# Patient Record
Sex: FEMALE | Race: WHITE | NOT HISPANIC OR LATINO | Employment: OTHER | URBAN - METROPOLITAN AREA
[De-identification: names, ages, dates, MRNs, and addresses within clinical notes are randomized per-mention and may not be internally consistent; named-entity substitution may affect disease eponyms.]

---

## 2017-01-05 ENCOUNTER — HOSPITAL ENCOUNTER (OUTPATIENT)
Dept: INFUSION CENTER | Facility: HOSPITAL | Age: 72
Discharge: HOME | End: 2017-01-05
Payer: MEDICARE

## 2017-01-05 ENCOUNTER — APPOINTMENT (OUTPATIENT)
Dept: LAB | Facility: HOSPITAL | Age: 72
End: 2017-01-05
Attending: INTERNAL MEDICINE
Payer: MEDICARE

## 2017-01-05 ENCOUNTER — TRANSCRIBE ORDERS (OUTPATIENT)
Dept: ADMINISTRATIVE | Facility: HOSPITAL | Age: 72
End: 2017-01-05

## 2017-01-05 VITALS
RESPIRATION RATE: 18 BRPM | OXYGEN SATURATION: 96 % | DIASTOLIC BLOOD PRESSURE: 68 MMHG | HEART RATE: 87 BPM | TEMPERATURE: 98.6 F | SYSTOLIC BLOOD PRESSURE: 142 MMHG

## 2017-01-05 DIAGNOSIS — N18.4 CHRONIC KIDNEY DISEASE, STAGE IV (SEVERE) (HCC): Primary | ICD-10-CM

## 2017-01-05 LAB
HCT VFR BLD AUTO: 40.6 % (ref 37–47)
HGB BLD-MCNC: 13.2 G/DL (ref 12–16)

## 2017-01-05 PROCEDURE — 36415 COLL VENOUS BLD VENIPUNCTURE: CPT | Performed by: INTERNAL MEDICINE

## 2017-01-05 PROCEDURE — 85018 HEMOGLOBIN: CPT | Performed by: INTERNAL MEDICINE

## 2017-01-05 PROCEDURE — 85014 HEMATOCRIT: CPT | Performed by: INTERNAL MEDICINE

## 2017-01-20 ENCOUNTER — HOSPITAL ENCOUNTER (OUTPATIENT)
Dept: INFUSION CENTER | Facility: HOSPITAL | Age: 72
Discharge: HOME | End: 2017-01-20
Payer: MEDICARE

## 2017-01-20 ENCOUNTER — APPOINTMENT (OUTPATIENT)
Dept: LAB | Facility: HOSPITAL | Age: 72
End: 2017-01-20
Attending: INTERNAL MEDICINE
Payer: MEDICARE

## 2017-01-20 VITALS
OXYGEN SATURATION: 98 % | SYSTOLIC BLOOD PRESSURE: 120 MMHG | DIASTOLIC BLOOD PRESSURE: 64 MMHG | TEMPERATURE: 98 F | RESPIRATION RATE: 18 BRPM | HEART RATE: 97 BPM

## 2017-01-20 LAB
HCT VFR BLD AUTO: 35.3 % (ref 37–47)
HGB BLD-MCNC: 11.3 G/DL (ref 12–16)

## 2017-01-20 PROCEDURE — 85014 HEMATOCRIT: CPT | Performed by: INTERNAL MEDICINE

## 2017-01-20 PROCEDURE — 36415 COLL VENOUS BLD VENIPUNCTURE: CPT | Performed by: INTERNAL MEDICINE

## 2017-01-20 PROCEDURE — 96372 THER/PROPH/DIAG INJ SC/IM: CPT

## 2017-01-20 PROCEDURE — 85018 HEMOGLOBIN: CPT | Performed by: INTERNAL MEDICINE

## 2017-01-20 RX ADMIN — ERYTHROPOIETIN 40000 UNITS: 40000 INJECTION, SOLUTION INTRAVENOUS; SUBCUTANEOUS at 10:16

## 2017-02-02 ENCOUNTER — APPOINTMENT (OUTPATIENT)
Dept: LAB | Facility: HOSPITAL | Age: 72
End: 2017-02-02
Attending: INTERNAL MEDICINE
Payer: MEDICARE

## 2017-02-02 ENCOUNTER — HOSPITAL ENCOUNTER (OUTPATIENT)
Dept: INFUSION CENTER | Facility: HOSPITAL | Age: 72
Discharge: HOME | End: 2017-02-02
Payer: MEDICARE

## 2017-02-02 VITALS
OXYGEN SATURATION: 96 % | DIASTOLIC BLOOD PRESSURE: 60 MMHG | SYSTOLIC BLOOD PRESSURE: 118 MMHG | HEART RATE: 97 BPM | TEMPERATURE: 97.5 F | RESPIRATION RATE: 20 BRPM

## 2017-02-02 LAB
HCT VFR BLD AUTO: 37.3 % (ref 37–47)
HGB BLD-MCNC: 12 G/DL (ref 12–16)

## 2017-02-02 PROCEDURE — 85014 HEMATOCRIT: CPT | Performed by: INTERNAL MEDICINE

## 2017-02-02 PROCEDURE — 36415 COLL VENOUS BLD VENIPUNCTURE: CPT | Performed by: INTERNAL MEDICINE

## 2017-02-02 PROCEDURE — 85018 HEMOGLOBIN: CPT | Performed by: INTERNAL MEDICINE

## 2017-02-02 NOTE — PLAN OF CARE
Problem: Potential for Falls  Goal: Patient will remain free of falls  INTERVENTIONS:  - Assess patient frequently for physical needs  - Identify cognitive and physical deficits and behaviors that affect risk of falls  - [place] fall precautions as indicated by assessment   - Educate patient/family on patient safety including physical limitations  - Instruct patient to call for assistance with activity based on assessment  - Modify environment to reduce risk of injury  - Consider OT/PT consult to assist with strengthening/mobility   Outcome: Progressing    Problem: HEMATOLOGIC - ADULT  Goal: Maintains hematologic stability  INTERVENTIONS  - Assess for signs and symptoms of bleeding or hemorrhage  - Monitor labs  - Administer supportive blood products/factors as ordered and appropriate   Outcome: Progressing    Problem: Knowledge Deficit  Goal: Patient/family/caregiver demonstrates understanding of disease process, treatment plan, medications, and discharge instructions  Complete learning assessment and assess knowledge base    Interventions:  - Provide teaching at level of understanding  - Provide teaching via preferred learning methods   Outcome: Progressing

## 2017-02-24 ENCOUNTER — APPOINTMENT (OUTPATIENT)
Dept: LAB | Facility: HOSPITAL | Age: 72
End: 2017-02-24
Attending: INTERNAL MEDICINE
Payer: MEDICARE

## 2017-02-24 ENCOUNTER — HOSPITAL ENCOUNTER (OUTPATIENT)
Dept: INFUSION CENTER | Facility: HOSPITAL | Age: 72
Discharge: HOME | End: 2017-02-24
Payer: MEDICARE

## 2017-02-24 ENCOUNTER — TRANSCRIBE ORDERS (OUTPATIENT)
Dept: ADMINISTRATIVE | Facility: HOSPITAL | Age: 72
End: 2017-02-24

## 2017-02-24 VITALS
SYSTOLIC BLOOD PRESSURE: 134 MMHG | RESPIRATION RATE: 18 BRPM | OXYGEN SATURATION: 95 % | DIASTOLIC BLOOD PRESSURE: 63 MMHG | HEART RATE: 103 BPM | TEMPERATURE: 98.5 F

## 2017-02-24 DIAGNOSIS — N18.4 CHRONIC KIDNEY DISEASE, STAGE IV (SEVERE) (HCC): Primary | ICD-10-CM

## 2017-02-24 LAB
HCT VFR BLD AUTO: 36 % (ref 37–47)
HGB BLD-MCNC: 11.5 G/DL (ref 12–16)

## 2017-02-24 PROCEDURE — 96372 THER/PROPH/DIAG INJ SC/IM: CPT

## 2017-02-24 PROCEDURE — 85014 HEMATOCRIT: CPT | Performed by: INTERNAL MEDICINE

## 2017-02-24 PROCEDURE — 36415 COLL VENOUS BLD VENIPUNCTURE: CPT | Performed by: INTERNAL MEDICINE

## 2017-02-24 PROCEDURE — 85018 HEMOGLOBIN: CPT | Performed by: INTERNAL MEDICINE

## 2017-02-24 RX ADMIN — ERYTHROPOIETIN 40000 UNITS: 40000 INJECTION, SOLUTION INTRAVENOUS; SUBCUTANEOUS at 10:05

## 2017-03-09 ENCOUNTER — HOSPITAL ENCOUNTER (OUTPATIENT)
Dept: INFUSION CENTER | Facility: HOSPITAL | Age: 72
Discharge: HOME | End: 2017-03-09
Payer: MEDICARE

## 2017-03-09 ENCOUNTER — APPOINTMENT (OUTPATIENT)
Dept: LAB | Facility: HOSPITAL | Age: 72
End: 2017-03-09
Attending: INTERNAL MEDICINE
Payer: MEDICARE

## 2017-03-09 VITALS
OXYGEN SATURATION: 94 % | HEART RATE: 88 BPM | SYSTOLIC BLOOD PRESSURE: 138 MMHG | TEMPERATURE: 98.1 F | RESPIRATION RATE: 20 BRPM | DIASTOLIC BLOOD PRESSURE: 66 MMHG

## 2017-03-09 LAB
HCT VFR BLD AUTO: 36.8 % (ref 37–47)
HGB BLD-MCNC: 11.9 G/DL (ref 12–16)

## 2017-03-09 PROCEDURE — 85014 HEMATOCRIT: CPT | Performed by: INTERNAL MEDICINE

## 2017-03-09 PROCEDURE — 96372 THER/PROPH/DIAG INJ SC/IM: CPT

## 2017-03-09 PROCEDURE — 36415 COLL VENOUS BLD VENIPUNCTURE: CPT | Performed by: INTERNAL MEDICINE

## 2017-03-09 PROCEDURE — 85018 HEMOGLOBIN: CPT | Performed by: INTERNAL MEDICINE

## 2017-03-09 RX ADMIN — ERYTHROPOIETIN 40000 UNITS: 40000 INJECTION, SOLUTION INTRAVENOUS; SUBCUTANEOUS at 10:15

## 2017-03-23 ENCOUNTER — HOSPITAL ENCOUNTER (OUTPATIENT)
Dept: INFUSION CENTER | Facility: HOSPITAL | Age: 72
Discharge: HOME | End: 2017-03-23
Payer: MEDICARE

## 2017-03-23 ENCOUNTER — APPOINTMENT (OUTPATIENT)
Dept: LAB | Facility: HOSPITAL | Age: 72
End: 2017-03-23
Attending: INTERNAL MEDICINE
Payer: MEDICARE

## 2017-03-23 VITALS — TEMPERATURE: 98.6 F | RESPIRATION RATE: 16 BRPM | OXYGEN SATURATION: 97 % | HEART RATE: 90 BPM

## 2017-03-23 DIAGNOSIS — D63.8 ANEMIA IN OTHER CHRONIC DISEASES CLASSIFIED ELSEWHERE: ICD-10-CM

## 2017-03-23 LAB
HCT VFR BLD AUTO: 37.4 % (ref 37–47)
HGB BLD-MCNC: 12 G/DL (ref 12–16)

## 2017-03-23 PROCEDURE — 85018 HEMOGLOBIN: CPT | Performed by: INTERNAL MEDICINE

## 2017-03-23 PROCEDURE — 85014 HEMATOCRIT: CPT | Performed by: INTERNAL MEDICINE

## 2017-03-23 PROCEDURE — 36415 COLL VENOUS BLD VENIPUNCTURE: CPT | Performed by: INTERNAL MEDICINE

## 2017-04-06 ENCOUNTER — APPOINTMENT (OUTPATIENT)
Dept: LAB | Facility: HOSPITAL | Age: 72
End: 2017-04-06
Attending: INTERNAL MEDICINE
Payer: MEDICARE

## 2017-04-06 ENCOUNTER — HOSPITAL ENCOUNTER (OUTPATIENT)
Dept: INFUSION CENTER | Facility: HOSPITAL | Age: 72
Discharge: HOME | End: 2017-04-06
Payer: MEDICARE

## 2017-04-06 ENCOUNTER — TRANSCRIBE ORDERS (OUTPATIENT)
Dept: ADMINISTRATIVE | Facility: HOSPITAL | Age: 72
End: 2017-04-06

## 2017-04-06 VITALS
SYSTOLIC BLOOD PRESSURE: 142 MMHG | RESPIRATION RATE: 16 BRPM | OXYGEN SATURATION: 98 % | HEART RATE: 88 BPM | TEMPERATURE: 98.1 F | DIASTOLIC BLOOD PRESSURE: 60 MMHG

## 2017-04-06 DIAGNOSIS — D63.8 CHRONIC DISEASE ANEMIA: ICD-10-CM

## 2017-04-06 DIAGNOSIS — D63.8 CHRONIC DISEASE ANEMIA: Primary | ICD-10-CM

## 2017-04-06 LAB
HCT VFR BLD AUTO: 39.4 % (ref 37–47)
HGB BLD-MCNC: 12.7 G/DL (ref 12–16)

## 2017-04-06 PROCEDURE — 85018 HEMOGLOBIN: CPT

## 2017-04-06 PROCEDURE — 85014 HEMATOCRIT: CPT

## 2017-04-06 PROCEDURE — 36415 COLL VENOUS BLD VENIPUNCTURE: CPT

## 2017-04-19 ENCOUNTER — APPOINTMENT (OUTPATIENT)
Dept: LAB | Facility: HOSPITAL | Age: 72
End: 2017-04-19
Attending: INTERNAL MEDICINE
Payer: MEDICARE

## 2017-04-19 ENCOUNTER — TRANSCRIBE ORDERS (OUTPATIENT)
Dept: ADMINISTRATIVE | Facility: HOSPITAL | Age: 72
End: 2017-04-19

## 2017-04-19 ENCOUNTER — HOSPITAL ENCOUNTER (OUTPATIENT)
Dept: INFUSION CENTER | Facility: HOSPITAL | Age: 72
Discharge: HOME | End: 2017-04-19
Payer: MEDICARE

## 2017-04-19 VITALS — TEMPERATURE: 97.5 F | OXYGEN SATURATION: 97 % | RESPIRATION RATE: 16 BRPM | HEART RATE: 78 BPM

## 2017-04-19 DIAGNOSIS — D63.8 CHRONIC DISEASE ANEMIA: ICD-10-CM

## 2017-04-19 DIAGNOSIS — D63.8 CHRONIC DISEASE ANEMIA: Primary | ICD-10-CM

## 2017-04-19 LAB
HCT VFR BLD AUTO: 37.7 % (ref 37–47)
HGB BLD-MCNC: 12 G/DL (ref 12–16)

## 2017-04-19 PROCEDURE — 36415 COLL VENOUS BLD VENIPUNCTURE: CPT | Performed by: INTERNAL MEDICINE

## 2017-04-19 PROCEDURE — 85018 HEMOGLOBIN: CPT

## 2017-04-19 PROCEDURE — 85014 HEMATOCRIT: CPT

## 2017-04-20 ENCOUNTER — ANESTHESIA (OUTPATIENT)
Dept: GASTROENTEROLOGY | Facility: AMBULARY SURGERY CENTER | Age: 72
End: 2017-04-20
Payer: MEDICARE

## 2017-04-20 ENCOUNTER — HOSPITAL ENCOUNTER (OUTPATIENT)
Facility: AMBULARY SURGERY CENTER | Age: 72
Setting detail: OUTPATIENT SURGERY
Discharge: HOME | End: 2017-04-20
Attending: INTERNAL MEDICINE | Admitting: INTERNAL MEDICINE
Payer: MEDICARE

## 2017-04-20 VITALS
SYSTOLIC BLOOD PRESSURE: 147 MMHG | WEIGHT: 203 LBS | RESPIRATION RATE: 18 BRPM | BODY MASS INDEX: 37.13 KG/M2 | DIASTOLIC BLOOD PRESSURE: 66 MMHG | HEART RATE: 76 BPM | TEMPERATURE: 99.4 F | OXYGEN SATURATION: 96 %

## 2017-04-20 DIAGNOSIS — R19.7 DIARRHEA: ICD-10-CM

## 2017-04-20 DIAGNOSIS — K51.80 OTHER ULCERATIVE COLITIS WITHOUT COMPLICATIONS (HCC): ICD-10-CM

## 2017-04-20 PROCEDURE — 88305 TISSUE EXAM BY PATHOLOGIST: CPT | Performed by: INTERNAL MEDICINE

## 2017-04-20 RX ORDER — PROPOFOL 10 MG/ML
INJECTION, EMULSION INTRAVENOUS AS NEEDED
Status: DISCONTINUED | OUTPATIENT
Start: 2017-04-20 | End: 2017-04-20 | Stop reason: SURG

## 2017-04-20 RX ORDER — FENTANYL CITRATE 50 UG/ML
INJECTION, SOLUTION INTRAMUSCULAR; INTRAVENOUS AS NEEDED
Status: DISCONTINUED | OUTPATIENT
Start: 2017-04-20 | End: 2017-04-20 | Stop reason: SURG

## 2017-04-20 RX ADMIN — PROPOFOL 50 MG: 10 INJECTION, EMULSION INTRAVENOUS at 12:15

## 2017-04-20 RX ADMIN — LIDOCAINE HYDROCHLORIDE 100 MG: 20 INJECTION, SOLUTION INTRAVENOUS at 12:01

## 2017-04-20 RX ADMIN — PROPOFOL 50 MG: 10 INJECTION, EMULSION INTRAVENOUS at 12:05

## 2017-04-20 RX ADMIN — FENTANYL CITRATE 25 MCG: 50 INJECTION, SOLUTION INTRAMUSCULAR; INTRAVENOUS at 12:05

## 2017-04-20 RX ADMIN — FENTANYL CITRATE 50 MCG: 50 INJECTION, SOLUTION INTRAMUSCULAR; INTRAVENOUS at 12:01

## 2017-04-20 RX ADMIN — PROPOFOL 100 MG: 10 INJECTION, EMULSION INTRAVENOUS at 12:01

## 2017-04-20 RX ADMIN — PROPOFOL 20 MG: 10 INJECTION, EMULSION INTRAVENOUS at 12:10

## 2017-04-20 RX ADMIN — FENTANYL CITRATE 25 MCG: 50 INJECTION, SOLUTION INTRAMUSCULAR; INTRAVENOUS at 12:10

## 2017-04-21 ENCOUNTER — ALLSCRIPTS OFFICE VISIT (OUTPATIENT)
Dept: OTHER | Facility: OTHER | Age: 72
End: 2017-04-21

## 2017-04-25 ENCOUNTER — GENERIC CONVERSION - ENCOUNTER (OUTPATIENT)
Dept: OTHER | Facility: OTHER | Age: 72
End: 2017-04-25

## 2017-04-27 ENCOUNTER — ALLSCRIPTS OFFICE VISIT (OUTPATIENT)
Dept: OTHER | Facility: OTHER | Age: 72
End: 2017-04-27

## 2017-05-11 ENCOUNTER — LAB (OUTPATIENT)
Dept: LAB | Facility: HOSPITAL | Age: 72
End: 2017-05-11
Attending: INTERNAL MEDICINE
Payer: MEDICARE

## 2017-05-11 ENCOUNTER — HOSPITAL ENCOUNTER (OUTPATIENT)
Dept: INFUSION CENTER | Facility: HOSPITAL | Age: 72
Discharge: HOME | End: 2017-05-11
Payer: MEDICARE

## 2017-05-11 ENCOUNTER — TRANSCRIBE ORDERS (OUTPATIENT)
Dept: ADMINISTRATIVE | Facility: HOSPITAL | Age: 72
End: 2017-05-11

## 2017-05-11 DIAGNOSIS — D63.8 CHRONIC DISEASE ANEMIA: ICD-10-CM

## 2017-05-11 DIAGNOSIS — D63.8 CHRONIC DISEASE ANEMIA: Primary | ICD-10-CM

## 2017-05-11 LAB
HCT VFR BLD AUTO: 36.6 % (ref 37–47)
HGB BLD-MCNC: 11.8 G/DL (ref 12–16)

## 2017-05-11 PROCEDURE — 96372 THER/PROPH/DIAG INJ SC/IM: CPT

## 2017-05-11 PROCEDURE — 85018 HEMOGLOBIN: CPT

## 2017-05-11 PROCEDURE — 85014 HEMATOCRIT: CPT

## 2017-05-11 PROCEDURE — 36415 COLL VENOUS BLD VENIPUNCTURE: CPT

## 2017-05-11 RX ADMIN — ERYTHROPOIETIN 40000 UNITS: 40000 INJECTION, SOLUTION INTRAVENOUS; SUBCUTANEOUS at 10:11

## 2017-05-25 ENCOUNTER — HOSPITAL ENCOUNTER (OUTPATIENT)
Dept: INFUSION CENTER | Facility: HOSPITAL | Age: 72
Discharge: HOME | End: 2017-05-25
Payer: MEDICARE

## 2017-05-25 ENCOUNTER — APPOINTMENT (OUTPATIENT)
Dept: LAB | Facility: HOSPITAL | Age: 72
End: 2017-05-25
Attending: INTERNAL MEDICINE
Payer: MEDICARE

## 2017-05-25 VITALS
OXYGEN SATURATION: 96 % | RESPIRATION RATE: 18 BRPM | HEART RATE: 64 BPM | SYSTOLIC BLOOD PRESSURE: 116 MMHG | DIASTOLIC BLOOD PRESSURE: 54 MMHG | TEMPERATURE: 98.4 F

## 2017-05-25 LAB
HCT VFR BLD AUTO: 39.1 % (ref 37–47)
HGB BLD-MCNC: 12.3 G/DL (ref 12–16)

## 2017-05-25 PROCEDURE — 85018 HEMOGLOBIN: CPT | Performed by: INTERNAL MEDICINE

## 2017-05-25 PROCEDURE — 36415 COLL VENOUS BLD VENIPUNCTURE: CPT | Performed by: INTERNAL MEDICINE

## 2017-05-25 PROCEDURE — 85014 HEMATOCRIT: CPT | Performed by: INTERNAL MEDICINE

## 2017-05-26 ENCOUNTER — ALLSCRIPTS OFFICE VISIT (OUTPATIENT)
Dept: OTHER | Facility: OTHER | Age: 72
End: 2017-05-26

## 2017-06-08 ENCOUNTER — APPOINTMENT (OUTPATIENT)
Dept: LAB | Facility: HOSPITAL | Age: 72
End: 2017-06-08
Attending: INTERNAL MEDICINE
Payer: MEDICARE

## 2017-06-08 ENCOUNTER — HOSPITAL ENCOUNTER (OUTPATIENT)
Dept: INFUSION CENTER | Facility: HOSPITAL | Age: 72
Discharge: HOME | End: 2017-06-08
Payer: MEDICARE

## 2017-06-08 DIAGNOSIS — D63.8 CHRONIC DISEASE ANEMIA: ICD-10-CM

## 2017-06-08 LAB
HCT VFR BLD AUTO: 37.3 % (ref 37–47)
HGB BLD-MCNC: 12 G/DL (ref 12–16)

## 2017-06-08 PROCEDURE — 85018 HEMOGLOBIN: CPT

## 2017-06-08 PROCEDURE — 85014 HEMATOCRIT: CPT

## 2017-06-08 PROCEDURE — 36415 COLL VENOUS BLD VENIPUNCTURE: CPT

## 2017-06-22 ENCOUNTER — HOSPITAL ENCOUNTER (OUTPATIENT)
Dept: INFUSION CENTER | Facility: HOSPITAL | Age: 72
Discharge: HOME | End: 2017-06-22
Payer: MEDICARE

## 2017-06-22 ENCOUNTER — TRANSCRIBE ORDERS (OUTPATIENT)
Dept: ADMINISTRATIVE | Facility: HOSPITAL | Age: 72
End: 2017-06-22

## 2017-06-22 ENCOUNTER — APPOINTMENT (OUTPATIENT)
Dept: LAB | Facility: HOSPITAL | Age: 72
End: 2017-06-22
Attending: INTERNAL MEDICINE
Payer: MEDICARE

## 2017-06-22 VITALS
HEART RATE: 83 BPM | RESPIRATION RATE: 18 BRPM | SYSTOLIC BLOOD PRESSURE: 110 MMHG | TEMPERATURE: 98.7 F | OXYGEN SATURATION: 95 % | DIASTOLIC BLOOD PRESSURE: 58 MMHG

## 2017-06-22 DIAGNOSIS — D63.8 CHRONIC DISEASE ANEMIA: Primary | ICD-10-CM

## 2017-06-22 LAB
HCT VFR BLD AUTO: 34.7 % (ref 37–47)
HGB BLD-MCNC: 11.4 G/DL (ref 12–16)

## 2017-06-22 PROCEDURE — 85014 HEMATOCRIT: CPT

## 2017-06-22 PROCEDURE — 36415 COLL VENOUS BLD VENIPUNCTURE: CPT | Performed by: INTERNAL MEDICINE

## 2017-06-22 PROCEDURE — 96372 THER/PROPH/DIAG INJ SC/IM: CPT

## 2017-06-22 PROCEDURE — 85018 HEMOGLOBIN: CPT

## 2017-06-22 RX ADMIN — ERYTHROPOIETIN 40000 UNITS: 40000 INJECTION, SOLUTION INTRAVENOUS; SUBCUTANEOUS at 10:28

## 2017-07-06 ENCOUNTER — HOSPITAL ENCOUNTER (OUTPATIENT)
Dept: INFUSION CENTER | Facility: HOSPITAL | Age: 72
Discharge: HOME | End: 2017-07-06
Payer: MEDICARE

## 2017-07-06 ENCOUNTER — APPOINTMENT (OUTPATIENT)
Dept: LAB | Facility: HOSPITAL | Age: 72
End: 2017-07-06
Attending: INTERNAL MEDICINE
Payer: MEDICARE

## 2017-07-06 ENCOUNTER — TRANSCRIBE ORDERS (OUTPATIENT)
Dept: ADMINISTRATIVE | Facility: HOSPITAL | Age: 72
End: 2017-07-06

## 2017-07-06 VITALS
DIASTOLIC BLOOD PRESSURE: 68 MMHG | HEART RATE: 90 BPM | TEMPERATURE: 97.5 F | RESPIRATION RATE: 20 BRPM | OXYGEN SATURATION: 97 % | SYSTOLIC BLOOD PRESSURE: 140 MMHG

## 2017-07-06 DIAGNOSIS — D63.8 ANEMIA IN OTHER CHRONIC DISEASES CLASSIFIED ELSEWHERE: ICD-10-CM

## 2017-07-06 LAB
HCT VFR BLD AUTO: 38.1 % (ref 37–47)
HGB BLD-MCNC: 12.6 G/DL (ref 12–16)

## 2017-07-06 PROCEDURE — 85018 HEMOGLOBIN: CPT

## 2017-07-06 PROCEDURE — 36415 COLL VENOUS BLD VENIPUNCTURE: CPT

## 2017-07-06 PROCEDURE — 85014 HEMATOCRIT: CPT

## 2017-07-19 ENCOUNTER — HOSPITAL ENCOUNTER (OUTPATIENT)
Dept: INFUSION CENTER | Facility: HOSPITAL | Age: 72
Discharge: HOME | End: 2017-07-19
Attending: DENTIST
Payer: MEDICARE

## 2017-07-19 VITALS
OXYGEN SATURATION: 97 % | HEART RATE: 83 BPM | DIASTOLIC BLOOD PRESSURE: 64 MMHG | RESPIRATION RATE: 20 BRPM | SYSTOLIC BLOOD PRESSURE: 130 MMHG | TEMPERATURE: 98.5 F

## 2017-07-19 LAB
BASOPHILS # BLD AUTO: 0 THOUSANDS/ΜL (ref 0–0.1)
BASOPHILS NFR BLD AUTO: 1 % (ref 0–1)
EOSINOPHIL # BLD AUTO: 0.2 THOUSAND/ΜL (ref 0–0.61)
EOSINOPHIL NFR BLD AUTO: 3 % (ref 0–6)
ERYTHROCYTE [DISTWIDTH] IN BLOOD BY AUTOMATED COUNT: 15.9 % (ref 11.6–15.1)
HCT VFR BLD AUTO: 35 % (ref 37–47)
HGB BLD-MCNC: 11.4 G/DL (ref 12–16)
LYMPHOCYTES # BLD AUTO: 0.9 THOUSANDS/ΜL (ref 0.6–4.47)
LYMPHOCYTES NFR BLD AUTO: 16 % (ref 14–44)
MCH RBC QN AUTO: 27.9 PG (ref 27–31)
MCHC RBC AUTO-ENTMCNC: 32.7 G/DL (ref 31.4–37.4)
MCV RBC AUTO: 85 FL (ref 82–98)
MONOCYTES # BLD AUTO: 0.4 THOUSAND/ΜL (ref 0.17–1.22)
MONOCYTES NFR BLD AUTO: 8 % (ref 4–12)
NEUTROPHILS # BLD AUTO: 3.9 THOUSANDS/ΜL (ref 1.85–7.62)
NEUTS SEG NFR BLD AUTO: 73 % (ref 43–75)
NRBC BLD AUTO-RTO: 0 /100 WBCS
PLATELET # BLD AUTO: 213 THOUSANDS/UL (ref 130–400)
PMV BLD AUTO: 7.4 FL (ref 8.9–12.7)
RBC # BLD AUTO: 4.1 MILLION/UL (ref 4.2–5.4)
WBC # BLD AUTO: 5.4 THOUSAND/UL (ref 4.8–10.8)

## 2017-07-19 PROCEDURE — 85025 COMPLETE CBC W/AUTO DIFF WBC: CPT | Performed by: INTERNAL MEDICINE

## 2017-07-19 PROCEDURE — 96372 THER/PROPH/DIAG INJ SC/IM: CPT

## 2017-07-19 RX ADMIN — ERYTHROPOIETIN 40000 UNITS: 40000 INJECTION, SOLUTION INTRAVENOUS; SUBCUTANEOUS at 10:23

## 2017-07-24 ENCOUNTER — TRANSCRIBE ORDERS (OUTPATIENT)
Dept: ADMINISTRATIVE | Facility: HOSPITAL | Age: 72
End: 2017-07-24

## 2017-07-24 DIAGNOSIS — Z12.31 ENCOUNTER FOR MAMMOGRAM TO ESTABLISH BASELINE MAMMOGRAM: Primary | ICD-10-CM

## 2017-07-26 ENCOUNTER — HOSPITAL ENCOUNTER (OUTPATIENT)
Dept: RADIOLOGY | Facility: HOSPITAL | Age: 72
Discharge: HOME | End: 2017-07-26
Payer: MEDICARE

## 2017-07-26 DIAGNOSIS — Z12.31 ENCOUNTER FOR MAMMOGRAM TO ESTABLISH BASELINE MAMMOGRAM: ICD-10-CM

## 2017-07-26 PROCEDURE — G0202 SCR MAMMO BI INCL CAD: HCPCS

## 2017-08-03 ENCOUNTER — HOSPITAL ENCOUNTER (OUTPATIENT)
Dept: INFUSION CENTER | Facility: HOSPITAL | Age: 72
Discharge: HOME | End: 2017-08-03
Payer: MEDICARE

## 2017-08-03 VITALS
TEMPERATURE: 98.6 F | HEART RATE: 76 BPM | OXYGEN SATURATION: 96 % | RESPIRATION RATE: 20 BRPM | SYSTOLIC BLOOD PRESSURE: 130 MMHG | DIASTOLIC BLOOD PRESSURE: 68 MMHG

## 2017-08-03 LAB
HCT VFR BLD AUTO: 35.7 % (ref 37–47)
HGB BLD-MCNC: 11.7 G/DL (ref 12–16)

## 2017-08-03 PROCEDURE — 96372 THER/PROPH/DIAG INJ SC/IM: CPT

## 2017-08-03 PROCEDURE — 85018 HEMOGLOBIN: CPT | Performed by: INTERNAL MEDICINE

## 2017-08-03 PROCEDURE — 85014 HEMATOCRIT: CPT | Performed by: INTERNAL MEDICINE

## 2017-08-03 RX ADMIN — ERYTHROPOIETIN 40000 UNITS: 40000 INJECTION, SOLUTION INTRAVENOUS; SUBCUTANEOUS at 10:08

## 2017-08-17 ENCOUNTER — HOSPITAL ENCOUNTER (OUTPATIENT)
Dept: INFUSION CENTER | Facility: HOSPITAL | Age: 72
Discharge: HOME | End: 2017-08-17
Payer: MEDICARE

## 2017-08-17 VITALS
HEART RATE: 78 BPM | DIASTOLIC BLOOD PRESSURE: 64 MMHG | OXYGEN SATURATION: 98 % | TEMPERATURE: 98.8 F | SYSTOLIC BLOOD PRESSURE: 126 MMHG | RESPIRATION RATE: 16 BRPM

## 2017-08-17 LAB
BASOPHILS # BLD AUTO: 0 THOUSANDS/ΜL (ref 0–0.1)
BASOPHILS NFR BLD AUTO: 1 % (ref 0–1)
EOSINOPHIL # BLD AUTO: 0.1 THOUSAND/ΜL (ref 0–0.61)
EOSINOPHIL NFR BLD AUTO: 3 % (ref 0–6)
ERYTHROCYTE [DISTWIDTH] IN BLOOD BY AUTOMATED COUNT: 15.2 % (ref 11.6–15.1)
HCT VFR BLD AUTO: 36.7 % (ref 37–47)
HGB BLD-MCNC: 12.1 G/DL (ref 12–16)
LYMPHOCYTES # BLD AUTO: 0.6 THOUSANDS/ΜL (ref 0.6–4.47)
LYMPHOCYTES NFR BLD AUTO: 13 % (ref 14–44)
MCH RBC QN AUTO: 28.2 PG (ref 27–31)
MCHC RBC AUTO-ENTMCNC: 33 G/DL (ref 31.4–37.4)
MCV RBC AUTO: 86 FL (ref 82–98)
MONOCYTES # BLD AUTO: 0.5 THOUSAND/ΜL (ref 0.17–1.22)
MONOCYTES NFR BLD AUTO: 10 % (ref 4–12)
NEUTROPHILS # BLD AUTO: 3.4 THOUSANDS/ΜL (ref 1.85–7.62)
NEUTS SEG NFR BLD AUTO: 73 % (ref 43–75)
NRBC BLD AUTO-RTO: 0 /100 WBCS
PLATELET # BLD AUTO: 216 THOUSANDS/UL (ref 130–400)
PMV BLD AUTO: 7.4 FL (ref 8.9–12.7)
RBC # BLD AUTO: 4.28 MILLION/UL (ref 4.2–5.4)
WBC # BLD AUTO: 4.6 THOUSAND/UL (ref 4.8–10.8)

## 2017-08-17 PROCEDURE — 85025 COMPLETE CBC W/AUTO DIFF WBC: CPT | Performed by: INTERNAL MEDICINE

## 2017-08-21 ENCOUNTER — ALLSCRIPTS OFFICE VISIT (OUTPATIENT)
Dept: OTHER | Facility: OTHER | Age: 72
End: 2017-08-21

## 2017-08-31 ENCOUNTER — HOSPITAL ENCOUNTER (OUTPATIENT)
Dept: INFUSION CENTER | Facility: HOSPITAL | Age: 72
Discharge: HOME | End: 2017-08-31
Payer: MEDICARE

## 2017-08-31 VITALS
OXYGEN SATURATION: 97 % | DIASTOLIC BLOOD PRESSURE: 62 MMHG | RESPIRATION RATE: 20 BRPM | SYSTOLIC BLOOD PRESSURE: 128 MMHG | HEART RATE: 89 BPM | TEMPERATURE: 98.7 F

## 2017-08-31 LAB
HCT VFR BLD AUTO: 39 % (ref 37–47)
HGB BLD-MCNC: 12.9 G/DL (ref 12–16)

## 2017-08-31 PROCEDURE — 85014 HEMATOCRIT: CPT | Performed by: INTERNAL MEDICINE

## 2017-08-31 PROCEDURE — 85018 HEMOGLOBIN: CPT | Performed by: INTERNAL MEDICINE

## 2017-09-01 DIAGNOSIS — J98.6 DISORDER OF DIAPHRAGM: ICD-10-CM

## 2017-09-14 ENCOUNTER — HOSPITAL ENCOUNTER (OUTPATIENT)
Dept: INFUSION CENTER | Facility: HOSPITAL | Age: 72
Discharge: HOME | End: 2017-09-14
Payer: MEDICARE

## 2017-09-14 VITALS
RESPIRATION RATE: 16 BRPM | DIASTOLIC BLOOD PRESSURE: 64 MMHG | OXYGEN SATURATION: 96 % | HEART RATE: 91 BPM | TEMPERATURE: 98.5 F | SYSTOLIC BLOOD PRESSURE: 132 MMHG

## 2017-09-14 LAB
HCT VFR BLD AUTO: 37.6 % (ref 37–47)
HGB BLD-MCNC: 12.4 G/DL (ref 12–16)

## 2017-09-14 PROCEDURE — 85018 HEMOGLOBIN: CPT | Performed by: INTERNAL MEDICINE

## 2017-09-14 PROCEDURE — 85014 HEMATOCRIT: CPT | Performed by: INTERNAL MEDICINE

## 2017-09-28 ENCOUNTER — HOSPITAL ENCOUNTER (OUTPATIENT)
Dept: INFUSION CENTER | Facility: HOSPITAL | Age: 72
Discharge: HOME | End: 2017-09-28
Payer: MEDICARE

## 2017-09-28 VITALS
TEMPERATURE: 98.1 F | DIASTOLIC BLOOD PRESSURE: 60 MMHG | SYSTOLIC BLOOD PRESSURE: 132 MMHG | RESPIRATION RATE: 18 BRPM | HEART RATE: 85 BPM | OXYGEN SATURATION: 96 %

## 2017-09-28 LAB
BASOPHILS # BLD AUTO: 0 THOUSANDS/ΜL (ref 0–0.1)
BASOPHILS NFR BLD AUTO: 0 % (ref 0–1)
EOSINOPHIL # BLD AUTO: 0.1 THOUSAND/ΜL (ref 0–0.61)
EOSINOPHIL NFR BLD AUTO: 2 % (ref 0–6)
ERYTHROCYTE [DISTWIDTH] IN BLOOD BY AUTOMATED COUNT: 14.6 % (ref 11.6–15.1)
HCT VFR BLD AUTO: 35.7 % (ref 37–47)
HGB BLD-MCNC: 11.7 G/DL (ref 12–16)
LYMPHOCYTES # BLD AUTO: 0.7 THOUSANDS/ΜL (ref 0.6–4.47)
LYMPHOCYTES NFR BLD AUTO: 13 % (ref 14–44)
MCH RBC QN AUTO: 27.7 PG (ref 27–31)
MCHC RBC AUTO-ENTMCNC: 32.7 G/DL (ref 31.4–37.4)
MCV RBC AUTO: 85 FL (ref 82–98)
MONOCYTES # BLD AUTO: 0.5 THOUSAND/ΜL (ref 0.17–1.22)
MONOCYTES NFR BLD AUTO: 9 % (ref 4–12)
NEUTROPHILS # BLD AUTO: 4.2 THOUSANDS/ΜL (ref 1.85–7.62)
NEUTS SEG NFR BLD AUTO: 76 % (ref 43–75)
PLATELET # BLD AUTO: 186 THOUSANDS/UL (ref 130–400)
PLATELET BLD QL SMEAR: ADEQUATE
PMV BLD AUTO: 7.8 FL (ref 8.9–12.7)
RBC # BLD AUTO: 4.23 MILLION/UL (ref 4.2–5.4)
WBC # BLD AUTO: 5.5 THOUSAND/UL (ref 4.8–10.8)

## 2017-09-28 PROCEDURE — 96372 THER/PROPH/DIAG INJ SC/IM: CPT

## 2017-09-28 PROCEDURE — 85025 COMPLETE CBC W/AUTO DIFF WBC: CPT | Performed by: INTERNAL MEDICINE

## 2017-09-28 RX ADMIN — ERYTHROPOIETIN 40000 UNITS: 40000 INJECTION, SOLUTION INTRAVENOUS; SUBCUTANEOUS at 10:40

## 2017-10-12 ENCOUNTER — HOSPITAL ENCOUNTER (OUTPATIENT)
Dept: INFUSION CENTER | Facility: HOSPITAL | Age: 72
Discharge: HOME | End: 2017-10-12
Payer: MEDICARE

## 2017-10-12 VITALS
TEMPERATURE: 98.1 F | OXYGEN SATURATION: 95 % | SYSTOLIC BLOOD PRESSURE: 128 MMHG | DIASTOLIC BLOOD PRESSURE: 66 MMHG | HEART RATE: 87 BPM | RESPIRATION RATE: 20 BRPM

## 2017-10-12 LAB
HCT VFR BLD AUTO: 37.2 % (ref 37–47)
HGB BLD-MCNC: 12 G/DL (ref 12–16)

## 2017-10-12 PROCEDURE — 85018 HEMOGLOBIN: CPT | Performed by: INTERNAL MEDICINE

## 2017-10-12 PROCEDURE — 85014 HEMATOCRIT: CPT | Performed by: INTERNAL MEDICINE

## 2017-10-12 NOTE — PLAN OF CARE
HEMATOLOGIC - ADULT     Maintains hematologic stability Progressing        Knowledge Deficit     Patient/family/caregiver demonstrates understanding of disease process, treatment plan, medications, and discharge instructions Progressing

## 2017-10-26 ENCOUNTER — TRANSCRIBE ORDERS (OUTPATIENT)
Dept: ADMINISTRATIVE | Facility: HOSPITAL | Age: 72
End: 2017-10-26

## 2017-10-26 ENCOUNTER — HOSPITAL ENCOUNTER (OUTPATIENT)
Dept: INFUSION CENTER | Facility: HOSPITAL | Age: 72
Discharge: HOME | DRG: 041 | End: 2017-10-26
Payer: MEDICARE

## 2017-10-26 ENCOUNTER — HOSPITAL ENCOUNTER (OUTPATIENT)
Dept: RADIOLOGY | Facility: HOSPITAL | Age: 72
Discharge: HOME | DRG: 041 | End: 2017-10-26
Attending: INTERNAL MEDICINE
Payer: MEDICARE

## 2017-10-26 VITALS — SYSTOLIC BLOOD PRESSURE: 136 MMHG | DIASTOLIC BLOOD PRESSURE: 60 MMHG

## 2017-10-26 DIAGNOSIS — J98.6 DIAPHRAGMITIS: ICD-10-CM

## 2017-10-26 DIAGNOSIS — J98.6 DIAPHRAGMITIS: Primary | ICD-10-CM

## 2017-10-26 LAB
BASOPHILS # BLD AUTO: 0 THOUSANDS/ΜL (ref 0–0.1)
BASOPHILS NFR BLD AUTO: 0 % (ref 0–1)
EOSINOPHIL # BLD AUTO: 0.2 THOUSAND/ΜL (ref 0–0.61)
EOSINOPHIL NFR BLD AUTO: 3 % (ref 0–6)
ERYTHROCYTE [DISTWIDTH] IN BLOOD BY AUTOMATED COUNT: 16.2 % (ref 11.6–15.1)
HCT VFR BLD AUTO: 35.9 % (ref 37–47)
HGB BLD-MCNC: 11.8 G/DL (ref 12–16)
LYMPHOCYTES # BLD AUTO: 0.8 THOUSANDS/ΜL (ref 0.6–4.47)
LYMPHOCYTES NFR BLD AUTO: 14 % (ref 14–44)
MCH RBC QN AUTO: 28.1 PG (ref 27–31)
MCHC RBC AUTO-ENTMCNC: 32.7 G/DL (ref 31.4–37.4)
MCV RBC AUTO: 86 FL (ref 82–98)
MONOCYTES # BLD AUTO: 0.5 THOUSAND/ΜL (ref 0.17–1.22)
MONOCYTES NFR BLD AUTO: 8 % (ref 4–12)
NEUTROPHILS # BLD AUTO: 4.2 THOUSANDS/ΜL (ref 1.85–7.62)
NEUTS SEG NFR BLD AUTO: 74 % (ref 43–75)
NRBC BLD AUTO-RTO: 0 /100 WBCS
PLATELET # BLD AUTO: 232 THOUSANDS/UL (ref 130–400)
PMV BLD AUTO: 7.8 FL (ref 8.9–12.7)
RBC # BLD AUTO: 4.19 MILLION/UL (ref 4.2–5.4)
WBC # BLD AUTO: 5.7 THOUSAND/UL (ref 4.8–10.8)

## 2017-10-26 PROCEDURE — 96372 THER/PROPH/DIAG INJ SC/IM: CPT

## 2017-10-26 PROCEDURE — 85025 COMPLETE CBC W/AUTO DIFF WBC: CPT | Performed by: INTERNAL MEDICINE

## 2017-10-26 PROCEDURE — 71020 HB CHEST X-RAY 2VW FRONTAL&LATL: CPT

## 2017-10-26 RX ADMIN — ERYTHROPOIETIN 40000 UNITS: 40000 INJECTION, SOLUTION INTRAVENOUS; SUBCUTANEOUS at 10:19

## 2017-10-29 ENCOUNTER — HOSPITAL ENCOUNTER (INPATIENT)
Facility: HOSPITAL | Age: 72
LOS: 2 days | Discharge: HOME | DRG: 041 | End: 2017-10-31
Attending: EMERGENCY MEDICINE | Admitting: STUDENT IN AN ORGANIZED HEALTH CARE EDUCATION/TRAINING PROGRAM
Payer: MEDICARE

## 2017-10-29 ENCOUNTER — APPOINTMENT (EMERGENCY)
Dept: RADIOLOGY | Facility: HOSPITAL | Age: 72
DRG: 041 | End: 2017-10-29
Payer: MEDICARE

## 2017-10-29 ENCOUNTER — APPOINTMENT (OUTPATIENT)
Dept: RADIOLOGY | Facility: HOSPITAL | Age: 72
DRG: 041 | End: 2017-10-29
Payer: MEDICARE

## 2017-10-29 DIAGNOSIS — R00.0 TACHYCARDIA: ICD-10-CM

## 2017-10-29 DIAGNOSIS — G45.9 TIA (TRANSIENT ISCHEMIC ATTACK): Primary | ICD-10-CM

## 2017-10-29 PROBLEM — E78.5 HLD (HYPERLIPIDEMIA): Status: ACTIVE | Noted: 2017-10-29

## 2017-10-29 LAB
ALBUMIN SERPL BCP-MCNC: 3.6 G/DL (ref 3.5–5)
ALP SERPL-CCNC: 89 U/L (ref 46–116)
ALT SERPL W P-5'-P-CCNC: 24 U/L (ref 12–78)
ANION GAP SERPL CALCULATED.3IONS-SCNC: 13 MMOL/L (ref 4–13)
APTT PPP: 24 SECONDS (ref 23–35)
AST SERPL W P-5'-P-CCNC: 20 U/L (ref 5–45)
BACTERIA UR QL AUTO: ABNORMAL /HPF
BASOPHILS # BLD AUTO: 0.1 THOUSANDS/ΜL (ref 0–0.1)
BASOPHILS NFR BLD AUTO: 1 % (ref 0–1)
BILIRUB SERPL-MCNC: 0.5 MG/DL (ref 0.2–1)
BILIRUB UR QL STRIP: NEGATIVE
BUN SERPL-MCNC: 18 MG/DL (ref 5–25)
CALCIUM SERPL-MCNC: 9.1 MG/DL (ref 8.3–10.1)
CHLORIDE SERPL-SCNC: 104 MMOL/L (ref 100–108)
CHOLEST SERPL-MCNC: 122 MG/DL (ref 50–200)
CLARITY UR: CLEAR
CO2 SERPL-SCNC: 23 MMOL/L (ref 21–32)
COLOR UR: ABNORMAL
CREAT SERPL-MCNC: 1.1 MG/DL (ref 0.6–1.3)
EOSINOPHIL # BLD AUTO: 0.2 THOUSAND/ΜL (ref 0–0.61)
EOSINOPHIL NFR BLD AUTO: 3 % (ref 0–6)
ERYTHROCYTE [DISTWIDTH] IN BLOOD BY AUTOMATED COUNT: 15.1 % (ref 11.6–15.1)
EST. AVERAGE GLUCOSE BLD GHB EST-MCNC: 123 MG/DL
GFR SERPL CREATININE-BSD FRML MDRD: 50 ML/MIN/1.73SQ M
GLUCOSE SERPL-MCNC: 136 MG/DL (ref 65–140)
GLUCOSE UR STRIP-MCNC: NEGATIVE MG/DL
HBA1C MFR BLD: 5.9 % (ref 4.2–6.3)
HCT VFR BLD AUTO: 38.2 % (ref 37–47)
HDLC SERPL-MCNC: 46 MG/DL (ref 40–60)
HGB BLD-MCNC: 12.4 G/DL (ref 12–16)
HGB UR QL STRIP.AUTO: ABNORMAL
INR PPP: 0.95 (ref 0.86–1.16)
KETONES UR STRIP-MCNC: NEGATIVE MG/DL
LDLC SERPL CALC-MCNC: 60 MG/DL (ref 0–100)
LEUKOCYTE ESTERASE UR QL STRIP: NEGATIVE
LYMPHOCYTES # BLD AUTO: 1 THOUSANDS/ΜL (ref 0.6–4.47)
LYMPHOCYTES NFR BLD AUTO: 14 % (ref 14–44)
MCH RBC QN AUTO: 27.5 PG (ref 27–31)
MCHC RBC AUTO-ENTMCNC: 32.5 G/DL (ref 31.4–37.4)
MCV RBC AUTO: 85 FL (ref 82–98)
MONOCYTES # BLD AUTO: 0.6 THOUSAND/ΜL (ref 0.17–1.22)
MONOCYTES NFR BLD AUTO: 9 % (ref 4–12)
NEUTROPHILS # BLD AUTO: 5.2 THOUSANDS/ΜL (ref 1.85–7.62)
NEUTS SEG NFR BLD AUTO: 74 % (ref 43–75)
NITRITE UR QL STRIP: NEGATIVE
NON-SQ EPI CELLS URNS QL MICRO: ABNORMAL /HPF
PH UR STRIP.AUTO: 5.5 [PH] (ref 5–9)
PLATELET # BLD AUTO: 211 THOUSANDS/UL (ref 130–400)
PLATELET # BLD AUTO: 237 THOUSANDS/UL (ref 130–400)
PMV BLD AUTO: 7.3 FL (ref 8.9–12.7)
PMV BLD AUTO: 7.9 FL (ref 8.9–12.7)
POTASSIUM SERPL-SCNC: 4 MMOL/L (ref 3.5–5.3)
PROT SERPL-MCNC: 7.3 G/DL (ref 6.4–8.2)
PROT UR STRIP-MCNC: NEGATIVE MG/DL
PROTHROMBIN TIME: 10 SECONDS (ref 9.4–11.7)
RBC # BLD AUTO: 4.5 MILLION/UL (ref 4.2–5.4)
RBC #/AREA URNS AUTO: ABNORMAL /HPF
SODIUM SERPL-SCNC: 140 MMOL/L (ref 136–145)
SP GR UR STRIP.AUTO: <=1.005 (ref 1–1.03)
TRIGL SERPL-MCNC: 82 MG/DL
TROPONIN I SERPL-MCNC: 0.03 NG/ML
TROPONIN I SERPL-MCNC: <0.02 NG/ML
UROBILINOGEN UR QL STRIP.AUTO: 0.2 E.U./DL
WBC # BLD AUTO: 7.2 THOUSAND/UL (ref 4.8–10.8)
WBC #/AREA URNS AUTO: ABNORMAL /HPF

## 2017-10-29 PROCEDURE — G8979 MOBILITY GOAL STATUS: HCPCS

## 2017-10-29 PROCEDURE — 84484 ASSAY OF TROPONIN QUANT: CPT | Performed by: STUDENT IN AN ORGANIZED HEALTH CARE EDUCATION/TRAINING PROGRAM

## 2017-10-29 PROCEDURE — 36415 COLL VENOUS BLD VENIPUNCTURE: CPT | Performed by: EMERGENCY MEDICINE

## 2017-10-29 PROCEDURE — 81001 URINALYSIS AUTO W/SCOPE: CPT | Performed by: FAMILY MEDICINE

## 2017-10-29 PROCEDURE — 85730 THROMBOPLASTIN TIME PARTIAL: CPT | Performed by: EMERGENCY MEDICINE

## 2017-10-29 PROCEDURE — 96361 HYDRATE IV INFUSION ADD-ON: CPT

## 2017-10-29 PROCEDURE — 96374 THER/PROPH/DIAG INJ IV PUSH: CPT

## 2017-10-29 PROCEDURE — 84484 ASSAY OF TROPONIN QUANT: CPT | Performed by: EMERGENCY MEDICINE

## 2017-10-29 PROCEDURE — 85025 COMPLETE CBC W/AUTO DIFF WBC: CPT | Performed by: EMERGENCY MEDICINE

## 2017-10-29 PROCEDURE — G8978 MOBILITY CURRENT STATUS: HCPCS

## 2017-10-29 PROCEDURE — 97162 PT EVAL MOD COMPLEX 30 MIN: CPT

## 2017-10-29 PROCEDURE — 70498 CT ANGIOGRAPHY NECK: CPT

## 2017-10-29 PROCEDURE — 85610 PROTHROMBIN TIME: CPT | Performed by: EMERGENCY MEDICINE

## 2017-10-29 PROCEDURE — 70496 CT ANGIOGRAPHY HEAD: CPT

## 2017-10-29 PROCEDURE — 93005 ELECTROCARDIOGRAM TRACING: CPT | Performed by: EMERGENCY MEDICINE

## 2017-10-29 PROCEDURE — 83036 HEMOGLOBIN GLYCOSYLATED A1C: CPT | Performed by: STUDENT IN AN ORGANIZED HEALTH CARE EDUCATION/TRAINING PROGRAM

## 2017-10-29 PROCEDURE — G8997 SWALLOW GOAL STATUS: HCPCS

## 2017-10-29 PROCEDURE — 71010 HB CHEST X-RAY 1 VIEW FRONTAL (PORTABLE): CPT

## 2017-10-29 PROCEDURE — 92610 EVALUATE SWALLOWING FUNCTION: CPT

## 2017-10-29 PROCEDURE — 80053 COMPREHEN METABOLIC PANEL: CPT | Performed by: EMERGENCY MEDICINE

## 2017-10-29 PROCEDURE — 87081 CULTURE SCREEN ONLY: CPT | Performed by: STUDENT IN AN ORGANIZED HEALTH CARE EDUCATION/TRAINING PROGRAM

## 2017-10-29 PROCEDURE — G8996 SWALLOW CURRENT STATUS: HCPCS

## 2017-10-29 PROCEDURE — 99285 EMERGENCY DEPT VISIT HI MDM: CPT

## 2017-10-29 PROCEDURE — 82948 REAGENT STRIP/BLOOD GLUCOSE: CPT

## 2017-10-29 PROCEDURE — 97110 THERAPEUTIC EXERCISES: CPT

## 2017-10-29 PROCEDURE — 80061 LIPID PANEL: CPT | Performed by: STUDENT IN AN ORGANIZED HEALTH CARE EDUCATION/TRAINING PROGRAM

## 2017-10-29 PROCEDURE — 85049 AUTOMATED PLATELET COUNT: CPT | Performed by: STUDENT IN AN ORGANIZED HEALTH CARE EDUCATION/TRAINING PROGRAM

## 2017-10-29 PROCEDURE — 70450 CT HEAD/BRAIN W/O DYE: CPT

## 2017-10-29 RX ORDER — PRAVASTATIN SODIUM 40 MG
40 TABLET ORAL
Status: DISCONTINUED | OUTPATIENT
Start: 2017-10-29 | End: 2017-10-30

## 2017-10-29 RX ORDER — PRAVASTATIN SODIUM 20 MG
20 TABLET ORAL DAILY
Status: DISCONTINUED | OUTPATIENT
Start: 2017-10-29 | End: 2017-10-29

## 2017-10-29 RX ORDER — LEVOTHYROXINE SODIUM 112 UG/1
112 TABLET ORAL
Status: DISCONTINUED | OUTPATIENT
Start: 2017-10-29 | End: 2017-10-31 | Stop reason: HOSPADM

## 2017-10-29 RX ORDER — ERGOCALCIFEROL (VITAMIN D2) 1250 MCG
50000 CAPSULE ORAL WEEKLY
Status: ON HOLD | COMMUNITY
End: 2017-10-29

## 2017-10-29 RX ORDER — PHENOL 1.4 %
600 AEROSOL, SPRAY (ML) MUCOUS MEMBRANE DAILY
COMMUNITY
End: 2018-04-27

## 2017-10-29 RX ORDER — HEPARIN SODIUM 5000 [USP'U]/ML
5000 INJECTION, SOLUTION INTRAVENOUS; SUBCUTANEOUS EVERY 8 HOURS SCHEDULED
Status: DISCONTINUED | OUTPATIENT
Start: 2017-10-29 | End: 2017-10-31 | Stop reason: HOSPADM

## 2017-10-29 RX ORDER — ONDANSETRON 2 MG/ML
4 INJECTION INTRAMUSCULAR; INTRAVENOUS ONCE
Status: COMPLETED | OUTPATIENT
Start: 2017-10-29 | End: 2017-10-29

## 2017-10-29 RX ORDER — MYCOPHENOLATE MOFETIL 250 MG/1
750 CAPSULE ORAL 2 TIMES DAILY
Status: DISCONTINUED | OUTPATIENT
Start: 2017-10-29 | End: 2017-10-31 | Stop reason: HOSPADM

## 2017-10-29 RX ORDER — CLOPIDOGREL BISULFATE 75 MG/1
75 TABLET ORAL DAILY
Status: DISCONTINUED | OUTPATIENT
Start: 2017-10-29 | End: 2017-10-31 | Stop reason: HOSPADM

## 2017-10-29 RX ORDER — CYCLOSPORINE 25 MG/1
50 CAPSULE, GELATIN COATED ORAL 2 TIMES DAILY
Status: DISCONTINUED | OUTPATIENT
Start: 2017-10-29 | End: 2017-10-29

## 2017-10-29 RX ORDER — PRAVASTATIN SODIUM 20 MG
20 TABLET ORAL DAILY
COMMUNITY
End: 2017-10-31 | Stop reason: HOSPADM

## 2017-10-29 RX ORDER — OMEPRAZOLE 40 MG/1
40 CAPSULE, DELAYED RELEASE ORAL DAILY
COMMUNITY

## 2017-10-29 RX ORDER — PANTOPRAZOLE SODIUM 20 MG/1
20 TABLET, DELAYED RELEASE ORAL
Status: DISCONTINUED | OUTPATIENT
Start: 2017-10-29 | End: 2017-10-31 | Stop reason: HOSPADM

## 2017-10-29 RX ORDER — CYCLOSPORINE 25 MG/1
50 CAPSULE, LIQUID FILLED ORAL 2 TIMES DAILY
Status: DISCONTINUED | OUTPATIENT
Start: 2017-10-29 | End: 2017-10-30

## 2017-10-29 RX ADMIN — MYCOPHENOLATE MOFETIL 750 MG: 250 CAPSULE ORAL at 17:33

## 2017-10-29 RX ADMIN — ONDANSETRON 4 MG: 2 INJECTION INTRAMUSCULAR; INTRAVENOUS at 02:25

## 2017-10-29 RX ADMIN — PRAVASTATIN SODIUM 40 MG: 40 TABLET ORAL at 17:33

## 2017-10-29 RX ADMIN — PRAVASTATIN SODIUM 20 MG: 20 TABLET ORAL at 09:37

## 2017-10-29 RX ADMIN — HEPARIN SODIUM 5000 UNITS: 5000 INJECTION, SOLUTION INTRAVENOUS; SUBCUTANEOUS at 15:15

## 2017-10-29 RX ADMIN — MYCOPHENOLATE MOFETIL 750 MG: 250 CAPSULE ORAL at 12:35

## 2017-10-29 RX ADMIN — LEVOTHYROXINE SODIUM 112 MCG: 112 TABLET ORAL at 05:03

## 2017-10-29 RX ADMIN — SODIUM CHLORIDE 1000 ML: 0.9 INJECTION, SOLUTION INTRAVENOUS at 02:30

## 2017-10-29 RX ADMIN — CLOPIDOGREL BISULFATE 75 MG: 75 TABLET ORAL at 12:36

## 2017-10-29 RX ADMIN — HEPARIN SODIUM 5000 UNITS: 5000 INJECTION, SOLUTION INTRAVENOUS; SUBCUTANEOUS at 05:02

## 2017-10-29 RX ADMIN — HEPARIN SODIUM 5000 UNITS: 5000 INJECTION, SOLUTION INTRAVENOUS; SUBCUTANEOUS at 21:54

## 2017-10-29 RX ADMIN — CYCLOSPORINE 50 MG: 25 CAPSULE, LIQUID FILLED ORAL at 22:00

## 2017-10-29 RX ADMIN — IOHEXOL 85 ML: 350 INJECTION, SOLUTION INTRAVENOUS at 13:27

## 2017-10-29 RX ADMIN — PANTOPRAZOLE SODIUM 20 MG: 20 TABLET, DELAYED RELEASE ORAL at 05:02

## 2017-10-29 NOTE — ED PROVIDER NOTES
History  Chief Complaint   Patient presents with    Altered Mental Status     Per , pt slurring speech and c/o bad headache and medicated with Tylenol  Patient and her  states that she was last seen well about 2200 hours tonight, 4 hours prior to arrival   Patient developed a severe frontal headache, and according to her  had slurred speech  She was already in bed so he did not notice any other focal motor deficit  There was no vomiting  Patient had initially refused to come to the hospital but stated that she thought she might be having another stroke  Patient arrived at the hospital awake with the headache rapidly improving, and although she felt generally weak her voice was back to normal  During the evaluation her headache resolved completely            Prior to Admission Medications   Prescriptions Last Dose Informant Patient Reported? Taking? BUDESONIDE PO   Yes Yes   Sig: Take 3 mg by mouth 2 (two) times a day   Multiple Vitamins-Minerals (OCUVITE PO)   Yes Yes   Sig: Take 1 tablet by mouth daily  calcium carbonate (OS-KENDRICK) 600 MG tablet   Yes Yes   Sig: Take 600 mg by mouth 2 (two) times a day with meals   clopidogrel (PLAVIX) 75 mg tablet   Yes Yes   Sig: Take 75 mg by mouth daily  cycloSPORINE non-modified (SandIMMUNE) 25 mg capsule   Yes Yes   Sig: Take 50 mg by mouth 2 (two) times a day  epoetin anjum (EPOGEN,PROCRIT) 40,000 units/mL   Yes Yes   Sig: Inject 40,000 Units under the skin every 14 (fourteen) days  ergocalciferol (ERGOCALCIFEROL) 06834 units capsule   Yes Yes   Sig: Take 50,000 Units by mouth once a week   levothyroxine 112 mcg tablet   Yes Yes   Sig: Take 112 mcg by mouth daily     mycophenolate (CELLCEPT) 250 mg capsule   Yes Yes   Sig: Take 250 mg by mouth 2 (two) times a day 3 tabs each dose=750 mg    omeprazole (PriLOSEC) 40 MG capsule   Yes Yes   Sig: Take 40 mg by mouth daily   oxybutynin (DITROPAN) 5 mg tablet   Yes Yes   Sig: Take 5 mg by mouth 3 (three) times a day  pravastatin (PRAVACHOL) 20 mg tablet   Yes Yes   Sig: Take 20 mg by mouth daily      Facility-Administered Medications: None       Past Medical History:   Diagnosis Date    Anemia     Anxiety     Arthritis     Disease of thyroid gland     GERD (gastroesophageal reflux disease)     History of transfusion     Osteoporosis        Past Surgical History:   Procedure Laterality Date    ABDOMINAL SURGERY      APPENDECTOMY      BLADDER AUGMENTATION N/A     CHOLECYSTECTOMY N/A     COLONOSCOPY N/A 4/20/2017    Procedure: COLONOSCOPY;  Surgeon: Charbel Gonzalez MD;  Location: [place] [address] [facility]; Service:     ESOPHAGOGASTRODUODENOSCOPY N/A 3/31/2016    Procedure: ESOPHAGOGASTRODUODENOSCOPY (EGD); Surgeon: Charbel Gonzalez MD;  Location: [place] [address] [facility]; Service:     ESOPHAGOGASTRODUODENOSCOPY N/A 4/20/2017    Procedure: ESOPHAGOGASTRODUODENOSCOPY (EGD); Surgeon: Charbel Gonzalez MD;  Location: [place] [address] [facility]; Service:     EYE SURGERY      cataracts removed both eyes lens implant    HERNIA REPAIR      JOINT REPLACEMENT      left knee replacement    LIVER TRANSPLANTATION N/A     TONSILECTOMY AND ADNOIDECTOMY N/A        Family History   Problem Relation Age of Onset    Cancer Mother      breast    Cancer Father      I have reviewed and agree with the history as documented  Social History   Substance Use Topics    Smoking status: Former Smoker     Quit date: 1/21/1975    Smokeless tobacco: Never Used    Alcohol use No        Review of Systems   Constitutional: Negative for fever  Respiratory: Negative for shortness of breath  Gastrointestinal: Positive for nausea  Negative for abdominal pain and vomiting  Neurological: Positive for speech difficulty, weakness and headaches  Negative for syncope  All other systems reviewed and are negative        Physical Exam  ED Triage Vitals   Temperature Pulse Respirations Blood Pressure SpO2   10/29/17 0208 10/29/17 0208 10/29/17 0208 10/29/17 0756 10/29/17 0208   100 1 °F (37 8 °C) (!) 106 20 (!) 177/80 97 %      Temp Source Heart Rate Source Patient Position - Orthostatic VS BP Location FiO2 (%)   10/29/17 0208 10/29/17 0208 10/29/17 0208 10/29/17 0208 --   Tympanic Monitor Lying Left arm       Pain Score       10/29/17 0248       No Pain           Orthostatic Vital Signs  Vitals:    10/29/17 0208 10/29/17 0248   BP: (!) 177/80 154/70   Pulse: (!) 106 93   Patient Position - Orthostatic VS: Lying Lying       Physical Exam   Constitutional: She is oriented to person, place, and time  She appears well-developed and well-nourished  HENT:   Head: Normocephalic and atraumatic  Mouth/Throat: Oropharynx is clear and moist    Eyes: Conjunctivae and EOM are normal    Neck: Normal range of motion  Neck supple  Cardiovascular: Normal rate, regular rhythm, normal heart sounds and intact distal pulses  Pulmonary/Chest: Effort normal and breath sounds normal    Abdominal: Soft  Bowel sounds are normal  There is no tenderness  Musculoskeletal: Normal range of motion  She exhibits no edema  Neurological: She is alert and oriented to person, place, and time  No cranial nerve deficit or sensory deficit  She exhibits normal muscle tone  Coordination normal    Skin: Skin is warm and dry  Psychiatric: She has a normal mood and affect  Her behavior is normal    Vitals reviewed        ED Medications  Medications   sodium chloride 0 9 % bolus 1,000 mL (1,000 mL Intravenous New Bag 10/29/17 0230)   ondansetron (ZOFRAN) injection 4 mg (4 mg Intravenous Given 10/29/17 0225)       Diagnostic Studies  Results Reviewed     Procedure Component Value Units Date/Time    Protime-INR [[id number]]  (Normal) Collected:  10/29/17 0218    Lab Status:  Final result Specimen:  Blood from Arm, Right Updated:  10/29/17 0305     Protime 10 0 seconds      INR 0 95    APTT [[id number]]  (Normal) Collected:  10/29/17 0218    Lab Status:  Final result Specimen:  Blood from Arm, Right Updated:  10/29/17 0304     PTT 24 seconds     Narrative: Therapeutic Heparin Range = 60-90 seconds    Troponin I [[id number]]  (Normal) Collected:  10/29/17 0218    Lab Status:  Final result Specimen:  Blood from Arm, Right Updated:  10/29/17 0250     Troponin I <0 02 ng/mL     Narrative:         [employer] Chemistry analyzer 99% cutoff is > 0 04 ng/mL in network labs    o cTnI 99% cutoff is useful only when applied to patients in the clinical setting of myocardial ischemia  o cTnI 99% cutoff should be interpreted in the context of clinical history, ECG findings and possibly cardiac imaging to establish correct diagnosis  o cTnI 99% cutoff may be suggestive but clearly not indicative of a coronary event without the clinical setting of myocardial ischemia  Comprehensive metabolic panel [[id number]] Collected:  10/29/17 0218    Lab Status:  Final result Specimen:  Blood from Arm, Right Updated:  10/29/17 0245     Sodium 140 mmol/L      Potassium 4 0 mmol/L      Chloride 104 mmol/L      CO2 23 mmol/L      Anion Gap 13 mmol/L      BUN 18 mg/dL      Creatinine 1 10 mg/dL      Glucose 136 mg/dL      Calcium 9 1 mg/dL      AST 20 U/L      ALT 24 U/L      Alkaline Phosphatase 89 U/L      Total Protein 7 3 g/dL      Albumin 3 6 g/dL      Total Bilirubin 0 50 mg/dL      eGFR 50 ml/min/1 73sq m     Narrative:         National Kidney Disease Education Program recommendations are as follows:  GFR calculation is accurate only with a steady state creatinine  Chronic Kidney disease less than 60 ml/min/1 73 sq  meters  Kidney failure less than 15 ml/min/1 73 sq  meters      CBC and differential [[id number]]  (Abnormal) Collected:  10/29/17 0218    Lab Status:  Final result Specimen:  Blood from Arm, Right Updated:  10/29/17 0225     WBC 7 20 Thousand/uL      RBC 4 50 Million/uL      Hemoglobin 12 4 g/dL      Hematocrit 38 2 %      MCV 85 fL      MCH 27 5 pg      MCHC 32 5 g/dL      RDW 15 1 %      MPV 7 3 (L) fL      Platelets 237 Thousands/uL      Neutrophils Relative 74 %      Lymphocytes Relative 14 %      Monocytes Relative 9 %      Eosinophils Relative 3 %      Basophils Relative 1 %      Neutrophils Absolute 5 20 Thousands/µL      Lymphocytes Absolute 1 00 Thousands/µL      Monocytes Absolute 0 60 Thousand/µL      Eosinophils Absolute 0 20 Thousand/µL      Basophils Absolute 0 10 Thousands/µL                  CT head without contrast    (Results Pending)   XR chest 1 view portable    (Results Pending)              Procedures  ECG 12 Lead Documentation  Date/Time: 10/29/2017 2:08 AM  Performed by: Ángela Rollins  Authorized by: Ángela Rollins     Indications / Diagnosis:  TIA  ECG reviewed by me, the ED Provider: yes    Patient location:  ED  Interpretation:     Interpretation: abnormal    Rate:     ECG rate:  99    ECG rate assessment: normal    Rhythm:     Rhythm: sinus rhythm    Ectopy:     Ectopy: none    QRS:     QRS axis:  Left    QRS intervals:  Normal  Conduction:     Conduction: normal    ST segments:     ST segments:  Normal  T waves:     T waves: normal             Phone Contacts  ED Phone Contact    ED Course  ED Course                                MDM  Number of Diagnoses or Management Options  Diagnosis management comments: Patient had speech difficulty dysarthria which is rapidly improved upon arrival and is actually back to normal at the time we sent her to CT  CritCare Time    Disposition  Final diagnoses:   TIA (transient ischemic attack)     Time reflects when diagnosis was documented in both MDM as applicable and the Disposition within this note     Time User Action Codes Description Comment    10/29/2017  3:12 AM Cortney ROWLAND Add [G45 9] TIA (transient ischemic attack)       ED Disposition     ED Disposition Condition Comment    Admit  Case was discussed with Dr Derek Quintana and the patient's admission status was agreed to be Admission Status: observation status to the service of Dr eDrek Quintana           Follow-up Information    None       Patient's Medications   Discharge Prescriptions    No medications on file     No discharge procedures on file      ED Provider  Electronically Signed by           Sarah Marquez MD  10/29/17 2135

## 2017-10-29 NOTE — CONSULTS
[facility]   Neurology Initial Consult    Rehan Fernández is a 66 y o  female  [address] [facility]          Information obtained from:   Chief Complaint   Patient presents with    Altered Mental Status     Per , pt slurring speech and c/o bad headache and medicated with Tylenol  Assessment/Plan:    1  Episode of dysarthria   2  Most probable TIA  3  HTN  4  HLD  5  H/o liver transplantation on immunosuppressive therapy       Patient is asymptomatic now  Cont tele  Permissive hypertension  Cont Plavix 75mg  Increased pravachol to 40mg daily for now  Upon discharge may remain on 20mg  LDL 69 within range  There is interaction with cyclosporin and pravachol use  MRI brain ordered to r/o stroke  Ordered CTA head and neck to r/o flow limiting stenosis, aneurysm  TTE w/ shunt  HgbA1c pending   Speech and swallow evaluation  PT/OT  Discussed plan with patient and family        HPI:  Rehan Fernández is a 67 yo F with PMH of HTN, liver transplantation, anemia, TIA presents with cc of episode of slurred speech  Patient and  state that around 12:30am, she had right frontal region headache, described as pressured type of pain 6/10 and later was found to be speaking gibberish by  around 1:30am  When she going to CT scan, she was nauseous and felt dry heaves  Denies associated dizziness, vomiting  Her dysarthria lasted about half an hour   felt her comprehension was intact  At first she was noted to have mild left facial droop  Patient has returned to her normal baseline  She is asymptomatic now  One year ago she had very similar episode with headache and slurred speech but it lasted few seconds only  Patient denies any h/o headaches or migraines  She denies h/o stroke however according to Dr Belkis Vasquez office note, she had right occipital stroke in 2015 that was incidental on MRI without any symptoms correlated with it  She is on plavix for stroke prevention     Patient is on cyclosporine for h/o liver transplantation  Past Medical History:   Diagnosis Date    Anemia     Anxiety     Arthritis     Disease of thyroid gland     GERD (gastroesophageal reflux disease)     History of transfusion     Osteoporosis        Past Surgical History:   Procedure Laterality Date    ABDOMINAL SURGERY      APPENDECTOMY      BLADDER AUGMENTATION N/A     CHOLECYSTECTOMY N/A     COLONOSCOPY N/A 4/20/2017    Procedure: COLONOSCOPY;  Surgeon: Link Vera MD;  Location: [facility]; Service:     ESOPHAGOGASTRODUODENOSCOPY N/A 3/31/2016    Procedure: ESOPHAGOGASTRODUODENOSCOPY (EGD); Surgeon: Link Vera MD;  Location: [facility]; Service:     ESOPHAGOGASTRODUODENOSCOPY N/A 4/20/2017    Procedure: ESOPHAGOGASTRODUODENOSCOPY (EGD); Surgeon: Link Vera MD;  Location: [facility];   Service:     EYE SURGERY      cataracts removed both eyes lens implant    HERNIA REPAIR      JOINT REPLACEMENT      left knee replacement    LIVER TRANSPLANTATION N/A     TONSILECTOMY AND ADNOIDECTOMY N/A        Allergies   Allergen Reactions    Tetracyclines & Related Hives    Vancomycin Other (See Comments)     Pt unsure of reaction    Prograf [Tacrolimus] Anxiety         Current Facility-Administered Medications:     clopidogrel (PLAVIX) tablet 75 mg, 75 mg, Oral, Daily, Shasta Anthony MD    cycloSPORINE non-modified (SandIMMUNE) capsule 50 mg, 50 mg, Oral, BID, Shasta Anthony MD    heparin (porcine) subcutaneous injection 5,000 Units, 5,000 Units, Subcutaneous, Q8H OLYA, 5,000 Units at 10/29/17 0502 **AND** Platelet count, , , Once, Shasta Anthony MD    levothyroxine tablet 112 mcg, 112 mcg, Oral, Early Morning, Shasta Anthony MD, 112 mcg at 10/29/17 0503    mycophenolate (CELLCEPT) capsule 750 mg, 750 mg, Oral, BID, Shasta Anthony MD    pantoprazole (PROTONIX) EC tablet 20 mg, 20 mg, Oral, Early Morning, Shasta Anthony MD, 20 mg at 10/29/17 0502    pravastatin (PRAVACHOL) tablet 20 mg, 20 mg, Oral, Daily, Pelon Sanders MD    Social History     Social History    Marital status:      Spouse name: N/A    Number of children: N/A    Years of education: N/A     Occupational History    Not on file  Social History Main Topics    Smoking status: Former Smoker     Quit date: 1/21/1975    Smokeless tobacco: Never Used    Alcohol use No    Drug use: No    Sexual activity: No     Other Topics Concern    Not on file     Social History Narrative    No narrative on file       Family History   Problem Relation Age of Onset    Cancer Mother      breast    Cancer Father          Review of systems:  Please see HPI for positive symptoms  No fever, no chills, no weight change  Ocular: No drainage, no blurred vision  HEENT:  No sore throat, earache, or congestion  No neck pain  COR:  No chest pain  No palpitations  Lungs:  no sob, wheezing,  GI:  no  nausea, no vomiting, no diarrhea, no constipation, no anorexia  :  No dysuria, frequency, or urgency  No hematuria  Musculoskeletal:  No joint pain or swelling or edema  Skin:  No rash or itching  Psychiatric:  no anxiety, no depression  Endocrine:  No polyuria or polydipsia  Physical examination:  Vitals:    10/29/17 0704   BP: 137/67   Pulse: 95   Resp: 18   Temp: 98 9 °F (37 2 °C)   SpO2: 97%       GENERAL APPEARANCE:  The patient is alert, oriented  He is in no acute distress  HEENT:  Head is normocephalic  The sinuses are otherwise nontender  Pupils are equal and reactive  NECK:  Supple without lymphadenopathy  HEART:  Regular rate and rhythm  LUNGS:  clear to auscultation  No crackles or wheezes are heard  ABDOMEN:  Soft, nontender, nondistended with good bowel sounds heard  EXTREMITIES:  Without cyanosis, clubbing or edema  Mental status: The patient is alert, attentive, and oriented  Speech is clear and fluent, good repetition, comprehension, and naming  she recalls 3/3 objects at 5 minutes    Cranial nerves:  CN II: Visual fields are full to confrontation  Fundoscopic exam is normal with sharp discs and no vascular changes    Pupils are 3 mm and reactive to light  CN III, IV, VI: At primary gaze, there is no eye deviation  CN V: Facial sensation is intact to pinprick in all 3 divisions bilaterally  Corneal responses are intact  CN VII: Face is symmetric with normal eye closure and smile  CN VIII: Hearing is normal to rubbing fingers  CN IX, X: Palate elevates symmetrically  Phonation is normal   CN XI: Head turning and shoulder shrug are intact  CN XII: Tongue is midline with normal movements and no atrophy  Motor: There is no pronator drift of out-stretched arms  Muscle bulk and tone are normal      Muscle exam  Arm Right Left Leg Right Left   Deltoid 5/5 5/5 Iliopsoas 5/5 5/5   Biceps 5/5 5/5 Quads 5/5 5/5   Triceps 5/5 5/5 Hamstrings 5/5 5/5   Wrist Extension 5/5 5/5 Ankle Dorsi Flexion 5/5 5/5   Wrist Flexion 5/5 5/5 Ankle Plantar Flexion 5/5 5/5   Interossei 5/5 5/5 Ankle Eversion 5/5 5/5   APB 5/5 5/5 Ankle Inversion 5/5 5/5       Reflexes   RJ BJ TJ KJ AJ Plantars Valderrama's   Right 2+ 2+ 2+ 2+ 2+ Downgoing Not present   Left 2+ 2+ 2+ 2+ 2+ Downgoing Not present     Sensory:  Light touch, pinprick, position sense  Moderate decrease in vibratory sense in toes  Coordination:  Rapid alternating movements and fine finger movements are intact  There is no dysmetria on finger-to-nose and heel-knee-shin  There are no abnormal or extraneous movements  Romberg negative    Gait/Stance:  Unsteady at baseline     Lab Results   Component Value Date    WBC 7 20 10/29/2017    HGB 12 4 10/29/2017    HCT 38 2 10/29/2017    MCV 85 10/29/2017     10/29/2017     Lab Results   Component Value Date    HGBA1C 5 2 08/30/2015     Lab Results   Component Value Date    ALT 24 10/29/2017    AST 20 10/29/2017    ALKPHOS 89 10/29/2017    BILITOT 0 50 10/29/2017     Lab Results   Component Value Date    GLUCOSE 136 10/29/2017    CALCIUM 9 1 10/29/2017     10/29/2017    K 4 0 10/29/2017    CO2 23 10/29/2017     10/29/2017    BUN 18 10/29/2017    CREATININE 1 10 10/29/2017         Radiology          Review of reports and notes reveal:  Xr Chest Pa & Lateral    Result Date: 10/28/2017  No active pulmonary disease  Workstation performed: [id number]     Independent Interpretation of images or specimens:    Ct Head Without Contrast    Result Date: 10/29/2017  No acute intracranial abnormality  Microangiopathic changes  Thank you for this consult  Total time of encounter: 60 min   More than 50% of time was spent in counseling and coordination of care of patient  [facility]  [facility] [address]

## 2017-10-29 NOTE — PHYSICAL THERAPY NOTE
PT EVALUATION     10/29/17 0840   Pain Assessment   Pain Assessment No/denies pain   Home Living   Type of [address] Two level;Stairs to enter with rails  (3 plus 3 separate stairs to enter home)   Home Equipment Walker;Cane   Additional Comments patient reports not using assistive device prior to admission but holding onto spouse while walking out of home   Prior Function   Level of [place] Independent with ADLs and functional mobility   Lives With Spouse   Receives Help From Family   ADL Assistance Independent   IADLs Needs assistance   Falls in the last 6 months 1 to 4   Comments patient reports falls in past due to L knee dysfunction and decling balance unrelated to this admission, "should be using walker, but too proud"   Restrictions/Precautions   Other Precautions Fall Risk; Chair Alarm; Bed Alarm   General   Additional Pertinent History chart reviewed,patient admitted with episode of slurred speech, L facial droop and confusion, now resolved  patient independent prior to admission although with gait dysfunction/unsteady gait and stubborness to not use walker therefore with falls   Patient educated in importance of use of roller walker for safety   Family/Caregiver Present No   Cognition   Overall Cognitive Status WFL   Arousal/Participation Cooperative   Orientation Level Oriented X4   Following Commands Follows all commands and directions without difficulty   RLE Assessment   RLE Assessment (ROMWFL,s trength 3+/4-)   LLE Assessment   LLE Assessment (ROM WFL, strength 3+/4-)   Coordination   Movements are Fluid and Coordinated 0   Coordination and Movement Description decreased coordination BLEs as per baseline of function prior to admission with TIA symptoms   Bed Mobility   Supine to Sit 7  Independent   Transfers   Sit to Stand 7  Independent   Stand to Sit 7  Independent   Ambulation/Elevation   Gait Assistance 5  Supervision   Additional items Verbal cues   Assistive Device (none or with roller walker)   Distance 50 feet with change in direction, unsteady at times and holding onto wall bar wtihout assistive device  Patient encouraged to use roller walker at all times   Balance   Static Sitting Good   Dynamic Sitting Fair +   Static Standing Fair +   Dynamic Standing Fair   Ambulatory Fair   Activity Tolerance   Activity Tolerance Patient limited by fatigue   Nurse Made Aware yes   Assessment   Prognosis Good   Problem List Decreased strength;Decreased endurance; Impaired balance;Decreased mobility; Decreased coordination;Decreased safety awareness   Assessment Patient seen for Physical Therapy evaluation  Patient admitted with TIA (transient ischemic attack)  Comorbidities affecting patient's physical performance include: TIA, colitis, liver transplant, anemia, gait dysfunction, falls  Personal factors affecting patient at time of initial evaluation include: lives in two story house, stairs to enter home, inability to ambulate household distances, inability to navigate community distances, inability to navigate level surfaces without external assistance, inability to perform dynamic tasks in community, positive fall history, inability to perform physical activity, inability to perform ADLS and inability to perform IADLS   Prior to admission, patient was independent with functional mobility without assistive device, independent with ADLS, requiring assist for IADLS, living in a multi-level home, ambulating household distance and home with family assist   Please find objective findings from Physical Therapy assessment regarding body systems outlined above with impairments and limitations including weakness, impaired balance, decreased endurance, impaired coordination, gait deviations, decreased activity tolerance, decreased functional mobility tolerance, decreased safety awareness and fall risk    The Barthel Index was used as a functional outcome tool presenting with a score of 70 today indicating moderate limitations of functional mobility and ADLS  Patient's clinical presentation is currently evolving as seen in patient's presentation of changing level of pain, increased fall risk, new onset of impairment of functional mobility, decreased endurance and new onset of weakness  Pt would benefit from continued Physical Therapy treatment to address deficits as defined above and maximize level of functional mobility  As demonstrated by objective findings, the assigned level of complexity for this evaluation is moderate  Goals   Patient Goals go home   STG Expiration Date (1 to 7days)   Short Term Goal #1 transfers and gait with roller walker independently   Short Term Goal #2 gait endurance to function household distances   LTG Expiration Date (8 to 14 days)   Long Term Goal #1 gait endurance to functional community distances   Long Term Goal #2 increase compliance of using roller walker to 100% of ther time   Plan   Treatment/Interventions ADL retraining;Functional transfer training;LE strengthening/ROM; Elevations; Therapeutic exercise; Endurance training;Patient/family training;Equipment eval/education; Bed mobility;Gait training   PT Frequency (3-5x/week)   Recommendation   Recommendation (home with family and use of roller walker(patienthas one))   Modified Zo Scale   Modified Milton Scale 1   Barthel Index   Feeding 10   Bathing 0   Grooming Score 5   Dressing Score 5   Bladder Score 10   Bowels Score 10   Toilet Use Score 5   Transfers (Bed/Chair) Score 10   Mobility (Level Surface) Score 10   Stairs Score 5   Barthel Index Score 70

## 2017-10-29 NOTE — CASE MANAGEMENT
Initial Clinical Review    Admission: Date/Time/Statement: 10/29/17 AT 0314 OBSERVATION    Orders Placed This Encounter   Procedures    Place in Observation (expected length of stay for this patient is less than two midnights)     Standing Status:   Standing     Number of Occurrences:   1     Order Specific Question:   Admitting Physician     Answer:   Toño Durán [[id number]]     Order Specific Question:   Level of Care     Answer:   Med Surg [16]     ED: Date/Time/Mode of Arrival:   ED Arrival Information     Expected Arrival Acuity Means of Arrival Escorted By Service Admission Type    - 10/29/2017 02:03 Emergent Walk-In Family Member General Medicine Emergency    Arrival Complaint    SLURRING OF WORDS        Chief Complaint:   Chief Complaint   Patient presents with    Altered Mental Status     Per , pt slurring speech and c/o bad headache and medicated with Tylenol  Chief Complaint:  Slurred speech     History of Present Illness:   Nelly Phoenix is a 66 y o  female with a PMH of HLD, TIA, s/p Liver Transplant, Ulcerative Colitis and Hypothyroidism who presents with slurred speech  She is here with her family who also provide some of the history  She was last seen normal around 1030 pm last night when she went upstairs to her room and went to sleep  Her family state that around 1 am they came upstairs and noticed that she was "talking funny"  She had slurred speech and a left facial droop  She also seemed to not make any sense with her words  The pt does not remember this occurring  Her symptoms have since resolved  She does report this occurring approximately a year ago at which time she was started on Plavix  Currently she denies any weakness/numnbess/tingling in her extremities, chest pain, shortness of breath, abdominal pain, melena, dysuria or blood in her stools  Of note, she does report a dry cough for several weeks but no fever    She underwent a CXR yesterday which did not reveal any active pulmonary disease  No other complaints or concerns         Review of Systems:   Constitutional: Negative for fever  Respiratory: Positive for cough and shortness of breath  Cardiovascular: Negative for chest pain and leg swelling  Gastrointestinal: Negative for abdominal pain, blood in stool and diarrhea  Genitourinary: Negative for hematuria  Musculoskeletal: Positive for arthralgias  Neurological: Positive for facial asymmetry and speech difficulty  Negative for syncope     Psychiatric/Behavioral: Positive for confusion       Physical Exam:  Neuro: alert and oriented x3, normal handgrip strength bilaterally, sensation intact in all extremities, equal strength in all limbs, follows commands      ED Vital Signs:   ED Triage Vitals   Temperature Pulse Respirations Blood Pressure SpO2   10/29/17 0208 10/29/17 0208 10/29/17 0208 10/29/17 0208 10/29/17 0208   100 1 °F (37 8 °C) (!) 106 20 (!) 177/80 97 %      Temp Source Heart Rate Source Patient Position - Orthostatic VS BP Location FiO2 (%)   10/29/17 0208 10/29/17 0208 10/29/17 0208 10/29/17 0208 --   Tympanic Monitor Lying Left arm       Pain Score       10/29/17 0248       No Pain        Wt Readings from Last 1 Encounters:   10/29/17 92 4 kg (203 lb 11 3 oz)        10/28 0701  10/29 0700 10/29 0701  10/29 0931  Most Recent    Temperature (°F) 99100 1 98 9  98 9 (37 2)    Pulse 92106 95  95    Respirations 1620 18  18    Blood Pressure 153/67177/80 137/67  137/67    SpO2 (%) 9397 97  97      ABCD SCORE = 5 /   A-1,  B-1,  C-1,  D-2      LABS/Diagnostic Test Results:   CBC  Results from last 7 days  Lab Units 10/29/17  0218   WBC Thousand/uL 7 20   HEMOGLOBIN g/dL 12 4   HEMATOCRIT % 38 2   PLATELETS Thousands/uL 237   NEUTROS PCT % 74   LYMPHS PCT % 14   MONOS PCT % 9   EOS PCT % 3      CMP  Results from last 7 days  Lab Units 10/29/17  0218   SODIUM mmol/L 140   POTASSIUM mmol/L 4 0   CHLORIDE mmol/L 104   CO2 mmol/L 23   BUN mg/dL 18 CREATININE mg/dL 1 10   CALCIUM mg/dL 9 1   TOTAL PROTEIN g/dL 7 3   BILIRUBIN TOTAL mg/dL 0 50   ALK PHOS U/L 89   ALT U/L 24   AST U/L 20   GLUCOSE RANDOM mg/dL 136      Lab Units 10/29/17  0218   INR   0 95      CT HEAD - Results pending      ED Treatment:   Medication Administration from 10/29/2017 0203 to 10/29/2017 0348       Date/Time Order Dose Route Action Action by Comments     10/29/2017 0230 sodium chloride 0 9 % bolus 1,000 mL 1,000 mL Intravenous [address] Rahul Rodriguez RN      10/29/2017 0225 ondansetron (ZOFRAN) injection 4 mg 4 mg Intravenous Given Vamsi Soto RN           Past Medical/Surgical History: Active Ambulatory Problems     Diagnosis Date Noted    Bronchitis 12/29/2016    Liver transplanted ([facility] [address] ) 12/29/2016    Hypothyroidism 12/29/2016    Long-term use of immunosuppressant medication 12/30/2016    Anemia 12/30/2016     Past Medical History:   Diagnosis Date    Anemia     Anxiety     Arthritis     Disease of thyroid gland     GERD (gastroesophageal reflux disease)     History of transfusion     Osteoporosis        Admitting Diagnosis: TIA (transient ischemic attack) [G45 9]  Altered mental status [R41 82]    Age/Sex: 66 y o  female    Assessment/Plan: Brijesh Cosby is a 66 y o  female with a PMH of HLD, TIA, s/p Liver Transplant, Ulcerative Colitis and Hypothyroidism who presents with slurred speech      TIA  - currently back to her baseline  CT head is negative for an acute process  Initial troponin is WNL    Will admit for Observation, place on telemetry, order neuro checks, speech/swallow evaluation, lipid panel, TTE, PT/OT evaluation, resume Plavix and Statin and consult Neurology      Hypothyroidism  - resume Synthroid     HLD  - continue Statin     S/P Liver Transplant  - resume Cellcept and Cyclosporine      Hospital Problem List:    Principal Problem:    TIA (transient ischemic attack)  Active Problems:    Liver transplanted ([facility] [address] )    Hypothyroidism HLD (hyperlipidemia)      VTE Prophylaxis: Heparin   Code Status: Prior     Anticipated Length of Stay:  Patient will be admitted on an Observation basis with an anticipated length of stay of atleast 1 midnights         Admission Orders:  10/29/17 AT 0314 OBSERVATION  TELEMETRY  VS + NEURO CHECKS Q4HRS    Bedrest with BRP  SCD    Diet Cardiac; Sodium 2 GM       IV ACCESS    Scheduled Meds:   clopidogrel 75 mg Oral Daily   cycloSPORINE non-modified 50 mg Oral BID   heparin (porcine) 5,000 Units Subcutaneous Q8H [address]   levothyroxine 112 mcg Oral Early Morning   mycophenolate 750 mg Oral BID   pantoprazole 20 mg Oral Early Morning   pravastatin 20 mg Oral Daily     PRN Meds:     Consult Neurology    ECHO    PT Eval  OT Eval  SPEECH Eval

## 2017-10-29 NOTE — PLAN OF CARE
DISCHARGE PLANNING     Discharge to home or other facility with appropriate resources Progressing        Knowledge Deficit     Patient/family/caregiver demonstrates understanding of disease process, treatment plan, medications, and discharge instructions Progressing        NEUROSENSORY - ADULT     Achieves stable or improved neurological status Progressing        PAIN - ADULT     Verbalizes/displays adequate comfort level or baseline comfort level Progressing        Potential for Falls     Patient will remain free of falls Progressing        SAFETY ADULT     Maintain or return to baseline ADL function Progressing     Maintain or return mobility status to optimal level Progressing

## 2017-10-29 NOTE — PHYSICAL THERAPY NOTE
PT TREATMENT     10/29/17 2038   Pain Assessment   Pain Assessment No/denies pain   Restrictions/Precautions   Other Precautions Fall Risk   General   Chart Reviewed Yes   Family/Caregiver Present No   Cognition   Arousal/Participation Cooperative   Subjective   Subjective I want to go home  Patient also reports being too proud to use walker prior to admission although with falls   Transfers   Sit to Stand 7  Independent   Stand to Sit 7  Independent   Ambulation/Elevation   Gait Assistance 5  Supervision   Assistive Device Rolling walker   Distance 120 feet with change in direction   Stair Management Assistance 5  Supervision   Stair Management Technique Step to pattern; Two rails   Number of Stairs 3   Exercises   Hip Flexion Sitting;5 reps;Bilateral   Knee AROM Long Arc Quad Sitting;5 reps;Bilateral   Ankle Pumps Sitting;5 reps;Bilateral   Assessment   Assessment patient cooperative and demonstrating improve gait with UE support and encouraged to use roller walker at all times  Patient will benefit from continued PT    Plan   Treatment/Interventions ADL retraining;Functional transfer training;LE strengthening/ROM; Therapeutic exercise; Endurance training;Patient/family training;Equipment eval/education; Bed mobility;Elevations;Gait training   PT Frequency (3-5x/week)   Recommendation   Recommendation (home with family and use of roller walker)

## 2017-10-29 NOTE — ED NOTES
Pt transported to CT,became nauseous,started dry heaving  Pt placed in sitting position,Zofran ordered       Carlo Jaffe, RN  10/29/17 5599

## 2017-10-29 NOTE — SPEECH THERAPY NOTE
Speech Language Pathology  Dysphagia Evaluation     10/29/17 1400   Swallow Information   Current Risks for Dysphagia & Aspiration New Neuro event  (Admitted with dx of TIA )   Current Symptoms/Concerns (Patient denies difficulty swallowing solids or liquids )   Current Diet Regular; Thin liquid   Baseline Diet Regular; Thin liquids   Baseline Assessment   Behavior/Cognition Alert; Cooperative; Interactive   Speech/Language Status WFL, no dysarthria  Patient Positioning Upright in bed   Swallow Mechanism Exam   Labial Symmetry WFL   Labial Strength WFL   Labial ROM WFL   Labial Sensation WFL   Facial Symmetry WFL   Facial Strength WFL   Facial ROM WFL   Facial Sensation WFL   Lingual Symmetry WFL   Lingual Strength WFL   Lingual ROM WFL   Lingual Sensation WFL   Velum WFL   Gag (Did not assess )   Mandible WFL   Dentition Adequate   Volitional Cough Strong   Tracheostomy No   Consistencies Assessed and Performance   Materials Admnistered Regular/Solid; Thin liquid   Materials Adminstered Comment (see above )   Oral Stage WFL   Oral Stage Comment Adequate timing of oral preparation and transit  Phargngeal Stage WFL   Pharyngeal Stage Comment No delay in the initiation of the swallow, adequate laryngeal elevation, no coughing or choking during or following the swallow, clear voice quality following all swallows  Swallow Mechanics WFL   Esophageal Concerns No s/s reported   Strategies and Efficacy (None required )   Summary   Swallow Summary Swallow skills are safe/WFL for regular diet with thin liquids  Recommendations   Risk for Aspiration None   Recommendations Consider oral diet   Diet Solid Recommendation Regular consistency   Diet Liquid Recommendation Thin liquid   Recommended Form of Meds Whole; With thin liquid   General Precautions Upright as possible for all oral intake   Compensatory Swallowing Strategies (None required )   Further Evaluations Neurology   Results Reviewed with RN;PT/Family/Caregiver Treatment Recommendations   Duration of treatment Treatment for dysphagia is not required  Follow up treatments (None required )   Dysphagia Goals Patient will tolerate recommended diet without observed clinical signs of oral/pharngeal dysphagia   Speech Therapy Prognosis   Prognosis Good   Prognosis Considerations Family/Community Support;Medical Prognosis; Patient Participation Level; Family/Caregiver Participation Level; Availability of Services; Potential;Previous Level of Function;Severity of Impairments

## 2017-10-29 NOTE — PLAN OF CARE
Problem: DISCHARGE PLANNING - CARE MANAGEMENT  Goal: Discharge to post-acute care or home with appropriate resources  INTERVENTIONS:  - Conduct assessment to determine patient/family and health care team treatment goals, and need for post-acute services based on payer coverage, community resources, and patient preferences, and barriers to discharge  - Address psychosocial, clinical, and financial barriers to discharge as identified in assessment in conjunction with the patient/family and health care team  - Arrange appropriate level of post-acute services according to patient's   needs and preference and payer coverage in collaboration with the physician and health care team  - Communicate with and update the patient/family, physician, and health care team regarding progress on the discharge plan  - Arrange appropriate transportation to post-acute venues  Jeaneth Rui [phone]  Outcome: Progressing

## 2017-10-29 NOTE — PLAN OF CARE
Problem: SLP ADULT - SWALLOWING, IMPAIRED  Goal: Initial SLP swallow eval performed  Outcome: Completed Date Met: 10/29/17

## 2017-10-29 NOTE — H&P
History and Physical - [facility]    Patient Information: Benigno Valdez 66 y o  female MRN: [id number]  Unit/Bed#: ED CT1 Encounter: [id number]  Admitting Physician: Ksenia Banegas MD  PCP: Renata Basurto MD  Date of Admission:  10/29/17    Chief Complaint:     Slurred speech    of Present Illness: Benigno Valdez is a 66 y o  female with a PMH of HLD, TIA, s/p Liver Transplant, Ulcerative Colitis and Hypothyroidism who presents with slurred speech  She is here with her family who also provide some of the history  She was last seen normal around 1030 pm last night when she went upstairs to her room and went to sleep  Her family state that around 1 am they came upstairs and noticed that she was "talking funny"  She had slurred speech and a left facial droop  She also seemed to not make any sense with her words  The pt does not remember this occurring  Her symptoms have since resolved  She does report this occurring approximately a year ago at which time she was started on Plavix  Currently she denies any weakness/numnbess/tingling in her extremities, chest pain, shortness of breath, abdominal pain, melena, dysuria or blood in her stools  Of note, she does report a dry cough for several weeks but no fever  She underwent a CXR yesterday which did not reveal any active pulmonary disease  No other complaints or concerns  Review of Systems:    Review of Systems   Constitutional: Negative for fever  Respiratory: Positive for cough and shortness of breath  Cardiovascular: Negative for chest pain and leg swelling  Gastrointestinal: Negative for abdominal pain, blood in stool and diarrhea  Genitourinary: Negative for hematuria  Musculoskeletal: Positive for arthralgias  Neurological: Positive for facial asymmetry and speech difficulty  Negative for syncope  Psychiatric/Behavioral: Positive for confusion         Past Medical and Surgical History:     Past Medical History: Diagnosis Date    Anemia     Anxiety     Arthritis     Disease of thyroid gland     GERD (gastroesophageal reflux disease)     History of transfusion     Osteoporosis        Past Surgical History:   Procedure Laterality Date    ABDOMINAL SURGERY      APPENDECTOMY      BLADDER AUGMENTATION N/A     CHOLECYSTECTOMY N/A     COLONOSCOPY N/A 4/20/2017    Procedure: COLONOSCOPY;  Surgeon: Yeimy Dominique MD;  Location: [facility] GI [facility]; Service:     ESOPHAGOGASTRODUODENOSCOPY N/A 3/31/2016    Procedure: ESOPHAGOGASTRODUODENOSCOPY (EGD); Surgeon: Yeimy Dominique MD;  Location: [facility]; Service:     ESOPHAGOGASTRODUODENOSCOPY N/A 4/20/2017    Procedure: ESOPHAGOGASTRODUODENOSCOPY (EGD); Surgeon: Yeimy Dominique MD;  Location: [facility]; Service:     EYE SURGERY      cataracts removed both eyes lens implant    HERNIA REPAIR      JOINT REPLACEMENT      left knee replacement    LIVER TRANSPLANTATION N/A     TONSILECTOMY AND ADNOIDECTOMY N/A        Meds/Allergies:    PTA meds:   Prior to Admission Medications   Prescriptions Last Dose Informant Patient Reported? Taking? BUDESONIDE PO   Yes Yes   Sig: Take 3 mg by mouth 2 (two) times a day   Multiple Vitamins-Minerals (OCUVITE PO)   Yes Yes   Sig: Take 1 tablet by mouth daily  calcium carbonate (OS-KENDRICK) 600 MG tablet   Yes Yes   Sig: Take 600 mg by mouth 2 (two) times a day with meals   clopidogrel (PLAVIX) 75 mg tablet   Yes Yes   Sig: Take 75 mg by mouth daily  cycloSPORINE non-modified (SandIMMUNE) 25 mg capsule   Yes Yes   Sig: Take 50 mg by mouth 2 (two) times a day  epoetin anjum (EPOGEN,PROCRIT) 40,000 units/mL   Yes Yes   Sig: Inject 40,000 Units under the skin every 14 (fourteen) days  ergocalciferol (ERGOCALCIFEROL) 26938 units capsule   Yes Yes   Sig: Take 50,000 Units by mouth once a week   levothyroxine 112 mcg tablet   Yes Yes   Sig: Take 112 mcg by mouth daily     mycophenolate (CELLCEPT) 250 mg capsule   Yes Yes   Sig: Take 250 mg by mouth 2 (two) times a day 3 tabs each dose=750 mg    omeprazole (PriLOSEC) 40 MG capsule   Yes Yes   Sig: Take 40 mg by mouth daily   oxybutynin (DITROPAN) 5 mg tablet   Yes Yes   Sig: Take 5 mg by mouth 3 (three) times a day  pravastatin (PRAVACHOL) 20 mg tablet   Yes Yes   Sig: Take 20 mg by mouth daily      Facility-Administered Medications: None       Allergies: Allergies   Allergen Reactions    Tetracyclines & Related Hives    Vancomycin Other (See Comments)     Pt unsure of reaction    Prograf [Tacrolimus] Anxiety     History:     Marital Status:    History   Alcohol Use No     History   Smoking Status    Former Smoker    Quit date: 1/21/1975   Smokeless Tobacco    Never Used     History   Drug Use No       Family History: Mother: healthy   Father: unknown     Physical Exam:     Vitals:   Blood Pressure: 154/70 (10/29/17 0248)  Pulse: 93 (10/29/17 0248)  Temperature: 99 3 °F (37 4 °C) (10/29/17 0248)  Temp Source: Tympanic (10/29/17 0248)  Respirations: 16 (10/29/17 0248)  Height: 5' 2" (157 5 cm) (10/29/17 0208)  Weight - Scale: 90 3 kg (199 lb) (10/29/17 0208)  SpO2: 93 % (10/29/17 0248)    Physical Exam:   General: in no acute distress  HEENT: atraumatic, normocephalic  Skin: no jaundice  CVS: RRR, no murmurs appreciated  Lungs: CTAL, no wheezing or rales appreciated  Abdomen: soft, nondistended, bowel sounds normal, nontender upon palpation, no guarding or rebound tenderness  Extremities: no edema, no calf swelling or tenderness  Neuro: alert and oriented x3, normal handgrip strength bilaterally, sensation intact in all extremities, equal strength in all limbs, follows commands  Psych: calm, cooperative      Lab Results: I have personally reviewed pertinent reports          Results from last 7 days  Lab Units 10/29/17  0218   WBC Thousand/uL 7 20   HEMOGLOBIN g/dL 12 4   HEMATOCRIT % 38 2   PLATELETS Thousands/uL 237   NEUTROS PCT % 74   LYMPHS PCT % 14   MONOS PCT % 9   EOS PCT % 3       Results from last 7 days  Lab Units 10/29/17  0218   SODIUM mmol/L 140   POTASSIUM mmol/L 4 0   CHLORIDE mmol/L 104   CO2 mmol/L 23   BUN mg/dL 18   CREATININE mg/dL 1 10   CALCIUM mg/dL 9 1   TOTAL PROTEIN g/dL 7 3   BILIRUBIN TOTAL mg/dL 0 50   ALK PHOS U/L 89   ALT U/L 24   AST U/L 20   GLUCOSE RANDOM mg/dL 136       Results from last 7 days  Lab Units 10/29/17  0218   INR  0 95       Imaging:     Xr Chest Pa & Lateral    Result Date: 10/28/2017  Narrative: CHEST INDICATION:  Cough for weeks  COMPARISON:  12/29/2016 VIEWS:  Frontal and lateral projections IMAGES:  2 FINDINGS:     Cardiomediastinal silhouette appears unremarkable  Mild elevation of the right hemidiaphragm is stable  The lungs are clear  No pneumothorax or pleural effusion  No evidence of heart failure  Stable compression of T12  Impression: No active pulmonary disease  Workstation performed: [id number]         Assessment/Plan    Antonio Varela is a 66 y o  female with a PMH of HLD, TIA, s/p Liver Transplant, Ulcerative Colitis and Hypothyroidism who presents with slurred speech  TIA  - currently back to her baseline  CT head is negative for an acute process  Initial troponin is WNL  Will admit for Observation, place on telemetry, order neuro checks, speech/swallow evaluation, lipid panel, TTE, PT/OT evaluation, resume Plavix and Statin and consult Neurology     Hypothyroidism  - resume Synthroid    HLD  - continue Statin    S/P Liver Transplant  - resume Cellcept and Cyclosporine     Hospital Problem List:     Principal Problem:    TIA (transient ischemic attack)  Active Problems:    Liver transplanted ([facility] [address] )    Hypothyroidism    HLD (hyperlipidemia)        VTE Prophylaxis: Heparin   Code Status: Prior    Anticipated Length of Stay:  Patient will be admitted on an Observation basis with an anticipated length of stay of atleast 1 midnights  Total Time for Visit, including Counseling / Coordination of Care: 30 minutes    Greater than 50% of this total time spent on direct patient counseling and coordination of care

## 2017-10-29 NOTE — SOCIAL WORK
DASH discussion completed  Discussed goals of making sure pt's needs are met upon discharge, pt's preferences are taken into account, pt understands her health condition, medications and symptoms to watch for after returning home and pt is aware of any follow up appointments recommended by hospital physician  SPOKE WITH THE PT AND HER  AT BEDSIDE  PT LIVES WITH SPOUSE, HAS SOME DME IN THE HOME AND NO HHC AT THIS TIME  PT IS GENERALLY INDEPENDENT WITH HER OWN CARE  NO DCP NEEDED PER PT   PT USES [facility] IN [address]

## 2017-10-30 ENCOUNTER — APPOINTMENT (INPATIENT)
Dept: RADIOLOGY | Facility: HOSPITAL | Age: 72
DRG: 041 | End: 2017-10-30
Payer: MEDICARE

## 2017-10-30 ENCOUNTER — APPOINTMENT (INPATIENT)
Dept: NON INVASIVE DIAGNOSTICS | Facility: HOSPITAL | Age: 72
DRG: 041 | End: 2017-10-30
Payer: MEDICARE

## 2017-10-30 ENCOUNTER — GENERIC CONVERSION - ENCOUNTER (OUTPATIENT)
Dept: OTHER | Facility: OTHER | Age: 72
End: 2017-10-30

## 2017-10-30 LAB
ATRIAL RATE: 99 BPM
GLUCOSE SERPL-MCNC: 140 MG/DL (ref 65–140)
MRSA NOSE QL CULT: NORMAL
P AXIS: 5 DEGREES
PR INTERVAL: 166 MS
QRS AXIS: -33 DEGREES
QRSD INTERVAL: 84 MS
QT INTERVAL: 354 MS
QTC INTERVAL: 454 MS
T WAVE AXIS: 37 DEGREES
VENTRICULAR RATE: 99 BPM

## 2017-10-30 PROCEDURE — 97535 SELF CARE MNGMENT TRAINING: CPT

## 2017-10-30 PROCEDURE — 93306 TTE W/DOPPLER COMPLETE: CPT

## 2017-10-30 PROCEDURE — G8988 SELF CARE GOAL STATUS: HCPCS

## 2017-10-30 PROCEDURE — 70551 MRI BRAIN STEM W/O DYE: CPT

## 2017-10-30 PROCEDURE — 97166 OT EVAL MOD COMPLEX 45 MIN: CPT

## 2017-10-30 PROCEDURE — 97110 THERAPEUTIC EXERCISES: CPT

## 2017-10-30 PROCEDURE — G8987 SELF CARE CURRENT STATUS: HCPCS

## 2017-10-30 RX ORDER — PRAVASTATIN SODIUM 80 MG/1
80 TABLET ORAL
Status: DISCONTINUED | OUTPATIENT
Start: 2017-10-31 | End: 2017-10-31 | Stop reason: HOSPADM

## 2017-10-30 RX ORDER — CYCLOSPORINE 25 MG/1
50 CAPSULE, LIQUID FILLED ORAL EVERY 12 HOURS SCHEDULED
Status: DISCONTINUED | OUTPATIENT
Start: 2017-10-30 | End: 2017-10-31 | Stop reason: HOSPADM

## 2017-10-30 RX ORDER — CYCLOSPORINE 25 MG/1
50 CAPSULE, LIQUID FILLED ORAL EVERY 12 HOURS SCHEDULED
Status: DISCONTINUED | OUTPATIENT
Start: 2017-10-30 | End: 2017-10-30

## 2017-10-30 RX ADMIN — HEPARIN SODIUM 5000 UNITS: 5000 INJECTION, SOLUTION INTRAVENOUS; SUBCUTANEOUS at 14:40

## 2017-10-30 RX ADMIN — PRAVASTATIN SODIUM 40 MG: 40 TABLET ORAL at 18:01

## 2017-10-30 RX ADMIN — LEVOTHYROXINE SODIUM 112 MCG: 112 TABLET ORAL at 05:32

## 2017-10-30 RX ADMIN — CYCLOSPORINE 50 MG: 25 CAPSULE, LIQUID FILLED ORAL at 22:31

## 2017-10-30 RX ADMIN — MYCOPHENOLATE MOFETIL 750 MG: 250 CAPSULE ORAL at 10:15

## 2017-10-30 RX ADMIN — PANTOPRAZOLE SODIUM 20 MG: 20 TABLET, DELAYED RELEASE ORAL at 05:32

## 2017-10-30 RX ADMIN — MYCOPHENOLATE MOFETIL 750 MG: 250 CAPSULE ORAL at 18:01

## 2017-10-30 RX ADMIN — CYCLOSPORINE 50 MG: 25 CAPSULE, LIQUID FILLED ORAL at 10:18

## 2017-10-30 RX ADMIN — HEPARIN SODIUM 5000 UNITS: 5000 INJECTION, SOLUTION INTRAVENOUS; SUBCUTANEOUS at 05:32

## 2017-10-30 RX ADMIN — CLOPIDOGREL BISULFATE 75 MG: 75 TABLET ORAL at 10:16

## 2017-10-30 RX ADMIN — HEPARIN SODIUM 5000 UNITS: 5000 INJECTION, SOLUTION INTRAVENOUS; SUBCUTANEOUS at 21:55

## 2017-10-30 NOTE — CONSULTS
to nose and heel to toe normal  Reflexes: 2+ throughout  except as noted      Labs:      Lab Results   Component Value Date    WBC 7 20 10/29/2017    HGB 12 4 10/29/2017    HCT 38 2 10/29/2017    MCV 85 10/29/2017     10/29/2017     Lab Results   Component Value Date    HGBA1C 5 9 10/29/2017     Lab Results   Component Value Date    ALT 24 10/29/2017    AST 20 10/29/2017    ALKPHOS 89 10/29/2017    BILITOT 0 50 10/29/2017     Lab Results   Component Value Date    GLUCOSE 136 10/29/2017    CALCIUM 9 1 10/29/2017     10/29/2017    K 4 0 10/29/2017    CO2 23 10/29/2017     10/29/2017    BUN 18 10/29/2017    CREATININE 1 10 10/29/2017         Review of reports and notes reveal:       Cta Head And Neck W Wo Contrast    Result Date: 10/30/2017  Atherosclerotic disease of the distal common carotid arteries bilaterally  Mild narrowing of the right distal common carotid artery and moderate narrowing on the left  Atherosclerotic changes extend into the proximal bifurcation with no significant stenosis of the proximal internal carotid arteries compared to the mid cervical internal carotid arteries  Slight, less than 50% narrowing of the distal left cervical internal carotid artery  Moderate intracranial atherosclerotic calcification involving the distal vertebral arteries and cavernous internal carotid arteries with mild narrowing  No occlusive disease  Extensive periventricular white matter hypoattenuation within both cerebral hemispheres consistent with extensive chronic microangiopathy  Workstation performed: [id number]     Xr Chest 1 View Portable    Result Date: 10/29/2017  No active pulmonary disease  Workstation performed: [id number]     Xr Chest Pa & Lateral    Result Date: 10/28/2017  No active pulmonary disease  Workstation performed: [id number]     Ct Head Without Contrast    Result Date: 10/29/2017  No acute intracranial abnormality  Microangiopathic changes   Findings are consistent with the preliminary report from [facility] which was provided shortly after completion of the exam              Workstation performed: [id number]     Mri Brain Wo Contrast    Result Date: 10/30/2017  Tiny focus of what could represent subacute small vessel ischemia in the right frontal parietal vertex  Diffuse generalized volume loss commensurate with age with relatively advanced chronic small vessel disease Workstation performed: [id number]             Thank you for this consult      Total time of encounter:  35 min  More than 50% of the time was used in counseling and/or coordination of care  Extent of couseling and/or coordination of care        MD [facility]  [address]  [place] [address]  [phone]

## 2017-10-30 NOTE — PHYSICAL THERAPY NOTE
PT TREATMENT     10/30/17 1128   Pain Assessment   Pain Assessment No/denies pain   Restrictions/Precautions   Other Precautions Fall Risk;Bed Alarm; Chair Alarm   General   Chart Reviewed Yes   Family/Caregiver Present Yes  (pt's )   Subjective   Subjective "I hope I go home today"   Transfers   Sit to Stand 7  Independent   Stand to Sit 7  Independent   Ambulation/Elevation   Gait Assistance 6  Modified independent   Assistive Device None  (however pt uses rail on hallway intermittently)   Distance 125 feet   Stair Management Assistance 6  Modified independent   Stair Management Technique Two rails; Step to pattern   Number of Stairs 6   Balance   Higher level balance (side stepping;fwd braiding;walking backward with assist)   Assessment   Assessment Pt is making steady progress with trans, amb, balance, endurance and mobility  Pt will cont to benefit from skilled PT services  Plan   Treatment/Interventions ADL retraining;Functional transfer training;LE strengthening/ROM; Elevations; Therapeutic exercise; Endurance training;Patient/family training;Bed mobility;Gait training   Recommendation   Recommendation Home with family support;24 hour supervision/assist;Home PT   Equipment Recommended (Pt has a cane and RW)

## 2017-10-30 NOTE — NUTRITION
10/30/17 1406   Assessment   Timepoint Initial  (Speech, consult: ischemic stroke)   Labs   List Completed Labs (Labs reviewed  cyclosporin, cellcept)   Feeding Route   PO Independent   Adequacy of Intake   Nutrition Modality PO  (2g Na)   Intake Meals %   Estimated Calorie Intake %   Estimated Protein Intake  %   Estimated Fluid Intake %   Estimated calorie intake compared to estimated need Pt reports good po intakes  Potential to continue with same   Nutrition Prognosis   Potential Fair   Nutrition Concerns (TIA)   Comorbid Concerns (h/o liver transplant)   Nutrition Precautions None   Nutrition Considerations (Provided verbal heart healthy education to pt  Discussed increasing intakes of fruits, vegetables, and whole grains, decreased intakes of fat  Pt expressed that she needs to make changes and is willing to change   Encouraged outpatient MNT for support )   PES Statement   Problem Clinical   Weight (3) Overweight/obesity NC-3 3   Overweight/ obesity specific to Obese, Class II (4)   Related to Energy intake > Energy output over time   As evidenced by: BMI   Patient Nutrition Goals   Goal avoid weight gain;meet PO needs;comprehend education;improve to a healthful diet   Goal Status initiated   Timeframe to complete goal by next f/u   Recommendations/Interventions   Interventions Education initiated/completed;Diet: continued as ordered   Nutrition Recommendations Continue diet as ordered   Nutrition Complexity Risk   Nutrition complexity level Moderate risk   Nutrition review: 11/02/17  (po intakes)   Follow up date 11/06/17

## 2017-10-30 NOTE — CONSULTS
Consult received and appreciated  Pt expressed good po intakes, has been trying to lose wt by walking more often  Provided verbal heart healthy education to pt  Discussed increasing intakes of fruits, vegetables, and whole grains, decreased intakes of fat  Pt expressed that she needs to make changes and is willing to change  Encouraged outpatient MNT for support  Will educate PRN      Thank you,  Evette Lopez MA, RD, LDN  Pager: [phone]

## 2017-10-30 NOTE — PROGRESS NOTES
[facility] Internal Medicine Progress Note  Patient: Catie Abbott 66 y o  female   MRN: [id number]  PCP: Israel Sen MD  Unit/Bed#: [address] [place] [address] Encounter: [id number]  Date Of Visit: 10/30/17    Problem List:    Principal Problem:    TIA (transient ischemic attack)  Active Problems:    Liver transplanted ([address] )    Hypothyroidism    HLD (hyperlipidemia)      Assessment & Plan:    1  Subacute small right frontal ischemic infarction-CT of the head and neck showed severe atherosclerotic disease of the distal common carotid and moderate narrowing of the left carotid, less than 50% narrowing of the distal left cervical internal carotid artery  Echo showed EF 91-65%, grade 1 diastolic dysfunction, no significant patent foramina ovale identified  Discussed the coordination of care with Neurology  Patient will be added on baby aspirin and will be continued on Plavix 75 milligram p o  daily  Patient's Pravachol dose was also increased to 80 milligram p o  daily  Telemetry showed multiple episodes of tachycardia but not clearly atrial fibrillation  Patient will be scheduled to undergo a loop recorder in [facility] Patient's hemoglobin A1c is normal   Had an extensive discussion with the patient, daughter and  at bedside in detail regarding all the plan and also discussed healthy diet  2  History of hypothyroidism-continue levothyroxine 112 microgram p  o  daily  TSH level is normal   3  Hyperlipidemia-patient will be on Pravachol 80 milligram p o  daily  4  Status post liver transplantation-continue CellCept and cyclosporine      VTE Pharmacologic Prophylaxis:   Pharmacologic: Heparin  Mechanical VTE Prophylaxis in Place: Yes    Patient Centered Rounds: I have performed bedside rounds with nursing staff today  Discussions with Specialists or Other Care Team Provider: Tawana Nam Necessary    Education and Discussions with Family / Patient:Yes    Time Spent for Care: 45 minutes    More than 50% of total time spent on counseling and coordination of care as described above  Current Length of Stay: 1 day(s)    Current Patient Status: Inpatient     Discharge Plan: Home    Code Status: Level 1 - Full Code      Subjective:   Patient denies any further slowing of speech, weakness, headache, dizziness, chest pain, palpitations    Objective:         Negative for Chest Pain, Palpitations, Shortness of Breath, Abdominal Pain, Nausea, Vomiting, Constipation, Diarrhea, Dizziness  All other 10 review of systems negative and without drastic interval changes from yesterday  Vitals:   Temp (24hrs), Av 8 °F (37 1 °C), Min:97 8 °F (36 6 °C), Max:99 3 °F (37 4 °C)    HR:  [80-98] 98  Resp:  [16-18] 18  BP: (117-139)/(59-72) 139/66  SpO2:  [94 %-98 %] 98 %  Body mass index is 37 26 kg/m²  Input and Output Summary (last 24 hours):     No intake or output data in the 24 hours ending 10/30/17 6561    Physical Exam:     Physical Exam   Constitutional: No distress  HENT:   Head: Normocephalic and atraumatic  Nose: Nose normal    Eyes: Conjunctivae and EOM are normal  Pupils are equal, round, and reactive to light  Neck: Normal range of motion  Neck supple  No JVD present  Cardiovascular: Normal rate, regular rhythm and normal heart sounds  Exam reveals no gallop and no friction rub  No murmur heard  Pulmonary/Chest: Effort normal and breath sounds normal  No respiratory distress  She has no wheezes  She has no rales  She exhibits no tenderness  Abdominal: Soft  Bowel sounds are normal  She exhibits no distension  There is no tenderness  There is no rebound and no guarding  Musculoskeletal: She exhibits no edema  Neurological: She is alert  No cranial nerve deficit  Skin: Skin is warm and dry  No rash noted  Psychiatric: She has a normal mood and affect         Additional Data:     Labs:      Results from last 7 days  Lab Units 10/29/17  0516 10/29/17  0218   WBC Thousand/uL  --  7 20   HEMOGLOBIN g/dL  -- 12 4   HEMATOCRIT %  --  38 2   PLATELETS Thousands/uL 211 237   NEUTROS PCT %  --  74   LYMPHS PCT %  --  14   MONOS PCT %  --  9   EOS PCT %  --  3       Results from last 7 days  Lab Units 10/29/17  0218   SODIUM mmol/L 140   POTASSIUM mmol/L 4 0   CHLORIDE mmol/L 104   CO2 mmol/L 23   BUN mg/dL 18   CREATININE mg/dL 1 10   CALCIUM mg/dL 9 1   TOTAL PROTEIN g/dL 7 3   BILIRUBIN TOTAL mg/dL 0 50   ALK PHOS U/L 89   ALT U/L 24   AST U/L 20   GLUCOSE RANDOM mg/dL 136       Results from last 7 days  Lab Units 10/29/17  0218   INR  0 95       * I Have Reviewed All Lab Data Listed Above  * Additional Pertinent Lab Tests Reviewed: [address] Admission Reviewed    Imaging:  Cta Head And Neck W Wo Contrast    Result Date: 10/30/2017  Narrative: CTA NECK AND BRAIN WITH AND WITHOUT CONTRAST INDICATION: Cerebral ischemia  COMPARISON:   MR angiography dated 8/30/2015  TECHNIQUE:  Routine CT imaging of the Brain without contrast   Post contrast imaging was performed after administration of iodinated contrast through the neck and brain  Post contrast axial 0 625 mm images timed to opacify the arterial system  3D rendering was performed on an independent workstation  MIP reconstructions performed  Coronal reconstructions were performed of the noncontrast portion of the brain  Radiation dose length product (DLP) for this visit:  1595 54 mGy-cm   This examination, like all CT scans performed in the [facility], was performed utilizing techniques to minimize radiation dose exposure, including the use of iterative reconstruction and automated exposure control  IV Contrast:  85 mL of iohexol (OMNIPAQUE)     IMAGE QUALITY:   Diagnostic FINDINGS: NONCONTRAST BRAIN PARENCHYMA:  Confluent hypoattenuation noted throughout both cerebral hemispheres, bilaterally symmetric consistent with extensive chronic microangiopathy  No mass, mass effect or midline shift  No hemorrhage   Extensive vascular calcification of the vertebrobasilar system and intracranial internal carotid arteries  VENTRICLES AND EXTRA-AXIAL SPACES:  Enlargement of ventricles and extra-axial CSF spaces consistent with cerebral and cerebellar atrophy  VISUALIZED ORBITS AND PARANASAL SINUSES:  Unremarkable  CALVARIUM AND EXTRACRANIAL SOFT TISSUES:   Normal  CERVICAL VASCULATURE AORTIC ARCH AND GREAT VESSELS:  Mild atherosclerotic calcification of the aortic arch extending into the proximal great vessels  No significant stenosis at the origin of the great vessels  Visualized subclavian vasculature is unremarkable  RIGHT VERTEBRAL ARTERY CERVICAL SEGMENT:  Normal origin  The vessel is normal in caliber throughout the neck  LEFT VERTEBRAL ARTERY CERVICAL SEGMENT:  Normal origin  The vessel is normal in caliber throughout the neck  RIGHT EXTRACRANIAL CAROTID SEGMENT:  Atherosclerotic calcification of the distal common carotid artery extending to the bifurcation  Mild narrowing of the distal common carotid artery  No significant stenosis of the cervical internal carotid artery  LEFT EXTRACRANIAL CAROTID SEGMENT:  Moderate calcification of the distal common carotid artery with moderate stenosis  Atherosclerotic calcification extends into the bifurcation  No stenosis of the proximal cervical internal carotid artery  Mild focal atherosclerotic calcification within the distal cervical internal carotid artery with only slight luminal narrowing  NASCET criteria was used to determine the degree of internal carotid artery diameter stenosis  INTRACRANIAL VASCULATURE INTERNAL CAROTID ARTERIES:  Moderate atherosclerotic calcification of the cavernous internal carotid arteries  Normal ophthalmic artery origins  Normal ICA terminus  ANTERIOR CIRCULATION:  Symmetric A1 segments and anterior cerebral arteries with normal enhancement  Normal anterior communicating artery   MIDDLE CEREBRAL ARTERY CIRCULATION:  M1 segment and middle cerebral artery branches demonstrate normal enhancement bilaterally  DISTAL VERTEBRAL ARTERIES:  Moderate calcification of the distal vertebral arteries with mild narrowing at the level of the foramen magnum  BASILAR ARTERY:  Mild atherosclerotic change without stenosis  Normal superior cerebellar arteries  POSTERIOR CEREBRAL ARTERIES: Both posterior cerebral arteries arises from the basilar tip  Both arteries demonstrate normal flow-related enhancement  Normal posterior communicating arteries  DURAL VENOUS SINUSES:  Normal  NON VASCULAR ANATOMY BONY STRUCTURES:  Mild cervical spondylitic degenerative change  No acute osseous abnormality  SOFT TISSUES OF THE NECK:  Thyroid gland is small in size  There is a homogeneously hyperdense nodule within the right lobe measuring less than 1 cm in maximum diameter  THORACIC INLET:  Unremarkable  Impression: Atherosclerotic disease of the distal common carotid arteries bilaterally  Mild narrowing of the right distal common carotid artery and moderate narrowing on the left  Atherosclerotic changes extend into the proximal bifurcation with no significant stenosis of the proximal internal carotid arteries compared to the mid cervical internal carotid arteries  Slight, less than 50% narrowing of the distal left cervical internal carotid artery  Moderate intracranial atherosclerotic calcification involving the distal vertebral arteries and cavernous internal carotid arteries with mild narrowing  No occlusive disease  Extensive periventricular white matter hypoattenuation within both cerebral hemispheres consistent with extensive chronic microangiopathy  Workstation performed: [id number]     Xr Chest 1 View Portable    Result Date: 10/29/2017  Narrative: CHEST INDICATION:  Mental status change  COMPARISON:  10/26/2017 VIEWS:   AP frontal IMAGES:  1 FINDINGS:     Cardiomediastinal silhouette appears unremarkable  The lungs are clear  No pneumothorax or pleural effusion   Visualized osseous structures appear within normal limits for the patient's age  Impression: No active pulmonary disease  Workstation performed: [id number]     Xr Chest Pa & Lateral    Result Date: 10/28/2017  Narrative: CHEST INDICATION:  Cough for weeks  COMPARISON:  12/29/2016 VIEWS:  Frontal and lateral projections IMAGES:  2 FINDINGS:     Cardiomediastinal silhouette appears unremarkable  Mild elevation of the right hemidiaphragm is stable  The lungs are clear  No pneumothorax or pleural effusion  No evidence of heart failure  Stable compression of T12  Impression: No active pulmonary disease  Workstation performed: [id number]     Ct Head Without Contrast    Result Date: 10/29/2017  Narrative: CT BRAIN - WITHOUT CONTRAST INDICATION:  Slurred speech COMPARISON:  9/27/2015 TECHNIQUE:  CT examination of the brain was performed  In addition to axial images, coronal reformatted images were created and submitted for interpretation  Radiation dose length product (DLP) for this visit:  1288 35 mGy-cm   This examination, like all CT scans performed in the [facility], was performed utilizing techniques to minimize radiation dose exposure, including the use of iterative reconstruction and automated exposure control  IMAGE QUALITY:  Diagnostic  FINDINGS:  PARENCHYMA:  Decreased attenuation is noted in the supratentorial white matter demonstrating an appearance most consistent with moderate microangiopathic change  No intracranial mass, mass effect or midline shift  No CT signs of acute infarction  There is no parenchymal hemorrhage  VENTRICLES AND EXTRA-AXIAL SPACES:  Age-appropriate volume loss is noted  No hydrocephalus  VISUALIZED ORBITS AND PARANASAL SINUSES:  Unremarkable  CALVARIUM AND EXTRACRANIAL SOFT TISSUES:   Normal      Impression: No acute intracranial abnormality  Microangiopathic changes   Findings are consistent with the preliminary report from [facility] which was provided shortly after completion of the exam              Workstation performed: [id number]     Mri Brain Wo Contrast    Result Date: 10/30/2017  Narrative: MRI BRAIN WITHOUT CONTRAST INDICATION:  Slurred speech COMPARISON:   CT CTA 10/29/2017, MR 8/30/2015 TECHNIQUE:  Sagittal T1, axial T2, axial FLAIR, axial T1, axial [place] and axial diffusion imaging  IMAGE QUALITY:  Diagnostic  FINDINGS: BRAIN PARENCHYMA:  No intracranial mass, mass effect  Minor scattered foci of hemosiderin more apparent than on prior study largely, technique related  These scattered about supra and infratentorial parenchyma to include the medial right basal ganglia,  inferior aspect of both temporal lobes, anteriorly on the right more posteriorly on the left  Tiny focus of what could represent subacute ischemia in the right subcortical frontal parietal vertex  No indication of acute large vessel disease  Chronic small vessel disease, present to a relatively advanced degree, has progressed since prior study 2 years earlier  Age-appropriate VENTRICLES:  The ventricles are normal in size and contour  SELLA AND PITUITARY GLAND:  Normal  ORBITS:  Normal  PARANASAL SINUSES:  Normal  VASCULATURE:  Evaluation of the major intracranial vasculature demonstrates appropriate flow voids  CALVARIUM AND SKULL BASE:  Normal  EXTRACRANIAL SOFT TISSUES:  Normal      Impression: Tiny focus of what could represent subacute small vessel ischemia in the right frontal parietal vertex   Diffuse generalized volume loss commensurate with age with relatively advanced chronic small vessel disease Workstation performed: [id number]     Imaging Reports Reviewed by myself    Cultures:   Blood Culture: No results found for: BLOODCX  Urine Culture: No results found for: URINECX  Sputum Culture: No components found for: SPUTUMCX  Wound Culture: No results found for: WOUNDCULT    Last 24 Hours Medication List:     clopidogrel 75 mg Oral Daily   cycloSPORINE modified 50 mg Oral Q12H [address]   heparin (porcine) 5,000 Units Subcutaneous Q8H [address]   levothyroxine 112 mcg Oral Early Morning   mycophenolate 750 mg Oral BID   pantoprazole 20 mg Oral Early Morning   [START ON 10/31/2017] pravastatin 80 mg Oral Daily With Dinner        Today, Patient Was Seen By: Harish Acosta MD    ** Please Note: Dragon 360 Dictation voice to text software may have been used in the creation of this document   **

## 2017-10-30 NOTE — PLAN OF CARE
Problem: PHYSICAL THERAPY ADULT  Goal: Performs mobility at highest level of function for planned discharge setting  See evaluation for individualized goals  Outcome: Progressing  Prognosis: Good  Problem List: Decreased strength, Decreased endurance, Impaired balance, Decreased mobility, Decreased coordination, Decreased safety awareness  Assessment: Pt is making steady progress with trans, amb, balance, endurance and mobility  Pt will cont to benefit from skilled PT services  Recommendation: Home with family support, 24 hour supervision/assist, Home PT          See flowsheet documentation for full assessment

## 2017-10-30 NOTE — CONSULTS
CARDIOLOGY CONSULTATION  Bushra Lizarraga 66 y o  female MRN: [id number]  Unit/Bed#: [address] Encounter: [id number]      History of Present Illness   Physician Requesting Consult: Phillip Alvarenga MD  Reason for Consult / Principal Problem:  Weakness    Assessment/Plan    1  Cryptogenic CVA/right frontal ischemic infarction- recurrent episode on anti-platelet agent  Event monitor with recurrent paroxysmal SVT  Plan for loop recorder tomorrow    2  Hypertension    3  Mixed hyperlipidemia with apical lipoprotein e4 variant    4  History of cirrhosis with liver transplant    5  Ulcerative colitis      HPI: Bushra Lizarraga is a 66y o  year old female who presents with acute slurred speech  She was seen by her family members the night prior and was her usual state state of health  She woke up at 1:00 a m  and she was talking funny  She thought she had a left facial droop  She denied feeling any irregular heart rhythm  She is not having chest tightness  She denies having a history of atrial fibrillaiton  Historical Information   Past Medical History:   Diagnosis Date    Anemia     Anxiety     Arthritis     Disease of thyroid gland     GERD (gastroesophageal reflux disease)     History of transfusion     Osteoporosis      Past Surgical History:   Procedure Laterality Date    ABDOMINAL SURGERY      APPENDECTOMY      BLADDER AUGMENTATION N/A     CHOLECYSTECTOMY N/A     COLONOSCOPY N/A 4/20/2017    Procedure: COLONOSCOPY;  Surgeon: Aissatou Mccauley MD;  Location: [facility]; Service:     ESOPHAGOGASTRODUODENOSCOPY N/A 3/31/2016    Procedure: ESOPHAGOGASTRODUODENOSCOPY (EGD); Surgeon: Aissatou Mccauley MD;  Location: [facility]; Service:     ESOPHAGOGASTRODUODENOSCOPY N/A 4/20/2017    Procedure: ESOPHAGOGASTRODUODENOSCOPY (EGD); Surgeon: Aissatou Mccauley MD;  Location: [facility];   Service:     EYE SURGERY      cataracts removed both eyes lens implant    HERNIA REPAIR      JOINT REPLACEMENT left knee replacement    LIVER TRANSPLANTATION N/A     TONSILECTOMY AND ADNOIDECTOMY N/A      History   Alcohol Use No     History   Drug Use No     History   Smoking Status    Former Smoker    Quit date: 1/21/1975   Smokeless Tobacco    Never Used     Family History   Problem Relation Age of Onset    Cancer Mother      breast    Cancer Father        Meds/Allergies   Prior to Admission medications    Medication Sig Start Date End Date Taking? Authorizing Provider   calcium carbonate (OS-KENDRICK) 600 MG tablet Take 600 mg by mouth 2 (two) times a day with meals   Yes Historical Provider, MD   clopidogrel (PLAVIX) 75 mg tablet Take 75 mg by mouth daily  Yes Historical Provider, MD   cycloSPORINE non-modified (SandIMMUNE) 25 mg capsule Take 50 mg by mouth 2 (two) times a day  Yes Historical Provider, MD   epoetin anjum (EPOGEN,PROCRIT) 40,000 units/mL Inject 40,000 Units under the skin every 14 (fourteen) days  Yes Historical Provider, MD   levothyroxine 112 mcg tablet Take 112 mcg by mouth daily     Yes Historical Provider, MD   mycophenolate (CELLCEPT) 250 mg capsule Take 250 mg by mouth 2 (two) times a day 3 tabs each dose=750 mg    Yes Historical Provider, MD   omeprazole (PriLOSEC) 40 MG capsule Take 40 mg by mouth daily   Yes Historical Provider, MD   oxybutynin (DITROPAN) 5 mg tablet Take 5 mg by mouth 2 (two) times a day     Yes Historical Provider, MD   pravastatin (PRAVACHOL) 20 mg tablet Take 20 mg by mouth daily   Yes Historical Provider, MD     Current Facility-Administered Medications   Medication Dose Route Frequency Provider Last Rate Last Dose    clopidogrel (PLAVIX) tablet 75 mg  75 mg Oral Daily Perez Brasher MD   75 mg at 10/30/17 1016    cycloSPORINE modified (NEORAL) capsule 50 mg  50 mg Oral Q12H [address] Sasha Bonilla MD        heparin (porcine) subcutaneous injection 5,000 Units  5,000 Units Subcutaneous Q8H [address] Perez Brasher MD   5,000 Units at 10/30/17 1440    levothyroxine tablet 112 mcg  112 mcg Oral Early Morning Pham Pettit MD   112 mcg at 10/30/17 0532    mycophenolate (CELLCEPT) capsule 750 mg  750 mg Oral BID Pham Pettit MD   750 mg at 10/30/17 1801    pantoprazole (PROTONIX) EC tablet 20 mg  20 mg Oral Early Morning Pham Pettit MD   20 mg at 10/30/17 0532    [START ON 10/31/2017] pravastatin (PRAVACHOL) tablet 80 mg  80 mg Oral Daily With Dinner Jolene Cortes MD         Allergies   Allergen Reactions    Tetracyclines & Related Hives    Vancomycin Other (See Comments)     Pt unsure of reaction    Prograf [Tacrolimus] Anxiety       Review of systems  CONSTITUTIONAL:  As per hpi  HEENT:  Eyes:  No visual loss, blurred vision, double vision or yellow sclerae  Ears, Nose, Throat:  No hearing loss, sneezing, congestion, runny nose or sore throat  SKIN:  No rash or itching  CARDIOVASCULAR:  As per hpi  RESPIRATORY:  No shortness of breath, cough or sputum  GASTROINTESTINAL:  No anorexia, nausea, vomiting or diarrhea  No abdominal pain or blood  GENITOURINARY:  Burning on urination  No flank pain  NEUROLOGICAL:  No headache, dizziness, syncope, paralysis, ataxia, numbness or tingling in the extremities  No change in bowel or bladder control  MUSCULOSKELETAL:  No muscle, back pain, joint pain or stiffness  HEMATOLOGIC:  No anemia, bleeding or bruising  LYMPHATICS:  No enlarged nodes  No history of splenectomy  PSYCHIATRIC:  No active suicidal or homicidal ideation  ENDOCRINOLOGIC:  No reports of sweating, cold or heat intolerance  No polyuria or polydipsia  ALLERGIES:  No history of asthma, hives, eczema or rhinitis  More than 10 systems reviewed and otherwise noncontributory  Objective   Vitals: Blood pressure 139/66, pulse 98, temperature 99 3 °F (37 4 °C), temperature source Tympanic, resp  rate 18, height 5' 2" (1 575 m), weight 92 4 kg (203 lb 11 3 oz), SpO2 98 %, not currently breastfeeding      Physical Exam   Constitutional: She is oriented to person, place, and time  No distress  HENT:   Head: Normocephalic and atraumatic  Right Ear: External ear normal    Left Ear: External ear normal    Nose: Nose normal    Mouth/Throat: No oropharyngeal exudate  Eyes: Conjunctivae are normal  Pupils are equal, round, and reactive to light  Right eye exhibits no discharge  Left eye exhibits no discharge  No scleral icterus  Neck: Normal range of motion  No JVD present  No tracheal deviation present  No thyromegaly present  Cardiovascular: Normal rate, regular rhythm and intact distal pulses  Exam reveals gallop  Exam reveals no friction rub  Murmur heard  Pulmonary/Chest: Effort normal and breath sounds normal  No stridor  No respiratory distress  She has no wheezes  She has no rales  She exhibits no tenderness  Abdominal: Soft  Bowel sounds are normal  She exhibits distension  She exhibits no mass  There is no tenderness  There is no rebound and no guarding  Genitourinary:   Genitourinary Comments: No CVA tenderness   Musculoskeletal: She exhibits no edema, tenderness or deformity  Neurological: She is alert and oriented to person, place, and time  She displays normal reflexes  She exhibits normal muscle tone  Skin: Skin is warm and dry  No rash noted  She is not diaphoretic  No erythema  Psychiatric: She has a normal mood and affect  Her behavior is normal  Judgment and thought content normal        No results found for this or any previous visit (from the past 24 hour(s))  Imaging: I have personally reviewed pertinent films in PACS  EKG: normal sinus rhythm lad no acute st/t wave changes      Counseling / Coordination of Care  Total floor / unit time spent today 70 minutes   Greater than fifty percent of time spent at bedside for coordination of care, patient counseling, history taking: afib, stroke management

## 2017-10-30 NOTE — OCCUPATIONAL THERAPY NOTE
Occupational Therapy Evaluation/Treatment     10/30/17 08   Restrictions/Precautions   Other Precautions Fall Risk; Chair Alarm; Bed Alarm   Pain Assessment   Pain Assessment No/denies pain   Home Living   Type of Home House   Home Layout Two level  (6 steps to enter )   Bathroom Shower/Tub Walk-in shower   Bathroom Equipment Grab bars in [facility] Walker;Cane  (pt reports holding onto furniture at home )   Additional Comments pt has a cleaning lady and assist for IADLS and showering,  Patient does not use an assistive device  Prior Function   Level of [place] Needs assistance with IADLs  (assist for showering, independent with mobility )   Lives With Significant other  ("")   Receives Help From (, cleaning person 1 day a week )   ADL Assistance Needs assistance   IADLs Needs assistance   Falls in the last 6 months (yes, "I fall and I cant get up, Forest Garcia helps me")   ADL   Eating Assistance 7  Independent   Grooming Assistance 5  Supervision/Setup   Grooming Deficit Wash/dry hands; Teeth care;Brushing hair  (in stance at sink; sat with mirror to comb hair )   UB Bathing Assistance 5  Supervision/Setup   LB Bathing Assistance 4  Minimal Assistance   UB Dressing Assistance 5  Supervision/Setup   LB Dressing Assistance 5  Supervision/Setup   LB Dressing Deficit Don/doff L sock  (seated )   Toileting Assistance  5  Supervision/Setup   Toileting Deficit Clothing management up;Clothing management down;Perineal hygiene  (urination on toilet )   Bed Mobility   Supine to Sit 5  Supervision   Sit to Supine 5  Supervision   Transfers   Sit to Stand 5  Supervision   Stand to Sit 5  Supervision   Functional Mobility   Functional Mobility 5  Supervision   Additional Comments 60 feet   Additional items Rolling walker   Balance   Static Sitting Good   Dynamic Sitting Fair +   Static Standing Fair -   Dynamic Standing Fair -   Activity Tolerance   Activity Tolerance Patient limited by fatigue RUE Assessment   RUE Assessment WFL  (4/5)   LUE Assessment   LUE Assessment WFL  (4/5)   Hand Function   Gross Motor Coordination Functional   Fine Motor Coordination Functional   Cognition   Overall Cognitive Status WFL   Arousal/Participation Cooperative   Attention Within functional limits   Orientation Level Oriented X4   Following Commands Follows all commands and directions without difficulty   Assessment   Limitation Decreased ADL status; Decreased UE strength;Decreased Safe judgement during ADL;Decreased endurance;Decreased self-care trans;Decreased high-level ADLs  (decreased balance and mobility )   Prognosis Good   Assessment Patient evaluated by Occupational Therapy  Patient admitted with TIA (transient ischemic attack)  Patient admitted with slurred speech, but symptoms have resolved  The patients occupational profile, medical and therapy history includes a extensive additional review of physical, cognitive, or psychosocial history related to current functional performance  Comorbidities affecting functional mobility and ADLS include: anemia, CVA, hypertension and TIA  Prior to admission, patient was independent with functional mobility without assistive device, requiring assist for ADLS and requiring assist for IADLS  The evaluation identifies the following performance deficits: weakness, impaired balance, decreased endurance, increased fall risk, new onset of impairment of functional mobility, decreased ADLS, decreased IADLS, decreased activity tolerance, decreased safety awareness and decreased strength, that result in activity limitations and/or participation restrictions  This evaluation requires clinical decision making of moderate complexity, because the patient may present with comorbidities that affect occupational performance and required minimal or moderate modification of tasks or assistance with the consideration of several treatment options      The Barthel Index was used as a functional outcome tool presenting with a score of 70, indicating moderate limitations of functional mobility and ADLS  Patient will benefit from skilled Occupational Therapy services to address above deficits and facilitate a safe return to prior level of function  Goals   Patient Goals go home, stop this cough    STG Time Frame (1-7 days)   Short Term Goal  Patient will increase standing tolerance to 3 minutes during functional activity; Patient will increase bed mobility to independent; Patient will increase functional mobility to and from bathroom with rolling walker independently to increase performance with ADLS; Patient will tolerate 8 minutes of UE ROM/strengthening to increase general activity tolerance and performance in ADLS/IADLS; Patient will improve functional activity tolerance to 10 minutes of sustained functional tasks to increase participation in basic self-care and decrease assistance level  LTG Time Frame (8-14 days)   Long Term Goal Patient will increase standing tolerance to 6 minutes during functional activity; Patient will tolerate 12 minutes of UE ROM/strengthening to increase general activity tolerance and performance in ADLS/IADLS; Patient will improve functional activity tolerance to 20 minutes of sustained functional tasks to increase participation in basic self-care and decrease assistance level  Functional Transfer Goals   Pt Will Perform All Functional Transfers (STG independent )   ADL Goals   Pt Will Perform Grooming (STG independent )   Pt Will Perform Bathing (STG independent )   Pt Will Perform UE Dressing (STG independent )   Pt Will Perform LE Dressing (STG supervision LTG Independent )   Pt Will Perform Toileting (STG independent )   Plan   Treatment Interventions ADL retraining;Functional transfer training;UE strengthening/ROM; Endurance training;Patient/family training;Equipment evaluation/education; Activityengagement   OT Frequency 2-3x/wk   Additional Treatment Session   Start Time 0815   End Time 0825   Treatment Assessment Patient completed grooming ADL standing at sink  Patient requires supervision for toileting and grooming at sink standing  Patient will benefit from use of RW to prevent falls  Patient reports she has assist for IADLS at home  Pt receptive to walker suggestions      Recommendation   OT Discharge Recommendation Home with family support  (home PT/OT)   Barthel Index   Feeding 10   Bathing 0   Grooming Score 5   Dressing Score 5   Bladder Score 10   Bowels Score 10   Toilet Use Score 5   Transfers (Bed/Chair) Score 10   Mobility (Level Surface) Score 10   Stairs Score 5   Barthel Index Score 70   Modified [place] Scale   Modified [place] Scale 1

## 2017-10-31 ENCOUNTER — APPOINTMENT (INPATIENT)
Dept: NON INVASIVE DIAGNOSTICS | Facility: HOSPITAL | Age: 72
DRG: 041 | End: 2017-10-31
Attending: INTERNAL MEDICINE
Payer: MEDICARE

## 2017-10-31 ENCOUNTER — GENERIC CONVERSION - ENCOUNTER (OUTPATIENT)
Dept: OTHER | Facility: OTHER | Age: 72
End: 2017-10-31

## 2017-10-31 VITALS
HEIGHT: 62 IN | DIASTOLIC BLOOD PRESSURE: 68 MMHG | OXYGEN SATURATION: 93 % | WEIGHT: 203.71 LBS | TEMPERATURE: 98.8 F | RESPIRATION RATE: 16 BRPM | SYSTOLIC BLOOD PRESSURE: 150 MMHG | BODY MASS INDEX: 37.49 KG/M2 | HEART RATE: 95 BPM

## 2017-10-31 PROBLEM — I63.9 ACUTE ISCHEMIC STROKE (HCC): Status: ACTIVE | Noted: 2017-10-31

## 2017-10-31 PROBLEM — I47.1 PAROXYSMAL SVT (SUPRAVENTRICULAR TACHYCARDIA) (HCC): Status: ACTIVE | Noted: 2017-10-31

## 2017-10-31 PROCEDURE — 0JH602Z INSERTION OF MONITORING DEVICE INTO CHEST SUBCUTANEOUS TISSUE AND FASCIA, OPEN APPROACH: ICD-10-PCS | Performed by: INTERNAL MEDICINE

## 2017-10-31 PROCEDURE — C1764 EVENT RECORDER, CARDIAC: HCPCS

## 2017-10-31 PROCEDURE — 97110 THERAPEUTIC EXERCISES: CPT

## 2017-10-31 PROCEDURE — 33282 HB IMPLANT PAT-ACTIVE HT RECORD: CPT

## 2017-10-31 RX ORDER — PRAVASTATIN SODIUM 80 MG/1
80 TABLET ORAL
Qty: 30 TABLET | Refills: 0 | Status: SHIPPED | OUTPATIENT
Start: 2017-10-31 | End: 2017-11-08 | Stop reason: HOSPADM

## 2017-10-31 RX ORDER — LIDOCAINE HYDROCHLORIDE AND EPINEPHRINE 10; 10 MG/ML; UG/ML
INJECTION, SOLUTION INFILTRATION; PERINEURAL CODE/TRAUMA/SEDATION MEDICATION
Status: COMPLETED | OUTPATIENT
Start: 2017-10-31 | End: 2017-10-31

## 2017-10-31 RX ORDER — ASPIRIN 81 MG/1
81 TABLET ORAL DAILY
Qty: 30 TABLET | Refills: 0 | Status: SHIPPED | OUTPATIENT
Start: 2017-10-31 | End: 2021-04-22 | Stop reason: SDUPTHER

## 2017-10-31 RX ADMIN — MYCOPHENOLATE MOFETIL 750 MG: 250 CAPSULE ORAL at 10:46

## 2017-10-31 RX ADMIN — PANTOPRAZOLE SODIUM 20 MG: 20 TABLET, DELAYED RELEASE ORAL at 06:02

## 2017-10-31 RX ADMIN — CLOPIDOGREL BISULFATE 75 MG: 75 TABLET ORAL at 10:43

## 2017-10-31 RX ADMIN — LEVOTHYROXINE SODIUM 112 MCG: 112 TABLET ORAL at 06:02

## 2017-10-31 RX ADMIN — CYCLOSPORINE 50 MG: 25 CAPSULE, LIQUID FILLED ORAL at 10:44

## 2017-10-31 RX ADMIN — LIDOCAINE HYDROCHLORIDE AND EPINEPHRINE 10 ML: 10; 10 INJECTION, SOLUTION INFILTRATION; PERINEURAL at 09:15

## 2017-10-31 RX ADMIN — HEPARIN SODIUM 5000 UNITS: 5000 INJECTION, SOLUTION INTRAVENOUS; SUBCUTANEOUS at 06:02

## 2017-10-31 RX ADMIN — HEPARIN SODIUM 5000 UNITS: 5000 INJECTION, SOLUTION INTRAVENOUS; SUBCUTANEOUS at 13:49

## 2017-10-31 RX ADMIN — PRAVASTATIN SODIUM 80 MG: 80 TABLET ORAL at 17:24

## 2017-10-31 NOTE — DISCHARGE INSTRUCTIONS
Please notify your transplant doctors regarding the medication changes    Cardiac Loop Recorder Insertion   WHAT YOU NEED TO KNOW:   A cardiac loop recorder is a device used to diagnose heart rhythm problems, such as a fast or irregular heartbeat  It is implanted in your left chest or armpit, just under the skin  The device records a pattern of your heart's rhythm, called an EKG  Your device records automatic EKGs, depending on how your healthcare provider programs it  You may also receive a handheld controller  You press a button on the controller when you have symptoms, such as dizziness or lightheadedness  The device will record an EKG at that moment  The recording can help your healthcare provider see if your symptoms may be caused by heart rhythm problems  Your healthcare provider will remove the device after it has collected enough data  You may need the device for up to 3 years  The procedure to remove the device is similar to the procedure used to implant it  DISCHARGE INSTRUCTIONS:   Follow up with your cardiologist as directed: You will need to return in 1 to 2 weeks  Your cardiologist will check your incision  He may also program your device settings again  He will retrieve data from the device every 1 to 3 months with a monitor held over your skin  You may be able to transmit data from your device over the phone  You will do this by calling a number provided by your cardiologist  Ask for information about this process  Write down your questions so you remember to ask them during your visits  Wound care:  Carefully wash your incision with soap and water  Keep the area clean and dry until it heals  Return to activity:  Most people can return to normal activities soon after the procedure  Your cardiologist may want to know if your work involves electrical current or high-voltage equipment  Ask about other electrical items that could interfere with your cardiac loop recorder     Contact your cardiologist if:   · You have a fever or chills  · Your wound is red, swollen, or draining pus  · You have questions or concerns about your condition or care  Seek care immediately or call 911 if:   · You feel weak, dizzy, or faint  · You lose consciousness  © 2017 [facility] Information is for End User's use only and may not be sold, redistributed or otherwise used for commercial purposes  All illustrations and images included in CareNotes® are the copyrighted property of [employer] , Juan R  or Bhaskar Dinh  The above information is an  only  It is not intended as medical advice for individual conditions or treatments  Talk to your doctor, nurse or pharmacist before following any medical regimen to see if it is safe and effective for you

## 2017-10-31 NOTE — PROCEDURES
Procedures  Loop recorder placement    Sharmaine Simmons  [id number]  10/31/2017  Luther Fuchs MD    Implanting Cardiologist: Erlin Sanchez MD    Procedure(s): Loop recorder implant    Indications: Cryptogenic stroke    Procedure Details  The risks, benefits, complications, treatment options, and expected outcomes were discussed with the patient  The patient and/or family concurred with the proposed plan, giving informed consent  Patient was prepped and draped in the usual strict sterile fashion  Patient was given adequate amount of local anesthesia   After the antibiotic was completely infused, a small skin incision off less than 0 5 cm made in the fifth intercostal space in the left parasternal area  Then a [employer] loop recorder with a serial number was successfully placed in a standard fashion without any complications  Once loop recorder was implanted pocket was closed with a one pursestring suture  Hemostasis was reverified  It was set for afib detection  Estimated Blood Loss: :Minimal         Complications:  None           Disposition:  To her room           Condition: Stable

## 2017-10-31 NOTE — DISCHARGE INSTR - ACTIVITY
Please plug your home box in within 6 feet of wherever you sleep at night  It will already be set up for you, you just need to plug it in  Your box will read your device every night between 12am-5am      Keep your dressing clean and dry for 2 days  After then, you may remove the dressing and shower, gently wash the site and pat dry well  Leave the steri-strips on, they will fall off on their own  You may wash OVER the steristrips

## 2017-10-31 NOTE — SEDATION DOCUMENTATION
Loop recorder device inserted without incidence  Patient tolerated well  Steristrips, telfa, and tegaderm applied to site  Instructions for loop recorder management reviewed and given to patient

## 2017-10-31 NOTE — PROGRESS NOTES
Progress Note - Cardiology   Ender Jackson 66 y o  female MRN: [id number]  Unit/Bed#: [address] Encounter: [id number]    Assessment/Plan: 1  Cryptogenic CVA/right frontal ischemic infarction-loop recorder placement today  recurrent episode on anti-platelet agent  Event monitor with recurrent paroxysmal SVT       2  Hypertension     3  Mixed hyperlipidemia with apical lipoprotein e4 variant     4  History of cirrhosis with liver transplant     5  Ulcerative colitis    Subjective/Objective   Paroxysmal spurts of regular complex tachycardia  No evidence of atrial fibrillation  Objective:     Vitals: /68   Pulse 95   Temp 98 8 °F (37 1 °C)   Resp 16   Ht 5' 2" (1 575 m)   Wt 92 4 kg (203 lb 11 3 oz)   SpO2 93%   BMI 37 26 kg/m²   Vitals:    10/29/17 0208 10/29/17 0409   Weight: 90 3 kg (199 lb) 92 4 kg (203 lb 11 3 oz)     Orthostatic Blood Pressures    Flowsheet Row Most Recent Value   Blood Pressure  150/68 filed at 10/31/2017 1958   Patient Position - Orthostatic VS  Lying filed at 10/31/2017 0715          No intake or output data in the 24 hours ending 10/31/17 1505    Invasive Devices     Peripheral Intravenous Line            Peripheral IV 10/29/17 Right Forearm 2 days                Review of Systems: reviewed    Physical Exam   Constitutional: She is oriented to person, place, and time  No distress  HENT:   Head: Normocephalic and atraumatic  Right Ear: External ear normal    Left Ear: External ear normal    Eyes: Conjunctivae are normal  Pupils are equal, round, and reactive to light  Right eye exhibits no discharge  Left eye exhibits no discharge  No scleral icterus  Neck: Normal range of motion  Neck supple  No JVD present  No tracheal deviation present  No thyromegaly present  Cardiovascular: Normal rate and regular rhythm  Exam reveals no gallop and no friction rub      No murmur heard   +loop recorder placement   Pulmonary/Chest: Effort normal and breath sounds normal  No stridor  No respiratory distress  She has no wheezes  She has no rales  She exhibits no tenderness  Abdominal: Soft  Bowel sounds are normal  She exhibits no distension and no mass  There is no tenderness  There is no rebound and no guarding  Musculoskeletal: Normal range of motion  She exhibits no edema, tenderness or deformity  Neurological: She is alert and oriented to person, place, and time  She has normal reflexes  No cranial nerve deficit  She exhibits normal muscle tone  Coordination normal    Skin: Skin is warm and dry  No rash noted  She is not diaphoretic  No erythema  No pallor  Psychiatric: She has a normal mood and affect  Her behavior is normal  Judgment and thought content normal    Nursing note and vitals reviewed  Lab Results: I have personally reviewed pertinent lab results  Imaging: I have personally reviewed pertinent reports  EKG: reviewed  VTE Pharmacologic Prophylaxis: Sequential compression device (Venodyne)   VTE Mechanical Prophylaxis: sequential compression device    Counseling / Coordination of Care  Total time spent today 40 minutes  Greater than 50% of total time was spent with the patient and / or family counseling and / or coordination of care   A description of the counseling / coordination of care: stroke management

## 2017-10-31 NOTE — DISCHARGE SUMMARY
Discharge Summary - [place] [address] [facility]    Patient Information: Andres Daniel 66 y o  female MRN: [id number]  Unit/Bed#: [address] [place] [address] Encounter: [id number]    Discharging Physician / Practitioner: Yasemin Kenny MD  PCP: Arie Branch MD  Admission Date: 10/29/2017  Discharge Date: 10/31/17    Reason for Admission: Altered Mental Status (Per , pt slurring speech and c/o bad headache and medicated with Tylenol )      Discharge Diagnoses:     Principal Problem:    TIA (transient ischemic attack)  Active Problems:    Liver transplanted ([facility] )    Hypothyroidism    HLD (hyperlipidemia)    Acute ischemic stroke ([facility] )    Paroxysmal SVT (supraventricular tachycardia) ([facility] )  Resolved Problems:    * No resolved hospital problems  *  1  Subacute right frontal ischemic infarction  2  Irregular narrow complex tachycardia  3  Hypothyroidism  4  Hyperlipidemia  5  Status post liver transplantation    Consultations During Hospital Stay:  Ana Rosa Wilhelm, Dr Alondra Rose  Procedures Performed:     Echo showed EF of 41-54%, grade 1 diastolic dysfunction, no regional wall motion abnormalities without significant patent foramina ovale    Imaging while in hospital:    Cta Head And Neck W Wo Contrast    Result Date: 10/30/2017  Narrative: CTA NECK AND BRAIN WITH AND WITHOUT CONTRAST INDICATION: Cerebral ischemia  COMPARISON:   MR angiography dated 8/30/2015  TECHNIQUE:  Routine CT imaging of the Brain without contrast   Post contrast imaging was performed after administration of iodinated contrast through the neck and brain  Post contrast axial 0 625 mm images timed to opacify the arterial system  3D rendering was performed on an independent workstation  MIP reconstructions performed  Coronal reconstructions were performed of the noncontrast portion of the brain    Radiation dose length product (DLP) for this visit:  1595 54 mGy-cm   This examination, like all CT scans performed in the [facility], was performed utilizing techniques to minimize radiation dose exposure, including the use of iterative reconstruction and automated exposure control  IV Contrast:  85 mL of iohexol (OMNIPAQUE)     IMAGE QUALITY:   Diagnostic FINDINGS: NONCONTRAST BRAIN PARENCHYMA:  Confluent hypoattenuation noted throughout both cerebral hemispheres, bilaterally symmetric consistent with extensive chronic microangiopathy  No mass, mass effect or midline shift  No hemorrhage  Extensive vascular calcification of the vertebrobasilar system and intracranial internal carotid arteries  VENTRICLES AND EXTRA-AXIAL SPACES:  Enlargement of ventricles and extra-axial CSF spaces consistent with cerebral and cerebellar atrophy  VISUALIZED ORBITS AND PARANASAL SINUSES:  Unremarkable  CALVARIUM AND EXTRACRANIAL SOFT TISSUES:   Normal  CERVICAL VASCULATURE AORTIC ARCH AND GREAT VESSELS:  Mild atherosclerotic calcification of the aortic arch extending into the proximal great vessels  No significant stenosis at the origin of the great vessels  Visualized subclavian vasculature is unremarkable  RIGHT VERTEBRAL ARTERY CERVICAL SEGMENT:  Normal origin  The vessel is normal in caliber throughout the neck  LEFT VERTEBRAL ARTERY CERVICAL SEGMENT:  Normal origin  The vessel is normal in caliber throughout the neck  RIGHT EXTRACRANIAL CAROTID SEGMENT:  Atherosclerotic calcification of the distal common carotid artery extending to the bifurcation  Mild narrowing of the distal common carotid artery  No significant stenosis of the cervical internal carotid artery  LEFT EXTRACRANIAL CAROTID SEGMENT:  Moderate calcification of the distal common carotid artery with moderate stenosis  Atherosclerotic calcification extends into the bifurcation  No stenosis of the proximal cervical internal carotid artery   Mild focal atherosclerotic calcification within the distal cervical internal carotid artery with only slight luminal narrowing  NASCET criteria was used to determine the degree of internal carotid artery diameter stenosis  INTRACRANIAL VASCULATURE INTERNAL CAROTID ARTERIES:  Moderate atherosclerotic calcification of the cavernous internal carotid arteries  Normal ophthalmic artery origins  Normal ICA terminus  ANTERIOR CIRCULATION:  Symmetric A1 segments and anterior cerebral arteries with normal enhancement  Normal anterior communicating artery  MIDDLE CEREBRAL ARTERY CIRCULATION:  M1 segment and middle cerebral artery branches demonstrate normal enhancement bilaterally  DISTAL VERTEBRAL ARTERIES:  Moderate calcification of the distal vertebral arteries with mild narrowing at the level of the foramen magnum  BASILAR ARTERY:  Mild atherosclerotic change without stenosis  Normal superior cerebellar arteries  POSTERIOR CEREBRAL ARTERIES: Both posterior cerebral arteries arises from the basilar tip  Both arteries demonstrate normal flow-related enhancement  Normal posterior communicating arteries  DURAL VENOUS SINUSES:  Normal  NON VASCULAR ANATOMY BONY STRUCTURES:  Mild cervical spondylitic degenerative change  No acute osseous abnormality  SOFT TISSUES OF THE NECK:  Thyroid gland is small in size  There is a homogeneously hyperdense nodule within the right lobe measuring less than 1 cm in maximum diameter  THORACIC INLET:  Unremarkable  Impression: Atherosclerotic disease of the distal common carotid arteries bilaterally  Mild narrowing of the right distal common carotid artery and moderate narrowing on the left  Atherosclerotic changes extend into the proximal bifurcation with no significant stenosis of the proximal internal carotid arteries compared to the mid cervical internal carotid arteries  Slight, less than 50% narrowing of the distal left cervical internal carotid artery   Moderate intracranial atherosclerotic calcification involving the distal vertebral arteries and cavernous internal carotid arteries with mild narrowing  No occlusive disease  Extensive periventricular white matter hypoattenuation within both cerebral hemispheres consistent with extensive chronic microangiopathy  Workstation performed: [id number]     Xr Chest 1 View Portable    Result Date: 10/29/2017  Narrative: CHEST INDICATION:  Mental status change  COMPARISON:  10/26/2017 VIEWS:   AP frontal IMAGES:  1 FINDINGS:     Cardiomediastinal silhouette appears unremarkable  The lungs are clear  No pneumothorax or pleural effusion  Visualized osseous structures appear within normal limits for the patient's age  Impression: No active pulmonary disease  Workstation performed: [id number]     Xr Chest Pa & Lateral    Result Date: 10/28/2017  Narrative: CHEST INDICATION:  Cough for weeks  COMPARISON:  12/29/2016 VIEWS:  Frontal and lateral projections IMAGES:  2 FINDINGS:     Cardiomediastinal silhouette appears unremarkable  Mild elevation of the right hemidiaphragm is stable  The lungs are clear  No pneumothorax or pleural effusion  No evidence of heart failure  Stable compression of T12  Impression: No active pulmonary disease  Workstation performed: [id number]     Ct Head Without Contrast    Result Date: 10/29/2017  Narrative: CT BRAIN - WITHOUT CONTRAST INDICATION:  Slurred speech COMPARISON:  9/27/2015 TECHNIQUE:  CT examination of the brain was performed  In addition to axial images, coronal reformatted images were created and submitted for interpretation  Radiation dose length product (DLP) for this visit:  1288 35 mGy-cm   This examination, like all CT scans performed in the [facility], was performed utilizing techniques to minimize radiation dose exposure, including the use of iterative reconstruction and automated exposure control  IMAGE QUALITY:  Diagnostic   FINDINGS:  PARENCHYMA:  Decreased attenuation is noted in the supratentorial white matter demonstrating an appearance most consistent with moderate microangiopathic change  No intracranial mass, mass effect or midline shift  No CT signs of acute infarction  There is no parenchymal hemorrhage  VENTRICLES AND EXTRA-AXIAL SPACES:  Age-appropriate volume loss is noted  No hydrocephalus  VISUALIZED ORBITS AND PARANASAL SINUSES:  Unremarkable  CALVARIUM AND EXTRACRANIAL SOFT TISSUES:   Normal      Impression: No acute intracranial abnormality  Microangiopathic changes  Findings are consistent with the preliminary report from [facility] which was provided shortly after completion of the exam              Workstation performed: [id number]     Mri Brain Wo Contrast    Result Date: 10/30/2017  Narrative: MRI BRAIN WITHOUT CONTRAST INDICATION:  Slurred speech COMPARISON:   CT CTA 10/29/2017, MR 8/30/2015 TECHNIQUE:  Sagittal T1, axial T2, axial FLAIR, axial T1, axial [place] and axial diffusion imaging  IMAGE QUALITY:  Diagnostic  FINDINGS: BRAIN PARENCHYMA:  No intracranial mass, mass effect  Minor scattered foci of hemosiderin more apparent than on prior study largely, technique related  These scattered about supra and infratentorial parenchyma to include the medial right basal ganglia,  inferior aspect of both temporal lobes, anteriorly on the right more posteriorly on the left  Tiny focus of what could represent subacute ischemia in the right subcortical frontal parietal vertex  No indication of acute large vessel disease  Chronic small vessel disease, present to a relatively advanced degree, has progressed since prior study 2 years earlier  Age-appropriate VENTRICLES:  The ventricles are normal in size and contour  SELLA AND PITUITARY GLAND:  Normal  ORBITS:  Normal  PARANASAL SINUSES:  Normal  VASCULATURE:  Evaluation of the major intracranial vasculature demonstrates appropriate flow voids   CALVARIUM AND SKULL BASE:  Normal  EXTRACRANIAL SOFT TISSUES:  Normal      Impression: Tiny focus of what could represent subacute small vessel ischemia in the right frontal parietal vertex  Diffuse generalized volume loss commensurate with age with relatively advanced chronic small vessel disease Workstation performed: [id number]     Radiology Results    Result Date: 10/31/2017  Narrative: Ordered by an unspecified provider  Test Results Pending at Discharge (will require follow up):   · As per After Visit Summary     Outpatient Tests Requested:  · Follow-up with Dr Andrew Lopez in 4 weeks, Dr Vinita Rodriguez in 2 weeks  Patient also advised to notify his liver transplant doctor of her medication changes    Complications:  None    Hospital Course: Danny Ruvalcaba is a 66 y o  female patient with past medical history of hyperlipidemia, TIA, ulcerative colitis , liver transplantation, hypothyroidism who originally presented to the hospital on 10/29/2017 due to slurred speech and talking funny  Patient had CT scan of the brain in the emergency room which was negative  Patient was initially admitted for observation  Patient had CT of the head and neck which showed atherosclerotic disease of the distal common carotid arteries bilaterally, mild narrowing of the right distal common carotid artery and moderate narrowing on the left, atherosclerotic changes extending into the proximal bifurcation, less than 50% narrowing of the distal left cervical internal carotid artery, moderate intracranial calcification and atherosclerosis involving the distal vertebral arteries and cavernous internal carotid arteries  Patient's MRI of the brain showed tiny focus of subacute infarction in the right frontal parietal vertex  Patient's symptoms were all resolved  Patient was started on low-dose aspirin and her Pravachol dose was increased to 80 milligrams    Later on telemetry patient was noted to have periods of regular complex tachycardia and patient was seen in consultation with Cardiology  After coronary discussion with Neurology and Cardiology days was made to place a loop recorder to rule out underlying atrial fibrillation  Patient remained in a stable condition and had physical therapy and was walking without assistance and doing well  Patient will be discharged home to follow up with her primary neurologist   Patient also advised to call her transplant doctor to notify of her medication changes  Condition at Discharge: stable     Discharge Day Visit / Exam:     Subjective:  Patient denies any slurring of speech, chest pain, shortness of breath, weakness, tingling or numbness    Vitals: Blood Pressure: 150/68 (10/31/17 0932)  Pulse: 95 (10/31/17 0932)  Temperature: 98 8 °F (37 1 °C) (10/31/17 0932)  Temp Source: Oral (10/31/17 0715)  Respirations: 16 (10/31/17 0932)  Height: 5' 2" (157 5 cm) (10/29/17 0409)  Weight - Scale: 92 4 kg (203 lb 11 3 oz) (10/29/17 0409)  SpO2: 93 % (10/31/17 0932)  Exam:   Physical Exam   Constitutional: No distress  HENT:   Head: Normocephalic and atraumatic  Nose: Nose normal    Eyes: Conjunctivae and EOM are normal  Pupils are equal, round, and reactive to light  Neck: Normal range of motion  Neck supple  No JVD present  Cardiovascular: Normal rate, regular rhythm and normal heart sounds  Exam reveals no gallop and no friction rub  No murmur heard  Pulmonary/Chest: Effort normal and breath sounds normal  No respiratory distress  She has no wheezes  She has no rales  She exhibits no tenderness  Abdominal: Soft  Bowel sounds are normal  She exhibits no distension  There is no tenderness  There is no rebound and no guarding  Musculoskeletal: She exhibits no edema  Neurological: She is alert  No cranial nerve deficit  Skin: Skin is warm and dry  No rash noted  Psychiatric: She has a normal mood and affect  Discharge instructions/Information to patient and family:(Discharge Medications and Follow up):    See after visit summary for information provided to patient and family  Provisions for Follow-Up Care:  See after visit summary for information related to follow-up care and any pertinent home health orders  Disposition: Home    Planned Readmission:  No     Discharge Statement:  I spent 45 minutes discharging the patient  This time was spent on the day of discharge  I had direct contact with the patient on the day of discharge  Greater than 50% of the total time was spent examining patient, answering all patient questions, arranging and discussing plan of care with patient as well as directly providing post-discharge instructions  Additional time then spent on discharge activities  Discharge Medications:  See after visit summary for reconciled discharge medications provided to patient and family  ** Please Note: Dragon 360 Dictation voice to text software may have been used in the creation of this document   **

## 2017-10-31 NOTE — PLAN OF CARE
DISCHARGE PLANNING     Discharge to home or other facility with appropriate resources Completed        DISCHARGE PLANNING - CARE MANAGEMENT     Discharge to post-acute care or home with appropriate resources Completed        Knowledge Deficit     Patient/family/caregiver demonstrates understanding of disease process, treatment plan, medications, and discharge instructions Completed        NEUROSENSORY - ADULT     Achieves stable or improved neurological status Completed        Nutrition/Hydration-ADULT     Nutrient/Hydration intake appropriate for improving, restoring or maintaining nutritional needs Completed        PAIN - ADULT     Verbalizes/displays adequate comfort level or baseline comfort level Completed        Potential for Falls     Patient will remain free of falls Completed        SAFETY ADULT     Maintain or return to baseline ADL function Completed     Maintain or return mobility status to optimal level Completed

## 2017-10-31 NOTE — PHYSICAL THERAPY NOTE
PT TREATMENT     10/31/17 1120   Pain Assessment   Pain Assessment No/denies pain   Restrictions/Precautions   Other Precautions Fall Risk;Bed Alarm; Chair Alarm   General   Chart Reviewed Yes   Family/Caregiver Present Yes  (pt's )   Subjective   Subjective "I hope to go home today"   Transfers   Sit to Stand 7  Independent   Stand to Sit 7  Independent   Ambulation/Elevation   Gait Assistance 6  Modified independent   Assistive Device (occasional use of rail in hallway)   Distance 200 feet   Stair Management Assistance 6  Modified independent   Stair Management Technique Two rails; Step to pattern   Number of Stairs 6   Balance   Higher level balance (side stepping;walking backward;SBA/min A)   Assessment   Assessment Pt noted with improved balance today when amb, less use of rail in hallway  Plan   Treatment/Interventions ADL retraining;Functional transfer training;LE strengthening/ROM; Elevations; Therapeutic exercise; Endurance training;Patient/family training;Gait training   Recommendation   Recommendation Home with family support;24 hour supervision/assist;Home PT   Equipment Recommended (Pt has a cane and RW)

## 2017-10-31 NOTE — NURSING NOTE
Patient left ambulatory in stable condition accompanied by   Discharge instructions and new prescription information reviewed with patient and spouse  Both patient and spouse state understanding

## 2017-10-31 NOTE — PHYSICIAN ADVISOR
Current patient class: Inpatient  The patient is currently on Hospital Day: 3      The patient was admitted to the hospital at [phone] on 10/29/17 for the following diagnosis:  TIA (transient ischemic attack) [G45 9]  Altered mental status [R41 82]       There is documentation in the medical record of an expected length of stay of at least 2 midnights  The patient is therefore expected to satisfy the 2 midnight benchmark and given the 2 midnight presumption is appropriate for INPATIENT ADMISSION  Given this expectation of a satisfying stay, CMS instructs us that the patient is most often appropriate for inpatient admission under part A provided medical necessity is documented in the chart  After review of the relevant documentation, labs, vital signs and test results, the patient is appropriate for INPATIENT ADMISSION  Admission to the hospital as an inpatient is a complex decision making process which requires the practitioner to consider the patients presenting complaint, history and physical examination and all relevant testing  With this in mind, in this case, the patient was deemed appropriate for INPATIENT ADMISSION  After review of the documentation and testing available at the time of the admission I concur with this clinical determination of medical necessity  Rationale is as follows:     The patient is a 66 yrs old Female who presented to the ED at 10/29/2017  2:08 AM with a chief complaint of Altered Mental Status (Per , pt slurring speech and c/o bad headache and medicated with Tylenol )    The patients vitals on arrival were ED Triage Vitals   Temperature Pulse Respirations Blood Pressure SpO2   10/29/17 0208 10/29/17 0208 10/29/17 0208 10/29/17 0208 10/29/17 0208   100 1 °F (37 8 °C) (!) 106 20 (!) 177/80 97 %      Temp Source Heart Rate Source Patient Position - Orthostatic VS BP Location FiO2 (%)   10/29/17 0208 10/29/17 0208 10/29/17 0208 10/29/17 0208 --   Tympanic Monitor Lying Left arm       Pain Score       10/29/17 0248       No Pain           Past Medical History:   Diagnosis Date    Anemia     Anxiety     Arthritis     Disease of thyroid gland     GERD (gastroesophageal reflux disease)     History of transfusion     Osteoporosis      Past Surgical History:   Procedure Laterality Date    ABDOMINAL SURGERY      APPENDECTOMY      BLADDER AUGMENTATION N/A     CHOLECYSTECTOMY N/A     COLONOSCOPY N/A 4/20/2017    Procedure: COLONOSCOPY;  Surgeon: Herberth Shepherd MD;  Location: [facility]; Service:     ESOPHAGOGASTRODUODENOSCOPY N/A 3/31/2016    Procedure: ESOPHAGOGASTRODUODENOSCOPY (EGD); Surgeon: Herberth Shepherd MD;  Location: [facility]; Service:     ESOPHAGOGASTRODUODENOSCOPY N/A 4/20/2017    Procedure: ESOPHAGOGASTRODUODENOSCOPY (EGD); Surgeon: Herberth Shepherd MD;  Location: [facility];   Service:     EYE SURGERY      cataracts removed both eyes lens implant    HERNIA REPAIR      JOINT REPLACEMENT      left knee replacement    LIVER TRANSPLANTATION N/A     TONSILECTOMY AND ADNOIDECTOMY N/A            Consults have been placed to:   IP CONSULT TO NEUROLOGY  IP CONSULT TO CASE MANAGEMENT  IP CONSULT TO NUTRITION SERVICES  IP CONSULT TO CASE MANAGEMENT  IP CONSULT TO CARDIOLOGY    Vitals:    10/30/17 1140 10/30/17 1640 10/30/17 2003 10/31/17 0009   BP: 129/63 139/66 128/59 128/59   Pulse: 97 98 87 87   Resp: 18 18 18 18   Temp: 99 1 °F (37 3 °C) 99 3 °F (37 4 °C) 99 1 °F (37 3 °C) 99 1 °F (37 3 °C)   TempSrc: Tympanic Tympanic Tympanic Tympanic   SpO2: 94% 98% 96% 98%   Weight:       Height:           Most recent labs:    Recent Labs      10/29/17   0218  10/29/17   0516   WBC  7 20   --    HGB  12 4   --    HCT  38 2   --    PLT  237  211   K  4 0   --    NA  140   --    CALCIUM  9 1   --    BUN  18   --    CREATININE  1 10   --    INR  0 95   --    TROPONINI  <0 02  0 03   AST  20   --    ALT  24   --    ALKPHOS  89   --    BILITOT  0 50   --        Scheduled Meds:  clopidogrel 75 mg Oral Daily   cycloSPORINE modified 50 mg Oral Q12H [address]   heparin (porcine) 5,000 Units Subcutaneous Q8H [address]   levothyroxine 112 mcg Oral Early Morning   mycophenolate 750 mg Oral BID   pantoprazole 20 mg Oral Early Morning   pravastatin 80 mg Oral Daily With Dinner     Continuous Infusions:   PRN Meds:      Surgical procedures (if appropriate):

## 2017-11-03 ENCOUNTER — GENERIC CONVERSION - ENCOUNTER (OUTPATIENT)
Dept: OTHER | Facility: OTHER | Age: 72
End: 2017-11-03

## 2017-11-04 ENCOUNTER — GENERIC CONVERSION - ENCOUNTER (OUTPATIENT)
Dept: OTHER | Facility: OTHER | Age: 72
End: 2017-11-04

## 2017-11-04 ENCOUNTER — APPOINTMENT (OUTPATIENT)
Dept: RADIOLOGY | Facility: HOSPITAL | Age: 72
DRG: 064 | End: 2017-11-04
Payer: MEDICARE

## 2017-11-04 ENCOUNTER — APPOINTMENT (EMERGENCY)
Dept: RADIOLOGY | Facility: HOSPITAL | Age: 72
DRG: 064 | End: 2017-11-04
Payer: MEDICARE

## 2017-11-04 ENCOUNTER — HOSPITAL ENCOUNTER (INPATIENT)
Facility: HOSPITAL | Age: 72
LOS: 4 days | Discharge: HOME HEALTH | DRG: 064 | End: 2017-11-08
Attending: EMERGENCY MEDICINE | Admitting: INTERNAL MEDICINE
Payer: MEDICARE

## 2017-11-04 DIAGNOSIS — R41.0 DELIRIUM: ICD-10-CM

## 2017-11-04 DIAGNOSIS — I63.9 CEREBROVASCULAR ACCIDENT (CVA) DUE TO EMBOLISM (HCC): ICD-10-CM

## 2017-11-04 DIAGNOSIS — R41.82 ALTERED MENTAL STATUS, UNSPECIFIED ALTERED MENTAL STATUS TYPE: Primary | ICD-10-CM

## 2017-11-04 DIAGNOSIS — R94.01 ABNORMAL EEG: ICD-10-CM

## 2017-11-04 DIAGNOSIS — R41.82 CHANGE IN MENTAL STATUS: ICD-10-CM

## 2017-11-04 DIAGNOSIS — I21.4 NSTEMI (NON-ST ELEVATED MYOCARDIAL INFARCTION) (HCC): ICD-10-CM

## 2017-11-04 DIAGNOSIS — I21.A1 NON-ST ELEVATION MYOCARDIAL INFARCTION (NSTEMI), TYPE 2: ICD-10-CM

## 2017-11-04 DIAGNOSIS — Z94.4 LIVER TRANSPLANTED (HCC): ICD-10-CM

## 2017-11-04 PROBLEM — G93.41 ACUTE METABOLIC ENCEPHALOPATHY: Status: ACTIVE | Noted: 2017-11-04

## 2017-11-04 LAB
ALBUMIN SERPL BCP-MCNC: 3.5 G/DL (ref 3.5–5)
ALP SERPL-CCNC: 108 U/L (ref 46–116)
ALT SERPL W P-5'-P-CCNC: 41 U/L (ref 12–78)
AMMONIA PLAS-SCNC: 14 UMOL/L (ref 11–35)
ANION GAP SERPL CALCULATED.3IONS-SCNC: 12 MMOL/L (ref 4–13)
AST SERPL W P-5'-P-CCNC: 45 U/L (ref 5–45)
BACTERIA UR QL AUTO: ABNORMAL /HPF
BASOPHILS # BLD AUTO: 0 THOUSANDS/ΜL (ref 0–0.1)
BASOPHILS NFR BLD AUTO: 0 % (ref 0–1)
BILIRUB SERPL-MCNC: 0.4 MG/DL (ref 0.2–1)
BILIRUB UR QL STRIP: NEGATIVE
BUN SERPL-MCNC: 22 MG/DL (ref 5–25)
CALCIUM SERPL-MCNC: 9.3 MG/DL (ref 8.3–10.1)
CHLORIDE SERPL-SCNC: 106 MMOL/L (ref 100–108)
CLARITY UR: CLEAR
CO2 SERPL-SCNC: 24 MMOL/L (ref 21–32)
COLOR UR: YELLOW
CREAT SERPL-MCNC: 1.26 MG/DL (ref 0.6–1.3)
EOSINOPHIL # BLD AUTO: 0.2 THOUSAND/ΜL (ref 0–0.61)
EOSINOPHIL NFR BLD AUTO: 3 % (ref 0–6)
ERYTHROCYTE [DISTWIDTH] IN BLOOD BY AUTOMATED COUNT: 16.5 % (ref 11.6–15.1)
GFR SERPL CREATININE-BSD FRML MDRD: 42 ML/MIN/1.73SQ M
GLUCOSE SERPL-MCNC: 149 MG/DL (ref 65–140)
GLUCOSE SERPL-MCNC: 161 MG/DL (ref 65–140)
GLUCOSE UR STRIP-MCNC: NEGATIVE MG/DL
HCT VFR BLD AUTO: 39.6 % (ref 37–47)
HGB BLD-MCNC: 12.7 G/DL (ref 12–16)
HGB UR QL STRIP.AUTO: ABNORMAL
KETONES UR STRIP-MCNC: NEGATIVE MG/DL
LEUKOCYTE ESTERASE UR QL STRIP: NEGATIVE
LYMPHOCYTES # BLD AUTO: 0.6 THOUSANDS/ΜL (ref 0.6–4.47)
LYMPHOCYTES NFR BLD AUTO: 10 % (ref 14–44)
MCH RBC QN AUTO: 27.7 PG (ref 27–31)
MCHC RBC AUTO-ENTMCNC: 32 G/DL (ref 31.4–37.4)
MCV RBC AUTO: 87 FL (ref 82–98)
MONOCYTES # BLD AUTO: 0.5 THOUSAND/ΜL (ref 0.17–1.22)
MONOCYTES NFR BLD AUTO: 8 % (ref 4–12)
NEUTROPHILS # BLD AUTO: 5.2 THOUSANDS/ΜL (ref 1.85–7.62)
NEUTS SEG NFR BLD AUTO: 79 % (ref 43–75)
NITRITE UR QL STRIP: NEGATIVE
NON-SQ EPI CELLS URNS QL MICRO: ABNORMAL /HPF
NRBC BLD AUTO-RTO: 0 /100 WBCS
PH UR STRIP.AUTO: 6 [PH] (ref 5–9)
PLATELET # BLD AUTO: 225 THOUSANDS/UL (ref 130–400)
PMV BLD AUTO: 7.6 FL (ref 8.9–12.7)
POTASSIUM SERPL-SCNC: 4.2 MMOL/L (ref 3.5–5.3)
PROT SERPL-MCNC: 7.3 G/DL (ref 6.4–8.2)
PROT UR STRIP-MCNC: NEGATIVE MG/DL
RBC # BLD AUTO: 4.57 MILLION/UL (ref 4.2–5.4)
RBC #/AREA URNS AUTO: ABNORMAL /HPF
SODIUM SERPL-SCNC: 142 MMOL/L (ref 136–145)
SP GR UR STRIP.AUTO: 1.01 (ref 1–1.03)
TROPONIN I SERPL-MCNC: 0.06 NG/ML
TROPONIN I SERPL-MCNC: 0.23 NG/ML
TROPONIN I SERPL-MCNC: 0.27 NG/ML
TSH SERPL DL<=0.05 MIU/L-ACNC: 1.33 UIU/ML (ref 0.36–3.74)
UROBILINOGEN UR QL STRIP.AUTO: 0.2 E.U./DL
VIT B12 SERPL-MCNC: 305 PG/ML (ref 100–900)
WBC # BLD AUTO: 6.5 THOUSAND/UL (ref 4.8–10.8)
WBC #/AREA URNS AUTO: ABNORMAL /HPF

## 2017-11-04 PROCEDURE — 93005 ELECTROCARDIOGRAM TRACING: CPT | Performed by: INTERNAL MEDICINE

## 2017-11-04 PROCEDURE — G8988 SELF CARE GOAL STATUS: HCPCS

## 2017-11-04 PROCEDURE — 84443 ASSAY THYROID STIM HORMONE: CPT | Performed by: STUDENT IN AN ORGANIZED HEALTH CARE EDUCATION/TRAINING PROGRAM

## 2017-11-04 PROCEDURE — 84484 ASSAY OF TROPONIN QUANT: CPT | Performed by: STUDENT IN AN ORGANIZED HEALTH CARE EDUCATION/TRAINING PROGRAM

## 2017-11-04 PROCEDURE — 70450 CT HEAD/BRAIN W/O DYE: CPT

## 2017-11-04 PROCEDURE — 85025 COMPLETE CBC W/AUTO DIFF WBC: CPT | Performed by: EMERGENCY MEDICINE

## 2017-11-04 PROCEDURE — 99285 EMERGENCY DEPT VISIT HI MDM: CPT

## 2017-11-04 PROCEDURE — 80053 COMPREHEN METABOLIC PANEL: CPT | Performed by: EMERGENCY MEDICINE

## 2017-11-04 PROCEDURE — 36415 COLL VENOUS BLD VENIPUNCTURE: CPT | Performed by: EMERGENCY MEDICINE

## 2017-11-04 PROCEDURE — 84484 ASSAY OF TROPONIN QUANT: CPT | Performed by: EMERGENCY MEDICINE

## 2017-11-04 PROCEDURE — 84484 ASSAY OF TROPONIN QUANT: CPT | Performed by: INTERNAL MEDICINE

## 2017-11-04 PROCEDURE — 97167 OT EVAL HIGH COMPLEX 60 MIN: CPT

## 2017-11-04 PROCEDURE — 81001 URINALYSIS AUTO W/SCOPE: CPT | Performed by: EMERGENCY MEDICINE

## 2017-11-04 PROCEDURE — 93005 ELECTROCARDIOGRAM TRACING: CPT | Performed by: EMERGENCY MEDICINE

## 2017-11-04 PROCEDURE — G8987 SELF CARE CURRENT STATUS: HCPCS

## 2017-11-04 PROCEDURE — 82607 VITAMIN B-12: CPT | Performed by: STUDENT IN AN ORGANIZED HEALTH CARE EDUCATION/TRAINING PROGRAM

## 2017-11-04 PROCEDURE — 82140 ASSAY OF AMMONIA: CPT | Performed by: STUDENT IN AN ORGANIZED HEALTH CARE EDUCATION/TRAINING PROGRAM

## 2017-11-04 PROCEDURE — 71010 HB CHEST X-RAY 1 VIEW FRONTAL (PORTABLE): CPT

## 2017-11-04 PROCEDURE — 82948 REAGENT STRIP/BLOOD GLUCOSE: CPT

## 2017-11-04 PROCEDURE — 97535 SELF CARE MNGMENT TRAINING: CPT

## 2017-11-04 RX ORDER — PANTOPRAZOLE SODIUM 20 MG/1
20 TABLET, DELAYED RELEASE ORAL
Status: DISCONTINUED | OUTPATIENT
Start: 2017-11-04 | End: 2017-11-08 | Stop reason: HOSPADM

## 2017-11-04 RX ORDER — CYCLOSPORINE 25 MG/1
50 CAPSULE, GELATIN COATED ORAL 2 TIMES DAILY
Status: DISCONTINUED | OUTPATIENT
Start: 2017-11-04 | End: 2017-11-04

## 2017-11-04 RX ORDER — MYCOPHENOLATE MOFETIL 250 MG/1
750 CAPSULE ORAL 2 TIMES DAILY
Status: DISCONTINUED | OUTPATIENT
Start: 2017-11-04 | End: 2017-11-08 | Stop reason: HOSPADM

## 2017-11-04 RX ORDER — HEPARIN SODIUM 5000 [USP'U]/ML
5000 INJECTION, SOLUTION INTRAVENOUS; SUBCUTANEOUS EVERY 8 HOURS SCHEDULED
Status: DISCONTINUED | OUTPATIENT
Start: 2017-11-04 | End: 2017-11-06

## 2017-11-04 RX ORDER — CLOPIDOGREL BISULFATE 75 MG/1
75 TABLET ORAL DAILY
Status: DISCONTINUED | OUTPATIENT
Start: 2017-11-04 | End: 2017-11-06

## 2017-11-04 RX ORDER — CYCLOSPORINE 25 MG/1
50 CAPSULE, LIQUID FILLED ORAL EVERY 12 HOURS SCHEDULED
Status: DISCONTINUED | OUTPATIENT
Start: 2017-11-04 | End: 2017-11-08 | Stop reason: HOSPADM

## 2017-11-04 RX ORDER — PRAVASTATIN SODIUM 80 MG/1
80 TABLET ORAL
Status: DISCONTINUED | OUTPATIENT
Start: 2017-11-04 | End: 2017-11-06

## 2017-11-04 RX ORDER — ASPIRIN 81 MG/1
81 TABLET ORAL DAILY
Status: DISCONTINUED | OUTPATIENT
Start: 2017-11-04 | End: 2017-11-08 | Stop reason: HOSPADM

## 2017-11-04 RX ORDER — SODIUM CHLORIDE 9 MG/ML
50 INJECTION, SOLUTION INTRAVENOUS CONTINUOUS
Status: DISCONTINUED | OUTPATIENT
Start: 2017-11-04 | End: 2017-11-08 | Stop reason: HOSPADM

## 2017-11-04 RX ORDER — LEVOTHYROXINE SODIUM 112 UG/1
112 TABLET ORAL DAILY
Status: DISCONTINUED | OUTPATIENT
Start: 2017-11-05 | End: 2017-11-08 | Stop reason: HOSPADM

## 2017-11-04 RX ADMIN — PANTOPRAZOLE SODIUM 20 MG: 20 TABLET, DELAYED RELEASE ORAL at 09:15

## 2017-11-04 RX ADMIN — PRAVASTATIN SODIUM 80 MG: 80 TABLET ORAL at 17:12

## 2017-11-04 RX ADMIN — HEPARIN SODIUM 5000 UNITS: 5000 INJECTION, SOLUTION INTRAVENOUS; SUBCUTANEOUS at 22:09

## 2017-11-04 RX ADMIN — ASPIRIN 81 MG: 81 TABLET, COATED ORAL at 09:15

## 2017-11-04 RX ADMIN — SODIUM CHLORIDE 50 ML/HR: 0.9 INJECTION, SOLUTION INTRAVENOUS at 19:53

## 2017-11-04 RX ADMIN — MYCOPHENOLATE MOFETIL 750 MG: 250 CAPSULE ORAL at 17:12

## 2017-11-04 RX ADMIN — CLOPIDOGREL BISULFATE 75 MG: 75 TABLET ORAL at 09:15

## 2017-11-04 RX ADMIN — MYCOPHENOLATE MOFETIL 750 MG: 250 CAPSULE ORAL at 09:15

## 2017-11-04 RX ADMIN — HEPARIN SODIUM 5000 UNITS: 5000 INJECTION, SOLUTION INTRAVENOUS; SUBCUTANEOUS at 13:43

## 2017-11-04 RX ADMIN — CYCLOSPORINE 50 MG: 25 CAPSULE, LIQUID FILLED ORAL at 20:51

## 2017-11-04 RX ADMIN — CYCLOSPORINE 50 MG: 25 CAPSULE, LIQUID FILLED ORAL at 12:17

## 2017-11-04 RX ADMIN — SODIUM CHLORIDE 50 ML/HR: 0.9 INJECTION, SOLUTION INTRAVENOUS at 09:15

## 2017-11-04 RX ADMIN — METOPROLOL TARTRATE 12.5 MG: 25 TABLET ORAL at 10:57

## 2017-11-04 RX ADMIN — METOPROLOL TARTRATE 12.5 MG: 25 TABLET ORAL at 20:51

## 2017-11-04 NOTE — PHYSICAL THERAPY NOTE
Attempted to see pt for evaluation, however pt deferred by RN 2/2 elevated troponins and does not want to stress her  Will follow

## 2017-11-04 NOTE — OCCUPATIONAL THERAPY NOTE
OT EVALUATION and TREATMENT   11/04/17 1025   Restrictions/Precautions   Other Precautions Cardiac/sternal;Chair Alarm; Bed Alarm; Fall Risk   Pain Assessment   Pain Assessment No/denies pain   Pain Score No Pain   Home Living   Type of [address] Two level;Bed/bath upstairs;1/2 bath on main level;Stairs to enter with rails  (SPEEDY 3, 15 steps inside)   Home Equipment Walker;Cane   Prior Function   Level of Tower Needs assistance with ADLs and functional mobility   Lives With Spouse   Receives Help From Family   ADL Assistance Needs assistance   IADLs Needs assistance   Subjective   Subjective I have to go to the bathroom  ADL   Where Assessed Other (Comment)  (toilet)   Grooming Assistance 5  Supervision/Setup   LB Bathing Assistance 2  Maximal Assistance   UB Dressing Assistance 4  Minimal Assistance   LB Dressing Assistance 2  Maximal [address]  4  Minimal Assistance   Bed Mobility   Rolling R 5  Supervision   Supine to Sit 4  Minimal assistance   Sit to Supine 4  Minimal assistance   Transfers   Sit to Stand 4  Minimal assistance   Stand to Sit 4  Minimal assistance   Stand pivot 4  Minimal assistance   Toilet transfer 4  Minimal assistance   Functional Mobility   Functional Mobility 4  Minimal assistance   Additional Comments to and from bathroom   Additional items Hand hold assistance   Balance   Static Sitting Fair   Dynamic Sitting Fair   Static Standing Fair -   Dynamic Standing Fair -   Activity Tolerance   Activity Tolerance Patient limited by fatigue   RUE Assessment   RUE Assessment (ROM: WFL MMT: 3+/5)   LUE Assessment   LUE Assessment (ROM: WFL MMT: 3+/5)   Cognition   Overall Cognitive Status WFL   Arousal/Participation Alert; Cooperative   Attention Within functional limits   Orientation Level Oriented to person;Oriented to place;Oriented to situation;Disoriented to time   Following Commands Follows one step commands without difficulty   Assessment Limitation Decreased ADL status; Decreased UE ROM; Decreased UE strength;Decreased cognition;Decreased endurance;Decreased high-level ADLs; Decreased self-care trans   Prognosis Good   Assessment Patient evaluated by Occupational Therapy  Patient admitted with Acute metabolic encephalopathy  The patients occupational profile, medical and therapy history includes a extensive additional review of physical, cognitive, or psychosocial history related to current functional performance  Comorbidities affecting functional mobility and ADLS include: CVA, hypothyroid and hyperlipidemia, NSTEMI, altered MS, ulcerative colitis  Pt recently discharged on 10/31  Pt re-admitted after pt's  noted pt to be confused with facial droop  Prior to admission, patient was requiring assist for functional mobility with walker/cane, requiring assist for ADLS, requiring assist for IADLS and living with spouse in a multi level home with 3 steps to enter  The evaluation identifies the following performance deficits: weakness, impaired balance, decreased endurance, increased fall risk, new onset of impairment of functional mobility, decreased ADLS, decreased IADLS, decreased activity tolerance, decreased cognition and decreased strength, that result in activity limitations and/or participation restrictions  This evaluation requires clinical decision making of high complexity, because the patient presents with comorbidites that affect occupational performance and required significant modification of tasks or assistance with consideration of multiple treatment options  The Barthel Index was used as a functional outcome tool presenting with a score of 50, indicating marked limitations of functional mobility and ADLS  Patient will benefit from skilled Occupational Therapy services to address above deficits and facilitate a safe return to prior level of function     Goals   Patient Goals find out what's going on   STG Time Frame (1-7 days) Short Term Goal #1 Patient will increase standing tolerance to 5 minutes during functional activity; Patient will increase bed mobility to independent; Patient will increase functional mobility to and from bathroom with rolling walker with supervision to increase performance with ADLS; Patient will tolerate 5 minutes of UE ROM/strengthening to increase general activity tolerance and performance in ADLS/IADLS; Patient will improve functional activity tolerance to 8 minutes of sustained functional tasks to increase participation in basic self-care and decrease assistance level  LTG Time Frame (8-14 days)   Long Term Goal #1 Patient will increase standing tolerance to 8 minutes during functional activity; Patient will increase bed mobility to independent; Patient will increase functional mobility to and from bathroom with rolling walker independently to increase performance with ADLS; Patient will tolerate 8 minutes of UE ROM/strengthening to increase general activity tolerance and performance in ADLS/IADLS; Patient will improve functional activity tolerance to 12 minutes of sustained functional tasks to increase participation in basic self-care and decrease assistance level  Functional Transfer Goals   Pt Will Perform All Functional Transfers (STG: supervison LTG: independent)   ADL Goals   Pt Will Perform UE Dressing (STG: supervision LTG: independent)   Pt Will Perform LE Dressing (STG: MOD assist LTG: MIN assist, trial LE AE)   Pt Will Perform Toileting (STG: supervision LTG: independent)   Plan   Treatment Interventions ADL retraining;Functional transfer training;UE strengthening/ROM; Endurance training;Cognitive reorientation;Patient/family training;Equipment evaluation/education; Activityengagement; Energy conservation   OT Frequency 2-3x/wk   Additional Treatment Session   Start Time 1010   End Time 1025   Treatment Assessment pt completed ADLS in bathroom in stance at sink with set up and MIN assist/supervision, as well as at the toilet level with MAX/MOD assist for LE dressing and bathing  pt fatigues easily, pt benefits from frequent rest breaks and will benefit from continued skilled tx     Recommendation   OT Discharge Recommendation (STR vs Home with 24 assist/supervision, services)   Barthel Index   Feeding 10   Bathing 0   Grooming Score 5   Dressing Score 5   Bladder Score 5   Bowels Score 10   Toilet Use Score 5   Transfers (Bed/Chair) Score 10   Mobility (Level Surface) Score 0   Stairs Score 0   Barthel Index Score 50

## 2017-11-04 NOTE — CASE MANAGEMENT
PATIENT WAS OBSERVATION CONVERTED TO INPATIENT 11/4/17      Initial Clinical Review    Admission: Date/Time/Statement: 11/4/17 0618  Inpatient Admission (Order [id number])   Admission   Date: 11/4/2017 Department: [place] [id number] Released By/Authorizing: Carolyne Enamorado MD (auto-released)   Order History   Inpatient   Date/Time Action Taken User Additional Information   11/04/17 1645 Release Carolyne Enamorado MD (auto-released) From Order: [id number]   11/04/17 1645 Complete Carolyne Enamorado MD    Order Details     Frequency Duration Priority Order Class   Once 1  occurrence Routine Hospital Performed   Order Questions     Question Answer Comment   Admitting Physician [facility]    Level of Care Med Surg    Estimated length of stay More than 2 Midnights    Certification I certify that inpatient services are medically necessary for this patient for a duration of greater than two midnights  See H&P and MD Progress Notes for additional information about the patient's course of treatment  ED: Date/Time/Mode of Arrival:   ED Arrival Information     Expected Arrival Acuity Means of Arrival Escorted By Service Admission Type    - 11/4/2017 04:24 Emergent Walk-In Spouse General Medicine Emergency    Arrival Complaint    weakness          Chief Complaint:   Chief Complaint   Patient presents with    Weakness - Generalized     patient states she feels weak all over       History of Illness: Lisa Malave is a 66 y o  female with a PMH of Hypothyroidism, HLD, s/p Liver Transplant, Ulcerative Colitis and recent CVA  who presents with confusion and concern for facial droop  Of note, the pt does not remember what happened and family is not available at bedside nor did they answer the phone  Therefore history was obtained from ER physician and medical chart  The pt states that since her discharge on 10/31 she was doing fine with no complaints  She went to bed with no complaints    Her  then noticed several hours ago that there was a possible facial droop, slurring of her speech and confusion  She was brought to the ED where her facial droop and slurred speech resolved however she remained confused  Currently she is alert and oriented however cannot tell me the year which she was able to during her prior admission  She denies any headache, numbness/tingling/weakness in her extremities, chest pain, shortness of breath, abdominal pain, fever, diarrhea, dysuria melena or blood in her stools  No other complaints or concerns         ED Vital Signs:   ED Triage Vitals [11/04/17 0432]   Temperature Pulse Respirations Blood Pressure SpO2   97 7 °F (36 5 °C) (!) 114 20 (!) 175/87 95 %      Temp Source Heart Rate Source Patient Position - Orthostatic VS BP Location FiO2 (%)   Oral Monitor Lying Right arm --      Pain Score       No Pain        Wt Readings from Last 1 Encounters:   11/04/17 93 kg (205 lb 0 4 oz)       Vital Signs (abnormal): Abnormal Labs/Diagnostic Test Results: TROPONIN 0 02, 0 03, 0 06 , 0 23  CT HEAD Chronic microvascular ischemic changes  CXR  No acute cardiopulmonary disease, essentially unchanged  No acute intracranial abnormality  ED Treatment:   Medication Administration from 11/04/2017 0424 to 11/04/2017 0821     None          Past Medical/Surgical History:    Active Ambulatory Problems     Diagnosis Date Noted    Bronchitis 12/29/2016    Liver transplanted ([facility] ) 12/29/2016    Hypothyroidism 12/29/2016    Long-term use of immunosuppressant medication 12/30/2016    Anemia 12/30/2016    TIA (transient ischemic attack) 10/29/2017    HLD (hyperlipidemia) 10/29/2017    Acute ischemic stroke ([facility] ) 10/31/2017    Paroxysmal SVT (supraventricular tachycardia) ([facility] ) 10/31/2017    Altered mental status 11/04/2017     Resolved Ambulatory Problems     Diagnosis Date Noted    No Resolved Ambulatory Problems     Past Medical History:   Diagnosis Date    Anemia     Anxiety     Arthritis     Disease of thyroid gland     GERD (gastroesophageal reflux disease)     History of transfusion     Osteoporosis        Admitting Diagnosis: Delirium [R41 0]  Weakness [R53 1]  NSTEMI (non-ST elevated myocardial infarction) ([place] [address] ) [I21 4]  Altered mental status, unspecified altered mental status type [R41 82]    Age/Sex: 66 y o  female    Assessment/Plan:   Principal Problem:    Acute metabolic encephalopathy  Active Problems:    Liver transplanted ([place] [address] )    Hypothyroidism    HLD (hyperlipidemia)    CVA (cerebral vascular accident) ([place] [address] )    NSTEMI (non-ST elevated myocardial infarction) ([place] [address] )   Assessment/Plan  Tram Roca is a 66 y o  female with a PMH of Hypothyroidism, HLD, s/p Liver Transplant, Ulcerative Colitis and recent CVA  who presents with confusion and concern for facial droop  Acute Metabolic Encephalopathy  - CT head is negative for an acute process  Differential includes TIA vs Infectious Process vs ACS vs Metabolic Derangement  Pt underwent a complete stroke workup several days ago and was found to have a small subacute right frontal ischemic infarct  At this time, Ill admit her for Observation, order neuro checks, consult Neurology, obtain Ammonia, TSH and B12 level, trend troponin and start an infectious workup (CXR and U/A ordered)  NSTEMI  - can be type 2 due to tachycardia  Pt was found to have periods of irregular narrow complex tachycardia during prior admission and had a loop recorder placed  Will keep NPO for now, trend troponin, place on telemetry and have Cardiology evaluate the p   CVA, recent  - resume Asprin, Plavix and Statin  Neurology consulted as above   Hypothyroidism  - continue Synthroid  S/P Liver Transplant  - resume Cyclosporine and Cellcept   HLD  - continue Statin      Anticipated Length of Stay:  Patient will be admitted on an Observation basis with an anticipated length of stay of atleast 1 midnights       Admission Orders:  OBSERVATION  TELE MON  NPO  CONSULT CARDIO  CONSULT NEUROLOGY  PTOT  SERIAL TROPONIN  CBC PLT   BMP   NEURO CHECKS Q4H  Scheduled Meds:   aspirin 81 mg Oral Daily   clopidogrel 75 mg Oral Daily   cycloSPORINE non-modified 50 mg Oral BID   heparin (porcine) 5,000 Units Subcutaneous Q8H [address]   [START ON 11/5/2017] levothyroxine 112 mcg Oral Daily   mycophenolate 750 mg Oral BID   pantoprazole 20 mg Oral Early Morning   pravastatin 80 mg Oral Daily With Dinner     Continuous Infusions:   sodium chloride 50 mL/hr Last Rate: 50 mL/hr (11/04/17 0915)     PRN Meds:     11/4/17 CONTINUED STAY  CHANGED TO INPATIENT ADMISSION   PER NEUROLOGY CONSULT  Assessment/Recommendations:     1  Probable TIA vs left MCA ischemic event  2  recent small subacute right frontal ischemic infarction    3  Diffuse atherosclerotic disease   4  HTN  5  HLD  6  H/o liver transplantation     Patient had yet another ischemic event within one week  This time it's opposite side  Will need to repeat MRI brain to look for additional strokes since recent admission  If there is evidence of embolic strokes, will consider anticoagulation  For now resume aspirin and plavix  She has known extensive microvascular disease and moderate atherosclerotic disease on imaging  High dose lipitor would help however due to her liver transplantation, she can not be on it  Patient recently had TTE w/ shunt  Doesn't need repeat one  Recent HgbA1c is wnl  Will get routine EEG as well to r/o epileptiform discharges as patient had some features of confusion that can be seen in seizure and with her multiple ischemic events, she's at high risk for seizure potentials  Cardiology is following for CAD  Loop recorder in place  Speech and swallow evaluation per stroke guidelines   PT/OT as needed  Discussed plan with patient, family at length

## 2017-11-04 NOTE — PLAN OF CARE
Problem: DISCHARGE PLANNING - CARE MANAGEMENT  Goal: Discharge to post-acute care or home with appropriate resources  INTERVENTIONS:  - Conduct assessment to determine patient/family and health care team treatment goals, and need for post-acute services based on payer coverage, community resources, and patient preferences, and barriers to discharge  - Address psychosocial, clinical, and financial barriers to discharge as identified in assessment in conjunction with the patient/family and health care team  - Arrange appropriate level of post-acute services according to patient's   needs and preference and payer coverage in collaboration with the physician and health care team  - Communicate with and update the patient/family, physician, and health care team regarding progress on the discharge plan  - Arrange appropriate transportation to post-acute venues  [facility]  Outcome: Progressing

## 2017-11-04 NOTE — CONSULTS
Consultation - Cardiology   Preston Soto 66 y o  female MRN: [id number]  Unit/Bed#: [address] 404-01 Encounter: [id number]    Assessment:   1  Altered mental status now back to baseline  Possible etiology could be infection versus ischemia versus TIA  2  Non ST-elevation MI most likely type 2 due to underlying tachycardia, need to rule out ACS  3  Recent CVA  4  History of narrow complex tachycardia with no evidence of atrial fibrillation as per previous records available from last admission  5  History of liver transplant  6  Hypothyroidism  7  Dyslipidemia on low-dose Pravachol cholesterol profile is acceptable    Plan:        Monitor on today  Will repeat EKG  Start on low-dose metoprolol  Continue aspirin and Plavix  Check troponins until they are trending down  Would consider to start the patient on Lovenox at full dose if okay with Neurology  Will need stress test before discharge or the further care will be dictated as a troponin results as well a EKG become available  Continue statins  Discussed with medical team and patient's family  History of Present Illness    Physician Requesting Consult: Cesar Granger MD    Reason for Consult / Principal Problem:  Increase in troponin and tachycardia    Inpatient consult to Cardiology  Consult performed by: Esmeralda Brittle ordered by: Chloe Bean        HPI: Preston Soto is a 66y o  year old female who presents with confusion  Patient has past medical history significant for hypothyroidism, hyperlipidemia, history of liver transplant on cyclosporin, history of colitis who was recently discharged from [facility] for ischemic stroke came back with similar complaints about confusion in the middle of night  Patient's  noted that she was sitting on the side of bed moaning and was confused  He also noted more prominent facial droop and brought her to the ER    In the ER she was found to be in sinus tachycardia with prolonged QTC  Patient has just an echo done and a loop recorder placed  Echo shows normal LV systolic function  During her workup troponins are slightly elevated  During her previous admission she had episodes of narrow complex tachycardia  Patient denies any fever or any chills she is back to her normal     Review of Systems   Constitutional: Positive for activity change  Negative for chills, diaphoresis, fever and unexpected weight change  HENT: Negative for congestion  Eyes: Negative for discharge and redness  Respiratory: Negative for cough, chest tightness, shortness of breath and wheezing  Cardiovascular: Negative  Negative for chest pain, palpitations and leg swelling  Gastrointestinal: Negative for abdominal pain, diarrhea and nausea  Endocrine: Negative  Genitourinary: Negative for decreased urine volume and urgency  Musculoskeletal: Positive for arthralgias  Negative for back pain and gait problem  Skin: Negative for rash and wound  Allergic/Immunologic: Negative  Neurological: Positive for weakness  Negative for dizziness, seizures, syncope, light-headedness and headaches  Hematological: Negative  Psychiatric/Behavioral: Positive for confusion  Negative for agitation  The patient is nervous/anxious  Historical Information   Past Medical History:   Diagnosis Date    Anemia     Anxiety     Arthritis     Disease of thyroid gland     GERD (gastroesophageal reflux disease)     History of transfusion     Osteoporosis      Past Surgical History:   Procedure Laterality Date    ABDOMINAL SURGERY      APPENDECTOMY      BLADDER AUGMENTATION N/A     CHOLECYSTECTOMY N/A     COLONOSCOPY N/A 4/20/2017    Procedure: COLONOSCOPY;  Surgeon: Nidhi Pineda MD;  Location: [facility] GI LAB; Service:     ESOPHAGOGASTRODUODENOSCOPY N/A 3/31/2016    Procedure: ESOPHAGOGASTRODUODENOSCOPY (EGD); Surgeon: Nidhi Pineda MD;  Location: [facility] GI LAB;   Service:    Alize Cast ESOPHAGOGASTRODUODENOSCOPY N/A 4/20/2017    Procedure: ESOPHAGOGASTRODUODENOSCOPY (EGD); Surgeon: Orrie Mcardle, MD;  Location: [place] [address] GI LAB;   Service:     EYE SURGERY      cataracts removed both eyes lens implant    HERNIA REPAIR      JOINT REPLACEMENT      left knee replacement    LIVER TRANSPLANTATION N/A     TONSILECTOMY AND ADNOIDECTOMY N/A      History   Alcohol Use No     History   Drug Use No     History   Smoking Status    Former Smoker    Quit date: 1/21/1975   Smokeless Tobacco    Never Used     Family History:   Family History   Problem Relation Age of Onset    Cancer Mother      breast    Cancer Father        Meds/Allergies    PTA meds:    Prescriptions Prior to Admission   Medication    aspirin (ECOTRIN LOW STRENGTH) 81 mg EC tablet    calcium carbonate (OS-KENDRICK) 600 MG tablet    clopidogrel (PLAVIX) 75 mg tablet    cycloSPORINE non-modified (SandIMMUNE) 25 mg capsule    epoetin anjum (EPOGEN,PROCRIT) 40,000 units/mL    levothyroxine 112 mcg tablet    mycophenolate (CELLCEPT) 250 mg capsule    omeprazole (PriLOSEC) 40 MG capsule    oxybutynin (DITROPAN) 5 mg tablet    pravastatin (PRAVACHOL) 80 mg tablet      Allergies   Allergen Reactions    Tetracyclines & Related Hives    Vancomycin Other (See Comments)     Pt unsure of reaction    Prograf [Tacrolimus] Anxiety       Current Facility-Administered Medications:     aspirin (ECOTRIN LOW STRENGTH) EC tablet 81 mg, 81 mg, Oral, Daily, Maria Luz Spears MD, 81 mg at 11/04/17 0915    clopidogrel (PLAVIX) tablet 75 mg, 75 mg, Oral, Daily, Maria Luz Spears MD, 75 mg at 11/04/17 0915    cycloSPORINE non-modified (SandIMMUNE) capsule 50 mg, 50 mg, Oral, BID, Maria Luz Spears MD    heparin (porcine) subcutaneous injection 5,000 Units, 5,000 Units, Subcutaneous, Q8H [facility] **AND** Platelet count, , , Once, Maria Luz Spears MD  [address] [place] [address]  [START ON 11/5/2017] levothyroxine tablet 112 mcg, 112 mcg, Oral, Daily, Maria Luz Spears MD    mycophenolate (CELLCEPT) capsule 750 mg, 750 mg, Oral, BID, Sultana Sanchez MD, 750 mg at 11/04/17 0915    pantoprazole (PROTONIX) EC tablet 20 mg, 20 mg, Oral, Early Morning, Sultana Sanchez MD, 20 mg at 11/04/17 0915    pravastatin (PRAVACHOL) tablet 80 mg, 80 mg, Oral, Daily With Dinner, Sultana Sanchez MD    sodium chloride 0 9 % infusion, 50 mL/hr, Intravenous, Continuous, Sultana Sanchez MD, Last Rate: 50 mL/hr at 11/04/17 0915, 50 mL/hr at 11/04/17 0915    VTE Pharmacologic Prophylaxis:    Heparin    Objective:   Vitals: Blood pressure 156/74, pulse 96, temperature 98 4 °F (36 9 °C), temperature source Oral, resp  rate 18, height 5' 2" (1 575 m), weight 93 kg (205 lb 0 4 oz), SpO2 96 %, not currently breastfeeding  Orthostatic Blood Pressures    Flowsheet Row Most Recent Value   Blood Pressure  156/74 filed at 11/04/2017 6089   Patient Position - Orthostatic VS  Lying filed at 11/04/2017 0823        No intake or output data in the 24 hours ending 11/04/17 1031  Body mass index is 37 5 kg/m²  Invasive Devices     Peripheral Intravenous Line            Peripheral IV 11/04/17 Right  less than 1 day    Peripheral IV 11/04/17 Right Arm less than 1 day                  Physical Exam:   Physical Exam    Neurologic:  Alert & oriented x 3, now back to baseline  Constitutional:  Well developed, well nourished, non-toxic appearance   Eyes:  Pupil equal and reacting to light, conjunctiva normal   HENT:  Atraumatic, oropharynx moist, Neck- normal range of motion, no tenderness, supple   Respiratory:  Bilateral air entry, mostly clear to auscultation  Cardiovascular: S1-S2 regular with a 2/6 ejection systolic murmur and S4 is present  GI:  Soft, nondistended, normal bowel sounds, nontender, no hepatosplenomegaly appreciated  Musculoskeletal:  No edema, no tenderness, no deformities     Skin:  Well hydrated, no rash   Lymphatic:  No lymphadenopathy noted   Extremities:  no edema and distal pulses are present    Labs:   Troponins: Results from last 7 days  Lab Units 17  0950 17  0441 10/29/17  0516   TROPONIN I ng/mL 0 23* 0 06* 0 03       CBC with diff:   Results from last 7 days  Lab Units 17  0441 10/29/17  0516 10/29/17  0218   WBC Thousand/uL 6 50  --  7 20   HEMOGLOBIN g/dL 12 7  --  12 4   HEMATOCRIT % 39 6  --  38 2   MCV fL 87  --  85   PLATELETS Thousands/uL 225 211 237   MCH pg 27 7  --  27 5   MCHC g/dL 32 0  --  32 5   RDW % 16 5*  --  15 1   MPV fL 7 6* 7 9* 7 3*   NRBC AUTO /100 WBCs 0  --   --        CMP:   Results from last 7 days  Lab Units 17  0441 10/29/17  0218   SODIUM mmol/L 142 140   POTASSIUM mmol/L 4 2 4 0   CHLORIDE mmol/L 106 104   CO2 mmol/L 24 23   ANION GAP mmol/L 12 13   BUN mg/dL 22 18   CREATININE mg/dL 1 26 1 10   GLUCOSE RANDOM mg/dL 161* 136   CALCIUM mg/dL 9 3 9 1   AST U/L 45 20   ALT U/L 41 24   ALK PHOS U/L 108 89   TOTAL PROTEIN g/dL 7 3 7 3   ALBUMIN g/dL 3 5 3 6   BILIRUBIN TOTAL mg/dL 0 40 0 50   EGFR ml/min/1 73sq m 42 50       Magnesium:     Coags:   Results from last 7 days  Lab Units 10/29/17  0218   PTT seconds 24   INR  0 95     TSH:    Results from last 7 days  Lab Units 17  0738   TSH 3RD GENERATON uIU/mL 1 334     Lipid Profile:   Results from last 7 days  Lab Units 10/29/17  0516   CHOLESTEROL mg/dL 122   TRIGLYCERIDES mg/dL 82   HDL mg/dL 46   LDL CALC mg/dL 60     Hgb A1c:   Results from last 7 days  Lab Units 10/29/17  0516   HEMOGLOBIN A1C % 5 9       Imaging & Testing   Cardiac testing:   Results for orders placed during the hospital encounter of 10/29/17   Echo complete with contrast if indicated    [address]  [address] [facility]  [phone]    Transthoracic Echocardiogram  2D, M-mode, Doppler, and Color Doppler    Study date:  30-Oct-2017    Patient: Kathi Gibbons  MR number: [id number]  Account number: [de-identified]  : 1945  Age: 66 years  Gender: Female  Status: Routine  Location: Bedside  Height: 62 in  Weight: 202 6 lb  BP: 136/ 72 mmHg    Indications: TIA    Diagnoses: I67 9 - Cerebrovascular disease, unspecified    Sonographer:  Phil Acharya  Primary Physician:  Gonzalez Riddle MD  Referring Physician:  Alma Onofre MD  Group:  Mandie Lemus  RN:  Reece Lopez RN  Interpreting Physician:  Digna De La O MD    SUMMARY    LEFT VENTRICLE:  Systolic function was normal  Ejection fraction was estimated in the range of 55 % to 60 %  There were no regional wall motion abnormalities  Wall thickness was mildly increased  Doppler parameters were consistent with abnormal left ventricular relaxation (grade 1 diastolic dysfunction)  Doppler parameters were consistent with elevated mean left atrial filling pressure  LEFT ATRIUM:  The atrium was mildly dilated  ATRIAL SEPTUM:  No significant defect or patent foramen ovale was identified  There was a trace left-to-right shunt  This is within normal limits for the patient's age  TRICUSPID VALVE:  The tricuspid jet envelope definition was inadequate for estimation of RV systolic pressure  There are no indirect findings (abnormal RV volume or geometry, altered pulmonary flow velocity profile, or leftward septal displacement) which  would suggest moderate or severe pulmonary hypertension  HISTORY: PRIOR HISTORY: GERD, Hypothyroidism, Anxiety, Anemia    PROCEDURE: The procedure was performed at the bedside  This was a routine study  The transthoracic approach was used  The study included complete 2D imaging, M-mode, complete spectral Doppler, and color Doppler  The heart rate was 80 bpm,  at the start of the study  Intravenous contrast ( 10 ml) was administered to evaluate shunting  Image quality was adequate  LEFT VENTRICLE: Size was normal  Systolic function was normal  Ejection fraction was estimated in the range of 55 % to 60 %  There were no regional wall motion abnormalities  Wall thickness was mildly increased   No evidence of apical  thrombus  DOPPLER: Doppler parameters were consistent with abnormal left ventricular relaxation (grade 1 diastolic dysfunction)  Doppler parameters were consistent with elevated mean left atrial filling pressure  RIGHT VENTRICLE: The size was normal  Systolic function was normal  Wall thickness was normal     LEFT ATRIUM: The atrium was mildly dilated  ATRIAL SEPTUM: No significant defect or patent foramen ovale was identified  There was a trace left-to-right shunt  This is within normal limits for the patient's age  RIGHT ATRIUM: Size was normal     MITRAL VALVE: There was mild to moderate annular calcification  Valve structure was normal  There was normal leaflet separation  DOPPLER: The transmitral velocity was within the normal range  There was no evidence for stenosis  There was  no significant regurgitation  AORTIC VALVE: The valve was not visualized well enough to rule out a bicuspid morphology  Leaflets exhibited mildly increased thickness and normal cuspal separation  DOPPLER: Transaortic velocity was within the normal range  There was no  evidence for stenosis  There was no significant regurgitation  TRICUSPID VALVE: The valve structure was normal  There was normal leaflet separation  DOPPLER: The transtricuspid velocity was within the normal range  There was no evidence for stenosis  There was trace regurgitation  The tricuspid jet  envelope definition was inadequate for estimation of RV systolic pressure  There are no indirect findings (abnormal RV volume or geometry, altered pulmonary flow velocity profile, or leftward septal displacement) which would suggest  moderate or severe pulmonary hypertension  PULMONIC VALVE: Leaflets exhibited normal thickness, no calcification, and normal cuspal separation  DOPPLER: The transpulmonic velocity was within the normal range  There was no significant regurgitation  PERICARDIUM: There was no pericardial effusion   The pericardium was normal in appearance  AORTA: The root exhibited normal size  SYSTEMIC VEINS: IVC: The inferior vena cava was normal in size  SYSTEM MEASUREMENT TABLES    2D mode  AoR Diam 2D: 3 cm  LA Diam (2D): 4 1 cm  LA/Ao (2D): 1 37  FS (2D Teich): 25 1 %  IVSd (2D): 1 14 cm  LVDEV: 65 9 cm³  LVESV: 32 8 cm³  LVIDd(2D): 3 9 cm  LVISd (2D): 2 92 cm  LVPWd (2D): 1 09 cm  SV (Teich): 33 1 cm³    Apical four chamber  LVEF A4C: 56 %    Unspecified Scan Mode  MV Peak A Shiv: 1670 mm/s  MV Peak E Shiv  Mean: 1030 mm/s  MVA (PHT): 4 4 cm squared  PHT: 50 ms  Max P mm[Hg]  V Max: 2570 mm/s  Vmax: 2540 mm/s  RA Area: 10 6 cm squared  RA Volume: 20 9 cm³  TAPSE: 1 6 cm    [facility] Accredited Echocardiography Laboratory    Prepared and electronically signed by    Doris Rosales MD  Signed 30-Oct-2017 14:13:31       t  Imaging: I have personally reviewed pertinent reports  Cta Head And Neck W Wo Contrast    Result Date: 10/30/2017  Narrative: CTA NECK AND BRAIN WITH AND WITHOUT CONTRAST INDICATION: Cerebral ischemia  COMPARISON:   MR angiography dated 2015  TECHNIQUE:  Routine CT imaging of the Brain without contrast   Post contrast imaging was performed after administration of iodinated contrast through the neck and brain  Post contrast axial 0 625 mm images timed to opacify the arterial system  3D rendering was performed on an independent workstation  MIP reconstructions performed  Coronal reconstructions were performed of the noncontrast portion of the brain  Radiation dose length product (DLP) for this visit:  1595 54 mGy-cm   This examination, like all CT scans performed in the [facility], was performed utilizing techniques to minimize radiation dose exposure, including the use of iterative reconstruction and automated exposure control     IV Contrast:  85 mL of iohexol (OMNIPAQUE)     IMAGE QUALITY:   Diagnostic FINDINGS: NONCONTRAST BRAIN PARENCHYMA:  Confluent hypoattenuation noted throughout both cerebral hemispheres, bilaterally symmetric consistent with extensive chronic microangiopathy  No mass, mass effect or midline shift  No hemorrhage  Extensive vascular calcification of the vertebrobasilar system and intracranial internal carotid arteries  VENTRICLES AND EXTRA-AXIAL SPACES:  Enlargement of ventricles and extra-axial CSF spaces consistent with cerebral and cerebellar atrophy  VISUALIZED ORBITS AND PARANASAL SINUSES:  Unremarkable  CALVARIUM AND EXTRACRANIAL SOFT TISSUES:   Normal  CERVICAL VASCULATURE AORTIC ARCH AND GREAT VESSELS:  Mild atherosclerotic calcification of the aortic arch extending into the proximal great vessels  No significant stenosis at the origin of the great vessels  Visualized subclavian vasculature is unremarkable  RIGHT VERTEBRAL ARTERY CERVICAL SEGMENT:  Normal origin  The vessel is normal in caliber throughout the neck  LEFT VERTEBRAL ARTERY CERVICAL SEGMENT:  Normal origin  The vessel is normal in caliber throughout the neck  RIGHT EXTRACRANIAL CAROTID SEGMENT:  Atherosclerotic calcification of the distal common carotid artery extending to the bifurcation  Mild narrowing of the distal common carotid artery  No significant stenosis of the cervical internal carotid artery  LEFT EXTRACRANIAL CAROTID SEGMENT:  Moderate calcification of the distal common carotid artery with moderate stenosis  Atherosclerotic calcification extends into the bifurcation  No stenosis of the proximal cervical internal carotid artery  Mild focal atherosclerotic calcification within the distal cervical internal carotid artery with only slight luminal narrowing  NASCET criteria was used to determine the degree of internal carotid artery diameter stenosis  INTRACRANIAL VASCULATURE INTERNAL CAROTID ARTERIES:  Moderate atherosclerotic calcification of the cavernous internal carotid arteries  Normal ophthalmic artery origins  Normal ICA terminus   ANTERIOR CIRCULATION:  Symmetric A1 segments and anterior cerebral arteries with normal enhancement  Normal anterior communicating artery  MIDDLE CEREBRAL ARTERY CIRCULATION:  M1 segment and middle cerebral artery branches demonstrate normal enhancement bilaterally  DISTAL VERTEBRAL ARTERIES:  Moderate calcification of the distal vertebral arteries with mild narrowing at the level of the foramen magnum  BASILAR ARTERY:  Mild atherosclerotic change without stenosis  Normal superior cerebellar arteries  POSTERIOR CEREBRAL ARTERIES: Both posterior cerebral arteries arises from the basilar tip  Both arteries demonstrate normal flow-related enhancement  Normal posterior communicating arteries  DURAL VENOUS SINUSES:  Normal  NON VASCULAR ANATOMY BONY STRUCTURES:  Mild cervical spondylitic degenerative change  No acute osseous abnormality  SOFT TISSUES OF THE NECK:  Thyroid gland is small in size  There is a homogeneously hyperdense nodule within the right lobe measuring less than 1 cm in maximum diameter  THORACIC INLET:  Unremarkable  Impression: Atherosclerotic disease of the distal common carotid arteries bilaterally  Mild narrowing of the right distal common carotid artery and moderate narrowing on the left  Atherosclerotic changes extend into the proximal bifurcation with no significant stenosis of the proximal internal carotid arteries compared to the mid cervical internal carotid arteries  Slight, less than 50% narrowing of the distal left cervical internal carotid artery  Moderate intracranial atherosclerotic calcification involving the distal vertebral arteries and cavernous internal carotid arteries with mild narrowing  No occlusive disease  Extensive periventricular white matter hypoattenuation within both cerebral hemispheres consistent with extensive chronic microangiopathy  Workstation performed: [id number]     EKG/ Monitor: Personally reviewed     Twelve lead EKG shows sinus tachycardia heart rate 112 beats per minute, left anterior fascicular block  Prolonged QTC  Nonspecific ST changes  Code Status: Level 1 - Full Code  Advance Directive and Living Will:      Power of :    POLST:        Dr Jennifer Presley MD [facility]      "This note has been constructed using a voice recognition system  Therefore there may be syntax, spelling, and/or grammatical errors   Please call if you have any questions  "

## 2017-11-04 NOTE — ED PROVIDER NOTES
History  Chief Complaint   Patient presents with    Weakness - Generalized     patient states she feels weak all over     Pt in ER with her  with c/o facial weakness and confusion  Per pt's , he woke up to her with right facial weakness, last seen normal was last night  Stroke alert was not initiated due to elapsed time  Pt somewhat confused on arrival  Pt with similar symptoms a few days ago, and was diagnosed with a CVA  Prior to Admission Medications   Prescriptions Last Dose Informant Patient Reported? Taking?   aspirin (ECOTRIN LOW STRENGTH) 81 mg EC tablet   No No   Sig: Take 1 tablet by mouth daily   calcium carbonate (OS-KENDRICK) 600 MG tablet   Yes No   Sig: Take 600 mg by mouth 2 (two) times a day with meals   clopidogrel (PLAVIX) 75 mg tablet   Yes No   Sig: Take 75 mg by mouth daily  cycloSPORINE non-modified (SandIMMUNE) 25 mg capsule   Yes No   Sig: Take 50 mg by mouth 2 (two) times a day  epoetin anjum (EPOGEN,PROCRIT) 40,000 units/mL   Yes No   Sig: Inject 40,000 Units under the skin every 14 (fourteen) days  levothyroxine 112 mcg tablet   Yes No   Sig: Take 112 mcg by mouth daily     mycophenolate (CELLCEPT) 250 mg capsule   Yes No   Sig: Take 250 mg by mouth 2 (two) times a day 3 tabs each dose=750 mg    omeprazole (PriLOSEC) 40 MG capsule   Yes No   Sig: Take 40 mg by mouth daily   oxybutynin (DITROPAN) 5 mg tablet   Yes No   Sig: Take 5 mg by mouth 2 (two) times a day     pravastatin (PRAVACHOL) 80 mg tablet   No No   Sig: Take 1 tablet by mouth daily with dinner      Facility-Administered Medications: None       Past Medical History:   Diagnosis Date    Anemia     Anxiety     Arthritis     Disease of thyroid gland     GERD (gastroesophageal reflux disease)     History of transfusion     Osteoporosis        Past Surgical History:   Procedure Laterality Date    ABDOMINAL SURGERY      APPENDECTOMY      BLADDER AUGMENTATION N/A     CHOLECYSTECTOMY N/A     COLONOSCOPY N/A 4/20/2017    Procedure: COLONOSCOPY;  Surgeon: Mojgan Iverson MD;  Location: [place] [address] [facility]; Service:     ESOPHAGOGASTRODUODENOSCOPY N/A 3/31/2016    Procedure: ESOPHAGOGASTRODUODENOSCOPY (EGD); Surgeon: Mojgan Iverson MD;  Location: [place] [address] [facility]; Service:     ESOPHAGOGASTRODUODENOSCOPY N/A 4/20/2017    Procedure: ESOPHAGOGASTRODUODENOSCOPY (EGD); Surgeon: Mojgan Iverson MD;  Location: [place] [address] [facility]; Service:     EYE SURGERY      cataracts removed both eyes lens implant    HERNIA REPAIR      JOINT REPLACEMENT      left knee replacement    LIVER TRANSPLANTATION N/A     TONSILECTOMY AND ADNOIDECTOMY N/A        Family History   Problem Relation Age of Onset    Cancer Mother      breast    Cancer Father      I have reviewed and agree with the history as documented  Social History   Substance Use Topics    Smoking status: Former Smoker     Quit date: 1/21/1975    Smokeless tobacco: Never Used    Alcohol use No        Review of Systems   Unable to perform ROS: Mental status change   Constitutional: Negative for chills and fever  Physical Exam  ED Triage Vitals [11/04/17 0432]   Temperature Pulse Respirations Blood Pressure SpO2   97 7 °F (36 5 °C) (!) 114 20 (!) 175/87 95 %      Temp Source Heart Rate Source Patient Position - Orthostatic VS BP Location FiO2 (%)   Oral Monitor Lying Right arm --      Pain Score       No Pain           Orthostatic Vital Signs  Vitals:    11/04/17 0432   BP: (!) 175/87   Pulse: (!) 114   Patient Position - Orthostatic VS: Lying       Physical Exam   Constitutional: She appears well-developed and well-nourished  No distress  HENT:   Head: Normocephalic and atraumatic  Eyes: Conjunctivae are normal  Pupils are equal, round, and reactive to light  Neck: Normal range of motion  Neck supple  Cardiovascular: Normal rate, regular rhythm and normal heart sounds  No murmur heard    Pulmonary/Chest: Effort normal and breath sounds normal  No respiratory distress  Abdominal: Soft  Bowel sounds are normal  She exhibits no distension  There is no tenderness  Musculoskeletal: Normal range of motion  She exhibits no edema or deformity  Neurological: She is alert  She is disoriented  No cranial nerve deficit  GCS eye subscore is 4  GCS verbal subscore is 4  GCS motor subscore is 6  Skin: Skin is warm and dry  No rash noted  She is not diaphoretic  No pallor  Psychiatric: She has a normal mood and affect  Her behavior is normal    Nursing note and vitals reviewed        ED Medications  Medications - No data to display    Diagnostic Studies  Results Reviewed     Procedure Component Value Units Date/Time    Ammonia [[id number]]     Lab Status:  No result Specimen:  Blood     TSH, 3rd generation [[id number]]     Lab Status:  No result Specimen:  Blood     Vitamin B12 [[id number]]     Lab Status:  No result Specimen:  Blood     Urine Microscopic [[id number]]  (Abnormal) Collected:  11/04/17 0557    Lab Status:  Final result Specimen:  Urine from Urine, Clean Catch Updated:  11/04/17 0618     RBC, UA 1-2 (A) /hpf      WBC, UA None Seen /hpf      Epithelial Cells None Seen /hpf      Bacteria, UA None Seen /hpf     UA w Reflex to Microscopic [[id number]]  (Abnormal) Collected:  11/04/17 0557    Lab Status:  Final result Specimen:  Urine from Urine, Clean Catch Updated:  11/04/17 0612     Color, UA Yellow     Clarity, UA Clear     Specific Gravity, UA 1 010     pH, UA 6 0     Leukocytes, UA Negative     Nitrite, UA Negative     Protein, UA Negative mg/dl      Glucose, UA Negative mg/dl      Ketones, UA Negative mg/dl      Urobilinogen, UA 0 2 E U /dl      Bilirubin, UA Negative     Blood, UA Small (A)    Troponin I [[id number]]  (Abnormal) Collected:  11/04/17 0441    Lab Status:  Final result Specimen:  Blood from Vein Updated:  11/04/17 0521     Troponin I 0 06 (H) ng/mL     Narrative:         [employer] Chemistry analyzer 99% cutoff is > 0 04 ng/mL in network labs    o cTnI 99% cutoff is useful only when applied to patients in the clinical setting of myocardial ischemia  o cTnI 99% cutoff should be interpreted in the context of clinical history, ECG findings and possibly cardiac imaging to establish correct diagnosis  o cTnI 99% cutoff may be suggestive but clearly not indicative of a coronary event without the clinical setting of myocardial ischemia  Comprehensive metabolic panel [[id number]]  (Abnormal) Collected:  11/04/17 0441    Lab Status:  Final result Specimen:  Blood from Vein Updated:  11/04/17 0516     Sodium 142 mmol/L      Potassium 4 2 mmol/L      Chloride 106 mmol/L      CO2 24 mmol/L      Anion Gap 12 mmol/L      BUN 22 mg/dL      Creatinine 1 26 mg/dL      Glucose 161 (H) mg/dL      Calcium 9 3 mg/dL      AST 45 U/L      ALT 41 U/L      Alkaline Phosphatase 108 U/L      Total Protein 7 3 g/dL      Albumin 3 5 g/dL      Total Bilirubin 0 40 mg/dL      eGFR 42 ml/min/1 73sq m     Narrative:         National Kidney Disease Education Program recommendations are as follows:  GFR calculation is accurate only with a steady state creatinine  Chronic Kidney disease less than 60 ml/min/1 73 sq  meters  Kidney failure less than 15 ml/min/1 73 sq  meters      CBC and differential [[id number]]  (Abnormal) Collected:  11/04/17 0441    Lab Status:  Final result Specimen:  Blood from Vein Updated:  11/04/17 0451     WBC 6 50 Thousand/uL      RBC 4 57 Million/uL      Hemoglobin 12 7 g/dL      Hematocrit 39 6 %      MCV 87 fL      MCH 27 7 pg      MCHC 32 0 g/dL      RDW 16 5 (H) %      MPV 7 6 (L) fL      Platelets 959 Thousands/uL      nRBC 0 /100 WBCs      Neutrophils Relative 79 (H) %      Lymphocytes Relative 10 (L) %      Monocytes Relative 8 %      Eosinophils Relative 3 %      Basophils Relative 0 %      Neutrophils Absolute 5 20 Thousands/µL      Lymphocytes Absolute 0 60 Thousands/µL      Monocytes Absolute 0 50 Thousand/µL      Eosinophils Absolute 0 20 Thousand/µL Basophils Absolute 0 00 Thousands/µL     Fingerstick Glucose (POCT) [[id number]]  (Abnormal) Collected:  11/04/17 0428    Lab Status:  Final result Updated:  11/04/17 0451     POC Glucose 149 (H) mg/dl                  CT head without contrast   Final Result by Leelee Cruz MD (11/04 3543)      Chronic microvascular ischemic changes  No acute intracranial abnormality  Workstation performed: [id number]         XR chest 1 view portable    (Results Pending)              Procedures  ECG 12 Lead Documentation  Date/Time: 11/4/2017 7:18 AM  Performed by: Jono Castellanso by: Elvira Pollard     Indications / Diagnosis:  Ms changes  ECG reviewed by me, the ED Provider: yes    Patient location:  ED  Previous ECG:     Previous ECG:  Compared to current  Rate:     ECG rate:  112bpm  Rhythm:     Rhythm: sinus tachycardia               Phone Contacts  ED Phone Contact    ED Course  ED Course                                MDM  Number of Diagnoses or Management Options  Delirium: new and requires workup  Diagnosis management comments: Per , he now states that pt is confused  She woke up and told him to help her with her underpants, while holding a napkin  Pt not acting appropriately, per   Will obs for delirium   D/w Dr Oskar Beard and/or Complexity of Data Reviewed  Clinical lab tests: ordered and reviewed  Tests in the radiology section of CPT®: ordered and reviewed  Tests in the medicine section of CPT®: ordered and reviewed      CritCare Time    Disposition  Final diagnoses:   Delirium     Time reflects when diagnosis was documented in both MDM as applicable and the Disposition within this note     Time User Action Codes Description Comment    11/4/2017  6:18 AM Anastacia Nephew A Add [R41 82] Altered mental status, unspecified altered mental status type     11/4/2017  6:18 AM Anastacia Nephew A Modify [R41 82] Altered mental status, unspecified altered mental status type 11/4/2017  6:18 AM Josh ROWLAND Modify [R41 82] Altered mental status, unspecified altered mental status type     11/4/2017  6:19 AM Nadege Gray Add [R41 0] Delirium     11/4/2017  6:19 AM Josh ROWLAND Add [I21 4] NSTEMI (non-ST elevated myocardial infarction) [facility])       ED Disposition     ED Disposition Condition Comment    Admit  Case was discussed with Dr Slick Tomlinson and the patient's admission status was agreed to be Admission Status: observation status to the service of Dr Slick Tomlinson   Follow-up Information    None       Patient's Medications   Discharge Prescriptions    No medications on file     No discharge procedures on file      ED Provider  Electronically Signed by           Phani Horton DO  11/06/17 1982

## 2017-11-04 NOTE — PLAN OF CARE
CARDIOVASCULAR - ADULT     Maintains optimal cardiac output and hemodynamic stability Progressing     Absence of cardiac dysrhythmias or at baseline rhythm Progressing        NEUROSENSORY - ADULT     Achieves stable or improved neurological status Progressing     Achieves maximal functionality and self care Progressing        Potential for Falls     Patient will remain free of falls Progressing

## 2017-11-04 NOTE — H&P
History and Physical - [facility]    Patient Information: Danny Ruvalcaba 66 y o  female MRN: [id number]  Unit/Bed#: ED CT1 Encounter: [id number]  Admitting Physician: Jose G Lira MD  PCP: Francisca Samaniego MD  Date of Admission:  11/04/17    Chief Complaint:     Confusion, facial droop?   of Present Illness: Danny Ruvalcaba is a 66 y o  female with a PMH of Hypothyroidism, HLD, s/p Liver Transplant, Ulcerative Colitis and recent CVA  who presents with confusion and concern for facial droop  Of note, the pt does not remember what happened and family is not available at bedside nor did they answer the phone  Therefore history was obtained from ER physician and medical chart  The pt states that since her discharge on 10/31 she was doing fine with no complaints  She went to bed with no complaints  Her  then noticed several hours ago that there was a possible facial droop, slurring of her speech and confusion  She was brought to the ED where her facial droop and slurred speech resolved however she remained confused  Currently she is alert and oriented however cannot tell me the year which she was able to during her prior admission  She denies any headache, numbness/tingling/weakness in her extremities, chest pain, shortness of breath, abdominal pain, fever, diarrhea, dysuria melena or blood in her stools  No other complaints or concerns  Review of Systems:    Review of Systems   Constitutional: Negative for fever  Respiratory: Negative for cough and shortness of breath  Cardiovascular: Negative for chest pain  Gastrointestinal: Negative for abdominal pain, diarrhea and nausea  Genitourinary: Negative for dysuria and hematuria  Musculoskeletal: Positive for arthralgias  Neurological: Positive for weakness  Psychiatric/Behavioral: Positive for confusion         Past Medical and Surgical History:     Past Medical History:   Diagnosis Date    Anemia     Anxiety     Arthritis     Disease of thyroid gland     GERD (gastroesophageal reflux disease)     History of transfusion     Osteoporosis        Past Surgical History:   Procedure Laterality Date    ABDOMINAL SURGERY      APPENDECTOMY      BLADDER AUGMENTATION N/A     CHOLECYSTECTOMY N/A     COLONOSCOPY N/A 4/20/2017    Procedure: COLONOSCOPY;  Surgeon: Ellie Catalan MD;  Location: [place] [address] [facility]; Service:     ESOPHAGOGASTRODUODENOSCOPY N/A 3/31/2016    Procedure: ESOPHAGOGASTRODUODENOSCOPY (EGD); Surgeon: Ellie Catalan MD;  Location: [place] [address] [facility]; Service:     ESOPHAGOGASTRODUODENOSCOPY N/A 4/20/2017    Procedure: ESOPHAGOGASTRODUODENOSCOPY (EGD); Surgeon: Ellie Catalan MD;  Location: [place] [address] [facility]; Service:     EYE SURGERY      cataracts removed both eyes lens implant    HERNIA REPAIR      JOINT REPLACEMENT      left knee replacement    LIVER TRANSPLANTATION N/A     TONSILECTOMY AND ADNOIDECTOMY N/A        Meds/Allergies:    PTA meds:   Prior to Admission Medications   Prescriptions Last Dose Informant Patient Reported? Taking?   aspirin (ECOTRIN LOW STRENGTH) 81 mg EC tablet   No No   Sig: Take 1 tablet by mouth daily   calcium carbonate (OS-KENDRICK) 600 MG tablet   Yes No   Sig: Take 600 mg by mouth 2 (two) times a day with meals   clopidogrel (PLAVIX) 75 mg tablet   Yes No   Sig: Take 75 mg by mouth daily  cycloSPORINE non-modified (SandIMMUNE) 25 mg capsule   Yes No   Sig: Take 50 mg by mouth 2 (two) times a day  epoetin anjum (EPOGEN,PROCRIT) 40,000 units/mL   Yes No   Sig: Inject 40,000 Units under the skin every 14 (fourteen) days  levothyroxine 112 mcg tablet   Yes No   Sig: Take 112 mcg by mouth daily     mycophenolate (CELLCEPT) 250 mg capsule   Yes No   Sig: Take 250 mg by mouth 2 (two) times a day 3 tabs each dose=750 mg    omeprazole (PriLOSEC) 40 MG capsule   Yes No   Sig: Take 40 mg by mouth daily   oxybutynin (DITROPAN) 5 mg tablet   Yes No   Sig: Take 5 mg by mouth 2 (two) times a day pravastatin (PRAVACHOL) 80 mg tablet   No No   Sig: Take 1 tablet by mouth daily with dinner      Facility-Administered Medications: None       Allergies: Allergies   Allergen Reactions    Tetracyclines & Related Hives    Vancomycin Other (See Comments)     Pt unsure of reaction    Prograf [Tacrolimus] Anxiety     History:     Marital Status:    History   Alcohol Use No     History   Smoking Status    Former Smoker    Quit date: 1/21/1975   Smokeless Tobacco    Never Used     History   Drug Use No       Family History: Mother: healthy   Father: unknown     Physical Exam:     Vitals:   Blood Pressure: (!) 175/87 (11/04/17 0432)  Pulse: (!) 114 (11/04/17 0432)  Temperature: 97 7 °F (36 5 °C) (11/04/17 0432)  Temp Source: Oral (11/04/17 0432)  Respirations: 20 (11/04/17 0432)  Weight - Scale: 92 1 kg (203 lb) (11/04/17 0432)  SpO2: 95 % (11/04/17 0432)    Physical Exam:   General: in no acute distress  HEENT: atraumatic, normocephalic  Skin: no jaundice  CVS: tachycardic, no murmurs appreciated  Lungs: CTAL, no wheezing or rales appreciated  Abdomen: soft, nondistended, bowel sounds normal, nontender upon palpation, no guarding or rebound tenderness  Extremities: no edema, no calf swelling or tenderness  Neuro: alert to person, place but not time, normal handgrip strength bilaterally, sensation intact in all extremities, moves all limbs spontaneously   Psych: calm, cooperative      Lab Results: I have personally reviewed pertinent reports          Results from last 7 days  Lab Units 11/04/17  0441   WBC Thousand/uL 6 50   HEMOGLOBIN g/dL 12 7   HEMATOCRIT % 39 6   PLATELETS Thousands/uL 225   NEUTROS PCT % 79*   LYMPHS PCT % 10*   MONOS PCT % 8   EOS PCT % 3       Results from last 7 days  Lab Units 11/04/17  0441   SODIUM mmol/L 142   POTASSIUM mmol/L 4 2   CHLORIDE mmol/L 106   CO2 mmol/L 24   BUN mg/dL 22   CREATININE mg/dL 1 26   CALCIUM mg/dL 9 3   TOTAL PROTEIN g/dL 7 3   BILIRUBIN TOTAL mg/dL 0 40   ALK PHOS U/L 108   ALT U/L 41   AST U/L 45   GLUCOSE RANDOM mg/dL 161*       Results from last 7 days  Lab Units 10/29/17  0218   INR  0 95       Imaging:     Cta Head And Neck W Wo Contrast    Result Date: 10/30/2017  Narrative: CTA NECK AND BRAIN WITH AND WITHOUT CONTRAST INDICATION: Cerebral ischemia  COMPARISON:   MR angiography dated 8/30/2015  TECHNIQUE:  Routine CT imaging of the Brain without contrast   Post contrast imaging was performed after administration of iodinated contrast through the neck and brain  Post contrast axial 0 625 mm images timed to opacify the arterial system  3D rendering was performed on an independent workstation  MIP reconstructions performed  Coronal reconstructions were performed of the noncontrast portion of the brain  Radiation dose length product (DLP) for this visit:  1595 54 mGy-cm   This examination, like all CT scans performed in the [facility], was performed utilizing techniques to minimize radiation dose exposure, including the use of iterative reconstruction and automated exposure control  IV Contrast:  85 mL of iohexol (OMNIPAQUE)     IMAGE QUALITY:   Diagnostic FINDINGS: NONCONTRAST BRAIN PARENCHYMA:  Confluent hypoattenuation noted throughout both cerebral hemispheres, bilaterally symmetric consistent with extensive chronic microangiopathy  No mass, mass effect or midline shift  No hemorrhage  Extensive vascular calcification of the vertebrobasilar system and intracranial internal carotid arteries  VENTRICLES AND EXTRA-AXIAL SPACES:  Enlargement of ventricles and extra-axial CSF spaces consistent with cerebral and cerebellar atrophy  VISUALIZED ORBITS AND PARANASAL SINUSES:  Unremarkable  CALVARIUM AND EXTRACRANIAL SOFT TISSUES:   Normal  CERVICAL VASCULATURE AORTIC ARCH AND GREAT VESSELS:  Mild atherosclerotic calcification of the aortic arch extending into the proximal great vessels   No significant stenosis at the origin of the great vessels  Visualized subclavian vasculature is unremarkable  RIGHT VERTEBRAL ARTERY CERVICAL SEGMENT:  Normal origin  The vessel is normal in caliber throughout the neck  LEFT VERTEBRAL ARTERY CERVICAL SEGMENT:  Normal origin  The vessel is normal in caliber throughout the neck  RIGHT EXTRACRANIAL CAROTID SEGMENT:  Atherosclerotic calcification of the distal common carotid artery extending to the bifurcation  Mild narrowing of the distal common carotid artery  No significant stenosis of the cervical internal carotid artery  LEFT EXTRACRANIAL CAROTID SEGMENT:  Moderate calcification of the distal common carotid artery with moderate stenosis  Atherosclerotic calcification extends into the bifurcation  No stenosis of the proximal cervical internal carotid artery  Mild focal atherosclerotic calcification within the distal cervical internal carotid artery with only slight luminal narrowing  NASCET criteria was used to determine the degree of internal carotid artery diameter stenosis  INTRACRANIAL VASCULATURE INTERNAL CAROTID ARTERIES:  Moderate atherosclerotic calcification of the cavernous internal carotid arteries  Normal ophthalmic artery origins  Normal ICA terminus  ANTERIOR CIRCULATION:  Symmetric A1 segments and anterior cerebral arteries with normal enhancement  Normal anterior communicating artery  MIDDLE CEREBRAL ARTERY CIRCULATION:  M1 segment and middle cerebral artery branches demonstrate normal enhancement bilaterally  DISTAL VERTEBRAL ARTERIES:  Moderate calcification of the distal vertebral arteries with mild narrowing at the level of the foramen magnum  BASILAR ARTERY:  Mild atherosclerotic change without stenosis  Normal superior cerebellar arteries  POSTERIOR CEREBRAL ARTERIES: Both posterior cerebral arteries arises from the basilar tip  Both arteries demonstrate normal flow-related enhancement  Normal posterior communicating arteries   DURAL VENOUS SINUSES:  Normal  NON VASCULAR ANATOMY BONY STRUCTURES:  Mild cervical spondylitic degenerative change  No acute osseous abnormality  SOFT TISSUES OF THE NECK:  Thyroid gland is small in size  There is a homogeneously hyperdense nodule within the right lobe measuring less than 1 cm in maximum diameter  THORACIC INLET:  Unremarkable  Impression: Atherosclerotic disease of the distal common carotid arteries bilaterally  Mild narrowing of the right distal common carotid artery and moderate narrowing on the left  Atherosclerotic changes extend into the proximal bifurcation with no significant stenosis of the proximal internal carotid arteries compared to the mid cervical internal carotid arteries  Slight, less than 50% narrowing of the distal left cervical internal carotid artery  Moderate intracranial atherosclerotic calcification involving the distal vertebral arteries and cavernous internal carotid arteries with mild narrowing  No occlusive disease  Extensive periventricular white matter hypoattenuation within both cerebral hemispheres consistent with extensive chronic microangiopathy  Workstation performed: [id number]     Xr Chest 1 View Portable    Result Date: 10/29/2017  Narrative: CHEST INDICATION:  Mental status change  COMPARISON:  10/26/2017 VIEWS:   AP frontal IMAGES:  1 FINDINGS:     Cardiomediastinal silhouette appears unremarkable  The lungs are clear  No pneumothorax or pleural effusion  Visualized osseous structures appear within normal limits for the patient's age  Impression: No active pulmonary disease  Workstation performed: [id number]     Xr Chest Pa & Lateral    Result Date: 10/28/2017  Narrative: CHEST INDICATION:  Cough for weeks  COMPARISON:  12/29/2016 VIEWS:  Frontal and lateral projections IMAGES:  2 FINDINGS:     Cardiomediastinal silhouette appears unremarkable  Mild elevation of the right hemidiaphragm is stable  The lungs are clear  No pneumothorax or pleural effusion  No evidence of heart failure  Stable compression of T12  Impression: No active pulmonary disease  Workstation performed: [id number]     Ct Head Without Contrast    Result Date: 11/4/2017  Narrative: CT BRAIN - WITHOUT CONTRAST INDICATION:  49-year-old woman with facial droop  COMPARISON:  CT from October 29, 2017  MRI from October 30, 2017  TECHNIQUE:  CT examination of the brain was performed  In addition to axial images, coronal reformatted images were created and submitted for interpretation  Radiation dose length product (DLP) for this visit:  1083 84 mGy-cm   This examination, like all CT scans performed in the [facility], was performed utilizing techniques to minimize radiation dose exposure, including the use of iterative reconstruction and automated exposure control  IMAGE QUALITY:  Diagnostic  FINDINGS:  PARENCHYMA:  No intracranial mass, mass effect or midline shift  No CT signs of acute infarction  There is no parenchymal hemorrhage  Diffuse decreased white matter attenuation, consistent with chronic microvascular ischemic change  VENTRICLES AND EXTRA-AXIAL SPACES:  Enlarged, consistent with the patient's age  No extra-axial blood  VISUALIZED ORBITS AND PARANASAL SINUSES:  Unremarkable  CALVARIUM AND EXTRACRANIAL SOFT TISSUES:   Normal      Impression: Chronic microvascular ischemic changes  No acute intracranial abnormality  Workstation performed: [id number]     Ct Head Without Contrast    Result Date: 10/29/2017  Narrative: CT BRAIN - WITHOUT CONTRAST INDICATION:  Slurred speech COMPARISON:  9/27/2015 TECHNIQUE:  CT examination of the brain was performed  In addition to axial images, coronal reformatted images were created and submitted for interpretation  Radiation dose length product (DLP) for this visit:  1288 35 mGy-cm     This examination, like all CT scans performed in the [facility], was performed utilizing techniques to minimize radiation dose exposure, including the use of iterative reconstruction and automated exposure control  IMAGE QUALITY:  Diagnostic  FINDINGS:  PARENCHYMA:  Decreased attenuation is noted in the supratentorial white matter demonstrating an appearance most consistent with moderate microangiopathic change  No intracranial mass, mass effect or midline shift  No CT signs of acute infarction  There is no parenchymal hemorrhage  VENTRICLES AND EXTRA-AXIAL SPACES:  Age-appropriate volume loss is noted  No hydrocephalus  VISUALIZED ORBITS AND PARANASAL SINUSES:  Unremarkable  CALVARIUM AND EXTRACRANIAL SOFT TISSUES:   Normal      Impression: No acute intracranial abnormality  Microangiopathic changes  Findings are consistent with the preliminary report from [facility] which was provided shortly after completion of the exam              Workstation performed: [id number]     Mri Brain Wo Contrast    Result Date: 10/30/2017  Narrative: MRI BRAIN WITHOUT CONTRAST INDICATION:  Slurred speech COMPARISON:   CT CTA 10/29/2017, MR 8/30/2015 TECHNIQUE:  Sagittal T1, axial T2, axial FLAIR, axial T1, axial Wyatt and axial diffusion imaging  IMAGE QUALITY:  Diagnostic  FINDINGS: BRAIN PARENCHYMA:  No intracranial mass, mass effect  Minor scattered foci of hemosiderin more apparent than on prior study largely, technique related  These scattered about supra and infratentorial parenchyma to include the medial right basal ganglia,  inferior aspect of both temporal lobes, anteriorly on the right more posteriorly on the left  Tiny focus of what could represent subacute ischemia in the right subcortical frontal parietal vertex  No indication of acute large vessel disease  Chronic small vessel disease, present to a relatively advanced degree, has progressed since prior study 2 years earlier  Age-appropriate VENTRICLES:  The ventricles are normal in size and contour   SELLA AND PITUITARY GLAND:  Normal  ORBITS:  Normal  PARANASAL SINUSES:  Normal  VASCULATURE: Evaluation of the major intracranial vasculature demonstrates appropriate flow voids  CALVARIUM AND SKULL BASE:  Normal  EXTRACRANIAL SOFT TISSUES:  Normal      Impression: Tiny focus of what could represent subacute small vessel ischemia in the right frontal parietal vertex  Diffuse generalized volume loss commensurate with age with relatively advanced chronic small vessel disease Workstation performed: [id number]     Radiology Results    Result Date: 10/31/2017  Narrative: Ordered by an unspecified provider  Assessment/Plan    Shital Oliver is a 66 y o  female with a PMH of Hypothyroidism, HLD, s/p Liver Transplant, Ulcerative Colitis and recent CVA  who presents with confusion and concern for facial droop  Acute Metabolic Encephalopathy  - CT head is negative for an acute process  Differential includes TIA vs Infectious Process vs ACS vs Metabolic Derangement  Pt underwent a complete stroke workup several days ago and was found to have a small subacute right frontal ischemic infarct  At this time, Ill admit her for Observation, order neuro checks, consult Neurology, obtain Ammonia, TSH and B12 level, trend troponin and start an infectious workup (CXR and U/A ordered)    NSTEMI  - can be type 2 due to tachycardia  Pt was found to have periods of irregular narrow complex tachycardia during prior admission and had a loop recorder placed  Will keep NPO for now, trend troponin, place on telemetry and have Cardiology evaluate the pt    CVA, recent  - resume Asprin, Plavix and Statin    Neurology consulted as above     Hypothyroidism  - continue Synthroid    S/P Liver Transplant  - resume Cyclosporine and Cellcept     HLD  - continue Statin         Hospital Problem List:     Principal Problem:    Acute metabolic encephalopathy  Active Problems:    Liver transplanted ([facility] [address] )    Hypothyroidism    HLD (hyperlipidemia)    CVA (cerebral vascular accident) ([facility] [address] )    NSTEMI (non-ST elevated myocardial infarction) ([facility] [address] )        VTE Prophylaxis: Heparin   Code Status: Prior    Anticipated Length of Stay:  Patient will be admitted on an Observation basis with an anticipated length of stay of atleast 1 midnights  Total Time for Visit, including Counseling / Coordination of Care: 30 minutes  Greater than 50% of this total time spent on direct patient counseling and coordination of care

## 2017-11-04 NOTE — CONSULTS
Neurology Consult Follow Up      Preston Soto is a 66 y o  female  Desmond Menjivar 32-*    [id number]        Assessment/Recommendations:    1  Probable TIA vs left MCA ischemic event  2  recent small subacute right frontal ischemic infarction    3  Diffuse atherosclerotic disease   4  HTN  5  HLD  6  H/o liver transplantation     Patient had yet another ischemic event within one week  This time it's opposite side  Will need to repeat MRI brain to look for additional strokes since recent admission  If there is evidence of embolic strokes, will consider anticoagulation  For now resume aspirin and plavix  She has known extensive microvascular disease and moderate atherosclerotic disease on imaging  High dose lipitor would help however due to her liver transplantation, she can not be on it  Patient recently had TTE w/ shunt  Doesn't need repeat one  Recent HgbA1c is wnl  Will get routine EEG as well to r/o epileptiform discharges as patient had some features of confusion that can be seen in seizure and with her multiple ischemic events, she's at high risk for seizure potentials  Cardiology is following for CAD  Loop recorder in place  Speech and swallow evaluation per stroke guidelines   PT/OT as needed  Discussed plan with patient, family at length                Chief Complaint:  Episode of facial droop, slurred speech   Subjective:   Mrs Shabbir Pompa was recently admitted for similar symptom on opposite side and was found to have small right frontal subacute stroke  She had only transient symptoms  For significant atherosclerotic disease she was placed on lipitor 80mg  Her hepatologist did not think it was safe in setting of her liver transplantation and it was lowered again to 20mg daily yesterday  This morning at 4 am patient was noted to be moaning by her  in bed  She woke up and wasn't making much sense  He noted right facial droop and garbled speech   She had pants in her hand but was calling them 'pad'  She was incorrect about year when arrived in hospital   can't say for sure but thinks her symptoms lasted close to 2 hours  During encounter, she was at her baseline  Her troponins are trending up and cardiologist may consider cardiac cath Mon  She had denied associated chest pain, palpitations  She has had SVTs and has been on loop recorder since discharge  Objective:  /65   Pulse 93   Temp 98 4 °F (36 9 °C) (Oral)   Resp 18   Ht 5' 2" (1 575 m)   Wt 93 kg (205 lb 0 4 oz)   SpO2 95%   BMI 37 50 kg/m²     General: alert   Mental status: oriented x3  Attention: normal  Knowledge: fair  Language and Speech: normal  Cranial nerves: II-XII intact  Muscle tone: normal  Motor strength:  5/5 throughout  Sensory: normal to light touch, pin prick, vibration  Proprioception intact  Gait: unsteady  Coordination: finger to nose and heel to toe normal  Reflexes: 2+ throughout  except as noted      Labs:      Lab Results   Component Value Date    WBC 6 50 11/04/2017    HGB 12 7 11/04/2017    HCT 39 6 11/04/2017    MCV 87 11/04/2017     11/04/2017     Lab Results   Component Value Date    HGBA1C 5 9 10/29/2017     Lab Results   Component Value Date    ALT 41 11/04/2017    AST 45 11/04/2017    ALKPHOS 108 11/04/2017    BILITOT 0 40 11/04/2017     Lab Results   Component Value Date    GLUCOSE 161 (H) 11/04/2017    CALCIUM 9 3 11/04/2017     11/04/2017    K 4 2 11/04/2017    CO2 24 11/04/2017     11/04/2017    BUN 22 11/04/2017    CREATININE 1 26 11/04/2017         Review of reports and notes reveal:         Cta Head And Neck W Wo Contrast    Result Date: 10/30/2017  Atherosclerotic disease of the distal common carotid arteries bilaterally  Mild narrowing of the right distal common carotid artery and moderate narrowing on the left   Atherosclerotic changes extend into the proximal bifurcation with no significant stenosis of the proximal internal carotid arteries compared to the mid cervical internal carotid arteries  Slight, less than 50% narrowing of the distal left cervical internal carotid artery  Moderate intracranial atherosclerotic calcification involving the distal vertebral arteries and cavernous internal carotid arteries with mild narrowing  No occlusive disease  Extensive periventricular white matter hypoattenuation within both cerebral hemispheres consistent with extensive chronic microangiopathy  Workstation performed: [id number]     Xr Chest 1 View Portable    Result Date: 11/4/2017  No acute cardiopulmonary disease, essentially unchanged Workstation performed: [id number]     Xr Chest 1 View Portable    Result Date: 10/29/2017  No active pulmonary disease  Workstation performed: [id number]     Ct Head Without Contrast    Result Date: 11/4/2017  Chronic microvascular ischemic changes  No acute intracranial abnormality  Workstation performed: [id number]     Ct Head Without Contrast    Result Date: 10/29/2017  No acute intracranial abnormality  Microangiopathic changes  Findings are consistent with the preliminary report from [facility] which was provided shortly after completion of the exam              Workstation performed: [id number]     Mri Brain Wo Contrast    Result Date: 10/30/2017  Tiny focus of what could represent subacute small vessel ischemia in the right frontal parietal vertex  Diffuse generalized volume loss commensurate with age with relatively advanced chronic small vessel disease Workstation performed: [id number]           Thank you for this consult      Total time of encounter:  45 min  More than 50% of the time was used in counseling and/or coordination of care  Extent of couseling and/or coordination of care        MD Rosy Kuhn [facility]  [facility] [address]   [phone]

## 2017-11-05 LAB
ANION GAP SERPL CALCULATED.3IONS-SCNC: 9 MMOL/L (ref 4–13)
BUN SERPL-MCNC: 17 MG/DL (ref 5–25)
CALCIUM SERPL-MCNC: 7.9 MG/DL (ref 8.3–10.1)
CHLORIDE SERPL-SCNC: 110 MMOL/L (ref 100–108)
CO2 SERPL-SCNC: 23 MMOL/L (ref 21–32)
CREAT SERPL-MCNC: 0.92 MG/DL (ref 0.6–1.3)
ERYTHROCYTE [DISTWIDTH] IN BLOOD BY AUTOMATED COUNT: 16.5 % (ref 11.6–15.1)
GFR SERPL CREATININE-BSD FRML MDRD: 62 ML/MIN/1.73SQ M
GLUCOSE SERPL-MCNC: 119 MG/DL (ref 65–140)
HCT VFR BLD AUTO: 32.2 % (ref 37–47)
HGB BLD-MCNC: 10.5 G/DL (ref 12–16)
MCH RBC QN AUTO: 27.9 PG (ref 27–31)
MCHC RBC AUTO-ENTMCNC: 32.6 G/DL (ref 31.4–37.4)
MCV RBC AUTO: 86 FL (ref 82–98)
PLATELET # BLD AUTO: 180 THOUSANDS/UL (ref 130–400)
PMV BLD AUTO: 7.2 FL (ref 8.9–12.7)
POTASSIUM SERPL-SCNC: 3.9 MMOL/L (ref 3.5–5.3)
RBC # BLD AUTO: 3.76 MILLION/UL (ref 4.2–5.4)
SODIUM SERPL-SCNC: 142 MMOL/L (ref 136–145)
WBC # BLD AUTO: 4.1 THOUSAND/UL (ref 4.8–10.8)

## 2017-11-05 PROCEDURE — G8978 MOBILITY CURRENT STATUS: HCPCS | Performed by: PHYSICAL THERAPIST

## 2017-11-05 PROCEDURE — 85027 COMPLETE CBC AUTOMATED: CPT | Performed by: STUDENT IN AN ORGANIZED HEALTH CARE EDUCATION/TRAINING PROGRAM

## 2017-11-05 PROCEDURE — G8979 MOBILITY GOAL STATUS: HCPCS | Performed by: PHYSICAL THERAPIST

## 2017-11-05 PROCEDURE — 97163 PT EVAL HIGH COMPLEX 45 MIN: CPT | Performed by: PHYSICAL THERAPIST

## 2017-11-05 PROCEDURE — 80048 BASIC METABOLIC PNL TOTAL CA: CPT | Performed by: STUDENT IN AN ORGANIZED HEALTH CARE EDUCATION/TRAINING PROGRAM

## 2017-11-05 RX ADMIN — PRAVASTATIN SODIUM 80 MG: 80 TABLET ORAL at 16:58

## 2017-11-05 RX ADMIN — ASPIRIN 81 MG: 81 TABLET, COATED ORAL at 09:03

## 2017-11-05 RX ADMIN — METOPROLOL TARTRATE 25 MG: 25 TABLET ORAL at 21:31

## 2017-11-05 RX ADMIN — HEPARIN SODIUM 5000 UNITS: 5000 INJECTION, SOLUTION INTRAVENOUS; SUBCUTANEOUS at 14:32

## 2017-11-05 RX ADMIN — METOPROLOL TARTRATE 12.5 MG: 25 TABLET ORAL at 09:03

## 2017-11-05 RX ADMIN — SODIUM CHLORIDE 50 ML/HR: 0.9 INJECTION, SOLUTION INTRAVENOUS at 19:45

## 2017-11-05 RX ADMIN — MYCOPHENOLATE MOFETIL 750 MG: 250 CAPSULE ORAL at 17:35

## 2017-11-05 RX ADMIN — CYCLOSPORINE 50 MG: 25 CAPSULE, LIQUID FILLED ORAL at 21:31

## 2017-11-05 RX ADMIN — HEPARIN SODIUM 5000 UNITS: 5000 INJECTION, SOLUTION INTRAVENOUS; SUBCUTANEOUS at 21:31

## 2017-11-05 RX ADMIN — MYCOPHENOLATE MOFETIL 750 MG: 250 CAPSULE ORAL at 09:03

## 2017-11-05 RX ADMIN — HEPARIN SODIUM 5000 UNITS: 5000 INJECTION, SOLUTION INTRAVENOUS; SUBCUTANEOUS at 05:45

## 2017-11-05 RX ADMIN — PANTOPRAZOLE SODIUM 20 MG: 20 TABLET, DELAYED RELEASE ORAL at 05:44

## 2017-11-05 RX ADMIN — SODIUM CHLORIDE 50 ML/HR: 0.9 INJECTION, SOLUTION INTRAVENOUS at 04:33

## 2017-11-05 RX ADMIN — CYCLOSPORINE 50 MG: 25 CAPSULE, LIQUID FILLED ORAL at 09:04

## 2017-11-05 RX ADMIN — LEVOTHYROXINE SODIUM 112 MCG: 112 TABLET ORAL at 05:44

## 2017-11-05 RX ADMIN — CLOPIDOGREL BISULFATE 75 MG: 75 TABLET ORAL at 09:03

## 2017-11-05 NOTE — SOCIAL WORK
DASH discussion completed  Discussed goals of making sure pt's needs are met upon discharge, pt's preferences are taken into account, pt understands her health condition, medications and symptoms to watch for after returning home and pt is aware of any follow up appointments recommended by hospital physician  SPOKE WITH PT AND HER  AT THE BEDSIDE  PER PT, SHE MAY NEED VNA SERVICES TO WORK WITH HER S/P CVA  PER PT SHE PREFER NOT TO GO TO [facility], DID NOT FEEL THAT WAS NECESSARY  WILL ELICIT ASSIST WITH [facility] FOR FURTHER CARE AT HOME POST DC  PT HAS NO DME AND DOES NOT DRIVE RIGHT NOW    PT USES THE [address] [place] [address]

## 2017-11-05 NOTE — PROGRESS NOTES
[facility] Internal Medicine Progress Note  Patient: Brijesh Cosby 66 y o  female   MRN: [id number]  PCP: Khalida Morris MD  Unit/Bed#: [address] [place] [address] Encounter: [id number]  Date Of Visit: 11/05/17    Problem List:    Principal Problem:    Acute metabolic encephalopathy  Active Problems:    Liver transplanted ([facility] )    Hypothyroidism    HLD (hyperlipidemia)    CVA (cerebral vascular accident) ([facility] )    NSTEMI (non-ST elevated myocardial infarction) ([facility] )      Assessment & Plan:  1  Probable TIA versus left MCA stroke-patient will be scheduled for repeat MRI to look for additional strokes since recent admission  Continue aspirin and Plavix for now  Patient does have extensive microvascular disease and atherosclerotic disease on the CTA of the head and neck  Patient could not take high-dose Lipitor due to her liver transplantation  Recent hemoglobin A1c was normal   Recent lipid panel was also normal   Patient will also be scheduled for routine EEG to rule out epileptiform discharges rule out seizures  2  NSTEMI- high most likely type 2  Patient has history of nonsustained SVT  Patient will be continued on aspirin, Plavix and 5 milligram p o  daily metoprolol dose was increased to 25 milligram p o  B i d  patient might need stress test before discharge  3  Hypothyroidism-continue Synthroid  4  History of liver transplantation-continue cyclosporine and CellCept  5  Dyslipidemia-continue Lipitor      VTE Pharmacologic Prophylaxis:   Pharmacologic: Heparin  Mechanical VTE Prophylaxis in Place: Yes    Patient Centered Rounds: I have performed bedside rounds with nursing staff today  Discussions with Specialists or Other Care Team Provider: Eden Rinne    Education and Discussions with Family / Patient:Yes    Time Spent for Care: 30 minutes  More than 50% of total time spent on counseling and coordination of care as described above      Current Length of Stay: 1 day(s)    Current Patient Status: Inpatient     Discharge Plan: Home    Code Status: Level 1 - Full Code      Subjective:   Patient does not have any further confusion  Patient denies any weakness, tingling or numbness  Objective:         Negative for Chest Pain, Palpitations, Shortness of Breath, Abdominal Pain, Nausea, Vomiting, Constipation, Diarrhea, Dizziness  All other 10 review of systems negative and without drastic interval changes from yesterday  Vitals:   Temp (24hrs), Av 4 °F (36 9 °C), Min:97 7 °F (36 5 °C), Max:99 6 °F (37 6 °C)    HR:  [82-89] 89  Resp:  [18] 18  BP: (130-143)/(65-69) 143/68  SpO2:  [93 %-97 %] 97 %  Body mass index is 37 5 kg/m²  Input and Output Summary (last 24 hours): Intake/Output Summary (Last 24 hours) at 17 1416  Last data filed at 17 0433   Gross per 24 hour   Intake              965 ml   Output                0 ml   Net              965 ml       Physical Exam:     Physical Exam   Constitutional: No distress  HENT:   Head: Normocephalic and atraumatic  Nose: Nose normal    Eyes: Conjunctivae and EOM are normal  Pupils are equal, round, and reactive to light  Neck: Normal range of motion  Neck supple  No JVD present  Cardiovascular: Normal rate and regular rhythm  Exam reveals no gallop and no friction rub  Murmur heard  Pulmonary/Chest: Effort normal and breath sounds normal  No respiratory distress  She has no wheezes  She has no rales  She exhibits no tenderness  Abdominal: Soft  Bowel sounds are normal  She exhibits no distension  There is no tenderness  There is no rebound and no guarding  Musculoskeletal: She exhibits no edema  Neurological: She is alert  No cranial nerve deficit  Skin: Skin is warm and dry  No rash noted  Psychiatric: She has a normal mood and affect         Additional Data:     Labs:      Results from last 7 days  Lab Units 17  0541 17  0441   WBC Thousand/uL 4 10* 6 50   HEMOGLOBIN g/dL 10 5* 12 7   HEMATOCRIT % 32 2* 39 6   PLATELETS Thousands/uL 180 225   NEUTROS PCT %  --  79*   LYMPHS PCT %  --  10*   MONOS PCT %  --  8   EOS PCT %  --  3       Results from last 7 days  Lab Units 11/05/17  0540 11/04/17  0441   SODIUM mmol/L 142 142   POTASSIUM mmol/L 3 9 4 2   CHLORIDE mmol/L 110* 106   CO2 mmol/L 23 24   BUN mg/dL 17 22   CREATININE mg/dL 0 92 1 26   CALCIUM mg/dL 7 9* 9 3   TOTAL PROTEIN g/dL  --  7 3   BILIRUBIN TOTAL mg/dL  --  0 40   ALK PHOS U/L  --  108   ALT U/L  --  41   AST U/L  --  45   GLUCOSE RANDOM mg/dL 119 161*           * I Have Reviewed All Lab Data Listed Above  * Additional Pertinent Lab Tests Reviewed: [facility] Admission Reviewed    Imaging:  Cta Head And Neck W Wo Contrast    Result Date: 10/30/2017  Narrative: CTA NECK AND BRAIN WITH AND WITHOUT CONTRAST INDICATION: Cerebral ischemia  COMPARISON:   MR angiography dated 8/30/2015  TECHNIQUE:  Routine CT imaging of the Brain without contrast   Post contrast imaging was performed after administration of iodinated contrast through the neck and brain  Post contrast axial 0 625 mm images timed to opacify the arterial system  3D rendering was performed on an independent workstation  MIP reconstructions performed  Coronal reconstructions were performed of the noncontrast portion of the brain  Radiation dose length product (DLP) for this visit:  1595 54 mGy-cm   This examination, like all CT scans performed in the [facility], was performed utilizing techniques to minimize radiation dose exposure, including the use of iterative reconstruction and automated exposure control  IV Contrast:  85 mL of iohexol (OMNIPAQUE)     IMAGE QUALITY:   Diagnostic FINDINGS: NONCONTRAST BRAIN PARENCHYMA:  Confluent hypoattenuation noted throughout both cerebral hemispheres, bilaterally symmetric consistent with extensive chronic microangiopathy  No mass, mass effect or midline shift  No hemorrhage   Extensive vascular calcification of the vertebrobasilar system and intracranial internal carotid arteries  VENTRICLES AND EXTRA-AXIAL SPACES:  Enlargement of ventricles and extra-axial CSF spaces consistent with cerebral and cerebellar atrophy  VISUALIZED ORBITS AND PARANASAL SINUSES:  Unremarkable  CALVARIUM AND EXTRACRANIAL SOFT TISSUES:   Normal  CERVICAL VASCULATURE AORTIC ARCH AND GREAT VESSELS:  Mild atherosclerotic calcification of the aortic arch extending into the proximal great vessels  No significant stenosis at the origin of the great vessels  Visualized subclavian vasculature is unremarkable  RIGHT VERTEBRAL ARTERY CERVICAL SEGMENT:  Normal origin  The vessel is normal in caliber throughout the neck  LEFT VERTEBRAL ARTERY CERVICAL SEGMENT:  Normal origin  The vessel is normal in caliber throughout the neck  RIGHT EXTRACRANIAL CAROTID SEGMENT:  Atherosclerotic calcification of the distal common carotid artery extending to the bifurcation  Mild narrowing of the distal common carotid artery  No significant stenosis of the cervical internal carotid artery  LEFT EXTRACRANIAL CAROTID SEGMENT:  Moderate calcification of the distal common carotid artery with moderate stenosis  Atherosclerotic calcification extends into the bifurcation  No stenosis of the proximal cervical internal carotid artery  Mild focal atherosclerotic calcification within the distal cervical internal carotid artery with only slight luminal narrowing  NASCET criteria was used to determine the degree of internal carotid artery diameter stenosis  INTRACRANIAL VASCULATURE INTERNAL CAROTID ARTERIES:  Moderate atherosclerotic calcification of the cavernous internal carotid arteries  Normal ophthalmic artery origins  Normal ICA terminus  ANTERIOR CIRCULATION:  Symmetric A1 segments and anterior cerebral arteries with normal enhancement  Normal anterior communicating artery   MIDDLE CEREBRAL ARTERY CIRCULATION:  M1 segment and middle cerebral artery branches demonstrate normal enhancement bilaterally  DISTAL VERTEBRAL ARTERIES:  Moderate calcification of the distal vertebral arteries with mild narrowing at the level of the foramen magnum  BASILAR ARTERY:  Mild atherosclerotic change without stenosis  Normal superior cerebellar arteries  POSTERIOR CEREBRAL ARTERIES: Both posterior cerebral arteries arises from the basilar tip  Both arteries demonstrate normal flow-related enhancement  Normal posterior communicating arteries  DURAL VENOUS SINUSES:  Normal  NON VASCULAR ANATOMY BONY STRUCTURES:  Mild cervical spondylitic degenerative change  No acute osseous abnormality  SOFT TISSUES OF THE NECK:  Thyroid gland is small in size  There is a homogeneously hyperdense nodule within the right lobe measuring less than 1 cm in maximum diameter  THORACIC INLET:  Unremarkable  Impression: Atherosclerotic disease of the distal common carotid arteries bilaterally  Mild narrowing of the right distal common carotid artery and moderate narrowing on the left  Atherosclerotic changes extend into the proximal bifurcation with no significant stenosis of the proximal internal carotid arteries compared to the mid cervical internal carotid arteries  Slight, less than 50% narrowing of the distal left cervical internal carotid artery  Moderate intracranial atherosclerotic calcification involving the distal vertebral arteries and cavernous internal carotid arteries with mild narrowing  No occlusive disease  Extensive periventricular white matter hypoattenuation within both cerebral hemispheres consistent with extensive chronic microangiopathy  Workstation performed: [id number]     Xr Chest 1 View Portable    Result Date: 11/4/2017  Narrative: CHEST  PORTABLE INDICATION: 68-year-old female, acute mental status change COMPARISON:  10/29/2017 chest x-ray VIEWS:   AP frontal; 1 image FINDINGS: The cardiomediastinal silhouette is unremarkable  The lungs are clear   No pleural effusion  Bony thorax unremarkable  Findings are stable except for interval placement of cardiac loop recorder     Impression: No acute cardiopulmonary disease, essentially unchanged Workstation performed: [id number]     Xr Chest 1 View Portable    Result Date: 10/29/2017  Narrative: CHEST INDICATION:  Mental status change  COMPARISON:  10/26/2017 VIEWS:   AP frontal IMAGES:  1 FINDINGS:     Cardiomediastinal silhouette appears unremarkable  The lungs are clear  No pneumothorax or pleural effusion  Visualized osseous structures appear within normal limits for the patient's age  Impression: No active pulmonary disease  Workstation performed: [id number]     Xr Chest Pa & Lateral    Result Date: 10/28/2017  Narrative: CHEST INDICATION:  Cough for weeks  COMPARISON:  12/29/2016 VIEWS:  Frontal and lateral projections IMAGES:  2 FINDINGS:     Cardiomediastinal silhouette appears unremarkable  Mild elevation of the right hemidiaphragm is stable  The lungs are clear  No pneumothorax or pleural effusion  No evidence of heart failure  Stable compression of T12  Impression: No active pulmonary disease  Workstation performed: [id number]     Ct Head Without Contrast    Result Date: 11/4/2017  Narrative: CT BRAIN - WITHOUT CONTRAST INDICATION:  73-year-old woman with facial droop  COMPARISON:  CT from October 29, 2017  MRI from October 30, 2017  TECHNIQUE:  CT examination of the brain was performed  In addition to axial images, coronal reformatted images were created and submitted for interpretation  Radiation dose length product (DLP) for this visit:  1083 84 mGy-cm   This examination, like all CT scans performed in the [facility], was performed utilizing techniques to minimize radiation dose exposure, including the use of iterative reconstruction and automated exposure control  IMAGE QUALITY:  Diagnostic  FINDINGS:  PARENCHYMA:  No intracranial mass, mass effect or midline shift   No CT signs of acute infarction  There is no parenchymal hemorrhage  Diffuse decreased white matter attenuation, consistent with chronic microvascular ischemic change  VENTRICLES AND EXTRA-AXIAL SPACES:  Enlarged, consistent with the patient's age  No extra-axial blood  VISUALIZED ORBITS AND PARANASAL SINUSES:  Unremarkable  CALVARIUM AND EXTRACRANIAL SOFT TISSUES:   Normal      Impression: Chronic microvascular ischemic changes  No acute intracranial abnormality  Workstation performed: [id number]     Ct Head Without Contrast    Result Date: 10/29/2017  Narrative: CT BRAIN - WITHOUT CONTRAST INDICATION:  Slurred speech COMPARISON:  9/27/2015 TECHNIQUE:  CT examination of the brain was performed  In addition to axial images, coronal reformatted images were created and submitted for interpretation  Radiation dose length product (DLP) for this visit:  1288 35 mGy-cm   This examination, like all CT scans performed in the [facility], was performed utilizing techniques to minimize radiation dose exposure, including the use of iterative reconstruction and automated exposure control  IMAGE QUALITY:  Diagnostic  FINDINGS:  PARENCHYMA:  Decreased attenuation is noted in the supratentorial white matter demonstrating an appearance most consistent with moderate microangiopathic change  No intracranial mass, mass effect or midline shift  No CT signs of acute infarction  There is no parenchymal hemorrhage  VENTRICLES AND EXTRA-AXIAL SPACES:  Age-appropriate volume loss is noted  No hydrocephalus  VISUALIZED ORBITS AND PARANASAL SINUSES:  Unremarkable  CALVARIUM AND EXTRACRANIAL SOFT TISSUES:   Normal      Impression: No acute intracranial abnormality  Microangiopathic changes   Findings are consistent with the preliminary report from [employer] which was provided shortly after completion of the exam              Workstation performed: [id number]     Mri Brain Wo Contrast    Result Date: 10/30/2017  Narrative: MRI BRAIN WITHOUT CONTRAST INDICATION:  Slurred speech COMPARISON:   CT CTA 10/29/2017, MR 8/30/2015 TECHNIQUE:  Sagittal T1, axial T2, axial FLAIR, axial T1, axial [place] and axial diffusion imaging  IMAGE QUALITY:  Diagnostic  FINDINGS: BRAIN PARENCHYMA:  No intracranial mass, mass effect  Minor scattered foci of hemosiderin more apparent than on prior study largely, technique related  These scattered about supra and infratentorial parenchyma to include the medial right basal ganglia,  inferior aspect of both temporal lobes, anteriorly on the right more posteriorly on the left  Tiny focus of what could represent subacute ischemia in the right subcortical frontal parietal vertex  No indication of acute large vessel disease  Chronic small vessel disease, present to a relatively advanced degree, has progressed since prior study 2 years earlier  Age-appropriate VENTRICLES:  The ventricles are normal in size and contour  SELLA AND PITUITARY GLAND:  Normal  ORBITS:  Normal  PARANASAL SINUSES:  Normal  VASCULATURE:  Evaluation of the major intracranial vasculature demonstrates appropriate flow voids  CALVARIUM AND SKULL BASE:  Normal  EXTRACRANIAL SOFT TISSUES:  Normal      Impression: Tiny focus of what could represent subacute small vessel ischemia in the right frontal parietal vertex  Diffuse generalized volume loss commensurate with age with relatively advanced chronic small vessel disease Workstation performed: [id number]     Radiology Results    Result Date: 10/31/2017  Narrative: Ordered by an unspecified provider      Imaging Reports Reviewed by myself    Cultures:   Blood Culture: No results found for: BLOODCX  Urine Culture: No results found for: URINECX  Sputum Culture: No components found for: SPUTUMCX  Wound Culture: No results found for: WOUNDCULT    Last 24 Hours Medication List:     aspirin 81 mg Oral Daily   clopidogrel 75 mg Oral Daily   cycloSPORINE modified 50 mg Oral Q12H [address]   heparin (porcine) 5,000 Units Subcutaneous Q8H [address]   levothyroxine 112 mcg Oral Daily   metoprolol tartrate 25 mg Oral Q12H OLYA   mycophenolate 750 mg Oral BID   pantoprazole 20 mg Oral Early Morning   pravastatin 80 mg Oral Daily With Dinner        Today, Patient Was Seen By: Bisi Medley MD    ** Please Note: Dragon 360 Dictation voice to text software may have been used in the creation of this document   **

## 2017-11-05 NOTE — PROGRESS NOTES
Progress Note - Cardiology   Antonio Varela 66 y o  female MRN: [id number]  Unit/Bed#: [address] 404-01 Encounter: [id number]    Assessment and Plan:   1  Altered mental status now back to baseline  thought probably secondary to TIA  2  Non ST-elevation MI most likely type 2 due to underlying tachycardia, no sustained arrhythmias noted so far patient has multiple episodes of SVT which are nonsustained  3  Recent CVA  With diffuse atherosclerotic disease of the carotids as well as intracranial  4  History of narrow complex tachycardia with no evidence of atrial fibrillation as per previous records available from last admission  5  History of liver transplant  6  Hypothyroidism  7  Dyslipidemia on low-dose Pravachol cholesterol profile is acceptable  Plan  Will increase metoprolol to 25 twice a day  Continue aspirin and Plavix  Continue statin  Not on high doses of statin due to liver problems and her LDL is pretty low  Neuro workup      Subjective / Objective:   Patient seen and evaluated  No new complaints  Overnight monitor shows multiple episodes of atrial arrhythmias mostly nonsustained SVT  No sustained arrhythmias noted but patient has multiple episodes  She denies any chest pain or any shortness of breath  No other episodes of TIA or focal deficit noted      Vitals:    11/05/17 0857   BP: 140/69   Pulse: 82   Resp: 18   Temp: 97 7 °F (36 5 °C)   SpO2: 96%     Vitals:    11/04/17 0432 11/04/17 0823   Weight: 92 1 kg (203 lb) 93 kg (205 lb 0 4 oz)     Orthostatic Blood Pressures    Flowsheet Row Most Recent Value   Blood Pressure  140/69 filed at 11/05/2017 0857   Patient Position - Orthostatic VS  Lying filed at 11/05/2017 0857        I/O       11/03 0701 - 11/04 0700 11/04 0701 - 11/05 0700 11/05 0701 - 11/06 0700    I V  (mL/kg)  965 (10 4)     Total Intake(mL/kg)  965 (10 4)     Net   +965                 Invasive Devices     Peripheral Intravenous Line            Peripheral IV 11/04/17 Right  1 day    Peripheral IV 11/04/17 Right Arm 1 day              Body mass index is 37 5 kg/m²  Physical Exam:   Physical Exam    Neurologic:  Alert & oriented x 3,  no  New focal deficits noted   Constitutional:  Well developed, well nourished,  With no acute distress  Eyes:  PERRL, conjunctiva normal   HENT:  Atraumatic, external ears normal, nose normal,   NECK: Normal range of motion, no tenderness, neck is supple , No JVP  Respiratory:  Bilateral air entry mostly clear to auscultation  Cardiovascular: S1-S2 regular with a 2/6 ejection systolic murmur  GI:  Soft, nondistended, normal bowel sounds, nontender, no hepatosplenomegaly appreciated  Musculoskeletal:  No tenderness, no deformities      Extremities:  No edema and distal pulses are present  Psychiatric:  Speech and behavior appropriate           Medications, Allergies:     aspirin 81 mg Oral Daily   clopidogrel 75 mg Oral Daily   cycloSPORINE modified 50 mg Oral Q12H [address]   heparin (porcine) 5,000 Units Subcutaneous Q8H [address]   levothyroxine 112 mcg Oral Daily   metoprolol tartrate 12 5 mg Oral Q12H [address]   mycophenolate 750 mg Oral BID   pantoprazole 20 mg Oral Early Morning   pravastatin 80 mg Oral Daily With Dinner        Allergies   Allergen Reactions    Tetracyclines & Related Hives    Vancomycin Other (See Comments)     Pt unsure of reaction    Prograf [Tacrolimus] Anxiety       VTE Pharmacologic Prophylaxis:   Heparin    Labs:   Troponins:  Results from last 7 days  Lab Units 11/04/17  1427 11/04/17  0950 11/04/17  0441   TROPONIN I ng/mL 0 27* 0 23* 0 06*       CBC with diff:  Results from last 7 days  Lab Units 11/05/17  0541 11/04/17  0441   WBC Thousand/uL 4 10* 6 50   HEMOGLOBIN g/dL 10 5* 12 7   HEMATOCRIT % 32 2* 39 6   MCV fL 86 87   PLATELETS Thousands/uL 180 225   MCH pg 27 9 27 7   MCHC g/dL 32 6 32 0   RDW % 16 5* 16 5*   MPV fL 7 2* 7 6*   NRBC AUTO /100 WBCs  --  0       CMP:  Results from last 7 days  Lab Units 11/05/17  0540 11/04/17  0441   SODIUM mmol/L 142 142   POTASSIUM mmol/L 3 9 4 2   CHLORIDE mmol/L 110* 106   CO2 mmol/L 23 24   ANION GAP mmol/L 9 12   BUN mg/dL 17 22   CREATININE mg/dL 0 92 1 26   GLUCOSE RANDOM mg/dL 119 161*   CALCIUM mg/dL 7 9* 9 3   AST U/L  --  45   ALT U/L  --  41   ALK PHOS U/L  --  108   TOTAL PROTEIN g/dL  --  7 3   ALBUMIN g/dL  --  3 5   BILIRUBIN TOTAL mg/dL  --  0 40   EGFR ml/min/1 73sq m 62 42       Results from last 7 days  Lab Units 11/04/17  0738   TSH 3RD GENERATON uIU/mL 1 334          Imaging & Testing   I have personally reviewed pertinent reports  Cta Head And Neck W Wo Contrast    Result Date: 10/30/2017  Narrative: CTA NECK AND BRAIN WITH AND WITHOUT CONTRAST INDICATION: Cerebral ischemia  COMPARISON:   MR angiography dated 8/30/2015  TECHNIQUE:  Routine CT imaging of the Brain without contrast   Post contrast imaging was performed after administration of iodinated contrast through the neck and brain  Post contrast axial 0 625 mm images timed to opacify the arterial system  3D rendering was performed on an independent workstation  MIP reconstructions performed  Coronal reconstructions were performed of the noncontrast portion of the brain  Radiation dose length product (DLP) for this visit:  1595 54 mGy-cm   This examination, like all CT scans performed in the [facility], was performed utilizing techniques to minimize radiation dose exposure, including the use of iterative reconstruction and automated exposure control  IV Contrast:  85 mL of iohexol (OMNIPAQUE)     IMAGE QUALITY:   Diagnostic FINDINGS: NONCONTRAST BRAIN PARENCHYMA:  Confluent hypoattenuation noted throughout both cerebral hemispheres, bilaterally symmetric consistent with extensive chronic microangiopathy  No mass, mass effect or midline shift  No hemorrhage  Extensive vascular calcification of the vertebrobasilar system and intracranial internal carotid arteries       VENTRICLES AND EXTRA-AXIAL SPACES:  Enlargement of ventricles and extra-axial CSF spaces consistent with cerebral and cerebellar atrophy  VISUALIZED ORBITS AND PARANASAL SINUSES:  Unremarkable  CALVARIUM AND EXTRACRANIAL SOFT TISSUES:   Normal  CERVICAL VASCULATURE AORTIC ARCH AND GREAT VESSELS:  Mild atherosclerotic calcification of the aortic arch extending into the proximal great vessels  No significant stenosis at the origin of the great vessels  Visualized subclavian vasculature is unremarkable  RIGHT VERTEBRAL ARTERY CERVICAL SEGMENT:  Normal origin  The vessel is normal in caliber throughout the neck  LEFT VERTEBRAL ARTERY CERVICAL SEGMENT:  Normal origin  The vessel is normal in caliber throughout the neck  RIGHT EXTRACRANIAL CAROTID SEGMENT:  Atherosclerotic calcification of the distal common carotid artery extending to the bifurcation  Mild narrowing of the distal common carotid artery  No significant stenosis of the cervical internal carotid artery  LEFT EXTRACRANIAL CAROTID SEGMENT:  Moderate calcification of the distal common carotid artery with moderate stenosis  Atherosclerotic calcification extends into the bifurcation  No stenosis of the proximal cervical internal carotid artery  Mild focal atherosclerotic calcification within the distal cervical internal carotid artery with only slight luminal narrowing  NASCET criteria was used to determine the degree of internal carotid artery diameter stenosis  INTRACRANIAL VASCULATURE INTERNAL CAROTID ARTERIES:  Moderate atherosclerotic calcification of the cavernous internal carotid arteries  Normal ophthalmic artery origins  Normal ICA terminus  ANTERIOR CIRCULATION:  Symmetric A1 segments and anterior cerebral arteries with normal enhancement  Normal anterior communicating artery  MIDDLE CEREBRAL ARTERY CIRCULATION:  M1 segment and middle cerebral artery branches demonstrate normal enhancement bilaterally   DISTAL VERTEBRAL ARTERIES:  Moderate calcification of the distal vertebral arteries with mild narrowing at the level of the foramen magnum  BASILAR ARTERY:  Mild atherosclerotic change without stenosis  Normal superior cerebellar arteries  POSTERIOR CEREBRAL ARTERIES: Both posterior cerebral arteries arises from the basilar tip  Both arteries demonstrate normal flow-related enhancement  Normal posterior communicating arteries  DURAL VENOUS SINUSES:  Normal  NON VASCULAR ANATOMY BONY STRUCTURES:  Mild cervical spondylitic degenerative change  No acute osseous abnormality  SOFT TISSUES OF THE NECK:  Thyroid gland is small in size  There is a homogeneously hyperdense nodule within the right lobe measuring less than 1 cm in maximum diameter  THORACIC INLET:  Unremarkable  Impression: Atherosclerotic disease of the distal common carotid arteries bilaterally  Mild narrowing of the right distal common carotid artery and moderate narrowing on the left  Atherosclerotic changes extend into the proximal bifurcation with no significant stenosis of the proximal internal carotid arteries compared to the mid cervical internal carotid arteries  Slight, less than 50% narrowing of the distal left cervical internal carotid artery  Moderate intracranial atherosclerotic calcification involving the distal vertebral arteries and cavernous internal carotid arteries with mild narrowing  No occlusive disease  Extensive periventricular white matter hypoattenuation within both cerebral hemispheres consistent with extensive chronic microangiopathy  Workstation performed: [id number]     Xr Chest 1 View Portable    Result Date: 11/4/2017  Narrative: CHEST  PORTABLE INDICATION: 78-year-old female, acute mental status change COMPARISON:  10/29/2017 chest x-ray VIEWS:   AP frontal; 1 image FINDINGS: The cardiomediastinal silhouette is unremarkable  The lungs are clear  No pleural effusion  Bony thorax unremarkable   Findings are stable except for interval placement of cardiac loop recorder     Impression: No acute cardiopulmonary disease, essentially unchanged Workstation performed: [id number]     Xr Chest 1 View Portable    Result Date: 10/29/2017  Narrative: CHEST INDICATION:  Mental status change  COMPARISON:  10/26/2017 VIEWS:   AP frontal IMAGES:  1 FINDINGS:     Cardiomediastinal silhouette appears unremarkable  The lungs are clear  No pneumothorax or pleural effusion  Visualized osseous structures appear within normal limits for the patient's age  Impression: No active pulmonary disease  Workstation performed: [id number]     Xr Chest Pa & Lateral    Result Date: 10/28/2017  Narrative: CHEST INDICATION:  Cough for weeks  COMPARISON:  12/29/2016 VIEWS:  Frontal and lateral projections IMAGES:  2 FINDINGS:     Cardiomediastinal silhouette appears unremarkable  Mild elevation of the right hemidiaphragm is stable  The lungs are clear  No pneumothorax or pleural effusion  No evidence of heart failure  Stable compression of T12  Impression: No active pulmonary disease  Workstation performed: [id number]     Ct Head Without Contrast    Result Date: 11/4/2017  Narrative: CT BRAIN - WITHOUT CONTRAST INDICATION:  47-year-old woman with facial droop  COMPARISON:  CT from October 29, 2017  MRI from October 30, 2017  TECHNIQUE:  CT examination of the brain was performed  In addition to axial images, coronal reformatted images were created and submitted for interpretation  Radiation dose length product (DLP) for this visit:  1083 84 mGy-cm   This examination, like all CT scans performed in the [facility], was performed utilizing techniques to minimize radiation dose exposure, including the use of iterative reconstruction and automated exposure control  IMAGE QUALITY:  Diagnostic  FINDINGS:  PARENCHYMA:  No intracranial mass, mass effect or midline shift  No CT signs of acute infarction  There is no parenchymal hemorrhage    Diffuse decreased white matter attenuation, consistent with chronic microvascular ischemic change  VENTRICLES AND EXTRA-AXIAL SPACES:  Enlarged, consistent with the patient's age  No extra-axial blood  VISUALIZED ORBITS AND PARANASAL SINUSES:  Unremarkable  CALVARIUM AND EXTRACRANIAL SOFT TISSUES:   Normal      Impression: Chronic microvascular ischemic changes  No acute intracranial abnormality  Workstation performed: [id number]       EKG / Monitor: Personally reviewed  EKG had shown previously no significant ST changes  Monitor shows multiple episodes of SVT which are nonsustained  No sustained arrhythmias noted so far  Cardiac testing:   Results for orders placed during the hospital encounter of 10/29/17   Echo complete with contrast if indicated    Narrative [address]  [address] [facility] [address]  [phone]    Transthoracic Echocardiogram  2D, M-mode, Doppler, and Color Doppler    Study date:  30-Oct-2017    Patient: Aaron Quinteros  MR number: [id number]  Account number: [de-identified]  : 1945  Age: 66 years  Gender: Female  Status: Routine  Location: Bedside  Height: 62 in  Weight: 202 6 lb  BP: 136/ 72 mmHg    Indications: TIA    Diagnoses: I67 9 - Cerebrovascular disease, unspecified    Sonographer:  Phil Cartagena  Primary Physician:  Diana Castellanos MD  Referring Physician:  Kamila Badillo MD  Group:  Ren Padilla  RN:  Donneta Cranker, RN  Interpreting Physician:  Apryl Trotter MD    SUMMARY    LEFT VENTRICLE:  Systolic function was normal  Ejection fraction was estimated in the range of 55 % to 60 %  There were no regional wall motion abnormalities  Wall thickness was mildly increased  Doppler parameters were consistent with abnormal left ventricular relaxation (grade 1 diastolic dysfunction)  Doppler parameters were consistent with elevated mean left atrial filling pressure  LEFT ATRIUM:  The atrium was mildly dilated      ATRIAL SEPTUM:  No significant defect or patent foramen ovale was identified  There was a trace left-to-right shunt  This is within normal limits for the patient's age  TRICUSPID VALVE:  The tricuspid jet envelope definition was inadequate for estimation of RV systolic pressure  There are no indirect findings (abnormal RV volume or geometry, altered pulmonary flow velocity profile, or leftward septal displacement) which  would suggest moderate or severe pulmonary hypertension  HISTORY: PRIOR HISTORY: GERD, Hypothyroidism, Anxiety, Anemia    PROCEDURE: The procedure was performed at the bedside  This was a routine study  The transthoracic approach was used  The study included complete 2D imaging, M-mode, complete spectral Doppler, and color Doppler  The heart rate was 80 bpm,  at the start of the study  Intravenous contrast ( 10 ml) was administered to evaluate shunting  Image quality was adequate  LEFT VENTRICLE: Size was normal  Systolic function was normal  Ejection fraction was estimated in the range of 55 % to 60 %  There were no regional wall motion abnormalities  Wall thickness was mildly increased  No evidence of apical  thrombus  DOPPLER: Doppler parameters were consistent with abnormal left ventricular relaxation (grade 1 diastolic dysfunction)  Doppler parameters were consistent with elevated mean left atrial filling pressure  RIGHT VENTRICLE: The size was normal  Systolic function was normal  Wall thickness was normal     LEFT ATRIUM: The atrium was mildly dilated  ATRIAL SEPTUM: No significant defect or patent foramen ovale was identified  There was a trace left-to-right shunt  This is within normal limits for the patient's age  RIGHT ATRIUM: Size was normal     MITRAL VALVE: There was mild to moderate annular calcification  Valve structure was normal  There was normal leaflet separation  DOPPLER: The transmitral velocity was within the normal range  There was no evidence for stenosis   There was  no significant regurgitation  AORTIC VALVE: The valve was not visualized well enough to rule out a bicuspid morphology  Leaflets exhibited mildly increased thickness and normal cuspal separation  DOPPLER: Transaortic velocity was within the normal range  There was no  evidence for stenosis  There was no significant regurgitation  TRICUSPID VALVE: The valve structure was normal  There was normal leaflet separation  DOPPLER: The transtricuspid velocity was within the normal range  There was no evidence for stenosis  There was trace regurgitation  The tricuspid jet  envelope definition was inadequate for estimation of RV systolic pressure  There are no indirect findings (abnormal RV volume or geometry, altered pulmonary flow velocity profile, or leftward septal displacement) which would suggest  moderate or severe pulmonary hypertension  PULMONIC VALVE: Leaflets exhibited normal thickness, no calcification, and normal cuspal separation  DOPPLER: The transpulmonic velocity was within the normal range  There was no significant regurgitation  PERICARDIUM: There was no pericardial effusion  The pericardium was normal in appearance  AORTA: The root exhibited normal size  SYSTEMIC VEINS: IVC: The inferior vena cava was normal in size  SYSTEM MEASUREMENT TABLES    2D mode  AoR Diam 2D: 3 cm  LA Diam (2D): 4 1 cm  LA/Ao (2D): 1 37  FS (2D Teich): 25 1 %  IVSd (2D): 1 14 cm  LVDEV: 65 9 cm³  LVESV: 32 8 cm³  LVIDd(2D): 3 9 cm  LVISd (2D): 2 92 cm  LVPWd (2D): 1 09 cm  SV (Teich): 33 1 cm³    Apical four chamber  LVEF A4C: 56 %    Unspecified Scan Mode  MV Peak A Shiv: 1670 mm/s  MV Peak E Shiv   Mean: 1030 mm/s  MVA (PHT): 4 4 cm squared  PHT: 50 ms  Max P mm[Hg]  V Max: 2570 mm/s  Vmax: 2540 mm/s  RA Area: 10 6 cm squared  RA Volume: 20 9 cm³  TAPSE: 1 6 cm    [facility] Accredited Echocardiography Laboratory    Prepared and electronically signed by    Herbie Joe MD  Signed 30-Oct-2017 14:13:31 Dr Jesus Angeles MD [facility]      "This note has been constructed using a voice recognition system  Therefore there may be syntax, spelling, and/or grammatical errors   Please call if you have any questions  "

## 2017-11-05 NOTE — PLAN OF CARE
CARDIOVASCULAR - ADULT     Maintains optimal cardiac output and hemodynamic stability Progressing     Absence of cardiac dysrhythmias or at baseline rhythm Progressing        DISCHARGE PLANNING - CARE MANAGEMENT     Discharge to post-acute care or home with appropriate resources Progressing        NEUROSENSORY - ADULT     Achieves stable or improved neurological status Progressing     Achieves maximal functionality and self care Progressing        Potential for Falls     Patient will remain free of falls Progressing

## 2017-11-05 NOTE — PHYSICAL THERAPY NOTE
PT EVALUATION:       11/05/17 1115   Pain Assessment   Pain Assessment No/denies pain   Pain Score No Pain   Home Living   Type of Home House   Home Layout Two level;Bed/bath upstairs;Stairs to enter with rails   Bathroom Shower/Tub Tub/shower unit   Bathroom Toilet Standard   Bathroom Equipment Grab bars in shower; Shower chair   Home Equipment Walker;Cane   Prior Function   Level of [place] Needs assistance with IADLs; Needs assistance with ADLs and functional mobility   Lives With Spouse   Receives Help From Family   ADL Assistance Needs assistance   IADLs Needs assistance   Restrictions/Precautions   Other Precautions Cardiac/sternal;Chair Alarm; Bed Alarm; Fall Risk   General   Family/Caregiver Present Yes   Cognition   Overall Cognitive Status WFL   Attention Within functional limits   Orientation Level Oriented X4   Following Commands Follows one step commands without difficulty   RUE Assessment   RUE Assessment WFL   LUE Assessment   LUE Assessment WFL   RLE Assessment   RLE Assessment (strength grossly 4-/5)   LLE Assessment   LLE Assessment (strength grossly 4-/5)   Bed Mobility   Rolling R 5  Supervision   Supine to Sit 4  Minimal assistance   Sit to Supine 4  Minimal assistance   Transfers   Sit to Stand 4  Minimal assistance   Stand to Sit 4  Minimal assistance   Ambulation/Elevation   Gait pattern Narrow LOUIS; Forward Flexion   Gait Assistance 4  Minimal assist   Assistive Device Rolling walker   Distance 40'x1   Balance   Static Sitting Fair   Dynamic Sitting Fair   Static Standing Fair -   Dynamic Standing Fair -   Endurance Deficit   Endurance Deficit Yes   Endurance Deficit Description pt fatigues easily   Activity Tolerance   Activity Tolerance Patient limited by fatigue   Medical Staff Made Aware (Dr Geetha Granados)   Nurse Made Aware (RN, [facility])   Assessment   Prognosis Good   Problem List Decreased strength;Decreased endurance; Impaired balance;Decreased mobility   Assessment Patient seen for Physical Therapy evaluation  Patient admitted with Acute metabolic encephalopathy  Comorbidities affecting patient's physical performance include: anemia, CVA, GERD, hypertension, hyperlipidemia and osteoporosis, osteoarthritis, s/p liver transplant  Personal factors affecting patient at time of initial evaluation include: lives in 2 story house, ambulating with assistive device, stairs to enter home, inability to ambulate household distances, inability to navigate community distances, inability to perform ADLS and inability to perform IADLS   Prior to admission, patient was requiring assist for functional mobility with RW, requiring assist for ADLS, requiring assist for IADLS, living in a multi-level home and living with spouse in a 2 level home with 4 steps to enter  Please find objective findings from Physical Therapy assessment regarding body systems outlined above with impairments and limitations including weakness, impaired balance, decreased endurance, gait deviations, decreased activity tolerance, decreased functional mobility tolerance and fall risk  The Barthel Index was used as a functional outcome tool presenting with a score of 60 today indicating moderate limitations of functional mobility and ADLS  Patient's clinical presentation is currently unstable/unpredictable as seen in patient's presentation of increased fall risk, new onset of impairment of functional mobility, decreased endurance and new onset of weakness  Pt would benefit from continued Physical Therapy treatment to address deficits as defined above and maximize level of functional mobility  As demonstrated by objective findings, the assigned level of complexity for this evaluation is high  Goals   Patient Goals ("I want to get stronger")   STG Expiration Date (1-7 days)   Short Term Goal #1 1) able to transfer, Independent   Short Term Goal #2 2) ambulate with RW, >50'x1, supervision   3) up/down 5 steps, CG   LTG Expiration Date (8-14 days)   Long Term Goal #1 1) ambulate with RW, >100'x1,  mod independent   Long Term Goal #2 2) up/down full flight supervision   Plan   Treatment/Interventions Functional transfer training;LE strengthening/ROM; Elevations; Therapeutic exercise; Endurance training;Patient/family training;Bed mobility;Gait training;Spoke to MD;Spoke to nursing;Spoke to case management   PT Frequency (3-5x/week)   Recommendation   Recommendation 24 hour supervision/assist;Home PT   Barthel Index   Feeding 10   Bathing 0   Grooming Score 5   Dressing Score 5   Bladder Score 5   Bowels Score 10   Toilet Use Score 5   Transfers (Bed/Chair) Score 10   Mobility (Level Surface) Score 10   Stairs Score 0   Barthel Index Score 60

## 2017-11-06 ENCOUNTER — APPOINTMENT (INPATIENT)
Dept: NEUROLOGY | Facility: HOSPITAL | Age: 72
DRG: 064 | End: 2017-11-06
Attending: PSYCHIATRY & NEUROLOGY
Payer: MEDICARE

## 2017-11-06 ENCOUNTER — APPOINTMENT (INPATIENT)
Dept: RADIOLOGY | Facility: HOSPITAL | Age: 72
DRG: 064 | End: 2017-11-06
Payer: MEDICARE

## 2017-11-06 LAB
ATRIAL RATE: 112 BPM
ATRIAL RATE: 95 BPM
P AXIS: -20 DEGREES
P AXIS: 40 DEGREES
PR INTERVAL: 148 MS
PR INTERVAL: 156 MS
QRS AXIS: -35 DEGREES
QRS AXIS: -45 DEGREES
QRSD INTERVAL: 82 MS
QRSD INTERVAL: 88 MS
QT INTERVAL: 330 MS
QT INTERVAL: 360 MS
QTC INTERVAL: 450 MS
QTC INTERVAL: 452 MS
T WAVE AXIS: 20 DEGREES
T WAVE AXIS: 41 DEGREES
VENTRICULAR RATE: 112 BPM
VENTRICULAR RATE: 95 BPM

## 2017-11-06 PROCEDURE — 97110 THERAPEUTIC EXERCISES: CPT

## 2017-11-06 PROCEDURE — 93017 CV STRESS TEST TRACING ONLY: CPT

## 2017-11-06 PROCEDURE — 95819 EEG AWAKE AND ASLEEP: CPT

## 2017-11-06 PROCEDURE — A9502 TC99M TETROFOSMIN: HCPCS

## 2017-11-06 PROCEDURE — 78452 HT MUSCLE IMAGE SPECT MULT: CPT

## 2017-11-06 PROCEDURE — 70551 MRI BRAIN STEM W/O DYE: CPT

## 2017-11-06 RX ORDER — PRAVASTATIN SODIUM 20 MG
20 TABLET ORAL
Status: DISCONTINUED | OUTPATIENT
Start: 2017-11-07 | End: 2017-11-06

## 2017-11-06 RX ORDER — LEVETIRACETAM 500 MG/1
500 TABLET ORAL EVERY 12 HOURS SCHEDULED
Status: DISCONTINUED | OUTPATIENT
Start: 2017-11-06 | End: 2017-11-08 | Stop reason: HOSPADM

## 2017-11-06 RX ORDER — ATORVASTATIN CALCIUM 20 MG/1
20 TABLET, FILM COATED ORAL
Status: DISCONTINUED | OUTPATIENT
Start: 2017-11-07 | End: 2017-11-07

## 2017-11-06 RX ADMIN — SODIUM CHLORIDE 50 ML/HR: 0.9 INJECTION, SOLUTION INTRAVENOUS at 21:38

## 2017-11-06 RX ADMIN — LEVOTHYROXINE SODIUM 112 MCG: 112 TABLET ORAL at 05:22

## 2017-11-06 RX ADMIN — CYCLOSPORINE 50 MG: 25 CAPSULE, LIQUID FILLED ORAL at 21:33

## 2017-11-06 RX ADMIN — MYCOPHENOLATE MOFETIL 750 MG: 250 CAPSULE ORAL at 08:30

## 2017-11-06 RX ADMIN — ASPIRIN 81 MG: 81 TABLET, COATED ORAL at 08:31

## 2017-11-06 RX ADMIN — APIXABAN 5 MG: 5 TABLET, FILM COATED ORAL at 18:28

## 2017-11-06 RX ADMIN — METOPROLOL TARTRATE 25 MG: 25 TABLET ORAL at 08:31

## 2017-11-06 RX ADMIN — CLOPIDOGREL BISULFATE 75 MG: 75 TABLET ORAL at 08:31

## 2017-11-06 RX ADMIN — HEPARIN SODIUM 5000 UNITS: 5000 INJECTION, SOLUTION INTRAVENOUS; SUBCUTANEOUS at 16:37

## 2017-11-06 RX ADMIN — PRAVASTATIN SODIUM 80 MG: 80 TABLET ORAL at 16:38

## 2017-11-06 RX ADMIN — MYCOPHENOLATE MOFETIL 750 MG: 250 CAPSULE ORAL at 18:28

## 2017-11-06 RX ADMIN — METOPROLOL TARTRATE 25 MG: 25 TABLET ORAL at 21:35

## 2017-11-06 RX ADMIN — HEPARIN SODIUM 5000 UNITS: 5000 INJECTION, SOLUTION INTRAVENOUS; SUBCUTANEOUS at 05:22

## 2017-11-06 RX ADMIN — PANTOPRAZOLE SODIUM 20 MG: 20 TABLET, DELAYED RELEASE ORAL at 05:22

## 2017-11-06 RX ADMIN — LEVETIRACETAM 500 MG: 500 TABLET ORAL at 21:35

## 2017-11-06 RX ADMIN — CYCLOSPORINE 50 MG: 25 CAPSULE, LIQUID FILLED ORAL at 08:31

## 2017-11-06 NOTE — PHYSICAL THERAPY NOTE
PT TREATMENT     11/06/17 1137   Pain Assessment   Pain Assessment No/denies pain   Restrictions/Precautions   Other Precautions Fall Risk;Bed Alarm; Chair Alarm   General   Chart Reviewed Yes   Family/Caregiver Present No   Subjective   Subjective "I'm not sure why I keep having strokes"   Transfers   Sit to Stand 5  Supervision   Additional items Verbal cues   Stand to Sit 5  Supervision   Additional items Verbal cues   Ambulation/Elevation   Gait Assistance 5  Supervision   Assistive Device Rolling walker   Distance 200 feet   Exercises   Hip Flexion 10 reps;Bilateral   Knee AROM Long Arc Quad 10 reps;Bilateral   Ankle Pumps 10 reps;Bilateral   Assessment   Assessment Pt noted with improved trans, amb, amb endurance and mobility with the RW  Pt with good tolerance to LE ex  Plan   Treatment/Interventions ADL retraining;Functional transfer training;LE strengthening/ROM; Therapeutic exercise; Endurance training;Patient/family training;Bed mobility;Gait training   Recommendation   Recommendation Home with family support;24 hour supervision/assist;Home PT   Equipment Recommended (cont amb with RW)

## 2017-11-06 NOTE — PLAN OF CARE
Problem: PHYSICAL THERAPY ADULT  Goal: Performs mobility at highest level of function for planned discharge setting  See evaluation for individualized goals  Outcome: Progressing  Prognosis: Good  Problem List: Decreased strength, Decreased endurance, Impaired balance, Decreased mobility  Assessment: Pt noted with improved trans, amb, amb endurance and mobility with the RW  Pt with good tolerance to LE ex  Recommendation: Home with family support, 24 hour supervision/assist, Home PT          See flowsheet documentation for full assessment

## 2017-11-06 NOTE — CONSULTS
Neurology Consult Follow Up      Tia Lara is a 66 y o  female  Desmond Menjivar 32-*    [id number]        Assessment/Recommendations:    1  Acute ischemic multiple bi hemispheric embolic strokes, most likely cardio embolic   2  recent small subacute right frontal ischemic infarction    3  Diffuse atherosclerotic disease   4  Abnormal EEG  5  HTN  6  HLD  7  H/o liver transplantation     Patient had a mild and small event of confusion this morning as noted by patient's   She is asymptomatic at this time  We do not have evidence of A fib however patient's pattern of strokes on MRI does favor proximal source  Recommend starting anticoagulation with eliquis 5mg bid for stroke prevention  D/c plavix  Resume aspirin if needed by cardiology  She has known extensive microvascular disease and moderate atherosclerotic disease on imaging  High dose lipitor would help however due to her liver transplantation, she can not be on it so resume lipitor 20mg  Patient recently had TTE w/ shunt  Repeat won't   Recent HgbA1c is wnl  Patient's routine EEG is abnormal with left temporal sharps  For now will start her on Keppra  These epileptiform discharges can appear post recent ischemic infarcts  In future will repeat study to see if she needs to remain on it for longer term  Cardiology is following for CAD  Loop recorder in place  Speech and swallow evaluation per stroke guidelines   PT/OT as needed  Discussed plan with patient, family at length  Chief Complaint:  Stroke   Subjective:   According to  patient over phone early this morning kept repeating that she was about to get stress testing  He felt she was slightly confused       Objective:  /67   Pulse 76   Temp 98 5 °F (36 9 °C) (Tympanic)   Resp 18   Ht 5' 2" (1 575 m)   Wt 93 kg (205 lb 0 4 oz)   SpO2 97%   BMI 37 50 kg/m²     General: alert   Mental status: oriented x3  Attention: normal  Knowledge: fair  Language and Speech: normal  Cranial nerves: II-XII intact  Muscle tone: normal  Motor strength:  5/5 throughout  Sensory: normal to light touch, pin prick, vibration  Proprioception intact  Gait: unsteady  Coordination: finger to nose and heel to toe normal  Reflexes: 2+ throughout  except as noted      Labs:      Lab Results   Component Value Date    WBC 4 10 (L) 11/05/2017    HGB 10 5 (L) 11/05/2017    HCT 32 2 (L) 11/05/2017    MCV 86 11/05/2017     11/05/2017     Lab Results   Component Value Date    HGBA1C 5 9 10/29/2017     Lab Results   Component Value Date    ALT 41 11/04/2017    AST 45 11/04/2017    ALKPHOS 108 11/04/2017    BILITOT 0 40 11/04/2017     Lab Results   Component Value Date    GLUCOSE 119 11/05/2017    CALCIUM 7 9 (L) 11/05/2017     11/05/2017    K 3 9 11/05/2017    CO2 23 11/05/2017     (H) 11/05/2017    BUN 17 11/05/2017    CREATININE 0 92 11/05/2017         Review of reports and independent interpretation of images reveal:       Xr Chest 1 View Portable    Result Date: 11/4/2017  No acute cardiopulmonary disease, essentially unchanged Workstation performed: [id number]     Ct Head Without Contrast    Result Date: 11/4/2017  Chronic microvascular ischemic changes  No acute intracranial abnormality  Workstation performed: [id number]     Mri Brain Wo Contrast    Addendum Date: 11/6/2017    Addendum: * I personally telephoned this result to Bereket Andrade on 11/6/2017 9:33 AM     Result Date: 11/6/2017  The numerous small embolic foci of acute ischemia in both hemispheres  No hemorrhage or significant mass effect  Thank you for this consult      Total time of encounter:  40 min  More than 50% of the time was used in counseling and/or coordination of care  Extent of couseling and/or coordination of care        MD Vianca Orozco [facility]  [address] [place] [address],[address]  [place] [address]  [phone]

## 2017-11-06 NOTE — PHYSICIAN ADVISOR
Current patient class: Inpatient  The patient is currently on Hospital Day: 3      The patient was admitted to the hospital  on 11/4/17 at 02 73 [phone] 04 for the following diagnosis:  Delirium [R41 0]  Weakness [R53 1]  NSTEMI (non-ST elevated myocardial infarction) ([facility] [address] ) [I21 4]  Altered mental status, unspecified altered mental status type [R41 82]       There is documentation in the medical record of an expected length of stay of at least 2 midnights  The patient is therefore expected to satisfy the 2 midnight benchmark and given the 2 midnight presumption is appropriate for INPATIENT ADMISSION  Given this expectation of a satisfying stay, CMS instructs us that the patient is most often appropriate for inpatient admission under part A provided medical necessity is documented in the chart  After review of the relevant documentation, labs, vital signs and test results, the patient is appropriate for INPATIENT ADMISSION  Admission to the hospital as an inpatient is a complex decision making process which requires the practitioner to consider the patients presenting complaint, history and physical examination and all relevant testing  With this in mind, in this case, the patient was deemed appropriate for INPATIENT ADMISSION  After review of the documentation and testing available at the time of the admission I concur with this clinical determination of medical necessity  The patient does have an inpatient admission within the previous 30 days  The patient was admitted on 10/29/17 and discharged on 10/31/17 as an inpatient  The patient therefore required readmission review  In this case the patient should be considered a SEPARATE and UNRELATED INPATIENT ADMISSION  The patient had been discharged in stable condition with a completed care plan  There were no unresolved acute medical issues at the time of discharged which would have reasonably been expected to prompt this readmission    She had another CNS event on the opposite side  Rationale is as follows: The patient is a 66 yrs old   Female who presented to the ED at 11/4/2017  4:36 AM with a chief complaint of Weakness - Generalized (patient states she feels weak all over)    The patients vitals on arrival were ED Triage Vitals [11/04/17 0432]   Temperature Pulse Respirations Blood Pressure SpO2   97 7 °F (36 5 °C) (!) 114 20 (!) 175/87 95 %      Temp Source Heart Rate Source Patient Position - Orthostatic VS BP Location FiO2 (%)   Oral Monitor Lying Right arm --      Pain Score       No Pain           Past Medical History:   Diagnosis Date    Anemia     Anxiety     Arthritis     Disease of thyroid gland     GERD (gastroesophageal reflux disease)     History of transfusion     Osteoporosis      Past Surgical History:   Procedure Laterality Date    ABDOMINAL SURGERY      APPENDECTOMY      BLADDER AUGMENTATION N/A     CHOLECYSTECTOMY N/A     COLONOSCOPY N/A 4/20/2017    Procedure: COLONOSCOPY;  Surgeon: Joann Carbone MD;  Location: [facility]; Service:     ESOPHAGOGASTRODUODENOSCOPY N/A 3/31/2016    Procedure: ESOPHAGOGASTRODUODENOSCOPY (EGD); Surgeon: Joann Carbone MD;  Location: [facility]; Service:     ESOPHAGOGASTRODUODENOSCOPY N/A 4/20/2017    Procedure: ESOPHAGOGASTRODUODENOSCOPY (EGD); Surgeon: Joann Carbone MD;  Location: [facility];   Service:     EYE SURGERY      cataracts removed both eyes lens implant    HERNIA REPAIR      JOINT REPLACEMENT      left knee replacement    LIVER TRANSPLANTATION N/A     TONSILECTOMY AND ADNOIDECTOMY N/A            Consults have been placed to:   IP CONSULT TO NEUROLOGY  IP CONSULT TO CARDIOLOGY    Vitals:    11/06/17 0329 11/06/17 0828 11/06/17 1220 11/06/17 1557   BP: 137/70 168/77 116/60 140/67   Pulse: 80 82 86 76   Resp: 18 18 18 18   Temp: 98 5 °F (36 9 °C) 98 4 °F (36 9 °C) 98 1 °F (36 7 °C) 98 5 °F (36 9 °C)   TempSrc: Tympanic Oral Oral Tympanic   SpO2: 96% 97% 96% 97%   Weight: Height:           Most recent labs:    Recent Labs      11/04/17 0441   11/04/17   1427  11/05/17   0540  11/05/17   0541   WBC  6 50   --    --    --   4 10*   HGB  12 7   --    --    --   10 5*   HCT  39 6   --    --    --   32 2*   PLT  225   --    --    --   180   K  4 2   --    --   3 9   --    NA  142   --    --   142   --    CALCIUM  9 3   --    --   7 9*   --    BUN  22   --    --   17   --    CREATININE  1 26   --    --   0 92   --    TROPONINI  0 06*   < >  0 27*   --    --    AST  45   --    --    --    --    ALT  41   --    --    --    --    ALKPHOS  108   --    --    --    --    BILITOT  0 40   --    --    --    --     < > = values in this interval not displayed         Scheduled Meds:  apixaban 5 mg Oral BID   aspirin 81 mg Oral Daily   clopidogrel 75 mg Oral Daily   cycloSPORINE modified 50 mg Oral Q12H [address]   heparin (porcine) 5,000 Units Subcutaneous Q8H [address]   levothyroxine 112 mcg Oral Daily   metoprolol tartrate 25 mg Oral Q12H [address]   mycophenolate 750 mg Oral BID   pantoprazole 20 mg Oral Early Morning   pravastatin 80 mg Oral Daily With Dinner     Continuous Infusions:  sodium chloride 50 mL/hr Last Rate: 50 mL/hr (11/05/17 1945)

## 2017-11-06 NOTE — PROGRESS NOTES
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  4  History of narrow complex tachycardia status post loop recorder  5   History of liver transplant  6  Hypothyroidism  7  Dyslipidemia on low-dose Pravachol cholesterol profile is acceptable  Plan  Will increase metoprolol to 25 twice a day  May need antithrombotic therapy once okay with Neurology  As she had embolic stroke  Continue statin  Not on high doses of statin due to liver problems and her LDL is pretty low  Lexiscan stress test once cleared by Neurology  Subjective / Objective:   Patient seen and evaluated  No new complaints  Overnight monitor shows multiple episodes of atrial arrhythmias mostly nonsustained SVT  No sustained arrhythmias noted but patient has multiple episodes        Vitals:    11/06/17 0828   BP: 168/77   Pulse: 82   Resp: 18   Temp: 98 4 °F (36 9 °C)   SpO2: 97%     Vitals:    11/04/17 0432 11/04/17 0823   Weight: 92 1 kg (203 lb) 93 kg (205 lb 0 4 oz)     Orthostatic Blood Pressures    Flowsheet Row Most Recent Value   Blood Pressure  168/77 filed at 11/06/2017 0828   Patient Position - Orthostatic VS  Lying filed at 11/06/2017 1525        I/O       11/03 0701 - 11/04 0700 11/04 0701 - 11/05 0700 11/05 0701 - 11/06 0700    I V  (mL/kg)  965 (10 4)     Total Intake(mL/kg)  965 (10 4)     Net   +965                 Invasive Devices     Peripheral Intravenous Line            Peripheral IV 11/04/17 Right  2 days    Peripheral IV 11/04/17 Right Arm 2 days              Body mass index is 37 5 kg/m²  Physical Exam:   Physical Exam   Constitutional: She is oriented to person, place, and time  She appears well-developed and well-nourished  No distress  HENT:   Head: Normocephalic and atraumatic  Eyes: Pupils are equal, round, and reactive to light  Neck: Neck supple  No JVD present  No tracheal deviation present  No thyromegaly present  Cardiovascular: Normal rate, regular rhythm, S1 normal, S2 normal and intact distal pulses  Exam reveals no gallop, no S3, no S4, no distant heart sounds and no friction rub  Murmur heard  Systolic (ejection) murmur is present with a grade of 2/6   Pulmonary/Chest: Effort normal and breath sounds normal  No respiratory distress  She has no wheezes  She has no rales  She exhibits no tenderness  Abdominal: Soft  Bowel sounds are normal  She exhibits no distension  There is no tenderness  Musculoskeletal: She exhibits no edema or deformity  Neurological: She is alert and oriented to person, place, and time  Skin: Skin is warm and dry  No rash noted  She is not diaphoretic  No pallor  Psychiatric: She has a normal mood and affect   Her behavior is normal  Judgment normal                Medications, Allergies:       aspirin 81 mg Oral Daily   clopidogrel 75 mg Oral Daily   cycloSPORINE modified 50 mg Oral Q12H [facility]   heparin (porcine) 5,000 Units Subcutaneous Q8H [facility]   levothyroxine 112 mcg Oral Daily   metoprolol tartrate 25 mg Oral Q12H [facility]   mycophenolate 750 mg Oral BID   pantoprazole 20 mg Oral Early Morning   pravastatin 80 mg Oral Daily With Dinner        Allergies   Allergen Reactions    Tetracyclines & Related Hives    Vancomycin Other (See Comments)     Pt unsure of reaction    Prograf [Tacrolimus] Anxiety       VTE Pharmacologic Prophylaxis:   Heparin    Labs:   Troponins:    Results from last 7 days  Lab Units 11/04/17  1427 11/04/17  0950 11/04/17  0441   TROPONIN I ng/mL 0 27* 0 23* 0 06*       CBC with diff:    Results from last 7 days  Lab Units 11/05/17  0541 11/04/17  0441   WBC Thousand/uL 4 10* 6 50   HEMOGLOBIN g/dL 10 5* 12 7   HEMATOCRIT % 32 2* 39 6   MCV fL 86 87   PLATELETS Thousands/uL 180 225   MCH pg 27 9 27 7   MCHC g/dL 32 6 32 0   RDW % 16 5* 16 5*   MPV fL 7 2* 7 6*   NRBC AUTO /100 WBCs  --  0       CMP:    Results from last 7 days  Lab Units 11/05/17  0540 11/04/17  0441   SODIUM mmol/L 142 142   POTASSIUM mmol/L 3 9 4 2   CHLORIDE mmol/L 110* 106   CO2 mmol/L 23 24   ANION GAP mmol/L 9 12   BUN mg/dL 17 22   CREATININE mg/dL 0 92 1 26   GLUCOSE RANDOM mg/dL 119 161*   CALCIUM mg/dL 7 9* 9 3   AST U/L  --  45   ALT U/L  --  41   ALK PHOS U/L  --  108   TOTAL PROTEIN g/dL  --  7 3   ALBUMIN g/dL  --  3 5   BILIRUBIN TOTAL mg/dL  --  0 40   EGFR ml/min/1 73sq m 62 42       Results from last 7 days  Lab Units 11/04/17  0738   TSH 3RD GENERATON uIU/mL 1 334          Imaging & Testing   I have personally reviewed pertinent reports  Cta Head And Neck W Wo Contrast    Result Date: 10/30/2017  Narrative: CTA NECK AND BRAIN WITH AND WITHOUT CONTRAST INDICATION: Cerebral ischemia  COMPARISON:   MR angiography dated 8/30/2015  TECHNIQUE:  Routine CT imaging of the Brain without contrast   Post contrast imaging was performed after administration of iodinated contrast through the neck and brain  Post contrast axial 0 625 mm images timed to opacify the arterial system  3D rendering was performed on an independent workstation  MIP reconstructions performed  Coronal reconstructions were performed of the noncontrast portion of the brain  Radiation dose length product (DLP) for this visit:  1595 54 mGy-cm     This examination, like all CT scans performed in the [facility], was performed utilizing techniques to minimize radiation dose exposure, including the use of iterative reconstruction and automated exposure control  IV Contrast:  85 mL of iohexol (OMNIPAQUE)     IMAGE QUALITY:   Diagnostic FINDINGS: NONCONTRAST BRAIN PARENCHYMA:  Confluent hypoattenuation noted throughout both cerebral hemispheres, bilaterally symmetric consistent with extensive chronic microangiopathy  No mass, mass effect or midline shift  No hemorrhage  Extensive vascular calcification of the vertebrobasilar system and intracranial internal carotid arteries  VENTRICLES AND EXTRA-AXIAL SPACES:  Enlargement of ventricles and extra-axial CSF spaces consistent with cerebral and cerebellar atrophy  VISUALIZED ORBITS AND PARANASAL SINUSES:  Unremarkable  CALVARIUM AND EXTRACRANIAL SOFT TISSUES:   Normal  CERVICAL VASCULATURE AORTIC ARCH AND GREAT VESSELS:  Mild atherosclerotic calcification of the aortic arch extending into the proximal great vessels  No significant stenosis at the origin of the great vessels  Visualized subclavian vasculature is unremarkable  RIGHT VERTEBRAL ARTERY CERVICAL SEGMENT:  Normal origin  The vessel is normal in caliber throughout the neck  LEFT VERTEBRAL ARTERY CERVICAL SEGMENT:  Normal origin  The vessel is normal in caliber throughout the neck  RIGHT EXTRACRANIAL CAROTID SEGMENT:  Atherosclerotic calcification of the distal common carotid artery extending to the bifurcation  Mild narrowing of the distal common carotid artery  No significant stenosis of the cervical internal carotid artery  LEFT EXTRACRANIAL CAROTID SEGMENT:  Moderate calcification of the distal common carotid artery with moderate stenosis  Atherosclerotic calcification extends into the bifurcation  No stenosis of the proximal cervical internal carotid artery  Mild focal atherosclerotic calcification within the distal cervical internal carotid artery with only slight luminal narrowing  NASCET criteria was used to determine the degree of internal carotid artery diameter stenosis   INTRACRANIAL VASCULATURE INTERNAL CAROTID ARTERIES:  Moderate atherosclerotic calcification of the cavernous internal carotid arteries  Normal ophthalmic artery origins  Normal ICA terminus  ANTERIOR CIRCULATION:  Symmetric A1 segments and anterior cerebral arteries with normal enhancement  Normal anterior communicating artery  MIDDLE CEREBRAL ARTERY CIRCULATION:  M1 segment and middle cerebral artery branches demonstrate normal enhancement bilaterally  DISTAL VERTEBRAL ARTERIES:  Moderate calcification of the distal vertebral arteries with mild narrowing at the level of the foramen magnum  BASILAR ARTERY:  Mild atherosclerotic change without stenosis  Normal superior cerebellar arteries  POSTERIOR CEREBRAL ARTERIES: Both posterior cerebral arteries arises from the basilar tip  Both arteries demonstrate normal flow-related enhancement  Normal posterior communicating arteries  DURAL VENOUS SINUSES:  Normal  NON VASCULAR ANATOMY BONY STRUCTURES:  Mild cervical spondylitic degenerative change  No acute osseous abnormality  SOFT TISSUES OF THE NECK:  Thyroid gland is small in size  There is a homogeneously hyperdense nodule within the right lobe measuring less than 1 cm in maximum diameter  THORACIC INLET:  Unremarkable  Impression: Atherosclerotic disease of the distal common carotid arteries bilaterally  Mild narrowing of the right distal common carotid artery and moderate narrowing on the left  Atherosclerotic changes extend into the proximal bifurcation with no significant stenosis of the proximal internal carotid arteries compared to the mid cervical internal carotid arteries  Slight, less than 50% narrowing of the distal left cervical internal carotid artery  Moderate intracranial atherosclerotic calcification involving the distal vertebral arteries and cavernous internal carotid arteries with mild narrowing  No occlusive disease   Extensive periventricular white matter hypoattenuation within both cerebral hemispheres consistent with extensive chronic microangiopathy  Workstation performed: [id number]     Xr Chest 1 View Portable    Result Date: 11/4/2017  Narrative: CHEST  PORTABLE INDICATION: 68-year-old female, acute mental status change COMPARISON:  10/29/2017 chest x-ray VIEWS:   AP frontal; 1 image FINDINGS: The cardiomediastinal silhouette is unremarkable  The lungs are clear  No pleural effusion  Bony thorax unremarkable  Findings are stable except for interval placement of cardiac loop recorder     Impression: No acute cardiopulmonary disease, essentially unchanged Workstation performed: [id number]     Xr Chest 1 View Portable    Result Date: 10/29/2017  Narrative: CHEST INDICATION:  Mental status change  COMPARISON:  10/26/2017 VIEWS:   AP frontal IMAGES:  1 FINDINGS:     Cardiomediastinal silhouette appears unremarkable  The lungs are clear  No pneumothorax or pleural effusion  Visualized osseous structures appear within normal limits for the patient's age  Impression: No active pulmonary disease  Workstation performed: [id number]     Xr Chest Pa & Lateral    Result Date: 10/28/2017  Narrative: CHEST INDICATION:  Cough for weeks  COMPARISON:  12/29/2016 VIEWS:  Frontal and lateral projections IMAGES:  2 FINDINGS:     Cardiomediastinal silhouette appears unremarkable  Mild elevation of the right hemidiaphragm is stable  The lungs are clear  No pneumothorax or pleural effusion  No evidence of heart failure  Stable compression of T12  Impression: No active pulmonary disease  Workstation performed: [id number]     Ct Head Without Contrast    Result Date: 11/4/2017  Narrative: CT BRAIN - WITHOUT CONTRAST INDICATION:  68-year-old woman with facial droop  COMPARISON:  CT from October 29, 2017  MRI from October 30, 2017  TECHNIQUE:  CT examination of the brain was performed  In addition to axial images, coronal reformatted images were created and submitted for interpretation    Radiation dose length product (DLP) for this visit:  1083 84 mGy-cm   This examination, like all CT scans performed in the [facility], was performed utilizing techniques to minimize radiation dose exposure, including the use of iterative reconstruction and automated exposure control  IMAGE QUALITY:  Diagnostic  FINDINGS:  PARENCHYMA:  No intracranial mass, mass effect or midline shift  No CT signs of acute infarction  There is no parenchymal hemorrhage  Diffuse decreased white matter attenuation, consistent with chronic microvascular ischemic change  VENTRICLES AND EXTRA-AXIAL SPACES:  Enlarged, consistent with the patient's age  No extra-axial blood  VISUALIZED ORBITS AND PARANASAL SINUSES:  Unremarkable  CALVARIUM AND EXTRACRANIAL SOFT TISSUES:   Normal      Impression: Chronic microvascular ischemic changes  No acute intracranial abnormality  Workstation performed: [id number]       EKG / Monitor: Personally reviewed  EKG had shown previously no significant ST changes  Monitor shows multiple episodes of SVT which are nonsustained  No sustained arrhythmias noted so far  Cardiac testing:   Results for orders placed during the hospital encounter of 10/29/17   Echo complete with contrast if indicated    [facility] [address]  [address] [address]  [phone]    Transthoracic Echocardiogram  2D, M-mode, Doppler, and Color Doppler    Study date:  30-Oct-2017    Patient:   MR number: [id number]  Account number: [de-identified]  : 1945  Age: 66 years  Gender: Female  Status: Routine  Location: Bedside  Height: 62 in  Weight: 202 6 lb  BP: 136/ 72 mmHg    Indications: TIA    Diagnoses: I67 9 - Cerebrovascular disease, unspecified    Sonographer:  Phil Carreno  Primary Physician:  Israel Sen MD  Referring Physician:  Sofiya Marquez MD  Group:  Samreen Esparza  RN:  Cheri Love RN  Interpreting Physician:  Sofie Alcantara MD    SUMMARY    LEFT VENTRICLE:  Systolic function was normal  Ejection fraction was estimated in the range of 55 % to 60 %  There were no regional wall motion abnormalities  Wall thickness was mildly increased  Doppler parameters were consistent with abnormal left ventricular relaxation (grade 1 diastolic dysfunction)  Doppler parameters were consistent with elevated mean left atrial filling pressure  LEFT ATRIUM:  The atrium was mildly dilated  ATRIAL SEPTUM:  No significant defect or patent foramen ovale was identified  There was a trace left-to-right shunt  This is within normal limits for the patient's age  TRICUSPID VALVE:  The tricuspid jet envelope definition was inadequate for estimation of RV systolic pressure  There are no indirect findings (abnormal RV volume or geometry, altered pulmonary flow velocity profile, or leftward septal displacement) which  would suggest moderate or severe pulmonary hypertension  HISTORY: PRIOR HISTORY: GERD, Hypothyroidism, Anxiety, Anemia    PROCEDURE: The procedure was performed at the bedside  This was a routine study  The transthoracic approach was used  The study included complete 2D imaging, M-mode, complete spectral Doppler, and color Doppler  The heart rate was 80 bpm,  at the start of the study  Intravenous contrast ( 10 ml) was administered to evaluate shunting  Image quality was adequate  LEFT VENTRICLE: Size was normal  Systolic function was normal  Ejection fraction was estimated in the range of 55 % to 60 %  There were no regional wall motion abnormalities  Wall thickness was mildly increased  No evidence of apical  thrombus  DOPPLER: Doppler parameters were consistent with abnormal left ventricular relaxation (grade 1 diastolic dysfunction)  Doppler parameters were consistent with elevated mean left atrial filling pressure      RIGHT VENTRICLE: The size was normal  Systolic function was normal  Wall thickness was normal     LEFT ATRIUM: The atrium was mildly dilated  ATRIAL SEPTUM: No significant defect or patent foramen ovale was identified  There was a trace left-to-right shunt  This is within normal limits for the patient's age  RIGHT ATRIUM: Size was normal     MITRAL VALVE: There was mild to moderate annular calcification  Valve structure was normal  There was normal leaflet separation  DOPPLER: The transmitral velocity was within the normal range  There was no evidence for stenosis  There was  no significant regurgitation  AORTIC VALVE: The valve was not visualized well enough to rule out a bicuspid morphology  Leaflets exhibited mildly increased thickness and normal cuspal separation  DOPPLER: Transaortic velocity was within the normal range  There was no  evidence for stenosis  There was no significant regurgitation  TRICUSPID VALVE: The valve structure was normal  There was normal leaflet separation  DOPPLER: The transtricuspid velocity was within the normal range  There was no evidence for stenosis  There was trace regurgitation  The tricuspid jet  envelope definition was inadequate for estimation of RV systolic pressure  There are no indirect findings (abnormal RV volume or geometry, altered pulmonary flow velocity profile, or leftward septal displacement) which would suggest  moderate or severe pulmonary hypertension  PULMONIC VALVE: Leaflets exhibited normal thickness, no calcification, and normal cuspal separation  DOPPLER: The transpulmonic velocity was within the normal range  There was no significant regurgitation  PERICARDIUM: There was no pericardial effusion  The pericardium was normal in appearance  AORTA: The root exhibited normal size  SYSTEMIC VEINS: IVC: The inferior vena cava was normal in size      SYSTEM MEASUREMENT TABLES    2D mode  AoR Diam 2D: 3 cm  LA Diam (2D): 4 1 cm  LA/Ao (2D): 1 37  FS (2D Teich): 25 1 %  IVSd (2D): 1 14 cm  LVDEV: 65 9 cm³  LVESV: 32 8 cm³  LVIDd(2D): 3 9 cm  LVISd (2D): 2 92 cm  LVPWd (2D): 1 09 cm  SV (Teich): 33 1 cm³    Apical four chamber  LVEF A4C: 56 %    Unspecified Scan Mode  MV Peak A Shiv: 1670 mm/s  MV Peak E Shiv  Mean: 1030 mm/s  MVA (PHT): 4 4 cm squared  PHT: 50 ms  Max P mm[Hg]  V Max: 2570 mm/s  Vmax: 2540 mm/s  RA Area: 10 6 cm squared  RA Volume: 20 9 cm³  TAPSE: 1 6 cm    [facility] Accredited [facility]    Prepared and electronically signed by    Wendi Clements MD  Signed 30-Oct-2017 14:13:31         Dr Brandyn Woodruff MD [facility]      "This note has been constructed using a voice recognition system  Therefore there may be syntax, spelling, and/or grammatical errors   Please call if you have any questions  "

## 2017-11-07 LAB
ANION GAP SERPL CALCULATED.3IONS-SCNC: 9 MMOL/L (ref 4–13)
BUN SERPL-MCNC: 12 MG/DL (ref 5–25)
CALCIUM SERPL-MCNC: 8.1 MG/DL (ref 8.3–10.1)
CHLORIDE SERPL-SCNC: 109 MMOL/L (ref 100–108)
CO2 SERPL-SCNC: 23 MMOL/L (ref 21–32)
CREAT SERPL-MCNC: 0.92 MG/DL (ref 0.6–1.3)
ERYTHROCYTE [DISTWIDTH] IN BLOOD BY AUTOMATED COUNT: 16.7 % (ref 11.6–15.1)
GFR SERPL CREATININE-BSD FRML MDRD: 62 ML/MIN/1.73SQ M
GLUCOSE SERPL-MCNC: 117 MG/DL (ref 65–140)
HCT VFR BLD AUTO: 33 % (ref 37–47)
HGB BLD-MCNC: 10.5 G/DL (ref 12–16)
MCH RBC QN AUTO: 27.6 PG (ref 27–31)
MCHC RBC AUTO-ENTMCNC: 31.9 G/DL (ref 31.4–37.4)
MCV RBC AUTO: 87 FL (ref 82–98)
PLATELET # BLD AUTO: 181 THOUSANDS/UL (ref 130–400)
PMV BLD AUTO: 7.8 FL (ref 8.9–12.7)
POTASSIUM SERPL-SCNC: 4.1 MMOL/L (ref 3.5–5.3)
RBC # BLD AUTO: 3.82 MILLION/UL (ref 4.2–5.4)
SODIUM SERPL-SCNC: 141 MMOL/L (ref 136–145)
WBC # BLD AUTO: 4 THOUSAND/UL (ref 4.8–10.8)

## 2017-11-07 PROCEDURE — 85027 COMPLETE CBC AUTOMATED: CPT | Performed by: FAMILY MEDICINE

## 2017-11-07 PROCEDURE — 97110 THERAPEUTIC EXERCISES: CPT

## 2017-11-07 PROCEDURE — 80048 BASIC METABOLIC PNL TOTAL CA: CPT | Performed by: FAMILY MEDICINE

## 2017-11-07 RX ORDER — ATORVASTATIN CALCIUM 10 MG/1
10 TABLET, FILM COATED ORAL
Status: DISCONTINUED | OUTPATIENT
Start: 2017-11-08 | End: 2017-11-08 | Stop reason: HOSPADM

## 2017-11-07 RX ADMIN — MYCOPHENOLATE MOFETIL 750 MG: 250 CAPSULE ORAL at 10:01

## 2017-11-07 RX ADMIN — METOPROLOL TARTRATE 25 MG: 25 TABLET ORAL at 20:51

## 2017-11-07 RX ADMIN — MYCOPHENOLATE MOFETIL 750 MG: 250 CAPSULE ORAL at 17:29

## 2017-11-07 RX ADMIN — LEVETIRACETAM 500 MG: 500 TABLET ORAL at 10:01

## 2017-11-07 RX ADMIN — METOPROLOL TARTRATE 25 MG: 25 TABLET ORAL at 12:31

## 2017-11-07 RX ADMIN — REGADENOSON 0.4 MG: 0.08 INJECTION, SOLUTION INTRAVENOUS at 11:57

## 2017-11-07 RX ADMIN — APIXABAN 5 MG: 5 TABLET, FILM COATED ORAL at 17:29

## 2017-11-07 RX ADMIN — LEVETIRACETAM 500 MG: 500 TABLET ORAL at 20:51

## 2017-11-07 RX ADMIN — APIXABAN 5 MG: 5 TABLET, FILM COATED ORAL at 10:02

## 2017-11-07 RX ADMIN — ATORVASTATIN CALCIUM 20 MG: 20 TABLET, FILM COATED ORAL at 16:16

## 2017-11-07 RX ADMIN — LEVOTHYROXINE SODIUM 112 MCG: 112 TABLET ORAL at 05:37

## 2017-11-07 RX ADMIN — CYCLOSPORINE 50 MG: 25 CAPSULE, LIQUID FILLED ORAL at 10:02

## 2017-11-07 RX ADMIN — PANTOPRAZOLE SODIUM 20 MG: 20 TABLET, DELAYED RELEASE ORAL at 05:37

## 2017-11-07 RX ADMIN — SODIUM CHLORIDE 50 ML/HR: 0.9 INJECTION, SOLUTION INTRAVENOUS at 16:16

## 2017-11-07 RX ADMIN — CYCLOSPORINE 50 MG: 25 CAPSULE, LIQUID FILLED ORAL at 20:52

## 2017-11-07 RX ADMIN — ASPIRIN 81 MG: 81 TABLET, COATED ORAL at 10:02

## 2017-11-07 NOTE — PROGRESS NOTES
Progress Note - Cardiology   Abel Caraballo 66 y o  female MRN: [id number]  Unit/Bed#: [address] 404-01 Encounter: [id number]    Assessment and Plan:   1  Altered mental status now back to baseline  with repeat MRI showing multiple episodes of emboli  Now on Eliquis  2  Non ST-elevation MI most likely type 2 due to underlying tachycardia, no sustained arrhythmias noted so far patient has multiple episodes of SVT which are nonsustained  Lexiscan stress test shows apical lateral wall mild ischemia along with cavity dilatation  With normal LV systolic function  3   Recent CVA  With diffuse atherosclerotic disease of the carotids as well as intracranial vascular disease  Need to be on aspirin  4  History of narrow complex tachycardia status post loop recorder  Continue metoprolol  5  History of liver transplant  6  Hypothyroidism  On levothyroxine check TSH  7  Dyslipidemia on low-dose Pravachol cholesterol profile is acceptable  Plan  Will increase metoprolol to 37 5 mg twice a day  Continue Eliquis  She is off from Plavix will continue aspirin  Continue statin  Not on high doses of statin due to liver problems and her LDL is pretty low  In my opinion patient has mildly abnormal stress test with evidence of focal apical lateral wall ischemia but cavity dilatation cannot rule out balanced ischemia  For now in view of recent stroke will try medical therapy  Subjective / Objective:   Patient seen and evaluated  Patient had another episode of confusion  She is now back to baseline  Denies any chest pain or any shortness of breath  She was started on antithrombotic therapy  Blood pressure is high  Neuro wanted permissive hypertension      Vitals:    11/07/17 1346   BP: (!) 177/78   Pulse: 96   Resp: 20   Temp: 97 9 °F (36 6 °C)   SpO2: 92%     Vitals:    11/04/17 0432 11/04/17 0823   Weight: 92 1 kg (203 lb) 93 kg (205 lb 0 4 oz)     Orthostatic Blood Pressures    Flowsheet Row Most Recent Value Blood Pressure   177/78 filed at 11/07/2017 1346   Patient Position - Orthostatic VS  Sitting filed at 11/07/2017 1346        I/O       11/03 0701 - 11/04 0700 11/04 0701 - 11/05 0700 11/05 0701 - 11/06 0700    I V  (mL/kg)  965 (10 4)     Total Intake(mL/kg)  965 (10 4)     Net   +965                 Invasive Devices     Peripheral Intravenous Line            Peripheral IV 11/04/17 Right  3 days    Peripheral IV 11/04/17 Right;Upper Arm 3 days              Body mass index is 37 5 kg/m²  Physical Exam:   Physical Exam   Constitutional: She is oriented to person, place, and time  She appears well-developed and well-nourished  No distress  HENT:   Head: Normocephalic and atraumatic  Eyes: Pupils are equal, round, and reactive to light  Neck: Neck supple  No JVD present  No tracheal deviation present  No thyromegaly present  Cardiovascular: Normal rate, regular rhythm, S1 normal, S2 normal and intact distal pulses  Exam reveals no gallop, no S3, no S4, no distant heart sounds and no friction rub  Murmur heard  Systolic (ejection) murmur is present with a grade of 2/6   Pulmonary/Chest: Effort normal and breath sounds normal  No respiratory distress  She has no wheezes  She has no rales  She exhibits no tenderness  Abdominal: Soft  Bowel sounds are normal  She exhibits no distension  There is no tenderness  Musculoskeletal: She exhibits edema  She exhibits no deformity  Neurological: She is alert and oriented to person, place, and time  Skin: Skin is warm and dry  No rash noted  She is not diaphoretic  No pallor  Psychiatric: She has a normal mood and affect   Her behavior is normal  Judgment normal                Medications, Allergies:       apixaban 5 mg Oral BID   aspirin 81 mg Oral Daily   atorvastatin 20 mg Oral Daily With Dinner   cycloSPORINE modified 50 mg Oral Q12H OLYA   levETIRAcetam 500 mg Oral Q12H [address]   levothyroxine 112 mcg Oral Daily   metoprolol tartrate 25 mg Oral Q12H [address] mycophenolate 750 mg Oral BID   pantoprazole 20 mg Oral Early Morning        Allergies   Allergen Reactions    Tetracyclines & Related Hives    Vancomycin Other (See Comments)     Pt unsure of reaction    Prograf [Tacrolimus] Anxiety       VTE Pharmacologic Prophylaxis:   Heparin    Labs:   Troponins:    Results from last 7 days  Lab Units 11/04/17  1427 11/04/17  0950 11/04/17  0441   TROPONIN I ng/mL 0 27* 0 23* 0 06*       CBC with diff:    Results from last 7 days  Lab Units 11/07/17  0558 11/05/17  0541 11/04/17  0441   WBC Thousand/uL 4 00* 4 10* 6 50   HEMOGLOBIN g/dL 10 5* 10 5* 12 7   HEMATOCRIT % 33 0* 32 2* 39 6   MCV fL 87 86 87   PLATELETS Thousands/uL 181 180 225   MCH pg 27 6 27 9 27 7   MCHC g/dL 31 9 32 6 32 0   RDW % 16 7* 16 5* 16 5*   MPV fL 7 8* 7 2* 7 6*   NRBC AUTO /100 WBCs  --   --  0       CMP:    Results from last 7 days  Lab Units 11/07/17  0558 11/05/17  0540 11/04/17  0441   SODIUM mmol/L 141 142 142   POTASSIUM mmol/L 4 1 3 9 4 2   CHLORIDE mmol/L 109* 110* 106   CO2 mmol/L 23 23 24   ANION GAP mmol/L 9 9 12   BUN mg/dL 12 17 22   CREATININE mg/dL 0 92 0 92 1 26   GLUCOSE RANDOM mg/dL 117 119 161*   CALCIUM mg/dL 8 1* 7 9* 9 3   AST U/L  --   --  45   ALT U/L  --   --  41   ALK PHOS U/L  --   --  108   TOTAL PROTEIN g/dL  --   --  7 3   ALBUMIN g/dL  --   --  3 5   BILIRUBIN TOTAL mg/dL  --   --  0 40   EGFR ml/min/1 73sq m 62 62 42       Results from last 7 days  Lab Units 11/04/17  0738   TSH 3RD GENERATON uIU/mL 1 334          Imaging & Testing   I have personally reviewed pertinent reports  Cta Head And Neck W Wo Contrast    Result Date: 10/30/2017  Narrative: CTA NECK AND BRAIN WITH AND WITHOUT CONTRAST INDICATION: Cerebral ischemia  COMPARISON:   MR angiography dated 8/30/2015  TECHNIQUE:  Routine CT imaging of the Brain without contrast   Post contrast imaging was performed after administration of iodinated contrast through the neck and brain   Post contrast axial 0 625 mm images timed to opacify the arterial system  3D rendering was performed on an independent workstation  MIP reconstructions performed  Coronal reconstructions were performed of the noncontrast portion of the brain  Radiation dose length product (DLP) for this visit:  1595 54 mGy-cm   This examination, like all CT scans performed in the [facility], was performed utilizing techniques to minimize radiation dose exposure, including the use of iterative reconstruction and automated exposure control  IV Contrast:  85 mL of iohexol (OMNIPAQUE)     IMAGE QUALITY:   Diagnostic FINDINGS: NONCONTRAST BRAIN PARENCHYMA:  Confluent hypoattenuation noted throughout both cerebral hemispheres, bilaterally symmetric consistent with extensive chronic microangiopathy  No mass, mass effect or midline shift  No hemorrhage  Extensive vascular calcification of the vertebrobasilar system and intracranial internal carotid arteries  VENTRICLES AND EXTRA-AXIAL SPACES:  Enlargement of ventricles and extra-axial CSF spaces consistent with cerebral and cerebellar atrophy  VISUALIZED ORBITS AND PARANASAL SINUSES:  Unremarkable  CALVARIUM AND EXTRACRANIAL SOFT TISSUES:   Normal  CERVICAL VASCULATURE AORTIC ARCH AND GREAT VESSELS:  Mild atherosclerotic calcification of the aortic arch extending into the proximal great vessels  No significant stenosis at the origin of the great vessels  Visualized subclavian vasculature is unremarkable  RIGHT VERTEBRAL ARTERY CERVICAL SEGMENT:  Normal origin  The vessel is normal in caliber throughout the neck  LEFT VERTEBRAL ARTERY CERVICAL SEGMENT:  Normal origin  The vessel is normal in caliber throughout the neck  RIGHT EXTRACRANIAL CAROTID SEGMENT:  Atherosclerotic calcification of the distal common carotid artery extending to the bifurcation  Mild narrowing of the distal common carotid artery  No significant stenosis of the cervical internal carotid artery       LEFT EXTRACRANIAL CAROTID SEGMENT:  Moderate calcification of the distal common carotid artery with moderate stenosis  Atherosclerotic calcification extends into the bifurcation  No stenosis of the proximal cervical internal carotid artery  Mild focal atherosclerotic calcification within the distal cervical internal carotid artery with only slight luminal narrowing  NASCET criteria was used to determine the degree of internal carotid artery diameter stenosis  INTRACRANIAL VASCULATURE INTERNAL CAROTID ARTERIES:  Moderate atherosclerotic calcification of the cavernous internal carotid arteries  Normal ophthalmic artery origins  Normal ICA terminus  ANTERIOR CIRCULATION:  Symmetric A1 segments and anterior cerebral arteries with normal enhancement  Normal anterior communicating artery  MIDDLE CEREBRAL ARTERY CIRCULATION:  M1 segment and middle cerebral artery branches demonstrate normal enhancement bilaterally  DISTAL VERTEBRAL ARTERIES:  Moderate calcification of the distal vertebral arteries with mild narrowing at the level of the foramen magnum  BASILAR ARTERY:  Mild atherosclerotic change without stenosis  Normal superior cerebellar arteries  POSTERIOR CEREBRAL ARTERIES: Both posterior cerebral arteries arises from the basilar tip  Both arteries demonstrate normal flow-related enhancement  Normal posterior communicating arteries  DURAL VENOUS SINUSES:  Normal  NON VASCULAR ANATOMY BONY STRUCTURES:  Mild cervical spondylitic degenerative change  No acute osseous abnormality  SOFT TISSUES OF THE NECK:  Thyroid gland is small in size  There is a homogeneously hyperdense nodule within the right lobe measuring less than 1 cm in maximum diameter  THORACIC INLET:  Unremarkable  Impression: Atherosclerotic disease of the distal common carotid arteries bilaterally  Mild narrowing of the right distal common carotid artery and moderate narrowing on the left   Atherosclerotic changes extend into the proximal bifurcation with no significant stenosis of the proximal internal carotid arteries compared to the mid cervical internal carotid arteries  Slight, less than 50% narrowing of the distal left cervical internal carotid artery  Moderate intracranial atherosclerotic calcification involving the distal vertebral arteries and cavernous internal carotid arteries with mild narrowing  No occlusive disease  Extensive periventricular white matter hypoattenuation within both cerebral hemispheres consistent with extensive chronic microangiopathy  Workstation performed: [id number]     Xr Chest 1 View Portable    Result Date: 11/4/2017  Narrative: CHEST  PORTABLE INDICATION: 75-year-old female, acute mental status change COMPARISON:  10/29/2017 chest x-ray VIEWS:   AP frontal; 1 image FINDINGS: The cardiomediastinal silhouette is unremarkable  The lungs are clear  No pleural effusion  Bony thorax unremarkable  Findings are stable except for interval placement of cardiac loop recorder     Impression: No acute cardiopulmonary disease, essentially unchanged Workstation performed: [id number]     Xr Chest 1 View Portable    Result Date: 10/29/2017  Narrative: CHEST INDICATION:  Mental status change  COMPARISON:  10/26/2017 VIEWS:   AP frontal IMAGES:  1 FINDINGS:     Cardiomediastinal silhouette appears unremarkable  The lungs are clear  No pneumothorax or pleural effusion  Visualized osseous structures appear within normal limits for the patient's age  Impression: No active pulmonary disease  Workstation performed: [id number]     Xr Chest Pa & Lateral    Result Date: 10/28/2017  Narrative: CHEST INDICATION:  Cough for weeks  COMPARISON:  12/29/2016 VIEWS:  Frontal and lateral projections IMAGES:  2 FINDINGS:     Cardiomediastinal silhouette appears unremarkable  Mild elevation of the right hemidiaphragm is stable  The lungs are clear  No pneumothorax or pleural effusion  No evidence of heart failure  Stable compression of T12  [id number]  Account number: [de-identified]  : 1945  Age: 66 years  Gender: Female  Status: Routine  Location: Bedside  Height: 62 in  Weight: 202 6 lb  BP: 136/ 72 mmHg    Indications: TIA    Diagnoses: I67 9 - Cerebrovascular disease, unspecified    Sonographer:  Phil Basiilo  Primary Physician:  Farideh Andrews MD  Referring Physician:  Phillip Alvarenga MD  Group:  Darrin Becerril  RN:  Alicia Randall RN  Interpreting Physician:  Anand Tiwari MD    SUMMARY    LEFT VENTRICLE:  Systolic function was normal  Ejection fraction was estimated in the range of 55 % to 60 %  There were no regional wall motion abnormalities  Wall thickness was mildly increased  Doppler parameters were consistent with abnormal left ventricular relaxation (grade 1 diastolic dysfunction)  Doppler parameters were consistent with elevated mean left atrial filling pressure  LEFT ATRIUM:  The atrium was mildly dilated  ATRIAL SEPTUM:  No significant defect or patent foramen ovale was identified  There was a trace left-to-right shunt  This is within normal limits for the patient's age  TRICUSPID VALVE:  The tricuspid jet envelope definition was inadequate for estimation of RV systolic pressure  There are no indirect findings (abnormal RV volume or geometry, altered pulmonary flow velocity profile, or leftward septal displacement) which  would suggest moderate or severe pulmonary hypertension  HISTORY: PRIOR HISTORY: GERD, Hypothyroidism, Anxiety, Anemia    PROCEDURE: The procedure was performed at the bedside  This was a routine study  The transthoracic approach was used  The study included complete 2D imaging, M-mode, complete spectral Doppler, and color Doppler  The heart rate was 80 bpm,  at the start of the study  Intravenous contrast ( 10 ml) was administered to evaluate shunting  Image quality was adequate      LEFT VENTRICLE: Size was normal  Systolic function was normal  Ejection fraction was estimated in the range of 55 % to 60 %  There were no regional wall motion abnormalities  Wall thickness was mildly increased  No evidence of apical  thrombus  DOPPLER: Doppler parameters were consistent with abnormal left ventricular relaxation (grade 1 diastolic dysfunction)  Doppler parameters were consistent with elevated mean left atrial filling pressure  RIGHT VENTRICLE: The size was normal  Systolic function was normal  Wall thickness was normal     LEFT ATRIUM: The atrium was mildly dilated  ATRIAL SEPTUM: No significant defect or patent foramen ovale was identified  There was a trace left-to-right shunt  This is within normal limits for the patient's age  RIGHT ATRIUM: Size was normal     MITRAL VALVE: There was mild to moderate annular calcification  Valve structure was normal  There was normal leaflet separation  DOPPLER: The transmitral velocity was within the normal range  There was no evidence for stenosis  There was  no significant regurgitation  AORTIC VALVE: The valve was not visualized well enough to rule out a bicuspid morphology  Leaflets exhibited mildly increased thickness and normal cuspal separation  DOPPLER: Transaortic velocity was within the normal range  There was no  evidence for stenosis  There was no significant regurgitation  TRICUSPID VALVE: The valve structure was normal  There was normal leaflet separation  DOPPLER: The transtricuspid velocity was within the normal range  There was no evidence for stenosis  There was trace regurgitation  The tricuspid jet  envelope definition was inadequate for estimation of RV systolic pressure  There are no indirect findings (abnormal RV volume or geometry, altered pulmonary flow velocity profile, or leftward septal displacement) which would suggest  moderate or severe pulmonary hypertension  PULMONIC VALVE: Leaflets exhibited normal thickness, no calcification, and normal cuspal separation   DOPPLER: The transpulmonic velocity was within the normal range  There was no significant regurgitation  PERICARDIUM: There was no pericardial effusion  The pericardium was normal in appearance  AORTA: The root exhibited normal size  SYSTEMIC VEINS: IVC: The inferior vena cava was normal in size  SYSTEM MEASUREMENT TABLES    2D mode  AoR Diam 2D: 3 cm  LA Diam (2D): 4 1 cm  LA/Ao (2D): 1 37  FS (2D Teich): 25 1 %  IVSd (2D): 1 14 cm  LVDEV: 65 9 cm³  LVESV: 32 8 cm³  LVIDd(2D): 3 9 cm  LVISd (2D): 2 92 cm  LVPWd (2D): 1 09 cm  SV (Teich): 33 1 cm³    Apical four chamber  LVEF A4C: 56 %    Unspecified Scan Mode  MV Peak A Shiv: 1670 mm/s  MV Peak E Shiv  Mean: 1030 mm/s  MVA (PHT): 4 4 cm squared  PHT: 50 ms  Max P mm[Hg]  V Max: 2570 mm/s  Vmax: 2540 mm/s  RA Area: 10 6 cm squared  RA Volume: 20 9 cm³  TAPSE: 1 6 cm    [employer] Accredited Echocardiography Laboratory    Prepared and electronically signed by    Dago Carroll MD  Signed 30-Oct-2017 14:13:31         Dr Lourdes Mcdaniel MD [facility]      "This note has been constructed using a voice recognition system  Therefore there may be syntax, spelling, and/or grammatical errors   Please call if you have any questions  "

## 2017-11-07 NOTE — PLAN OF CARE
Problem: OCCUPATIONAL THERAPY ADULT  Goal: Performs self-care activities at highest level of function for planned discharge setting  See evaluation for individualized goals  Outcome: Progressing  Limitation: Decreased ADL status, Decreased UE ROM, Decreased UE strength, Decreased cognition, Decreased endurance, Decreased high-level ADLs, Decreased self-care trans  Prognosis: Good  Assessment: Tolerated well  Patient is cooperative with encouragement  Patient requires supervision for transfers  Patient reports spouse puts her socks on for her at home        OT Discharge Recommendation:  (home with services and family support )

## 2017-11-07 NOTE — OCCUPATIONAL THERAPY NOTE
OT TREATMENT       11/07/17 1615   Restrictions/Precautions   Other Precautions Fall Risk; Chair Alarm   Pain Assessment   Pain Assessment No/denies pain   ADL   Toileting Comments toilet transfers supervision   Functional Standing Tolerance   Activity standing at sink to wash hands supervision    Transfers   Sit to Stand 5  Supervision   Stand to Sit 5  Supervision   Functional Mobility   Functional Mobility 5  Supervision   Additional Comments to and from bathroom (10 feet x 2)   ROM- Right Upper Extremities   R Shoulder AROM; Flexion; Horizontal ABduction  (chest press)   R Elbow AROM;Elbow extension;Elbow flexion   R Weight/Reps/Sets 10 times each seated in chair    ROM - Left Upper Extremities    L Shoulder AROM; Flexion; Horizontal ABduction  (chest press)   L Elbow AROM;Elbow flexion;Elbow extension   L Weight/Reps/Sets 10 times each seated in chair    Assessment   Assessment Tolerated well  Patient is cooperative with encouragement  Patient requires supervision for transfers  Patient reports spouse puts her socks on for her at home  Plan   Treatment Interventions ADL retraining;Functional transfer training;UE strengthening/ROM; Endurance training;Patient/family training;Equipment evaluation/education; Activityengagement   Recommendation   OT Discharge Recommendation (home with services and family support )

## 2017-11-07 NOTE — PLAN OF CARE
Problem: PHYSICAL THERAPY ADULT  Goal: Performs mobility at highest level of function for planned discharge setting  See evaluation for individualized goals  Outcome: Progressing  Prognosis: Good  Problem List: Decreased strength, Decreased endurance, Decreased mobility  Assessment: Pt  just returned from stress test, sitting in chair  Tired from test, so completed ther ex in chair  [place]  well  Cont  PT  Recommendation:  (Home with family with 24 hour supervision, Home PT as needed)          See flowsheet documentation for full assessment

## 2017-11-07 NOTE — PHYSICAL THERAPY NOTE
PT TREATMENT     11/07/17 1440   Pain Assessment   Pain Assessment No/denies pain   General   Chart Reviewed Yes   Family/Caregiver Present (yes, )   Cognition   Overall Cognitive Status WFL   Orientation Level Oriented X4   Following Commands Follows all commands and directions without difficulty   Subjective   Subjective Pt  was tired from just returning from stress test earlier  Activity Tolerance   Activity Tolerance Patient tolerated treatment well   Exercises   Hip Flexion Sitting;20 reps;Bilateral   Knee AROM Long Arc Quad Sitting;20 reps;Bilateral   Ankle Pumps Sitting;20 reps;Bilateral  (heel toe raises)   Assessment   Prognosis Good   Problem List Decreased strength;Decreased endurance;Decreased mobility   Assessment Pt  just returned from stress test, sitting in chair  Tired from test, so completed ther ex in chair  [place]rist[place]  well  Cont  PT    Goals   Patient Goals To go home soon   Plan   Treatment/Interventions Functional transfer training; Therapeutic exercise;Patient/family training;Bed mobility;Gait training   Progress Progressing toward goals   Recommendation   Recommendation (Home with family with 24 hour supervision, Home PT as needed)

## 2017-11-07 NOTE — PROGRESS NOTES
[facility] Internal Medicine Progress Note  Patient: Abel Caraballo 66 y o  female   MRN: [id number]  PCP: Christiano Stewart MD  Unit/Bed#: [address] [place] [address] Encounter: [id number]  Date Of Visit: 11/06/17    Problem List:    Principal Problem:    Acute metabolic encephalopathy  Active Problems:    Liver transplanted ([facility] [address] )    Hypothyroidism    HLD (hyperlipidemia)    CVA (cerebral vascular accident) ([facility] )    NSTEMI (non-ST elevated myocardial infarction) ([facility] )      Assessment & Plan:    1  Encephalopathy due to acute ischemic multiple embolic strokes in both hemispheres - noted on MRI  Patient not noted to have any evidence of atrial fibrillation  Patient started on Eliquis for further stroke prevention  Plavix was discontinued  Patient should ideally be on high dose statin however due to liver transplantation, patient will be on low-dose statin  She has had a recent echo on Oct 30 2017 which showed EF of 55-60% with grade 1 diastolic dysfunction  No significant ASD or PFO was noted  There was a trace left to right shunt  2  NSTEMI type 2 - likely due to underlying tachycardia  Patient has had multiple episodes of nonsustained SVT  Stress test when okay by Neurology  Dose of metoprolol increased  3  Abnormal EEG - as per Neurology EEG showed left temporal sharps  Patient started on Keppra  She will follow up with Neurology after discharge  4  Hypothyroidism - continue levothyroxine  TSH WNL  5  History of recent subacute right frontal parietal vertex stroke  6  History of liver transplantation - continue CellCept and cyclosporine  7  Dyslipidemia - continue statin      VTE Pharmacologic Prophylaxis:   Pharmacologic: Apixaban (Eliquis)  Mechanical VTE Prophylaxis in Place: No    Patient Centered Rounds: I have performed bedside rounds with nursing staff today      Discussions with Specialists or Other Care Team Provider: Yes    Education and Discussions with Family / Patient:Yes    Time Spent for Care: 30 minutes  More than 50% of total time spent on counseling and coordination of care as described above  Current Length of Stay: 2 day(s)    Current Patient Status: Inpatient     Discharge Plan: home    Code Status: Level 1 - Full Code      Subjective:   Denies any weakness, chest pain, shortness of breath    Objective:     Vitals:   Temp (24hrs), Av 3 °F (36 8 °C), Min:97 5 °F (36 4 °C), Max:99 2 °F (37 3 °C)    HR:  [76-94] 87  Resp:  [18] 18  BP: (116-168)/(60-77) 124/60  SpO2:  [96 %-97 %] 97 %  Body mass index is 37 5 kg/m²  Input and Output Summary (last 24 hours):     No intake or output data in the 24 hours ending 17    Physical Exam:     Physical Exam   Constitutional: She is oriented to person, place, and time  She appears well-developed and well-nourished  No distress  HENT:   Head: Normocephalic and atraumatic  Eyes: EOM are normal  Right eye exhibits no discharge  Left eye exhibits no discharge  No scleral icterus  Neck: Neck supple  No tracheal deviation present  Cardiovascular: Normal rate and regular rhythm  Murmur (JOSÉ MIGUEL) heard  Pulmonary/Chest: Effort normal and breath sounds normal  No respiratory distress  She has no wheezes  She has no rales  Abdominal: Soft  Bowel sounds are normal  She exhibits no distension  There is no tenderness  Musculoskeletal: She exhibits no edema  Neurological: She is alert and oriented to person, place, and time  No cranial nerve deficit  Skin: Skin is dry  She is not diaphoretic  Psychiatric: She has a normal mood and affect         Additional Data:     Labs:      Results from last 7 days  Lab Units 17  0541 17  0441   WBC Thousand/uL 4 10* 6 50   HEMOGLOBIN g/dL 10 5* 12 7   HEMATOCRIT % 32 2* 39 6   PLATELETS Thousands/uL 180 225   NEUTROS PCT %  --  79*   LYMPHS PCT %  --  10*   MONOS PCT %  --  8   EOS PCT %  --  3       Results from last 7 days  Lab Units 17  0540 17  0441   SODIUM mmol/L 142 142 POTASSIUM mmol/L 3 9 4 2   CHLORIDE mmol/L 110* 106   CO2 mmol/L 23 24   BUN mg/dL 17 22   CREATININE mg/dL 0 92 1 26   CALCIUM mg/dL 7 9* 9 3   TOTAL PROTEIN g/dL  --  7 3   BILIRUBIN TOTAL mg/dL  --  0 40   ALK PHOS U/L  --  108   ALT U/L  --  41   AST U/L  --  45   GLUCOSE RANDOM mg/dL 119 161*           * I Have Reviewed All Lab Data Listed Above  * Additional Pertinent Lab Tests Reviewed: [facility] Admission Reviewed    Imaging:  Cta Head And Neck W Wo Contrast    Result Date: 10/30/2017  Narrative: CTA NECK AND BRAIN WITH AND WITHOUT CONTRAST INDICATION: Cerebral ischemia  COMPARISON:   MR angiography dated 8/30/2015  TECHNIQUE:  Routine CT imaging of the Brain without contrast   Post contrast imaging was performed after administration of iodinated contrast through the neck and brain  Post contrast axial 0 625 mm images timed to opacify the arterial system  3D rendering was performed on an independent workstation  MIP reconstructions performed  Coronal reconstructions were performed of the noncontrast portion of the brain  Radiation dose length product (DLP) for this visit:  1595 54 mGy-cm   This examination, like all CT scans performed in the [facility], was performed utilizing techniques to minimize radiation dose exposure, including the use of iterative reconstruction and automated exposure control  IV Contrast:  85 mL of iohexol (OMNIPAQUE)     IMAGE QUALITY:   Diagnostic FINDINGS: NONCONTRAST BRAIN PARENCHYMA:  Confluent hypoattenuation noted throughout both cerebral hemispheres, bilaterally symmetric consistent with extensive chronic microangiopathy  No mass, mass effect or midline shift  No hemorrhage  Extensive vascular calcification of the vertebrobasilar system and intracranial internal carotid arteries  VENTRICLES AND EXTRA-AXIAL SPACES:  Enlargement of ventricles and extra-axial CSF spaces consistent with cerebral and cerebellar atrophy   VISUALIZED ORBITS AND PARANASAL SINUSES:  Unremarkable  CALVARIUM AND EXTRACRANIAL SOFT TISSUES:   Normal  CERVICAL VASCULATURE AORTIC ARCH AND GREAT VESSELS:  Mild atherosclerotic calcification of the aortic arch extending into the proximal great vessels  No significant stenosis at the origin of the great vessels  Visualized subclavian vasculature is unremarkable  RIGHT VERTEBRAL ARTERY CERVICAL SEGMENT:  Normal origin  The vessel is normal in caliber throughout the neck  LEFT VERTEBRAL ARTERY CERVICAL SEGMENT:  Normal origin  The vessel is normal in caliber throughout the neck  RIGHT EXTRACRANIAL CAROTID SEGMENT:  Atherosclerotic calcification of the distal common carotid artery extending to the bifurcation  Mild narrowing of the distal common carotid artery  No significant stenosis of the cervical internal carotid artery  LEFT EXTRACRANIAL CAROTID SEGMENT:  Moderate calcification of the distal common carotid artery with moderate stenosis  Atherosclerotic calcification extends into the bifurcation  No stenosis of the proximal cervical internal carotid artery  Mild focal atherosclerotic calcification within the distal cervical internal carotid artery with only slight luminal narrowing  NASCET criteria was used to determine the degree of internal carotid artery diameter stenosis  INTRACRANIAL VASCULATURE INTERNAL CAROTID ARTERIES:  Moderate atherosclerotic calcification of the cavernous internal carotid arteries  Normal ophthalmic artery origins  Normal ICA terminus  ANTERIOR CIRCULATION:  Symmetric A1 segments and anterior cerebral arteries with normal enhancement  Normal anterior communicating artery  MIDDLE CEREBRAL ARTERY CIRCULATION:  M1 segment and middle cerebral artery branches demonstrate normal enhancement bilaterally  DISTAL VERTEBRAL ARTERIES:  Moderate calcification of the distal vertebral arteries with mild narrowing at the level of the foramen magnum   BASILAR ARTERY:  Mild atherosclerotic change without stenosis  Normal superior cerebellar arteries  POSTERIOR CEREBRAL ARTERIES: Both posterior cerebral arteries arises from the basilar tip  Both arteries demonstrate normal flow-related enhancement  Normal posterior communicating arteries  DURAL VENOUS SINUSES:  Normal  NON VASCULAR ANATOMY BONY STRUCTURES:  Mild cervical spondylitic degenerative change  No acute osseous abnormality  SOFT TISSUES OF THE NECK:  Thyroid gland is small in size  There is a homogeneously hyperdense nodule within the right lobe measuring less than 1 cm in maximum diameter  THORACIC INLET:  Unremarkable  Impression: Atherosclerotic disease of the distal common carotid arteries bilaterally  Mild narrowing of the right distal common carotid artery and moderate narrowing on the left  Atherosclerotic changes extend into the proximal bifurcation with no significant stenosis of the proximal internal carotid arteries compared to the mid cervical internal carotid arteries  Slight, less than 50% narrowing of the distal left cervical internal carotid artery  Moderate intracranial atherosclerotic calcification involving the distal vertebral arteries and cavernous internal carotid arteries with mild narrowing  No occlusive disease  Extensive periventricular white matter hypoattenuation within both cerebral hemispheres consistent with extensive chronic microangiopathy  Workstation performed: [id number]     Xr Chest 1 View Portable    Result Date: 11/4/2017  Narrative: CHEST  PORTABLE INDICATION: 66-year-old female, acute mental status change COMPARISON:  10/29/2017 chest x-ray VIEWS:   AP frontal; 1 image FINDINGS: The cardiomediastinal silhouette is unremarkable  The lungs are clear  No pleural effusion  Bony thorax unremarkable   Findings are stable except for interval placement of cardiac loop recorder     Impression: No acute cardiopulmonary disease, essentially unchanged Workstation performed: [id number]     Xr Chest 1 View Portable    Result Date: 10/29/2017  Narrative: CHEST INDICATION:  Mental status change  COMPARISON:  10/26/2017 VIEWS:   AP frontal IMAGES:  1 FINDINGS:     Cardiomediastinal silhouette appears unremarkable  The lungs are clear  No pneumothorax or pleural effusion  Visualized osseous structures appear within normal limits for the patient's age  Impression: No active pulmonary disease  Workstation performed: [id number]     Xr Chest Pa & Lateral    Result Date: 10/28/2017  Narrative: CHEST INDICATION:  Cough for weeks  COMPARISON:  12/29/2016 VIEWS:  Frontal and lateral projections IMAGES:  2 FINDINGS:     Cardiomediastinal silhouette appears unremarkable  Mild elevation of the right hemidiaphragm is stable  The lungs are clear  No pneumothorax or pleural effusion  No evidence of heart failure  Stable compression of T12  Impression: No active pulmonary disease  Workstation performed: [id number]     Ct Head Without Contrast    Result Date: 11/4/2017  Narrative: CT BRAIN - WITHOUT CONTRAST INDICATION:  78-year-old woman with facial droop  COMPARISON:  CT from October 29, 2017  MRI from October 30, 2017  TECHNIQUE:  CT examination of the brain was performed  In addition to axial images, coronal reformatted images were created and submitted for interpretation  Radiation dose length product (DLP) for this visit:  1083 84 mGy-cm   This examination, like all CT scans performed in the [facility], was performed utilizing techniques to minimize radiation dose exposure, including the use of iterative reconstruction and automated exposure control  IMAGE QUALITY:  Diagnostic  FINDINGS:  PARENCHYMA:  No intracranial mass, mass effect or midline shift  No CT signs of acute infarction  There is no parenchymal hemorrhage  Diffuse decreased white matter attenuation, consistent with chronic microvascular ischemic change  VENTRICLES AND EXTRA-AXIAL SPACES:  Enlarged, consistent with the patient's age  No extra-axial blood  VISUALIZED ORBITS AND PARANASAL SINUSES:  Unremarkable  CALVARIUM AND EXTRACRANIAL SOFT TISSUES:   Normal      Impression: Chronic microvascular ischemic changes  No acute intracranial abnormality  Workstation performed: [id number]     Ct Head Without Contrast    Result Date: 10/29/2017  Narrative: CT BRAIN - WITHOUT CONTRAST INDICATION:  Slurred speech COMPARISON:  9/27/2015 TECHNIQUE:  CT examination of the brain was performed  In addition to axial images, coronal reformatted images were created and submitted for interpretation  Radiation dose length product (DLP) for this visit:  1288 35 mGy-cm   This examination, like all CT scans performed in the [facility], was performed utilizing techniques to minimize radiation dose exposure, including the use of iterative reconstruction and automated exposure control  IMAGE QUALITY:  Diagnostic  FINDINGS:  PARENCHYMA:  Decreased attenuation is noted in the supratentorial white matter demonstrating an appearance most consistent with moderate microangiopathic change  No intracranial mass, mass effect or midline shift  No CT signs of acute infarction  There is no parenchymal hemorrhage  VENTRICLES AND EXTRA-AXIAL SPACES:  Age-appropriate volume loss is noted  No hydrocephalus  VISUALIZED ORBITS AND PARANASAL SINUSES:  Unremarkable  CALVARIUM AND EXTRACRANIAL SOFT TISSUES:   Normal      Impression: No acute intracranial abnormality  Microangiopathic changes   Findings are consistent with the preliminary report from [facility] which was provided shortly after completion of the exam              Workstation performed: [id number]     Mri Brain Wo Contrast    Addendum Date: 11/6/2017 Addendum:   Addendum: * I personally telephoned this result to Angelique Garcia on 11/6/2017 9:33 AM     Result Date: 11/6/2017  Narrative: MRI BRAIN WITHOUT CONTRAST INDICATION:  Right facial droop, slurred speech COMPARISON:   MR 10/30/2017 TECHNIQUE:  Sagittal T1, axial T2, axial FLAIR, axial T1, axial [place] and axial diffusion imaging  IMAGE QUALITY:  Diagnostic  FINDINGS: BRAIN PARENCHYMA:  Significant change in the MR appearance the brain in the short interval since prior study  Numerous embolic foci of diffusion restriction in both hemispheres  No significant mass effect  6 stable scattered foci of unrelated hemosiderin stable  Advanced chronic small vessel disease, diffuse age-related volume loss  VENTRICLES:  The ventricles are normal in size and contour  SELLA AND PITUITARY GLAND:  Normal  ORBITS:  Normal  PARANASAL SINUSES:  Normal  VASCULATURE:  Evaluation of the major intracranial vasculature demonstrates appropriate flow voids  CALVARIUM AND SKULL BASE:  Normal  EXTRACRANIAL SOFT TISSUES:  Normal      Impression: The numerous small embolic foci of acute ischemia in both hemispheres  No hemorrhage or significant mass effect  ##imslh##imslh Workstation performed: [id number]     Mri Brain Wo Contrast    Result Date: 10/30/2017  Narrative: MRI BRAIN WITHOUT CONTRAST INDICATION:  Slurred speech COMPARISON:   CT CTA 10/29/2017, MR 8/30/2015 TECHNIQUE:  Sagittal T1, axial T2, axial FLAIR, axial T1, axial [place] and axial diffusion imaging  IMAGE QUALITY:  Diagnostic  FINDINGS: BRAIN PARENCHYMA:  No intracranial mass, mass effect  Minor scattered foci of hemosiderin more apparent than on prior study largely, technique related  These scattered about supra and infratentorial parenchyma to include the medial right basal ganglia,  inferior aspect of both temporal lobes, anteriorly on the right more posteriorly on the left  Tiny focus of what could represent subacute ischemia in the right subcortical frontal parietal vertex  No indication of acute large vessel disease  Chronic small vessel disease, present to a relatively advanced degree, has progressed since prior study 2 years earlier    Age-appropriate VENTRICLES:  The ventricles are normal in size and contour  SELLA AND PITUITARY GLAND:  Normal  ORBITS:  Normal  PARANASAL SINUSES:  Normal  VASCULATURE:  Evaluation of the major intracranial vasculature demonstrates appropriate flow voids  CALVARIUM AND SKULL BASE:  Normal  EXTRACRANIAL SOFT TISSUES:  Normal      Impression: Tiny focus of what could represent subacute small vessel ischemia in the right frontal parietal vertex  Diffuse generalized volume loss commensurate with age with relatively advanced chronic small vessel disease Workstation performed: [id number]     Radiology Results    Result Date: 10/31/2017  Narrative: Ordered by an unspecified provider  Imaging Reports Reviewed by myself    Cultures:   Blood Culture: No results found for: BLOODCX  Urine Culture: No results found for: URINECX  Sputum Culture: No components found for: SPUTUMCX  Wound Culture: No results found for: WOUNDCULT    Last 24 Hours Medication List:     apixaban 5 mg Oral BID   aspirin 81 mg Oral Daily   cycloSPORINE modified 50 mg Oral Q12H [address]   levETIRAcetam 500 mg Oral Q12H [address]   levothyroxine 112 mcg Oral Daily   metoprolol tartrate 25 mg Oral Q12H [address]   mycophenolate 750 mg Oral BID   pantoprazole 20 mg Oral Early Morning   [START ON 11/7/2017] pravastatin 20 mg Oral Daily With Dinner        Today, Patient Was Seen By: Amy July, DO    ** Please Note: Dragon 360 Dictation voice to text software may have been used in the creation of this document   **

## 2017-11-08 VITALS
OXYGEN SATURATION: 96 % | HEIGHT: 62 IN | SYSTOLIC BLOOD PRESSURE: 133 MMHG | RESPIRATION RATE: 16 BRPM | HEART RATE: 91 BPM | TEMPERATURE: 98.1 F | BODY MASS INDEX: 37.73 KG/M2 | DIASTOLIC BLOOD PRESSURE: 60 MMHG | WEIGHT: 205.03 LBS

## 2017-11-08 PROBLEM — G93.41 ACUTE METABOLIC ENCEPHALOPATHY: Status: RESOLVED | Noted: 2017-11-04 | Resolved: 2017-11-08

## 2017-11-08 PROBLEM — R94.01 ABNORMAL EEG: Status: ACTIVE | Noted: 2017-11-08

## 2017-11-08 PROBLEM — I21.A1 NON-ST ELEVATION MYOCARDIAL INFARCTION (NSTEMI), TYPE 2: Status: ACTIVE | Noted: 2017-11-04

## 2017-11-08 LAB
HCT VFR BLD AUTO: 33.8 % (ref 37–47)
HGB BLD-MCNC: 10.9 G/DL (ref 12–16)

## 2017-11-08 PROCEDURE — 85014 HEMATOCRIT: CPT | Performed by: FAMILY MEDICINE

## 2017-11-08 PROCEDURE — 97110 THERAPEUTIC EXERCISES: CPT

## 2017-11-08 PROCEDURE — 85018 HEMOGLOBIN: CPT | Performed by: FAMILY MEDICINE

## 2017-11-08 RX ORDER — ATORVASTATIN CALCIUM 10 MG/1
10 TABLET, FILM COATED ORAL
Qty: 30 TABLET | Refills: 0 | Status: SHIPPED | OUTPATIENT
Start: 2017-11-08 | End: 2018-02-05 | Stop reason: CLARIF

## 2017-11-08 RX ORDER — LEVETIRACETAM 500 MG/1
500 TABLET ORAL EVERY 12 HOURS SCHEDULED
Qty: 60 TABLET | Refills: 0 | Status: SHIPPED | OUTPATIENT
Start: 2017-11-08 | End: 2018-07-13 | Stop reason: SDUPTHER

## 2017-11-08 RX ADMIN — PANTOPRAZOLE SODIUM 20 MG: 20 TABLET, DELAYED RELEASE ORAL at 06:37

## 2017-11-08 RX ADMIN — LEVOTHYROXINE SODIUM 112 MCG: 112 TABLET ORAL at 06:37

## 2017-11-08 RX ADMIN — ASPIRIN 81 MG: 81 TABLET, COATED ORAL at 09:58

## 2017-11-08 RX ADMIN — MYCOPHENOLATE MOFETIL 750 MG: 250 CAPSULE ORAL at 09:55

## 2017-11-08 RX ADMIN — METOPROLOL TARTRATE 25 MG: 25 TABLET ORAL at 09:58

## 2017-11-08 RX ADMIN — APIXABAN 5 MG: 5 TABLET, FILM COATED ORAL at 09:57

## 2017-11-08 RX ADMIN — LEVETIRACETAM 500 MG: 500 TABLET ORAL at 09:58

## 2017-11-08 RX ADMIN — CYCLOSPORINE 50 MG: 25 CAPSULE, LIQUID FILLED ORAL at 10:01

## 2017-11-08 NOTE — PROGRESS NOTES
[facility] Internal Medicine Progress Note  Patient: Makenna Casey 66 y o  female   MRN: [id number]  PCP: Francine Casillas MD  Unit/Bed#: [address] [place] [address] Encounter: [id number]  Date Of Visit: 11/07/17    Problem List:    Principal Problem:    Acute metabolic encephalopathy  Active Problems:    Liver transplanted ([facility] )    Hypothyroidism    HLD (hyperlipidemia)    CVA (cerebral vascular accident) ([facility] )    NSTEMI (non-ST elevated myocardial infarction) ([facility] )      Assessment & Plan:    1  Encephalopathy due to acute ischemic multiple embolic strokes in both hemispheres - noted on MRI  Patient not noted to have any evidence of atrial fibrillation  Continue Eliquis for further stroke prevention  She is off of Plavix  Patient should ideally be on high dose statin however due to liver transplantation, patient will be on low-dose statin  Her lipid Panel is acceptable  Recent echo on Oct 30 2017 showed EF of 55-60% with grade 1 diastolic dysfunction  No significant ASD or PFO was noted  There was a trace left to right shunt  2  NSTEMI type 2 - likely due to underlying tachycardia  She has had multiple episodes of nonsustained SVT  Stress test today showed apical lateral wall ischemia along with cavity dilatation and normal LV function  Continue medical therapy  3  Abnormal EEG - EEG showed left temporal sharps  Continue Keppra  As per Neurology epileptiform discharges can appear after recent ischemic infarcts  She will follow up with Neurology after discharge  4  Hypothyroidism - TSH within normal limits  Continue levothyroxine  5  History of recent subacute right frontal parietal vertex stroke  6  History of liver transplantation - continue cyclosporine and CellCept  7  Dyslipidemia - continue statin  8  Spoke with clinical pharmacist at liver transplant department Merlin Bunnose and as per her it is okay for patient to receive Keppra    Eliquis is acceptable as well but patient needs to be monitored closely for bleeding  She stated that it would be ideal if patient was on 10 mg of Lipitor but 20 mg was acceptable and patient needs to be monitored for signs of rhabdo  Since her lipid panel is acceptable, dose of Lipitor decreased  VTE Pharmacologic Prophylaxis:   Pharmacologic: Apixaban (Eliquis)  Mechanical VTE Prophylaxis in Place: No    Patient Centered Rounds: I have performed bedside rounds with nursing staff today  Discussions with Specialists or Other Care Team Provider: Yes    Education and Discussions with Family / Patient:Yes    Time Spent for Care: 45 min  More than 50% of total time spent on counseling and coordination of care as described above  Current Length of Stay: 3 day(s)    Current Patient Status: Inpatient     Discharge Plan: home    Code Status: Level 1 - Full Code      Subjective:     Denies any complaints  Objective:     Vitals:   Temp (24hrs), Av 2 °F (36 8 °C), Min:97 9 °F (36 6 °C), Max:98 9 °F (37 2 °C)    HR:  [75-96] 75  Resp:  [16-20] 16  BP: (143-180)/(67-78) 150/67  SpO2:  [92 %-98 %] 98 %  Body mass index is 37 5 kg/m²  Input and Output Summary (last 24 hours): Intake/Output Summary (Last 24 hours) at 17  Last data filed at 17 2138   Gross per 24 hour   Intake              999 ml   Output                0 ml   Net              999 ml       Physical Exam:     Physical Exam   Constitutional: She is oriented to person, place, and time  She appears well-developed and well-nourished  No distress  HENT:   Head: Normocephalic and atraumatic  Mouth/Throat: Oropharynx is clear and moist    Eyes: Conjunctivae are normal  No scleral icterus  Neck: Neck supple  No tracheal deviation present  Cardiovascular: Normal rate and regular rhythm  Murmur (JOSÉ MIGEUL) heard  Pulmonary/Chest: Effort normal and breath sounds normal  No respiratory distress  She has no wheezes  She has no rales  Abdominal: Soft   Bowel sounds are normal  She exhibits no distension  There is no tenderness  Musculoskeletal: She exhibits no edema  Neurological: She is alert and oriented to person, place, and time  No cranial nerve deficit  Skin: Skin is dry  She is not diaphoretic  Psychiatric: She has a normal mood and affect  Additional Data:     Labs:      Results from last 7 days  Lab Units 11/07/17 0558 11/04/17  0441   WBC Thousand/uL 4 00*  < > 6 50   HEMOGLOBIN g/dL 10 5*  < > 12 7   HEMATOCRIT % 33 0*  < > 39 6   PLATELETS Thousands/uL 181  < > 225   NEUTROS PCT %  --   --  79*   LYMPHS PCT %  --   --  10*   MONOS PCT %  --   --  8   EOS PCT %  --   --  3   < > = values in this interval not displayed  Results from last 7 days  Lab Units 11/07/17 0558  11/04/17  0441   SODIUM mmol/L 141  < > 142   POTASSIUM mmol/L 4 1  < > 4 2   CHLORIDE mmol/L 109*  < > 106   CO2 mmol/L 23  < > 24   BUN mg/dL 12  < > 22   CREATININE mg/dL 0 92  < > 1 26   CALCIUM mg/dL 8 1*  < > 9 3   TOTAL PROTEIN g/dL  --   --  7 3   BILIRUBIN TOTAL mg/dL  --   --  0 40   ALK PHOS U/L  --   --  108   ALT U/L  --   --  41   AST U/L  --   --  45   GLUCOSE RANDOM mg/dL 117  < > 161*   < > = values in this interval not displayed  * I Have Reviewed All Lab Data Listed Above  * Additional Pertinent Lab Tests Reviewed: [facility]    Imaging:  Cta Head And Neck W Wo Contrast    Result Date: 10/30/2017  Narrative: CTA NECK AND BRAIN WITH AND WITHOUT CONTRAST INDICATION: Cerebral ischemia  COMPARISON:   MR angiography dated 8/30/2015  TECHNIQUE:  Routine CT imaging of the Brain without contrast   Post contrast imaging was performed after administration of iodinated contrast through the neck and brain  Post contrast axial 0 625 mm images timed to opacify the arterial system  3D rendering was performed on an independent workstation  MIP reconstructions performed  Coronal reconstructions were performed of the noncontrast portion of the brain  Radiation dose length product (DLP) for this visit:  1595 54 mGy-cm   This examination, like all CT scans performed in the [facility], was performed utilizing techniques to minimize radiation dose exposure, including the use of iterative reconstruction and automated exposure control  IV Contrast:  85 mL of iohexol (OMNIPAQUE)     IMAGE QUALITY:   Diagnostic FINDINGS: NONCONTRAST BRAIN PARENCHYMA:  Confluent hypoattenuation noted throughout both cerebral hemispheres, bilaterally symmetric consistent with extensive chronic microangiopathy  No mass, mass effect or midline shift  No hemorrhage  Extensive vascular calcification of the vertebrobasilar system and intracranial internal carotid arteries  VENTRICLES AND EXTRA-AXIAL SPACES:  Enlargement of ventricles and extra-axial CSF spaces consistent with cerebral and cerebellar atrophy  VISUALIZED ORBITS AND PARANASAL SINUSES:  Unremarkable  CALVARIUM AND EXTRACRANIAL SOFT TISSUES:   Normal  CERVICAL VASCULATURE AORTIC ARCH AND GREAT VESSELS:  Mild atherosclerotic calcification of the aortic arch extending into the proximal great vessels  No significant stenosis at the origin of the great vessels  Visualized subclavian vasculature is unremarkable  RIGHT VERTEBRAL ARTERY CERVICAL SEGMENT:  Normal origin  The vessel is normal in caliber throughout the neck  LEFT VERTEBRAL ARTERY CERVICAL SEGMENT:  Normal origin  The vessel is normal in caliber throughout the neck  RIGHT EXTRACRANIAL CAROTID SEGMENT:  Atherosclerotic calcification of the distal common carotid artery extending to the bifurcation  Mild narrowing of the distal common carotid artery  No significant stenosis of the cervical internal carotid artery  LEFT EXTRACRANIAL CAROTID SEGMENT:  Moderate calcification of the distal common carotid artery with moderate stenosis  Atherosclerotic calcification extends into the bifurcation   No stenosis of the proximal cervical internal carotid artery  Mild focal atherosclerotic calcification within the distal cervical internal carotid artery with only slight luminal narrowing  NASCET criteria was used to determine the degree of internal carotid artery diameter stenosis  INTRACRANIAL VASCULATURE INTERNAL CAROTID ARTERIES:  Moderate atherosclerotic calcification of the cavernous internal carotid arteries  Normal ophthalmic artery origins  Normal ICA terminus  ANTERIOR CIRCULATION:  Symmetric A1 segments and anterior cerebral arteries with normal enhancement  Normal anterior communicating artery  MIDDLE CEREBRAL ARTERY CIRCULATION:  M1 segment and middle cerebral artery branches demonstrate normal enhancement bilaterally  DISTAL VERTEBRAL ARTERIES:  Moderate calcification of the distal vertebral arteries with mild narrowing at the level of the foramen magnum  BASILAR ARTERY:  Mild atherosclerotic change without stenosis  Normal superior cerebellar arteries  POSTERIOR CEREBRAL ARTERIES: Both posterior cerebral arteries arises from the basilar tip  Both arteries demonstrate normal flow-related enhancement  Normal posterior communicating arteries  DURAL VENOUS SINUSES:  Normal  NON VASCULAR ANATOMY BONY STRUCTURES:  Mild cervical spondylitic degenerative change  No acute osseous abnormality  SOFT TISSUES OF THE NECK:  Thyroid gland is small in size  There is a homogeneously hyperdense nodule within the right lobe measuring less than 1 cm in maximum diameter  THORACIC INLET:  Unremarkable  Impression: Atherosclerotic disease of the distal common carotid arteries bilaterally  Mild narrowing of the right distal common carotid artery and moderate narrowing on the left  Atherosclerotic changes extend into the proximal bifurcation with no significant stenosis of the proximal internal carotid arteries compared to the mid cervical internal carotid arteries  Slight, less than 50% narrowing of the distal left cervical internal carotid artery   Moderate intracranial atherosclerotic calcification involving the distal vertebral arteries and cavernous internal carotid arteries with mild narrowing  No occlusive disease  Extensive periventricular white matter hypoattenuation within both cerebral hemispheres consistent with extensive chronic microangiopathy  Workstation performed: [id number]     Xr Chest 1 View Portable    Result Date: 11/4/2017  Narrative: CHEST  PORTABLE INDICATION: 70-year-old female, acute mental status change COMPARISON:  10/29/2017 chest x-ray VIEWS:   AP frontal; 1 image FINDINGS: The cardiomediastinal silhouette is unremarkable  The lungs are clear  No pleural effusion  Bony thorax unremarkable  Findings are stable except for interval placement of cardiac loop recorder     Impression: No acute cardiopulmonary disease, essentially unchanged Workstation performed: [id number]     Xr Chest 1 View Portable    Result Date: 10/29/2017  Narrative: CHEST INDICATION:  Mental status change  COMPARISON:  10/26/2017 VIEWS:   AP frontal IMAGES:  1 FINDINGS:     Cardiomediastinal silhouette appears unremarkable  The lungs are clear  No pneumothorax or pleural effusion  Visualized osseous structures appear within normal limits for the patient's age  Impression: No active pulmonary disease  Workstation performed: [id number]     Xr Chest Pa & Lateral    Result Date: 10/28/2017  Narrative: CHEST INDICATION:  Cough for weeks  COMPARISON:  12/29/2016 VIEWS:  Frontal and lateral projections IMAGES:  2 FINDINGS:     Cardiomediastinal silhouette appears unremarkable  Mild elevation of the right hemidiaphragm is stable  The lungs are clear  No pneumothorax or pleural effusion  No evidence of heart failure  Stable compression of T12  Impression: No active pulmonary disease  Workstation performed: [id number]     Ct Head Without Contrast    Result Date: 11/4/2017  Narrative: CT BRAIN - WITHOUT CONTRAST INDICATION:  70-year-old woman with facial droop  COMPARISON:  CT from October 29, 2017  MRI from October 30, 2017  TECHNIQUE:  CT examination of the brain was performed  In addition to axial images, coronal reformatted images were created and submitted for interpretation  Radiation dose length product (DLP) for this visit:  1083 84 mGy-cm   This examination, like all CT scans performed in the [facility], was performed utilizing techniques to minimize radiation dose exposure, including the use of iterative reconstruction and automated exposure control  IMAGE QUALITY:  Diagnostic  FINDINGS:  PARENCHYMA:  No intracranial mass, mass effect or midline shift  No CT signs of acute infarction  There is no parenchymal hemorrhage  Diffuse decreased white matter attenuation, consistent with chronic microvascular ischemic change  VENTRICLES AND EXTRA-AXIAL SPACES:  Enlarged, consistent with the patient's age  No extra-axial blood  VISUALIZED ORBITS AND PARANASAL SINUSES:  Unremarkable  CALVARIUM AND EXTRACRANIAL SOFT TISSUES:   Normal      Impression: Chronic microvascular ischemic changes  No acute intracranial abnormality  Workstation performed: [id number]     Ct Head Without Contrast    Result Date: 10/29/2017  Narrative: CT BRAIN - WITHOUT CONTRAST INDICATION:  Slurred speech COMPARISON:  9/27/2015 TECHNIQUE:  CT examination of the brain was performed  In addition to axial images, coronal reformatted images were created and submitted for interpretation  Radiation dose length product (DLP) for this visit:  1288 35 mGy-cm   This examination, like all CT scans performed in the [facility], was performed utilizing techniques to minimize radiation dose exposure, including the use of iterative reconstruction and automated exposure control  IMAGE QUALITY:  Diagnostic   FINDINGS:  PARENCHYMA:  Decreased attenuation is noted in the supratentorial white matter demonstrating an appearance most consistent with moderate microangiopathic change  No intracranial mass, mass effect or midline shift  No CT signs of acute infarction  There is no parenchymal hemorrhage  VENTRICLES AND EXTRA-AXIAL SPACES:  Age-appropriate volume loss is noted  No hydrocephalus  VISUALIZED ORBITS AND PARANASAL SINUSES:  Unremarkable  CALVARIUM AND EXTRACRANIAL SOFT TISSUES:   Normal      Impression: No acute intracranial abnormality  Microangiopathic changes  Findings are consistent with the preliminary report from [facility] which was provided shortly after completion of the exam              Workstation performed: [id number]     Mri Brain Wo Contrast    Addendum Date: 11/6/2017 Addendum:   Addendum: * I personally telephoned this result to Sofia Herronarita on 11/6/2017 9:33 AM     Result Date: 11/6/2017  Narrative: MRI BRAIN WITHOUT CONTRAST INDICATION:  Right facial droop, slurred speech COMPARISON:   MR 10/30/2017 TECHNIQUE:  Sagittal T1, axial T2, axial FLAIR, axial T1, axial [place] and axial diffusion imaging  IMAGE QUALITY:  Diagnostic  FINDINGS: BRAIN PARENCHYMA:  Significant change in the MR appearance the brain in the short interval since prior study  Numerous embolic foci of diffusion restriction in both hemispheres  No significant mass effect  6 stable scattered foci of unrelated hemosiderin stable  Advanced chronic small vessel disease, diffuse age-related volume loss  VENTRICLES:  The ventricles are normal in size and contour  SELLA AND PITUITARY GLAND:  Normal  ORBITS:  Normal  PARANASAL SINUSES:  Normal  VASCULATURE:  Evaluation of the major intracranial vasculature demonstrates appropriate flow voids  CALVARIUM AND SKULL BASE:  Normal  EXTRACRANIAL SOFT TISSUES:  Normal      Impression: The numerous small embolic foci of acute ischemia in both hemispheres  No hemorrhage or significant mass effect    ##imslh##imslh Workstation performed: [id number]     Mri Brain Wo Contrast    Result Date: 10/30/2017  Narrative: MRI BRAIN WITHOUT CONTRAST INDICATION:  Slurred speech COMPARISON:   CT CTA 10/29/2017, MR 8/30/2015 TECHNIQUE:  Sagittal T1, axial T2, axial FLAIR, axial T1, axial [place] and axial diffusion imaging  IMAGE QUALITY:  Diagnostic  FINDINGS: BRAIN PARENCHYMA:  No intracranial mass, mass effect  Minor scattered foci of hemosiderin more apparent than on prior study largely, technique related  These scattered about supra and infratentorial parenchyma to include the medial right basal ganglia,  inferior aspect of both temporal lobes, anteriorly on the right more posteriorly on the left  Tiny focus of what could represent subacute ischemia in the right subcortical frontal parietal vertex  No indication of acute large vessel disease  Chronic small vessel disease, present to a relatively advanced degree, has progressed since prior study 2 years earlier  Age-appropriate VENTRICLES:  The ventricles are normal in size and contour  SELLA AND PITUITARY GLAND:  Normal  ORBITS:  Normal  PARANASAL SINUSES:  Normal  VASCULATURE:  Evaluation of the major intracranial vasculature demonstrates appropriate flow voids  CALVARIUM AND SKULL BASE:  Normal  EXTRACRANIAL SOFT TISSUES:  Normal      Impression: Tiny focus of what could represent subacute small vessel ischemia in the right frontal parietal vertex  Diffuse generalized volume loss commensurate with age with relatively advanced chronic small vessel disease Workstation performed: [id number]     Radiology Results    Result Date: 10/31/2017  Narrative: Ordered by an unspecified provider      Imaging Reports Reviewed by myself    Cultures:   Blood Culture: No results found for: BLOODCX  Urine Culture: No results found for: URINECX  Sputum Culture: No components found for: SPUTUMCX  Wound Culture: No results found for: WOUNDCULT    Last 24 Hours Medication List:     apixaban 5 mg Oral BID   aspirin 81 mg Oral Daily   [START ON 11/8/2017] atorvastatin 10 mg Oral Daily With Dinner   cycloSPORINE modified 50 mg Oral Q12H [address]   levETIRAcetam 500 mg Oral Q12H [address]   levothyroxine 112 mcg Oral Daily   metoprolol tartrate 25 mg Oral Q12H [address]   mycophenolate 750 mg Oral BID   pantoprazole 20 mg Oral Early Morning        Today, Patient Was Seen By: Iwona Davidson DO    ** Please Note: Dragon 360 Dictation voice to text software may have been used in the creation of this document   **

## 2017-11-08 NOTE — DISCHARGE SUMMARY
Discharge Summary - [facility] Internal Medicine    Patient Information: Yumiko Nieves 66 y o  female MRN: [id number]  Unit/Bed#: [address] [place] [address] Encounter: [id number]    Discharging Physician / Practitioner: Marco Nunez DO  PCP: Timbo John MD  Admission Date: 11/4/2017  Discharge Date: 11/08/17    Reason for Admission: Weakness - Generalized (patient states she feels weak all over)      Discharge Diagnoses:     Principal Problem:    Cerebrovascular accident (CVA) due to embolism ([facility] [address] )  Active Problems:    L[facility] transplanted ([facility] )    Hypothyroidism    HLD (hyperlipidemia)    Non-ST elevation myocardial infarction (NSTEMI), type 2 ([facility] )    Abnormal EEG  Resolved Problems:    Acute metabolic encephalopathy      Consultations During Hospital Stay:  IP CONSULT TO NEUROLOGY  IP CONSULT TO CARDIOLOGY    Procedures Performed:     · Stress test    Significant Findings:     · See hospital course    Imaging while in hospital:    Cta Head And Neck W Wo Contrast    Result Date: 10/30/2017  Narrative: CTA NECK AND BRAIN WITH AND WITHOUT CONTRAST INDICATION: Cerebral ischemia  COMPARISON:   MR angiography dated 8/30/2015  TECHNIQUE:  Routine CT imaging of the Brain without contrast   Post contrast imaging was performed after administration of iodinated contrast through the neck and brain  Post contrast axial 0 625 mm images timed to opacify the arterial system  3D rendering was performed on an independent workstation  MIP reconstructions performed  Coronal reconstructions were performed of the noncontrast portion of the brain  Radiation dose length product (DLP) for this visit:  1595 54 mGy-cm   This examination, like all CT scans performed in the [facility], was performed utilizing techniques to minimize radiation dose exposure, including the use of iterative reconstruction and automated exposure control     IV Contrast:  85 mL of iohexol (OMNIPAQUE)     IMAGE QUALITY:   Diagnostic FINDINGS: NONCONTRAST BRAIN PARENCHYMA:  Confluent hypoattenuation noted throughout both cerebral hemispheres, bilaterally symmetric consistent with extensive chronic microangiopathy  No mass, mass effect or midline shift  No hemorrhage  Extensive vascular calcification of the vertebrobasilar system and intracranial internal carotid arteries  VENTRICLES AND EXTRA-AXIAL SPACES:  Enlargement of ventricles and extra-axial CSF spaces consistent with cerebral and cerebellar atrophy  VISUALIZED ORBITS AND PARANASAL SINUSES:  Unremarkable  CALVARIUM AND EXTRACRANIAL SOFT TISSUES:   Normal  CERVICAL VASCULATURE AORTIC ARCH AND GREAT VESSELS:  Mild atherosclerotic calcification of the aortic arch extending into the proximal great vessels  No significant stenosis at the origin of the great vessels  Visualized subclavian vasculature is unremarkable  RIGHT VERTEBRAL ARTERY CERVICAL SEGMENT:  Normal origin  The vessel is normal in caliber throughout the neck  LEFT VERTEBRAL ARTERY CERVICAL SEGMENT:  Normal origin  The vessel is normal in caliber throughout the neck  RIGHT EXTRACRANIAL CAROTID SEGMENT:  Atherosclerotic calcification of the distal common carotid artery extending to the bifurcation  Mild narrowing of the distal common carotid artery  No significant stenosis of the cervical internal carotid artery  LEFT EXTRACRANIAL CAROTID SEGMENT:  Moderate calcification of the distal common carotid artery with moderate stenosis  Atherosclerotic calcification extends into the bifurcation  No stenosis of the proximal cervical internal carotid artery  Mild focal atherosclerotic calcification within the distal cervical internal carotid artery with only slight luminal narrowing  NASCET criteria was used to determine the degree of internal carotid artery diameter stenosis  INTRACRANIAL VASCULATURE INTERNAL CAROTID ARTERIES:  Moderate atherosclerotic calcification of the cavernous internal carotid arteries   Normal ophthalmic artery origins  Normal ICA terminus  ANTERIOR CIRCULATION:  Symmetric A1 segments and anterior cerebral arteries with normal enhancement  Normal anterior communicating artery  MIDDLE CEREBRAL ARTERY CIRCULATION:  M1 segment and middle cerebral artery branches demonstrate normal enhancement bilaterally  DISTAL VERTEBRAL ARTERIES:  Moderate calcification of the distal vertebral arteries with mild narrowing at the level of the foramen magnum  BASILAR ARTERY:  Mild atherosclerotic change without stenosis  Normal superior cerebellar arteries  POSTERIOR CEREBRAL ARTERIES: Both posterior cerebral arteries arises from the basilar tip  Both arteries demonstrate normal flow-related enhancement  Normal posterior communicating arteries  DURAL VENOUS SINUSES:  Normal  NON VASCULAR ANATOMY BONY STRUCTURES:  Mild cervical spondylitic degenerative change  No acute osseous abnormality  SOFT TISSUES OF THE NECK:  Thyroid gland is small in size  There is a homogeneously hyperdense nodule within the right lobe measuring less than 1 cm in maximum diameter  THORACIC INLET:  Unremarkable  Impression: Atherosclerotic disease of the distal common carotid arteries bilaterally  Mild narrowing of the right distal common carotid artery and moderate narrowing on the left  Atherosclerotic changes extend into the proximal bifurcation with no significant stenosis of the proximal internal carotid arteries compared to the mid cervical internal carotid arteries  Slight, less than 50% narrowing of the distal left cervical internal carotid artery  Moderate intracranial atherosclerotic calcification involving the distal vertebral arteries and cavernous internal carotid arteries with mild narrowing  No occlusive disease  Extensive periventricular white matter hypoattenuation within both cerebral hemispheres consistent with extensive chronic microangiopathy      Workstation performed: [id number]     Xr Chest 1 View Portable    Result Date: 11/4/2017  Narrative: CHEST  PORTABLE INDICATION: 70-year-old female, acute mental status change COMPARISON:  10/29/2017 chest x-ray VIEWS:   AP frontal; 1 image FINDINGS: The cardiomediastinal silhouette is unremarkable  The lungs are clear  No pleural effusion  Bony thorax unremarkable  Findings are stable except for interval placement of cardiac loop recorder     Impression: No acute cardiopulmonary disease, essentially unchanged Workstation performed: [id number]     Xr Chest 1 View Portable    Result Date: 10/29/2017  Narrative: CHEST INDICATION:  Mental status change  COMPARISON:  10/26/2017 VIEWS:   AP frontal IMAGES:  1 FINDINGS:     Cardiomediastinal silhouette appears unremarkable  The lungs are clear  No pneumothorax or pleural effusion  Visualized osseous structures appear within normal limits for the patient's age  Impression: No active pulmonary disease  Workstation performed: [id number]     Xr Chest Pa & Lateral    Result Date: 10/28/2017  Narrative: CHEST INDICATION:  Cough for weeks  COMPARISON:  12/29/2016 VIEWS:  Frontal and lateral projections IMAGES:  2 FINDINGS:     Cardiomediastinal silhouette appears unremarkable  Mild elevation of the right hemidiaphragm is stable  The lungs are clear  No pneumothorax or pleural effusion  No evidence of heart failure  Stable compression of T12  Impression: No active pulmonary disease  Workstation performed: [id number]     Ct Head Without Contrast    Result Date: 11/4/2017  Narrative: CT BRAIN - WITHOUT CONTRAST INDICATION:  70-year-old woman with facial droop  COMPARISON:  CT from October 29, 2017  MRI from October 30, 2017  TECHNIQUE:  CT examination of the brain was performed  In addition to axial images, coronal reformatted images were created and submitted for interpretation  Radiation dose length product (DLP) for this visit:  1083 84 mGy-cm     This examination, like all CT scans performed in the [facility], was performed utilizing techniques to minimize radiation dose exposure, including the use of iterative reconstruction and automated exposure control  IMAGE QUALITY:  Diagnostic  FINDINGS:  PARENCHYMA:  No intracranial mass, mass effect or midline shift  No CT signs of acute infarction  There is no parenchymal hemorrhage  Diffuse decreased white matter attenuation, consistent with chronic microvascular ischemic change  VENTRICLES AND EXTRA-AXIAL SPACES:  Enlarged, consistent with the patient's age  No extra-axial blood  VISUALIZED ORBITS AND PARANASAL SINUSES:  Unremarkable  CALVARIUM AND EXTRACRANIAL SOFT TISSUES:   Normal      Impression: Chronic microvascular ischemic changes  No acute intracranial abnormality  Workstation performed: [id number]     Ct Head Without Contrast    Result Date: 10/29/2017  Narrative: CT BRAIN - WITHOUT CONTRAST INDICATION:  Slurred speech COMPARISON:  9/27/2015 TECHNIQUE:  CT examination of the brain was performed  In addition to axial images, coronal reformatted images were created and submitted for interpretation  Radiation dose length product (DLP) for this visit:  1288 35 mGy-cm   This examination, like all CT scans performed in the [facility], was performed utilizing techniques to minimize radiation dose exposure, including the use of iterative reconstruction and automated exposure control  IMAGE QUALITY:  Diagnostic  FINDINGS:  PARENCHYMA:  Decreased attenuation is noted in the supratentorial white matter demonstrating an appearance most consistent with moderate microangiopathic change  No intracranial mass, mass effect or midline shift  No CT signs of acute infarction  There is no parenchymal hemorrhage  VENTRICLES AND EXTRA-AXIAL SPACES:  Age-appropriate volume loss is noted  No hydrocephalus  VISUALIZED ORBITS AND PARANASAL SINUSES:  Unremarkable  CALVARIUM AND EXTRACRANIAL SOFT TISSUES:   Normal      Impression: No acute intracranial abnormality  Microangiopathic changes  Findings are consistent with the preliminary report from [facility] which was provided shortly after completion of the exam              Workstation performed: [id number]     Mri Brain Wo Contrast    Addendum Date: 11/6/2017 Addendum:   Addendum: * I personally telephoned this result to Sofia Sandhu on 11/6/2017 9:33 AM     Result Date: 11/6/2017  Narrative: MRI BRAIN WITHOUT CONTRAST INDICATION:  Right facial droop, slurred speech COMPARISON:   MR 10/30/2017 TECHNIQUE:  Sagittal T1, axial T2, axial FLAIR, axial T1, axial Fleming and axial diffusion imaging  IMAGE QUALITY:  Diagnostic  FINDINGS: BRAIN PARENCHYMA:  Significant change in the MR appearance the brain in the short interval since prior study  Numerous embolic foci of diffusion restriction in both hemispheres  No significant mass effect  6 stable scattered foci of unrelated hemosiderin stable  Advanced chronic small vessel disease, diffuse age-related volume loss  VENTRICLES:  The ventricles are normal in size and contour  SELLA AND PITUITARY GLAND:  Normal  ORBITS:  Normal  PARANASAL SINUSES:  Normal  VASCULATURE:  Evaluation of the major intracranial vasculature demonstrates appropriate flow voids  CALVARIUM AND SKULL BASE:  Normal  EXTRACRANIAL SOFT TISSUES:  Normal      Impression: The numerous small embolic foci of acute ischemia in both hemispheres  No hemorrhage or significant mass effect  ##imslh##imslh Workstation performed: [id number]     Mri Brain Wo Contrast    Result Date: 10/30/2017  Narrative: MRI BRAIN WITHOUT CONTRAST INDICATION:  Slurred speech COMPARISON:   CT CTA 10/29/2017, MR 8/30/2015 TECHNIQUE:  Sagittal T1, axial T2, axial FLAIR, axial T1, axial Fleming and axial diffusion imaging  IMAGE QUALITY:  Diagnostic  FINDINGS: BRAIN PARENCHYMA:  No intracranial mass, mass effect  Minor scattered foci of hemosiderin more apparent than on prior study largely, technique related    These scattered about supra and infratentorial parenchyma to include the medial right basal ganglia,  inferior aspect of both temporal lobes, anteriorly on the right more posteriorly on the left  Tiny focus of what could represent subacute ischemia in the right subcortical frontal parietal vertex  No indication of acute large vessel disease  Chronic small vessel disease, present to a relatively advanced degree, has progressed since prior study 2 years earlier  Age-appropriate VENTRICLES:  The ventricles are normal in size and contour  SELLA AND PITUITARY GLAND:  Normal  ORBITS:  Normal  PARANASAL SINUSES:  Normal  VASCULATURE:  Evaluation of the major intracranial vasculature demonstrates appropriate flow voids  CALVARIUM AND SKULL BASE:  Normal  EXTRACRANIAL SOFT TISSUES:  Normal      Impression: Tiny focus of what could represent subacute small vessel ischemia in the right frontal parietal vertex  Diffuse generalized volume loss commensurate with age with relatively advanced chronic small vessel disease Workstation performed: [id number]     Radiology Results    Result Date: 10/31/2017  Narrative: Ordered by an unspecified provider  Incidental Findings:   · none    Test Results Pending at Discharge (will require follow up):   · As per After Visit Summary     Outpatient Tests Requested:  · none    Complications:  See hospital course    Hospital Course: Neftaly Chi is a 66 y o  female patient who originally presented to the hospital on 11/4/2017 due to confusion, slurred speech and concern for facial droop  She was admitted to telemetry for further evaluation and management  On MRI she was noted to have acute ischemic multiple embolic strokes in both hemispheres likely contributing to the encephalopathy that she presented with  She was seen by Neurology  She was also noted to have elevated troponins and was seen by Cardiology    There was no evidence of atrial fibrillation however due to embolic stroke she was started on Eliquis for further stroke prevention  She was taken off of Plavix  She should ideally be on high dose statin however due to liver transplant patient she was placed on low-dose Lipitor and her lipid panel was acceptable  Elevated troponins is likely NSTEMI type 2 due to underlying tachycardia  she had multiple episodes of nonsustained SVT  She had stress test which showed apical lateral wall ischemia along with cavity dilatation and normal LV function  Continue medical management for this  She had an EEG done which showed left temporal sharps  As per Neurology epileptiform discharges can appear after recent ischemic infarcts however she was started on Keppra due to the abnormal EEG  She will follow up with Neurology after discharge  TSH level was within normal limits  Spoke with the clinical pharmacist at liver transplant department and as per them it was okay for patient to receive Keppra and Eliquis and low-dose Lipitor  Once all the work up was done she was discharged home    Condition at Discharge: stable     Discharge Day Visit / Exam:     Subjective:  Denies any complaints    Vitals: Blood Pressure: 133/60 (11/08/17 0948)  Pulse: 91 (11/08/17 0948)  Temperature: 98 1 °F (36 7 °C) (11/08/17 0948)  Temp Source: Oral (11/08/17 0948)  Respirations: 16 (11/08/17 0948)  Height: 5' 2" (157 5 cm) (11/04/17 0823)  Weight - Scale: 93 kg (205 lb 0 4 oz) (11/04/17 0823)  SpO2: 96 % (11/08/17 0948)  Exam:   Physical Exam   Constitutional: She is oriented to person, place, and time  She appears well-developed and well-nourished  No distress  HENT:   Head: Normocephalic and atraumatic  Mouth/Throat: Oropharynx is clear and moist    Eyes: EOM are normal  Right eye exhibits no discharge  Left eye exhibits no discharge  No scleral icterus  Neck: Neck supple  No tracheal deviation present  Cardiovascular: Normal rate and regular rhythm  Murmur (JOSÉ MIGUEL) heard    Pulmonary/Chest: Effort normal and breath sounds normal  No respiratory distress  She has no wheezes  She has no rales  Abdominal: Soft  Bowel sounds are normal  She exhibits no distension  There is no tenderness  Musculoskeletal: She exhibits no edema  Neurological: She is alert and oriented to person, place, and time  No cranial nerve deficit  Skin: Skin is dry  She is not diaphoretic  Psychiatric: She has a normal mood and affect  Discharge instructions/Information to patient and family:(Discharge Medications and Follow up):   See after visit summary for information provided to patient and family  Provisions for Follow-Up Care:  See after visit summary for information related to follow-up care and any pertinent home health orders  Disposition: Home    Planned Readmission:  No     Discharge Statement:  I spent > 30 minutes discharging the patient  This time was spent on the day of discharge  I had direct contact with the patient on the day of discharge  Greater than 50% of the total time was spent examining patient, answering all patient questions, arranging and discussing plan of care with patient as well as directly providing post-discharge instructions  Additional time then spent on discharge activities  Discharge Medications:  See after visit summary for reconciled discharge medications provided to patient and family  ** Please Note: Dragon 360 Dictation voice to text software may have been used in the creation of this document   **

## 2017-11-08 NOTE — PHYSICAL THERAPY NOTE
PT TREATMENT     11/08/17 1045   Pain Assessment   Pain Assessment No/denies pain   Restrictions/Precautions   Other Precautions Fall Risk   General   Chart Reviewed Yes   Family/Caregiver Present No   Cognition   Arousal/Participation Cooperative   Subjective   Subjective "going home today, won't use walker because I'm stubborn"   Transfers   Sit to Stand 7  Independent   Stand to Sit 7  Independent   Ambulation/Elevation   Gait Assistance 5  Supervision   Additional items Verbal cues   Assistive Device (none/wall bar at times)   Distance 120 feet with change in direction, antalgic gait at times due to L knee dysfunction history, Patient encouraged to use roller walker at home but patient resistant   Stair Management Assistance 5  Supervision   Stair Management Technique Two rails   Number of Stairs 3   Exercises   Hip Flexion Sitting;10 reps;Bilateral   Knee AROM Long Arc Quad Sitting;10 reps;Bilateral   Ankle Pumps Sitting;10 reps;Bilateral   Assessment   Assessment patient cooperative and demonstrating functional ability on level and unlevel surfaces  patient will benefit from continued PT    Plan   Treatment/Interventions Functional transfer training;LE strengthening/ROM; Elevations; Therapeutic exercise; Endurance training;Patient/family training;Gait training   PT Frequency (3-5x/week)   Recommendation   Recommendation (home with family and services as needed)

## 2017-11-08 NOTE — PROGRESS NOTES
Progress Note - Cardiology   Yumiko Nieves 66 y o  female MRN: [id number]  Unit/Bed#: 72493 [facility] 404-01 Encounter: [id number]    Assessment and Plan:   1  Recent new embolic stroke:  Source not clear  Has normal LV systolic function  No atrial arrhythmias so far on antithrombotic therapy now  2  Non ST-elevation MI most likely type 2 due to underlying tachycardia, no sustained arrhythmias noted so far patient has multiple episodes of SVT which are nonsustained  Lexiscan stress test shows apical lateral wall mild ischemia along with cavity dilatation  Patient is asymptomatic  And has normal LV systolic function  Continue medical Rx  3  Recent CVA  With diffuse atherosclerotic disease of the carotids as well as intracranial vascular disease  Need to be on aspirin  4  History of narrow complex tachycardia status post loop recorder  Continue metoprolol  5   History of liver transplant  6  Hypothyroidism  On levothyroxine check TSH  7  Dyslipidemia on low-dose Pravachol cholesterol profile is acceptable  Plan  Will increase metoprolol to 37 5 mg twice a day  Continue Eliquis  She is off from Plavix will continue aspirin  Continue statin  Not on high doses of statin due to liver problems and her LDL is pretty low  Mildly abnormal stress test   Continue medical Rx  Follow-up with cardiology as an outpatient  Subjective and objective:  Patient feels fine  No new complaints  Denies any chest pain and shortness of breath  Felicia Schwarz to go home  Continues to have short nonsustained atrial arrhythmias  Heart rate is acceptable      Vitals:    11/08/17 0948   BP: 133/60   Pulse: 91   Resp: 16   Temp: 98 1 °F (36 7 °C)   SpO2: 96%     Vitals:    11/04/17 0432 11/04/17 0823   Weight: 92 1 kg (203 lb) 93 kg (205 lb 0 4 oz)     Orthostatic Blood Pressures    Flowsheet Row Most Recent Value   Blood Pressure  133/60 filed at 11/08/2017 3066   Patient Position - Orthostatic VS  Sitting filed at 11/08/2017 1042 I/O       11/03 0701 - 11/04 0700 11/04 0701 - 11/05 0700 11/05 0701 - 11/06 0700    I V  (mL/kg)  965 (10 4)     Total Intake(mL/kg)  965 (10 4)     Net   +965                 Invasive Devices     Peripheral Intravenous Line            Peripheral IV 11/04/17 Right;Upper Arm 4 days              Body mass index is 37 5 kg/m²  Physical Exam:   Physical Exam   Constitutional: She is oriented to person, place, and time  She appears well-developed and well-nourished  No distress  HENT:   Head: Normocephalic and atraumatic  Eyes: Pupils are equal, round, and reactive to light  Neck: Neck supple  No JVD present  No tracheal deviation present  No thyromegaly present  Cardiovascular: Normal rate, regular rhythm, S1 normal, S2 normal and intact distal pulses  Exam reveals no gallop, no S3, no S4, no distant heart sounds and no friction rub  Murmur heard  Systolic (ejection) murmur is present with a grade of 2/6   Pulmonary/Chest: Effort normal and breath sounds normal  No respiratory distress  She has no wheezes  She has no rales  She exhibits no tenderness  Abdominal: Soft  Bowel sounds are normal  She exhibits no distension  There is no tenderness  Musculoskeletal: She exhibits no edema or deformity  Neurological: She is alert and oriented to person, place, and time  Skin: Skin is warm and dry  No rash noted  She is not diaphoretic  No pallor  Psychiatric: She has a normal mood and affect   Her behavior is normal  Judgment normal                Medications, Allergies:       apixaban 5 mg Oral BID   aspirin 81 mg Oral Daily   atorvastatin 10 mg Oral Daily With Dinner   cycloSPORINE modified 50 mg Oral Q12H OLYA   levETIRAcetam 500 mg Oral Q12H [address]   levothyroxine 112 mcg Oral Daily   metoprolol tartrate 25 mg Oral Q12H OLYA   mycophenolate 750 mg Oral BID   pantoprazole 20 mg Oral Early Morning        Allergies   Allergen Reactions    Tetracyclines & Related Hives    Vancomycin Other (See Comments)     Pt unsure of reaction    Prograf [Tacrolimus] Anxiety       VTE Pharmacologic Prophylaxis:   Heparin    Labs:   Troponins:    Results from last 7 days  Lab Units 11/04/17  1427 11/04/17  0950 11/04/17  0441   TROPONIN I ng/mL 0 27* 0 23* 0 06*       CBC with diff:    Results from last 7 days  Lab Units 11/08/17  0521 11/07/17  0558 11/05/17  0541 11/04/17  0441   WBC Thousand/uL  --  4 00* 4 10* 6 50   HEMOGLOBIN g/dL 10 9* 10 5* 10 5* 12 7   HEMATOCRIT % 33 8* 33 0* 32 2* 39 6   MCV fL  --  87 86 87   PLATELETS Thousands/uL  --  181 180 225   MCH pg  --  27 6 27 9 27 7   MCHC g/dL  --  31 9 32 6 32 0   RDW %  --  16 7* 16 5* 16 5*   MPV fL  --  7 8* 7 2* 7 6*   NRBC AUTO /100 WBCs  --   --   --  0       CMP:    Results from last 7 days  Lab Units 11/07/17  0558 11/05/17  0540 11/04/17  0441   SODIUM mmol/L 141 142 142   POTASSIUM mmol/L 4 1 3 9 4 2   CHLORIDE mmol/L 109* 110* 106   CO2 mmol/L 23 23 24   ANION GAP mmol/L 9 9 12   BUN mg/dL 12 17 22   CREATININE mg/dL 0 92 0 92 1 26   GLUCOSE RANDOM mg/dL 117 119 161*   CALCIUM mg/dL 8 1* 7 9* 9 3   AST U/L  --   --  45   ALT U/L  --   --  41   ALK PHOS U/L  --   --  108   TOTAL PROTEIN g/dL  --   --  7 3   ALBUMIN g/dL  --   --  3 5   BILIRUBIN TOTAL mg/dL  --   --  0 40   EGFR ml/min/1 73sq m 62 62 42       Results from last 7 days  Lab Units 11/04/17  0738   TSH 3RD GENERATON uIU/mL 1 334          Imaging & Testing   I have personally reviewed pertinent reports  Cta Head And Neck W Wo Contrast    Result Date: 10/30/2017  Narrative: CTA NECK AND BRAIN WITH AND WITHOUT CONTRAST INDICATION: Cerebral ischemia  COMPARISON:   MR angiography dated 8/30/2015  TECHNIQUE:  Routine CT imaging of the Brain without contrast   Post contrast imaging was performed after administration of iodinated contrast through the neck and brain  Post contrast axial 0 625 mm images timed to opacify the arterial system    3D rendering was performed on an independent workstation  MIP reconstructions performed  Coronal reconstructions were performed of the noncontrast portion of the brain  Radiation dose length product (DLP) for this visit:  1595 54 mGy-cm   This examination, like all CT scans performed in the [facility], was performed utilizing techniques to minimize radiation dose exposure, including the use of iterative reconstruction and automated exposure control  IV Contrast:  85 mL of iohexol (OMNIPAQUE)     IMAGE QUALITY:   Diagnostic FINDINGS: NONCONTRAST BRAIN PARENCHYMA:  Confluent hypoattenuation noted throughout both cerebral hemispheres, bilaterally symmetric consistent with extensive chronic microangiopathy  No mass, mass effect or midline shift  No hemorrhage  Extensive vascular calcification of the vertebrobasilar system and intracranial internal carotid arteries  VENTRICLES AND EXTRA-AXIAL SPACES:  Enlargement of ventricles and extra-axial CSF spaces consistent with cerebral and cerebellar atrophy  VISUALIZED ORBITS AND PARANASAL SINUSES:  Unremarkable  CALVARIUM AND EXTRACRANIAL SOFT TISSUES:   Normal  CERVICAL VASCULATURE AORTIC ARCH AND GREAT VESSELS:  Mild atherosclerotic calcification of the aortic arch extending into the proximal great vessels  No significant stenosis at the origin of the great vessels  Visualized subclavian vasculature is unremarkable  RIGHT VERTEBRAL ARTERY CERVICAL SEGMENT:  Normal origin  The vessel is normal in caliber throughout the neck  LEFT VERTEBRAL ARTERY CERVICAL SEGMENT:  Normal origin  The vessel is normal in caliber throughout the neck  RIGHT EXTRACRANIAL CAROTID SEGMENT:  Atherosclerotic calcification of the distal common carotid artery extending to the bifurcation  Mild narrowing of the distal common carotid artery  No significant stenosis of the cervical internal carotid artery       LEFT EXTRACRANIAL CAROTID SEGMENT:  Moderate calcification of the distal common carotid artery with moderate stenosis  Atherosclerotic calcification extends into the bifurcation  No stenosis of the proximal cervical internal carotid artery  Mild focal atherosclerotic calcification within the distal cervical internal carotid artery with only slight luminal narrowing  NASCET criteria was used to determine the degree of internal carotid artery diameter stenosis  INTRACRANIAL VASCULATURE INTERNAL CAROTID ARTERIES:  Moderate atherosclerotic calcification of the cavernous internal carotid arteries  Normal ophthalmic artery origins  Normal ICA terminus  ANTERIOR CIRCULATION:  Symmetric A1 segments and anterior cerebral arteries with normal enhancement  Normal anterior communicating artery  MIDDLE CEREBRAL ARTERY CIRCULATION:  M1 segment and middle cerebral artery branches demonstrate normal enhancement bilaterally  DISTAL VERTEBRAL ARTERIES:  Moderate calcification of the distal vertebral arteries with mild narrowing at the level of the foramen magnum  BASILAR ARTERY:  Mild atherosclerotic change without stenosis  Normal superior cerebellar arteries  POSTERIOR CEREBRAL ARTERIES: Both posterior cerebral arteries arises from the basilar tip  Both arteries demonstrate normal flow-related enhancement  Normal posterior communicating arteries  DURAL VENOUS SINUSES:  Normal  NON VASCULAR ANATOMY BONY STRUCTURES:  Mild cervical spondylitic degenerative change  No acute osseous abnormality  SOFT TISSUES OF THE NECK:  Thyroid gland is small in size  There is a homogeneously hyperdense nodule within the right lobe measuring less than 1 cm in maximum diameter  THORACIC INLET:  Unremarkable  Impression: Atherosclerotic disease of the distal common carotid arteries bilaterally  Mild narrowing of the right distal common carotid artery and moderate narrowing on the left   Atherosclerotic changes extend into the proximal bifurcation with no significant stenosis of the proximal internal carotid arteries compared to the mid cervical internal carotid arteries  Slight, less than 50% narrowing of the distal left cervical internal carotid artery  Moderate intracranial atherosclerotic calcification involving the distal vertebral arteries and cavernous internal carotid arteries with mild narrowing  No occlusive disease  Extensive periventricular white matter hypoattenuation within both cerebral hemispheres consistent with extensive chronic microangiopathy  Workstation performed: [id number]     Xr Chest 1 View Portable    Result Date: 11/4/2017  Narrative: CHEST  PORTABLE INDICATION: 68-year-old female, acute mental status change COMPARISON:  10/29/2017 chest x-ray VIEWS:   AP frontal; 1 image FINDINGS: The cardiomediastinal silhouette is unremarkable  The lungs are clear  No pleural effusion  Bony thorax unremarkable  Findings are stable except for interval placement of cardiac loop recorder     Impression: No acute cardiopulmonary disease, essentially unchanged Workstation performed: [id number]     Xr Chest 1 View Portable    Result Date: 10/29/2017  Narrative: CHEST INDICATION:  Mental status change  COMPARISON:  10/26/2017 VIEWS:   AP frontal IMAGES:  1 FINDINGS:     Cardiomediastinal silhouette appears unremarkable  The lungs are clear  No pneumothorax or pleural effusion  Visualized osseous structures appear within normal limits for the patient's age  Impression: No active pulmonary disease  Workstation performed: [id number]     Xr Chest Pa & Lateral    Result Date: 10/28/2017  Narrative: CHEST INDICATION:  Cough for weeks  COMPARISON:  12/29/2016 VIEWS:  Frontal and lateral projections IMAGES:  2 FINDINGS:     Cardiomediastinal silhouette appears unremarkable  Mild elevation of the right hemidiaphragm is stable  The lungs are clear  No pneumothorax or pleural effusion  No evidence of heart failure  Stable compression of T12  Impression: No active pulmonary disease   Workstation performed: [id number]     Ct Head Without Contrast    Result Date: 2017  Narrative: CT BRAIN - WITHOUT CONTRAST INDICATION:  73-year-old woman with facial droop  COMPARISON:  CT from 2017  MRI from 2017  TECHNIQUE:  CT examination of the brain was performed  In addition to axial images, coronal reformatted images were created and submitted for interpretation  Radiation dose length product (DLP) for this visit:  1083 84 mGy-cm   This examination, like all CT scans performed in the [facility], was performed utilizing techniques to minimize radiation dose exposure, including the use of iterative reconstruction and automated exposure control  IMAGE QUALITY:  Diagnostic  FINDINGS:  PARENCHYMA:  No intracranial mass, mass effect or midline shift  No CT signs of acute infarction  There is no parenchymal hemorrhage  Diffuse decreased white matter attenuation, consistent with chronic microvascular ischemic change  VENTRICLES AND EXTRA-AXIAL SPACES:  Enlarged, consistent with the patient's age  No extra-axial blood  VISUALIZED ORBITS AND PARANASAL SINUSES:  Unremarkable  CALVARIUM AND EXTRACRANIAL SOFT TISSUES:   Normal      Impression: Chronic microvascular ischemic changes  No acute intracranial abnormality  Workstation performed: [id number]       EKG / Monitor: Personally reviewed  EKG had shown previously no significant ST changes  Monitor shows multiple episodes of SVT which are nonsustained        Cardiac testing:   Results for orders placed during the hospital encounter of 10/29/17   Echo complete with contrast if indicated    Narrative [address]KerryEn[facility] [phone]    Transthoracic Echocardiogram  2D, M-mode, Doppler, and Color Doppler    Study date:  30-Oct-2017    Patient: Asia Esquivel  MR number: [id number]  Account number: [de-identified]  : 1945  Age: 66 years  Gender: Female  Status: Routine  Location: Bedside  Height: 62 in  Weight: 202 6 lb  BP: 136/ 72 mmHg    Indications: TIA    Diagnoses: I67 9 - Cerebrovascular disease, unspecified    Sonographer:  Phil Benítez  Primary Physician:  Jeff Moore MD  Referring Physician:  Otf Barrios MD  Group:  Mendez Marcelo  RN:  Sary Bush RN  Interpreting Physician:  Doris Rosales MD    SUMMARY    LEFT VENTRICLE:  Systolic function was normal  Ejection fraction was estimated in the range of 55 % to 60 %  There were no regional wall motion abnormalities  Wall thickness was mildly increased  Doppler parameters were consistent with abnormal left ventricular relaxation (grade 1 diastolic dysfunction)  Doppler parameters were consistent with elevated mean left atrial filling pressure  LEFT ATRIUM:  The atrium was mildly dilated  ATRIAL SEPTUM:  No significant defect or patent foramen ovale was identified  There was a trace left-to-right shunt  This is within normal limits for the patient's age  TRICUSPID VALVE:  The tricuspid jet envelope definition was inadequate for estimation of RV systolic pressure  There are no indirect findings (abnormal RV volume or geometry, altered pulmonary flow velocity profile, or leftward septal displacement) which  would suggest moderate or severe pulmonary hypertension  HISTORY: PRIOR HISTORY: GERD, Hypothyroidism, Anxiety, Anemia    PROCEDURE: The procedure was performed at the bedside  This was a routine study  The transthoracic approach was used  The study included complete 2D imaging, M-mode, complete spectral Doppler, and color Doppler  The heart rate was 80 bpm,  at the start of the study  Intravenous contrast ( 10 ml) was administered to evaluate shunting  Image quality was adequate  LEFT VENTRICLE: Size was normal  Systolic function was normal  Ejection fraction was estimated in the range of 55 % to 60 %  There were no regional wall motion abnormalities  Wall thickness was mildly increased   No evidence of apical  thrombus  DOPPLER: Doppler parameters were consistent with abnormal left ventricular relaxation (grade 1 diastolic dysfunction)  Doppler parameters were consistent with elevated mean left atrial filling pressure  RIGHT VENTRICLE: The size was normal  Systolic function was normal  Wall thickness was normal     LEFT ATRIUM: The atrium was mildly dilated  ATRIAL SEPTUM: No significant defect or patent foramen ovale was identified  There was a trace left-to-right shunt  This is within normal limits for the patient's age  RIGHT ATRIUM: Size was normal     MITRAL VALVE: There was mild to moderate annular calcification  Valve structure was normal  There was normal leaflet separation  DOPPLER: The transmitral velocity was within the normal range  There was no evidence for stenosis  There was  no significant regurgitation  AORTIC VALVE: The valve was not visualized well enough to rule out a bicuspid morphology  Leaflets exhibited mildly increased thickness and normal cuspal separation  DOPPLER: Transaortic velocity was within the normal range  There was no  evidence for stenosis  There was no significant regurgitation  TRICUSPID VALVE: The valve structure was normal  There was normal leaflet separation  DOPPLER: The transtricuspid velocity was within the normal range  There was no evidence for stenosis  There was trace regurgitation  The tricuspid jet  envelope definition was inadequate for estimation of RV systolic pressure  There are no indirect findings (abnormal RV volume or geometry, altered pulmonary flow velocity profile, or leftward septal displacement) which would suggest  moderate or severe pulmonary hypertension  PULMONIC VALVE: Leaflets exhibited normal thickness, no calcification, and normal cuspal separation  DOPPLER: The transpulmonic velocity was within the normal range  There was no significant regurgitation  PERICARDIUM: There was no pericardial effusion   The pericardium was normal in appearance  AORTA: The root exhibited normal size  SYSTEMIC VEINS: IVC: The inferior vena cava was normal in size  SYSTEM MEASUREMENT TABLES    2D mode  AoR Diam 2D: 3 cm  LA Diam (2D): 4 1 cm  LA/Ao (2D): 1 37  FS (2D Teich): 25 1 %  IVSd (2D): 1 14 cm  LVDEV: 65 9 cm³  LVESV: 32 8 cm³  LVIDd(2D): 3 9 cm  LVISd (2D): 2 92 cm  LVPWd (2D): 1 09 cm  SV (Teich): 33 1 cm³    Apical four chamber  LVEF A4C: 56 %    Unspecified Scan Mode  MV Peak A Shiv: 1670 mm/s  MV Peak E Sihv  Mean: 1030 mm/s  MVA (PHT): 4 4 cm squared  PHT: 50 ms  Max P mm[Hg]  V Max: 2570 mm/s  Vmax: 2540 mm/s  RA Area: 10 6 cm squared  RA Volume: 20 9 cm³  TAPSE: 1 6 cm    [employer] Accredited Echocardiography Laboratory    Prepared and electronically signed by    Herbie Joe MD  Signed 30-Oct-2017 14:13:31         Dr Chhaya Bucio MD [facility]      "This note has been constructed using a voice recognition system  Therefore there may be syntax, spelling, and/or grammatical errors   Please call if you have any questions  "

## 2017-11-09 ENCOUNTER — ALLSCRIPTS OFFICE VISIT (OUTPATIENT)
Dept: OTHER | Facility: OTHER | Age: 72
End: 2017-11-09

## 2017-11-09 ENCOUNTER — HOSPITAL ENCOUNTER (OUTPATIENT)
Dept: INFUSION CENTER | Facility: HOSPITAL | Age: 72
Discharge: HOME | End: 2017-11-09
Payer: MEDICARE

## 2017-11-14 ENCOUNTER — ALLSCRIPTS OFFICE VISIT (OUTPATIENT)
Dept: OTHER | Facility: OTHER | Age: 72
End: 2017-11-14

## 2017-11-15 NOTE — PROGRESS NOTES
Assessment  Assessed    1  CVA (cerebral vascular accident) (434 91) (I63 9)   2  Elevated homocysteine (270 4) (E72 11)   3  Liver replaced by transplant (V42 7) (Z94 4)   4  Stroke syndrome (434 91) (I63 9)   5  Transient ischemic attack, acute (435 9) (G45 9)   6  NSTEMI (non-ST elevated myocardial infarction) (410 70) (I21 4)    Plan  CVA (cerebral vascular accident)    · Eliquis 5 MG Oral Tablet; take 1 tablet by mouth twice daily   Rx By: Ra Jesus; Dispense: 90 Days ; #:180 X 100 Tablet Box; Refill: 0;CVA (cerebral vascular accident); MAXINE = N; Verified Transmission to [facility]; Last Updated By: System, SureScripts; 11/14/2017 2:48:44 PM    Discussion/Summary  Cardiology Discussion Summary Free Text Note Form [facility]:   Nstemi- likely type 2 in the setting of cva? heme/onc with suspicion related to procrit?eliquis + aspirin 81mg with foodpravastatinmetoprolol  no evidence of afib? cryptogenic? atheroemboli from carotid/aorta?eliquis + [facility]  transplant- discussed with hepatologistcontinue pravastatin 20mg  Chief Complaint  Chief Complaint Free Text Note Form: Morris Hoffman is here for a follow up from the hospital and has no complaints at this time  History of Present Illness  Cardiology HPI Free Text Note Form [facility]: 70-year-old woman with a history of liver transplant, multiple CVA, nstemi likely typ2, chronic anemia receiving Procrit injections with recent prolonged hospitalization secondary to recurrent CVA  She has been placed on novel oral anticoagulation and has not had a further event  Her loop recorder was interrogated in the hospital and did not show any evidence of atrial fibrillation  She reports note trouble with her gait or unilateral weakness  She denies having any chest tightness  She denies having significant bleeding or bruising current the  She denies having any falls   Her hospitalization records were reviewed with      Review of Systems  Cardiology Female ROS:    Cardiac: No complaints of chest pain, no palpitations, no fainting  Skin: No complaints of nonhealing sores or skin rash  Genitourinary: No complaints of recurrent urinary tract infections, frequent urination at night, difficult urination, blood in urine, kidney stones, loss of bladder control, kidney problems, denies any birth control or hormone replacement, is not post menopausal, not currently pregnant  Psychological: No complaints of feeling depressed, anxiety, panic attacks, or difficulty concentrating  General: No complaints of trouble sleeping, lack of energy, fatigue, appetite changes, weight changes, fever, frequent infections, or night sweats  Respiratory: No complaints of shortness of breath, cough with sputum, or wheezing  HEENT: No complaints of serious problems, hearing problems, nose problems, throat problems, or snoring  Gastrointestinal: No complaints of liver problems, nausea, vomiting, heartburn, constipation, bloody stools, diarrhea, problems swallowing, adbominal pain, or rectal bleeding  Hematologic: No complaints of bleeding disorders, anemia, blood clots, or excessive brusing  Neurological: No complaints of numbness, tingling, dizziness, weakness, seizures, headaches, syncope or fainting, AM fatigue, daytime sleepiness, no witnessed apnea episodes  Musculoskeletal: arthritis-- and-- back pain, but-- as noted in HPI   ROS Reviewed:   ROS reviewed  Active Problems  Problems    1  Acute bacterial sinusitis (461 9) (J01 90,B96 89)   2  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)   3  Aftercare following surgery of the musculoskeletal system (V58 78) (Z47 89)   4  Anemia (285 9) (D64 9)   5  Anemia of chronic disease (285 29) (D63 8)   6  Ataxic gait (781 2) (R26 0)   7  Back Strain (847 9)   8  Benign essential hypertension (401 1) (I10)   9  Benign paroxysmal vertigo, unspecified laterality (386 11) (H81 10)   10  Chronic cough (786 2) (R05)   11  Chronic joint pain (719 40,338 29) (M25 50,G89 29)   12  Chronic kidney insufficiency (585 9) (N18 9)   13  Chronic liver disease (571 9) (K76 9)   14  Cough (786 2) (R05)   15  CVA (cerebral vascular accident) (434 91) (I63 9)   16  Cyst of diaphragm (519 4) (J98 6)   17  Cyst of diaphragm (519 4) (J98 6)   18  Depression (311) (F32 9)   19  Edema (782 3) (R60 9)   20  Elevated homocysteine (270 4) (E72 11)   21  Encounter for screening mammogram for malignant neoplasm of breast (V76 12)  (Z12 31)   22  Foot Pain (Soft Tissue) (729 5)   23  Hematuria (599 70) (R31 9)   24  Hypercalcemia (275 42) (E83 52)   25  Hypersomnia (780 54) (G47 10)   26  Hypothyroidism (244 9) (E03 9)   27  Itching (698 9) (L29 9)   28  Liver replaced by transplant (V42 7) (Z94 4)   29  Lung nodule, solitary (793 11) (R91 1)   30  Lung nodule, solitary (793 11) (R91 1)   31  Mixed hyperlipidemia with apolipoprotein E4 variant (272 2) (E78 2)   32  Need for influenza vaccination (V04 81) (Z23)   33  Need for prophylactic vaccination and inoculation against influenza (V04 81) (Z23)   34  Non-ST elevation myocardial infarction (NSTEMI), subsequent episode of care (410 72)  (I21 4)   35  Osteoarthritis, localized, knee (715 36) (M17 10)   36  Peripheral arterial disease (443 9) (I73 9)   37  Post-menopausal osteoporosis (733 01) (M81 0)   38  Routine history and physical examination of adult (V70 0) (Z00 00)   39  Sciatica (724 3) (M54 30)   40  Screening for breast cancer (V76 10) (Z12 31)   41  Screening for genitourinary condition (V81 6) (Z13 89)   42  Shortness of breath (786 05) (R06 02)   43  Sleep disorder (780 50) (G47 9)   44  Stroke syndrome (434 91) (I63 9)   45  Thyroid disorder (246 9) (E07 9)   46  Transient ischemic attack, acute (435 9) (G45 9)   47  Ulcerative colitis without complications (554 3) (H94 17)   48   Urine, incontinence, stress female (625 6) (N39 3)    Past Medical History  Problems    1  _ (727 03)   2  Acute upper respiratory infection (465 9) (J06 9)   3  History of Acute upper respiratory infection (465 9) (J06 9)   4  History of Adverse Effect Of Opioids (E935 2)   5  Aftercare following surgery of the musculoskeletal system (V58 78) (Z47 89)   6  Ankle pain, unspecified laterality   7  Ascending cholangitis (576 1) (K83 0)   8  Benign paroxysmal vertigo, unspecified laterality (386 11) (H81 10)   9  Contusion With Intact Skin Surface Of Multiple Sites (924 8)   10  History of Cough (786 2) (R05)   11  History of Hematochezia (578 1) (K92 1)   12  History of backache (V13 59) (Z87 39)   13  History of cirrhosis (V12 79) (Z87 19)   14  History of diarrhea (V12 79) (Z87 898)   15  History of infectious diarrhea (V12 79) (Z87 19)   16  History of vertigo (V12 49) (Z87 898)   17  Impacted cerumen, unspecified laterality (380 4) (H61 20)   18  Knee Sprain (844 9)   19  Limb pain (729 5) (M79 609)   20  History of Liver Transplant Candidate (V42 7)   21  Pelvic Fracture (808)  Active Problems And Past Medical History Reviewed: The active problems and past medical history were reviewed and updated today  Surgical History  Problems    1  History of Appendectomy   2  History of Cataract Surgery   3  History of Cholecystectomy   4  History of Liver Transplant   5  History of Tonsillectomy   6  History of Tubal Ligation  Surgical History Reviewed: The surgical history was reviewed and updated today  Family History  Brother    1  Family history of Crohn's Disease  Family History    2  Family history of Arthritis (V17 7)   3  Family history of Breast Cancer (V16 3)  Family History Reviewed: The family history was reviewed and updated today  Social History  Problems    · Completed 12th grade   · Daily Coffee Consumption (1  Cups/Day)   ·    · Former smoker (G51 31) (Q34 627)   · No drug use   · Unknown If Ever Smoked  Social History Reviewed:  The social history was reviewed and updated today  Current Meds   1  Aspirin EC 81 MG Oral Tablet Delayed Release; TAKE 1 TABLET DAILY; Therapy: (Recorded:14Nov2017) to Recorded   2  Calcium 600 600 MG Oral Tablet; TAKE 1 TABLET DAILY; Therapy: 67Egy6099 to Recorded   3  CycloSPORINE 25 MG Oral Capsule; 2 two times a day; Therapy: 33CEA9914 to  Requested for: 21Aug2017 Recorded   4  Eliquis 5 MG Oral Tablet; Take 1 tablet twice daily; Therapy: (Recorded:14Nov2017) to Recorded   5  Keppra 500 MG Oral Tablet; take 1 tablet twice a day; Therapy: (Recorded:14Nov2017) to Recorded   6  Levothyroxine Sodium 112 MCG Oral Tablet; take 1 tablet by mouth once daily; Therapy: 92SZM6725 to (468 6549)  Requested for: 90KCE4429; Last Rx:79Bim6633 Ordered   7  Metoprolol Tartrate 25 MG Oral Tablet; TAKE 1 TABLET TWICE DAILY; Therapy: 89VBV9739 to (Evaluate:13Nrb2409) Recorded   8  Mycophenolate Mofetil 250 MG Oral Capsule; TAKE 1 CAPSULE TWICE DAILY; Therapy: 78Ggq7809 to Recorded   9  Ocuvite TABS; TAKE 1 TABLET DAILY; Therapy: (Tory Reuben) to Recorded   10  Omeprazole 40 MG Oral Capsule Delayed Release; Therapy: 18DGE1269 to Recorded   11  Oxybutynin Chloride 5 MG Oral Tablet; TAKE TWO TABLETS DAILY; Therapy: 40QGL7061 to Recorded   12  Pravastatin Sodium 20 MG Oral Tablet; TAKE ONE TABLET BY MOUTH ONCE DAILY AT  BEDTIME; Therapy: 75YLQ9893 to 95 073742)  Requested for: 23BFL2732; Last  Rx:03Nov2017 Ordered   13  Vitamin D 72529 U CAPS; Therapy: (Danyelle Veliz) to Recorded  Medication List Reviewed: The medication list was reviewed and updated today  Allergies  Medication    1  Tetracyclines   2  Vancomycin   3   Prograf CAPS    Vitals  Vital Signs    Recorded: 24KRO3044 02:14PM   Heart Rate 89, L Radial   Systolic 170, LUE, Sitting   Diastolic 60, LUE, Sitting   Height 5 ft 1 in   Weight 206 lb 8 oz   BMI Calculated 39 02   BSA Calculated 1 91   O2 Saturation 98       Physical Exam Constitutional  General appearance: No acute distress, well appearing and well nourished  Eyes  Conjunctiva and Sclera examination: Conjunctiva pink, sclera anicteric  Ears, Nose, Mouth, and Throat - Oropharynx: Clear, nares are clear, mucous membranes are moist   Neck  Neck and thyroid: Normal, supple, trachea midline, no thyromegaly  Pulmonary  Respiratory effort: No increased work of breathing or signs of respiratory distress  Auscultation of lungs: Clear to auscultation, no rales, no rhonchi, no wheezing, good air movement  Cardiovascular rrr +s1 +S2 +s4 soft ejection murmur  Carotid pulses: Normal, 2+ bilaterally  Peripheral vascular exam: Normal pulses throughout, no tenderness, erythema or swelling  Pedal pulses: Normal, 2+ bilaterally  Examination of extremities for edema and/or varicosities: Abnormal    Abdomen  Abdomen: Abnormal    Liver and spleen: Abnormal    Musculoskeletal Gait and station: Abnormal -- Digits and nails: Normal without clubbing or cyanosis  -- Inspection/palpation of joints, bones, and muscles: Abnormal   Skin - Skin and subcutaneous tissue: Normal without rashes or lesions  Skin is warm and well perfused, normal turgor  Neurologic - Cranial nerves: II - XII intact  -- Speech: Normal    Psychiatric - Orientation to person, place, and time: Normal -- Mood and affect: Normal       Results/Data  Diagnostic Studies Reviewed Cardio: I personally reviewed the recording/images in the office today  My interpretation follows  Stress Test: nuclear stress with mod anterolateral defect, normal EF, TID  Health Management  Encounter for screening mammogram for malignant neoplasm of breast   Digital Bilateral Screening Mammogram With CAD; every 1 year; Last 02Apr2015; Next Due:14Hxh3847; Overdue  Health Maintenance   COLONOSCOPY; every 2 years; Last 65SQJ9842; Next Due: 50DCM3949;  Overdue    Future Appointments    Date/Time Provider Specialty Site   06/18/2018 11:00 AM April Henley Eulogio Bautista MD Neurology [facility] 21   12/07/2017 01:00 PM Mitra Alvarado, [address] [place] [address]   12/14/2017 12:00 PM Vy Dennis MD Neurology [facility] 21   01/23/2018 02:00 PM Aurora Khan MD [facility]ology [facility]   11/17/2017 04:00 PM JOSE Padilla   Family Medicine [address]       Signatures   Electronically signed by : JOSE Allison ; Nov 14 2017  5:46PM EST                       (Author)

## 2017-11-17 LAB
CHEST PAIN STATEMENT: NORMAL
MAX DIASTOLIC BP: 81 MMHG
MAX HEART RATE: 103 BPM
MAX PREDICTED HEART RATE: 147 BPM
MAX. SYSTOLIC BP: 170 MMHG
PROTOCOL NAME: NORMAL
REASON FOR TERMINATION: NORMAL
TARGET HR FORMULA: NORMAL
TEST INDICATION: NORMAL
TIME IN EXERCISE PHASE: NORMAL

## 2017-12-04 ENCOUNTER — APPOINTMENT (EMERGENCY)
Dept: RADIOLOGY | Facility: HOSPITAL | Age: 72
End: 2017-12-04
Payer: MEDICARE

## 2017-12-04 ENCOUNTER — APPOINTMENT (OUTPATIENT)
Dept: LAB | Facility: HOSPITAL | Age: 72
End: 2017-12-04
Attending: INTERNAL MEDICINE
Payer: MEDICARE

## 2017-12-04 ENCOUNTER — HOSPITAL ENCOUNTER (EMERGENCY)
Facility: HOSPITAL | Age: 72
Discharge: HOME | End: 2017-12-04
Attending: EMERGENCY MEDICINE | Admitting: EMERGENCY MEDICINE
Payer: MEDICARE

## 2017-12-04 ENCOUNTER — TRANSCRIBE ORDERS (OUTPATIENT)
Dept: ADMINISTRATIVE | Facility: HOSPITAL | Age: 72
End: 2017-12-04

## 2017-12-04 VITALS
WEIGHT: 195 LBS | TEMPERATURE: 98.1 F | OXYGEN SATURATION: 98 % | RESPIRATION RATE: 16 BRPM | SYSTOLIC BLOOD PRESSURE: 150 MMHG | HEART RATE: 79 BPM | DIASTOLIC BLOOD PRESSURE: 66 MMHG | BODY MASS INDEX: 35.67 KG/M2

## 2017-12-04 DIAGNOSIS — D63.8 ANEMIA IN OTHER CHRONIC DISEASES CLASSIFIED ELSEWHERE: ICD-10-CM

## 2017-12-04 DIAGNOSIS — S80.01XA CONTUSION OF RIGHT KNEE, INITIAL ENCOUNTER: Primary | ICD-10-CM

## 2017-12-04 LAB
BASOPHILS # BLD AUTO: 0 THOUSANDS/ΜL (ref 0–0.1)
BASOPHILS NFR BLD AUTO: 1 % (ref 0–1)
EOSINOPHIL # BLD AUTO: 0.4 THOUSAND/ΜL (ref 0–0.61)
EOSINOPHIL NFR BLD AUTO: 7 % (ref 0–6)
ERYTHROCYTE [DISTWIDTH] IN BLOOD BY AUTOMATED COUNT: 15.5 % (ref 11.6–15.1)
HCT VFR BLD AUTO: 33.7 % (ref 37–47)
HGB BLD-MCNC: 11 G/DL (ref 12–16)
LYMPHOCYTES # BLD AUTO: 0.6 THOUSANDS/ΜL (ref 0.6–4.47)
LYMPHOCYTES NFR BLD AUTO: 11 % (ref 14–44)
MCH RBC QN AUTO: 28 PG (ref 27–31)
MCHC RBC AUTO-ENTMCNC: 32.7 G/DL (ref 31.4–37.4)
MCV RBC AUTO: 86 FL (ref 82–98)
MONOCYTES # BLD AUTO: 0.4 THOUSAND/ΜL (ref 0.17–1.22)
MONOCYTES NFR BLD AUTO: 8 % (ref 4–12)
NEUTROPHILS # BLD AUTO: 4.1 THOUSANDS/ΜL (ref 1.85–7.62)
NEUTS SEG NFR BLD AUTO: 74 % (ref 43–75)
NRBC BLD AUTO-RTO: 0 /100 WBCS
PLATELET # BLD AUTO: 210 THOUSANDS/UL (ref 130–400)
PMV BLD AUTO: 8 FL (ref 8.9–12.7)
RBC # BLD AUTO: 3.93 MILLION/UL (ref 4.2–5.4)
WBC # BLD AUTO: 5.6 THOUSAND/UL (ref 4.8–10.8)

## 2017-12-04 PROCEDURE — 99284 EMERGENCY DEPT VISIT MOD MDM: CPT

## 2017-12-04 PROCEDURE — 73564 X-RAY EXAM KNEE 4 OR MORE: CPT

## 2017-12-04 PROCEDURE — 36415 COLL VENOUS BLD VENIPUNCTURE: CPT

## 2017-12-04 PROCEDURE — 85025 COMPLETE CBC W/AUTO DIFF WBC: CPT

## 2017-12-04 RX ORDER — ACETAMINOPHEN 325 MG/1
650 TABLET ORAL ONCE
Status: COMPLETED | OUTPATIENT
Start: 2017-12-04 | End: 2017-12-04

## 2017-12-04 RX ADMIN — ACETAMINOPHEN 650 MG: 325 TABLET ORAL at 13:20

## 2017-12-04 NOTE — DISCHARGE INSTRUCTIONS

## 2017-12-04 NOTE — ED PROVIDER NOTES
History  Chief Complaint   Patient presents with    Fall     Pt states that she fell on a carpeted floor today  Pt states that her right knee hurts her  Pt denies hitting her head  History provided by:  Patient and spouse  Fall   Mechanism of injury: fall    Injury location:  Leg  Leg injury location:  R knee  Incident location:  Home  Time since incident:  2 hours  Fall:     Fall occurred:  Tripped and walking    Impact surface:  Hard floor    Point of impact:  Knees  Protective equipment: none    Suspicion of alcohol use: no    Suspicion of drug use: no    Prior to arrival data:     Bystander interventions:  None    Blood loss:  None    Responsiveness at scene:  Alert    Orientation at scene:  Person, situation, time and place    Loss of consciousness: no      Amnesic to event: no      Airway interventions:  None    Medications administered:  None    Immobilization:  None  Current pain details:     Pain quality:  Dull    Pain Severity:  Moderate    Pain timing:  Constant  Associated symptoms: no chest pain and no headaches    Associated symptoms comment:  No other symptoms patient able to ambulate with out difficulty using her walker      Prior to Admission Medications   Prescriptions Last Dose Informant Patient Reported? Taking? apixaban (ELIQUIS) 5 mg  Self No Yes   Sig: Take 1 tablet by mouth 2 (two) times a day   aspirin (ECOTRIN LOW STRENGTH) 81 mg EC tablet  Self No Yes   Sig: Take 1 tablet by mouth daily   atorvastatin (LIPITOR) 10 mg tablet  Self No Yes   Sig: Take 1 tablet by mouth daily with dinner   calcium carbonate (OS-KENDRICK) 600 MG tablet  Self Yes Yes   Sig: Take 600 mg by mouth 2 (two) times a day with meals   cycloSPORINE non-modified (SandIMMUNE) 25 mg capsule  Self Yes Yes   Sig: Take 50 mg by mouth 2 (two) times a day     levETIRAcetam (KEPPRA) 500 mg tablet  Self No Yes   Sig: Take 1 tablet by mouth every 12 (twelve) hours   levothyroxine 112 mcg tablet  Self Yes Yes   Sig: Take 112 mcg by mouth daily  metoprolol tartrate (LOPRESSOR) 25 mg tablet  Self No Yes   Sig: Take 1 5 tablets by mouth every 12 (twelve) hours   mycophenolate (CELLCEPT) 250 mg capsule  Self Yes Yes   Sig: Take 250 mg by mouth 2 (two) times a day 3 tabs each dose=750 mg    omeprazole (PriLOSEC) 40 MG capsule  Self Yes Yes   Sig: Take 40 mg by mouth daily   oxybutynin (DITROPAN) 5 mg tablet  Self Yes Yes   Sig: Take 5 mg by mouth 2 (two) times a day        Facility-Administered Medications: None       Past Medical History:   Diagnosis Date    Anemia     Anxiety     Arthritis     Disease of thyroid gland     GERD (gastroesophageal reflux disease)     History of transfusion     Osteoporosis        Past Surgical History:   Procedure Laterality Date    ABDOMINAL SURGERY      APPENDECTOMY      BLADDER AUGMENTATION N/A     CHOLECYSTECTOMY N/A     COLONOSCOPY N/A 4/20/2017    Procedure: COLONOSCOPY;  Surgeon: Madelyn Baca MD;  Location: [place] [address] GI LAB; Service:     ESOPHAGOGASTRODUODENOSCOPY N/A 3/31/2016    Procedure: ESOPHAGOGASTRODUODENOSCOPY (EGD); Surgeon: Madelyn Baca MD;  Location: [place] [address] GI [facility]; Service:     ESOPHAGOGASTRODUODENOSCOPY N/A 4/20/2017    Procedure: ESOPHAGOGASTRODUODENOSCOPY (EGD); Surgeon: Madelyn Baca MD;  Location: [place] [address] GI LAB; Service:     EYE SURGERY      cataracts removed both eyes lens implant    HERNIA REPAIR      JOINT REPLACEMENT      left knee replacement    LIVER TRANSPLANTATION N/A     TONSILECTOMY AND ADNOIDECTOMY N/A        Family History   Problem Relation Age of Onset    Cancer Mother      breast    Cancer Father      I have reviewed and agree with the history as documented  Social History   Substance Use Topics    Smoking status: Former Smoker     Quit date: 1/21/1975    Smokeless tobacco: Never Used    Alcohol use No        Review of Systems   Constitutional: Negative for fever  Respiratory: Negative for chest tightness and shortness of breath  Cardiovascular: Negative for chest pain  Skin: Negative for rash  Neurological: Negative for dizziness, light-headedness and headaches  Physical Exam  ED Triage Vitals [12/04/17 1237]   Temperature Pulse Respirations Blood Pressure SpO2   98 1 °F (36 7 °C) 79 16 150/66 98 %      Temp Source Heart Rate Source Patient Position - Orthostatic VS BP Location FiO2 (%)   Oral Monitor Lying Right arm --      Pain Score       3           Orthostatic Vital Signs  Vitals:    12/04/17 1237   BP: 150/66   Pulse: 79   Patient Position - Orthostatic VS: Lying       Physical Exam   Constitutional: She appears well-developed  HENT:   Head: Atraumatic  Eyes: Conjunctivae are normal  Right eye exhibits no discharge  Left eye exhibits no discharge  No scleral icterus  Neck: Neck supple  No tracheal deviation present  Pulmonary/Chest: Effort normal  No stridor  No respiratory distress  Musculoskeletal: She exhibits no deformity  Right knee, moderate pain to palpation diffusely mainly over patella but also on proximal tibia  No distal femur tenderness   Neurological: She is alert  She exhibits normal muscle tone  Coordination normal    Skin: Skin is warm and dry  No rash noted  No erythema  No pallor  Psychiatric: She has a normal mood and affect  Vitals reviewed        ED Medications  Medications   acetaminophen (TYLENOL) tablet 650 mg (650 mg Oral Given 12/4/17 1320)       Diagnostic Studies  Results Reviewed     None                 XR knee 4+ views Right injury   ED Interpretation by Marquis Rl DO (12/04 1332)   No acute fracture                 Procedures  Procedures       Phone Contacts  ED Phone Contact    ED Course  ED Course                                MDM  Number of Diagnoses or Management Options  Contusion of right knee, initial encounter: new and requires workup     Amount and/or Complexity of Data Reviewed  Tests in the radiology section of CPT®: ordered and reviewed  Review and summarize past medical records: yes  Independent visualization of images, tracings, or specimens: yes      CritCare Time    Disposition  Final diagnoses:   Contusion of right knee, initial encounter     Time reflects when diagnosis was documented in both MDM as applicable and the Disposition within this note     Time User Action Codes Description Comment    12/4/2017  1:33 PM Sixto Carcamo Add [S80 01XA] Contusion of right knee, initial encounter       ED Disposition     ED Disposition Condition Comment    Discharge    Jose Koroma 60 discharge to home/self care  Condition at discharge: Good        Follow-up Information     Follow up With Specialties Details Why [id number] [address]  [facility]   To arrange for the next available appointment, For repeat evaluation and  to ensure resolution [address]  [phone]        Patient's Medications   Discharge Prescriptions    No medications on file     No discharge procedures on file      ED Provider  Electronically Signed by           Giorgio Sorenson DO  12/04/17 6018

## 2017-12-11 ENCOUNTER — ALLSCRIPTS OFFICE VISIT (OUTPATIENT)
Dept: OTHER | Facility: OTHER | Age: 72
End: 2017-12-11

## 2017-12-11 ENCOUNTER — GENERIC CONVERSION - ENCOUNTER (OUTPATIENT)
Dept: OTHER | Facility: OTHER | Age: 72
End: 2017-12-11

## 2017-12-12 ENCOUNTER — ALLSCRIPTS OFFICE VISIT (OUTPATIENT)
Dept: OTHER | Facility: OTHER | Age: 72
End: 2017-12-12

## 2017-12-14 ENCOUNTER — ALLSCRIPTS OFFICE VISIT (OUTPATIENT)
Dept: OTHER | Facility: OTHER | Age: 72
End: 2017-12-14

## 2017-12-14 DIAGNOSIS — D63.8 ANEMIA IN OTHER CHRONIC DISEASES CLASSIFIED ELSEWHERE: ICD-10-CM

## 2017-12-15 NOTE — PROGRESS NOTES
Assessment  1  CVA (cerebral vascular accident) (434 91) (I63 9)  2  Cerebral microvascular disease (437 9) (I67 9)    Plan  Cerebral microvascular disease    · Follow-up visit in 3 months Evaluation and Treatment  Follow-up  Status: Hold For -Scheduling  Requested for: 52IQS2834  Ordered; For: Cerebral microvascular disease;  Ordered By: Elin Camarena  Performed:   Due: 73LIM6576    Discussion/Summary  Discussion Summary: This 67-year-old female status post liver transplant 10 years ago on chronic immunosuppressive therapy, chronic anemia of undetermined etiology, hypertension who is here as a follow-up of multiple embolic strokes  Strokes: No new stroke-like symptoms at this time  Neurologic examination currently is intact  She does have diffuse atherosclerotic disease in her bilateral carotid arteries and bilateral vertebral arteries  She had bilateral embolic strokes in November of 2017 with regard to be cardioembolic in etiology, although does not have a history of atrial fibrillation  MRI brain which I personally reviewed today and reviewed the images with the family showed bilateral embolic strokes and also significant moderate to severe microvascular disease  At this point she is on Eliquis for possible cardioembolic etiology which I would like to continue  I talked to her regarding compliance with Eliquis and missing medications in the rebound effect causing strokes  She verbalized understanding -as far as her diffuse atherosclerotic disease concerned continue with aspirin 81 mg in addition to Eliquis -continue with pravastatin 20 mg -loop recorder in place, cardiology following, no evidence of atrial fibrillation as of yet  1 Seizures: EEG routine showed left temporal spikes  Continue with Keppra 500 mg b i d Microvascular disease moderate to severe: Continue with baby aspirin for now  Manage her risk factors  Defer to primary doctor regarding management of her hypertension and other comorbidities   When compared with the prior MRI from 2 years ago, microvascular disease has progressed  Unfortunately, other than managing the risk factors, nothing else could be done to decrease the progression  She will likely developed vascular dementia in the near future  I did inform of this to the family  Follow up with me in 3 months  Patient/Guardian was advised to the call the office if they have any questions and concerns in the meantime  Patient/Guardian does understand that if they have any new stroke like symptoms such as facial droop on one side, weakness/paralysis on either side, speech trouble, numbness on one side, balance issues, any vision changes, or any new headache, to call 9-1-1 immediately or to proceed to the nearest ER immediately  1 Amended By: Jose Sotelo; Dec 14 2017 12:56 PM EST    Chief Complaint  Chief Complaint Free Text Note Form: Patient present for hospital follow-up appointment regarding stroke  admission date: 11/4/17      History of Present Illness  HPI: This is a 66year old  female who is here as a follow up of her hospital discharge for embolic strokes which occurred 1 month ago  She has a history of hypertension, s/p liver transplant on chronic immunosuppressive therapy, chronic anemia  Hematology oncology following  She had a NSTEMI while in the hospital  MRI brain revealed multiple embolic strokes bilaterally  CTA head and neck shows diffuse atherosclerotic disease in the carotids and vertebral arteries  BERNICE did not show any evidence of aortic thrombus  She also had an EEG which showed left temporal strokes and was started on Keppra 500mg PO BID  Etiology of the stroke in the hospital was thought to be cardioembolic and was started on Eliquis and plavix was discontinued at that time  SHe had a loop recorder placed  Cardiology following this, no evidence of atrial fibrillation so far  SHe has not had any new TIA/stroke like symptoms   She has some occasional memory loss but not-consistent and according to her and her , it is random but not always present  She is compliant with her medications  Review of Systems  Neurological ROS:  Constitutional:1  chills1 ,-- recent weight loss1 ,-- fatigue1 -- and-- appetite changes1   HEENT:1   No sinus problems, not feeling congested, no blurred vision, no dryness of the eyes, no eye pain, no hearing loss, no tinnitus, no mouth sores, no sore throat, no hoarseness, no dysphagia, no masses, no bleeding1   Cardiovascular:1   No chest pain or pressure, no palpitations present, the heart rate was not rapid or irregular, no swelling in the arms or legs, no poor circulation1   Respiratory:1   No unusual or persistant cough, no shortness of breath with or without exertion1   Gastrointestinal:1   No nausea, no vomiting, no diarrhea, no abdominal pain, no changes in bowel habits, no melena, no loss of bowel control1   Genitourinary:1  feelings of urinary urgency1 -- and-- increased frequency1   Musculoskeletal:1   No arthralgias, no myalgias, no immobility or loss of function, no head/neck/back pain, no pain while walking1   Integumentary1   No masses, no rash, no skin lesions, no livedo reticularis1   Psychiatric:1   No anxiety, no depression, no mood swings, no psychiatric hospitalizations, no sleep problems1   Endocrine1   No unusual weight loss or gain, no excessive urination, no excessive thirst, no hair loss or gain, no hot or cold intolerance, no menstrual period change or irregularity, no loss of sexual ability or drive, no erection difficulty, no nipple discharge1   Hematologic/Lymphatic:1   No unusual bleeding, no tendency for easy bruising, no clotting skin or lumps1   Neurological General:1  waking up at night1   Neurological Mental Status:1   No confusion, no mood swings, no alteration or loss of consciousness, no difficulty expressing/understanding speech, no memory problems1   Neurological Cranial Nerves:1    No blurry or double vision, no loss of vision, no face drooping, no facial numbness or weakness, no taste or smell loss/changes, no hearing loss or ringing, no vertigo or dizziness, no dysphagia, no slurred speech1   Neurological Motor findings include:1   No tremor, no twitching, no cramping(pre/post exercise), no atrophy1   Neurological Coordination:1  balance difficulties1   Neurological Sensory:1   No numbness, no pain, no tingling, does not fall when eyes closed or taking a shower1   Neurological Gait:1  difficulty walking1 -- and-- has had falls1   ROS Reviewed:   ROS reviewed  1 Amended By: Leisa James; Dec 14 2017 1:08 PM EST    Active Problems  1  Acute bacterial sinusitis (461 9) (J01 90,B96 89)  2  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)  3  Aftercare following surgery of the musculoskeletal system (V58 78) (Z47 89)  4  Anemia (285 9) (D64 9)  5  Anemia of chronic disease (285 29) (D63 8)  6  Ataxic gait (781 2) (R26 0)  7  Back Strain (847 9)  8  Benign essential hypertension (401 1) (I10)  9  Benign paroxysmal vertigo, unspecified laterality (386 11) (H81 10)  10  Chronic cough (786 2) (R05)  11  Chronic joint pain (719 40,338 29) (M25 50,G89 29)  12  Chronic kidney insufficiency (585 9) (N18 9)  13  Chronic liver disease (571 9) (K76 9)  14  Cough (786 2) (R05)  15  CVA (cerebral vascular accident) (434 91) (I63 9)  16  Cyst of diaphragm (519 4) (J98 6)  17  Cyst of diaphragm (519 4) (J98 6)  18  Depression (311) (F32 9)  19  Edema (782 3) (R60 9)  20  Elevated homocysteine (270 4) (E72 11)  21  Encounter for screening mammogram for malignant neoplasm of breast (V76 12)  (Z12 31)  22  Foot Pain (Soft Tissue) (729 5)  23  Hematuria (599 70) (R31 9)  24  Hypercalcemia (275 42) (E83 52)  25  Hypersomnia (780 54) (G47 10)  26  Hypothyroidism (244 9) (E03 9)  27  Itching (698 9) (L29 9)  28  Liver replaced by transplant (V42 7) (Z94 4)  29   Lung nodule, solitary (793 11) (R91 1)  30  Lung nodule, solitary (793 11) (R91 1)  31  Mixed hyperlipidemia with apolipoprotein E4 variant (272 2) (E78 2)  32  Need for influenza vaccination (V04 81) (Z23)  33  Need for prophylactic vaccination and inoculation against influenza (V04 81) (Z23)  34  Non-ST elevation myocardial infarction (NSTEMI), subsequent episode of care (410 72)  (I21 4)  35  NSTEMI (non-ST elevated myocardial infarction) (410 70) (I21 4)  36  Osteoarthritis, localized, knee (715 36) (M17 10)  37  Peripheral arterial disease (443 9) (I73 9)  38  Post-menopausal osteoporosis (733 01) (M81 0)  39  Routine history and physical examination of adult (V70 0) (Z00 00)  40  Sciatica (724 3) (M54 30)  41  Screening for breast cancer (V76 10) (Z12 31)  42  Screening for genitourinary condition (V81 6) (Z13 89)  43  Shortness of breath (786 05) (R06 02)  44  Sleep disorder (780 50) (G47 9)  45  Stroke syndrome (434 91) (I63 9)  46  Thyroid disorder (246 9) (E07 9)  47  Transient ischemic attack, acute (435 9) (G45 9)  48  Ulcerative colitis without complications (149 9) (U95 90)  49  Urine, incontinence, stress female (625 6) (N39 3)    Past Medical History  1  _ (727 03)  2  Acute upper respiratory infection (465 9) (J06 9)  3  History of Acute upper respiratory infection (465 9) (J06 9)  4  History of Adverse Effect Of Opioids (E935 2)  5  Aftercare following surgery of the musculoskeletal system (V58 78) (Z47 89)  6  Ankle pain, unspecified laterality  7  Ascending cholangitis (576 1) (K83 0)  8  Benign paroxysmal vertigo, unspecified laterality (386 11) (H81 10)  9  Contusion With Intact Skin Surface Of Multiple Sites (924 8)  10  History of Cough (786 2) (R05)  11  History of Hematochezia (578 1) (K92 1)  12  History of backache (V13 59) (Z87 39)  13  History of cirrhosis (V12 79) (Z87 19)  14  History of diarrhea (V12 79) (Z87 898)  15  History of infectious diarrhea (V12 79) (Z87 19)  16  History of vertigo (V12 49) (Z87 898)  17  Impacted cerumen, unspecified laterality (380 4) (H61 20)  18  Knee Sprain (844 9)  19  Limb pain (729 5) (Z58 609)  20  History of Liver Transplant Candidate (V42 7)  21  Pelvic Fracture (808)  Active Problems And Past Medical History Reviewed: The active problems and past medical history were reviewed and updated today  Surgical History  1  History of Appendectomy  2  History of Cataract Surgery  3  History of Cholecystectomy  4  History of Liver Transplant  5  History of Tonsillectomy  6  History of Tubal Ligation  Surgical History Reviewed: The surgical history was reviewed and updated today  Family History  Brother   1  Family history of Crohn's Disease  Family History   2  Family history of Arthritis (V17 7)  3  Family history of Breast Cancer (V16 3)  Family History Reviewed: The family history was reviewed and updated today  Social History   · Completed 12th grade   · Daily Coffee Consumption (1  Cups/Day)   ·    · Former smoker (U94 98) (E97 304)   · No drug use   · Unknown If Ever Smoked  Social History Reviewed: The social history was reviewed and updated today  The social history was reviewed and is unchanged  Current Meds  1  Aspirin EC 81 MG Oral Tablet Delayed Release; TAKE 1 TABLET DAILY; Therapy: (Recorded:14Nov2017) to Recorded  2  Calcium 600 600 MG Oral Tablet; TAKE 1 TABLET DAILY; Therapy: 83Fkp5115 to Recorded  3  CycloSPORINE 25 MG Oral Capsule; 2 two times a day; Therapy: 11GOD4084 to  Requested for: 57Ozs8472 Recorded  4  Eliquis 5 MG Oral Tablet; take 1 tablet by mouth twice daily  Requested for: 56DPZ5842; Last Rx:14Nov2017 Ordered  5  Keppra 500 MG Oral Tablet; take 1 tablet twice a day; Therapy: (Recorded:14Nov2017) to Recorded  6  Levothyroxine Sodium 112 MCG Oral Tablet; take 1 tablet by mouth once daily; Therapy: 90LVM7834 to (166 6947)  Requested for: 60TVW6733; Last Rx:34Vil6217 Ordered  7   Metoprolol Tartrate 25 MG Oral Tablet; TAKE 1 TABLET TWICE DAILY; Therapy: 44RQK7595 to (Evaluate:67Xzx3037) Recorded  8  Mycophenolate Mofetil 250 MG Oral Capsule; TAKE 1 CAPSULE TWICE DAILY; Therapy: 23Xpx8837 to Recorded  9  Ocuvite TABS; TAKE 1 TABLET DAILY; Therapy: (Ayde Scott) to Recorded  10  Omeprazole 40 MG Oral Capsule Delayed Release; Therapy: 80CKM3537 to Recorded  11  Oxybutynin Chloride 5 MG Oral Tablet; TAKE TWO TABLETS DAILY; Therapy: 88RQG9054 to Recorded  12  Pravastatin Sodium 20 MG Oral Tablet; TAKE ONE TABLET BY MOUTH ONCE DAILY AT  BEDTIME; Therapy: 95VTS7140 to 04 00 14 32 96)  Requested for: 69DSN8804; Last  Rx:03Nov2017 Ordered  13  Vitamin D 67904 U CAPS; Therapy: (Kevon Eugene) to Recorded  Medication List Reviewed: The medication list was reviewed and updated today  Allergies  1  Tetracyclines  2  Vancomycin  3  Prograf CAPS    Vitals  Signs   Recorded: 53WCY0787 12:09PM   Heart Rate: 82  Respiration: 16  Systolic: 968, LUE, Sitting  Diastolic: 62, LUE, Sitting  Weight: 204 lb   BMI Calculated: 38 55  BSA Calculated: 1 91  O2 Saturation: 98    Physical Exam  General alert awake oriented following commands HEENT normocephalic atraumatic Speech fluent no evidence of aphasia noted or dysarthria Cranial nerves:  Pupils equal round reactive to light extraocular movements intact no facial asymmetry noted facial sensation intact to soft touch throughout shoulder shrug strong Motor 5/5 in all 4 extremities Gait: cautious      Results/Data  Diagnostic Studies Reviewed:  CT Scan Review CT Head 11/4/17IMPRESSION:Chronic microvascular ischemic changes  Â No acute intracranial abnormality  CTA Head 10/29/2017IMPRESSION:Â Atherosclerotic disease of the distal common carotid arteries bilaterally  Mild narrowing of the right distal common carotid artery and moderate narrowing on the left   Atherosclerotic changes extend into the proximal bifurcation with no significantstenosis of the proximal internal carotid arteries compared to the mid cervical internal carotid arteries  Â Slight, less than 50% narrowing of the distal left cervical internal carotid artery  Â Moderate intracranial atherosclerotic calcification involving the distal vertebral arteries and cavernous internal carotid arteries with mild narrowing  No occlusive disease  Â Extensive periventricular white matter hypoattenuation within both cerebral hemispheres consistent with extensive chronic microangiopathy  MRI Review MRI [facility] 11/6/17IMPRESSION:Â The numerous small embolic foci of acute ischemia in both hemispheres  No hemorrhage or significant mass effect  Diagnostic Review Lipid Panel 10/29/2017Cholesterol 122Triglycerides 82HDL 46LDL 60Hemoglobin A1C 5 9        Future Appointments    Date/Time Provider Specialty Site   06/18/2018 11:00 AM Everett Jean MD Neurology [facility]   03/16/2018 11:00 AM [facility]iology [facility], Remote [address] [place] [address]   12/20/2017 02:15 PM Quinton Darden, [address] [place] [address]   01/11/2018 03:00 PM Lisa Durham MD Hematology [facility]     Signatures   Electronically signed by : Steven Mcdonough MD; Dec 14 2017 12:55PM EST                       (Author)    Electronically signed by : Steven Mcdonough MD; Dec 14 2017 12:56PM EST                       (Author)    Electronically signed by : Steven Mcdonough MD; Dec 14 2017  1:53PM EST                       (Author)

## 2017-12-20 ENCOUNTER — GENERIC CONVERSION - ENCOUNTER (OUTPATIENT)
Dept: OTHER | Facility: OTHER | Age: 72
End: 2017-12-20

## 2018-01-03 ENCOUNTER — APPOINTMENT (OUTPATIENT)
Dept: LAB | Facility: HOSPITAL | Age: 73
End: 2018-01-03
Attending: INTERNAL MEDICINE
Payer: MEDICARE

## 2018-01-03 ENCOUNTER — GENERIC CONVERSION - ENCOUNTER (OUTPATIENT)
Dept: OTHER | Facility: OTHER | Age: 73
End: 2018-01-03

## 2018-01-03 ENCOUNTER — TRANSCRIBE ORDERS (OUTPATIENT)
Dept: ADMINISTRATIVE | Facility: HOSPITAL | Age: 73
End: 2018-01-03

## 2018-01-03 DIAGNOSIS — D63.8 ANEMIA IN OTHER CHRONIC DISEASES CLASSIFIED ELSEWHERE: ICD-10-CM

## 2018-01-03 LAB
BASOPHILS # BLD AUTO: 0 THOUSANDS/ΜL (ref 0–0.1)
BASOPHILS NFR BLD AUTO: 1 % (ref 0–1)
EOSINOPHIL # BLD AUTO: 0.3 THOUSAND/ΜL (ref 0–0.61)
EOSINOPHIL NFR BLD AUTO: 6 % (ref 0–6)
ERYTHROCYTE [DISTWIDTH] IN BLOOD BY AUTOMATED COUNT: 14.6 % (ref 11.6–15.1)
FERRITIN SERPL-MCNC: 127 NG/ML (ref 8–388)
HCT VFR BLD AUTO: 33.8 % (ref 37–47)
HGB BLD-MCNC: 11.1 G/DL (ref 12–16)
IRON SATN MFR SERPL: 18 %
IRON SERPL-MCNC: 46 UG/DL (ref 50–170)
LYMPHOCYTES # BLD AUTO: 0.8 THOUSANDS/ΜL (ref 0.6–4.47)
LYMPHOCYTES NFR BLD AUTO: 15 % (ref 14–44)
MCH RBC QN AUTO: 28.6 PG (ref 27–31)
MCHC RBC AUTO-ENTMCNC: 32.7 G/DL (ref 31.4–37.4)
MCV RBC AUTO: 87 FL (ref 82–98)
MONOCYTES # BLD AUTO: 0.4 THOUSAND/ΜL (ref 0.17–1.22)
MONOCYTES NFR BLD AUTO: 7 % (ref 4–12)
NEUTROPHILS # BLD AUTO: 3.7 THOUSANDS/ΜL (ref 1.85–7.62)
NEUTS SEG NFR BLD AUTO: 71 % (ref 43–75)
NRBC BLD AUTO-RTO: 0 /100 WBCS
PLATELET # BLD AUTO: 239 THOUSANDS/UL (ref 130–400)
PMV BLD AUTO: 7.8 FL (ref 8.9–12.7)
RBC # BLD AUTO: 3.87 MILLION/UL (ref 4.2–5.4)
TIBC SERPL-MCNC: 254 UG/DL (ref 250–450)
WBC # BLD AUTO: 5.3 THOUSAND/UL (ref 4.8–10.8)

## 2018-01-03 PROCEDURE — 83540 ASSAY OF IRON: CPT

## 2018-01-03 PROCEDURE — 85025 COMPLETE CBC W/AUTO DIFF WBC: CPT

## 2018-01-03 PROCEDURE — 82728 ASSAY OF FERRITIN: CPT

## 2018-01-03 PROCEDURE — 36415 COLL VENOUS BLD VENIPUNCTURE: CPT

## 2018-01-03 PROCEDURE — 83550 IRON BINDING TEST: CPT

## 2018-01-09 NOTE — PROCEDURES
Procedures by Greg Robert MD at 10/31/2017  11:13 AM      Author:  Greg Robert MD Service:  Cardiology Author Type:  Physician    Filed:  10/31/2017 11:15 AM Date of Service:  10/31/2017 11:13 AM Status:  Signed    :  Greg Robert MD (Physician)         Procedures  Loop recorder placement    Glenn Blevins  [phone]  10/31/2017  Cristo Mccray MD    Implanting Cardiologist: Greg Robert MD    Procedure(s): Loop recorder implant    Indications: Cryptogenic stroke    Procedure Details  The risks, benefits, complications, treatment options, and expected outcomes were discussed with the patient  The patient and/or family concurred with the proposed plan, giving informed consent  Patient was prepped and draped in the usual strict sterile fashion  Patient was given adequate amount of local anesthesia   After the antibiotic was completely infused, a small skin incision off less than 0 5 cm made in the fifth intercostal space in  the left parasternal area  Then a [employer] loop recorder with a serial number was successfully placed in a standard fashion without any complications  Once loop recorder was implanted pocket was closed with a one pursestring suture  Hemostasis was reverified  It was set for afib detection  Estimated Blood Loss: :Minimal         Complications:  None           Disposition: To her room           Condition: Stable             Received Freya BLEVINS    Nov 6 2017  4:15PM [facility] Standard Time

## 2018-01-11 ENCOUNTER — GENERIC CONVERSION - ENCOUNTER (OUTPATIENT)
Dept: OTHER | Facility: OTHER | Age: 73
End: 2018-01-11

## 2018-01-11 NOTE — RESULT NOTES
Verified Results  * MAMMO SCREENING BILATERAL W CAD [id number] 02:17PM Calla Riedel Order Number: [id number]     Test Name Result Flag Reference   MAMMO SCREENING BILATERAL W CAD (Report)     Patient History:   Patient is postmenopausal    Family history of breast cancer in mother at age 79  Patient has never smoked  Patient's BMI is 39 9  Reason for exam: screening (asymptomatic)  Mammo Screening Bilateral W CAD: May 13, 2016 - Check In #:    [de-identified]   Bilateral CC and MLO view(s) were taken  Technologist: ACSH Crow   Prior study comparison: April 2, 2015, bilateral screening    mammogram performed at [place] [address]  March 12, 2014, bilateral screening mammogram performed at [place] [address]  February 21, 2013, bilateral    screening mammogram performed at [place] [address]  December 6, 2011, bilateral screening mammogram    performed at [place] [address]  November 28, 2011, bilateral screening mammogram performed at [place] [address]  The breast tissue is almost entirely fat  No new dominant soft    tissue mass, architectural distortion or suspicious    calcifications are noted  The skin and nipple structures are    within normal limits  Benign appearing calcifications are noted  No mammographic evidence of malignancy  No    significant changes when compared with prior studies  ASSESSMENT: BiRad:2 - Benign     A reminder letter will be scheduled  Recommendation:   Routine screening mammogram of both breasts in 1 year  Analyzed by CAD     8-10% of cancers will be missed on mammography  Management of a    palpable abnormality must be based on clinical grounds  Patients   will be notified of their results via letter from our facility  Accredited by [employer] of [employer] and FDA       Transcription Location: [facility] [address]: [id number] Risk Value(s):   Ferozer-Cuzick 10 Year: 5 358%, Ferozer-Cuzick Lifetime: 7 164%,    Myriad Table: 1 5%, NANCY 5 Year: 3 1%, NCI Lifetime: 7 9%   Signed by:   Brissa Mcdonough MD   5/13/16       Discussion/Summary   Summer Wilkins, Your mammogram is benign  Continue yearly screenings   [address] [place] [address] Drive

## 2018-01-11 NOTE — CONSULTS
I had the pleasure of evaluating your patient, Ami Daley  My full evaluation follows:      Chief Complaint  Chief Complaint:   The patient presents to the office today with Anemia of chronic kidney disease, recent complications, follow-up  History of Present Illness  49-year-old female with history of anemia (anemia of chronic renal insufficiency/anemia of chronic disease on Procrit)  Patient is back for an unscheduled follow-up  Mrs Sandra Romano was recently admitted to the hospital after her partner noticed she was garbling her words  Patient was found to have a CVA  Patient was also found to have a non-ST elevated MI  Mrs Sandra Romano did not have any chest pain or pressure or other cardiac symptoms  Patient also does not have any neurologic sequelae  Patient is now on aspirin and eliquis  Patient is also on Keppra - information on the seizures is not presently clear  Patient denies any problems with excessive bruising or bleeding  No fevers, chills or sweats  Mrs Sandra Romano states that her activities are close to baseline  Patient is on CellCept and cyclosporine - no recent issues  Review of Systems    Constitutional: as noted in HPI and not feeling tired  Eyes: No complaints of eye pain, no red eyes, no eyesight problems, no discharge, no dry eyes, no itching of eyes  ENT: no complaints of earache, no loss of hearing, no nose bleeds, no nasal discharge, no sore throat, no hoarseness and no sore throat  Cardiovascular: as noted in HPI and no lower extremity edema  Respiratory: as noted in HPI, no cough and no shortness of breath during exertion  Gastrointestinal: No complaints of abdominal pain, no constipation, no nausea or vomiting, no diarrhea, no bloody stools  Genitourinary: No complaints of dysuria, no incontinence, no pelvic pain, no dysmenorrhea, no vaginal discharge or bleeding     Musculoskeletal: No complaints of arthralgias, no myalgias, no joint swelling or stiffness, no limb pain or swelling  Integumentary: No complaints of skin rash or lesions, no itching, no skin wounds, no breast pain or lump  Neurological: as noted in HPI, no confusion, no limb weakness and no difficulty walking  Psychiatric: Not suicidal, no sleep disturbance, no anxiety or depression, no change in personality, no emotional problems  Endocrine: No complaints of proptosis, no hot flashes, no muscle weakness, no deepening of the voice, no feelings of weakness  Hematologic/Lymphatic: No complaints of swollen glands, no swollen glands in the neck, does not bleed easily, does not bruise easily  Active Problems     1  Acute bacterial sinusitis (461 9) (J01 90,B96 89)   2  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)   3  Aftercare following surgery of the musculoskeletal system (V58 78) (Z47 89)   4  Anemia (285 9) (D64 9)   5  Anemia of chronic disease (285 29) (D63 8)   6  Ataxic gait (781 2) (R26 0)   7  Back Strain (847 9)   8  Benign essential hypertension (401 1) (I10)   9  Benign paroxysmal vertigo, unspecified laterality (386 11) (H81 10)   10  Chronic cough (786 2) (R05)   11  Chronic joint pain (719 40,338 29) (M25 50,G89 29)   12  Chronic kidney insufficiency (585 9) (N18 9)   13  Chronic liver disease (571 9) (K76 9)   14  Cough (786 2) (R05)   15  Cyst of diaphragm (519 4) (J98 6)   16  Cyst of diaphragm (519 4) (J98 6)   17  Depression (311) (F32 9)   18  Edema (782 3) (R60 9)   19  Encounter for screening mammogram for malignant neoplasm of breast (V76 12)    (Z12 31)   20  Foot Pain (Soft Tissue) (729 5)   21  Hematuria (599 70) (R31 9)   22  Hypercalcemia (275 42) (E83 52)   23  Hypersomnia (780 54) (G47 10)   24  Hypothyroidism (244 9) (E03 9)   25  Itching (698 9) (L29 9)   26  Lung nodule, solitary (793 11) (R91 1)   27  Lung nodule, solitary (793 11) (R91 1)   28  Mixed hyperlipidemia with apolipoprotein E4 variant (272 2) (E78 2)   29   Need for influenza vaccination (V04 81) (Z23)   30  Need for prophylactic vaccination and inoculation against influenza (V04 81) (Z23)   31  Non-ST elevation myocardial infarction (NSTEMI), subsequent episode of care (410 72)    (I21 4)   32  Osteoarthritis, localized, knee (715 36) (M17 10)   33  Peripheral arterial disease (443 9) (I73 9)   34  Post-menopausal osteoporosis (733 01) (M81 0)   35  Routine history and physical examination of adult (V70 0) (Z00 00)   36  Sciatica (724 3) (M54 30)   37  Screening for breast cancer (V76 10) (Z12 31)   38  Screening for genitourinary condition (V81 6) (Z13 89)   39  Shortness of breath (786 05) (R06 02)   40  Sleep disorder (780 50) (G47 9)   41  Thyroid disorder (246 9) (E07 9)   42  Ulcerative colitis without complications (417 6) (M04 45)   43   Urine, incontinence, stress female (625 6) (N39 3)    Liver replaced by transplant (V42 7) (Z94 4)       Transient cerebral ischemic attack (435 9) (G45 9)       Stroke syndrome (434 91) (I63 9)       Elevated homocysteine (270 4) (E72 11)       CVA (cerebral vascular accident) (434 91) (I63 9)          Past Medical History    · _ (727 03)   · Acute upper respiratory infection (465 9) (J06 9)   · History of Acute upper respiratory infection (465 9) (J06 9)   · History of Adverse Effect Of Opioids (E935 2)   · Aftercare following surgery of the musculoskeletal system (V58 78) (Z47 89)   · Ankle pain, unspecified laterality   · Ascending cholangitis (576 1) (K83 0)   · Benign paroxysmal vertigo, unspecified laterality (386 11) (H81 10)   · Contusion With Intact Skin Surface Of Multiple Sites (924 8)   · History of Cough (786 2) (R05)   · History of Hematochezia (578 1) (K92 1)   · History of backache (V13 59) (Z87 39)   · History of cirrhosis (V12 79) (Z87 19)   · History of diarrhea (V12 79) (F70 682)   · History of infectious diarrhea (V12 79) (Z87 19)   · History of vertigo (V12 49) (Z87 898)   · Impacted cerumen, unspecified laterality (380 4) (H61 20)   · Knee Sprain (844 9)   · Limb pain (729 5) (M79 609)   · History of Liver Transplant Candidate (V42 7)   · Pelvic Fracture (808)    Surgical History    · History of Appendectomy   · History of Cataract Surgery   · History of Cholecystectomy   · History of Liver Transplant   · History of Tonsillectomy   · History of Tubal Ligation    Family History    · Family history of Crohn's Disease    · Family history of Arthritis (V17 7)   · Family history of Breast Cancer (V16 3)    Social History    · Completed 12th grade   · Daily Coffee Consumption (1  Cups/Day)   ·    · Former smoker (V15 82) (W94 951)   · No drug use   · Unknown If Ever Smoked    Current Meds   1  Aspirin EC 81 MG Oral Tablet Delayed Release; TAKE 1 TABLET DAILY; Therapy: (Recorded:14Nov2017) to Recorded   2  Calcium 600 600 MG Oral Tablet; TAKE 1 TABLET DAILY; Therapy: 59Clv0565 to Recorded   3  CycloSPORINE 25 MG Oral Capsule; 2 two times a day; Therapy: 25UMH0425 to  Requested for: 21Aug2017 Recorded   4  Eliquis 5 MG Oral Tablet; Take 1 tablet twice daily; Therapy: (Recorded:14Nov2017) to Recorded   5  Keppra 500 MG Oral Tablet; take 1 tablet twice a day; Therapy: (Recorded:14Nov2017) to Recorded   6  Levothyroxine Sodium 112 MCG Oral Tablet; take 1 tablet by mouth once daily; Therapy: 00UPF5832 to (Sunny Velez)  Requested for: 99GPW7921; Last   Rx:88Shv5369 Ordered   7  Metoprolol Tartrate 25 MG Oral Tablet; TAKE 1 TABLET TWICE DAILY; Therapy: 61BUE1863 to (Evaluate:75Tdc3844) Recorded   8  Mycophenolate Mofetil 250 MG Oral Capsule; TAKE 1 CAPSULE TWICE DAILY; Therapy: 37Dtd0995 to Recorded   9  Ocuvite TABS; TAKE 1 TABLET DAILY; Therapy: (Carolee Flynn) to Recorded   10  Omeprazole 40 MG Oral Capsule Delayed Release; Therapy: 87RSE0263 to Recorded   11  Oxybutynin Chloride 5 MG Oral Tablet; TAKE TWO TABLETS DAILY; Therapy: 52GBW7907 to Recorded   12   Pravastatin Sodium 20 MG Oral Tablet; TAKE ONE TABLET BY MOUTH ONCE DAILY AT    BEDTIME; Therapy: 49STI7676 to 04 00 14 32 96)  Requested for: 27SJT4209; Last    Rx:03Nov2017 Ordered   13  Vitamin D 01679 U CAPS; Therapy: (Recorded:27Apr2017) to Recorded    Allergies    1  Tetracyclines   2  Vancomycin   3  Prograf CAPS    Vitals   Recorded: 49JGM8286 12:09PM   Temperature 98 2 F   Heart Rate 118   Respiration 16   Systolic 572   Diastolic 70   Height 5 ft 1 in   Weight 206 lb 2 oz   BMI Calculated 38 95   BSA Calculated 1 91   O2 Saturation 97     Physical Exam    Constitutional   General appearance: No acute distress, well appearing and well nourished  no apparent respiratory distress  Ears, Nose, Mouth, and Throat   Oropharynx: Normal with no erythema, edema, exudate or lesions  Pink, no thrush or exudate  Pulmonary Lung fields are clear bilaterally  Cardiovascular   Examination of extremities for edema and/or varicosities: Abnormal   no lower extremity edema - same as before, no cords, no hematomas, no cellulitis  Carotid pulses: Normal     Abdomen   Liver and spleen: Abnormal   obese, well-healed scars, cannot palpate spleen  Lymphatic   Palpation of lymph nodes in neck: No lymphadenopathy  Musculoskeletal   Gait and station: Normal     Digits and nails: Normal without clubbing or cyanosis  Inspection/palpation of joints, bones, and muscles: Normal     Skin   Skin and subcutaneous tissue: Normal without rashes or lesions  Scattered ecchymoses on upper lower extremities - same as before, relatively good color  Neurologic   Cranial nerves: Cranial nerves 2-12 intact  Reflexes: 2+ and symmetric  Sensation: No sensory loss  Psychiatric   Orientation to person, place, and time: Normal     Mood and affect: Normal     Diabetic Foot Screen: Normal        Results/Data    Results   Radiology 11/6/17 MRI of the brain demonstrated numerous small embolic foci of acute ischemia in both hemispheres   No hemorrhage or significant mass effect  3/12/14 digital mammogram = category 2, benign  Lab Results 11/8/17 hemoglobin = 10 9 hematocrit = 34  11/7/17 BUN = 12 creatinine = 0 92  8/17/17 WBC = 4 6 hemoglobin = 12 1 hematocrit = 36 7 MCV = 86 platelet = 486 neutrophil = 73% lymphs at = 13% monocyte = 10%  4/19/17 hemoglobin = 12 0 hematocrit = 37 7  12/8/16 hemoglobin = 11 5 hematocrit = 34 9  11/10/16 hemoglobin = 11 8 hematocrit = 35 8  9/21/16 hemoglobin = 12 1 hematocrit = 37 1  8/11/16 hemoglobin = 12 0 hematocrit = 35 5  5/5/16 hemoglobin = 11 6 hematocrit = 35 9  3/11/16 hemoglobin = 12 2 hematocrit = 38 BUN = 34 creatinine = 0 91 LFTs WNL cyclosporine = 71  1/21/16 hemoglobin = 11 2 hematocrit = 34 5  1/7/16 WBC = 6 3 hemoglobin = 11 8 hematocrit = 38 platelet = 195 cyclosporine = 32  10/1/15 hemoglobin = 11 6 hematocrit = 35 6  8/28/15 hemoglobin = 11 7 hematocrit = 35 2  6/4/15 hemoglobin = 11 7 hematocrit = 35 6  4/23/15 hemoglobin = 10 9 hematocrit = 34 2  3/26/15 hemoglobin = 11 7 hematocrit = 35 9  2/19/15 H/H = 11 8/36 7  1/22/15 H/H = 11 5/35 4  12/26/14 H/H = 11 1/34 7  11/26/14 Hgb: 11 5 hematocrit= 35 8  8/14/14 hemoglobin= 11 6 hematocrit= 36 1  5/30/13 BUN = 23 creatinine = 1 1  5/29/14 iron = 67 TIBC = 217 iron saturation = 31% ferritin = 51 hemoglobin = 12 4 hematocrit = 38 2  2/27/14 WBC= 5 2 hemoglobin= 11 2 hematocrit= 34 9 platelet= 769  43/01/83 hemoglobin = 12 7 hematocrit = 41  8/26/13 WBC = 6 8 hemoglobin = 9 1hematocrit = 32 platelet = 010  7/7/71 WBC = 7 6 hemoglobin = 10 hematocrit = 33 MCV = 86 platelet = 237  9/8/64 WBC = 10 1 hemoglobin = 11 1 hematocrit = 36 platelet = 357 and  Assessment    1  Anemia of chronic disease (285 29) (D63 8)   2  Chronic cough (786 2) (R05)   3  Non-ST elevation myocardial infarction (NSTEMI), subsequent episode of care (410 72)   (I21 4)    Plan  Anemia of chronic disease    · (1) CBC/PLT/DIFF; Status:Active;  Requested for:59Wrl8557;    Perform:[facility]; Due:36Kje7726; Ordered; For:Anemia of chronic disease; Ordered By:Rainer Arellano;  Non-ST elevation myocardial infarction (NSTEMI), subsequent episode of care    · (1) CBC/PLT/DIFF; Status:Active; Requested JST:38GIE9465; Perform:[facility]; SMF:93QYH9046;ZPQCXIP;  For:Non-ST elevation myocardial infarction (NSTEMI), subsequent episode of care; Ordered By:Sagar Fortune;   · Follow Up After Tests Complete Evaluation and Treatment  Follow-up  Status: Complete   Done: 59NLY3523 02:00PM   Ordered; For: Non-ST elevation myocardial infarction (NSTEMI), subsequent episode of care; Ordered By: Amado Ricardo Performed:  Due: 26AZD0832; Last Updated By: Renetta Pretty; 11/14/2017 12:28:44 PM    Discussion/Summary    75-year-old female with history of anemia of chronic renal insufficiency/anemia of chronic disease (likely multiple etiologies)  Hemoglobin levels have been relatively good/acceptable with the RONNA over the past few years  Unfortunately patient has now developed complicatios (CNS, cardiac thromboemolic)  We discusssed options  Presently Mrs Malena Jose feels relatively well and the recent hemoglobn level was okay/acceptble  The plan is for patient to go for 2 months the without RONNA to see how far the hemoglobin level drops (or not)  We discussed what patient needs to monitor for in regard to progressive anemia  Patient is to return in 2 months with a CBC before  Mrs Malena Jose knows to call the office if she has any other hematology questions or concerns  Please carefully review your medication list and verify that the list is accurate and up-to-date  Please call the oncology office if there are medications missing from the list, medications on the list that you are not currently taking or if there is a dosage or instruction that is different from how you're taking a medication  Health Management   Digital Bilateral Screening Mammogram With CAD; every 1 year; Last 02Apr2015;  Next Due: 02Apr2016; Overdue   COLONOSCOPY; every 2 years; Last 87MNI5382; Next Due: 44NKX6692; Overdue    Thank you very much for allowing me to participate in the care of this patient  If you have any questions, please do not hesitate to contact me        Signatures   Electronically signed by : Charlie Alvarez MD; Nov 15 2017  2:07PM EST                       (Author)

## 2018-01-12 VITALS
TEMPERATURE: 98.9 F | HEART RATE: 96 BPM | RESPIRATION RATE: 14 BRPM | HEIGHT: 61 IN | DIASTOLIC BLOOD PRESSURE: 70 MMHG | BODY MASS INDEX: 38.51 KG/M2 | SYSTOLIC BLOOD PRESSURE: 128 MMHG | WEIGHT: 204 LBS | OXYGEN SATURATION: 97 %

## 2018-01-12 NOTE — MISCELLANEOUS
Message   Date: 28 Apr 2016 12:18 PM EST, Recorded By: Gaurav Ferrer, Self   Phone: [phone] (Home), [phone] (Work)   Pt called and wanted to know what provider would be in the office  I told her Dr Mirella Olivia and Clint Garrido would be here for the rest of the afternoon  She told me her phone number and told me to let Dr Mirella Olivia know that she needed to talk to them because it was extremely important  I told her that I would let him know and ask what it would be in regards to  She told that it was concerning back pain  Pt state that she and her  wanted to go on vacation for the weekend and that she would not be able to go due to her back pain  I then proceded to tell her that a pt was at the window and she continued to talk stating that she wanted me to let Nasir Reed know that  she was happy with the referral she recieved about the vet that Nasir Reed told her about  She then went on to tell me that  she always was able to take care of her dogs and is disappointed that she has to go to someone to help take care of dog  I told her that the pt was still waiting and I would have to go and she said  okay, bye  The call lasted for 7 mins  Active Problems    1  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)   2  Aftercare following surgery of the musculoskeletal system (V58 78) (Z47 89)   3  Anemia (285 9) (D64 9)   4  Anemia of chronic disease (285 29) (D63 8)   5  Ataxic gait (781 2) (R26 0)   6  Back Strain (847 9)   7  Benign essential hypertension (401 1) (I10)   8  Benign paroxysmal vertigo, unspecified laterality (386 11) (H81 10)   9  Chronic kidney insufficiency (585 9) (N18 9)   10  Chronic liver disease (571 9) (K76 9)   11  Cirrhosis (571 5) (K74 60)   12  Depression (311) (F32 9)   13  Edema (782 3) (R60 9)   14  Encounter for screening mammogram for malignant neoplasm of breast (V76 12)    (Z12 31)   15  Foot Pain (Soft Tissue) (729 5)   16   Hypercalcemia (275 42) (E83 52)   17  Hypothyroidism (244 9) (E03 9)   18  Itching (698 9) (L29 9)   19  Liver replaced by transplant (V42 7) (Z94 4)   20  Lung nodule, solitary (793 11) (R91 1)   21  Lung nodule, solitary (793 11) (R91 1)   22  Need for prophylactic vaccination and inoculation against influenza (V04 81) (Z23)   23  Osteoarthritis, localized, knee (715 36) (M17 9)   24  Peripheral arterial disease (443 9) (I73 9)   25  Post-menopausal osteoporosis (733 01) (M81 0)   26  Routine history and physical examination of adult (V70 0) (Z00 00)   27  Sciatica (724 3) (M54 30)   28  Sleep disorder (780 50) (G47 9)   29  Stroke syndrome (434 91) (I63 9)   30  Thyroid disorder (246 9) (E07 9)   31  Transient cerebral ischemic attack (435 9) (G45 9)   32  Ulcerative colitis without complications (539 0) (T09 99)   33  Urine, incontinence, stress female (625 6) (N39 3)    Current Meds   1  Budesonide ER 3 MG CP24 (Entocort EC); TAKE ONE CAPSULE BY MOUTH TWICE   DAILY; Therapy: 38JZU1454 to (Maine Staple)  Requested for: 81BMG5427; Last   Rx:95Luj1541 Ordered   2  Clopidogrel Bisulfate 75 MG Oral Tablet; TAKE 1 TABLET DAILY; Therapy: 20YBS3189 to (Evaluate:94Lhv5274); Last Rx:21Rgf0583 Ordered   3  CycloSPORINE 25 MG Oral Capsule; 3 Two Times A Day; Therapy: 45TUX9674 to  Requested for: 60YKO7771 Recorded   4  HydrOXYzine HCl - 10 MG Oral Tablet; TAKE 1 TABLET 4 times daily PRN; Therapy: 15GZC3917 to (Evaluate:71Mat0514); Last Rx:30Nov2015 Ordered   5  Levothyroxine Sodium 112 MCG Oral Tablet (Synthroid); TAKE ONE TABLET BY MOUTH   EVERY DAY AS DIRECTED; Therapy: 87FCW4236 to (Evaluate:27Jun2016)  Requested for: 20TJK8557; Last   Rx:43Gxj4737 Ordered   6  Mycophenolate Mofetil 250 MG Oral Capsule; TAKE 1 CAPSULE TWICE DAILY; Therapy: 03Sep2015 to Recorded   7  Myrbetriq 50 MG Oral Tablet Extended Release 24 Hour; Take 1 tablet daily; Therapy: 38OVI5638 to (Evaluate:89Chj0893); Last Rx:30Nov2015 Ordered   8  Ocuvite TABS; Therapy: (Recorded:03Jun2014) to Recorded   9  Pravastatin Sodium 20 MG Oral Tablet; take 1 tablet daily at bedtime; Therapy: 86YJA5615 to (Jacques Garza)  Requested for: 01Apr2016; Last   Rx:01Apr2016 Ordered   10  Tums Ultra 1000 1000 MG Oral Tablet Chewable; Therapy: (Recorded:63Ykr6007) to Recorded    Allergies    1  Tetracyclines   2  Vancomycin   3   Prograf CAPS    Signatures   Electronically signed by : Madhu Butterfield, ; Apr 28 2016 12:33PM EST                       (Author)

## 2018-01-12 NOTE — MISCELLANEOUS
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)   2  Aftercare following surgery of the musculoskeletal system (V58 78) (Z47 89)   3  Anemia (285 9) (D64 9)   4  Anemia of chronic disease (285 29) (D63 8)   5  Ataxic gait (781 2) (R26 0)   6  Back Strain (847 9)   7  Benign essential hypertension (401 1) (I10)   8  Benign paroxysmal vertigo, unspecified laterality (386 11) (H81 10)   9  Chronic cough (786 2) (R05)   10  Chronic joint pain (719 40,338 29) (M25 50,G89 29)   11  Chronic kidney insufficiency (585 9) (N18 9)   12  Chronic liver disease (571 9) (K76 9)   13  Cirrhosis (571 5) (K74 60)   14  Cyst of diaphragm (519 4) (J98 6)   15  Depression (311) (F32 9)   16  Edema (782 3) (R60 9)   17  Elevated homocysteine (270 4) (E72 11)   18  Encounter for screening mammogram for malignant neoplasm of breast (V76 12)    (Z12 31)   19  Foot Pain (Soft Tissue) (729 5)   20  Hypercalcemia (275 42) (E83 52)   21  Hypersomnia (780 54) (G47 10)   22  Hypothyroidism (244 9) (E03 9)   23  Itching (698 9) (L29 9)   24  Liver replaced by transplant (V42 7) (Z94 4)   25  Lung nodule, solitary (793 11) (R91 1)   26  Lung nodule, solitary (793 11) (R91 1)   27  Mixed hyperlipidemia with apolipoprotein E4 variant (272 2) (E78 2)   28  Need for influenza vaccination (V04 81) (Z23)   29  Need for prophylactic vaccination and inoculation against influenza (V04 81) (Z23)   30  Osteoarthritis, localized, knee (715 36) (M17 9)   31  Peripheral arterial disease (443 9) (I73 9)   32  Post-menopausal osteoporosis (733 01) (M81 0)   33  Routine history and physical examination of adult (V70 0) (Z00 00)   34   Sciatica (724 3) (M54 30) 28  Screening for breast cancer (V76 10) (Z12 39)   36  Screening for genitourinary condition (V81 6) (Z13 89)   37  Sleep disorder (780 50) (G47 9)   38  Stroke syndrome (434 91) (I63 9)   39  Thyroid disorder (246 9) (E07 9)   40  Transient cerebral ischemic attack (435 9) (G45 9)   41  Ulcerative colitis without complications (092 3) (H73 06)   42  Urine, incontinence, stress female (625 6) (N39 3)    Current Meds   1  Clopidogrel Bisulfate 75 MG Oral Tablet; TAKE 1 TABLET DAILY; Therapy: 82NSX4475 to (Evaluate:30Wre8582)  Requested for: 39QMK0644; Last   Rx:14Nov2016 Ordered   2  CycloSPORINE 25 MG Oral Capsule; 3 Two Times A Day; Therapy: 27IDT6529 to  Requested for: 71NHA4362 Recorded   3  Levothyroxine Sodium 112 MCG Oral Tablet; TAKE ONE TABLET BY MOUTH EVERY   DAY AS DIRECTED; Therapy: 72AMC3513 to (Claudell Sofia)  Requested for: 22Nov2016; Last   Rx:78Bje5278 Ordered   4  Mycophenolate Mofetil 250 MG Oral Capsule; TAKE 1 CAPSULE TWICE DAILY; Therapy: 20Eet4824 to Recorded   5  Ocuvite TABS; Therapy: (ITYTIUNZ:31WXT7190) to Recorded   6  Pravastatin Sodium 20 MG Oral Tablet; take 1 tablet daily at bedtime; Therapy: 36QSZ8891 to (Evaluate:55Ids1132)  Requested for: 91JBZ7044; Last   Rx:14Nov2016 Ordered    Allergies    1  Tetracyclines   2  Vancomycin   3  Prograf CAPS    Signatures   Electronically signed by :  Joni Minaya, ; Dec  2 2016 11:54AM EST                       (Author)

## 2018-01-13 NOTE — RESULT NOTES
Verified Results  CT CHEST W CONTRAST 29MSA4343 10:09AM Mindi Parma     Test Name Result Flag Reference   CT CHEST W CONTRAST (Report)     CT CHEST WITH IV CONTRAST     INDICATION: Nodule on chest film     COMPARISON: 03/16/2016     TECHNIQUE: CT examination of the chest was performed  100 mL of Omnipaque 350 was injected intravenously  Axial, sagittal and coronal reformatted images were submitted for interpretation  Coronal thick section MIP (maximal intensity projection) images   were also created  This examination, like all CT scans performed in the [facility], was performed utilizing techniques to minimize radiation dose exposure, including the use of iterative reconstruction and automated exposure control  FINDINGS:     LUNGS: There is an oval-shaped nodule presence at the left lung base  This appears to arise from the left hemidiaphragm  This measures 2 3 x 1 4 cm  This is a fluid density  PLEURA: See above comments  There are no pleural effusions  The right hemidiaphragm is elevated  HEART/GREAT VESSELS: Unremarkable for patient's age  MEDIASTINUM AND ZULLY: Unremarkable  CHEST WALL AND LOWER NECK: Unremarkable  VISUALIZED STRUCTURES IN THE UPPER ABDOMEN: Hiatal hernia  Atrophic left kidney  OSSEOUS STRUCTURES: No acute fracture  No destructive osseous lesion  IMPRESSION:     Small cystic structure arising from the left hemidiaphragm as described  This appears of fluid density and likely benign in origin  Follow-up in 3 months time suggested  Alternatively an MRI can always be obtained       ##sigslh##sigslh       Workstation performed: [id number]     Signed by:   Precious Telles MD   3/28/16

## 2018-01-14 VITALS
HEART RATE: 118 BPM | WEIGHT: 206.13 LBS | TEMPERATURE: 98.2 F | RESPIRATION RATE: 16 BRPM | SYSTOLIC BLOOD PRESSURE: 120 MMHG | DIASTOLIC BLOOD PRESSURE: 70 MMHG | HEIGHT: 61 IN | BODY MASS INDEX: 38.92 KG/M2 | OXYGEN SATURATION: 97 %

## 2018-01-14 VITALS
WEIGHT: 208 LBS | RESPIRATION RATE: 14 BRPM | OXYGEN SATURATION: 98 % | SYSTOLIC BLOOD PRESSURE: 142 MMHG | DIASTOLIC BLOOD PRESSURE: 68 MMHG | HEIGHT: 61 IN | BODY MASS INDEX: 39.27 KG/M2 | HEART RATE: 79 BPM

## 2018-01-14 VITALS
RESPIRATION RATE: 12 BRPM | DIASTOLIC BLOOD PRESSURE: 58 MMHG | OXYGEN SATURATION: 97 % | TEMPERATURE: 98.2 F | HEART RATE: 85 BPM | SYSTOLIC BLOOD PRESSURE: 122 MMHG | HEIGHT: 61 IN | WEIGHT: 209 LBS | BODY MASS INDEX: 39.46 KG/M2

## 2018-01-14 VITALS
SYSTOLIC BLOOD PRESSURE: 110 MMHG | HEIGHT: 61 IN | RESPIRATION RATE: 18 BRPM | HEART RATE: 78 BPM | WEIGHT: 207.5 LBS | TEMPERATURE: 98.4 F | DIASTOLIC BLOOD PRESSURE: 74 MMHG | BODY MASS INDEX: 39.18 KG/M2 | OXYGEN SATURATION: 98 %

## 2018-01-14 DIAGNOSIS — I21.4 NON-ST ELEVATION (NSTEMI) MYOCARDIAL INFARCTION (HCC): ICD-10-CM

## 2018-01-15 VITALS
WEIGHT: 206.5 LBS | SYSTOLIC BLOOD PRESSURE: 122 MMHG | HEIGHT: 61 IN | DIASTOLIC BLOOD PRESSURE: 60 MMHG | HEART RATE: 89 BPM | OXYGEN SATURATION: 98 % | BODY MASS INDEX: 38.99 KG/M2

## 2018-01-15 NOTE — MISCELLANEOUS
Assessment    1  Non-ST elevation myocardial infarction (NSTEMI), subsequent episode of care (410 72)   (I21 4)   2  CVA (cerebral vascular accident) (434 91) (I63 9)   3  Hematuria (599 70) (R31 9)    Plan  Hematuria    · 2 - Lynda TRINIDAD, Rina Barraza (Urology) Co-Management  *  Status: Hold For - Scheduling   Requested for: 39AOM5522   Ordered; For: Hematuria; Ordered By: Hannah Benz Performed:  Due: 43UAV2625  Care Summary provided  : Yes  Hypothyroidism    · Levothyroxine Sodium 112 MCG Oral Tablet (Synthroid); take 1 tablet by mouth  once daily   Rx By: Hannah Benz; Dispense: 30 Days ; #:90 TAB; Refill: 1; For: Hypothyroidism; MAXINE = N; Verified Transmission to [address] (  Stroke syndrome    · Clopidogrel Bisulfate 75 MG Oral Tablet   Rx By: Elias Cavazos; Dispense: 90 Days ; #:90 TAB; Refill: 1; For: Stroke syndrome; MAXINE = N; Sent To: [facility]; Last Updated By: Hannah Benz; 11/9/2017 3:13:07 PM   · Pravastatin Sodium 20 MG Oral Tablet; TAKE ONE TABLET BY MOUTH ONCE  DAILY AT BEDTIME   Rx By: Meño Robin; Dispense: 90 Days ; #:1 X 90 Tablet Bottle; Refill: 3; For: Stroke syndrome; MAXINE = N; Verified Transmission to [facility]; Last Updated By: Hannah Benz; 11/9/2017 3:13:07 PM  Unlinked    · Aspirin EC 81 MG Oral Tablet Delayed Release   Dispense: 0 Days ; #: Sufficient TBEC; Refill: 0; MAXINE = N; Record; Last Updated By: Hannah Benz; 11/9/2017 3:13:07 PM   · Eliquis 5 MG Oral Tablet   Dispense: 0 Days ; #: Sufficient TABS; Refill: 0; MAXINE = N; Record; Last Updated By: Hannah Benz; 11/9/2017 3:13:07 PM   · Keppra 250 MG Oral Tablet (LevETIRAcetam)   Dispense: 0 Days ; #: Sufficient TABS; Refill: 0; MAXINE = N; Record; Last Updated By: Hannah Benz; 11/9/2017 3:13:07 PM    Discussion/Summary  Discussion Summary:   Liam Soto is currently taking aspirin clopidogrel and her Eliquis  Historical data shows the intention to stop the clopidogrel   This is discussed today  Meme Beverly will stop the clopidogrel  Make appointment with Dr Aurora Arreola for further evaluation of hematuria  Keep appointments with both Neurology and Cardiology for ongoing follow-up  Counseling Documentation With Imm: The patient, patient's family was counseled regarding instructions for management, risk factor reductions, impressions, risks and benefits of treatment options, importance of compliance with treatment, lengthy visit with records review and pt and s o  education with lengthy Q&a to both their satisfaction  total time of encounter was 45 minutes  Medication SE Review and Pt Understands Tx: Possible side effects of new medications were reviewed with the patient/guardian today  The treatment plan was reviewed with the patient/guardian  The patient/guardian understands and agrees with the treatment plan      Chief Complaint  Chief Complaint Free Text Note Form: Patient here for BARTON [place] visit  Patient states that she was in hospital because she had mini strokes  /lpn      History of Present Illness  [facility] Communication [facility]: The patient is being contacted for follow-up after hospitalization and B  [place] LPN 11[birth year] [facility]  The date of admission: 11/4/17, date of discharge: 11/8/17  Communication performed and completed by   HPI: TCM Visit  She has had two back-to-back hospital admissions  The first hospital admission was on 10/29 she was discharged 10/31 the second hospital admission was 11 four she was discharged yesterday 11 a  On 1029 where he was admitted for altered mental status she presented with slurring of speech and a headache  She was diagnosed at that time with transient ischemic attack  She has a known history of paroxysmal SVT as well  Her diagnoses at the time of admission was subacute right frontal ischemic infarction, irregular narrow complex tachycardia  Neurology and Cardiology were consulted at that initial admission   Her echo showed an ejection fraction of 55 to 60% with grade 1 diastolic dysfunction without significant wall motion abnormalities or patent foramen ovale  She underwent a CTA of the head and neck with and without contrast, this showed atherosclerotic disease of the distal common carotid arteries bilaterally, mild narrowing of the right distal common carotid artery, moderate narrowing of the left  Less than 50% narrowing of the distal left cervical internal carotid artery  Moderate intracranial atherosclerotic sclerotic calcification involving the distal vertebral arteries and cavernous internal carotid arteries with mild narrowing  No occlusive disease  Extensive periventricular white matter hypoattenuation with both cerebral hemispheres consistent with extensive chronic microangiopathy  Chest x-ray showed no active pulmonary disease  Her MRI of the brain without contrast showed a tiny focus of what could represent subacute small vessel ischemia in the right frontal parietal vertex  Diffuse generalized volume loss commensurate with age and relatively advanced chronic small vessel disease  Cardiology evaluation was done as well as Neurology evaluation during hospitalization  It was felt that her CVA may have been due to a rhythm disturbance  Cardiology did place a loop recorder during that first admission to rule out underlying atrial fibrillation  She remained in stable condition during the first hospitalization had physical therapy and was walking without assistance at the time of discharge  She was again admitted on November 4th feeling weak and disoriented  She again had a CT scan of the head and neck which did not show any significant changes from the prior CT scan  Chest x-ray was again normal  She underwent another MRI of the brain at the second admission also, this showed numerous small embolic foci of acute ischemia in both hemispheres no hemorrhage or significant mass effect  There is no evidence of atrial fibrillation during either hospitalization   However due to the suspicion for embolic stroke she was started on Eliquis  She was taken off Plavix  High-dose statin was considered as she had a mild heart attack however due to her liver transplant she was placed on low-dose Lipitor and her lipid panel was acceptable during hospitalization  Elevated troponins likely due to an ST HAYDE type 2 due to underlying tachycardia  She had multiple episodes of nonsustained SVT  Her stress test showed apical lateral wall ischemia along with cavity dilation and normal left ventricular function  The plan was to continue medical management for this  Neurology did an EEG which showed epileptiform discharges  This can occur after recent ischemic infarcts however she was started on Keppra due to the abnormal EEG  Thyroid testing was within normal limits  She was discharged on Keppra Eliquis and low-dose Lipitor  At the time of this visit Geovanna Mcintosh says that she feels good  She appears to be very tired  Her partner has not noticed any further symptoms of CVA at home with Geovanna Mcintosh since discharge from [facility] denies any pain in the calves or ankles and denies any swelling in the lower extremities  She has follow-up appointments with both Neurology and Cardiology in the near future  She denies chest pain palpitations shortness of breath or chest pain  Pt reports on the first day home she did note some blood in the urine  This has not happened since and she denies dysuria or additional gyn/gu sx of concern  Geovanna Mcintosh has followed with Dr Naga Hernandez in the past for urinary tract issues  Review of Systems  Complete-Female:   Constitutional: No fever, no chills, feels well, no tiredness, no recent weight gain or weight loss  Eyes: No complaints of eye pain, no red eyes, no eyesight problems, no discharge, no dry eyes, no itching of eyes  ENT: no complaints of earache, no loss of hearing, no nose bleeds, no nasal discharge, no sore throat, no hoarseness     Cardiovascular: No complaints of slow heart rate, no fast heart rate, no chest pain, no palpitations, no leg claudication, no lower extremity edema  Respiratory: No complaints of shortness of breath, no wheezing, no cough, no SOB on exertion, no orthopnea, no PND  Gastrointestinal: No complaints of abdominal pain, no constipation, no nausea or vomiting, no diarrhea, no bloody stools  Genitourinary: as noted in HPI  Musculoskeletal: No complaints of arthralgias, no myalgias, no joint swelling or stiffness, no limb pain or swelling  Integumentary: No complaints of skin rash or lesions, no itching, no skin wounds, no breast pain or lump  Neurological: as noted in HPI, no headache, no numbness, no tingling, no confusion, no dizziness, no limb weakness, no convulsions, no fainting and no difficulty walking  Active Problems    1  Acute bacterial sinusitis (461 9) (J01 90,B96 89)   2  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)   3  Aftercare following surgery of the musculoskeletal system (V58 78) (Z47 89)   4  Anemia (285 9) (D64 9)   5  Anemia of chronic disease (285 29) (D63 8)   6  Ataxic gait (781 2) (R26 0)   7  Back Strain (847 9)   8  Benign essential hypertension (401 1) (I10)   9  Benign paroxysmal vertigo, unspecified laterality (386 11) (H81 10)   10  Chronic cough (786 2) (R05)   11  Chronic joint pain (719 40,338 29) (M25 50,G89 29)   12  Chronic kidney insufficiency (585 9) (N18 9)   13  Chronic liver disease (571 9) (K76 9)   14  Cough (786 2) (R05)   15  Cyst of diaphragm (519 4) (J98 6)   16  Cyst of diaphragm (519 4) (J98 6)   17  Depression (311) (F32 9)   18  Edema (782 3) (R60 9)   19  Elevated homocysteine (270 4) (E72 11)   20  Encounter for screening mammogram for malignant neoplasm of breast (V76 12)    (Z12 31)   21  Foot Pain (Soft Tissue) (729 5)   22  Hypercalcemia (275 42) (E83 52)   23  Hypersomnia (780 54) (G47 10)   24  Hypothyroidism (244 9) (E03 9)   25   Itching (698 9) (L29 9) 26  Liver replaced by transplant (V42 7) (Z94 4)   27  Lung nodule, solitary (793 11) (R91 1)   28  Lung nodule, solitary (793 11) (R91 1)   29  Mixed hyperlipidemia with apolipoprotein E4 variant (272 2) (E78 2)   30  Need for influenza vaccination (V04 81) (Z23)   31  Need for prophylactic vaccination and inoculation against influenza (V04 81) (Z23)   32  Osteoarthritis, localized, knee (715 36) (M17 10)   33  Peripheral arterial disease (443 9) (I73 9)   34  Post-menopausal osteoporosis (733 01) (M81 0)   35  Routine history and physical examination of adult (V70 0) (Z00 00)   36  Sciatica (724 3) (M54 30)   37  Screening for breast cancer (V76 10) (Z12 31)   38  Screening for genitourinary condition (V81 6) (Z13 89)   39  Shortness of breath (786 05) (R06 02)   40  Sleep disorder (780 50) (G47 9)   41  Stroke syndrome (434 91) (I63 9)   42  Thyroid disorder (246 9) (E07 9)   43  Transient cerebral ischemic attack (435 9) (G45 9)   44  Ulcerative colitis without complications (594 9) (K20 77)   45  Urine, incontinence, stress female (625 6) (N39 3)    Past Medical History    1  _ (727 03)   2  Acute upper respiratory infection (465 9) (J06 9)   3  History of Acute upper respiratory infection (465 9) (J06 9)   4  History of Adverse Effect Of Opioids (E935 2)   5  Aftercare following surgery of the musculoskeletal system (V58 78) (Z47 89)   6  Ankle pain, unspecified laterality   7  Ascending cholangitis (576 1) (K83 0)   8  Benign paroxysmal vertigo, unspecified laterality (386 11) (H81 10)   9  Contusion With Intact Skin Surface Of Multiple Sites (924 8)   10  History of Cough (786 2) (R05)   11  History of Hematochezia (578 1) (K92 1)   12  History of backache (V13 59) (Z87 39)   13  History of cirrhosis (V12 79) (Z87 19)   14  History of diarrhea (V12 79) (Z87 898)   15  History of infectious diarrhea (V12 79) (Z87 19)   16  History of vertigo (V12 49) (Z87 898)   17   Impacted cerumen, unspecified laterality (380  4) (H61 20)   18  Knee Sprain (844 9)   19  Limb pain (729 5) (M02 609)   20  History of Liver Transplant Candidate (V42 7)   21  Pelvic Fracture (808)    Surgical History    1  History of Appendectomy   2  History of Cataract Surgery   3  History of Cholecystectomy   4  History of Liver Transplant   5  History of Tonsillectomy   6  History of Tubal Ligation    Family History  Brother    1  Family history of Crohn's Disease  Family History    2  Family history of Arthritis (V17 7)   3  Family history of Breast Cancer (V16 3)  Family History Reviewed: The family history was reviewed and updated today  Social History    · Completed 12th grade   · Daily Coffee Consumption (1  Cups/Day)   ·    · Former smoker (Q75 14) (T65 619)   · No drug use   · Unknown If Ever Smoked  Social History Reviewed: The social history was reviewed and updated today  Current Meds   1  Aspirin EC 81 MG Oral Tablet Delayed Release; Therapy: (Recorded:09Nov2017) to Recorded   2  Calcium 600 600 MG Oral Tablet; TAKE 1 TABLET DAILY; Therapy: 69Vdi3709 to Recorded   3  Clopidogrel Bisulfate 75 MG Oral Tablet; take 1 tablet every day; Therapy: 77IMU0153 to (0489 33 97 26)  Requested for: 85GTV9495; Last   Rx:30Oct2017 Ordered   4  CycloSPORINE 25 MG Oral Capsule; 2 two times a day; Therapy: 58KMV8871 to  Requested for: 89Usp5619 Recorded   5  Eliquis 5 MG Oral Tablet; Therapy: (0664 313 06 34) to Recorded   6  Keppra 250 MG Oral Tablet; Therapy: (0664 313 06 34) to Recorded   7  Levothyroxine Sodium 112 MCG Oral Tablet; take 1 tablet by mouth once daily; Therapy: 97AIZ9519 to (Evaluate:11Dek3885)  Requested for: 98Ijr8055; Last   Rx:06Ryw6089 Ordered   8  Mycophenolate Mofetil 250 MG Oral Capsule; TAKE 1 CAPSULE TWICE DAILY; Therapy: 39Ayu3130 to Recorded   9  Ocuvite TABS; TAKE 1 TABLET DAILY; Therapy: (Abhi Li) to Recorded   10   Omeprazole 40 MG Oral Capsule Delayed Release; Therapy: 60BWB8798 to Recorded   11  Oxybutynin Chloride 5 MG Oral Tablet; Therapy: 39DYP9114 to Recorded   12  Pravastatin Sodium 20 MG Oral Tablet; TAKE ONE TABLET BY MOUTH ONCE DAILY AT    BEDTIME; Therapy: 60LUS9587 to 04 00 14 32 96)  Requested for: 25RVV2095; Last    Rx:03Nov2017 Ordered   13  Vitamin D 08244 U CAPS; Therapy: (Remi Asa) to Recorded  Medication List Reviewed: The medication list was reviewed and updated today  Allergies    1  Tetracyclines   2  Vancomycin   3  Prograf CAPS    Vitals  Signs   Recorded: 90KGB8642 01:58PM   Temperature: 97 9 F  Heart Rate: 80  Respiration: 16  Systolic: 736  Diastolic: 76  Height: 5 ft 1 in  Weight: 204 lb   BMI Calculated: 38 55  BSA Calculated: 1 91    Physical Exam    Constitutional   General appearance: No acute distress, well appearing and well nourished  Eyes   Conjunctiva and lids: No swelling, erythema or discharge  Ears, Nose, Mouth, and Throat   External inspection of ears and nose: Normal     Pulmonary   Respiratory effort: No increased work of breathing or signs of respiratory distress  Auscultation of lungs: Clear to auscultation  Cardiovascular   Auscultation of heart: Normal rate and rhythm, normal S1 and S2, without murmurs  Examination of extremities for edema and/or varicosities: Normal     Neurologic   Cranial nerves: Cranial nerves 2-12 intact  Psychiatric   Orientation to person, place, and time: Normal     Mood and affect: Normal          Health Management  Encounter for screening mammogram for malignant neoplasm of breast   Digital Bilateral Screening Mammogram With CAD; every 1 year; Last 02Apr2015; Next Due:  49Cnb4676; Overdue  Health Maintenance   COLONOSCOPY; every 2 years; Last 14XHW6955; Next Due: 82XRE4028; Overdue    Attending Note  Collaborating Physician Note: Collaborating Physician: I agree with the Advanced Practitioner note        Future Appointments    Date/Time Provider Specialty Site   06/18/2018 11:00 AM Fred Gonzalez MD [facility] 21   12/07/2017 01:00 PM Amaya Cabello, [address] [place], [place] [address]e [facility]   12/14/2017 12:00 PM Guerline Powell MD Neurology [facility] 21   11/14/2017 02:10 PM JOSE Austin   Cardiology [facility]   11/14/2017 11:30 AM Naya Sanz MD [facility]   12/26/2017 03:30 PM Nestor Sage MD [facility]     Signatures   Electronically signed by : Nelsy Padilla; Nov 9 2017  3:15PM EST                       (Author)    Electronically signed by : Gillermo Denver, DO; Nov 9 2017  3:54PM EST                       (Author)

## 2018-01-15 NOTE — MISCELLANEOUS
Message  CT of chest reviewed  Left basilar pleural cyst diminished in size to 18 mm x 9mm  I did review the CT with Dr Napoleon High  Initial CT of chest 3/16 and PET/CT 4/16  Plan  Lung nodule, solitary    · * CT CHEST WO CONTRAST; Status:Need Information - Financial Authorization;   Requested [id number];     Signatures   Electronically signed by : KARINA Julian; Nov 14 2016 12:12PM EST                       (Author)

## 2018-01-17 NOTE — RESULT NOTES
Verified Results  VAS LOWER LIMB ARTERIAL DUPLEX, COMPLETE BILATERAL/GRAFTS 58Zyq5541 09:36AM Maxime Coleman Order Number: [id number]     Test Name Result Flag Reference   VAS LOWER LIMB ARTERIAL DUPLEX, COMPLETE BILATERAL/GRAFTS (Report)     [facility]PORT   CLINICAL:   Indications: PVD, Unspecified [I73 9]  Risk Factors: The patient has history of Hyperlipidemia and Hypertension  She   has no history of Diabetes  The patient current Weight (lb) is 221 lb, BMI is   40 42 and Height (in) is 62 in  Operative History   Left knee replacement, 2010   Liver transplant   Other Procedure   Right Pressure: 128/ mm Hg, Left Pressure: 139/ mm Hg  FINDINGS:      Segment        Rig Left                        PSV PSV    Common Femoral Artery 140 215    Prox Profunda      91  64    Prox SFA        152 152    Mid SFA        133  96    Dist SFA        82  89    Proximal Pop      121 133    Distal Pop          90    Prox Post Tibial    124 112    Dist Post Tibial    87 126    Dist  Ant  Tibial   144 118    Dist Peroneal      80 125             CONCLUSION:   Impression:   RIGHT LOWER LIMB:   Diffuse atherosclerotic disease of the femoral -popliteal arterial segment and   throughout the tibial arteries  No focal stenosis noted  Collaterals present  Ankle/Brachial index: Unreliable morejon to calcified arteries  No prior  Metatarsal pressure of 114 mmHg   Great toe pressure of 103 mmHg, within the healing range      LEFT LOWER LIMB:   Diffuse atherosclerotic disease of the femoral -popliteal arterial segment and   throughout the tibial arteries  No focal stenosis noted  Collaterals present  Ankle/Brachial index: Unreliable due to calcified arteries  No prior  Metatarsal pressure of 118 mmHg   Great toe pressure of 89 mmHg, within the healing range      No prior study for comparison  Recommend repeat testing in 1year as per protocol unless otherwise indicated        SIGNATURE:   Electronically Signed by: MD ANG Solorzano on 2016-02-12 05:14:51 PM     VAS LOWER LIMB VENOUS DUPLEX STUDY, COMPLETE BILATERAL [id number] 09:36AM Martha Gray Order Number: [id number]     Test Name Result Flag Reference   VAS LOWER LIMB VENOUS DUPLEX STUDY, COMPLETE BILATERAL (Report)     [facility] REPORT   CLINICAL:   Indications: Edema, unspecified [R60 9]  Patient presented with bilateral leg   swelling for about 2 weeks  Patient on Plavix for last 3 months  Operative History:   Left knee replacement, 2010   Liver transplant   Other Procedure   Risk Factors: The patient has history of previous smoking (quit >10yrs ago)  She has no history of DVT  The patient current BMI is 40 42, Weight is 221 lb   and Height is 62 in  FINDINGS:      Segment Right      Left          Impression    Impression       CFV   Normal (Patent) Normal (Patent)             CONCLUSION:   Impression:   RIGHT LOWER LIMB: NORMAL   No evidence of acute or chronic deep vein thrombosis  No evidence of superficial thrombophlebitis noted  Doppler evaluation shows a normal response to augmentation maneuvers  LEFT LOWER LIMB: NORMAL   No evidence of acute or chronic deep vein thrombosis  No evidence of superficial thrombophlebitis noted  Doppler evaluation shows a normal response to augmentation maneuvers        SIGNATURE:   Electronically Signed by: Pura Dumont MD RPVI on 2016-02-12 05:15:01 PM

## 2018-01-18 NOTE — RESULT NOTES
Verified Results  (1) CBC/PLT/DIFF 15UQP8513 09:25AM [facility] [place]     Test Name Result Flag Reference   WBC 5 3 x10E3/uL  3 4-10 8   RBC 4 53 x10E6/uL  3 77-5 28   Hemoglobin 12 1 g/dL  11 1-15 9   Hematocrit 39 0 %  34 0-46  6   MCV 86 fL  79-97   MCH 26 7 pg  26 6-33 0   MCHC 31 0 g/dL L 31 5-35 7   RDW 15 7 % H 12 3-15 4   Platelets 211 P73S4/EQ  150-379   Neutrophils 71 %     Lymphs 16 %     Monocytes 9 %     Eos 4 %     Basos 0 %     Neutrophils (Absolute) 3 7 x10E3/uL  1 4-7 0   Lymphs (Absolute) 0 9 x10E3/uL  0 7-3 1   Monocytes(Absolute) 0 5 x10E3/uL  0 1-0 9   Eos (Absolute) 0 2 x10E3/uL  0 0-0 4   Baso (Absolute) 0 0 x10E3/uL  0 0-0 2   Immature Granulocytes 0 %     Immature Grans (Abs) 0 0 x10E3/uL  0 0-0 1     (1) COMPREHENSIVE METABOLIC PANEL 48BHU7197 39:15PM [facility] [place]     Test Name Result Flag Reference   Glucose, Serum 106 mg/dL H 65-99   BUN 21 mg/dL  8-27   Creatinine, Serum 0 86 mg/dL  0 57-1 00   eGFR If NonAfricn Am 68 mL/min/1 73  >59   eGFR If Africn Am 78 mL/min/1 73  >59   BUN/Creatinine Ratio 24  11-26   Sodium, Serum 145 mmol/L H 134-144   Potassium, Serum 4 5 mmol/L  3 5-5 2   Chloride, Serum 109 mmol/L H    Carbon Dioxide, Total 17 mmol/L L 18-29   Calcium, Serum 8 6 mg/dL L 8 7-10 3   Protein, Total, Serum 6 5 g/dL  6 0-8 5   Albumin, Serum 4 0 g/dL  3 5-4 8   Globulin, Total 2 5 g/dL  1 5-4 5   A/G Ratio 1 6  1 1-2 5   Bilirubin, Total 0 4 mg/dL  0 0-1 2   Alkaline Phosphatase, S 78 IU/L     AST (SGOT) 22 IU/L  0-40   ALT (SGPT) 17 IU/L  0-32     (1) PHOSPHORUS [id number] 09:25AM [facility] [place]     Test Name Result Flag Reference   Phosphorus, Serum 3 5 mg/dL  2 5-4 5     (1) GGT [id number] 09:25AM [facility] [place]     Test Name Result Flag Reference   GGT 57 IU/L  0-60     (1) BILIRUBIN, DIRECT [id number] 09:25AM [facility] [place]     Test Name Result Flag Reference   Bilirubin, Direct 0 10 mg/dL  0 00-0 40     (1) MAGNESIUM 62QFF2125 09:25AM [facility] [place]     Test Name Result Flag Reference   Magnesium, Serum 2 7 mg/dL H 1 6-2 3       Discussion/Summary   Summer Saravia, Your labs look ok  Your magnesium is a little elevated  If you take a magnesium suppliment cut the amount in half  MARLEY Deleon NP

## 2018-01-18 NOTE — MISCELLANEOUS
Message   Recorded as Task   Date: 11/03/2017 09:41 AM, Created By: Martina Lagunas   Task Name: Care Coordination   Assigned To: Martina Lagunas   Regarding Patient: Haylee Mccartney, Status: Active   Comment:    Kayli Baldwinnifer - 03 Nov 2017 9:41 AM     TASK CREATED  pt was recently discharged from hospital   she will need a hospital follow up with Dr Silverio Quesada  Please advise no infusion appointments until see by Dr Silverio Quesada  thanks  DarianeliLexy - 91 Nov 2017 12:33 PM     TASK EDITED  A VOICE MESSAGE WAS LEFT FOR THE PT - SHE IS [facility] FOR 11/14/17 @ 830AM  PT WAS ALSO ASKED TO CALL AND CONFIRM APPT        Active Problems    1  Acute bacterial sinusitis (461 9) (J01 90,B96 89)   2  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)   3  Aftercare following surgery of the musculoskeletal system (V58 78) (Z47 89)   4  Anemia (285 9) (D64 9)   5  Anemia of chronic disease (285 29) (D63 8)   6  Ataxic gait (781 2) (R26 0)   7  Back Strain (847 9)   8  Benign essential hypertension (401 1) (I10)   9  Benign paroxysmal vertigo, unspecified laterality (386 11) (H81 10)   10  Chronic cough (786 2) (R05)   11  Chronic joint pain (719 40,338 29) (M25 50,G89 29)   12  Chronic kidney insufficiency (585 9) (N18 9)   13  Chronic liver disease (571 9) (K76 9)   14  Cough (786 2) (R05)   15  Cyst of diaphragm (519 4) (J98 6)   16  Cyst of diaphragm (519 4) (J98 6)   17  Depression (311) (F32 9)   18  Edema (782 3) (R60 9)   19  Elevated homocysteine (270 4) (E72 11)   20  Encounter for screening mammogram for malignant neoplasm of breast (V76 12)    (Z12 31)   21  Foot Pain (Soft Tissue) (729 5)   22  Hypercalcemia (275 42) (E83 52)   23  Hypersomnia (780 54) (G47 10)   24  Hypothyroidism (244 9) (E03 9)   25  Itching (698 9) (L29 9)   26  Liver replaced by transplant (V42 7) (Z94 4)   27  Lung nodule, solitary (793 11) (R91 1)   28  Lung nodule, solitary (793 11) (R91 1)   29   Mixed hyperlipidemia with apolipoprotein E4 variant (272 2) (E78 2)   30  Need for influenza vaccination (V04 81) (Z23)   31  Need for prophylactic vaccination and inoculation against influenza (V04 81) (Z23)   32  Osteoarthritis, localized, knee (715 36) (M17 10)   33  Peripheral arterial disease (443 9) (I73 9)   34  Post-menopausal osteoporosis (733 01) (M81 0)   35  Routine history and physical examination of adult (V70 0) (Z00 00)   36  Sciatica (724 3) (M54 30)   37  Screening for breast cancer (V76 10) (Z12 31)   38  Screening for genitourinary condition (V81 6) (Z13 89)   39  Shortness of breath (786 05) (R06 02)   40  Sleep disorder (780 50) (G47 9)   41  Stroke syndrome (434 91) (I63 9)   42  Thyroid disorder (246 9) (E07 9)   43  Transient cerebral ischemic attack (435 9) (G45 9)   44  Ulcerative colitis without complications (356 5) (R78 93)   45  Urine, incontinence, stress female (625 6) (N39 3)    Current Meds   1  Calcium 600 600 MG Oral Tablet; TAKE 1 TABLET DAILY; Therapy: 05Lks1050 to Recorded   2  Clopidogrel Bisulfate 75 MG Oral Tablet; take 1 tablet every day; Therapy: 08WJG5142 to ()  Requested for: 82HPW7005; Last   Rx:30Oct2017 Ordered   3  CycloSPORINE 25 MG Oral Capsule; 2 two times a day; Therapy: 13PQL1279 to  Requested for: 15Cgi8226 Recorded   4  Levothyroxine Sodium 112 MCG Oral Tablet (Synthroid); take 1 tablet by mouth once   daily; Therapy: 92CSM6773 to (Evaluate:84Epq8351)  Requested for: 46Wqd1834; Last   Rx:04Gpu6582 Ordered   5  Mycophenolate Mofetil 250 MG Oral Capsule; TAKE 1 CAPSULE TWICE DAILY; Therapy: 99Lma3967 to Recorded   6  Ocuvite TABS; TAKE 1 TABLET DAILY; Therapy: (Alver Salaam) to Recorded   7  Omeprazole 40 MG Oral Capsule Delayed Release; Therapy: 58KVN4761 to Recorded   8  Oxybutynin Chloride 5 MG Oral Tablet; Therapy: 44QNF6167 to Recorded   9  Pravastatin Sodium 20 MG Oral Tablet; TAKE ONE TABLET BY MOUTH ONCE DAILY AT   BEDTIME;    Therapy: 30NIY4578 to (Evaluate:97Eeq8179)  Requested for: 21Sep2017; Last   Rx:21Sep2017 Ordered   10  Vitamin D 24182 U CAPS; Therapy: (Recorded:27Apr2017) to Recorded    Allergies    1  Tetracyclines   2  Vancomycin   3   Prograf CAPS    Signatures   Electronically signed by : Jory Chawla RN; Nov  3 2017 12:38PM EST                       (Author)

## 2018-01-22 VITALS
WEIGHT: 204 LBS | RESPIRATION RATE: 16 BRPM | HEART RATE: 80 BPM | BODY MASS INDEX: 38.51 KG/M2 | DIASTOLIC BLOOD PRESSURE: 76 MMHG | HEIGHT: 61 IN | SYSTOLIC BLOOD PRESSURE: 118 MMHG | TEMPERATURE: 97.9 F

## 2018-01-22 VITALS
HEART RATE: 97 BPM | TEMPERATURE: 97.3 F | HEIGHT: 61 IN | RESPIRATION RATE: 16 BRPM | SYSTOLIC BLOOD PRESSURE: 122 MMHG | OXYGEN SATURATION: 98 % | BODY MASS INDEX: 38.56 KG/M2 | DIASTOLIC BLOOD PRESSURE: 70 MMHG | WEIGHT: 204.25 LBS

## 2018-01-23 VITALS
RESPIRATION RATE: 16 BRPM | BODY MASS INDEX: 38.55 KG/M2 | WEIGHT: 204 LBS | DIASTOLIC BLOOD PRESSURE: 62 MMHG | OXYGEN SATURATION: 98 % | HEART RATE: 82 BPM | SYSTOLIC BLOOD PRESSURE: 118 MMHG

## 2018-01-23 NOTE — CONSULTS
I had the pleasure of evaluating your patient, David Letters  My full evaluation follows:      Chief Complaint  Chief Complaint:   The patient presents to the office today with Anemia of chronic kidney disease, recent complications, follow-up  History of Present Illness  47-year-old female with history of anemia (anemia of chronic renal insufficiency/anemia of chronic disease previously on Procrit)  Mrs Shoaib Luther was recently admitted to the hospital after her partner noticed she was garbling her words  Patient was found to have a CVA  Patient was also found to have a non-ST elevated MI  Mrs Shoaib Luther did not have any chest pain or pressure or other cardiac symptoms  Patient also does not have any neurologic sequelae  Patient is now on aspirin and eliquis  Patient is also on Keppra  Mrs Shoaib Luther is back for follow-up  Patient states being more tired than before  Mrs Shoaib Luther denies any problems with excessive bruising or bleeding  No fevers, chills or sweats  Mrs Shoaib Luther states that her activities have been decreased recently  Patient is on CellCept and cyclosporine - no recent issues  Appetite is +/-but no nausea vomiting  Patient denies any other GI,  or GYN issues  No shortness of breath at rest, mild dyspnea on exertion  Review of Systems    Constitutional: feeling tired, but as noted in HPI  Eyes: No complaints of eye pain, no red eyes, no eyesight problems, no discharge, no dry eyes, no itching of eyes  ENT: no complaints of earache, no loss of hearing, no nose bleeds, no nasal discharge, no sore throat, no hoarseness and no sore throat  Cardiovascular: as noted in HPI and no lower extremity edema  Respiratory: shortness of breath during exertion, but as noted in HPI and no cough  Gastrointestinal: No complaints of abdominal pain, no constipation, no nausea or vomiting, no diarrhea, no bloody stools     Genitourinary: No complaints of dysuria, no incontinence, no pelvic pain, no dysmenorrhea, no vaginal discharge or bleeding  Musculoskeletal: No complaints of arthralgias, no myalgias, no joint swelling or stiffness, no limb pain or swelling  Integumentary: No complaints of skin rash or lesions, no itching, no skin wounds, no breast pain or lump  Neurological: as noted in HPI, no confusion, no limb weakness and no difficulty walking  Psychiatric: Not suicidal, no sleep disturbance, no anxiety or depression, no change in personality, no emotional problems  Endocrine: No complaints of proptosis, no hot flashes, no muscle weakness, no deepening of the voice, no feelings of weakness  Hematologic/Lymphatic: No complaints of swollen glands, no swollen glands in the neck, does not bleed easily, does not bruise easily  Active Problems    1  Acute bacterial sinusitis (461 9) (J01 90,B96 89)   2  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)   3  Aftercare following surgery of the musculoskeletal system (V58 78) (Z47 89)   4  Anemia (285 9) (D64 9)   5  Anemia of chronic disease (285 29) (D63 8)   6  Ataxic gait (781 2) (R26 0)   7  Back Strain (847 9)   8  Benign essential hypertension (401 1) (I10)   9  Benign paroxysmal vertigo, unspecified laterality (386 11) (H81 10)   10  Chronic cough (786 2) (R05)   11  Chronic joint pain (719 40,338 29) (M25 50,G89 29)   12  Chronic kidney insufficiency (585 9) (N18 9)   13  Chronic liver disease (571 9) (K76 9)   14  Cough (786 2) (R05)   15  CVA (cerebral vascular accident) (434 91) (I63 9)   16  Cyst of diaphragm (519 4) (J98 6)   17  Cyst of diaphragm (519 4) (J98 6)   18  Depression (311) (F32 9)   19  Edema (782 3) (R60 9)   20  Elevated homocysteine (270 4) (E72 11)   21  Encounter for screening mammogram for malignant neoplasm of breast (V76 12)    (Z12 31)   22  Foot Pain (Soft Tissue) (729 5)   23  Hematuria (599 70) (R31 9)   24  Hypercalcemia (275 42) (E83 52)   25  Hypersomnia (780 54) (G47 10)   26   Hypothyroidism (244  9) (E03 9)   27  Itching (698 9) (L29 9)   28  Liver replaced by transplant (V42 7) (Z94 4)   29  Lung nodule, solitary (793 11) (R91 1)   30  Lung nodule, solitary (793 11) (R91 1)   31  Mixed hyperlipidemia with apolipoprotein E4 variant (272 2) (E78 2)   32  Need for influenza vaccination (V04 81) (Z23)   33  Need for prophylactic vaccination and inoculation against influenza (V04 81) (Z23)   34  Non-ST elevation myocardial infarction (NSTEMI), subsequent episode of care (410 72)    (I21 4)   35  NSTEMI (non-ST elevated myocardial infarction) (410 70) (I21 4)   36  Osteoarthritis, localized, knee (715 36) (M17 10)   37  Peripheral arterial disease (443 9) (I73 9)   38  Post-menopausal osteoporosis (733 01) (M81 0)   39  Routine history and physical examination of adult (V70 0) (Z00 00)   40  Sciatica (724 3) (M54 30)   41  Screening for breast cancer (V76 10) (Z12 31)   42  Screening for genitourinary condition (V81 6) (Z13 89)   43  Shortness of breath (786 05) (R06 02)   44  Sleep disorder (780 50) (G47 9)   45  Stroke syndrome (434 91) (I63 9)   46  Thyroid disorder (246 9) (E07 9)   47  Transient ischemic attack, acute (435 9) (G45 9)   48  Ulcerative colitis without complications (233 7) (I74 08)   49   Urine, incontinence, stress female (625 6) (N39 3)      Liver replaced by transplant (V42 7) (Z94 4)       Transient cerebral ischemic attack (435 9) (G45 9)       Stroke syndrome (434 91) (I63 9)       Elevated homocysteine (270 4) (E72 11)       CVA (cerebral vascular accident) (434 91) (I63 9)          Past Medical History    · _ (727 03)   · Acute upper respiratory infection (465 9) (J06 9)   · History of Acute upper respiratory infection (465 9) (J06 9)   · History of Adverse Effect Of Opioids (E935 2)   · Aftercare following surgery of the musculoskeletal system (V58 78) (Z47 89)   · Ankle pain, unspecified laterality   · Ascending cholangitis (576 1) (K83 0)   · Benign paroxysmal vertigo, unspecified laterality (386 11) (H81 10)   · Contusion With Intact Skin Surface Of Multiple Sites (924 8)   · History of Cough (786 2) (R05)   · History of Hematochezia (578 1) (K92 1)   · History of backache (V13 59) (Z87 39)   · History of cirrhosis (V12 79) (Z87 19)   · History of diarrhea (V12 79) (J44 227)   · History of infectious diarrhea (V12 79) (Z87 19)   · History of vertigo (V12 49) (R94 944)   · Impacted cerumen, unspecified laterality (380 4) (H61 20)   · Knee Sprain (844 9)   · Limb pain (729 5) (M79 609)   · History of Liver Transplant Candidate (V42 7)   · Pelvic Fracture (808)    Surgical History    · History of Appendectomy   · History of Cataract Surgery   · History of Cholecystectomy   · History of Liver Transplant   · History of Tonsillectomy   · History of Tubal Ligation    Family History    · Family history of Crohn's Disease    · Family history of Arthritis (V17 7)   · Family history of Breast Cancer (V16 3)    Social History    · Completed 12th grade   · Daily Coffee Consumption (1  Cups/Day)   ·    · Former smoker (V15 82) (A19 673)   · No drug use   · Unknown If Ever Smoked    Current Meds   1  Aspirin EC 81 MG Oral Tablet Delayed Release; TAKE 1 TABLET DAILY; Therapy: (Recorded:14Nov2017) to Recorded   2  Calcium 600 600 MG Oral Tablet; TAKE 1 TABLET DAILY; Therapy: 47Nis2227 to Recorded   3  CycloSPORINE 25 MG Oral Capsule; 2 two times a day; Therapy: 96WOJ2432 to  Requested for: 41Gwo7788 Recorded   4  Eliquis 5 MG Oral Tablet; take 1 tablet by mouth twice daily  Requested for: 05BUD4957;   Last Rx:14Nov2017 Ordered   5  Keppra 500 MG Oral Tablet; take 1 tablet twice a day; Therapy: (Recorded:14Nov2017) to Recorded   6  Levothyroxine Sodium 112 MCG Oral Tablet; take 1 tablet by mouth once daily; Therapy: 43HGH6021 to (22-85-39-05)  Requested for: 88NJP3798; Last   Rx:56Vth1871 Ordered   7   Metoprolol Tartrate 25 MG Oral Tablet; TAKE 1 TABLET TWICE DAILY; Therapy: 88TUT0163 to (Evaluate:61Nsf2994) Recorded   8  Mycophenolate Mofetil 250 MG Oral Capsule; TAKE 1 CAPSULE TWICE DAILY; Therapy: 89Pdh7146 to Recorded   9  Ocuvite TABS; TAKE 1 TABLET DAILY; Therapy: (Julio Gaytan) to Recorded   10  Omeprazole 40 MG Oral Capsule Delayed Release; Therapy: 35JQZ1161 to Recorded   11  Oxybutynin Chloride 5 MG Oral Tablet; TAKE TWO TABLETS DAILY; Therapy: 25XUJ8883 to Recorded   12  Pravastatin Sodium 20 MG Oral Tablet; TAKE ONE TABLET BY MOUTH ONCE DAILY AT    BEDTIME; Therapy: 26FDH9853 to 78 220 82 17)  Requested for: 55PYM9483; Last    Rx:03Nov2017 Ordered   13  Vitamin D 46170 U CAPS; Therapy: (Recorded:27Apr2017) to Recorded    Allergies    1  Tetracyclines   2  Vancomycin   3  Prograf CAPS    Vitals   Recorded: 43Xcg6608 04:02PM   Temperature 97 3 F   Heart Rate 97   Respiration 16   Systolic 371   Diastolic 70   Height 5 ft 1 in   Weight 204 lb 4 0 oz   BMI Calculated 38 59   BSA Calculated 1 91   O2 Saturation 98     Physical Exam    Constitutional   General appearance: No acute distress, well appearing and well nourished  no apparent respiratory distress  Ears, Nose, Mouth, and Throat   Oropharynx: Normal with no erythema, edema, exudate or lesions  Pink, no thrush or exudate  Pulmonary Lung fields are clear bilaterally  Cardiovascular   Examination of extremities for edema and/or varicosities: Abnormal   no lower extremity edema - same as before, no cords, no hematomas, no cellulitis  Carotid pulses: Normal     Abdomen   Liver and spleen: Abnormal   obese, well-healed scars, cannot palpate spleen  Lymphatic   Palpation of lymph nodes in neck: No lymphadenopathy  Musculoskeletal   Gait and station: Normal     Digits and nails: Normal without clubbing or cyanosis  Inspection/palpation of joints, bones, and muscles: Normal     Skin   Skin and subcutaneous tissue: Normal without rashes or lesions    Scattered ecchymoses on upper lower extremities - same as before, color is more pale as compared to before  Neurologic   Cranial nerves: Cranial nerves 2-12 intact  Reflexes: 2+ and symmetric  Sensation: No sensory loss  Psychiatric   Orientation to person, place, and time: Normal     Mood and affect: Normal     Diabetic Foot Screen: Normal        Results/Data    Results   Radiology 11/6/17 MRI of the brain demonstrated numerous small embolic foci of acute ischemia in both hemispheres  No hemorrhage or significant mass effect  3/12/14 digital mammogram = category 2, benign     Lab Results 12/04/2017 WBC = 5 6 hemoglobin = 11 hematocrit = 33 7 MCV = 86 platelet = 571 neutrophil = 74%  11/8/17 hemoglobin = 10 9 hematocrit = 34  11/7/17 BUN = 12 creatinine = 0 92  8/17/17 WBC = 4 6 hemoglobin = 12 1 hematocrit = 36 7 MCV = 86 platelet = 617 neutrophil = 73% lymphs at = 13% monocyte = 10%  4/19/17 hemoglobin = 12 0 hematocrit = 37 7  12/8/16 hemoglobin = 11 5 hematocrit = 34 9  11/10/16 hemoglobin = 11 8 hematocrit = 35 8  9/21/16 hemoglobin = 12 1 hematocrit = 37 1  8/11/16 hemoglobin = 12 0 hematocrit = 35 5  5/5/16 hemoglobin = 11 6 hematocrit = 35 9  3/11/16 hemoglobin = 12 2 hematocrit = 38 BUN = 34 creatinine = 0 91 LFTs WNL cyclosporine = 71  1/21/16 hemoglobin = 11 2 hematocrit = 34 5  1/7/16 WBC = 6 3 hemoglobin = 11 8 hematocrit = 38 platelet = 374 cyclosporine = 32  10/1/15 hemoglobin = 11 6 hematocrit = 35 6  8/28/15 hemoglobin = 11 7 hematocrit = 35 2  6/4/15 hemoglobin = 11 7 hematocrit = 35 6  4/23/15 hemoglobin = 10 9 hematocrit = 34 2  3/26/15 hemoglobin = 11 7 hematocrit = 35 9  2/19/15 H/H = 11 8/36 7  1/22/15 H/H = 11 5/35 4  12/26/14 H/H = 11 1/34 7  11/26/14 Hgb: 11 5 hematocrit= 35 8  8/14/14 hemoglobin= 11 6 hematocrit= 36 1  5/30/13 BUN = 23 creatinine = 1 1  5/29/14 iron = 67 TIBC = 217 iron saturation = 31% ferritin = 51 hemoglobin = 12 4 hematocrit = 38 2  2/27/14 WBC= 5 2 hemoglobin= 11 2 hematocrit= 34 9 platelet= 082  65/40/56 hemoglobin = 12 7 hematocrit = 41  8/26/13 WBC = 6 8 hemoglobin = 9 1hematocrit = 32 platelet = 558  4/8/39 WBC = 7 6 hemoglobin = 10 hematocrit = 33 MCV = 86 platelet = 087  8/1/35 WBC = 10 1 hemoglobin = 11 1 hematocrit = 36 platelet = 558 and  Assessment    1  Anemia of chronic disease (285 29) (D63 8)   2  CVA (cerebral vascular accident) (434 91) (I63 9)   3  NSTEMI (non-ST elevated myocardial infarction) (410 70) (I21 4)    Plan  Anemia of chronic disease    · (1) CBC/PLT/DIFF; Status:Active; Requested MLR:38WEG2840; Perform:[facility]; RONNA:49AYI3776;UHBSCPF; For:Anemia of chronic disease; Ordered By:Sagar Rausch;   · (1) FERRITIN; Status:Active; Requested QFJ:01BDZ7306; Perform:[facility]; CDQ:28MCY9198;JYHNCDB; For:Anemia of chronic disease; Ordered By:aSgar Rausch;   · (1) IRON PANEL; Status:Active; Requested EGH:05WIU9812; Perform:[facility]; DHE:27RMY4952;UWVKZVG; For:Anemia of chronic disease; Ordered By:Sagar Rausch;   · Follow Up After Tests Complete Evaluation and Treatment  Follow-up  Status: Complete   Done: 09JRX0825 03:00PM   Ordered; For: Anemia of chronic disease; Ordered By: Aleksey Lorenzo Performed:  Due: 94SKR9776; Last Updated By: Niranjan Wheeler; 12/12/2017 4:19:39 PM    Discussion/Summary    61-year-old female with history of anemia of chronic renal insufficiency/anemia of chronic disease (likely multiple etiologies)  Hemoglobin levels had previously been good/acceptable with RONNA treatment  Unfortunately patient recently suffered a CVA and MI  Patient is on aspirin and eliquis -cardiology and neurology follow-ups are ongoing  The hemoglobin level is actually slightly better compared to November 14, 2017  Patient states having more fatigue -there are likely other etiologies  We rediscussed the serious potential cardiac, thrombolic and embolic complications of an erythroid stimulating agent  Patient is to return in 1 month with a repeat CBC and iron studies before  We re-discussed what patient needs to monitor for in regard to progressive anemia  Mrs Santo Hurt knows to call the office if she has any other hematology questions or concerns  Please carefully review your medication list and verify that the list is accurate and up-to-date  Please call the oncology office if there are medications missing from the list, medications on the list that you are not currently taking or if there is a dosage or instruction that is different from how you're taking a medication  Health Management   Digital Bilateral Screening Mammogram With CAD; every 1 year; Last 02Apr2015; Next Due:  02Apr2016; Overdue   COLONOSCOPY; every 2 years; Last 23TWE2868; Next Due: 28HNQ0526; Overdue    Thank you very much for allowing me to participate in the care of this patient  If you have any questions, please do not hesitate to contact me        Signatures   Electronically signed by : Jo Ann Francis MD; Dec 12 2017  6:27PM EST                       (Author)

## 2018-01-23 NOTE — RESULT NOTES
Verified Results  * XR CHEST PA & LATERAL 26Oct2017 11:48AM Pelonelsi Schwalbe     Test Name Result Flag Reference   XR CHEST PA & LATERAL (Report)     CHEST      INDICATION: Cough for weeks  COMPARISON: 12/29/2016     VIEWS: Frontal and lateral projections     IMAGES: 2     FINDINGS:        Cardiomediastinal silhouette appears unremarkable  Mild elevation of the right hemidiaphragm is stable  The lungs are clear  No pneumothorax or pleural effusion  No evidence of heart failure  Stable compression of T12  IMPRESSION:     No active pulmonary disease         Workstation performed: [id number]     Signed by:   Lynn Be MD   10/28/17     SPIROMETRY W/O BRONCHO- POC 27Apr2017 12:00AM Kait Mora     Test Name Result Flag Reference   Spirometry [id number]

## 2018-01-24 VITALS
TEMPERATURE: 97.3 F | RESPIRATION RATE: 16 BRPM | DIASTOLIC BLOOD PRESSURE: 68 MMHG | BODY MASS INDEX: 38.33 KG/M2 | SYSTOLIC BLOOD PRESSURE: 118 MMHG | HEIGHT: 61 IN | HEART RATE: 80 BPM | WEIGHT: 203 LBS

## 2018-01-24 VITALS
HEIGHT: 61 IN | SYSTOLIC BLOOD PRESSURE: 122 MMHG | RESPIRATION RATE: 18 BRPM | WEIGHT: 203 LBS | TEMPERATURE: 97.8 F | OXYGEN SATURATION: 98 % | HEART RATE: 76 BPM | BODY MASS INDEX: 38.33 KG/M2 | DIASTOLIC BLOOD PRESSURE: 64 MMHG

## 2018-01-30 DIAGNOSIS — I48.21 PERMANENT ATRIAL FIBRILLATION (HCC): Primary | ICD-10-CM

## 2018-01-31 DIAGNOSIS — E03.9 HYPOTHYROIDISM, UNSPECIFIED TYPE: Primary | ICD-10-CM

## 2018-01-31 RX ORDER — LEVOTHYROXINE SODIUM 112 UG/1
TABLET ORAL
Qty: 90 TABLET | Refills: 3 | Status: SHIPPED | OUTPATIENT
Start: 2018-01-31 | End: 2019-06-14 | Stop reason: SDUPTHER

## 2018-02-05 ENCOUNTER — OFFICE VISIT (OUTPATIENT)
Dept: CARDIOLOGY CLINIC | Facility: CLINIC | Age: 73
End: 2018-02-05
Payer: MEDICARE

## 2018-02-05 VITALS
OXYGEN SATURATION: 100 % | BODY MASS INDEX: 37.54 KG/M2 | HEIGHT: 62 IN | DIASTOLIC BLOOD PRESSURE: 56 MMHG | HEART RATE: 78 BPM | SYSTOLIC BLOOD PRESSURE: 120 MMHG | WEIGHT: 204 LBS

## 2018-02-05 DIAGNOSIS — I48.0 PAROXYSMAL ATRIAL FIBRILLATION (HCC): ICD-10-CM

## 2018-02-05 DIAGNOSIS — I21.A1 NON-ST ELEVATION MYOCARDIAL INFARCTION (NSTEMI), TYPE 2: ICD-10-CM

## 2018-02-05 DIAGNOSIS — Z79.899 LONG-TERM USE OF IMMUNOSUPPRESSANT MEDICATION: Chronic | ICD-10-CM

## 2018-02-05 DIAGNOSIS — E78.00 PURE HYPERCHOLESTEROLEMIA: ICD-10-CM

## 2018-02-05 DIAGNOSIS — Z94.4 LIVER TRANSPLANTED (HCC): Primary | ICD-10-CM

## 2018-02-05 DIAGNOSIS — G45.1 HEMISPHERIC CAROTID ARTERY SYNDROME: ICD-10-CM

## 2018-02-05 DIAGNOSIS — I47.1 PAROXYSMAL SVT (SUPRAVENTRICULAR TACHYCARDIA) (HCC): ICD-10-CM

## 2018-02-05 PROCEDURE — 99214 OFFICE O/P EST MOD 30 MIN: CPT | Performed by: INTERNAL MEDICINE

## 2018-02-05 RX ORDER — PRAVASTATIN SODIUM 20 MG
1 TABLET ORAL
COMMUNITY
Start: 2015-10-30 | End: 2018-04-02 | Stop reason: SDUPTHER

## 2018-02-05 NOTE — PROGRESS NOTES
Cardiology Follow Up    [facility]  1945  [id number]  [facility]  [facility] [address] [id number]    Interval History: 78-year-old woman with a history of liver transplant, multiple CVA, nstemi likely typ2, chronic anemia receiving Procrit injections with recent prolonged hospitalization secondary to recurrent CVA  She has been placed on novel oral anticoagulation and has not had a further event  Her loop recorder was interrogated in the hospital and did not show any evidence of atrial fibrillation  She reports note trouble with her gait or unilateral weakness  She denies having any chest tightness  She denies having significant bleeding or bruising current the  She denies having any falls  Her hospitalization records were reviewed with      February 5, 2018:  She denies having significant bleeding or bruising on anticoagulation  She denies having any recurrence of unilateral weakness or speech difficulty  She denies having any chest pain  Her blood pressures been well controlled      Patient Active Problem List   Diagnosis    Bronchitis    [facility]    Hypothyroidism    Long-term use of immunosuppressant medication    Anemia    TIA (transient ischemic attack)    HLD (hyperlipidemia)    Acute ischemic stroke (HCC)    Paroxysmal SVT (supraventricular tachycardia) (HCC)    Altered mental status    Cerebrovascular accident (CVA) due to embolism ([facility] )    Non-ST elevation myocardial infarction (NSTEMI), type 2 ([facility] )    Abnormal EEG     Past Medical History:   Diagnosis Date    Anemia     Anxiety     Arthritis     Disease of thyroid gland     GERD (gastroesophageal reflux disease)     History of transfusion     Osteoporosis      Social History     Social History    Marital status:      Spouse name: N/A    Number of children: N/A    Years of education: N/A     Occupational History    Not on file  Social History Main Topics    Smoking status: Former Smoker     Quit date: 1/21/1975    Smokeless tobacco: Never Used    Alcohol use No    Drug use: No    Sexual activity: No     Other Topics Concern    Not on file     Social History Narrative    No narrative on file      Family History   Problem Relation Age of Onset    Cancer Mother      breast    Cancer Father      Past Surgical History:   Procedure Laterality Date    ABDOMINAL SURGERY      APPENDECTOMY      BLADDER AUGMENTATION N/A     CHOLECYSTECTOMY N/A     COLONOSCOPY N/A 4/20/2017    Procedure: COLONOSCOPY;  Surgeon: Ronald Vasquez MD;  Location: [facility] GI LAB; Service:     ESOPHAGOGASTRODUODENOSCOPY N/A 3/31/2016    Procedure: ESOPHAGOGASTRODUODENOSCOPY (EGD); Surgeon: Ronald Vasquez MD;  Location: [facility] GI LAB; Service:     ESOPHAGOGASTRODUODENOSCOPY N/A 4/20/2017    Procedure: ESOPHAGOGASTRODUODENOSCOPY (EGD); Surgeon: Ronald Vasquez MD;  Location: [facility] GI LAB;   Service:     EYE SURGERY      cataracts removed both eyes lens implant    HERNIA REPAIR      JOINT REPLACEMENT      left knee replacement    LIVER TRANSPLANTATION N/A     TONSILECTOMY AND ADNOIDECTOMY N/A        Current Outpatient Prescriptions:     apixaban (ELIQUIS) 5 mg, Take 1 tablet by mouth 2 (two) times a day, Disp: 60 tablet, Rfl: 0    apixaban (ELIQUIS) 5 mg, Take 1 tablet (5 mg total) by mouth 2 (two) times a day, Disp: 28 tablet, Rfl: 0    aspirin (ECOTRIN LOW STRENGTH) 81 mg EC tablet, Take 1 tablet by mouth daily, Disp: 30 tablet, Rfl: 0    calcium carbonate (OS-KENDRICK) 600 MG tablet, Take 600 mg by mouth 2 (two) times a day with meals, Disp: , Rfl:     cycloSPORINE non-modified (SandIMMUNE) 25 mg capsule, Take 50 mg by mouth 2 (two) times a day , Disp: , Rfl:     levETIRAcetam (KEPPRA) 500 mg tablet, Take 1 tablet by mouth every 12 (twelve) hours, Disp: 60 tablet, Rfl: 0    levothyroxine 112 mcg tablet, TAKE 1 TABLET EVERY DAY AS DIRECTED, Disp: 90 tablet, Rfl: 3    metoprolol tartrate (LOPRESSOR) 25 mg tablet, Take 1 5 tablets by mouth every 12 (twelve) hours, Disp: 90 tablet, Rfl: 0    mycophenolate (CELLCEPT) 250 mg capsule, Take 250 mg by mouth 2 (two) times a day 3 tabs each dose=750 mg , Disp: , Rfl:     omeprazole (PriLOSEC) 40 MG capsule, Take 40 mg by mouth daily, Disp: , Rfl:     oxybutynin (DITROPAN) 5 mg tablet, Take 5 mg by mouth 2 (two) times a day  , Disp: , Rfl:     pravastatin (PRAVACHOL) 20 mg tablet, Take 1 tablet by mouth daily at bedtime, Disp: , Rfl:   Allergies   Allergen Reactions    Tetracyclines & Related Hives    Vancomycin Other (See Comments) and Itching     Reaction Date: [id number]; Pt unsure of reaction    Prograf [Tacrolimus] Anxiety     Reaction Date: [id number]; Review of Systems:  Review of Systems   Constitutional: Negative  Negative for activity change, appetite change, chills, diaphoresis, fatigue, fever and unexpected weight change  HENT: Negative  Negative for congestion, dental problem, drooling, ear discharge, ear pain, facial swelling, hearing loss, mouth sores, nosebleeds, postnasal drip, rhinorrhea, sinus pain, sinus pressure, sneezing, sore throat, tinnitus, trouble swallowing and voice change  Eyes: Negative  Negative for photophobia, pain, redness, itching and visual disturbance  Respiratory: Negative  Negative for apnea, cough, choking, chest tightness, shortness of breath, wheezing and stridor  Cardiovascular: Negative  Negative for chest pain, palpitations and leg swelling  Gastrointestinal: Negative  Negative for abdominal distention, abdominal pain, anal bleeding, blood in stool, constipation, diarrhea, nausea, rectal pain and vomiting  Endocrine: Negative  Negative for cold intolerance, heat intolerance, polydipsia, polyphagia and polyuria  Genitourinary: Negative    Negative for decreased urine volume, difficulty urinating, dyspareunia, dysuria, enuresis, flank pain, frequency, genital sores, hematuria, menstrual problem, pelvic pain, urgency, vaginal bleeding, vaginal discharge and vaginal pain  Musculoskeletal: Negative  Negative for arthralgias, back pain, gait problem, joint swelling, myalgias, neck pain and neck stiffness  Skin: Negative  Negative for color change, pallor, rash and wound  Allergic/Immunologic: Negative  Negative for environmental allergies, food allergies and immunocompromised state  Neurological: Negative  Negative for dizziness, tremors, seizures, syncope, facial asymmetry, speech difficulty, weakness, light-headedness, numbness and headaches  Hematological: Negative  Negative for adenopathy  Does not bruise/bleed easily  Psychiatric/Behavioral: Negative  Negative for agitation, behavioral problems, confusion, decreased concentration, dysphoric mood, hallucinations, self-injury, sleep disturbance and suicidal ideas  The patient is not nervous/anxious and is not hyperactive  All other systems reviewed and are negative  Vitals:    02/05/18 1522   BP: 120/56   BP Location: Left arm   Patient Position: Sitting   Cuff Size: Large   Pulse: 78   SpO2: 100%   Weight: 92 5 kg (204 lb)   Height: 5' 2" (1 575 m)     Physical Exam:  Physical Exam   Constitutional: She is oriented to person, place, and time  She appears well-developed and well-nourished  No distress  HENT:   Head: Normocephalic and atraumatic  Right Ear: External ear normal    Left Ear: External ear normal    Eyes: Conjunctivae are normal  Pupils are equal, round, and reactive to light  Right eye exhibits no discharge  Left eye exhibits no discharge  No scleral icterus  Neck: Normal range of motion  Neck supple  No JVD present  No tracheal deviation present  No thyromegaly present  Cardiovascular: Normal rate and regular rhythm  Exam reveals gallop  Exam reveals no friction rub  Murmur heard    Pulmonary/Chest: Effort normal and breath sounds normal  No stridor  No respiratory distress  She has no wheezes  She has no rales  She exhibits no tenderness  Abdominal: Soft  Bowel sounds are normal  She exhibits distension  She exhibits no mass  There is no tenderness  There is no rebound and no guarding  Musculoskeletal: Normal range of motion  She exhibits no edema, tenderness or deformity  Neurological: She is alert and oriented to person, place, and time  She has normal reflexes  No cranial nerve deficit  She exhibits normal muscle tone  Coordination normal    Skin: Skin is warm and dry  No rash noted  She is not diaphoretic  No erythema  No pallor  Psychiatric: She has a normal mood and affect  Her behavior is normal  Judgment and thought content normal    Nursing note and vitals reviewed  1  Liver transplanted ([facility] )     2  Non-ST elevation myocardial infarction (NSTEMI), type 2 (HCC)     3  Paroxysmal SVT (supraventricular tachycardia) ([facility] )     4  Hemispheric carotid artery syndrome     5  Pure hypercholesterolemia     6  Long-term use of immunosuppressant medication          Atrial fibrillation--continue Eliquis therapy plus metoprolol    CVA-continue Eliquis plus aspirin 81 mg with food    Hyperlipidemia-continue pravastatin statin 20 mg daily    Liver transplant-continue immunosuppressants    Statin cleared by hepatology      Reid Bermudez  Please call with any questions or suggestions

## 2018-03-03 PROBLEM — J98.6: Status: ACTIVE | Noted: 2017-04-27

## 2018-03-05 DIAGNOSIS — I47.1 PSVT (PAROXYSMAL SUPRAVENTRICULAR TACHYCARDIA) (HCC): Primary | ICD-10-CM

## 2018-03-07 NOTE — PROGRESS NOTES
"  Discussion/Summary  Normal device function      Results/Data  Cardiac Device In Clinic [id number] 06:34PM DokDok     Test Name Result Flag Reference   MISCELLANEOUS COMMENT      LOOP INTERROGATED IN THE [facility] OFFICE: BATTERY STATUS "GOOD " R WAVE = 0 21 MV  NO PATIENT OR DEVICE ACTIVATED EPISODES  NORMAL DEVICE FUNCTION  RG   Cardiac Electrophysiology Report      [id number]  pdf   DEVICE TYPE Monitor       Cardiac Electrophysiology Report [id number] 06:34PM DokDok     Test Name Result Flag Reference   Cardiac Electrophysiology Report      [id number]  pdf     Signatures   Electronically signed by : Gaurang Hernandez, ; Dec 11 2017  1:47PM EST                       (Author)    Electronically signed by : JOSE Calles ; Jan 22 2018 11:08PM EST                       (Author)    "

## 2018-03-08 ENCOUNTER — PROCEDURE VISIT (OUTPATIENT)
Dept: FAMILY MEDICINE CLINIC | Facility: CLINIC | Age: 73
End: 2018-03-08
Payer: MEDICARE

## 2018-03-08 VITALS
TEMPERATURE: 97.2 F | SYSTOLIC BLOOD PRESSURE: 136 MMHG | DIASTOLIC BLOOD PRESSURE: 60 MMHG | BODY MASS INDEX: 37.91 KG/M2 | WEIGHT: 206 LBS | HEART RATE: 76 BPM | HEIGHT: 62 IN | RESPIRATION RATE: 20 BRPM

## 2018-03-08 DIAGNOSIS — E03.9 HYPOTHYROIDISM, UNSPECIFIED TYPE: ICD-10-CM

## 2018-03-08 DIAGNOSIS — E78.00 PURE HYPERCHOLESTEROLEMIA: ICD-10-CM

## 2018-03-08 DIAGNOSIS — Z94.4 LIVER TRANSPLANTED (HCC): ICD-10-CM

## 2018-03-08 DIAGNOSIS — K51.90 ULCERATIVE COLITIS WITHOUT COMPLICATIONS, UNSPECIFIED LOCATION (HCC): ICD-10-CM

## 2018-03-08 DIAGNOSIS — I10 BENIGN ESSENTIAL HYPERTENSION: Primary | ICD-10-CM

## 2018-03-08 PROCEDURE — 99215 OFFICE O/P EST HI 40 MIN: CPT | Performed by: INTERNAL MEDICINE

## 2018-03-08 RX ORDER — LEVOFLOXACIN 500 MG/1
1 TABLET, FILM COATED ORAL DAILY
COMMUNITY
Start: 2018-02-28 | End: 2018-03-13

## 2018-03-08 NOTE — PROGRESS NOTES
Subjective:      Patient ID: Ze Flores is a 74 y o  female  Chief Complaint   Patient presents with    Follow-up     medication review  [facility]       Here for NP evaluation  Previously a patient at [facility]  Has history of liver transplant done at [facility]  Sees transplant doctor there once a year in July  Gets bloodwork done through them every 2 months and will have these forwarded to us  Sees Dr Melburn Canavan (hematology) next week for follow up  Has history of ulcerative colitis and is currentlyon no meds, no symptoms currently  Sees Dr Jasbir Kelly for history of MI and SVT  Has a loop recorder and gets this checked next week  Denies symptoms currently  Feels a little fatigued in the afternoons but otherwise is doing well  The following portions of the patient's history were reviewed and updated as appropriate: allergies, current medications, past family history, past medical history, past social history, past surgical history and problem list     Review of Systems   Constitutional: Positive for fatigue  Respiratory: Negative  Cardiovascular: Negative  Gastrointestinal: Negative  Current Outpatient Prescriptions   Medication Sig Dispense Refill    apixaban (ELIQUIS) 5 mg Take 1 tablet (5 mg total) by mouth 2 (two) times a day 28 tablet 0    aspirin (ECOTRIN LOW STRENGTH) 81 mg EC tablet Take 1 tablet by mouth daily 30 tablet 0    calcium carbonate (OS-KENDRICK) 600 MG tablet Take 600 mg by mouth 2 (two) times a day with meals      cycloSPORINE non-modified (SandIMMUNE) 25 mg capsule Take 50 mg by mouth 2 (two) times a day        levETIRAcetam (KEPPRA) 500 mg tablet Take 1 tablet by mouth every 12 (twelve) hours 60 tablet 0    levofloxacin (LEVAQUIN) 500 mg tablet Take 1 tablet by mouth daily 2 days left      levothyroxine 112 mcg tablet TAKE 1 TABLET EVERY DAY AS DIRECTED 90 tablet 3    metoprolol tartrate (LOPRESSOR) 25 mg tablet Take 1 5 tablets (37 5 mg total) by mouth every 12 (twelve) hours 90 tablet 3    mycophenolate (CELLCEPT) 250 mg capsule Take 250 mg by mouth 2 (two) times a day 3 tabs each dose=750 mg       omeprazole (PriLOSEC) 40 MG capsule Take 40 mg by mouth daily      oxybutynin (DITROPAN) 5 mg tablet Take 5 mg by mouth 2 (two) times a day        pravastatin (PRAVACHOL) 20 mg tablet Take 1 tablet by mouth daily at bedtime      apixaban (ELIQUIS) 5 mg Take 1 tablet by mouth 2 (two) times a day 60 tablet 0     No current facility-administered medications for this visit  Objective:    /60   Pulse 76   Temp (!) 97 2 °F (36 2 °C)   Resp 20   Ht 5' 2" (1 575 m)   Wt 93 4 kg (206 lb)   BMI 37 68 kg/m²        Physical Exam   Constitutional: She is oriented to person, place, and time  She appears well-developed and well-nourished  obese   HENT:   Head: Normocephalic and atraumatic  Eyes: Conjunctivae are normal  Pupils are equal, round, and reactive to light  Neck: Neck supple  No JVD present  No thyromegaly present  Cardiovascular: Normal rate, regular rhythm, normal heart sounds and intact distal pulses  Pulmonary/Chest: Effort normal and breath sounds normal  She has no wheezes  She has no rales  Abdominal: Soft  Bowel sounds are normal  She exhibits no distension  There is no guarding  Musculoskeletal: Normal range of motion  She exhibits no edema  Neurological: She is alert and oriented to person, place, and time  Skin: Skin is warm and dry  No rash noted  Assessment/Plan:    Ulcerative colitis without complications (HCC)  Ulcerative colitis is currently asymptomatic  Liver transplanted [facility])  Follows with the transplant clinic at [facility]  Asked patient to have her bimonthly bloodwork forwarded here  Hypothyroidism  Reviewed recent labs and hypothyroidism is well controlled  Continue levothyroxine  Benign essential hypertension  Stable, continue metoprolol      HLD (hyperlipidemia)  Labs were reviewed and lipids are favorable  Continue pravastatin  Diagnoses and all orders for this visit:    Benign essential hypertension    Liver transplanted ([email or web address] )    Hypothyroidism, unspecified type    Pure hypercholesterolemia    Ulcerative colitis without complications, unspecified location ([place] [address] )    Other orders  -     levofloxacin (LEVAQUIN) 500 mg tablet; Take 1 tablet by mouth daily 2 days left          Return in about 3 months (around 6/8/2018)         Hector Bradley MD

## 2018-03-08 NOTE — ASSESSMENT & PLAN NOTE
Follows with the transplant clinic at [facility]  Asked patient to have her bimonthly bloodwork forwarded here

## 2018-03-11 DIAGNOSIS — D63.8 ANEMIA IN OTHER CHRONIC DISEASES CLASSIFIED ELSEWHERE: ICD-10-CM

## 2018-03-13 ENCOUNTER — HOSPITAL ENCOUNTER (EMERGENCY)
Facility: HOSPITAL | Age: 73
Discharge: HOME | End: 2018-03-13
Attending: EMERGENCY MEDICINE | Admitting: EMERGENCY MEDICINE
Payer: MEDICARE

## 2018-03-13 ENCOUNTER — APPOINTMENT (EMERGENCY)
Dept: RADIOLOGY | Facility: HOSPITAL | Age: 73
End: 2018-03-13
Payer: MEDICARE

## 2018-03-13 ENCOUNTER — LAB (OUTPATIENT)
Dept: LAB | Facility: HOSPITAL | Age: 73
End: 2018-03-13
Attending: INTERNAL MEDICINE
Payer: MEDICARE

## 2018-03-13 ENCOUNTER — TRANSCRIBE ORDERS (OUTPATIENT)
Dept: ADMINISTRATIVE | Facility: HOSPITAL | Age: 73
End: 2018-03-13

## 2018-03-13 VITALS
RESPIRATION RATE: 18 BRPM | BODY MASS INDEX: 37.91 KG/M2 | SYSTOLIC BLOOD PRESSURE: 150 MMHG | WEIGHT: 206 LBS | OXYGEN SATURATION: 97 % | TEMPERATURE: 97.9 F | HEART RATE: 78 BPM | HEIGHT: 62 IN | DIASTOLIC BLOOD PRESSURE: 65 MMHG

## 2018-03-13 DIAGNOSIS — S00.03XA HEMATOMA OF SCALP, INITIAL ENCOUNTER: ICD-10-CM
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
## 2018-03-13 DIAGNOSIS — D63.8 CHRONIC DISEASE ANEMIA: ICD-10-CM

## 2018-03-13 DIAGNOSIS — D63.8 CHRONIC DISEASE ANEMIA: Primary | ICD-10-CM

## 2018-03-13 DIAGNOSIS — S09.90XA CLOSED HEAD INJURY, INITIAL ENCOUNTER: ICD-10-CM

## 2018-03-13 LAB
ALBUMIN SERPL BCP-MCNC: 3.5 G/DL (ref 3.5–5)
ALP SERPL-CCNC: 136 U/L (ref 46–116)
ALT SERPL W P-5'-P-CCNC: 33 U/L (ref 12–78)
ANION GAP SERPL CALCULATED.3IONS-SCNC: 8 MMOL/L (ref 4–13)
APTT PPP: 29 SECONDS (ref 23–35)
AST SERPL W P-5'-P-CCNC: 53 U/L (ref 5–45)
BACTERIA UR QL AUTO: ABNORMAL /HPF
BASOPHILS # BLD AUTO: 0 THOUSANDS/ΜL (ref 0–0.1)
BASOPHILS # BLD AUTO: 0.02 THOUSANDS/ΜL (ref 0–0.1)
BASOPHILS NFR BLD AUTO: 0 % (ref 0–1)
BASOPHILS NFR BLD AUTO: 1 % (ref 0–1)
BILIRUB SERPL-MCNC: 0.44 MG/DL (ref 0.2–1)
BILIRUB UR QL STRIP: NEGATIVE
BUN SERPL-MCNC: 33 MG/DL (ref 5–25)
CALCIUM SERPL-MCNC: 8.9 MG/DL (ref 8.3–10.1)
CHLORIDE SERPL-SCNC: 106 MMOL/L (ref 100–108)
CLARITY UR: CLEAR
CO2 SERPL-SCNC: 21 MMOL/L (ref 21–32)
COLOR UR: YELLOW
COLOR, POC: YELLOW
CREAT SERPL-MCNC: 1 MG/DL (ref 0.6–1.3)
EOSINOPHIL # BLD AUTO: 0.3 THOUSAND/ΜL (ref 0–0.61)
EOSINOPHIL # BLD AUTO: 0.31 THOUSAND/ΜL (ref 0–0.61)
EOSINOPHIL NFR BLD AUTO: 6 % (ref 0–6)
EOSINOPHIL NFR BLD AUTO: 6 % (ref 0–6)
ERYTHROCYTE [DISTWIDTH] IN BLOOD BY AUTOMATED COUNT: 13.5 % (ref 11.6–15.1)
ERYTHROCYTE [DISTWIDTH] IN BLOOD BY AUTOMATED COUNT: 14.2 % (ref 11.6–15.1)
FERRITIN SERPL-MCNC: 48 NG/ML (ref 8–388)
GFR SERPL CREATININE-BSD FRML MDRD: 56 ML/MIN/1.73SQ M
GLUCOSE SERPL-MCNC: 100 MG/DL (ref 65–140)
GLUCOSE UR STRIP-MCNC: NEGATIVE MG/DL
HCT VFR BLD AUTO: 31.6 % (ref 37–47)
HCT VFR BLD AUTO: 35.1 % (ref 34.8–46.1)
HGB BLD-MCNC: 10.2 G/DL (ref 12–16)
HGB BLD-MCNC: 11.3 G/DL (ref 11.5–15.4)
HGB UR QL STRIP.AUTO: ABNORMAL
HYALINE CASTS #/AREA URNS LPF: ABNORMAL /LPF
INR PPP: 1.26 (ref 0.86–1.16)
IRON SATN MFR SERPL: 21 %
IRON SERPL-MCNC: 53 UG/DL (ref 50–170)
KETONES UR STRIP-MCNC: NEGATIVE MG/DL
LEUKOCYTE ESTERASE UR QL STRIP: NEGATIVE
LYMPHOCYTES # BLD AUTO: 0.89 THOUSANDS/ΜL (ref 0.6–4.47)
LYMPHOCYTES # BLD AUTO: 0.9 THOUSANDS/ΜL (ref 0.6–4.47)
LYMPHOCYTES NFR BLD AUTO: 16 % (ref 14–44)
LYMPHOCYTES NFR BLD AUTO: 16 % (ref 14–44)
MCH RBC QN AUTO: 27.6 PG (ref 27–31)
MCH RBC QN AUTO: 28 PG (ref 26.8–34.3)
MCHC RBC AUTO-ENTMCNC: 32.2 G/DL (ref 31.4–37.4)
MCHC RBC AUTO-ENTMCNC: 32.3 G/DL (ref 31.4–37.4)
MCV RBC AUTO: 86 FL (ref 82–98)
MCV RBC AUTO: 87 FL (ref 82–98)
MONOCYTES # BLD AUTO: 0.5 THOUSAND/ΜL (ref 0.17–1.22)
MONOCYTES # BLD AUTO: 0.55 THOUSAND/ΜL (ref 0.17–1.22)
MONOCYTES NFR BLD AUTO: 10 % (ref 4–12)
MONOCYTES NFR BLD AUTO: 9 % (ref 4–12)
NEUTROPHILS # BLD AUTO: 3.7 THOUSANDS/ΜL (ref 1.85–7.62)
NEUTROPHILS # BLD AUTO: 3.86 THOUSANDS/ΜL (ref 1.85–7.62)
NEUTS SEG NFR BLD AUTO: 68 % (ref 43–75)
NEUTS SEG NFR BLD AUTO: 69 % (ref 43–75)
NITRITE UR QL STRIP: NEGATIVE
NON-SQ EPI CELLS URNS QL MICRO: ABNORMAL /HPF
NRBC BLD AUTO-RTO: 0 /100 WBCS
NRBC BLD AUTO-RTO: 0 /100 WBCS
PH UR STRIP.AUTO: 7 [PH] (ref 4.5–8)
PLATELET # BLD AUTO: 198 THOUSANDS/UL (ref 149–390)
PLATELET # BLD AUTO: 204 THOUSANDS/UL (ref 130–400)
PMV BLD AUTO: 10 FL (ref 8.9–12.7)
PMV BLD AUTO: 7.6 FL (ref 8.9–12.7)
POTASSIUM SERPL-SCNC: 5.6 MMOL/L (ref 3.5–5.3)
PROT SERPL-MCNC: 7.7 G/DL (ref 6.4–8.2)
PROT UR STRIP-MCNC: NEGATIVE MG/DL
PROTHROMBIN TIME: 15.9 SECONDS (ref 12.1–14.4)
RBC # BLD AUTO: 3.69 MILLION/UL (ref 4.2–5.4)
RBC # BLD AUTO: 4.04 MILLION/UL (ref 3.81–5.12)
RBC #/AREA URNS AUTO: ABNORMAL /HPF
SODIUM SERPL-SCNC: 135 MMOL/L (ref 136–145)
SP GR UR STRIP.AUTO: 1.01 (ref 1–1.03)
TIBC SERPL-MCNC: 254 UG/DL (ref 250–450)
TROPONIN I SERPL-MCNC: <0.02 NG/ML
UROBILINOGEN UR QL STRIP.AUTO: 0.2 E.U./DL
WBC # BLD AUTO: 5.4 THOUSAND/UL (ref 4.8–10.8)
WBC # BLD AUTO: 5.65 THOUSAND/UL (ref 4.31–10.16)
WBC #/AREA URNS AUTO: ABNORMAL /HPF

## 2018-03-13 PROCEDURE — 36415 COLL VENOUS BLD VENIPUNCTURE: CPT

## 2018-03-13 PROCEDURE — 90715 TDAP VACCINE 7 YRS/> IM: CPT | Performed by: EMERGENCY MEDICINE

## 2018-03-13 PROCEDURE — 82728 ASSAY OF FERRITIN: CPT

## 2018-03-13 PROCEDURE — 93005 ELECTROCARDIOGRAM TRACING: CPT

## 2018-03-13 PROCEDURE — 72125 CT NECK SPINE W/O DYE: CPT

## 2018-03-13 PROCEDURE — 80053 COMPREHEN METABOLIC PANEL: CPT | Performed by: EMERGENCY MEDICINE

## 2018-03-13 PROCEDURE — 84484 ASSAY OF TROPONIN QUANT: CPT | Performed by: EMERGENCY MEDICINE

## 2018-03-13 PROCEDURE — 81002 URINALYSIS NONAUTO W/O SCOPE: CPT | Performed by: EMERGENCY MEDICINE

## 2018-03-13 PROCEDURE — 83540 ASSAY OF IRON: CPT

## 2018-03-13 PROCEDURE — 70450 CT HEAD/BRAIN W/O DYE: CPT

## 2018-03-13 PROCEDURE — 85025 COMPLETE CBC W/AUTO DIFF WBC: CPT

## 2018-03-13 PROCEDURE — 99284 EMERGENCY DEPT VISIT MOD MDM: CPT

## 2018-03-13 PROCEDURE — 85025 COMPLETE CBC W/AUTO DIFF WBC: CPT | Performed by: EMERGENCY MEDICINE

## 2018-03-13 PROCEDURE — 83550 IRON BINDING TEST: CPT

## 2018-03-13 PROCEDURE — 81001 URINALYSIS AUTO W/SCOPE: CPT

## 2018-03-13 PROCEDURE — 71045 X-RAY EXAM CHEST 1 VIEW: CPT

## 2018-03-13 PROCEDURE — 90471 IMMUNIZATION ADMIN: CPT

## 2018-03-13 PROCEDURE — 85610 PROTHROMBIN TIME: CPT | Performed by: EMERGENCY MEDICINE

## 2018-03-13 PROCEDURE — 93010 ELECTROCARDIOGRAM REPORT: CPT | Performed by: INTERNAL MEDICINE

## 2018-03-13 PROCEDURE — 85730 THROMBOPLASTIN TIME PARTIAL: CPT | Performed by: EMERGENCY MEDICINE

## 2018-03-13 RX ORDER — CLOPIDOGREL BISULFATE 75 MG/1
75 TABLET ORAL DAILY
COMMUNITY
End: 2018-04-27

## 2018-03-13 RX ORDER — BUDESONIDE 3 MG/1
3 CAPSULE, COATED PELLETS ORAL 2 TIMES DAILY
COMMUNITY
End: 2018-04-27

## 2018-03-13 RX ORDER — CYCLOSPORINE 25 MG/1
50 CAPSULE, LIQUID FILLED ORAL 2 TIMES DAILY
COMMUNITY

## 2018-03-13 RX ADMIN — TETANUS TOXOID, REDUCED DIPHTHERIA TOXOID AND ACELLULAR PERTUSSIS VACCINE, ADSORBED 0.5 ML: 5; 2.5; 8; 8; 2.5 SUSPENSION INTRAMUSCULAR at 15:48

## 2018-03-13 NOTE — ED ATTENDING ATTESTATION
Beverly Terrell MD, saw and evaluated the patient  I have discussed the patient with the resident/non-physician practitioner and agree with the resident's/non-physician practitioner's findings, Plan of Care, and MDM as documented in the resident's/non-physician practitioner's note, except where noted  All available labs and Radiology studies were reviewed  At this point I agree with the current assessment done in the Emergency Department  I have conducted an independent evaluation of this patient a history and physical is as follows: The patient presents for evaluation of a fall, the patient fell 2 steps she was walking down the steps the patient struck the back of her head     The patient is on eliquis   with the patient has no complaints of neck pain, no vomiting no syncope no chest or abdominal pain no back pain and no extremity pain  The patient has a pressed medical history of liver cancer and atrial fibrillation        Exam the patient is comfortable HEENT there is a contusion the occipital area neck no step-offs she is in a [place] collar lungs clear chest wall nontender heart regular abdomen soft nontender nondistended no bruising pelvis stable bilateral knee replacement scars are noted neurologic exam cranial nerves intact motor intact sensory grossly intact  Impression:  Mechanical fall in a patient on Coumadin with a head injury  CT head and C-spine INR re-evaluation    Critical Care Time  CritCare Time    Procedures

## 2018-03-13 NOTE — ED RE-EVALUATION NOTE
Closed head trauma with LOC  Is on blood thinners  Has no complaints at this time  No numbness tingling weakness or headache  I did offer observation admission if patient uncomfortable going home  Patient and significant other like to go home and will follow up with PCP    Did discussed return immediately with any changes in condition or any concerns     Franci Chavez DO  03/13/18 0875

## 2018-03-13 NOTE — ED PROVIDER NOTES
History  Chief Complaint   Patient presents with    Fall     Pt reports falling down two steps in garage  + LOC according to EMS  On blood thinners  C/o "everything hurting"  17-year-old female liver transplant/atrial fibrillation comes in for evaluation after fall  Was walking out of the garage; apparently fell down 2 stairs; does not remember syncopal or mechanical and hit the back of her head (Friend she lives with arrives and states was mechanical)  Denies pain anywherel states may have been transient loss of consciousness  Is on Eliquis and aspirin  Denies fevers chills; chest pain; shortness of breath  She is shaking; but denies she knows why  She has a GCS of 15 she moves extremities x4  She has no pain in the pelvis; no pain in the lower extremities; no pain cervical thoracic lumbar spine  Small hematoma on the back of the head  Denies paresthesias  Pupils are symmetrical full EOMI  No facial pain  Prior to Admission Medications   Prescriptions Last Dose Informant Patient Reported? Taking? apixaban (ELIQUIS) 5 mg 3/13/2018 at Unknown time Self No Yes   Sig: Take 1 tablet (5 mg total) by mouth 2 (two) times a day   aspirin (ECOTRIN LOW STRENGTH) 81 mg EC tablet 3/13/2018 at Unknown time Self No Yes   Sig: Take 1 tablet by mouth daily   budesonide (ENTOCORT EC) 3 MG capsule 3/13/2018 at Unknown time  Yes Yes   Sig: Take 3 mg by mouth 2 (two) times a day   calcium carbonate (OS-KENDRICK) 600 MG tablet 3/13/2018 at Unknown time Self Yes Yes   Sig: Take 600 mg by mouth 2 (two) times a day with meals   clopidogrel (PLAVIX) 75 mg tablet 3/13/2018 at Unknown time  Yes Yes   Sig: Take 75 mg by mouth daily   cycloSPORINE modified (NEORAL) 25 mg capsule 3/13/2018 at Unknown time  Yes Yes   Sig: Take 50 mg by mouth 2 (two) times a day   cycloSPORINE non-modified (SandIMMUNE) 25 mg capsule 3/13/2018 at Unknown time Self Yes Yes   Sig: Take 50 mg by mouth 2 (two) times a day     levETIRAcetam (KEPPRA) 500 mg tablet 3/13/2018 at Unknown time Self No Yes   Sig: Take 1 tablet by mouth every 12 (twelve) hours   levothyroxine 112 mcg tablet 3/13/2018 at Unknown time Self No Yes   Sig: TAKE 1 TABLET EVERY DAY AS DIRECTED   metoprolol tartrate (LOPRESSOR) 25 mg tablet 3/13/2018 at Unknown time  No Yes   Sig: Take 1 5 tablets (37 5 mg total) by mouth every 12 (twelve) hours   mycophenolate (CELLCEPT) 250 mg capsule 3/13/2018 at Unknown time Self Yes Yes   Sig: Take 250 mg by mouth 2 (two) times a day 3 tabs each dose=750 mg    omeprazole (PriLOSEC) 40 MG capsule 3/13/2018 at Unknown time Self Yes Yes   Sig: Take 40 mg by mouth daily   oxybutynin (DITROPAN) 5 mg tablet 3/13/2018 at Unknown time Self Yes Yes   Sig: Take 5 mg by mouth 2 (two) times a day     pravastatin (PRAVACHOL) 20 mg tablet 3/12/2018 at Unknown time Self Yes Yes   Sig: Take 1 tablet by mouth daily at bedtime      Facility-Administered Medications: None       Past Medical History:   Diagnosis Date    Anemia     Anxiety     Arthritis     Benign paroxysmal vertigo, unspecified ear     onset: 05/06/2010    Cirrhosis ([facility] )     onset: 03/30/2005    Disease of thyroid gland     GERD (gastroesophageal reflux disease)     History of transfusion     Liver transplant candidate     onset: 09/16/2008    Osteoporosis     Pelvic fracture ([facility] )     onset: 10/14/2008       Past Surgical History:   Procedure Laterality Date    ABDOMINAL SURGERY      APPENDECTOMY      BLADDER AUGMENTATION N/A     CATARACT EXTRACTION      CHOLECYSTECTOMY N/A     COLONOSCOPY N/A 4/20/2017    Procedure: COLONOSCOPY;  Surgeon: Harvey Meeks MD;  Location: [facility] GI LAB; Service:     ESOPHAGOGASTRODUODENOSCOPY N/A 3/31/2016    Procedure: ESOPHAGOGASTRODUODENOSCOPY (EGD); Surgeon: Harvey Meeks MD;  Location: [facility] GI LAB; Service:     ESOPHAGOGASTRODUODENOSCOPY N/A 4/20/2017    Procedure: ESOPHAGOGASTRODUODENOSCOPY (EGD);   Surgeon: Harvey Meeks MD;  Location: [facility]; Service:     EYE SURGERY      cataracts removed both eyes lens implant    HERNIA REPAIR      JOINT REPLACEMENT      left knee replacement    LIVER TRANSPLANTATION N/A     TONSILECTOMY AND ADNOIDECTOMY N/A     TUBAL LIGATION         Family History   Problem Relation Age of Onset    Cancer Mother      breast    Cancer Father     Crohn's disease Brother     Arthritis Family     Breast cancer Family      I have reviewed and agree with the history as documented  Social History   Substance Use Topics    Smoking status: Former Smoker     Years: 5 00     Quit date: 1/21/1975    Smokeless tobacco: Never Used      Comment: smoked for 5 years-1/2 to 1 ppd    Alcohol use No        Review of Systems   Constitutional: Negative for activity change, appetite change, chills and fever  HENT: Negative for congestion, ear pain, rhinorrhea, sore throat and trouble swallowing  Eyes: Negative for photophobia and pain  Respiratory: Negative for cough, chest tightness, shortness of breath and wheezing  Cardiovascular: Negative for chest pain and palpitations  Gastrointestinal: Negative for abdominal distention, abdominal pain, constipation, diarrhea, nausea and vomiting  Genitourinary: Negative for dysuria, flank pain, hematuria and urgency  Musculoskeletal: Positive for arthralgias and myalgias  Negative for back pain, joint swelling, neck pain and neck stiffness  Skin: Positive for wound  Negative for rash  Neurological: Negative for dizziness, seizures, weakness, light-headedness and headaches  Hematological: Negative for adenopathy  Psychiatric/Behavioral: Negative for confusion, hallucinations and self-injury         Physical Exam  ED Triage Vitals   Temperature Pulse Respirations Blood Pressure SpO2   03/13/18 1612 03/13/18 1507 03/13/18 1507 03/13/18 1507 03/13/18 1507   97 9 °F (36 6 °C) 83 18 (!) 183/132 100 %      Temp Source Heart Rate Source Patient Position - Orthostatic VS BP Location FiO2 (%)   03/13/18 1507 03/13/18 1507 03/13/18 1507 03/13/18 1507 --   Oral Monitor Lying Left arm       Pain Score       --                  Orthostatic Vital Signs  Vitals:    03/13/18 1507 03/13/18 1600 03/13/18 1700   BP: (!) 183/132 146/65 150/65   Pulse: 83 74 78   Patient Position - Orthostatic VS: Lying         Physical Exam   Constitutional: She is oriented to person, place, and time  She appears well-developed and well-nourished  No distress  HENT:   Head: Normocephalic and atraumatic  Right Ear: External ear normal    Left Ear: External ear normal    Mouth/Throat: Oropharynx is clear and moist  No oropharyngeal exudate  Eyes: Conjunctivae and EOM are normal  Pupils are equal, round, and reactive to light  Right eye exhibits no discharge  Left eye exhibits no discharge  Pupils for symmetrical full EOMI   Neck: Normal range of motion  Neck supple  No tracheal deviation present  No thyromegaly present  No midline tenderness   Cardiovascular: Normal rate, normal heart sounds and intact distal pulses  No murmur heard  Pulmonary/Chest: Effort normal and breath sounds normal  No respiratory distress  She has no wheezes  She has no rales  Bilateral breath sounds   Abdominal: Soft  Bowel sounds are normal  She exhibits no distension  There is no tenderness  There is no rebound and no guarding  Protuberant soft nontender   Musculoskeletal: Normal range of motion  She exhibits no edema, tenderness or deformity  Lymphadenopathy:     She has no cervical adenopathy  Neurological: She is alert and oriented to person, place, and time  No cranial nerve deficit  She exhibits normal muscle tone  Patient moves extremities x4  Is GCS 15   Can recite her address name and why she is here   Skin: Skin is warm  Capillary refill takes less than 2 seconds  No rash noted  She is not diaphoretic  No erythema  Hematoma occiput   Psychiatric: She has a normal mood and affect   Her behavior is normal  Judgment and thought content normal        ED Medications  Medications   tetanus-diphtheria-acellular pertussis (BOOSTRIX) IM injection 0 5 mL (0 5 mL Intramuscular Given 3/13/18 1548)       Diagnostic Studies  Results Reviewed     Procedure Component Value Units Date/Time    Urine Microscopic [[id number]]  (Abnormal) Collected:  03/13/18 1651    Lab Status:  Final result Specimen:  Urine from Urine, Other Updated:  03/13/18 1707     RBC, UA 4-10 (A) /hpf      WBC, UA None Seen /hpf      Epithelial Cells None Seen /hpf      Bacteria, UA None Seen /hpf      Hyaline Casts, UA None Seen /lpf     POCT urinalysis dipstick [[id number]]  (Normal) Resulted:  03/13/18 1652    Lab Status:  Final result Updated:  03/13/18 1652     Color, UA YELLOW    ED Urine Macroscopic [[id number]]  (Abnormal) Collected:  03/13/18 1651    Lab Status:  Final result Specimen:  Urine Updated:  03/13/18 1651     Color, UA Yellow     Clarity, UA Clear     pH, UA 7 0     Leukocytes, UA Negative     Nitrite, UA Negative     Protein, UA Negative mg/dl      Glucose, UA Negative mg/dl      Ketones, UA Negative mg/dl      Urobilinogen, UA 0 2 E U /dl      Bilirubin, UA Negative     Blood, UA Trace (A)     Specific [place], UA 1 010    Narrative:       CLINITEK RESULT    Comprehensive metabolic panel [[id number]]  (Abnormal) Collected:  03/13/18 1545    Lab Status:  Final result Specimen:  Blood from Arm, Left Updated:  03/13/18 1622     Sodium 135 (L) mmol/L      Potassium 5 6 (H) mmol/L      Chloride 106 mmol/L      CO2 21 mmol/L      Anion Gap 8 mmol/L      BUN 33 (H) mg/dL      Creatinine 1 00 mg/dL      Glucose 100 mg/dL      Calcium 8 9 mg/dL      AST 53 (H) U/L      ALT 33 U/L      Alkaline Phosphatase 136 (H) U/L      Total Protein 7 7 g/dL      Albumin 3 5 g/dL      Total Bilirubin 0 44 mg/dL      eGFR 56 ml/min/1 73sq m     Narrative:         National Kidney Disease Education Program recommendations are as follows:  GFR calculation is accurate only with a steady state creatinine  Chronic Kidney disease less than 60 ml/min/1 73 sq  meters  Kidney failure less than 15 ml/min/1 73 sq  meters  Troponin I [[id number]]  (Normal) Collected:  03/13/18 1545    Lab Status:  Final result Specimen:  Blood from Arm, Left Updated:  03/13/18 1622     Troponin I <0 02 ng/mL     Narrative:         [employer] Chemistry analyzer 99% cutoff is > 0 04 ng/mL in network labs    o cTnI 99% cutoff is useful only when applied to patients in the clinical setting of myocardial ischemia  o cTnI 99% cutoff should be interpreted in the context of clinical history, ECG findings and possibly cardiac imaging to establish correct diagnosis  o cTnI 99% cutoff may be suggestive but clearly not indicative of a coronary event without the clinical setting of myocardial ischemia  APTT [[id number]]  (Normal) Collected:  03/13/18 1545    Lab Status:  Final result Specimen:  Blood from Arm, Left Updated:  03/13/18 1617     PTT 29 seconds     Narrative:          Therapeutic Heparin Range = 60-90 seconds    Protime-INR [[id number]]  (Abnormal) Collected:  03/13/18 1545    Lab Status:  Final result Specimen:  Blood from Arm, Left Updated:  03/13/18 1617     Protime 15 9 (H) seconds      INR 1 26 (H)    CBC and differential [[id number]]  (Abnormal) Collected:  03/13/18 1545    Lab Status:  Final result Specimen:  Blood from Arm, Left Updated:  03/13/18 1609     WBC 5 65 Thousand/uL      RBC 4 04 Million/uL      Hemoglobin 11 3 (L) g/dL      Hematocrit 35 1 %      MCV 87 fL      MCH 28 0 pg      MCHC 32 2 g/dL      RDW 13 5 %      MPV 10 0 fL      Platelets 619 Thousands/uL      nRBC 0 /100 WBCs      Neutrophils Relative 68 %      Lymphocytes Relative 16 %      Monocytes Relative 10 %      Eosinophils Relative 6 %      Basophils Relative 0 %      Neutrophils Absolute 3 86 Thousands/µL      Lymphocytes Absolute 0 89 Thousands/µL      Monocytes Absolute 0 55 Thousand/µL      Eosinophils Absolute 0 31 Thousand/µL      Basophils Absolute 0 02 Thousands/µL                  XR chest 1 view   Final Result by Brigido Quick DO (03/13 1536)      No acute cardiopulmonary disease  Workstation performed: [id number]         CT spine cervical without contrast   Final Result by Mauricio Stephens MD (03/13 1523)      No cervical spine fracture or traumatic malalignment  Workstation performed: [id number]         CT head wo contrast   Final Result by Mauricio Stephnes MD (03/13 1521)      No acute intracranial abnormality  Unchanged chronic microvascular changes and age-related volume loss  Right occipital scalp hematoma  Workstation performed: [id number]               Procedures  ECG 12 Lead Documentation  Date/Time: 3/13/2018 3:14 PM  Performed by: Josette Azevedo  Authorized by: Sylvia Lua     Indications / Diagnosis:  Eval  ECG reviewed by me, the ED Provider: yes    Patient location:  ED  Interpretation:     Interpretation: non-specific    Rate:     ECG rate:  101    ECG rate assessment: tachycardic    Rhythm:     Rhythm: sinus rhythm    Ectopy:     Ectopy: none    QRS:     QRS axis:  Left    QRS intervals:  Normal  Conduction:     Conduction: normal    ST segments:     ST segments:  Normal  T waves:     T waves: normal    Comments:      Baseline artifact but appears to be sinus as is regular            Phone Consults  ED Phone Contact    ED Course  ED Course as of Mar 14 2219   Tue Mar 13, 2018   1536 No cervical spine fracture or traumatic malalignment  1652 Recently treated for urinary infection is peeing frequently wanted urine checked  Urine negative  1654 Did discuss observation given fall LOC on thinners  Patient states she has no symptoms at this time and has decided at this time that she would like to be discharged home    I did educate family that any change in behavior any abnormal movements or any concerns should return immediately for re-evaluation                                MDM  Number of Diagnoses or Management Options  Closed head injury, initial encounter:   Fall, initial encounter:   Hematoma of scalp, initial encounter:   Diagnosis management comments: Closed head injury no evidence of hemorrhage or fracture  Is on Eliquis  Has small scalp hematoma  Vitals unremarkable; no other signs of trauma  Tetanus was updated  Cardiac workup unremarkable  Friend did arrive and states the patient was screaming whole way down to the ground "help me "  States there was a transient LOC when she hit the ground; but not prior  Vital signs unremarkable  Patient appears comfortable  Given option of observation for serial abdominal exams; versus discharge and follow-up in 1 day  Patient significant other chose to be discharged with follow-up in 1 day  Told to return immediately with any changes in mentation or new symptoms    CritCare Time    Disposition  Final diagnoses:   Fall, initial encounter - mecahnical   Closed head injury, initial encounter   Hematoma of scalp, initial encounter     Time reflects when diagnosis was documented in both MDM as applicable and the Disposition within this note     Time User Action Codes Description Comment    3/13/2018  4:59 PM Abeba Ruck Add Fransisco Apley  XXXA] Fall, initial encounter     3/13/2018  4:59 PM Abeba Choprack Add [S09 90XA] Closed head injury, initial encounter     3/13/2018  4:59 PM Abeba Ruck Add [S00 03XA] Hematoma of scalp, initial encounter     3/13/2018  5:00 PM Abeba Yun Modify [K02  XXXA] Fall, initial encounter mecahnical      ED Disposition     ED Disposition Condition Comment    Discharge  Ming Koroma 60 discharge to home/self care      Condition at discharge: Good        Follow-up Information     Follow up With Specialties Details Why Contact Info Additional Information      Schedule an appointment as soon as possible for a visit       Latoya Reyez MD Internal Medicine, Family Medicine Please follow up tomorrow [address]    [facility] [address]  [phone]       [facility] [address] Emergency Department Emergency Medicine Go in 1 day As needed, If symptoms worsen [place] [address]  [phone]  ED, [address] [address], [place], [place], [address]        Discharge Medication List as of 3/13/2018  5:00 PM      CONTINUE these medications which have NOT CHANGED    Details   apixaban (ELIQUIS) 5 mg Take 1 tablet (5 mg total) by mouth 2 (two) times a day, Starting Tue 1/30/2018, Sample      aspirin (ECOTRIN LOW STRENGTH) 81 mg EC tablet Take 1 tablet by mouth daily, Starting Tue 10/31/2017, Normal      budesonide (ENTOCORT EC) 3 MG capsule Take 3 mg by mouth 2 (two) times a day, Historical Med      calcium carbonate (OS-KENDRICK) 600 MG tablet Take 600 mg by mouth 2 (two) times a day with meals, Historical Med      clopidogrel (PLAVIX) 75 mg tablet Take 75 mg by mouth daily, Historical Med      cycloSPORINE modified (NEORAL) 25 mg capsule Take 50 mg by mouth 2 (two) times a day, Historical Med      cycloSPORINE non-modified (SandIMMUNE) 25 mg capsule Take 50 mg by mouth 2 (two) times a day , Historical Med      levETIRAcetam (KEPPRA) 500 mg tablet Take 1 tablet by mouth every 12 (twelve) hours, Starting Wed 11/8/2017, Print      levothyroxine 112 mcg tablet TAKE 1 TABLET EVERY DAY AS DIRECTED, Normal      metoprolol tartrate (LOPRESSOR) 25 mg tablet Take 1 5 tablets (37 5 mg total) by mouth every 12 (twelve) hours, Starting Mon 3/5/2018, Print      mycophenolate (CELLCEPT) 250 mg capsule Take 250 mg by mouth 2 (two) times a day 3 tabs each dose=750 mg , Historical Med      omeprazole (PriLOSEC) 40 MG capsule Take 40 mg by mouth daily, Historical Med      oxybutynin (DITROPAN) 5 mg tablet Take 5 mg by mouth 2 (two) times a day  , Historical Med      pravastatin (PRAVACHOL) 20 mg tablet Take 1 tablet by mouth daily at bedtime, Starting Fri 10/30/2015, Historical Med No discharge procedures on file  ED Provider  Attending physically available and evaluated Kristin Kimbrough I managed the patient along with the ED Attending      Electronically Signed by         Ayan Galeana DO  03/14/18 0960

## 2018-03-13 NOTE — DISCHARGE INSTRUCTIONS
Contusion in Adults   WHAT YOU NEED TO KNOW:   A contusion is a bruise that appears on your skin after an injury  A bruise happens when small blood vessels tear but skin does not  When blood vessels tear, blood leaks into nearby tissue, such as soft tissue or muscle  DISCHARGE INSTRUCTIONS:   Return to the emergency department if:   · You have new trouble moving the injured area  · You have tingling or numbness in or near the injured area  · Your hand or foot below the bruise gets cold or turns pale  Contact your healthcare provider if:   · You find a new lump in the injured area  · Your symptoms do not improve with treatment after 4 to 5 days  · You have questions or concerns about your condition or care  Medicines: You may need any of the following:  · NSAIDs  help decrease swelling and pain or fever  This medicine is available with or without a doctor's order  NSAIDs can cause stomach bleeding or kidney problems in certain people  If you take blood thinner medicine, always ask your healthcare provider if NSAIDs are safe for you  Always read the medicine label and follow directions  · Prescription pain medicine  may be given  Do not wait until the pain is severe before you take your medicine  · Take your medicine as directed  Contact your healthcare provider if you think your medicine is not helping or if you have side effects  Tell him of her if you are allergic to any medicine  Keep a list of the medicines, vitamins, and herbs you take  Include the amounts, and when and why you take them  Bring the list or the pill bottles to follow-up visits  Carry your medicine list with you in case of an emergency  Follow up with your healthcare provider as directed: You may need to return within a week to check your injury again  Write down your questions so you remember to ask them during your visits    Help a contusion heal:   · Rest the injured area  or use it less than usual  If you bruised your leg or foot, you may need crutches or a cane to help you walk  This will help you keep weight off your injured body part  · Apply ice  to decrease swelling and pain  Ice may also help prevent tissue damage  Use an ice pack, or put crushed ice in a plastic bag  Cover it with a towel and place it on your bruise for 15 to 20 minutes every hour or as directed  · Use compression  to support the area and decrease swelling  Wrap an elastic bandage around the area over the bruised muscle  Make sure the bandage is not too tight  You should be able to fit 1 finger between the bandage and your skin  · Elevate (raise) your injured body part  above the level of your heart to help decrease pain and swelling  Use pillows, blankets, or rolled towels to elevate the area as often as you can  · Do not drink alcohol  as directed  Alcohol may slow healing  · Do not stretch injured muscles  right after your injury  Ask your healthcare provider when and how you may safely stretch after your injury  Gentle stretches can help increase your flexibility  · Do not massage the area or put heating pads  on the bruise right after your injury  Heat and massage may slow healing  Your healthcare provider may tell you to apply heat after several days  At that time, heat will start to help the injury heal   Prevent another contusion:   · Stretch and warm up before you play sports or exercise  · Wear protective gear when you play sports  Examples are shin guards and padding  · If you begin a new physical activity, start slowly to give your body a chance to adjust   © 2017 [address] [employer] Information is for End User's use only and may not be sold, redistributed or otherwise used for commercial purposes  All illustrations and images included in CareNotes® are the copyrighted property of [employer]  or [employer]  The above information is an  only   It is not intended as medical advice for individual conditions or treatments  Talk to your doctor, nurse or pharmacist before following any medical regimen to see if it is safe and effective for you  Head Injury   WHAT YOU NEED TO KNOW:   A head injury is most often caused by a blow to the head  This may occur from a fall, bicycle injury, sports injury, being struck in the head, or a motor vehicle accident  DISCHARGE INSTRUCTIONS:   Call 911 or have someone else call for any of the following:   · You cannot be woken  · You have a seizure  · You stop responding to others or you faint  · You have blurry or double vision  · Your speech becomes slurred or confused  · You have arm or leg weakness, loss of feeling, or new problems with coordination  · Your pupils are larger than usual or one pupil is a different size than the other  · You have blood or clear fluid coming out of your ears or nose  Return to the emergency department if:   · You have repeated or forceful vomiting  · You feel confused  · Your headache gets worse or becomes severe  · You or someone caring for you notices that you are harder to wake than usual   Contact your healthcare provider if:   · Your symptoms last longer than 6 weeks after the injury  · You have questions or concerns about your condition or care  Medicines:   · Acetaminophen  decreases pain  Acetaminophen is available without a doctor's order  Ask how much to take and how often to take it  Follow directions  Acetaminophen can cause liver damage if not taken correctly  · Take your medicine as directed  Contact your healthcare provider if you think your medicine is not helping or if you have side effects  Tell him or her if you are allergic to any medicine  Keep a list of the medicines, vitamins, and herbs you take  Include the amounts, and when and why you take them  Bring the list or the pill bottles to follow-up visits   Carry your medicine list with you in case of an emergency  Self-care:   · Rest  or do quiet activities for 24 to 48 hours  Limit your time watching TV, using the computer, or doing tasks that require a lot of thinking  Slowly return to your normal activities as directed  Do not play sports or do activities that may cause you to get hit in the head  Ask your healthcare provider when you can return to sports  · Apply ice  on your head for 15 to 20 minutes every hour or as directed  Use an ice pack, or put crushed ice in a plastic bag  Cover it with a towel before you apply it to your skin  Ice helps prevent tissue damage and decreases swelling and pain  · Have someone stay with you for 24 hours  or as directed  This person can monitor you for complications and call [phone]  When you are awake the person should ask you a few questions to see if you are thinking clearly  An example would be to ask your name or your address  Prevent another head injury:   · Wear a helmet that fits properly  Do this when you play sports, or ride a bike, scooter, or skateboard  Helmets help decrease your risk of a serious head injury  Talk to your healthcare provider about other ways you can protect yourself if you play sports  · Wear your seat belt every time you are in a car  This helps to decrease your risk for a head injury if you are in a car accident  Follow up with your healthcare provider as directed:  Write down your questions so you remember to ask them during your visits  © 2017 [address] Information is for End User's use only and may not be sold, redistributed or otherwise used for commercial purposes  All illustrations and images included in CareNotes® are the copyrighted property of A D A M [employer]  or [employer]askar Dinh  The above information is an  only  It is not intended as medical advice for individual conditions or treatments   Talk to your doctor, nurse or pharmacist before following any medical regimen to see if it is safe and effective for you  Scalp Contusion in Adults   WHAT YOU NEED TO KNOW:   A scalp contusion is a bruise on your scalp  There is bleeding under the scalp, but the skin is not broken  You may have swelling at the site of the bruise  DISCHARGE INSTRUCTIONS:   Home care:   · Have someone stay with you for 24 to 48 hours after the injury  Give him the signs of serious injury listed below, such as a seizure or trouble moving  You will need immediate care if you develop signs of a serious injury  · Apply ice to your bruise  Ice helps decrease swelling and pain  Ice may also help prevent tissue damage  Use an ice pack, or put crushed ice in a plastic bag  Cover it with a towel and place it on your bruise for 15 to 20 minutes every hour or as directed  Follow up with your healthcare provider as directed:  Write down your questions so you remember to ask them during your visits  Contact your healthcare provider if:   · You have a headache or neck pain that is getting worse  · You are drowsy and confused  · You have trouble staying balanced or walking  · You are irritable for no reason  · You have problems with your vision  · You cannot stop vomiting  Return to the emergency department or have someone call 911 if:   · You have a seizure  · You cannot be awakened  · You are not able to move part of your body  · Your pupils are different sizes  · You have blood or clear fluid coming out of your nose, ears, or mouth  © 2017 [address] Information is for End User's use only and may not be sold, redistributed or otherwise used for commercial purposes  All illustrations and images included in CareNotes® are the copyrighted property of A D A M [employer]  or Bhaskar Dinh  The above information is an  only  It is not intended as medical advice for individual conditions or treatments   Talk to your doctor, nurse or pharmacist before following any medical regimen to see if it is safe and effective for you

## 2018-03-14 ENCOUNTER — TELEPHONE (OUTPATIENT)
Dept: ADMINISTRATIVE | Facility: OTHER | Age: 73
End: 2018-03-14

## 2018-03-14 LAB
ATRIAL RATE: 441 BPM
P AXIS: 31 DEGREES
QRS AXIS: -13 DEGREES
QRSD INTERVAL: 74 MS
QT INTERVAL: 350 MS
QTC INTERVAL: 453 MS
T WAVE AXIS: 14 DEGREES
VENTRICULAR RATE: 101 BPM

## 2018-03-14 NOTE — TELEPHONE ENCOUNTER
I called and spoke with patient  She was instructed by ED to follow up with PCP in one day but didn't want to schedule an appt for this afternoon because she had a package coming  I scheduled her with [place] Charlton for tomorrow 3/15/18  Pt instructed to call with any change in symptoms prior to that appt  ED visit on 3/13/18 documented in ED log

## 2018-03-15 ENCOUNTER — OFFICE VISIT (OUTPATIENT)
Dept: HEMATOLOGY ONCOLOGY | Facility: MEDICAL CENTER | Age: 73
End: 2018-03-15
Payer: MEDICARE

## 2018-03-15 VITALS
SYSTOLIC BLOOD PRESSURE: 140 MMHG | TEMPERATURE: 96.8 F | RESPIRATION RATE: 16 BRPM | HEIGHT: 62 IN | WEIGHT: 203 LBS | HEART RATE: 74 BPM | BODY MASS INDEX: 37.36 KG/M2 | DIASTOLIC BLOOD PRESSURE: 80 MMHG | OXYGEN SATURATION: 96 %

## 2018-03-15 DIAGNOSIS — Z94.4 LIVER TRANSPLANTED (HCC): Primary | ICD-10-CM

## 2018-03-15 DIAGNOSIS — D63.8 ANEMIA IN OTHER CHRONIC DISEASES CLASSIFIED ELSEWHERE: Chronic | ICD-10-CM

## 2018-03-15 PROCEDURE — 99213 OFFICE O/P EST LOW 20 MIN: CPT | Performed by: INTERNAL MEDICINE

## 2018-03-15 NOTE — PROGRESS NOTES
[facility]  1945  [address] [facility] [place] [address] [id number] [phone]    DISCUSSION  SUMMARY:    64-year-old female with history of anemia of chronic renal insufficiency/anemia of chronic disease (likely multiple etiologies)  Hemoglobin levels had previously been good/acceptable with RONNA treatment  Unfortunately patient recently suffered a CVA and MI  Patient is on aspirin and eliquis -cardiology and neurology follow-ups are ongoing  The hemoglobin level continues to be okay/stable over the past 3 months  Symptomatically patient seems to be stable  We rediscussed the serious potential cardiac, thrombolic and embolic complications of an erythroid stimulating agent - EPO can not be given  Patient is to return in 3 months with a repeat CBC before  We re-discussed what patient needs to monitor for in regard to progressive anemia  [facility] knows to call the office if she has any other hematology questions or concerns  Significant  Other is monitoring the falls  Evaluation of the [facility] center is pending  Carefully review your medication list and verify that the list is accurate and up-to-date  Please call the hematology/oncology office if there are medications missing from the list, medications on the list that you are not currently taking or if there is a dosage or instruction that is different from how you're taking that medication      Patient goals and areas of care:  Monitor hemoglobin level  Patient is able to self-care   _____________________________________________________________________________________    Chief Complaint   Patient presents with    Follow-up      status post liver transplant on immunosuppressants, history of anemia, thrombotic complications from RONNA     History of Present Illness:    64-year-old female with history of anemia (anemia of chronic renal insufficiency/anemia of chronic disease previously on Sacha)  Mrs Angelito Anthony was recently admitted to the hospital after her partner noticed she was garbling her words  Patient was found to have a CVA  Patient was also found to have a non-ST elevated MI  Mrs Angelito Anthony did not have any chest pain or pressure or other cardiac symptoms  Patient also does not have any neurologic sequelae  Patient is now on aspirin and eliquis  Patient is also on Keppra  Mrs Angelito Anthony is back for follow-up  Recently the plan has been observation  Patient states okay, fatigue is less/better than before  Mrs Angelito Anthony denies any problems with excessive bruising or bleeding  No fevers, chills or sweats  Mrs Angelito Anthony states that her activities have been decreased recently  Patient is on CellCept and cyclosporine - no recent issues  Appetite is +/-but no nausea vomiting  Patient denies any other GI,  or GYN issues  No shortness of breath at rest, mild dyspnea on exertion - same as before  Patient has a history of falls  Patient fell a few days ago was seen in the emergency room  No trauma  Evaluation of the balance center is pending  Review of Systems   Constitutional: Positive for fatigue  HENT: Negative  Eyes: Negative  Respiratory: Negative  Cardiovascular: Negative  Gastrointestinal: Negative  Endocrine: Negative  Genitourinary: Negative  Musculoskeletal: Negative  Skin: Negative  Allergic/Immunologic: Negative  Neurological: Negative  Hematological: Negative  Psychiatric/Behavioral: Negative  All other systems reviewed and are negative       Patient Active Problem List   Diagnosis    Liver transplanted ([facility] )    Hypothyroidism    Long-term use of immunosuppressant medication    Anemia    TIA (transient ischemic attack)    HLD (hyperlipidemia)    Acute ischemic stroke (HCC)    Paroxysmal SVT (supraventricular tachycardia) (HCC)    Altered mental status    Cerebrovascular accident (CVA) due to embolism ([facility] )    Non-ST elevation myocardial infarction (NSTEMI), type 2 (HCC)    Abnormal EEG    Adjustment reaction with prolonged depressive reaction    Ataxic gait    Benign essential hypertension    Chronic joint pain    Chronic kidney insufficiency    Cyst of diaphragm    Depression    Elevated homocysteine (HCC)    Hypercalcemia    Lung nodule, solitary    Peripheral arterial disease (HCC)    Sciatica    Ulcerative colitis without complications (HCC)     Past Medical History:   Diagnosis Date    Anemia     Anxiety     Arthritis     Benign paroxysmal vertigo, unspecified ear     onset: 05/06/2010    Cirrhosis ([facility] )     onset: 03/30/2005    Disease of thyroid gland     GERD (gastroesophageal reflux disease)     History of transfusion     Liver transplant candidate     onset: 09/16/2008    Osteoporosis     Pelvic fracture ([facility] )     onset: 10/14/2008     Past Surgical History:   Procedure Laterality Date    ABDOMINAL SURGERY      APPENDECTOMY      BLADDER AUGMENTATION N/A     CATARACT EXTRACTION      CHOLECYSTECTOMY N/A     COLONOSCOPY N/A 4/20/2017    Procedure: COLONOSCOPY;  Surgeon: Katherine Mendenhall MD;  Location: [facility] GI LAB; Service:     ESOPHAGOGASTRODUODENOSCOPY N/A 3/31/2016    Procedure: ESOPHAGOGASTRODUODENOSCOPY (EGD); Surgeon: Katherine Mendenhall MD;  Location: [facility] GI LAB; Service:     ESOPHAGOGASTRODUODENOSCOPY N/A 4/20/2017    Procedure: ESOPHAGOGASTRODUODENOSCOPY (EGD); Surgeon: Katherine Mendenhall MD;  Location: [facility] GI LAB;   Service:     EYE SURGERY      cataracts removed both eyes lens implant    HERNIA REPAIR      JOINT REPLACEMENT      left knee replacement    LIVER TRANSPLANTATION N/A     TONSILECTOMY AND ADNOIDECTOMY N/A     TUBAL LIGATION       Family History   Problem Relation Age of Onset    Cancer Mother      breast    Cancer Father     Crohn's disease Brother     Arthritis Family     Breast cancer Family      Social History     Social History    Marital status:      Spouse name: N/A  Number of children: N/A    Years of education: Completed 12th grade     Occupational History    Not on file       Social History Main Topics    Smoking status: Former Smoker     Years: 5 00     Quit date: 1/21/1975    Smokeless tobacco: Never Used      Comment: smoked for 5 years-1/2 to 1 ppd    Alcohol use No    Drug use: No    Sexual activity: No     Other Topics Concern    Not on file     Social History Narrative    Daily coffee consumption (1 cup/day)           Current Outpatient Prescriptions:     apixaban (ELIQUIS) 5 mg, Take 1 tablet (5 mg total) by mouth 2 (two) times a day, Disp: 28 tablet, Rfl: 0    aspirin (ECOTRIN LOW STRENGTH) 81 mg EC tablet, Take 1 tablet by mouth daily, Disp: 30 tablet, Rfl: 0    budesonide (ENTOCORT EC) 3 MG capsule, Take 3 mg by mouth 2 (two) times a day, Disp: , Rfl:     calcium carbonate (OS-KENDRICK) 600 MG tablet, Take 600 mg by mouth 2 (two) times a day with meals, Disp: , Rfl:     clopidogrel (PLAVIX) 75 mg tablet, Take 75 mg by mouth daily, Disp: , Rfl:     cycloSPORINE modified (NEORAL) 25 mg capsule, Take 50 mg by mouth 2 (two) times a day, Disp: , Rfl:     cycloSPORINE non-modified (SandIMMUNE) 25 mg capsule, Take 50 mg by mouth 2 (two) times a day , Disp: , Rfl:     levETIRAcetam (KEPPRA) 500 mg tablet, Take 1 tablet by mouth every 12 (twelve) hours, Disp: 60 tablet, Rfl: 0    levothyroxine 112 mcg tablet, TAKE 1 TABLET EVERY DAY AS DIRECTED, Disp: 90 tablet, Rfl: 3    metoprolol tartrate (LOPRESSOR) 25 mg tablet, Take 1 5 tablets (37 5 mg total) by mouth every 12 (twelve) hours, Disp: 90 tablet, Rfl: 3    mycophenolate (CELLCEPT) 250 mg capsule, Take 250 mg by mouth 2 (two) times a day 3 tabs each dose=750 mg , Disp: , Rfl:     omeprazole (PriLOSEC) 40 MG capsule, Take 40 mg by mouth daily, Disp: , Rfl:     oxybutynin (DITROPAN) 5 mg tablet, Take 5 mg by mouth 2 (two) times a day  , Disp: , Rfl:     pravastatin (PRAVACHOL) 20 mg tablet, Take 1 tablet by mouth daily at bedtime, Disp: , Rfl:     Allergies   Allergen Reactions    Tetracyclines & Related Hives    Vancomycin Other (See Comments) and Itching     Reaction Date: [id number]; Pt unsure of reaction    Prograf [Tacrolimus] Anxiety     Reaction Date: [id number];        Vitals:    03/15/18 1501   BP: 140/80   Pulse: 74   Resp: 16   Temp: (!) 96 8 °F (36 °C)   SpO2: 96%     Physical Exam   Constitutional: She is oriented to person, place, and time  She appears well-developed and well-nourished  HENT:   Head: Normocephalic and atraumatic  Right Ear: External ear normal    Left Ear: External ear normal    Nose: Nose normal    Mouth/Throat: Oropharynx is clear and moist    Eyes: Conjunctivae and EOM are normal  Pupils are equal, round, and reactive to light  Neck: Normal range of motion  Neck supple  Cardiovascular: Normal rate, regular rhythm, normal heart sounds and intact distal pulses  Pulmonary/Chest: Effort normal and breath sounds normal    Abdominal: Soft  Bowel sounds are normal     Abdomen is obese, nontender, well-healed suture lines, +bowel sounds   Musculoskeletal: Normal range of motion  Neurological: She is alert and oriented to person, place, and time  She has normal reflexes  Skin: Skin is warm  Skin is pale, warm, no petechiae, few ecchymoses, good turgor   Psychiatric: She has a normal mood and affect   Her behavior is normal  Judgment and thought content normal    Extremities:  0-1 +bilateral lower extremity edema, no cords, pulses are 1+  Lymphatics:  No adenopathy in the neck, supraclavicular region, axilla and groin bilaterally    Labs:     3/13/ 18 WBC = 5 65 hemoglobin = 11 3 hematocrit = 35 1 MCV = 87 platelet = 128 neutrophil = 60%    1/3/18 WBC = 5 3 hemoglobin = 11 1 hematocrit = 33 8 MCV = 87 platelets = 740 iron = 46 ferritin = 127 TIBC = 254 iron saturation = 18%   12/04/2017 WBC = 5 6 hemoglobin = 11 hematocrit = 33 7 MCV = 86 platelet = 744 neutrophil = 74% 11/8/17 hemoglobin = 10 9 hematocrit = 34   11/7/17 BUN = 12 creatinine = 0 92   8/17/17 WBC = 4 6 hemoglobin = 12 1 hematocrit = 36 7 MCV = 86 platelet = 885 neutrophil = 73% lymphs at = 13% monocyte = 10%

## 2018-03-16 ENCOUNTER — OFFICE VISIT (OUTPATIENT)
Dept: FAMILY MEDICINE CLINIC | Facility: CLINIC | Age: 73
End: 2018-03-16
Payer: MEDICARE

## 2018-03-16 ENCOUNTER — CLINICAL SUPPORT (OUTPATIENT)
Dept: CARDIOLOGY CLINIC | Facility: CLINIC | Age: 73
End: 2018-03-16
Payer: MEDICARE

## 2018-03-16 VITALS
SYSTOLIC BLOOD PRESSURE: 124 MMHG | BODY MASS INDEX: 37.91 KG/M2 | HEART RATE: 76 BPM | WEIGHT: 206 LBS | HEIGHT: 62 IN | TEMPERATURE: 97 F | DIASTOLIC BLOOD PRESSURE: 70 MMHG | RESPIRATION RATE: 16 BRPM

## 2018-03-16 DIAGNOSIS — Z86.73 PERSONAL HISTORY OF TIA (TRANSIENT ISCHEMIC ATTACK): Primary | ICD-10-CM

## 2018-03-16 DIAGNOSIS — I10 BENIGN ESSENTIAL HYPERTENSION: ICD-10-CM

## 2018-03-16 DIAGNOSIS — Z95.818 PRESENCE OF CARDIAC DEVICE: ICD-10-CM

## 2018-03-16 DIAGNOSIS — R26.0 ATAXIC GAIT: ICD-10-CM

## 2018-03-16 DIAGNOSIS — I47.1 PAROXYSMAL SVT (SUPRAVENTRICULAR TACHYCARDIA) (HCC): ICD-10-CM

## 2018-03-16 DIAGNOSIS — S00.03XS SCALP HEMATOMA, SEQUELA: Primary | ICD-10-CM

## 2018-03-16 PROCEDURE — 99214 OFFICE O/P EST MOD 30 MIN: CPT | Performed by: FAMILY MEDICINE

## 2018-03-16 PROCEDURE — 93299 PR REM INTERROG ICPMS/SCRMS <30 D TECH REVIEW: CPT | Performed by: INTERNAL MEDICINE

## 2018-03-16 PROCEDURE — 93298 REM INTERROG DEV EVAL SCRMS: CPT | Performed by: INTERNAL MEDICINE

## 2018-03-16 NOTE — PROGRESS NOTES
Assessment/Plan:    Balance center eval and for devices  Fall precautions  Not dizzy but could reconsider oxybutynin if gets side effects, already has dry mouth  Seizures hx stable for now     Diagnoses and all orders for this visit:    Scalp hematoma, sequela    Ataxic gait    Benign essential hypertension    Paroxysmal SVT (supraventricular tachycardia) (HCC)        Htn/seizure stable  Gait issues  Fall precautions      No Follow-up on file  Subjective:      Patient ID: Kaity Rush is a 74 y o  female  Chief Complaint   Patient presents with    Follow-up     ER head injury       HPI    Fell at home  Hit back of head on concrete  Was in Er, CT head showed occiptal hematoma, no ICH, CT neck ok, not dizzy, just lost balance, no cp preceding episode, saw Dr Lalit Bernal, hx of anemia, falls 2-3x/year, would like to go balance center, hx of PT years ago, stopped exercises then, has walker but does not use, has a rollator also  On Eliquis  Last seizure 2months ago, in hosp for TIA  Takes oxybutynin bid, not dizzy          The following portions of the patient's history were reviewed and updated as appropriate: allergies, current medications, past family history, past medical history, past social history, past surgical history and problem list     Review of Systems   Respiratory: Negative for shortness of breath  Cardiovascular: Negative for chest pain           Current Outpatient Prescriptions   Medication Sig Dispense Refill    apixaban (ELIQUIS) 5 mg Take 1 tablet (5 mg total) by mouth 2 (two) times a day 28 tablet 0    aspirin (ECOTRIN LOW STRENGTH) 81 mg EC tablet Take 1 tablet by mouth daily 30 tablet 0    budesonide (ENTOCORT EC) 3 MG capsule Take 3 mg by mouth 2 (two) times a day      calcium carbonate (OS-KENDRICK) 600 MG tablet Take 600 mg by mouth daily        clopidogrel (PLAVIX) 75 mg tablet Take 75 mg by mouth daily      cycloSPORINE non-modified (SandIMMUNE) 25 mg capsule Take 50 mg by mouth 2 (two) times a day   levothyroxine 112 mcg tablet TAKE 1 TABLET EVERY DAY AS DIRECTED 90 tablet 3    metoprolol tartrate (LOPRESSOR) 25 mg tablet Take 1 5 tablets (37 5 mg total) by mouth every 12 (twelve) hours 90 tablet 3    mycophenolate (CELLCEPT) 250 mg capsule Take 250 mg by mouth 2 (two) times a day 3 tabs each dose=750 mg       omeprazole (PriLOSEC) 40 MG capsule Take 40 mg by mouth daily      oxybutynin (DITROPAN) 5 mg tablet Take 5 mg by mouth 2 (two) times a day        pravastatin (PRAVACHOL) 20 mg tablet Take 1 tablet by mouth daily at bedtime      cycloSPORINE modified (NEORAL) 25 mg capsule Take 50 mg by mouth 2 (two) times a day      levETIRAcetam (KEPPRA) 500 mg tablet Take 1 tablet by mouth every 12 (twelve) hours 60 tablet 0     No current facility-administered medications for this visit  Objective:    /70   Pulse 76   Temp (!) 97 °F (36 1 °C)   Resp 16   Ht 5' 2" (1 575 m)   Wt 93 4 kg (206 lb)   BMI 37 68 kg/m²        Physical Exam   Constitutional: She appears well-developed  HENT:   Head: Normocephalic  Eyes: Conjunctivae are normal    Neck: Neck supple  Cardiovascular: Normal rate and intact distal pulses  Pulmonary/Chest: Effort normal  No respiratory distress  Abdominal: Soft  Musculoskeletal: She exhibits no edema or deformity  Neurological: She is alert  Skin: Skin is warm and dry  Subacute scalp hematoma   Psychiatric: Her behavior is normal  Thought content normal    Nursing note and vitals reviewed               Mariposa Mello DO

## 2018-03-19 NOTE — PROGRESS NOTES
CARELINK TRANSMISSION: (LOOP) NO PATIENT OR DEVICE ACTIVATED EPISODES  NORMAL DEVICE FUNCTION  ---LYNN

## 2018-03-24 ENCOUNTER — TELEPHONE (OUTPATIENT)
Dept: FAMILY MEDICINE CLINIC | Facility: CLINIC | Age: 73
End: 2018-03-24

## 2018-03-24 NOTE — TELEPHONE ENCOUNTER
Pt fell and was evaluated at hospital, she then saw   Dr Idalmis Hinton who told her she had a concussion  She was told this will take 6 months to a year to recover from  She is going to the balance center Monday   She just wanted this in her record

## 2018-03-26 ENCOUNTER — EVALUATION (OUTPATIENT)
Dept: PHYSICAL THERAPY | Facility: CLINIC | Age: 73
End: 2018-03-26
Payer: MEDICARE

## 2018-03-26 DIAGNOSIS — R26.89 IMBALANCE: ICD-10-CM

## 2018-03-26 DIAGNOSIS — R26.0 ATAXIC GAIT: Primary | ICD-10-CM

## 2018-03-26 PROCEDURE — G8978 MOBILITY CURRENT STATUS: HCPCS

## 2018-03-26 PROCEDURE — 97163 PT EVAL HIGH COMPLEX 45 MIN: CPT

## 2018-03-26 PROCEDURE — G8979 MOBILITY GOAL STATUS: HCPCS

## 2018-03-26 NOTE — PROGRESS NOTES
PT Evaluation     Today's date: 3/26/2018  Patient name: Gulshan Teague  : 1945  MRN: [id number]  Referring provider: Curly Marx DO  Dx:   Encounter Diagnosis     ICD-10-CM    1  Ataxic gait R26 0 Ambulatory referral to Physical Therapy   2  Imbalance R26 89        Start Time: 1350  Stop Time: 1458  Total time in clinic (min): 68 minutes    Assessment    Assessment details: 74year old female is at increased risk for falls per standardized testing  She had a recent fall descending two steps  She presents with proximal LE weakness and reduced static and dynamic balance  She displays scores abnormal from the age norm for 5 times sit to stand, gait speed and TUG  She has had a stroke, TIA, and BPPV in the past   The pt would benefit from continued PT to improve her safety and mobility in the home and community  She is advised to use her SPC at all times for now, and to have her  assist her to descend stairs  She has been advised to gradually increase her overall activity level which is currently very low  Understanding of Dx/Px/POC: good   Prognosis: good    Goals  Goals  STG 2-4 weeks  1  Improve gait speed by 0 1 m/sec  2 Pt will be independent with basic HEP  3  Pt will demonstrate independence with SPC gait over obstacles, stairs, direction change  LTG  4-8 weeks  1  Improve DGI  score by by 5 points to 19-20/24  to improve safety with mobility   2  Improve 5XSTS  score by 5-10 seconds to approach age norm (10 seconds or less per current research,) to improve safety with mobilitiy  3  Pt will be independent with advanced HEP  4  Independent ambulation with least restrictive/no AD for community mobility  5  Improve gait speed by 0 2 m/sec or more to approach age-norm  6  Pt will be independent with a plan to continue an elevated overall level of activity          Plan  Planned therapy interventions: home exercise program, strengthening, manual therapy, neuromuscular re-education, patient education, therapeutic activities, therapeutic exercise and balance  Frequency: 2x week  Duration in weeks: 8  Treatment plan discussed with: family and patient  Plan details: POC 3/26/18 to 18        Subjective Evaluation    History of Present Illness  Mechanism of injury: 74year old female reports she is off balance, on and off for years  She has been to the [facility] two times previously  She arrived with a SPC  She fell 3 weeks ago, going down the stairs in the garage, and she lost her footing  She suffered a concussion  She states she has been having "trouble with her eyes " Her vision gets fuzzy  She made an appointment with Dr Carlie Levy who told the pt she had a concussion  She will follow up with Dr Carlie Levy  6 months  The pt called Dr Hall Ice office on 3/24/18, to report she had a concussion  She denies headaches  She reports  Chronic low back pain  Recurrent probem    Quality of life: good    Pain  Current pain ratin  At best pain ratin  At worst pain ratin  Quality: dull ache  Relieving factors: change in position and rest  Aggravating factors: standing  Progression: worsening    Social Support  Steps to enter house: yes  Stairs in house: yes   Lives in: multiple-level home (rails with all stairs)    Patient Goals  Patient goals for therapy: improved balance and increased strength  Patient goal: walk faster        Objective     Ambulation     Ambulation: Level Surfaces     Additional Level Surfaces Ambulation Details  Coordination: ue intact, le too weak to test  KEENAN: ue and le intact  LE strength:  Hip flex= 3/5  Quads= 4/5  Ankle Df=5/5  IV =5/5  EV =5/5  Pf=5/5  REO:neg  REC:neg  SREO:  neg  SLS: 1 sec each   Gait speed:0 47 m/sec or 1 55 ft/sec with SPC   5XSTS=22 76 sec, with one abrupt sit    TUG=19 41 sec  DGI=   Oculomotor:  Smooth pursuit: nl, no HA provoked  Saccades: nl, no HA provoked  Vergence: NPC=4', no HA provoked  Positionals: negative roll tests and gumaro Hallpike  Flowsheet Rows    Flowsheet Row Most Recent Value   PT/OT G-Codes   Current Score  55   Projected Score  69   FOTO information reviewed  Yes   Assessment Type  Evaluation   G code set  Mobility: Walking & Moving Around   Mobility: Walking and Moving Around Current Status ()  CK   Mobility: Walking and Moving Around Goal Status ()  CJ          Precautions:   has a past medical history of Anemia; Anxiety; Arthritis; Benign paroxysmal vertigo, unspecified ear; Cirrhosis ([facility] ); Disease of thyroid gland; GERD (gastroesophageal reflux disease); History of transfusion; Liver transplant candidate; Osteoporosis; and [facility] ([facility] )      Daily Treatment Diary     Manual                                                                                   Exercise Diary                                                                                                                                                                                                                                                                                      Modalities

## 2018-03-29 ENCOUNTER — OFFICE VISIT (OUTPATIENT)
Dept: PHYSICAL THERAPY | Facility: CLINIC | Age: 73
End: 2018-03-29
Payer: MEDICARE

## 2018-03-29 DIAGNOSIS — R26.89 IMBALANCE: ICD-10-CM

## 2018-03-29 DIAGNOSIS — R26.0 ATAXIC GAIT: Primary | ICD-10-CM

## 2018-03-29 PROCEDURE — 97110 THERAPEUTIC EXERCISES: CPT

## 2018-03-29 PROCEDURE — 97112 NEUROMUSCULAR REEDUCATION: CPT

## 2018-03-29 NOTE — PROGRESS NOTES
Daily Note     Today's date: 3/29/2018  Patient name: Leif Elder  : 1945  MRN: [id number]  Referring provider: Juan Justin DO  Dx:   Encounter Diagnosis     ICD-10-CM    1  Ataxic gait R26 0    2  Imbalance R26 89        Start Time: 1524  Stop Time: 1603  Total time in clinic (min): 39 minutes    Subjective: No new complaints  Pt arrived with SPC  No falls  Objective: See treatment diary below  Precautions  has a past medical history of Anemia; Anxiety; Arthritis; Benign paroxysmal vertigo, unspecified ear; [address] ([facility] [address] ); Disease of thyroid gland; GERD (gastroesophageal reflux disease); History of transfusion; Liver transplant candidate; Osteoporosis; and Pelvic fracture ([facility] )  Specialty Daily Treatment Diary     Manual                                                     Exercise Diary  3/29/18       AP sway  10x       MIP  20x               FOAM HT FA 10 X 2   LOB=1       FOAM EC  FA 5s x 10   LOB=2               Pbars- standing hip ABD 2# ankles  X10, x 3 sets @       Pbars over hurdles fwd 1 UE  6" x 4   2'# ankles   3 cycles       Pbars sidestep  Over hurdles  1 Ue,  6" x 4  2# ankles  3 cyles        Pbars                Gait        Gait        Gait         Foursquare  SPC 4 cycles @                Mat - SLR HEP 10x bilat        Mat- Bridge HEP  10x 5 sec hold       Mat                             Modalities                                          Assessment: Tolerated treatment well  Patient would benefit from continued PT  SLR and Bridges added to hep  See copies in media  AP sway is challenging for pt, but she improved with reps  Plan: Continue per plan of care  NV add gait with HT   Add AP sway to hep

## 2018-04-02 DIAGNOSIS — Z86.73 HISTORY OF STROKE: Primary | ICD-10-CM

## 2018-04-02 RX ORDER — PRAVASTATIN SODIUM 20 MG
20 TABLET ORAL
Qty: 90 TABLET | Refills: 0 | Status: SHIPPED | OUTPATIENT
Start: 2018-04-02 | End: 2019-04-01 | Stop reason: SDUPTHER

## 2018-04-03 ENCOUNTER — OFFICE VISIT (OUTPATIENT)
Dept: PHYSICAL THERAPY | Facility: CLINIC | Age: 73
End: 2018-04-03
Payer: MEDICARE

## 2018-04-03 DIAGNOSIS — R26.0 ATAXIC GAIT: Primary | ICD-10-CM

## 2018-04-03 PROCEDURE — 97110 THERAPEUTIC EXERCISES: CPT

## 2018-04-03 PROCEDURE — 97112 NEUROMUSCULAR REEDUCATION: CPT

## 2018-04-03 NOTE — PROGRESS NOTES
Daily Note     Today's date: 4/3/2018  Patient name: Neftaly Chi  : 1945  MRN: [id number]  Referring provider: Luz Elena Gonzalez DO  Dx:   Encounter Diagnosis     ICD-10-CM    1  Ataxic gait R26 0        Start Time:   Stop Time: 318  Total time in clinic (min): 39 minutes    Subjective: No new complaints  Pt arrived with SPC  No falls  Objective: See treatment diary below  Precautions  has a past medical history of Anemia; Anxiety; Arthritis; Benign paroxysmal vertigo, unspecified ear; Cirrhosis ([facility] [address] ); Disease of thyroid gland; GERD (gastroesophageal reflux disease); History of transfusion; Liver transplant candidate; Osteoporosis; and [facility]  Specialty Daily Treatment Diary     Manual                                                     Exercise Diary  3/29/18 4/3/2018      AP sway FOAM  FC 10x x20 EO  LOB=4      MIP  20x               FOAM HT FA 10 X 2   LOB=1 FA  10x2  EO  LOB=0      FOAM EC  FA 5s x 10   LOB=2 FA  10sec x 10  LOB=1      B/L pf stance  2# 10x2      Pbars- standing hip ABD 2# ankles  X10, x 3 sets @ 2# ankles x10 x 3sets      Pbars over hurdles fwd 1 UE  6" x 4   2'# ankles   3 cycles No UE supervised 2# ankles 6 cycles F/B      Pbars sidestep  Over hurdles  1 Ue,  6" x 4  2# ankles  3 cyles  No UE 2# ankles 4 cycles      Pbars          Step taps x20 in bars 4", occasional LOB      Gait        Gait        Gait         Foursquare  SPC 4 cycles @                Mat - SLR HEP 10x bilat        Mat- Bridge HEP  10x 5 sec hold       Mat                             Modalities                                    Assessment:     Patient initiated step taps with cuing to increase step height for better clearance  Nice gains noted in stability with hurdles, progressed to no Ue  Rests given due to fatigue factor  Plan: Continue per plan of care  Patient without complaints post treatment

## 2018-04-05 ENCOUNTER — OFFICE VISIT (OUTPATIENT)
Dept: PHYSICAL THERAPY | Facility: CLINIC | Age: 73
End: 2018-04-05
Payer: MEDICARE

## 2018-04-05 ENCOUNTER — TELEPHONE (OUTPATIENT)
Dept: NEUROLOGY | Facility: CLINIC | Age: 73
End: 2018-04-05

## 2018-04-05 DIAGNOSIS — R26.89 IMBALANCE: ICD-10-CM

## 2018-04-05 DIAGNOSIS — R26.0 ATAXIC GAIT: Primary | ICD-10-CM

## 2018-04-05 PROCEDURE — 97110 THERAPEUTIC EXERCISES: CPT

## 2018-04-05 PROCEDURE — 97112 NEUROMUSCULAR REEDUCATION: CPT

## 2018-04-05 NOTE — PROGRESS NOTES
Daily Note     Today's date: 2018  Patient name: Glenda Hutchinson  : 1945  MRN: [id number]  Referring provider: Fátima Suarez DO  Dx:   No diagnosis found  Start Time: 3849  Stop Time: 1625  Total time in clinic (min): 47 minutes    Subjective: No new complaints  Pt arrived with SPC  No falls  Objective: See treatment diary below  Precautions  has a past medical history of Anemia; Anxiety; Arthritis; Benign paroxysmal vertigo, unspecified ear; Cirrhosis ([facility] ); Disease of thyroid gland; GERD (gastroesophageal reflux disease); History of transfusion; Liver transplant candidate; Osteoporosis; and Pelvic fracture ([facility] )  Specialty Daily Treatment Diary     Manual                                                     Exercise Diary  3/29/18 4/3/2018 4/5/18     AP sway FOAM  FC 10x x20 EO  LOB=4 FOAM FA  15x LOB=1     MIP  20x  FOAM FA 20x2  LOB=1             FOAM HT FA 10 X 2   LOB=1 FA  10x2  EO  LOB=0 FC 10x 2  LOB=2     FOAM EC  FA 5s x 10   LOB=2 FA  10sec x 10  LOB=1 FC 10s  LOB=1             B/L pf stance  2# 10x2 2# 10x2     Pbars- standing hip ABD 2# ankles  X10, x 3 sets @ 2# ankles x10 x 3sets 2# ankles x10     Pbars over hurdles fwd 1 UE  6" x 4   2'# ankles   3 cycles No UE supervised 2# ankles 6 cycles F/B 6" hurdles   No UE supervised 2# ankles 3 cycles F/B     Pbars sidestep  Over hurdles  1 Ue,  6" x 4  2# ankles  3 cyles  No UE 2# ankles 4 cycles 6" hurdles   No UE supervised 2# ankles 4 cycles F/B     Pbars          Step taps x20 in bars 4", occasional LOB      Gait HT   SPC   No SPC  No veering  WBOS     Gait curbs    Close supervision & SPC 5 times, fair eccentric control     Gait         Foursquare  SPC 4 cycles @   1  No SPC   3 cycles @ LOB=0  2  SPC 1- 2trac for side step only   3 cylces  LOB=0     NUSTEP    L3 5'      Mat - SLR HEP 10x bilat        Mat- Bridge HEP  10x 5 sec hold       Mat                             Modalities                                    Assessment: Wide base of support during gait persists  Pt able to progress difficulty level for several activities as noted above  Pt is anxious stepping down> up stuart  Advise continued PT as pt continues to display LOB and fear of falling  Plan: Continue per plan of care  Patient without complaints post treatment

## 2018-04-09 ENCOUNTER — OFFICE VISIT (OUTPATIENT)
Dept: PHYSICAL THERAPY | Facility: CLINIC | Age: 73
End: 2018-04-09
Payer: MEDICARE

## 2018-04-09 DIAGNOSIS — R26.89 IMBALANCE: ICD-10-CM

## 2018-04-09 DIAGNOSIS — I47.1 PSVT (PAROXYSMAL SUPRAVENTRICULAR TACHYCARDIA) (HCC): ICD-10-CM

## 2018-04-09 DIAGNOSIS — R26.0 ATAXIC GAIT: Primary | ICD-10-CM

## 2018-04-09 PROCEDURE — 97110 THERAPEUTIC EXERCISES: CPT

## 2018-04-09 PROCEDURE — 97112 NEUROMUSCULAR REEDUCATION: CPT

## 2018-04-09 NOTE — PROGRESS NOTES
Daily Note     Today's date: 2018  Patient name: Laura Savage  : 1945  MRN: [id number]  Referring provider: Maria Guadalupe Lazaro DO  Dx:   No diagnosis found  Subjective: No new complaints  Pt arrived with SPC  No falls  Objective: See treatment diary below  Precautions  has a past medical history of Anemia; Anxiety; Arthritis; Benign paroxysmal vertigo, unspecified ear; Cirrhosis ([facility] [address] ); Disease of thyroid gland; GERD (gastroesophageal reflux disease); History of transfusion; Liver transplant candidate; Osteoporosis; and Pelvic fracture ([facility] )  Specialty Daily Treatment Diary     Manual                                                     Exercise Diary  3/29/18 4/3/2018 4/5/18 4/9/18    AP sway FOAM  FC 10x x20 EO  LOB=4 FOAM FA  15x LOB=1     MIP  20x  FOAM FA 20x2  LOB=1     Lat STEP UPS    4" 1 UE 10x @    FOR STEP UPS    4" 2 UE 10 x @ Pbars                    FOAM HT FA 10 X 2   LOB=1 FA  10x2  EO  LOB=0 FC 10x 2  LOB=2     FOAM EC  FA 5s x 10   LOB=2 FA  10sec x 10  LOB=1 FC 10s  LOB=1                                     B/L pf stance  2# 10x2 2# 10x2     Pbars- standing hip ABD 2# ankles  X10, x 3 sets @ 2# ankles x10 x 3sets 2# ankles x10 2#  10 each  2 UE    Pbars over hurdles fwd 1 UE  6" x 4   2'# ankles   3 cycles No UE supervised 2# ankles 6 cycles F/B 6" hurdles   No UE supervised 2# ankles 3 cycles F/B 9" hurdles  2# ankles  1 UE  3 cycles for    Pbars sidestep  Over hurdles  1 Ue,  6" x 4  2# ankles  3 cyles  No UE 2# ankles 4 cycles 6" hurdles   No UE supervised 2# ankles 4 cycles F/B 9" hurdles  2#ankles  1 UE  3 cycles     Pbars          Step taps x20 in bars 4", occasional LOB      Gait HT   SPC   No SPC  No veering  WBOS No SPC   Gait HT  Mental dual task: no decrease in safety  3'    Gait curbs    Close supervision & SPC 5 times, fair eccentric control 4" w/SPC  3 times    6" w SPC  Too anxious    Gait         Foursquare  SPC 4 cycles @   1   No SPC   3 cycles @ LOB=0  2  SPC 1- 2trac for side step only  3 cylces  LOB=0     NUSTEP    L3 5'  L4 10'    Mat - SLR HEP 10x bilat        Mat- Bridge HEP  10x 5 sec hold       Mat                             Modalities                                    Assessment: Wide base of support during gait persists  Pt remains anxious regarding curbs, especially going down 6" curb (she was unable to complete without assistance today  ) Advise continued strengthening  Continued PT is advised to maximize pt's level of function and safety, and to reduce pt anxiety regarding curbs  Plan: Continue per plan of care  Patient without complaints post treatment  Standing hip ABD was added to hep  See copy in media

## 2018-04-11 ENCOUNTER — OFFICE VISIT (OUTPATIENT)
Dept: PHYSICAL THERAPY | Facility: CLINIC | Age: 73
End: 2018-04-11
Payer: MEDICARE

## 2018-04-11 DIAGNOSIS — R26.0 ATAXIC GAIT: Primary | ICD-10-CM

## 2018-04-11 DIAGNOSIS — R26.89 IMBALANCE: ICD-10-CM

## 2018-04-11 PROCEDURE — 97112 NEUROMUSCULAR REEDUCATION: CPT | Performed by: PHYSICAL THERAPIST

## 2018-04-12 ENCOUNTER — APPOINTMENT (OUTPATIENT)
Dept: PHYSICAL THERAPY | Facility: CLINIC | Age: 73
End: 2018-04-12
Payer: MEDICARE

## 2018-04-12 NOTE — PROGRESS NOTES
Daily Note     Today's date: 2018  Patient name: Neftaly Chi  : 1945  MRN: [id number]  Referring provider: Luz Elena Gonzalez DO  Dx:   Encounter Diagnosis     ICD-10-CM    1  Ataxic gait R26 0    2  Imbalance R26 89        Start Time: 3257  Stop Time: 5998  Total time in clinic (min): 46 minutes    Subjective: No new complaints  Pt arrived with SPC  No falls  Objective: See treatment diary below  Precautions  has a past medical history of Anemia; Anxiety; Arthritis; Benign paroxysmal vertigo, unspecified ear; Cirrhosis ([facility] [address] ); Disease of thyroid gland; GERD (gastroesophageal reflux disease); History of transfusion; Liver transplant candidate; Osteoporosis; and Pelvic fracture ([facility] [facility] )      Specialty Daily Treatment Diary     Manual                                                     Exercise Diary  3/29/18 4/3/2018 4/5/18 4/9/18 4/11/18   AP sway FOAM  FC 10x x20 EO  LOB=4 FOAM FA  15x LOB=1     MIP  20x  FOAM FA 20x2  LOB=1     Lat STEP UPS    4" 1 UE 10x @ 4" step 1 UE 10 reps    FOR STEP UPS    4" 2 UE 10 x @ Pbars 4" step 1 UE, 10 reps                    FOAM HT FA 10 X 2   LOB=1 FA  10x2  EO  LOB=0 FC 10x 2  LOB=2     FOAM EC  FA 5s x 10   LOB=2 FA  10sec x 10  LOB=1 FC 10s  LOB=1                                     B/L pf stance  2# 10x2 2# 10x2     Pbars- standing hip ABD 2# ankles  X10, x 3 sets @ 2# ankles x10 x 3sets 2# ankles x10 2#  10 each  2 UE    Pbars over hurdles fwd 1 UE  6" x 4   2'# ankles   3 cycles No UE supervised 2# ankles 6 cycles F/B 6" hurdles   No UE supervised 2# ankles 3 cycles F/B 9" hurdles  2# ankles  1 UE  3 cycles for 9" hurdles  2# ankles  1 UE  3 cycles for   Pbars sidestep  Over hurdles  1 Ue,  6" x 4  2# ankles  3 cyles  No UE 2# ankles 4 cycles 6" hurdles   No UE supervised 2# ankles 4 cycles F/B 9" hurdles  2#ankles  1 UE  3 cycles  9" hurdles  2#ankles  1 UE  3 cycles    Pbars backwards stepping over hurdles      9" height   2# wts ankle  1 UE   2 cycles Step taps x20 in bars 4", occasional LOB      Gait HT   SPC   No SPC  No veering  WBOS No SPC   Gait HT  Mental dual task: no decrease in safety  3'    Gait curbs    Close supervision & SPC 5 times, fair eccentric control 4" w/SPC  3 times    6" w SPC  Too anxious    Gait         Foursquare  SPC 4 cycles @   1  No SPC   3 cycles @ LOB=0  2  SPC 1- 2trac for side step only  3 cylces  LOB=0  4 cycles    NUSTEP    L3 5'  L4 10' L5 10 minutes prior to therapy session    Mat - SLR HEP 10x bilat        Mat- Bridge HEP  10x 5 sec hold       Mat         Step taps No UE  FWD   LAT      2 x 10                Modalities                                    Assessment:      Patient continues to be fearful with limited UE support with balance activities this date  Increased challenge with backwards stepping with tendency to to have decrease step height with crossing over  Cuing for base of support with backwards stepping with slight improvement in stability  Challenge with step tap activity this date and step ups with decrease UE support  Displays short shuffling gait pattern without UE support or AD  Patient will continue to benefit from skilled PT to improve dynamic balance and enhance confident with balance activities  Plan: Continue per plan of care  Patient without complaints post treatment

## 2018-04-16 ENCOUNTER — OFFICE VISIT (OUTPATIENT)
Dept: PHYSICAL THERAPY | Facility: CLINIC | Age: 73
End: 2018-04-16
Payer: MEDICARE

## 2018-04-16 DIAGNOSIS — I48.21 PERMANENT ATRIAL FIBRILLATION (HCC): ICD-10-CM

## 2018-04-16 DIAGNOSIS — R26.0 ATAXIC GAIT: Primary | ICD-10-CM

## 2018-04-16 DIAGNOSIS — R26.89 IMBALANCE: ICD-10-CM

## 2018-04-16 PROCEDURE — 97110 THERAPEUTIC EXERCISES: CPT

## 2018-04-16 PROCEDURE — 97112 NEUROMUSCULAR REEDUCATION: CPT

## 2018-04-16 NOTE — PROGRESS NOTES
Daily Note     Today's date: 2018  Patient name: Glenda Hutchinson  : 1945  MRN: [id number]  Referring provider: Fátima Suarez DO  Dx:   Encounter Diagnosis     ICD-10-CM    1  Ataxic gait R26 0    2  Imbalance R26 89        Start Time: 3005  Stop Time: 1528  Total time in clinic (min): 68 minutes    Subjective: No new complaints  Pt arrived with SPC  No falls  Objective: See treatment diary below  Precautions  has a past medical history of Anemia; Anxiety; Arthritis; Benign paroxysmal vertigo, unspecified ear; Cirrhosis ([facility] [address] ); Disease of thyroid gland; GERD (gastroesophageal reflux disease); History of transfusion; Liver transplant candidate; Osteoporosis; and Pelvic fracture ([facility] [address] )      Specialty Daily Treatment Diary     Manual                                                     Exercise Diary  4/16/18 4/3/2018 4/5/18 4/9/18 4/11/18   AP sway FOAM  FC  x20 EO  LOB=4 FOAM FA  15x LOB=1     MIP  FOAM FA  20x LOB=0  FOAM FA 20x2  LOB=1     Lat STEP UPS 4" step, 1 UE  10 x bilat    4" 1 UE 10x @ 4" step 1 UE 10 reps    FOR STEP UPS 4" step, 1 UE  10 x bilat    4" 2 UE 10 x @ Pbars 4" step 1 UE, 10 reps    FALLTRAK LOS 50%  =88       FALLTRAK CW/CCW Med  =52  =67       FOAM HT FC  LOB=0 FA  10x2  EO  LOB=0 FC 10x 2  LOB=2     FOAM EC  FC 10 s  LOB=0 FA  10sec x 10  LOB=1 FC 10s  LOB=1     FOAM VOR CX FC 10 x 2  LOB=0               Wide AP stance 30 sec x 2                B/L pf stance  2# 10x2 2# 10x2     Pbars- standing hip ABD  2# ankles x10 x 3sets 2# ankles x10 2#  10 each  2 UE    Pbars over hurdles fwd 9" hurdles  2# ankles  1 UE  3 cycles  No UE supervised 2# ankles 6 cycles F/B 6" hurdles   No UE supervised 2# ankles 3 cycles F/B 9" hurdles  2# ankles  1 UE  3 cycles for 9" hurdles  2# ankles  1 UE  3 cycles for   Pbars sidestep  Over hurdles  9" hurdles  2#ankles  1 UE  2 cycles  No UE 2# ankles 4 cycles 6" hurdles   No UE supervised 2# ankles 4 cycles F/B 9" hurdles  2#ankles  1 UE  3 cycles  9" hurdles  2#ankles  1 UE  3 cycles    Pbars backwards stepping over hurdles  9 " hurdles  2# ankles  Pbars  1 UE    9" height   2# wts ankle  1 UE   2 cycles      Step taps x20 in bars 4", occasional LOB      Gait HT   SPC   No SPC  No veering  WBOS No SPC   Gait HT  Mental dual task: no decrease in safety  3'    Gait curbs    Close supervision & SPC 5 times, fair eccentric control 4" w/SPC  3 times    6" w SPC  Too anxious    Gait         Foursquare  2# ankles  No obstacles  0# 2trac x 2   Needs CG for posterior step  1  No SPC   3 cycles @ LOB=0  2  SPC 1- 2trac for side step only  3 cylces  LOB=0  4 cycles    NUSTEP  L5 x 8'  Pt tired, could not complete 10'  L3 5'  L4 10' L5 10 minutes prior to therapy session    Mat - SLR HEP        Mat- Bridge HEP         Mat         Step taps No UE  FWD   LAT      2 x 10                Modalities                                    Assessment:      End of session pt denied knee pain  Pr reports fair hep adherence  Patient continues to be fearful with limited UE support with balance activities this date  Cuing for base of support with backwards stepping and side stepping  with slight improvement in stability  Challenge with step tap activity remains challenging for pt, provoking anxiety  Displays short shuffling gait pattern without UE support or AD  Patient will continue to benefit from skilled PT to improve dynamic balance and enhance confident with balance activities  Pt reports mild fatigue end of session  Plan: Continue per plan of care  Patient without complaints post treatment

## 2018-04-19 ENCOUNTER — OFFICE VISIT (OUTPATIENT)
Dept: PHYSICAL THERAPY | Facility: CLINIC | Age: 73
End: 2018-04-19
Payer: MEDICARE

## 2018-04-19 DIAGNOSIS — R26.0 ATAXIC GAIT: Primary | ICD-10-CM

## 2018-04-19 DIAGNOSIS — R26.89 IMBALANCE: ICD-10-CM

## 2018-04-19 PROCEDURE — 97112 NEUROMUSCULAR REEDUCATION: CPT

## 2018-04-19 PROCEDURE — 97110 THERAPEUTIC EXERCISES: CPT

## 2018-04-19 NOTE — PROGRESS NOTES
Daily Note     Today's date: 2018  Patient name: Enid Lucas  : 1945  MRN: [id number]  Referring provider: Katie Roberto DO  Dx:   Encounter Diagnosis     ICD-10-CM    1  Ataxic gait R26 0    2  Imbalance R26 89        Start Time: 3908  Stop Time: 1500  Total time in clinic (min): 72 minutes    Subjective: No new complaints  Pt arrived with SPC  No falls  No soreness after PT   Objective: See treatment diary below  Precautions  has a past medical history of Anemia; Anxiety; Arthritis; Benign paroxysmal vertigo, unspecified ear; Cirrhosis ([facility] [address] ); Disease of thyroid gland; GERD (gastroesophageal reflux disease); History of transfusion; Liver transplant candidate; Osteoporosis; and Pelvic fracture ([facility] [address] )      Specialty Daily Treatment Diary     Manual                                                     Exercise Diary  18   AP sway FOAM  FC   FOAM FA  15x LOB=1     MIP  FOAM FA  20x LOB=0  FOAM FA 20x2  LOB=1     Lat STEP UPS 4" step, 1 UE  10 x bilat  6" 2 UE pbars  10 x 2 bilat  4" 1 UE 10x @ 4" step 1 UE 10 reps    FOR STEP UPS 4" step, 1 UE  10 x bilat  6"  10x bilat  2 UE  4" 2 UE 10 x @ Pbars 4" step 1 UE, 10 reps    STS no UE  10x no pain       FALLTRAK LOS 50%  =88 75%=  42%      FALLTRAK CW/CCW Med  =52  =67 LG  =60%  =57%      FOAM HT FC  LOB=0  FC 10x 2  LOB=2     FOAM EC  FC 10 s  LOB=0  FC 10s  LOB=1     FOAM VOR CX FC 10 x 2  LOB=0               Wide AP stance 30 sec x 2                B/L pf stance   2# 10x2     Pbars- standing hip ABD   2# ankles x10 2#  10 each  2 UE    Pbars over hurdles fwd 9" hurdles  2# ankles  1 UE  3 cycles  done 6" hurdles   No UE supervised 2# ankles 3 cycles F/B 9" hurdles  2# ankles  1 UE  3 cycles for 9" hurdles  2# ankles  1 UE  3 cycles for   Pbars sidestep  Over hurdles  9" hurdles  2#ankles  1 UE  3 cycles  Done  6" hurdles   No UE supervised 2# ankles 4 cycles F/B 9" hurdles  2#ankles  1 UE  3 cycles  9" hurdles  2#ankles  1 UE  3 cycles    Pbars backwards stepping over hurdles  9 " hurdles  2# ankles  Pbars  1 UE Done  2 cycles   9" height   2# wts ankle  1 UE   2 cycles            Gait HT   SPC   No SPC  No veering  WBOS No SPC   Gait HT  Mental dual task: no decrease in safety  3'    Gait curbs    Close supervision & SPC 5 times, fair eccentric control 4" w/SPC  3 times    6" w SPC  Too anxious    Gait         Foursquare  2# ankles  No obstacles  0# 2trac x 2   Needs CG for posterior step  1  No SPC   3 cycles @ LOB=0  2  SPC 1- 2trac for side step only  3 cylces  LOB=0  4 cycles    NUSTEP  L5 x 8'  Pt tired, could not complete 10' 10' L5 L3 5'  L4 10' L5 10 minutes prior to therapy session    Mat - SLR HEP        Mat- Bridge HEP         Mat         Step taps No UE  FWD   LAT      2 x 10                Modalities                                    Assessment:      End of session pt denied knee pain  Pr reports fair hep adherence  Patient continues to be fearful with limited UE support with balance activities this date  Pt has been able to progress the step height for step ups with UE support    Displays short shuffling gait pattern without UE support or AD  Patient will continue to benefit from skilled PT to improve dynamic balance and enhance confident with balance activities  Pt reports mild fatigue end of session  Care was assisted by Luz Elena Morton PT to maintain one on one care for part of session  Plan: Continue per plan of care  Patient without complaints post treatment

## 2018-04-20 ENCOUNTER — TELEPHONE (OUTPATIENT)
Dept: NEUROLOGY | Facility: CLINIC | Age: 73
End: 2018-04-20

## 2018-04-20 NOTE — TELEPHONE ENCOUNTER
pt called and states that she has not been taking keppra 500mg since approx april 6th,  she was unclear what the medication was  she didn't realize that she should have been taking it  she was taking prior to this  keppra was ordered 500mg 1 tab bid  no seizures  she is going to check her medications and call back to verify if she has or hasn't been taking keppra

## 2018-04-20 NOTE — TELEPHONE ENCOUNTER
Pt called back and confirmed that she has not been taking Keppra 500 mg bid, "I put it aside and I forgot that I'm suppose to take it"  Last dose was last month, unknown exact date  No episode of seizure  Pt wants to know if it's safe for her to start taking it again since she has not taking it for 20 days?       [phone]

## 2018-04-23 ENCOUNTER — OFFICE VISIT (OUTPATIENT)
Dept: PHYSICAL THERAPY | Facility: CLINIC | Age: 73
End: 2018-04-23
Payer: MEDICARE

## 2018-04-23 DIAGNOSIS — R26.89 IMBALANCE: ICD-10-CM

## 2018-04-23 DIAGNOSIS — R26.0 ATAXIC GAIT: Primary | ICD-10-CM

## 2018-04-23 PROCEDURE — 97110 THERAPEUTIC EXERCISES: CPT

## 2018-04-23 PROCEDURE — 97112 NEUROMUSCULAR REEDUCATION: CPT

## 2018-04-23 NOTE — TELEPHONE ENCOUNTER
Patient states she still inquiring about this, states she thought somebody would have called her back over the weekend  She also contacted the answering service and "nobody responded"

## 2018-04-23 NOTE — TELEPHONE ENCOUNTER
Yes start with 500 mg BID but no driving just stay at home while starting it for 1-2 weeks until used to the dose  It can cause a little sedation when you first start it  Thanks

## 2018-04-23 NOTE — PROGRESS NOTES
Daily Note     Today's date: 2018  Patient name: Eliana Robles  : 1945  MRN: [id number]  Referring provider: Yennifer Bain DO  Dx:   No diagnosis found  Subjective: No new complaints  Pt arrived with SPC  No falls  No soreness after PT   Objective: See treatment diary below  Precautions  has a past medical history of Anemia; Anxiety; Arthritis; Benign paroxysmal vertigo, unspecified ear; Cirrhosis ([facility] [address] ); Disease of thyroid gland; GERD (gastroesophageal reflux disease); History of transfusion; Liver transplant candidate; Osteoporosis; and Pelvic fracture ([facility] )  Specialty Daily Treatment Diary     Manual                                                     Exercise Diary  18     AP sway FOAM  FC        MIP  FOAM FA  20x LOB=0       Rev  step ups   4" 1 ue PBARS  10X 2      Lat STEP UPS 4" step, 1 UE  10 x bilat  6" 2 UE pbars  10 x 2 bilat 6" 1 UE pbars  10 x 2 bilat     FOR STEP UPS 4" step, 1 UE  10 x bilat  6"  10x bilat  2 UE 6"  10x bilat  1 UE     STS no UE  10x no pain  5#  10x, 5x     FALLTRAK LOS 50%  =88 75%=  42% 75%  =71     FALLTRAK CW/CCW Med  =52  =67 LG  =60%  =57% lg  =56  =59     FOAM HT FC  LOB=0       FOAM EC  FC 10 s  LOB=0       FOAM VOR CX FC 10 x 2  LOB=0               Wide AP stance 30 sec x 2                B/L pf stance        Pbars- standing hip ABD        Pbars over hurdles fwd 9" hurdles  2# ankles  1 UE  3 cycles  9" hurdles  2# ankles  1 UE  3 cycles 9"  3# ankles  1 UE  2 cycles     Pbars sidestep  Over hurdles  9" hurdles  2#ankles  1 UE  3 cycles  9" hurdles  2#ankles  1 UE  3 cycles  9"  3# ankles  1 UE  2 cycles     Pbars backwards stepping over hurdles  9 " hurdles  2# ankles  Pbars  1 UE 9 " hurdles  2# ankles  Pbars  1 UE 9"  3# ankles  1 UE  2 cycles             Gait HT        Gait curbs         Gait         Foursquare  2# ankles  No obstacles  0# 2trac x 2   Needs CG for posterior step          NUSTEP  L5 x 8'  Pt tired, could not complete 10' 10' L5 L6 7'     Mat - SLR HEP        Mat- Bridge HEP         Mat         Step taps No UE  FWD   LAT                     Modalities                                    Assessment:      Pt less hesitant today with step ups to 6" height, 1 UE support  Displays short shuffling gait pattern without UE support or AD  Patient will continue to benefit from skilled PT to improve dynamic balance and enhance confident with balance activities  Pt reports mild fatigue end of session, but overall is more fatigued today in general, prior to arriving                Plan: Continue per plan of care  Patient without complaints post treatment   Reassess Nv

## 2018-04-26 ENCOUNTER — OFFICE VISIT (OUTPATIENT)
Dept: PHYSICAL THERAPY | Facility: CLINIC | Age: 73
End: 2018-04-26
Payer: MEDICARE

## 2018-04-26 DIAGNOSIS — R26.0 ATAXIC GAIT: Primary | ICD-10-CM

## 2018-04-26 DIAGNOSIS — R26.89 IMBALANCE: ICD-10-CM

## 2018-04-26 PROCEDURE — 97110 THERAPEUTIC EXERCISES: CPT

## 2018-04-26 PROCEDURE — G8978 MOBILITY CURRENT STATUS: HCPCS

## 2018-04-26 PROCEDURE — 97112 NEUROMUSCULAR REEDUCATION: CPT

## 2018-04-26 PROCEDURE — G8979 MOBILITY GOAL STATUS: HCPCS

## 2018-04-26 NOTE — PROGRESS NOTES
PT Re-Evaluation     Today's date: 2018  Patient name: Catie Abbott  : 1945  MRN: [id number]  Referring provider: Viv Schulz DO  Dx:   Encounter Diagnosis     ICD-10-CM    1  Ataxic gait R26 0    2  Imbalance R26 89        Start Time: 1345  Stop Time: 1440  Total time in clinic (min): 55 minutes    Assessment    Assessment details: 74year old female has attended  PT for the past month and feels faster and steadier with gait  She would like to continue PT  Walking on grass is challenging, her yard  Is uneven  Curbs remain challenging, she always uses a ramp or holds onto something/someone  Pt has made progress with all objective measures and is making progress toward goal completion  She continues to display/report anxiety attempting to step up a curb/step with [facility] external support  Advise continued PT to maximize progress toward age norms and to further improve pt's confidence in community  Understanding of Dx/Px/POC: good   Prognosis: good    Goals  Goals  STG 2-4 weeks  1  Improve gait speed by 0 1 m/sec  MET  2  Pt will be independent with basic HEP MET  3  Pt will demonstrate independence with SPC gait over obstacles, stairs, direction change  PARTIALLY MET    LTG  4-8 weeks  1  Improve DGI  score by by 5 points to 19-20/24  to improve safety with mobility   PARTIALLY MET   2  Improve 5XSTS  score by 5-10 seconds to approach age norm (10 seconds or less per current research,) to improve safety with mobilitiy  MET  3  Pt will be independent with advanced HEP  4  Independent ambulation with least restrictive/no AD for community mobility  PARTIALLY MET   5  Improve gait speed by 0 2 m/sec or more to approach age-norm  MET   6  Pt will be independent with a plan to continue an elevated overall level of activity  PT [address]         Plan  Planned therapy interventions: home exercise program, strengthening, manual therapy, neuromuscular re-education, patient education, therapeutic activities, therapeutic exercise and balance  Frequency: 2x week  Duration in weeks: 4  Treatment plan discussed with: family and patient  Plan details: POC 3/26/18 to 18        Subjective Evaluation    History of Present Illness  Mechanism of injury: 74year old female has attended  PT for the (past month and feels faster and steadier with gait  She would like to continue PT  Walking on grass is challenging, her yard  Is uneven  Curbs remain challenging, she always uses a ramp or holds onto something/someone  She continues to experience low back pain standing at the sink for 5 min washing dishes  Recurrent probem    Quality of life: good    Pain  Current pain ratin  At best pain ratin  At worst pain ratin  Quality: dull ache  Relieving factors: change in position and rest  Aggravating factors: standing  Progression: worsening    Social Support  Steps to enter house: yes  Stairs in house: yes   Lives in: multiple-level home (rails with all stairs)    Patient Goals  Patient goals for therapy: improved balance and increased strength  Patient goal: walk faster MET  Get stronger - partially  MET  Improved Balance: partially MET        Objective     Ambulation     Ambulation: Level Surfaces     Additional Level Surfaces Ambulation Details  Coordination: ue intact, le too weak to test  KEENAN: ue and le intact  LE strength:  Hip flex= 3/5  Quads= 4/5  Ankle Df=5/5  IV =5/5  EV =5/5  Pf=5/5    REO:neg  REC:neg  SREO: unable   SLS: 1 sec each   Gait speed:0 47 m/sec or 1 55 ft/sec with SPC   5XSTS=22 76 sec, with one abrupt sit    TUG=19 41 sec  DGI=   Oculomotor:  Smooth pursuit: nl, no HA provoked  Saccades: nl, no HA provoked  Vergence: NPC=4', no HA provoked  Positionals: negative roll tests and gumaro Hallpike  Updated 18  c/o dizziness upon sitting up in the am   She started Keflex again after 3 week break    Since she started retaking it she feels dizzy in am again  Strength   Hip flex = 3+/5  Quads=5/5  SREO: unable  REC= 30 sec  SLS=1sec each   5 x STS= 12 64 sec   TUG=11 58 sec   Gait speed = 0 75 meters/sec or 2 46 ft sec   DGI =17/24      Flowsheet Rows      Most Recent Value   PT/OT G-Codes   Current Score  72   Projected Score  69   FOTO information reviewed  Yes   Assessment Type  Re-evaluation   G code set  Mobility: Walking & Moving Around   Mobility: Walking and Moving Around Current Status ()  CJ   Mobility: Walking and Moving Around Goal Status ()  CJ          Precautions:   has a past medical history of Anemia; Anxiety; Arthritis; Benign paroxysmal vertigo, unspecified ear; Cirrhosis ([facility] [address] ); Disease of thyroid gland; GERD (gastroesophageal reflux disease); History of transfusion; Liver transplant candidate; Osteoporosis; and Pelvic fracture ([facility] [address] )  Daily Treatment Diary      Updated 4/26/18  c/o dizziness upon sitting up in the am   She started Keflex again after 3 week break  Since she started retaking it she feels dizzy in am again      Strength   Hip flex = 3+/5  Quads=5/5  SREO: unable  REC= 30 sec  SLS=1sec each   5 x STS= 12 64 sec   TUG=11 58 sec   Gait speed = 0 75 meters/sec or 2 46 ft sec   Specialty Daily Treatment Diary      Manual                                                                                          Exercise Diary  4/16/18 4/19/18 4/23/18 4/26/18     AP sway FOAM  FC            MIP  FOAM FA  20x LOB=0           Rev  step ups     4" 1 ue PBARS  10X 2    1# ankles  4"  12 reps bilat     Lat STEP UPS 4" step, 1 UE  10 x bilat  6" 2 UE pbars  10 x 2 bilat 6" 1 UE pbars  10 x 2 bilat  1# ankles  6"  12 reps bilat     FOR STEP UPS 4" step, 1 UE  10 x bilat  6"  10x bilat  2 UE 6"  10x bilat  1 UE  1# ankles  6"  12 reps bilat     STS no UE   10x no pain  5#  10x, 5x       FALLTRAK LOS 50%  =88 75%=  42% 75%  =71       FALLTRAK CW/CCW Med  =52  =67 LG  =60%  =57% lg  =56  =59     FOAM HT FC  LOB=0           FOAM EC  FC 10 s  LOB=0           FOAM VOR CX FC 10 x 2  LOB=0                         Wide AP stance 30 sec x 2                          B/L pf stance             Pbars- standing hip ABD             Pbars over hurdles fwd 9" hurdles  2# ankles  1 UE  3 cycles  9" hurdles  2# ankles  1 UE  3 cycles 9"  3# ankles  1 UE  2 cycles       Pbars sidestep  Over hurdles  9" hurdles  2#ankles  1 UE  3 cycles  9" hurdles  2#ankles  1 UE  3 cycles  9"  3# ankles  1 UE  2 cycles       Pbars backwards stepping over hurdles  9 " hurdles  2# ankles  Pbars  1 UE 9 " hurdles  2# ankles  Pbars  1 UE 9"  3# ankles  1 UE  2 cycles                     Gait HT             Gait curbs              Gait              Foursquare  2# ankles  No obstacles      0# 2trac x 2   Needs CG for posterior step             NUSTEP  L5 x 8'  Pt tired, could not complete 10' 10' L5 L6 7'       Mat - SLR HEP             Mat- Bridge HEP              Mat              Step taps No UE  FWD   LAT                                  Modalities

## 2018-04-27 ENCOUNTER — OFFICE VISIT (OUTPATIENT)
Dept: CARDIOLOGY CLINIC | Facility: CLINIC | Age: 73
End: 2018-04-27
Payer: MEDICARE

## 2018-04-27 VITALS
SYSTOLIC BLOOD PRESSURE: 102 MMHG | WEIGHT: 202 LBS | BODY MASS INDEX: 37.17 KG/M2 | OXYGEN SATURATION: 99 % | HEIGHT: 62 IN | DIASTOLIC BLOOD PRESSURE: 58 MMHG

## 2018-04-27 DIAGNOSIS — I48.0 PAROXYSMAL ATRIAL FIBRILLATION (HCC): Primary | ICD-10-CM

## 2018-04-27 DIAGNOSIS — I47.1 PAROXYSMAL SVT (SUPRAVENTRICULAR TACHYCARDIA) (HCC): ICD-10-CM

## 2018-04-27 DIAGNOSIS — K51.90 ULCERATIVE COLITIS WITHOUT COMPLICATIONS, UNSPECIFIED LOCATION (HCC): ICD-10-CM

## 2018-04-27 DIAGNOSIS — Z79.899 LONG-TERM USE OF IMMUNOSUPPRESSANT MEDICATION: Chronic | ICD-10-CM

## 2018-04-27 DIAGNOSIS — Z94.4 LIVER TRANSPLANTED (HCC): ICD-10-CM

## 2018-04-27 PROCEDURE — 99214 OFFICE O/P EST MOD 30 MIN: CPT | Performed by: INTERNAL MEDICINE

## 2018-04-27 PROCEDURE — 93000 ELECTROCARDIOGRAM COMPLETE: CPT | Performed by: INTERNAL MEDICINE

## 2018-04-27 NOTE — PROGRESS NOTES
Cardiology Follow Up    [facility]  1945  [id number]  SANDEEP IZAGUIRRE [facility] [address]  [facility] [id number]    Interval History: 25-year-old woman with a history of liver transplant, multiple CVA, nstemi likely typ2, chronic anemia receiving Procrit injections with recent prolonged hospitalization secondary to recurrent CVA  She has been placed on novel oral anticoagulation and has not had a further event  Her loop recorder was interrogated in the hospital and did not show any evidence of atrial fibrillation  She reports note trouble with her gait or unilateral weakness  She denies having any chest tightness  She denies having significant bleeding or bruising current the  She denies having any falls  Her hospitalization records were reviewed with      February 5, 2018:  She denies having significant bleeding or bruising on anticoagulation  She denies having any recurrence of unilateral weakness or speech difficulty  She denies having any chest pain  Her blood pressures been well controlled  4/27: She has felt dizzy since starting keppra  Still taking clopidogrel against medical advice  No chest pain  No arrhythmia    NO edema    Patient Active Problem List   Diagnosis    Liver transplanted ([facility] [address]    Hypothyroidism    Long-term use of immunosuppressant medication    Anemia    TIA (transient ischemic attack)    HLD (hyperlipidemia)    Acute ischemic stroke (HCC)    Paroxysmal SVT (supraventricular tachycardia) (HCC)    Altered mental status    Cerebrovascular accident (CVA) due to embolism ([facility] [address]    Non-ST elevation myocardial infarction (NSTEMI), type 2    Abnormal EEG    Adjustment reaction with prolonged depressive reaction    Ataxic gait    Benign essential hypertension    Chronic joint pain    Chronic kidney insufficiency    Cyst of diaphragm    Depression    Elevated homocysteine ([place] [address] )    Hypercalcemia    Lung nodule, solitary    Peripheral arterial disease ([place] [address] )    Sciatica    Ulcerative colitis without complications (HCC)     Past Medical History:   Diagnosis Date    Anemia     Anxiety     Arthritis     Benign paroxysmal vertigo, unspecified ear     onset: 05/06/2010    Cirrhosis ([place] [address] )     onset: 03/30/2005    Disease of thyroid gland     GERD (gastroesophageal reflux disease)     History of transfusion     Liver transplant candidate     onset: 09/16/2008    Osteoporosis     Pelvic fracture ([place] [address] )     onset: 10/14/2008     Social History     Social History    Marital status:      Spouse name: N/A    Number of children: N/A    Years of education: Completed 12th grade     Occupational History    Not on file  Social History Main Topics    Smoking status: Former Smoker     Years: 5 00     Quit date: 1/21/1975    Smokeless tobacco: Never Used      Comment: smoked for 5 years-1/2 to 1 ppd    Alcohol use No    Drug use: No    Sexual activity: No     Other Topics Concern    Not on file     Social History Narrative    Daily coffee consumption (1 cup/day)          Family History   Problem Relation Age of Onset    Cancer Mother      breast    Cancer Father     Crohn's disease Brother     Arthritis Family     Breast cancer Family      Past Surgical History:   Procedure Laterality Date    ABDOMINAL SURGERY      APPENDECTOMY      BLADDER AUGMENTATION N/A     CATARACT EXTRACTION      CHOLECYSTECTOMY N/A     COLONOSCOPY N/A 4/20/2017    Procedure: COLONOSCOPY;  Surgeon: Madelyn Baca MD;  Location: [facility] GI [facility]; Service:     ESOPHAGOGASTRODUODENOSCOPY N/A 3/31/2016    Procedure: ESOPHAGOGASTRODUODENOSCOPY (EGD); Surgeon: Madelyn Baca MD;  Location: [facility]; Service:     ESOPHAGOGASTRODUODENOSCOPY N/A 4/20/2017    Procedure: ESOPHAGOGASTRODUODENOSCOPY (EGD); Surgeon: Madelyn Baca MD;  Location: [facility];   Service:     EYE SURGERY      cataracts removed both eyes lens implant    HERNIA REPAIR      JOINT REPLACEMENT      left knee replacement    LIVER TRANSPLANTATION N/A     TONSILECTOMY AND ADNOIDECTOMY N/A     TUBAL LIGATION         Current Outpatient Prescriptions:     apixaban (ELIQUIS) 5 mg, Take 1 tablet (5 mg total) by mouth 2 (two) times a day, Disp: 56 tablet, Rfl: 0    aspirin (ECOTRIN LOW STRENGTH) 81 mg EC tablet, Take 1 tablet by mouth daily, Disp: 30 tablet, Rfl: 0    Calcium Carb-Cholecalciferol (OS-KENDRICK PO), Take by mouth, Disp: , Rfl:     clopidogrel (PLAVIX) 75 mg tablet, Take 75 mg by mouth daily, Disp: , Rfl:     cycloSPORINE modified (NEORAL) 25 mg capsule, Take 50 mg by mouth 2 (two) times a day, Disp: , Rfl:     levETIRAcetam (KEPPRA) 500 mg tablet, Take 1 tablet by mouth every 12 (twelve) hours, Disp: 60 tablet, Rfl: 0    levothyroxine 112 mcg tablet, TAKE 1 TABLET EVERY DAY AS DIRECTED, Disp: 90 tablet, Rfl: 3    metoprolol tartrate (LOPRESSOR) 25 mg tablet, TAKE 1 & 1/2 (ONE & ONE-HALF) TABLETS BY MOUTH TWICE DAILY, Disp: 90 tablet, Rfl: 0    mycophenolate (CELLCEPT) 250 mg capsule, Take 250 mg by mouth 2 (two) times a day 3 tabs each dose=750 mg , Disp: , Rfl:     omeprazole (PriLOSEC) 40 MG capsule, Take 40 mg by mouth daily, Disp: , Rfl:     oxybutynin (DITROPAN) 5 mg tablet, Take 5 mg by mouth 2 (two) times a day  , Disp: , Rfl:     pravastatin (PRAVACHOL) 20 mg tablet, Take 1 tablet (20 mg total) by mouth daily at bedtime, Disp: 90 tablet, Rfl: 0  Allergies   Allergen Reactions    Tetracyclines & Related Hives    Vancomycin Other (See Comments) and Itching     Reaction Date: [id number]; Pt unsure of reaction    Prograf [Tacrolimus] Anxiety     Reaction Date: 16Dec2008; Review of Systems:  Review of Systems   Constitutional: Negative  Negative for activity change, appetite change, chills, diaphoresis, fatigue, fever and unexpected weight change  HENT: Negative    Negative for congestion, dental problem, drooling, ear discharge, ear pain, facial swelling, hearing loss, mouth sores, nosebleeds, postnasal drip, rhinorrhea, sinus pain, sinus pressure, sneezing, sore throat, tinnitus, trouble swallowing and voice change  Eyes: Negative  Negative for photophobia, pain, redness, itching and visual disturbance  Respiratory: Negative  Negative for apnea, cough, choking, chest tightness, shortness of breath, wheezing and stridor  Cardiovascular: Negative  Negative for chest pain, palpitations and leg swelling  Gastrointestinal: Negative  Negative for abdominal distention, abdominal pain, anal bleeding, blood in stool, constipation, diarrhea, nausea, rectal pain and vomiting  Endocrine: Negative  Negative for cold intolerance, heat intolerance, polydipsia, polyphagia and polyuria  Genitourinary: Negative  Negative for decreased urine volume, difficulty urinating, dyspareunia, dysuria, enuresis, flank pain, frequency, genital sores, hematuria, menstrual problem, pelvic pain, urgency, vaginal bleeding, vaginal discharge and vaginal pain  Musculoskeletal: Negative  Negative for arthralgias, back pain, gait problem, joint swelling, myalgias, neck pain and neck stiffness  Skin: Negative  Negative for color change, pallor, rash and wound  Allergic/Immunologic: Negative  Negative for environmental allergies, food allergies and immunocompromised state  Neurological: Positive for dizziness  Negative for tremors, seizures, syncope, facial asymmetry, speech difficulty, weakness, light-headedness, numbness and headaches  Hematological: Negative  Negative for adenopathy  Does not bruise/bleed easily  Psychiatric/Behavioral: Negative  Negative for agitation, behavioral problems, confusion, decreased concentration, dysphoric mood, hallucinations, self-injury, sleep disturbance and suicidal ideas  The patient is not nervous/anxious and is not hyperactive      All other systems reviewed and are negative  Vitals:    04/27/18 1401   BP: 102/58   BP Location: Right arm   Patient Position: Sitting   Cuff Size: Large   SpO2: 99%   Weight: 91 6 kg (202 lb)   Height: 5' 2" (1 575 m)     Physical Exam:  Physical Exam   Constitutional: She is oriented to person, place, and time  She appears well-developed and well-nourished  No distress  HENT:   Head: Normocephalic and atraumatic  Right Ear: External ear normal    Left Ear: External ear normal    Eyes: Conjunctivae are normal  Pupils are equal, round, and reactive to light  Right eye exhibits no discharge  Left eye exhibits no discharge  No scleral icterus  Neck: Normal range of motion  Neck supple  No JVD present  No tracheal deviation present  No thyromegaly present  Cardiovascular: Normal rate and regular rhythm  Exam reveals gallop  Exam reveals no friction rub  Murmur heard  Pulmonary/Chest: Effort normal and breath sounds normal  No stridor  No respiratory distress  She has no wheezes  She has no rales  She exhibits no tenderness  Abdominal: Soft  Bowel sounds are normal  She exhibits distension  She exhibits no mass  There is no tenderness  There is no rebound and no guarding  Musculoskeletal: Normal range of motion  She exhibits no edema, tenderness or deformity  Neurological: She is alert and oriented to person, place, and time  She has normal reflexes  No cranial nerve deficit  She exhibits normal muscle tone  Coordination normal    Skin: Skin is warm and dry  No rash noted  She is not diaphoretic  No erythema  No pallor  Psychiatric: She has a normal mood and affect  Her behavior is normal  Judgment and thought content normal    Nursing note and vitals reviewed  1  Paroxysmal atrial fibrillation (HCC)  POCT ECG   2  Long-term use of immunosuppressant medication     3  Paroxysmal SVT (supraventricular tachycardia) ([address] )     4  Liver transplanted ([address] )     5   Ulcerative colitis without complications, unspecified location [facility])          Atrial fibrillation--continue Eliquis therapy plus metoprolol  Currently in sinus rhythm    CVA-continue Eliquis plus aspirin 81 mg with food    Hyperlipidemia-continue pravastatin statin 20 mg daily  Yearly lipid +liver function test    Liver transplant-continue immunosuppressants  Statin cleared by hepatology    Seizures- discussed with neurology about keppra side effects          Alondra Bermudez  Please call with any questions or suggestions

## 2018-04-30 ENCOUNTER — OFFICE VISIT (OUTPATIENT)
Dept: PHYSICAL THERAPY | Facility: CLINIC | Age: 73
End: 2018-04-30
Payer: MEDICARE

## 2018-04-30 DIAGNOSIS — R26.89 IMBALANCE: ICD-10-CM

## 2018-04-30 DIAGNOSIS — R26.0 ATAXIC GAIT: Primary | ICD-10-CM

## 2018-04-30 PROCEDURE — 97110 THERAPEUTIC EXERCISES: CPT

## 2018-04-30 PROCEDURE — 97112 NEUROMUSCULAR REEDUCATION: CPT

## 2018-04-30 NOTE — PROGRESS NOTES
Daily Note     Today's date: 2018  Patient name: Sergio Hill  : 1945  MRN: [id number]  Referring provider: Ming Gonzalez DO  Dx:   No diagnosis found  Start Time:           Subjective: No new complaints  Pt arrived with SPC  No falls  No soreness after last visit      Objective: See treatment diary below  Precautions  has a past medical history of Anemia; Anxiety; Arthritis; Benign paroxysmal vertigo, unspecified ear; Cirrhosis ([facility] [address] ); Disease of thyroid gland; GERD (gastroesophageal reflux disease); History of transfusion; Liver transplant candidate; Osteoporosis; and Pelvic fracture ([facility] [address] )      Manual                                                                                          Exercise Diary  18   AP sway FOAM  FC            MIP  FOAM FA  20x LOB=0           Rev  step ups     4" 1 ue PBARS  10X 2    1# ankles  4"  12 reps bilat  3 # ankles  4"  10 reps bilat  2 UE   Lat STEP UPS 4" step, 1 UE  10 x bilat  6" 2 UE pbars  10 x 2 bilat 6" 1 UE pbars  10 x 2 bilat  1# ankles  6"  12 reps bilat  3#  6" step  10reps bilat  1 UE   FOR STEP UPS 4" step, 1 UE  10 x bilat  6"  10x bilat  2 UE 6"  10x bilat  1 UE  1# ankles  6"  12 reps bilat  3#  6" step  10reps bilat  1 UE    STS no UE   10x no pain  5#  10x, 5x    5# 12 reps  2 sets    FALLTRAK LOS 50%  =88 75%=  42% 75%  =71       FALLTRAK CW/CCW Med  =52  =67 LG  =60%  =57% lg  =56  =59       FOAM HT FC  LOB=0           FOAM EC  FC 10 s  LOB=0           FOAM VOR CX FC 10 x 2  LOB=0                         Wide AP stance 30 sec x 2                          B/L pf stance         20 reps,  Added to hep   Pbars- standing hip ABD             Pbars over hurdles fwd 9" hurdles  2# ankles  1 UE  3 cycles  9" hurdles  2# ankles  1 UE  3 cycles 9"  3# ankles  1 UE  2 cycles    9"  3# ankles  1 UE  3 cycles   Pbars sidestep  Over hurdles  9" hurdles  2#ankles  1 UE  3 cycles  9" hurdles  2#ankles  1 UE  3 cycles  9"  3# ankles  1 UE  2 cycles    9"  3# ankles  1 UE  3 cycles   Pbars backwards stepping over hurdles  9 " hurdles  2# ankles  Pbars  1 UE 9 " hurdles  2# ankles  Pbars  1 UE 9"  3# ankles  1 UE  2 cycles    9"  3# ankles  1 UE  2 cycles                 Gait HT             Gait curbs          3 ply mat  SPC (sway)  6" step  SPC  No other support  4 reps no LOB     Gait              Foursquare  2# ankles  No obstacles      0# 2trac x 2   Needs CG for posterior step             NUSTEP  L5 x 8'  Pt tired, could not complete 10' 10' L5 L6 7'       Mat - SLR HEP             Mat- Bridge HEP              Mat              Step taps No UE  FWD   LAT                                  Modalities                                                           Assessment:    Pt tolerated increase in weights and cycles with step ups and hurdles  Improving confidence and abilities with step up to 6" height with SPC and supvervision  Patient will continue to benefit from skilled PT to improve dynamic balance and enhance confident with balance activities  Mildly tired at end of session  Plan: Continue per plan of care  Patient without complaints post treatment  Continue curb training and dynamic gait activites with SPC

## 2018-05-04 ENCOUNTER — OFFICE VISIT (OUTPATIENT)
Dept: PHYSICAL THERAPY | Facility: CLINIC | Age: 73
End: 2018-05-04
Payer: MEDICARE

## 2018-05-04 DIAGNOSIS — R26.89 IMBALANCE: ICD-10-CM

## 2018-05-04 DIAGNOSIS — R26.0 ATAXIC GAIT: Primary | ICD-10-CM

## 2018-05-04 PROCEDURE — 97112 NEUROMUSCULAR REEDUCATION: CPT

## 2018-05-04 NOTE — PROGRESS NOTES
Daily Note     Today's date: 2018  Patient name: Paul Grant  : 1945  MRN: [id number]  Referring provider: Ivory Dyer DO  Dx:   Encounter Diagnosis     ICD-10-CM    1  Ataxic gait R26 0    2  Imbalance R26 89        Start Time: 1100  Stop Time: 1145  Total time in clinic (min): 45 minutes    Subjective: Patient reported no updates since last appointment, presented with  and SPC  Objective: See treatment diary below  Precautions  has a past medical history of Anemia; Anxiety; Arthritis; Benign paroxysmal vertigo, unspecified ear; Cirrhosis ([facility] [address] ); Disease of thyroid gland; GERD (gastroesophageal reflux disease); History of transfusion; Liver transplant candidate; Osteoporosis; and Pelvic fracture ([facility] [address] )      Manual                                                                                          Exercise Diary  18   AP sway FOAM  FC            MIP             Rev  step ups     4" 1 ue PBARS  10X 2    1# ankles  4"  12 reps bilat  3 # ankles  4"  10 reps bilat  2 UE   Lat STEP UPS Standing on foam, 8" step, Lift and hold for 3 seconds, 20 reps bilaterally 6" 2 UE pbars  10 x 2 bilat 6" 1 UE pbars  10 x 2 bilat  1# ankles  6"  12 reps bilat  3#  6" step  10reps bilat  1 UE   FOR STEP UPS Standing on foam, 8" step, Lift and hold for 3 seconds, 20 reps bilaterally 6"  10x bilat  2 UE 6"  10x bilat  1 UE  1# ankles  6"  12 reps bilat  3#  6" step  10reps bilat  1 UE    STS no UE   10x no pain  5#  10x, 5x    5# 12 reps  2 sets    FALLTRAK LOS  75%=  42% 75%  =71       FALLTRAK CW/CCW  LG  =60%  =57% lg  =56  =59       FOAM HT            FOAM EC             FOAM VOR CX             Se             Wide AP stance                          B/L pf stance         20 reps,  Added to hep   Pbars- standing hip ABD             Pbars over hurdles fwd 9" hurdles today, 1 Ue support, 5 cycles  9" hurdles  2# ankles  1 UE  3 cycles 9"  3# ankles  1 UE  2 cycles    9"  3# ankles  1 UE  3 cycles   Pbars sidestep  Over hurdles  9" hurdles today, 1 Ue support, 5 cycles  9" hurdles  2#ankles  1 UE  3 cycles  9"  3# ankles  1 UE  2 cycles    9"  3# ankles  1 UE  3 cycles   Pbars backwards stepping over hurdles  9" hurdles today, 1 Ue support, 5 cycles  9 " hurdles  2# ankles  Pbars  1 UE 9"  3# ankles  1 UE  2 cycles    9"  3# ankles  1 UE  2 cycles                 Gait HT             Gait curbs          3 ply mat  SPC (sway)  6" step  SPC  No other support  4 reps no LOB     Gait              Foursquare               NUSTEP   10' L5 L6 7'       Mat - SLR HEP             Mat- Bridge HEP              Mat              Step taps No UE  FWD   LAT                                      Ambulation over consecutive raised objects with foam between each-backwards 8" steps, 2 in a row, 1 UE, 5 cycles        Ambulation over consecutive raised objects with foam between each-sideways 8" steps, 2 in a row, 1 UE, 5 cycles        Ambulation over consecutive raised objects with foam between each-forwards 8" steps, 2 in a row, 1 UE, 5 cycles                  Modalities                                                           Assessment:  Patient tolerated treatment well today, able to progress her abilities with her ambulation over increased surface heights  Patient hesitant to perform backwards obstacle negotiation without support externally from bars, indicating hesitation with her posterior ambulation capabilities  Required occasional cuing to increase step height with performance of fernando ambulation, increased performance upon verbal cuing  Patient requires skilled PT to maximize function, improve ambulation capabilities, and to improve her fall risk stratification  Plan: Continue per plan of care  Patient without complaints post treatment  Continue curb training and dynamic gait activites with SPC

## 2018-05-07 ENCOUNTER — OFFICE VISIT (OUTPATIENT)
Dept: PHYSICAL THERAPY | Facility: CLINIC | Age: 73
End: 2018-05-07
Payer: MEDICARE

## 2018-05-07 DIAGNOSIS — R26.89 IMBALANCE: ICD-10-CM

## 2018-05-07 DIAGNOSIS — R26.0 ATAXIC GAIT: Primary | ICD-10-CM

## 2018-05-07 PROCEDURE — 97112 NEUROMUSCULAR REEDUCATION: CPT

## 2018-05-07 PROCEDURE — 97110 THERAPEUTIC EXERCISES: CPT

## 2018-05-07 PROCEDURE — 97530 THERAPEUTIC ACTIVITIES: CPT

## 2018-05-07 NOTE — PROGRESS NOTES
Daily Note     Today's date: 2018  Patient name: Tia Lara  : 1945  MRN: [id number]  Referring provider: Daisy Fields DO  Dx:   Encounter Diagnosis     ICD-10-CM    1  Ataxic gait R26 0    2  Imbalance R26 89        Start Time: 1348  Stop Time: 4585  Total time in clinic (min): 49 minutes    Subjective: Patient reported no updates since last appointment, presented with  and SPC  No knee pain upon arriving  No soreness after last visit  Objective: See treatment diary below  Precautions  has a past medical history of Anemia; Anxiety; Arthritis; Benign paroxysmal vertigo, unspecified ear; Cirrhosis ([facility] [address] ); Disease of thyroid gland; GERD (gastroesophageal reflux disease); History of transfusion; Liver transplant candidate; Osteoporosis; and Pelvic fracture ([facility] [address] )      Manual                                                                                          Exercise Diary  18   AP sway FOAM  FC           MIP            Rev  step ups    6" from foam   No weights   10 each lead leg  4" 1 ue PBARS  10X 2    1# ankles  4"  12 reps bilat  3 # ankles  4"  10 reps bilat  2 UE   Lat STEP UPS Standing on foam, 8" step, Lift and hold for 3 seconds, 20 reps bilaterally Standing on foam, 8" step, Lift and hold for 3 seconds, 20 reps bilaterally  1# ankles  6" 1 UE pbars  10 x 2 bilat  1# ankles  6"  12 reps bilat  3#  6" step  10reps bilat  1 UE   FOR STEP UPS Standing on foam, 8" step, Lift and hold for 3 seconds,   20 reps Standing on foam, 8" step, Lift and hold for 3 seconds,   10 reps, 2 sets each, bilaterally 6"  10x bilat  1 UE  1# ankles  6"  12 reps bilat  3#  6" step  10reps bilat  1 UE    STS no UE    5#  10x, 5x    5# 12 reps  2 sets    FALLTRAK LOS   75%  =71       FALLTRAK CW/CCW   lg  =56  =59       FOAM HT           FOAM EC            FOAM VOR CX            Se            Wide AP stance                        B/L pf stance        20 reps,  Added to hep   Pbars- standing hip ABD            Pbars over hurdles fwd 9" hurdles today, 1 Ue support, 5 cycles  9" hurdlesx4  SPC   2 cycles  9"  3# ankles  1 UE  2 cycles    9"  3# ankles  1 UE  3 cycles   Pbars sidestep  Over hurdles  9" hurdles today, 1 Ue support, 5 cycles  9" hurdles x 4  2 cycles   No bars,   SPC only  9"  3# ankles  1 UE  2 cycles    9"  3# ankles  1 UE  3 cycles   Pbars backwards stepping over hurdles  9" hurdles today, 1 Ue support, 5 cycles  9" hurdles x 4  2 cycles   No bars,   SPC only  9"  3# ankles  1 UE  2 cycles    9"  3# ankles  1 UE  2 cycles                Gait HT            Gait curbs         3 ply mat  SPC (sway)  6" step  SPC  No other support  4 reps no LOB     Gait             Foursquare              NUSTEP   L6 x 10 min  L6 7'       Mat - SLR HEP            Mat- Bridge HEP             Mat             Step taps No UE  FWD   LAT                                     Ambulation over consecutive raised objects with foam between each-backwards 8" steps, 2 in a row, 1 UE, 5 cycles        Ambulation over consecutive raised objects with foam between each-sideways 8" steps, 2 in a row, 1 UE, 5 cycles        Ambulation over consecutive raised objects with foam between each-forwards 8" steps, 2 in a row, 1 UE, 5 cycles                 Modalities                                                           Assessment:  Pt performed well with SPC/gait over obstacles without external support  Required occasional cuing to increase step height with performance of fernando ambulation, increased performance upon verbal cuing  Patient requires skilled PT to maximize function, improve ambulation capabilities, and to improve her fall risk stratification  Plan: Continue per plan of care  Patient without complaints post treatment  Continue curb training and dynamic gait activites with SPC

## 2018-05-10 ENCOUNTER — OFFICE VISIT (OUTPATIENT)
Dept: PHYSICAL THERAPY | Facility: CLINIC | Age: 73
End: 2018-05-10
Payer: MEDICARE

## 2018-05-10 DIAGNOSIS — I47.1 PSVT (PAROXYSMAL SUPRAVENTRICULAR TACHYCARDIA) (HCC): ICD-10-CM

## 2018-05-10 DIAGNOSIS — R26.89 IMBALANCE: ICD-10-CM

## 2018-05-10 DIAGNOSIS — R26.0 ATAXIC GAIT: Primary | ICD-10-CM

## 2018-05-10 PROCEDURE — 97112 NEUROMUSCULAR REEDUCATION: CPT

## 2018-05-10 PROCEDURE — 97110 THERAPEUTIC EXERCISES: CPT

## 2018-05-10 NOTE — PROGRESS NOTES
Daily Note     Today's date: 5/10/2018  Patient name: Adria Bence  : 1945  MRN: [id number]  Referring provider: Mark Orourke DO  Dx:   Encounter Diagnosis     ICD-10-CM    1  Ataxic gait R26 0    2  Imbalance R26 89        Start Time: 48  Stop Time: 1258  Total time in clinic (min): 59 minutes    Subjective: Patient reported no updates since last appointment, presented with  and SPC  No knee pain upon arriving  No soreness after last visit  Objective: See treatment diary below  Precautions  has a past medical history of Anemia; Anxiety; Arthritis; Benign paroxysmal vertigo, unspecified ear; Cirrhosis ([facility] [address] ); Disease of thyroid gland; GERD (gastroesophageal reflux disease); History of transfusion; Liver transplant candidate; Osteoporosis; and Pelvic fracture ([facility] [address] )      Outcomes  3/26/18 4/26/18                                                                                     Exercise Diary  5/4 5/7/18 5/10/18 4/26/18  4/30/18   AP sway FOAM  FC          MIP           Rev  step ups    6" from foam   No weights   10 each lead leg  Standing on foam, 8" step, Lift and hold for 3 seconds, 10 reps bilaterally  1# ankles    1# ankles  4"  12 reps bilat  3 # ankles  4"  10 reps bilat  2 UE   Lat STEP UPS Standing on foam, 8" step, Lift and hold for 3 seconds, 20 reps bilaterally Standing on foam, 8" step, Lift and hold for 3 seconds, 20 reps bilaterally  1# ankles  Standing on foam, 8" step, Lift and hold for 3 seconds, 10 reps bilaterally  1# ankles   1# ankles  6"  12 reps bilat  3#  6" step  10reps bilat  1 UE   FOR STEP UPS Standing on foam, 8" step, Lift and hold for 3 seconds,   20 reps Standing on foam, 8" step, Lift and hold for 3 seconds,   10 reps, 2 sets each, bilaterally Standing on foam, 8" step, Lift and hold for 3 seconds,   10 reps, 2 sets each, bilaterally  1# ankles  6"  12 reps bilat  3#  6" step  10reps bilat  1 UE    STS no UE        5# 12 reps  2 sets    [facility] LOS          FALLTRAK CW/CCW          FOAM HT          FOAM EC           FOAM VOR CX           Se           Wide AP stance                      B/L pf stance       20 reps,  Added to hep   Pbars- standing hip ABD           Pbars over hurdles fwd 9" hurdles today, 1 Ue support, 5 cycles  9" hurdlesx4  SPC   2 cycles      9"  3# ankles  1 UE  3 cycles   Pbars sidestep  Over hurdles  9" hurdles today, 1 Ue support, 5 cycles  9" hurdles x 4  2 cycles   No bars,   SPC only      9"  3# ankles  1 UE  3 cycles   Pbars backwards stepping over hurdles  9" hurdles today, 1 Ue support, 5 cycles  9" hurdles x 4  2 cycles   No bars,   SPC only      9"  3# ankles  1 UE  2 cycles               Gait HT           Gait curbs     1 rail  6" step  Without asst  10reps   4reps   14 reps     Feels anxious   "scary and hard"   But no pain      3 ply mat  SPC (sway)  6" step  SPC  No other support  4 reps no LOB     Gait            Foursquare             NUSTEP   L6 x 10 min  L5 10 min   NC        Mat - SLR HEP           Mat- Bridge HEP            Mat            Step taps No UE  FWD   LAT            Sidesteps  Tap cone w/each foot  Pbars   No Ue  6 cones  1 cycle                     Ambulation over consecutive raised objects with foam between each-backwards 8" steps, 2 in a row, 1 UE, 5 cycles        Ambulation over consecutive raised objects with foam between each-sideways 8" steps, 2 in a row, 1 UE, 5 cycles        Ambulation over consecutive raised objects with foam between each-forwards 8" steps, 2 in a row, 1 UE, 5 cycles                Modalities                                                           Assessment:    LE coordination progressed today  Remains anxious with stepping with one UE support  Pt was allowed to work with one Pbar today  Patient requires skilled PT to maximize function, improve ambulation capabilities, and to improve her fall risk stratification  Plan: Continue per plan of care    Patient without complaints post treatment  Continue curb training and dynamic gait activites resume with  SPC NV

## 2018-05-11 DIAGNOSIS — I47.1 PSVT (PAROXYSMAL SUPRAVENTRICULAR TACHYCARDIA) (HCC): ICD-10-CM

## 2018-05-14 ENCOUNTER — OFFICE VISIT (OUTPATIENT)
Dept: PHYSICAL THERAPY | Facility: CLINIC | Age: 73
End: 2018-05-14
Payer: MEDICARE

## 2018-05-14 DIAGNOSIS — R26.0 ATAXIC GAIT: Primary | ICD-10-CM

## 2018-05-14 DIAGNOSIS — R26.89 IMBALANCE: ICD-10-CM

## 2018-05-14 PROCEDURE — 97110 THERAPEUTIC EXERCISES: CPT

## 2018-05-14 PROCEDURE — 97112 NEUROMUSCULAR REEDUCATION: CPT

## 2018-05-14 NOTE — PROGRESS NOTES
Daily Note     Today's date: 2018  Patient name: Ender Jackson  : 1945  MRN: [id number]  Referring provider: Tyron Galeana DO  Dx:   Encounter Diagnosis     ICD-10-CM    1  Ataxic gait R26 0    2  Imbalance R26 89        Start Time: 1357          Subjective: Patient reported no updates since last appointment, presented with  and SPC  No knee pain upon arriving  No soreness after last visit  Pt felt fairly comfortable walking car to porch several times yesterday due to rain, with SPC unassisted  Objective: See treatment diary below  Precautions  has a past medical history of Anemia; Anxiety; Arthritis; Benign paroxysmal vertigo, unspecified ear; Cirrhosis ([facility] [address] ); Disease of thyroid gland; GERD (gastroesophageal reflux disease); History of transfusion; Liver transplant candidate; Osteoporosis; and Pelvic fracture ([facility] [address] )  Outcomes  3/26/18 4/26/18                                                                                     Exercise Diary  5/4 5/7/18 5/10/18 5/14/18  4/30/18   AP sway FOAM  FC         MIP          Rev  step ups    6" from foam   No weights   10 each lead leg  Standing on foam, 8" step, Lift and hold for 3 seconds, 10 reps bilaterally  1# ankles    3 # ankles  4"  10 reps bilat  2 UE   Lat STEP UPS Standing on foam, 8" step, Lift and hold for 3 seconds, 20 reps bilaterally Standing on foam, 8" step, Lift and hold for 3 seconds, 20 reps bilaterally  1# ankles  Standing on foam, 8" step, Lift and hold for 3 seconds, 10 reps bilaterally  1# ankles  8" no foam  R lead  10 reps, 1 rail  L lead, 2 rails, 7reps  3#  6" step  10reps bilat  1 UE   FOR STEP UPS Standing on foam, 8" step, Lift and hold for 3 seconds,   20 reps Standing on foam, 8" step, Lift and hold for 3 seconds,   10 reps, 2 sets each, bilaterally Standing on foam, 8" step, Lift and hold for 3 seconds,   10 reps, 2 sets each, bilaterally 8" no foam  R lead  10 reps, 1 rail      L lead, 2 rails, 10 reps  3#  6" step  10reps bilat  1 UE    STS no UE     6# from chair  12 reps   5# 12 reps  2 sets    FALLTRAK LOS         FALLTRAK CW/CCW         FOAM HT         FOAM EC          FOAM VOR CX          Se          Wide AP stance                    B/L pf stance      20 reps,  Added to hep   Pbars- standing hip ABD          Pbars over hurdles fwd 9" hurdles today, 1 Ue support, 5 cycles  9" hurdlesx4  SPC   2 cycles   9" hurdles  SPC  No rails   2 cycles  LOB=0  9"  3# ankles  1 UE  3 cycles   Pbars sidestep  Over hurdles  9" hurdles today, 1 Ue support, 5 cycles  9" hurdles x 4  2 cycles   No bars,   SPC only     9"  3# ankles  1 UE  3 cycles   Pbars backwards stepping over hurdles  9" hurdles today, 1 Ue support, 5 cycles  9" hurdles x 4  2 cycles   No bars,   SPC only     9"  3# ankles  1 UE  2 cycles              Gait HT          Gait curbs     1 rail  6" step  Without asst  10reps   4reps   14 reps     Feels anxious   "scary and hard"   But no pain  1 rail  SPC  4" step   Easy    SPC, no rail  4" step  Independent     1 rail+SPC  6" step  Independent    SPC no rail  6" step     3 ply mat  SPC (sway)  6" step  SPC  No other support  4 reps no LOB     Curb not near walls or rail     6" step  SPC, no rail  4x independent    Gait           Foursquare            NUSTEP   L6 x 10 min  L5 10 min   NC       Mat - SLR HEP          Mat- Bridge HEP           Mat           Step taps No UE  FWD   LAT           Sidesteps  Tap cone w/each foot  Pbars   No Ue  6 cones  1 cycle 7 cones  No UE  Open room   LOB=1                    Ambulation over consecutive raised objects with foam between each-backwards 8" steps, 2 in a row, 1 UE, 5 cycles        Ambulation over consecutive raised objects with foam between each-sideways 8" steps, 2 in a row, 1 UE, 5 cycles        Ambulation over consecutive raised objects with foam between each-forwards 8" steps, 2 in a row, 1 UE, 5 cycles               Modalities                                                           Assessment:       Pt made good improvement with confidence with activities today where she did not have a solid rail to hold on to  Patient requires skilled PT to maximize function, improve ambulation capabilities, and to improve her fall risk stratification  Plan: Continue per plan of care  Patient without complaints post treatment  Continue curb training and dynamic gait activites  with  SPC

## 2018-05-17 ENCOUNTER — OFFICE VISIT (OUTPATIENT)
Dept: PHYSICAL THERAPY | Facility: CLINIC | Age: 73
End: 2018-05-17
Payer: MEDICARE

## 2018-05-17 DIAGNOSIS — R26.89 IMBALANCE: ICD-10-CM

## 2018-05-17 DIAGNOSIS — R26.0 ATAXIC GAIT: Primary | ICD-10-CM

## 2018-05-17 PROCEDURE — 97112 NEUROMUSCULAR REEDUCATION: CPT

## 2018-05-17 PROCEDURE — 97110 THERAPEUTIC EXERCISES: CPT

## 2018-05-17 NOTE — PROGRESS NOTES
Daily Note     Today's date: 2018  Patient name: Rehan Fernández  : 1945  MRN: [id number]  Referring provider: Kyle Fowler DO  Dx:   Encounter Diagnosis     ICD-10-CM    1  Ataxic gait R26 0    2  Imbalance R26 89        Start Time: 5788  Stop Time: 5730  Total time in clinic (min): 35 minutes    Subjective:   Pt was able to get  Up a fairly high curb at [employer] with her SPC and no contact guard    She was able to come down the curb  She felt good about her balance  Objective: See treatment diary below  Precautions  has a past medical history of Anemia; Anxiety; Arthritis; Benign paroxysmal vertigo, unspecified ear; Cirrhosis ([facility] [address] ); Disease of thyroid gland; GERD (gastroesophageal reflux disease); History of transfusion; Liver transplant candidate; Osteoporosis; and Pelvic fracture ([facility] [address] )  Outcomes  3/26/18 4/26/18                                                                                     Exercise Diary  5/4 5/7/18 5/10/18 5/14/18 5/17/18   AP sway FOAM  FC         MIP         Rev  step ups    6" from foam   No weights   10 each lead leg  Standing on foam, 8" step, Lift and hold for 3 seconds, 10 reps bilaterally  1# ankles      Lat STEP UPS Standing on foam, 8" step, Lift and hold for 3 seconds, 20 reps bilaterally Standing on foam, 8" step, Lift and hold for 3 seconds, 20 reps bilaterally  1# ankles  Standing on foam, 8" step, Lift and hold for 3 seconds, 10 reps bilaterally  1# ankles  8" no foam  R lead  10 reps, 1 rail  L lead, 2 rails, 7reps    FOR STEP UPS Standing on foam, 8" step, Lift and hold for 3 seconds,   20 reps Standing on foam, 8" step, Lift and hold for 3 seconds,   10 reps, 2 sets each, bilaterally Standing on foam, 8" step, Lift and hold for 3 seconds,   10 reps, 2 sets each, bilaterally 8" no foam  R lead  10 reps, 1 rail      L lead, 2 rails, 10 reps    STS no UE     6# from chair  12 reps     FALLTRAK LOS        FALLTRAK CW/CCW        FOAM HT FOAM EC         FOAM VOR CX         Se         Wide AP stance                  B/L pf stance         Pbars- standing hip ABD         Pbars over hurdles fwd 9" hurdles today, 1 Ue support, 5 cycles  9" hurdlesx4  SPC   2 cycles   9" hurdles  SPC  No rails   2 cycles  LOB=0 9" hurdlesx3  No bars  SPC only  2 cycles    +1 foam pad  6" hurdles on each side of foam   Close supervision   2 cycles    Pbars sidestep  Over hurdles  9" hurdles today, 1 Ue support, 5 cycles  9" hurdles x 4  2 cycles   No bars,   SPC only       Pbars backwards stepping over hurdles  9" hurdles today, 1 Ue support, 5 cycles  9" hurdles x 4  2 cycles   No bars,   SPC only                 Gait HT         Gait curbs     1 rail  6" step  Without asst  10reps   4reps   14 reps     Feels anxious   "scary and hard"   But no pain  1 rail  SPC  4" step  Easy    SPC, no rail  4" step  Independent     1 rail+SPC  6" step  Independent    SPC no rail  6" step        Curb not near walls or rail     6" step  SPC, no rail  4x independent    Step up onto step stool      10"  3 reps  SPC and handle  Contact guard    Gait          Foursquare         6" hurdles   w/SPC  3 cycles @  cw/ccw    NUSTEP   L6 x 10 min  L5 10 min   NC   L5 10 min NC   Mat - SLR HEP         Mat- Bridge HEP          Mat          Step taps No UE  FWD   LAT          Sidesteps  Tap cone w/each foot  Pbars   No Ue  6 cones  1 cycle 7 cones  No UE  Open room   LOB=1                    Ambulation over consecutive raised objects with foam between each-backwards 8" steps, 2 in a row, 1 UE, 5 cycles        Ambulation over consecutive raised objects with foam between each-sideways 8" steps, 2 in a row, 1 UE, 5 cycles     Gait over  8" steps  X 2  SPC    5 cycles     Ambulation over consecutive raised objects with foam between each-forwards 8" steps, 2 in a row, 1 UE, 5 cycles              Modalities                                                           Assessment:       Improving ability to step up to higher surfaces  When pt gets fatigued (after repetitive stepping up activities) her abilities and confidence are reduced  Patient requires skilled PT to maximize function, improve ambulation capabilities, and to improve her fall risk stratification  Expect to DC PT next week  Plan: Continue per plan of care  Patient without complaints post treatment  Continue curb training and dynamic gait activites  with  SPC  Expect to DC next week

## 2018-05-21 ENCOUNTER — OFFICE VISIT (OUTPATIENT)
Dept: PHYSICAL THERAPY | Facility: CLINIC | Age: 73
End: 2018-05-21
Payer: MEDICARE

## 2018-05-21 DIAGNOSIS — R26.89 IMBALANCE: ICD-10-CM

## 2018-05-21 DIAGNOSIS — R26.0 ATAXIC GAIT: Primary | ICD-10-CM

## 2018-05-21 PROCEDURE — 97110 THERAPEUTIC EXERCISES: CPT

## 2018-05-21 PROCEDURE — G8978 MOBILITY CURRENT STATUS: HCPCS

## 2018-05-21 PROCEDURE — 97112 NEUROMUSCULAR REEDUCATION: CPT

## 2018-05-21 PROCEDURE — G8979 MOBILITY GOAL STATUS: HCPCS

## 2018-05-21 NOTE — PROGRESS NOTES
PT Re-Evaluation      18 Addended to add POC signature order for MD    Today's date: 2018  Patient name: Rehan Fernández  : 1945  MRN: [id number]  Referring provider: Kyle Fowler DO  Dx:   Encounter Diagnosis     ICD-10-CM    1  Ataxic gait R26 0    2  Imbalance R26 89        Start Time: 1254  Stop Time: 1353  Total time in clinic (min): 59 minutes    Assessment  Impairments: abnormal gait, abnormal movement, impaired balance and impaired physical strength    Assessment details: 74year old female has attended  16 sessions of PT  She is doing well on curbs with her [facility]  Without spouse assistance, but he is nearby  She is doing well on stairs using the railing without pain  She demonstrated improvements in strength, static balance in the clinic today compared to last reassessment, and improved DGI score  Her gait speed, and activities measuring speed are not improved today  vs last month and TM was initiated today  She is feeling a little sluggish today  Pt states she has reluctantly agreed to wellness center attendance upon completion of PT  TM will be revisted next visit, to see if it is feasible for pt to attempt with assistance at wellness center, in addition to the [facility]  Pt was advised to use ice massage for 5 min,  for left knee tenderness (infrapatellar) that she has had since her knee was replaced  She does not get knee pain with walking or steps  Consider DC next vist, remeasure gait speed, TUG and 5XSTS next visit  Understanding of Dx/Px/POC: good   Prognosis: good    Goals  Goals  STG 2-4 weeks  1  Improve gait speed by 0 1 m/sec  MET  2  Pt will be independent with basic HEP MET  3  Pt will demonstrate independence with SPC gait over obstacles, stairs, direction change  MET    LTG  4-8 weeks  1  Improve DGI  score by by 5 points to 19-20/24  to improve safety with mobility   MET   2   Improve 5XSTS  score by 5-10 seconds to approach age norm (10 seconds or less per current research,) to improve safety with mobilitiy  MET  3  Pt will be independent with advanced HEP  PARTIALLY MET  4  Independent ambulation with least restrictive/no AD for community mobility  MET   5  Improve gait speed by 0 2 m/sec or more to approach age-norm  MET   6  Pt will be independent with a plan to continue an elevated overall level of activity  PT [id number]  Plan  Planned therapy interventions: home exercise program, strengthening, manual therapy, neuromuscular re-education, patient education, therapeutic activities, therapeutic exercise and balance  Frequency: 2x week  Duration in weeks: 4  Treatment plan discussed with: family and patient  Plan details: POC 18 to 18        Subjective Evaluation    History of Present Illness  Mechanism of injury:  74year old female has attended  16 sessions of PT  She is doing well on curbs with her [facility] without spouse assistance, but he is nearby  She is doing well on stairs using the railing  Arrived with [facility] as usual  No pain  She has not been washing dishes because her spouse does them  He will let her do them for short periods to see what her tolerance is  She will use the cabinet bottom for alternate position choices  Recurrent probem    Quality of life: good    Pain  Current pain ratin  At best pain ratin  At worst pain rating: 3  Quality: dull ache  Relieving factors: change in position and rest  Aggravating factors: standing  Progression: worsening    Social Support  Steps to enter house: yes  Stairs in house: yes   Lives in: multiple-level home (rails with all stairs)    Patient Goals  Patient goals for therapy: improved balance and increased strength  Patient goal: walk faster MET  Get stronger -   MET    Improved Balance:  MET        Objective     Ambulation     Ambulation: Level Surfaces     Additional Level Surfaces Ambulation Details  Coordination: ue intact, le too weak to test  KEENAN: ue and le intact  LE strength:  Hip flex= 3/5  Quads= 4/5  Ankle Df=5/5  IV =5/5  EV =5/5  Pf=5/5    REO:neg  REC:neg  SREO: unable   SLS: 1 sec each   Gait speed:0 47 m/sec or 1 55 ft/sec with SPC   5XSTS=22 76 sec, with one abrupt sit    TUG=19 41 sec  DGI= 14/24  Oculomotor:  Smooth pursuit: nl, no HA provoked  Saccades: nl, no HA provoked  Vergence: NPC=4', no HA provoked  Positionals: negative roll tests and gumaro Hallpike  Updated 4/26/18  c/o dizziness upon sitting up in the am   She started Keflex again after 3 week break  Since she started retaking it she feels dizzy in am again  Strength   Hip flex = 3+/5  Quads=5/5  SREO: unable  REC= 30 sec  SLS=1sec each   5 x STS= 12 64 sec   TUG=11 58 sec   Gait speed = 0 75 meters/sec or 2 46 ft sec   DGI =17/24    Updated 5/21/18  Hip flexion = 4/5  SREO=3 sec  SLS = 2 sec each  5XSTS= 14 sec  TUG=14 12 sec  Gait speed = 0 64 m/sec   DGI= 19/24      Flowsheet Rows      Most Recent Value   PT/OT G-Codes   Current Score  66   Projected Score  69   FOTO information reviewed  Yes   Assessment Type  Re-evaluation   G code set  Mobility: Walking & Moving Around   Mobility: Walking and Moving Around Current Status ()  CJ   Mobility: Walking and Moving Around Goal Status ()  CJ          Precautions:   has a past medical history of Anemia; Anxiety; Arthritis; Benign paroxysmal vertigo, unspecified ear; Cirrhosis ([address] ); Disease of thyroid gland; GERD (gastroesophageal reflux disease); History of transfusion; Liver transplant candidate; Osteoporosis; and Pelvic fracture ([address] )  Daily Treatment Diary      Updated 4/26/18  c/o dizziness upon sitting up in the am   She started Keflex again after 3 week break  Since she started retaking it she feels dizzy in am again      Strength   Hip flex = 3+/5  Quads=5/5  SREO: unable  REC= 30 sec  SLS=1sec each   5 x STS= 12 64 sec   TUG=11 58 sec   Gait speed = 0 75 meters/sec or 2 46 ft sec     Precautions has a past medical history of Anemia; Anxiety; Arthritis; Benign paroxysmal vertigo, unspecified ear; Cirrhosis ([address] ); Disease of thyroid gland; GERD (gastroesophageal reflux disease); History of transfusion; Liver transplant candidate; Osteoporosis; and Pelvic fracture [facility])      Outcomes  3/26/18 4/26/18                                                                                     Exercise Diary  5/21/18   REEVAL peformed  NV retest speed, 5XSTS  5/7/18 5/10/18 5/14/18 5/17/18   TM  0 1 to 0 5 mph  5' total with close supervision  Distance = 0 03m          AP sway FOAM  FC            MIP             Rev  step ups  6" from foam   No weights   10 each lead leg  Standing on foam, 8" step, Lift and hold for 3 seconds, 10 reps bilaterally  1# ankles        Lat STEP UPS 8" no foam  R lead  10 reps, 1 rail      L lead, 2 rails, 7reps Standing on foam, 8" step, Lift and hold for 3 seconds, 20 reps bilaterally  1# ankles  Standing on foam, 8" step, Lift and hold for 3 seconds, 10 reps bilaterally  1# ankles  8" no foam  R lead  10 reps, 1 rail      L lead, 2 rails, 7reps     FOR STEP UPS 8" no foam  R lead  10 reps, 1 rail      L lead, 2 rails, 10 reps Standing on foam, 8" step, Lift and hold for 3 seconds,   10 reps, 2 sets each, bilaterally Standing on foam, 8" step, Lift and hold for 3 seconds,   10 reps, 2 sets each, bilaterally 8" no foam  R lead  10 reps, 1 rail      L lead, 2 rails, 10 reps  8" no foam  R lead  10 reps, 1 rail      L lead, 2 rails, 10 reps   STS no UE      6# from chair  12 reps      FALLTRAK [facility]            FALLTRAK CW/CCW            FOAM HT            FOAM EC             FOAM VOR CX             Se            Wide AP stance                         B/L pf stance            Pbars- standing hip ABD            Pbars over hurdles fwd  9" hurdlesx4  SPC   2 cycles    9" hurdles  SPC  No rails   2 cycles  LOB=0 9" hurdlesx3  No bars  SPC only  2 cycles     +1 foam pad  6" hurdles on each side of foam   Close supervision   2 cycles    Pbars sidestep  Over hurdles   9" hurdles x 4  2 cycles   No bars,   SPC only          Pbars backwards stepping over hurdles   9" hurdles x 4  2 cycles   No bars,   SPC only                       Gait HT            Gait curbs     1 rail  6" step  Without asst  10reps   4reps   14 reps      Feels anxious   "scary and hard"   But no pain  1 rail  SPC  4" step   Easy     SPC, no rail  4" step  Independent      1 rail+SPC  6" step  Independent     SPC no rail  6" step           Curb not near walls or rail       6" step  SPC, no rail  4x independent     Step up onto step stool         10"  3 reps  SPC and handle  Contact guard    Gait             Foursquare         6" hurdles   w/SPC  3 cycles @  cw/ccw    NUSTEP   L6 x 10 min  L5 10 min   NC    L5 10 min NC   Mat - SLR HEP            Mat- Bridge HEP             Mat             Step taps No UE  FWD   LAT             Sidesteps  Tap cone w/each foot  Pbars    No Ue  6 cones  1 cycle 7 cones  No UE  Open room   LOB=1                               Ambulation over consecutive raised objects with foam between each-backwards            Ambulation over consecutive raised objects with foam between each-sideways        Gait over  8" steps  X 2  SPC     5 cycles      Ambulation over consecutive raised objects with foam between each-forwards                  Modalities

## 2018-05-24 ENCOUNTER — OFFICE VISIT (OUTPATIENT)
Dept: PHYSICAL THERAPY | Facility: CLINIC | Age: 73
End: 2018-05-24
Payer: MEDICARE

## 2018-05-24 DIAGNOSIS — R26.0 ATAXIC GAIT: Primary | ICD-10-CM

## 2018-05-24 DIAGNOSIS — R26.89 IMBALANCE: ICD-10-CM

## 2018-05-24 PROCEDURE — 97112 NEUROMUSCULAR REEDUCATION: CPT

## 2018-05-24 PROCEDURE — G8979 MOBILITY GOAL STATUS: HCPCS

## 2018-05-24 PROCEDURE — G8980 MOBILITY D/C STATUS: HCPCS

## 2018-05-24 PROCEDURE — 97110 THERAPEUTIC EXERCISES: CPT

## 2018-05-24 NOTE — PROGRESS NOTES
PT Discharge    Today's date: 2018  Patient name: Catie Abbott  : 1945  MRN: [id number]  Referring provider: Viv Schulz DO  Dx:   Encounter Diagnosis     ICD-10-CM    1  Ataxic gait R26 0    2  Imbalance R26 89        Start Time: 1123  Stop Time: 1211  Total time in clinic (min): 48 minutes    Assessment    Assessment details: 74year old female has attended  PT for the past two months and feels faster and steadier with gait  She is able to go up and down curbs with her [facility], now without assistance, with much more confidence  All goals partially met or met  Wellness center attendance is advised, pt will consider  HEP was reviewed and updated  See copies in media  Pt suffers from depression and is overwhelmed with the costs of her medications  Wellness center literature/referral provided  DC form PT  Pt in agreement  Pt was educated that she may need to return to PT periodically when she notes status decline, sooner is better than later  Pt verbalized understanding and agreement  Understanding of Dx/Px/POC: good   Prognosis: good    Goals  Goals  STG 2-4 weeks  1  Improve gait speed by 0 1 m/sec  MET  2  Pt will be independent with basic HEP MET  3  Pt will demonstrate independence with SPC gait over obstacles, stairs, direction change  MET    LTG  4-8 weeks  1  Improve DGI  score by by 5 points to 19-20/24  to improve safety with mobility   PARTIALLY MET   2  Improve 5XSTS  score by 5-10 seconds to approach age norm (10 seconds or less per current research,) to improve safety with mobilitiy  MET  3  Pt will be independent with advanced HEP  MET  4  Independent ambulation with least restrictive/no AD for community mobility  MET   5  Improve gait speed by 0 2 m/sec or more to approach age-norm  MET   6  Pt will be independent with a plan to continue an elevated overall level of activity  PT [id number]         Plan  Planned therapy interventions: home exercise program  Treatment plan discussed with: family and patient  Plan details: POC  18 to 18        Subjective Evaluation    History of Present Illness  Mechanism of injury: 74year old female reports improved abilities and confidence going up/down curbs  She no longer needs to hold on to someone  Recurrent probem    Quality of life: good    Pain  Current pain ratin  At best pain ratin  At worst pain ratin  Quality: dull ache  Relieving factors: change in position and rest  Aggravating factors: standing  Progression: worsening    Social Support  Steps to enter house: yes  Stairs in house: yes   Lives in: multiple-level home (rails with all stairs)    Patient Goals  Patient goals for therapy: improved balance and increased strength  Patient goal: walk faster MET  Get stronger - partially  MET  Improved Balance: partially MET        Objective     Ambulation     Ambulation: Level Surfaces     Additional Level Surfaces Ambulation Details  Coordination: ue intact, le too weak to test  KEENAN: ue and le intact  LE strength:  Hip flex= 3/5  Quads= 4/5  Ankle Df=5/5  IV =5/5  EV =5/5  Pf=5/5    REO:neg  REC:neg  SREO: unable   SLS: 1 sec each   Gait speed:0 47 m/sec or 1 55 ft/sec with SPC   5XSTS=22 76 sec, with one abrupt sit    TUG=19 41 sec  DGI=   Oculomotor:  Smooth pursuit: nl, no HA provoked  Saccades: nl, no HA provoked  Vergence: NPC=4', no HA provoked  Positionals: negative roll tests and gumaro Hallpike  Updated 18  c/o dizziness upon sitting up in the am   She started Keflex again after 3 week break  Since she started retaking it she feels dizzy in am again  Strength   Hip flex = 3+/5  Quads=5/5  SREO: unable  REC= 30 sec  SLS=1sec each   5 x STS= 12 64 sec   TUG=11 58 sec   Gait speed = 0 75 meters/sec or 2 46 ft sec   DGI =    Updated 18  Hip flexion = 4/5    SREO=3 sec  SLS = 2 sec each  5XSTS= 14 sec  TUG=14 12 sec  Gait speed = 0 64 m/sec   DGI= 19/24 5/24/18  5XSTS= 12 93  Gait speed = 0 71 m/sec or 2 35 feet/sec   TUG= 12 63 sec             Precautions:   has a past medical history of Anemia; Anxiety; Arthritis; Benign paroxysmal vertigo, unspecified ear; Cirrhosis ([facility] [address] ); Disease of thyroid gland; GERD (gastroesophageal reflux disease); History of transfusion; Liver transplant candidate; Osteoporosis; and Pelvic fracture ([facility] [address] )  Daily Treatment Diary       Exercise Diary  5/21/18   REEVAL peformed  NV retest speed, 5XSTS  5/24/18 5/10/18 5/14/18 5/17/18   TM  0 1 to 0 5 mph  5' total with close supervision  Distance = 0 03m    Pt deferred       AP sway FOAM  FC           MIP            Rev  step ups   Standing on foam, 8" step, Lift and hold for 3 seconds, 10 reps bilaterally  1# ankles        Lat STEP UPS 8" no foam  R lead  10 reps, 1 rail      L lead, 2 rails, 7reps  Standing on foam, 8" step, Lift and hold for 3 seconds, 10 reps bilaterally  1# ankles  8" no foam  R lead  10 reps, 1 rail      L lead, 2 rails, 7reps     FOR STEP UPS 8" no foam  R lead  10 reps, 1 rail      L lead, 2 rails, 10 reps  Standing on foam, 8" step, Lift and hold for 3 seconds,   10 reps, 2 sets each, bilaterally 8" no foam  R lead  10 reps, 1 rail      L lead, 2 rails, 10 reps  8" no foam  R lead  10 reps, 1 rail      L lead, 2 rails, 10 reps   STS no UE  5x no wt   X 2   6# from chair  12 reps      FALLTRAK LOS           FALLTRAK CW/CCW           FOAM HT           FOAM EC            FOAM VOR CX            Se           Wide AP stance                       B/L pf stance           Pbars- standing hip ABD           Pbars over hurdles fwd     9" hurdles  SPC  No rails   2 cycles  LOB=0 9" hurdlesx3  No bars  SPC only  2 cycles     +1 foam pad  6" hurdles on each side of foam   Close supervision   2 cycles    Pbars sidestep  Over hurdles            Pbars backwards stepping over hurdles                        Gait HT           Gait curbs    1 rail  6" step  Without asst  10reps   4reps   14 reps      Feels anxious   "scary and hard"   But no pain  1 rail  SPC  4" step   Easy     SPC, no rail  4" step  Independent      1 rail+SPC  6" step  Independent     SPC no rail  6" step           Curb not near walls or rail      6" step  SPC, no rail  4x independent     Step up onto step stool        10"  3 reps  SPC and handle  Contact guard    Gait            Foursquare        6" hurdles   w/SPC  3 cycles @  cw/ccw    NUSTEP    L5 10 min   NC    L5 10 min NC   Mat - SLR HEP           Mat- Bridge HEP            Mat            Step taps No UE  FWD   LAT            Sidesteps  Tap cone w/each foot  Pbars   No Ue  6 cones  1 cycle 7 cones  No UE  Open room   LOB=1      AP sway   10x   Added to HEP                     Ambulation over consecutive raised objects with foam between each-backwards           Ambulation over consecutive raised objects with foam between each-sideways       Gait over  8" steps  X 2  SPC     5 cycles      Ambulation over consecutive raised objects with foam between each-forwards                 Modalities

## 2018-06-07 DIAGNOSIS — I48.21 PERMANENT ATRIAL FIBRILLATION (HCC): ICD-10-CM

## 2018-06-15 ENCOUNTER — APPOINTMENT (OUTPATIENT)
Dept: LAB | Facility: HOSPITAL | Age: 73
End: 2018-06-15
Attending: INTERNAL MEDICINE
Payer: MEDICARE

## 2018-06-15 ENCOUNTER — TRANSCRIBE ORDERS (OUTPATIENT)
Dept: ADMINISTRATIVE | Facility: HOSPITAL | Age: 73
End: 2018-06-15

## 2018-06-15 DIAGNOSIS — D63.8 ANEMIA IN OTHER CHRONIC DISEASES CLASSIFIED ELSEWHERE: Chronic | ICD-10-CM

## 2018-06-15 LAB
BASOPHILS # BLD AUTO: 0.05 THOUSANDS/ΜL (ref 0–0.1)
BASOPHILS NFR BLD AUTO: 1 % (ref 0–1)
EOSINOPHIL # BLD AUTO: 0.35 THOUSAND/ΜL (ref 0–0.61)
EOSINOPHIL NFR BLD AUTO: 6 % (ref 0–6)
ERYTHROCYTE [DISTWIDTH] IN BLOOD BY AUTOMATED COUNT: 13.5 % (ref 11.6–15.1)
HCT VFR BLD AUTO: 35.7 % (ref 34.8–46.1)
HGB BLD-MCNC: 10.8 G/DL (ref 11.5–15.4)
IMM GRANULOCYTES # BLD AUTO: 0.04 THOUSAND/UL (ref 0–0.2)
IMM GRANULOCYTES NFR BLD AUTO: 1 % (ref 0–2)
LYMPHOCYTES # BLD AUTO: 0.75 THOUSANDS/ΜL (ref 0.6–4.47)
LYMPHOCYTES NFR BLD AUTO: 14 % (ref 14–44)
MCH RBC QN AUTO: 27.4 PG (ref 26.8–34.3)
MCHC RBC AUTO-ENTMCNC: 30.3 G/DL (ref 31.4–37.4)
MCV RBC AUTO: 91 FL (ref 82–98)
MONOCYTES # BLD AUTO: 0.45 THOUSAND/ΜL (ref 0.17–1.22)
MONOCYTES NFR BLD AUTO: 8 % (ref 4–12)
NEUTROPHILS # BLD AUTO: 3.88 THOUSANDS/ΜL (ref 1.85–7.62)
NEUTS SEG NFR BLD AUTO: 70 % (ref 43–75)
NRBC BLD AUTO-RTO: 0 /100 WBCS
PLATELET # BLD AUTO: 218 THOUSANDS/UL (ref 149–390)
PMV BLD AUTO: 10 FL (ref 8.9–12.7)
RBC # BLD AUTO: 3.94 MILLION/UL (ref 3.81–5.12)
WBC # BLD AUTO: 5.52 THOUSAND/UL (ref 4.31–10.16)

## 2018-06-15 PROCEDURE — 85025 COMPLETE CBC W/AUTO DIFF WBC: CPT

## 2018-06-15 PROCEDURE — 36415 COLL VENOUS BLD VENIPUNCTURE: CPT

## 2018-06-19 ENCOUNTER — OFFICE VISIT (OUTPATIENT)
Dept: HEMATOLOGY ONCOLOGY | Facility: MEDICAL CENTER | Age: 73
End: 2018-06-19
Payer: MEDICARE

## 2018-06-19 VITALS
HEART RATE: 86 BPM | BODY MASS INDEX: 37.91 KG/M2 | RESPIRATION RATE: 20 BRPM | TEMPERATURE: 98.2 F | SYSTOLIC BLOOD PRESSURE: 142 MMHG | WEIGHT: 206 LBS | DIASTOLIC BLOOD PRESSURE: 70 MMHG | HEIGHT: 62 IN | OXYGEN SATURATION: 96 %

## 2018-06-19 DIAGNOSIS — D63.8 ANEMIA IN OTHER CHRONIC DISEASES CLASSIFIED ELSEWHERE: Primary | Chronic | ICD-10-CM

## 2018-06-19 PROCEDURE — 99213 OFFICE O/P EST LOW 20 MIN: CPT | Performed by: INTERNAL MEDICINE

## 2018-06-19 NOTE — PROGRESS NOTES
[facility]  1945  [address] [facility] [place] [facility] [id number]    DISCUSSION  SUMMARY:    77-year-old female with history of anemia of chronic renal insufficiency/anemia of chronic disease (likely multiple etiologies)  Hemoglobin levels had previously been good/acceptable with RONNA treatment  Unfortunately patient recently suffered a CVA and MI  Patient is on aspirin and eliquis -cardiology and neurology follow-ups are ongoing  The hemoglobin level is lower as compared to when patient was on Procrit but is okay/acceptable  There has been no significant/persistent downward trend in the hemoglobin and hematocrit levels  Symptomatically patient seems to be stable  We rediscussed the serious potential cardiac, thrombolic and embolic complications of an erythroid stimulating agent - EPO can not be given  We also discussed what to monitor for in regard to progressive anemia  Patient is to return in 3 months with a repeat CBC before  [facility] also has a follow-up with her liver transplant team pending  Patient knows to call the office if she has any other hematology questions or concerns  Carefully review your medication list and verify that the list is accurate and up-to-date  Please call the hematology/oncology office if there are medications missing from the list, medications on the list that you are not currently taking or if there is a dosage or instruction that is different from how you're taking that medication      Patient goals and areas of care:  Monitor hemoglobin level  Patient is able to self-care   _____________________________________________________________________________________    Chief Complaint   Patient presents with    Follow-up     Anemia of chronic disease     History of Present Illness:    77-year-old female with history of anemia (anemia of chronic renal insufficiency/anemia of chronic disease previously on Procrit)  Mrs Norman Martinez was recently admitted to the hospital after her partner noticed she was garbling her words  Patient was found to have a CVA  Patient was also found to have a non-ST elevated MI  Mrs Norman Martinez did not have any chest pain or pressure or other cardiac symptoms  Patient also does not have any neurologic sequelae  Patient is now on aspirin and eliquis  Patient is also on Keppra  Mrs Norman Martinez is back for follow-up  Recently the plan has been observation  Patient states okay, fatigue is the same as before  Mrs Norman Martinez denies any problems with excessive bruising or bleeding  No fevers, chills or sweats  Mrs Norman Martinez states that her activities have been about the same as before  Patient is on CellCept and cyclosporine (liver transplant)- no recent issues  Appetite is +/-but no nausea vomiting  Patient denies any other GI,  or GYN issues  No shortness of breath at rest, mild dyspnea on exertion - same as before  Patient has a history of falls and recently completed her balance center course  No recent falls  Review of Systems   Constitutional: Positive for fatigue  HENT: Negative  Eyes: Negative  Respiratory: Negative  Cardiovascular: Negative  Gastrointestinal: Negative  Endocrine: Negative  Genitourinary: Negative  Musculoskeletal: Negative  Skin: Negative  Allergic/Immunologic: Negative  Neurological: Negative  Hematological: Negative  Psychiatric/Behavioral: Negative  All other systems reviewed and are negative       Patient Active Problem List   Diagnosis    Liver transplanted ([facility] [address] )    Hypothyroidism    Long-term use of immunosuppressant medication    Anemia    TIA (transient ischemic attack)    HLD (hyperlipidemia)    Acute ischemic stroke (HCC)    Paroxysmal SVT (supraventricular tachycardia) (HCC)    Altered mental status    Cerebrovascular accident (CVA) due to embolism ([facility] [address] )    Non-ST elevation myocardial infarction (NSTEMI), type 2    Abnormal EEG    Adjustment reaction with prolonged depressive reaction    Ataxic gait    Benign essential hypertension    Chronic joint pain    Chronic kidney insufficiency    Cyst of diaphragm    Depression    Elevated homocysteine (HCC)    Hypercalcemia    Lung nodule, solitary    Peripheral arterial disease (HCC)    Sciatica    Ulcerative colitis without complications (HCC)     Past Medical History:   Diagnosis Date    Anemia     Anxiety     Arthritis     Benign paroxysmal vertigo, unspecified ear     onset: 05/06/2010    Cirrhosis ([facility] )     onset: 03/30/2005    Disease of thyroid gland     GERD (gastroesophageal reflux disease)     History of transfusion     Liver transplant candidate     onset: 09/16/2008    Osteoporosis     Pelvic fracture ([facility] )     onset: 10/14/2008     Past Surgical History:   Procedure Laterality Date    ABDOMINAL SURGERY      APPENDECTOMY      BLADDER AUGMENTATION N/A     CATARACT EXTRACTION      CHOLECYSTECTOMY N/A     COLONOSCOPY N/A 4/20/2017    Procedure: COLONOSCOPY;  Surgeon: Herberth Shepherd MD;  Location: [place] [address] GI LAB; Service:     ESOPHAGOGASTRODUODENOSCOPY N/A 3/31/2016    Procedure: ESOPHAGOGASTRODUODENOSCOPY (EGD); Surgeon: Herberth Shepherd MD;  Location: [place] [address] GI LAB; Service:     ESOPHAGOGASTRODUODENOSCOPY N/A 4/20/2017    Procedure: ESOPHAGOGASTRODUODENOSCOPY (EGD); Surgeon: Herberth Shepherd MD;  Location: [place] [address] GI LAB;   Service:     EYE SURGERY      cataracts removed both eyes lens implant    HERNIA REPAIR      JOINT REPLACEMENT      left knee replacement    LIVER TRANSPLANTATION N/A     TONSILECTOMY AND ADNOIDECTOMY N/A     TUBAL LIGATION       Family History   Problem Relation Age of Onset    Cancer Mother         breast    Cancer Father     Crohn's disease Brother     Arthritis Family     Breast cancer Family      Social History     Social History    Marital status:      Spouse name: N/A    Number of children: N/A    Years of education: Completed 12th grade     Occupational History    Not on file       Social History Main Topics    Smoking status: Former Smoker     Years: 5 00     Quit date: 1/21/1975    Smokeless tobacco: Never Used      Comment: smoked for 5 years-1/2 to 1 ppd    Alcohol use No    Drug use: No    Sexual activity: No     Other Topics Concern    Not on file     Social History Narrative    Daily coffee consumption (1 cup/day)           Current Outpatient Prescriptions:     apixaban (ELIQUIS) 5 mg, Take 1 tablet (5 mg total) by mouth 2 (two) times a day, Disp: 42 tablet, Rfl: 0    aspirin (ECOTRIN LOW STRENGTH) 81 mg EC tablet, Take 1 tablet by mouth daily, Disp: 30 tablet, Rfl: 0    Calcium Carb-Cholecalciferol (OS-KENDRICK PO), Take by mouth, Disp: , Rfl:     cycloSPORINE modified (NEORAL) 25 mg capsule, Take 50 mg by mouth 2 (two) times a day, Disp: , Rfl:     levETIRAcetam (KEPPRA) 500 mg tablet, Take 1 tablet by mouth every 12 (twelve) hours, Disp: 60 tablet, Rfl: 0    levothyroxine 112 mcg tablet, TAKE 1 TABLET EVERY DAY AS DIRECTED, Disp: 90 tablet, Rfl: 3    MAGNESIUM OXIDE PO, Take by mouth, Disp: , Rfl:     metoprolol tartrate (LOPRESSOR) 25 mg tablet, Take 1 tablet (25 mg total) by mouth 2 (two) times a day, Disp: 270 tablet, Rfl: 4    mycophenolate (CELLCEPT) 250 mg capsule, Take 250 mg by mouth 2 (two) times a day 3 tabs each dose=750 mg , Disp: , Rfl:     omeprazole (PriLOSEC) 40 MG capsule, Take 40 mg by mouth daily, Disp: , Rfl:     oxybutynin (DITROPAN) 5 mg tablet, Take 5 mg by mouth 2 (two) times a day  , Disp: , Rfl:     pravastatin (PRAVACHOL) 20 mg tablet, Take 1 tablet (20 mg total) by mouth daily at bedtime, Disp: 90 tablet, Rfl: 0    metoprolol tartrate (LOPRESSOR) 25 mg tablet, TAKE 1 & 1/2 (ONE & ONE-HALF) TABLETS BY MOUTH TWICE DAILY, Disp: 270 tablet, Rfl: 3    Allergies   Allergen Reactions    Tetracyclines & Related Hives    Vancomycin Other (See Comments) and Itching Reaction Date: [id number]; Pt unsure of reaction    Prograf [Tacrolimus] Anxiety     Reaction Date: [id number];        Vitals:    06/19/18 1359   BP: 142/70   Pulse: 86   Resp: 20   Temp: 98 2 °F (36 8 °C)   SpO2: 96%     Physical Exam   Constitutional: She is oriented to person, place, and time  She appears well-developed and well-nourished  HENT:   Head: Normocephalic and atraumatic  Right Ear: External ear normal    Left Ear: External ear normal    Nose: Nose normal    Mouth/Throat: Oropharynx is clear and moist    Eyes: Conjunctivae and EOM are normal  Pupils are equal, round, and reactive to light  Neck: Normal range of motion  Neck supple  Cardiovascular: Normal rate, regular rhythm, normal heart sounds and intact distal pulses  Pulmonary/Chest: Effort normal and breath sounds normal    Abdominal: Soft  Bowel sounds are normal     Abdomen is obese, nontender, well-healed suture lines, +bowel sounds   Musculoskeletal: Normal range of motion  Neurological: She is alert and oriented to person, place, and time  She has normal reflexes  Skin: Skin is warm  Skin is pale, warm, no petechiae, few ecchymoses, good turgor   Psychiatric: She has a normal mood and affect   Her behavior is normal  Judgment and thought content normal    Extremities:  0-1 +bilateral lower extremity edema, no cords, pulses are 1+  Lymphatics:  No adenopathy in the neck, supraclavicular region, axilla and groin bilaterally    Labs:    06/15/2018 WBC = 5 52 hemoglobin = 10 8 hematocrit = 35 7 MCV = 91 platelet = 607 neutrophil = 70%  3/13/ 18 WBC = 5 65 hemoglobin = 11 3 hematocrit = 35 1 MCV = 87 platelet = 131 neutrophil = 60%    1/3/18 WBC = 5 3 hemoglobin = 11 1 hematocrit = 33 8 MCV = 87 platelets = 433 iron = 46 ferritin = 127 TIBC = 254 iron saturation = 18%   12/04/2017 WBC = 5 6 hemoglobin = 11 hematocrit = 33 7 MCV = 86 platelet = 376 neutrophil = 74%  11/8/17 hemoglobin = 10 9 hematocrit = 34   11/7/17 BUN = 12 creatinine = 0 92

## 2018-06-21 ENCOUNTER — IN-CLINIC DEVICE VISIT (OUTPATIENT)
Dept: CARDIOLOGY CLINIC | Facility: CLINIC | Age: 73
End: 2018-06-21
Payer: MEDICARE

## 2018-06-21 ENCOUNTER — OFFICE VISIT (OUTPATIENT)
Dept: FAMILY MEDICINE CLINIC | Facility: CLINIC | Age: 73
End: 2018-06-21
Payer: MEDICARE

## 2018-06-21 VITALS
WEIGHT: 204 LBS | HEART RATE: 80 BPM | SYSTOLIC BLOOD PRESSURE: 122 MMHG | DIASTOLIC BLOOD PRESSURE: 60 MMHG | RESPIRATION RATE: 16 BRPM | HEIGHT: 62 IN | TEMPERATURE: 97.1 F | BODY MASS INDEX: 37.54 KG/M2

## 2018-06-21 DIAGNOSIS — Z95.818 PRESENCE OF CARDIAC DEVICE: ICD-10-CM

## 2018-06-21 DIAGNOSIS — Z86.73 PERSONAL HISTORY OF TIA (TRANSIENT ISCHEMIC ATTACK): Primary | ICD-10-CM

## 2018-06-21 DIAGNOSIS — I10 BENIGN ESSENTIAL HYPERTENSION: ICD-10-CM

## 2018-06-21 DIAGNOSIS — E78.00 PURE HYPERCHOLESTEROLEMIA: ICD-10-CM

## 2018-06-21 DIAGNOSIS — Z00.00 MEDICARE ANNUAL WELLNESS VISIT, SUBSEQUENT: Primary | ICD-10-CM

## 2018-06-21 DIAGNOSIS — Z12.31 SCREENING MAMMOGRAM, ENCOUNTER FOR: ICD-10-CM

## 2018-06-21 DIAGNOSIS — Z94.4 LIVER TRANSPLANTED (HCC): ICD-10-CM

## 2018-06-21 DIAGNOSIS — Z79.899 LONG-TERM USE OF IMMUNOSUPPRESSANT MEDICATION: Chronic | ICD-10-CM

## 2018-06-21 PROCEDURE — 99214 OFFICE O/P EST MOD 30 MIN: CPT | Performed by: INTERNAL MEDICINE

## 2018-06-21 PROCEDURE — G0439 PPPS, SUBSEQ VISIT: HCPCS | Performed by: INTERNAL MEDICINE

## 2018-06-21 PROCEDURE — 93298 REM INTERROG DEV EVAL SCRMS: CPT

## 2018-06-21 PROCEDURE — 93299 PR REM INTERROG ICPMS/SCRMS <30 D TECH REVIEW: CPT

## 2018-06-21 NOTE — PROGRESS NOTES
Results for orders placed or performed in visit on 06/21/18   Cardiac EP device report    Narrative    CARELINK TRANSMISSION: (LOOP) NO PATIENT OR DEVICE ACTIVATED EPISODES  NORMAL DEVICE FUNCTION  ---LYNN

## 2018-06-21 NOTE — PROGRESS NOTES
AWV Clinical     ISAR:   Previous hospitalizations?:  No       Once in a Lifetime Medicare Screening:   EKG performed?:  Yes        Medicare Screening Tests and Risk Assessment:   AAA Risk Assessment    None Indicated:  Yes    Osteoporosis Risk Assessment    :  Yes    Age over 48:  Yes    HIV Risk Assessment    None indicated:  Yes        Drug and Alcohol Use:   Tobacco use    Cigarettes:  former smoker    Tobacco use duration    Tobacco Cessation Readiness    Alcohol use    Alcohol use:  occasional use    Alcohol Treatment Readiness   Illicit Drug Use    Drug use:  never    Drug type:  no sedative use       Diet & Exercise:   Diet   What is your diet?:  No Added Salt   How many servings a day of the following:   Exercise    Do you currently exercise?:  yes    Frequency:  occasional    Type of exercise:  walking       Cognitive Impairment Screening:   Anxiety screenings preformed:   Yes Anxiety screen score:  0   Depression screening preformed:  Yes Depression screen score:  0   Depression screening results:  negative for symptoms   Cognitive Impairment Screening    Do you have difficulty learning or retaining new information?:  No Do you have difficulty handling new tasks?:  No   Do you have difficulty with reasoning?:  No Do you have difficulty with spatial ability and orientation?:  No   Do you have difficulty with language?:  No Do you have difficulty with behavior?:  No       Functional Ability/Level of Safety:   Hearing    Hearing difficulties:  No    Hearing Impairment Assessment    Hearing status:  No impairment   Current Activities    Status:  unlimited ADL's, unlimited driving, unlimited IADL's, unlimited social activities   Help needed with the folllowing:    ADL    Fall Risk   Have you fallen in the last 12 months?:  No    Injury History   Polypharmacy:  No Antidepressant Use:  No   Sedative Use:  No Antihypertensive Use:  Yes   Previous Fall:  No Alcohol Use:  No   Deconditioning:  No Visual Impairment:  No   Cogitive Impairment:  No Mmobility Impairment:  No   Postural Hypotension:  No Urinary Incontinence:  No       Home Safety:   Home Safety Risk Factors   Unfamilar with surroundings:  No Uneven floors:  No   Stairs or handrail saftey risk:  No Loose rugs:  No   Household clutter:  No Poor household lighting:  No   No grab bars in bathroom:  No Further evaluation needed:  No       Advanced Directives:   Advanced Directives    Living Will:  Yes    Advanced directive:  Yes    Patient's End of Life Decisions        Urinary Incontinence:   Do you have urinary incontinence?:  Yes        Glaucoma:           Assessment and Plan:    Problem List Items Addressed This Visit     Liver transplanted [facility])     She has upcoming transplant appointment  Tolerating antirejection meds without side effects  Long-term use of immunosuppressant medication (Chronic)    HLD (hyperlipidemia)     Doing well on pravastatin, up to date with bloodwork, will continue  Benign essential hypertension     Hypertension stable on metoprolol  Other Visit Diagnoses     Medicare annual wellness visit, subsequent    -  Primary    Screening mammogram, encounter for        Relevant Orders    Mammo screening bilateral w cad        Health Maintenance Due   Topic Date Due    Urinary Incontinence Screening  12/15/2008    GLAUCOMA SCREENING 67+ YR  12/15/2010         HPI:  Hector Fischer is a 74 y o  female here for her Subsequent Wellness Visit      Patient Active Problem List   Diagnosis    Liver transplanted ([address] )    Hypothyroidism    Long-term use of immunosuppressant medication    Anemia    TIA (transient ischemic attack)    HLD (hyperlipidemia)    Acute ischemic stroke (HCC)    Paroxysmal SVT (supraventricular tachycardia) (HCC)    Altered mental status    Cerebrovascular accident (CVA) due to embolism ([facility] [address]    Non-ST elevation myocardial infarction (NSTEMI), type 2    Abnormal EEG    Ataxic gait    Benign essential hypertension    Chronic joint pain    Chronic kidney insufficiency    Cyst of diaphragm    Depression    Elevated homocysteine (HCC)    Hypercalcemia    Lung nodule, solitary    Peripheral arterial disease (HCC)    Sciatica    Ulcerative colitis without complications (HCC)     Past Medical History:   Diagnosis Date    Anemia     Anxiety     Arthritis     Benign paroxysmal vertigo, unspecified ear     onset: 05/06/2010    Cirrhosis ([facility] )     onset: 03/30/2005    Disease of thyroid gland     GERD (gastroesophageal reflux disease)     History of transfusion     Liver transplant candidate     onset: 09/16/2008    Osteoporosis     Pelvic fracture ([facility] )     onset: 10/14/2008     Past Surgical History:   Procedure Laterality Date    ABDOMINAL SURGERY      APPENDECTOMY      BLADDER AUGMENTATION N/A     CATARACT EXTRACTION      CHOLECYSTECTOMY N/A     COLONOSCOPY N/A 4/20/2017    Procedure: COLONOSCOPY;  Surgeon: Sawyer Estes MD;  Location: [facility] GI LAB; Service:     ESOPHAGOGASTRODUODENOSCOPY N/A 3/31/2016    Procedure: ESOPHAGOGASTRODUODENOSCOPY (EGD); Surgeon: Sawyer Estes MD;  Location: [facility] GI [facility]; Service:     ESOPHAGOGASTRODUODENOSCOPY N/A 4/20/2017    Procedure: ESOPHAGOGASTRODUODENOSCOPY (EGD); Surgeon: Sawyer Estes MD;  Location: [facility] GI [facility];   Service:     EYE SURGERY      cataracts removed both eyes lens implant    HERNIA REPAIR      JOINT REPLACEMENT      left knee replacement    LIVER TRANSPLANTATION N/A     TONSILECTOMY AND ADNOIDECTOMY N/A     TUBAL LIGATION       Family History   Problem Relation Age of Onset    Cancer Mother         breast    Cancer Father     Crohn's disease Brother     Arthritis Family     Breast cancer Family      History   Smoking Status    Former Smoker    Years: 5 00    Quit date: 1/21/1975   Smokeless Tobacco    Never Used     Comment: smoked for 5 years-1/2 to 1 ppd     History   Alcohol Use No      History Drug Use No       Current Outpatient Prescriptions   Medication Sig Dispense Refill    apixaban (ELIQUIS) 5 mg Take 1 tablet (5 mg total) by mouth 2 (two) times a day 42 tablet 0    aspirin (ECOTRIN LOW STRENGTH) 81 mg EC tablet Take 1 tablet by mouth daily 30 tablet 0    Calcium Carb-Cholecalciferol (OS-KENDRICK PO) Take by mouth      cycloSPORINE modified (NEORAL) 25 mg capsule Take 50 mg by mouth 2 (two) times a day      levETIRAcetam (KEPPRA) 500 mg tablet Take 1 tablet by mouth every 12 (twelve) hours 60 tablet 0    levothyroxine 112 mcg tablet TAKE 1 TABLET EVERY DAY AS DIRECTED 90 tablet 3    MAGNESIUM OXIDE PO Take by mouth      metoprolol tartrate (LOPRESSOR) 25 mg tablet TAKE 1 & 1/2 (ONE & ONE-HALF) TABLETS BY MOUTH TWICE DAILY 270 tablet 3    mycophenolate (CELLCEPT) 250 mg capsule Take 250 mg by mouth 2 (two) times a day 3 tabs each dose=750 mg       omeprazole (PriLOSEC) 40 MG capsule Take 40 mg by mouth daily      oxybutynin (DITROPAN) 5 mg tablet Take 5 mg by mouth 2 (two) times a day        pravastatin (PRAVACHOL) 20 mg tablet Take 1 tablet (20 mg total) by mouth daily at bedtime 90 tablet 0    metoprolol tartrate (LOPRESSOR) 25 mg tablet Take 1 tablet (25 mg total) by mouth 2 (two) times a day 270 tablet 4     No current facility-administered medications for this visit  Allergies   Allergen Reactions    Tetracyclines & Related Hives    Vancomycin Other (See Comments) and Itching     Reaction Date: [id number]; Pt unsure of reaction    Prograf [Tacrolimus] Anxiety     Reaction Date: [id number];       Immunization History   Administered Date(s) Administered    H1N1, All Formulations 10/31/2009    Hep A, adult 11/19/1996, 05/21/1997    Influenza Split High Dose Preservative Free IM 10/11/2012, 09/25/2013, 10/31/2014, 09/03/2015, 09/29/2016, 10/17/2017    Influenza TIV (IM) 10/07/2005, 10/03/2006, 10/25/2007, 09/16/2008, 09/09/2009, 09/22/2010, 09/14/2011    Pneumococcal Conjugate 13-Valent 06/30/2015    Pneumococcal Polysaccharide PPV23 09/30/2006, 09/14/2011    Tdap 03/13/2018       Patient Care Team:  Suzanne Landaverde MD as PCP - General (Internal Medicine)  MATTHEW Cerna MD Walker Mar, DO Darrick Lorenzo, MD Delwyn Leep, MD Venia Harman, MD      Medicare Screening Tests and Risk Assessments:  AWV Clinical     ISAR:   Previous hospitalizations?:  No       Once in a Lifetime Medicare Screening:   EKG performed?:  Yes        Medicare Screening Tests and Risk Assessment:   AAA Risk Assessment    None Indicated:  Yes    Osteoporosis Risk Assessment    :  Yes    Age over 48:  Yes    HIV Risk Assessment    None indicated:  Yes        Drug and Alcohol Use:   Tobacco use    Cigarettes:  former smoker    Tobacco use duration    Tobacco Cessation Readiness    Alcohol use    Alcohol use:  occasional use    Alcohol Treatment Readiness   Illicit Drug Use    Drug use:  never    Drug type:  no sedative use       Diet & Exercise:   Diet   What is your diet?:  No Added Salt   How many servings a day of the following:   Exercise    Do you currently exercise?:  yes    Frequency:  occasional    Type of exercise:  walking       Cognitive Impairment Screening:   Anxiety screenings preformed:   Yes Anxiety screen score:  0   Depression screening preformed:  Yes Depression screen score:  0   Depression screening results:  negative for symptoms   Cognitive Impairment Screening    Do you have difficulty learning or retaining new information?:  No Do you have difficulty handling new tasks?:  No   Do you have difficulty with reasoning?:  No Do you have difficulty with spatial ability and orientation?:  No   Do you have difficulty with language?:  No Do you have difficulty with behavior?:  No       Functional Ability/Level of Safety:   Hearing    Hearing difficulties:  No    Hearing Impairment Assessment    Hearing status:  No impairment   Current Activities    Status: unlimited ADL's, unlimited driving, unlimited IADL's, unlimited social activities   Help needed with the folllowing:    ADL    Fall Risk   Have you fallen in the last 12 months?:  No    Injury History   Polypharmacy:  No Antidepressant Use:  No   Sedative Use:  No Antihypertensive Use:  Yes   Previous Fall:  No Alcohol Use:  No   Deconditioning:  No Visual Impairment:  No   Cogitive Impairment:  No Mmobility Impairment:  No   Postural Hypotension:  No Urinary Incontinence:  No       Home Safety:   Home Safety Risk Factors   Unfamilar with surroundings:  No Uneven floors:  No   Stairs or handrail saftey risk:  No Loose rugs:  No   Household clutter:  No Poor household lighting:  No   No grab bars in bathroom:  No Further evaluation needed:  No       Advanced Directives:   Advanced Directives    Living Will:  Yes    Advanced directive:  Yes    Patient's End of Life Decisions        Urinary Incontinence:   Do you have urinary incontinence?:  Yes        Glaucoma:            Provider Screening     Preventative Screening/Counseling:   Cardiovascular Screening/Counseling:   (Labs Q5 years, EKG optional one-time)   General:  Risks and Benefits Discussed, Screening Current Counseling:  Healthy Diet, Healthy Weight, Improve Cholesterol, Improve Blood Pressure          Diabetes Screening/Counseling:   (2 tests/year if Pre-Diabetes or 1 test/year if no Diabetes)   General:  Risks and Benefits Discussed, Screening Current           Colorectal Cancer Screening/Counseling:   (FOBT Q1 yr; Flex Sig Q4 yrs or Q10 yrs after Screening Colonoscopy; Screening Colonoscpy Q2 yrs High Risk or Q10 yrs Low Risk; Barium Enema Q2 yrs High Risk or Q4 yrs Low Risk)   General:  Screening Current           Prostate Cancer Screening/Counseling:   (Annual)          Breast Cancer Screening/Counseling:   (Baseline Age 28 - 43;  Annual Age 36+)    Due for: Studies:  mammogram         Cervical Cancer Screening/Counseling:   (Annual for High Risk or Childbearing Age with Abnormal Pap in Last 3 yrs; Every 2 all others)   General:  Screening Not Indicated           Osteoporosis Screening/Counseling:   (Every 2 Yrs if at risk or more if medically necessary)   General:  Patient Declines           AAA Screening/Counseling:   (Once per Lifetime with risk factors)    General:  Patient Declines           Glaucoma Screening/Counseling:   (Annual)   General:  Screening Current          HIV Screening/Counseling:   (Voluntary; Once annually for high risk OR 3 times for Pregnancy at diagnosis of IUP; 3rd trimester; and at Labor   General:  Screening Not Indicated           Hepatitis C Screening:             Immunizations:   Influenza (annual):   Influenza UTD This Year   Pneumococcal (Once in a Lifetime):  Lifetime Vaccine Completed   Hepatitis B Series (low risk patients):  Series Not Indicated   Zostavax (Medicare D Coverage, Pt >70 yo):  Patient Declines   TD (Non-Medicare Wellness  Visit required):  Patient Declines       Other Preventative Couseling (Non-Medicare Wellness Visit Required):       Referrals (Non-Medicare Wellness Visit Required):       Medical Equipment/Suppliers:

## 2018-06-21 NOTE — PROGRESS NOTES
Subjective:      Patient ID: Hector Fischer is a 74 y o  female  No chief complaint on file  Here for follow up of hypertension, hyperlipidemia  Feels well  Taking all meds as prescribed  Has transplant follow up in September  No side effects with medications  Denies fever, chills, rash  Recently saw Dr Delma Hurst and hemoglobin is stable  The following portions of the patient's history were reviewed and updated as appropriate: allergies, current medications, past family history, past medical history, past social history, past surgical history and problem list     Review of Systems   Constitutional: Negative  Respiratory: Negative  Cardiovascular: Negative            Current Outpatient Prescriptions   Medication Sig Dispense Refill    apixaban (ELIQUIS) 5 mg Take 1 tablet (5 mg total) by mouth 2 (two) times a day 42 tablet 0    aspirin (ECOTRIN LOW STRENGTH) 81 mg EC tablet Take 1 tablet by mouth daily 30 tablet 0    Calcium Carb-Cholecalciferol (OS-KENDRICK PO) Take by mouth      cycloSPORINE modified (NEORAL) 25 mg capsule Take 50 mg by mouth 2 (two) times a day      levETIRAcetam (KEPPRA) 500 mg tablet Take 1 tablet by mouth every 12 (twelve) hours 60 tablet 0    levothyroxine 112 mcg tablet TAKE 1 TABLET EVERY DAY AS DIRECTED 90 tablet 3    MAGNESIUM OXIDE PO Take by mouth      metoprolol tartrate (LOPRESSOR) 25 mg tablet Take 1 tablet (25 mg total) by mouth 2 (two) times a day 270 tablet 4    metoprolol tartrate (LOPRESSOR) 25 mg tablet TAKE 1 & 1/2 (ONE & ONE-HALF) TABLETS BY MOUTH TWICE DAILY 270 tablet 3    mycophenolate (CELLCEPT) 250 mg capsule Take 250 mg by mouth 2 (two) times a day 3 tabs each dose=750 mg       omeprazole (PriLOSEC) 40 MG capsule Take 40 mg by mouth daily      oxybutynin (DITROPAN) 5 mg tablet Take 5 mg by mouth 2 (two) times a day        pravastatin (PRAVACHOL) 20 mg tablet Take 1 tablet (20 mg total) by mouth daily at bedtime 90 tablet 0     No current facility-administered medications for this visit  Objective: There were no vitals taken for this visit  Physical Exam   Constitutional: She appears well-developed and well-nourished  Eyes: Conjunctivae are normal    Neck: Neck supple  No JVD present  No thyromegaly present  Cardiovascular: Normal rate, regular rhythm, normal heart sounds and intact distal pulses  Exam reveals no gallop and no friction rub  No murmur heard  Pulmonary/Chest: Effort normal and breath sounds normal  She has no wheezes  She has no rales  Abdominal: Soft  Bowel sounds are normal  She exhibits no distension  There is no tenderness  Musculoskeletal: She exhibits no edema  Psychiatric: She has a normal mood and affect  Her behavior is normal  Judgment and thought content normal          Assessment/Plan:    No problem-specific Assessment & Plan notes found for this encounter  There are no diagnoses linked to this encounter  No Follow-up on file         Venita Childress MD

## 2018-07-06 DIAGNOSIS — I48.21 PERMANENT ATRIAL FIBRILLATION (HCC): ICD-10-CM

## 2018-07-06 NOTE — TELEPHONE ENCOUNTER
eliquis 5 mg tabs  - refill - pt aware of new sample policy - will need to get pharmacy refill or try the eliquis BMS program     Given: 56 tablets - Lot [id number] - Exp: Jun 2020

## 2018-07-13 DIAGNOSIS — R56.9 SEIZURES (HCC): Primary | ICD-10-CM

## 2018-07-16 RX ORDER — LEVETIRACETAM 500 MG/1
500 TABLET ORAL EVERY 12 HOURS SCHEDULED
Qty: 180 TABLET | Refills: 1 | Status: SHIPPED | OUTPATIENT
Start: 2018-07-16 | End: 2019-01-09 | Stop reason: SDUPTHER

## 2018-07-16 NOTE — TELEPHONE ENCOUNTER
Patient requesting status of her script, states she has 4 days left  Requesting a call back once script has been submitted

## 2018-07-19 ENCOUNTER — OFFICE VISIT (OUTPATIENT)
Dept: NEUROLOGY | Facility: CLINIC | Age: 73
End: 2018-07-19
Payer: MEDICARE

## 2018-07-19 VITALS
BODY MASS INDEX: 38.24 KG/M2 | DIASTOLIC BLOOD PRESSURE: 60 MMHG | SYSTOLIC BLOOD PRESSURE: 102 MMHG | HEIGHT: 62 IN | WEIGHT: 207.8 LBS | HEART RATE: 81 BPM

## 2018-07-19 DIAGNOSIS — I73.9 PERIPHERAL ARTERIAL DISEASE (HCC): ICD-10-CM

## 2018-07-19 DIAGNOSIS — R94.01 ABNORMAL EEG: ICD-10-CM

## 2018-07-19 DIAGNOSIS — Z86.73 HISTORY OF STROKE: Primary | ICD-10-CM

## 2018-07-19 DIAGNOSIS — I10 BENIGN ESSENTIAL HYPERTENSION: ICD-10-CM

## 2018-07-19 DIAGNOSIS — I65.29 STENOSIS OF CAROTID ARTERY, UNSPECIFIED LATERALITY: ICD-10-CM

## 2018-07-19 DIAGNOSIS — I65.22 ASYMPTOMATIC STENOSIS OF LEFT CAROTID ARTERY: ICD-10-CM

## 2018-07-19 PROBLEM — I65.21 ASYMPTOMATIC STENOSIS OF RIGHT CAROTID ARTERY: Status: ACTIVE | Noted: 2018-07-19

## 2018-07-19 PROCEDURE — 99214 OFFICE O/P EST MOD 30 MIN: CPT | Performed by: PSYCHIATRY & NEUROLOGY

## 2018-07-19 NOTE — PATIENT INSTRUCTIONS
Stroke: Leatha Castañeda presents for follow-up with regard to multiple prior strokes  Her strokes do have a cardioembolic appearance, however she also has significant peripheral arterial disease and cerebrovascular disease  Notably when she was initially evaluated in the hospital she was found to be a prediabetic, although her cholesterol panel was quite good  Reportedly she does have some ongoing fatigue as well as occasional confusion  She denies any breakthrough seizure events, although she does have a history of seizure with some left temporal spikes on her EE G     - for secondary stroke prevention she should continue her current combination of Eliquis, aspirin, statin, and appropriate blood pressure control  -I would suggest that she is due to have her carotid arteries recheck via ultrasound and will provide her with a prescription  -I will defer to the good judgment of her family physician in terms of monitoring for her cholesterol panel and hemoglobin A1c, but I would suggest that her hemoglobin A1c should be recheck in the near future considering it was 5 9% in late 2017  - I do think that she would benefit from coenzyme Q10 at a dose of 1-200 mg taken daily in the morning  I will write a prescription to be printed  - we will plan to continue Keppra at her current dose of 500 mg twice daily, preferably 12 hr apart  I will plan for her to return to the office in 8-12 months time but I would be happy to see her sooner if the need should arise  If she has any stroke-like symptoms including sudden painless loss of vision, difficulty with speaking or swallowing, vertigo/room spinning that does not quickly resolve, or weakness / numbness affecting 1 side of the body she should proceed to the nearest emergency room immediately    Similarly, considering her combination anticoagulant, if she were to developed a sudden severe very unusual headache or have any fall in which she strikes her head and has residual symptoms she should likewise be seen in the nearest emergency room immediately

## 2018-07-19 NOTE — PROGRESS NOTES
Patient ID: Danisha Cummins is a 74 y o  female  Assessment/Plan:    No problem-specific Assessment & Plan notes found for this encounter  Diagnoses and all orders for this visit:    History of stroke  -     co-enzyme Q-10 50 MG capsule; Take 4 capsules (200 mg total) by mouth daily    Benign essential hypertension    Peripheral arterial disease (HCC)    Abnormal EEG    Stenosis of carotid artery, unspecified laterality  -     VAS carotid complete study; Future    Asymptomatic stenosis of left carotid artery       Patient Instructions   Stroke: Nayla Bergeron presents for follow-up with regard to multiple prior strokes  Her strokes do have a cardioembolic appearance, however she also has significant peripheral arterial disease and cerebrovascular disease  Notably when she was initially evaluated in the hospital she was found to be a prediabetic, although her cholesterol panel was quite good  Reportedly she does have some ongoing fatigue as well as occasional confusion  She denies any breakthrough seizure events, although she does have a history of seizure with some left temporal spikes on her EE G     - for secondary stroke prevention she should continue her current combination of Eliquis, aspirin, statin, and appropriate blood pressure control  -I would suggest that she is due to have her carotid arteries recheck via ultrasound and will provide her with a prescription  -I will defer to the good judgment of her family physician in terms of monitoring for her cholesterol panel and hemoglobin A1c, but I would suggest that her hemoglobin A1c should be recheck in the near future considering it was 5 9% in late 2017  - I do think that she would benefit from coenzyme Q10 at a dose of 1-200 mg taken daily in the morning  I will write a prescription to be printed  - we will plan to continue Keppra at her current dose of 500 mg twice daily, preferably 12 hr apart      I will plan for her to return to the office in 8-12 months time but I would be happy to see her sooner if the need should arise  If she has any stroke-like symptoms including sudden painless loss of vision, difficulty with speaking or swallowing, vertigo/room spinning that does not quickly resolve, or weakness / numbness affecting 1 side of the body she should proceed to the nearest emergency room immediately  Similarly, considering her combination anticoagulant, if she were to developed a sudden severe very unusual headache or have any fall in which she strikes her head and has residual symptoms she should likewise be seen in the nearest emergency room immediately  Subjective:    TONIE      Stacie Hart presents with her  for follow-up with regard to her prior embolic strokes  In the interval since her last visit to the office she had a fall which required a brief hospitalization but reportedly no significant bleeding issues  Later she did see her ophthalmologist who saw some evidence of damage in the back of the eye  They report no new stroke-like symptoms  She reports good medication compliance with no significant bleeding or bruising issues  She reports no breakthrough seizures and no side effects on Keppra at this point in time  She does experience relatively significant  Fatigue as well as occasional confusion  The following portions of the patient's history were reviewed and updated as appropriate:   She  has a past medical history of Anemia; Anxiety; Arthritis; Benign paroxysmal vertigo, unspecified ear; Cirrhosis ([address] ); Disease of thyroid gland; GERD (gastroesophageal reflux disease); History of transfusion; Liver transplant candidate; Osteoporosis; and Pelvic fracture ([address] )    She   Patient Active Problem List    Diagnosis Date Noted    Abnormal EEG 11/08/2017    Altered mental status 11/04/2017    Cerebrovascular accident (CVA) due to embolism ([address] ) 11/04/2017    Non-ST elevation myocardial infarction (NSTEMI), type 2 11/04/2017    Acute ischemic stroke ([place] [address] ) 10/31/2017    Paroxysmal SVT (supraventricular tachycardia) ([place] [address] ) 10/31/2017    TIA (transient ischemic attack) 10/29/2017    HLD (hyperlipidemia) 10/29/2017    Cyst of diaphragm 04/27/2017    Long-term use of immunosuppressant medication 12/30/2016    Anemia 12/30/2016    Liver transplanted ([place] [address] ) 12/29/2016    Hypothyroidism 12/29/2016    Elevated homocysteine (HCC) 11/11/2016    Chronic joint pain 07/07/2016    Lung nodule, solitary 03/21/2016    Peripheral arterial disease ([place] [address] ) 02/16/2016    Sciatica 09/03/2015    Hypercalcemia 06/30/2015    Ataxic gait 09/25/2013    Chronic kidney insufficiency 05/24/2013    Benign essential hypertension 09/04/2012    Ulcerative colitis without complications ([place] [address] ) 81/62/3588    Depression 05/07/2012     She  has a past surgical history that includes Abdominal surgery; Appendectomy; Eye surgery; Hernia repair; Joint replacement; Liver transplantation (N/A); Cholecystectomy (N/A); TONSILECTOMY AND ADNOIDECTOMY (N/A); Bladder augmentation (N/A); Esophagogastroduodenoscopy (N/A, 3/31/2016); Esophagogastroduodenoscopy (N/A, 4/20/2017); Colonoscopy (N/A, 4/20/2017); Cataract extraction; and Tubal ligation  Her family history includes Arthritis in her family; Breast cancer in her family; Cancer in her father and mother; Crohn's disease in her brother  She  reports that she quit smoking about 43 years ago  She quit after 5 00 years of use  She has never used smokeless tobacco  She reports that she does not drink alcohol or use drugs    Current Outpatient Prescriptions   Medication Sig Dispense Refill    apixaban (ELIQUIS) 5 mg Take 1 tablet (5 mg total) by mouth 2 (two) times a day 56 tablet 0    aspirin (ECOTRIN LOW STRENGTH) 81 mg EC tablet Take 1 tablet by mouth daily 30 tablet 0    Calcium Carb-Cholecalciferol (OS-KENDRICK PO) Take by mouth      cycloSPORINE modified (NEORAL) 25 mg capsule Take 50 mg by mouth 2 (two) times a day      levETIRAcetam (KEPPRA) 500 mg tablet Take 1 tablet (500 mg total) by mouth every 12 (twelve) hours 180 tablet 1    levothyroxine 112 mcg tablet TAKE 1 TABLET EVERY DAY AS DIRECTED 90 tablet 3    metoprolol tartrate (LOPRESSOR) 25 mg tablet Take 1 tablet (25 mg total) by mouth 2 (two) times a day 270 tablet 4    metoprolol tartrate (LOPRESSOR) 25 mg tablet TAKE 1 & 1/2 (ONE & ONE-HALF) TABLETS BY MOUTH TWICE DAILY 270 tablet 3    mycophenolate (CELLCEPT) 250 mg capsule Take 250 mg by mouth 2 (two) times a day 3 tabs each dose=750 mg       omeprazole (PriLOSEC) 40 MG capsule Take 40 mg by mouth daily      oxybutynin (DITROPAN) 5 mg tablet Take 5 mg by mouth 2 (two) times a day        pravastatin (PRAVACHOL) 20 mg tablet Take 1 tablet (20 mg total) by mouth daily at bedtime 90 tablet 0    MAGNESIUM OXIDE PO Take by mouth       No current facility-administered medications for this visit        Current Outpatient Prescriptions on File Prior to Visit   Medication Sig    apixaban (ELIQUIS) 5 mg Take 1 tablet (5 mg total) by mouth 2 (two) times a day    aspirin (ECOTRIN LOW STRENGTH) 81 mg EC tablet Take 1 tablet by mouth daily    Calcium Carb-Cholecalciferol (OS-KENDRICK PO) Take by mouth    cycloSPORINE modified (NEORAL) 25 mg capsule Take 50 mg by mouth 2 (two) times a day    levETIRAcetam (KEPPRA) 500 mg tablet Take 1 tablet (500 mg total) by mouth every 12 (twelve) hours    levothyroxine 112 mcg tablet TAKE 1 TABLET EVERY DAY AS DIRECTED    metoprolol tartrate (LOPRESSOR) 25 mg tablet Take 1 tablet (25 mg total) by mouth 2 (two) times a day    metoprolol tartrate (LOPRESSOR) 25 mg tablet TAKE 1 & 1/2 (ONE & ONE-HALF) TABLETS BY MOUTH TWICE DAILY    mycophenolate (CELLCEPT) 250 mg capsule Take 250 mg by mouth 2 (two) times a day 3 tabs each dose=750 mg     omeprazole (PriLOSEC) 40 MG capsule Take 40 mg by mouth daily    oxybutynin (DITROPAN) 5 mg tablet Take 5 mg by mouth 2 (two) times a day      pravastatin (PRAVACHOL) 20 mg tablet Take 1 tablet (20 mg total) by mouth daily at bedtime    MAGNESIUM OXIDE PO Take by mouth     No current facility-administered medications on file prior to visit  She is allergic to tetracyclines & related; vancomycin; and prograf [tacrolimus]            Objective:    Blood pressure 102/60, pulse 81, height 5' 2" (1 575 m), weight 94 3 kg (207 lb 12 8 oz), not currently breastfeeding  Physical Exam    Neurological Exam      At the time of my evaluation she was awake, alert, and in no distress  No obvious speech or language dysfunction  Her pupils were equal round and reactive to light  Her face was symmetric appearing with intact sensation in V1 through 3 bilaterally  Her tongue was midline with symmetric palate raise  Strength is 5/5 in the bilateral upper lower extremities  Her gait  Was stable with the assistance of her cane  ROS:    Review of Systems   Constitutional: Positive for fatigue  HENT: Negative  Eyes: Negative  Respiratory: Negative  Cardiovascular: Negative  Gastrointestinal: Negative  Endocrine: Negative  Genitourinary: Negative  Musculoskeletal: Positive for gait problem  Skin: Negative  Allergic/Immunologic: Negative  Hematological: Bruises/bleeds easily  Psychiatric/Behavioral: Positive for sleep disturbance

## 2018-07-26 ENCOUNTER — HOSPITAL ENCOUNTER (OUTPATIENT)
Dept: RADIOLOGY | Facility: HOSPITAL | Age: 73
Discharge: HOME | End: 2018-07-26
Attending: PSYCHIATRY & NEUROLOGY
Payer: MEDICARE

## 2018-07-26 DIAGNOSIS — I65.29 STENOSIS OF CAROTID ARTERY, UNSPECIFIED LATERALITY: ICD-10-CM

## 2018-07-26 PROCEDURE — 93880 EXTRACRANIAL BILAT STUDY: CPT | Performed by: SURGERY

## 2018-07-26 PROCEDURE — 93880 EXTRACRANIAL BILAT STUDY: CPT

## 2018-08-02 ENCOUNTER — TELEPHONE (OUTPATIENT)
Dept: CARDIOLOGY CLINIC | Facility: CLINIC | Age: 73
End: 2018-08-02

## 2018-08-02 DIAGNOSIS — I48.21 PERMANENT ATRIAL FIBRILLATION (HCC): ICD-10-CM

## 2018-08-02 NOTE — TELEPHONE ENCOUNTER
ELIQUIS 5MG  1 BOX LOT [id number] EXP 10/2020  1 BOX LOT [id number] EXP 01/2021  1 BOX LOT [id number] EXP 06/2020  2 BOXES LOT [id number] EXP 01/2021

## 2018-08-03 ENCOUNTER — TELEPHONE (OUTPATIENT)
Dept: CARDIOLOGY CLINIC | Facility: CLINIC | Age: 73
End: 2018-08-03

## 2018-08-03 ENCOUNTER — APPOINTMENT (EMERGENCY)
Dept: CT IMAGING | Facility: HOSPITAL | Age: 73
End: 2018-08-03
Payer: MEDICARE

## 2018-08-03 ENCOUNTER — HOSPITAL ENCOUNTER (EMERGENCY)
Facility: HOSPITAL | Age: 73
Discharge: HOME | End: 2018-08-03
Attending: EMERGENCY MEDICINE | Admitting: EMERGENCY MEDICINE
Payer: MEDICARE

## 2018-08-03 VITALS
BODY MASS INDEX: 38.02 KG/M2 | RESPIRATION RATE: 20 BRPM | OXYGEN SATURATION: 99 % | SYSTOLIC BLOOD PRESSURE: 163 MMHG | DIASTOLIC BLOOD PRESSURE: 70 MMHG | WEIGHT: 207.89 LBS | HEART RATE: 72 BPM | TEMPERATURE: 98.1 F

## 2018-08-03 DIAGNOSIS — M54.9 MUSCULOSKELETAL BACK PAIN: ICD-10-CM

## 2018-08-03 DIAGNOSIS — N12 PYELONEPHRITIS: Primary | ICD-10-CM

## 2018-08-03 LAB
ALBUMIN SERPL BCP-MCNC: 3.2 G/DL (ref 3.5–5)
ALP SERPL-CCNC: 155 U/L (ref 46–116)
ALT SERPL W P-5'-P-CCNC: 43 U/L (ref 12–78)
ANION GAP SERPL CALCULATED.3IONS-SCNC: 8 MMOL/L (ref 4–13)
AST SERPL W P-5'-P-CCNC: 51 U/L (ref 5–45)
BACTERIA UR QL AUTO: ABNORMAL /HPF
BASOPHILS # BLD AUTO: 0.03 THOUSANDS/ΜL (ref 0–0.1)
BASOPHILS NFR BLD AUTO: 1 % (ref 0–1)
BILIRUB SERPL-MCNC: 0.5 MG/DL (ref 0.2–1)
BILIRUB UR QL STRIP: NEGATIVE
BUN SERPL-MCNC: 26 MG/DL (ref 5–25)
CALCIUM SERPL-MCNC: 8.7 MG/DL (ref 8.3–10.1)
CHLORIDE SERPL-SCNC: 108 MMOL/L (ref 100–108)
CLARITY UR: ABNORMAL
CO2 SERPL-SCNC: 22 MMOL/L (ref 21–32)
COLOR UR: YELLOW
CREAT SERPL-MCNC: 1.08 MG/DL (ref 0.6–1.3)
EOSINOPHIL # BLD AUTO: 0.34 THOUSAND/ΜL (ref 0–0.61)
EOSINOPHIL NFR BLD AUTO: 7 % (ref 0–6)
ERYTHROCYTE [DISTWIDTH] IN BLOOD BY AUTOMATED COUNT: 13.4 % (ref 11.6–15.1)
GFR SERPL CREATININE-BSD FRML MDRD: 51 ML/MIN/1.73SQ M
GLUCOSE SERPL-MCNC: 114 MG/DL (ref 65–140)
GLUCOSE UR STRIP-MCNC: NEGATIVE MG/DL
HCT VFR BLD AUTO: 33.5 % (ref 34.8–46.1)
HGB BLD-MCNC: 10.3 G/DL (ref 11.5–15.4)
HGB UR QL STRIP.AUTO: ABNORMAL
IMM GRANULOCYTES # BLD AUTO: 0.02 THOUSAND/UL (ref 0–0.2)
IMM GRANULOCYTES NFR BLD AUTO: 0 % (ref 0–2)
KETONES UR STRIP-MCNC: NEGATIVE MG/DL
LEUKOCYTE ESTERASE UR QL STRIP: ABNORMAL
LYMPHOCYTES # BLD AUTO: 0.66 THOUSANDS/ΜL (ref 0.6–4.47)
LYMPHOCYTES NFR BLD AUTO: 14 % (ref 14–44)
MCH RBC QN AUTO: 27.5 PG (ref 26.8–34.3)
MCHC RBC AUTO-ENTMCNC: 30.7 G/DL (ref 31.4–37.4)
MCV RBC AUTO: 90 FL (ref 82–98)
MONOCYTES # BLD AUTO: 0.4 THOUSAND/ΜL (ref 0.17–1.22)
MONOCYTES NFR BLD AUTO: 8 % (ref 4–12)
NEUTROPHILS # BLD AUTO: 3.45 THOUSANDS/ΜL (ref 1.85–7.62)
NEUTS SEG NFR BLD AUTO: 70 % (ref 43–75)
NITRITE UR QL STRIP: POSITIVE
NON-SQ EPI CELLS URNS QL MICRO: ABNORMAL /HPF
NRBC BLD AUTO-RTO: 0 /100 WBCS
PH UR STRIP.AUTO: 5.5 [PH] (ref 4.5–8)
PLATELET # BLD AUTO: 210 THOUSANDS/UL (ref 149–390)
PMV BLD AUTO: 10.1 FL (ref 8.9–12.7)
POTASSIUM SERPL-SCNC: 5.2 MMOL/L (ref 3.5–5.3)
PROT SERPL-MCNC: 7.2 G/DL (ref 6.4–8.2)
PROT UR STRIP-MCNC: NEGATIVE MG/DL
RBC # BLD AUTO: 3.74 MILLION/UL (ref 3.81–5.12)
RBC #/AREA URNS AUTO: ABNORMAL /HPF
SODIUM SERPL-SCNC: 138 MMOL/L (ref 136–145)
SP GR UR STRIP.AUTO: 1.01 (ref 1–1.03)
UROBILINOGEN UR QL STRIP.AUTO: 0.2 E.U./DL
WBC # BLD AUTO: 4.9 THOUSAND/UL (ref 4.31–10.16)
WBC #/AREA URNS AUTO: ABNORMAL /HPF

## 2018-08-03 PROCEDURE — 96374 THER/PROPH/DIAG INJ IV PUSH: CPT

## 2018-08-03 PROCEDURE — 99284 EMERGENCY DEPT VISIT MOD MDM: CPT

## 2018-08-03 PROCEDURE — 80053 COMPREHEN METABOLIC PANEL: CPT | Performed by: EMERGENCY MEDICINE

## 2018-08-03 PROCEDURE — 36415 COLL VENOUS BLD VENIPUNCTURE: CPT | Performed by: EMERGENCY MEDICINE

## 2018-08-03 PROCEDURE — 87077 CULTURE AEROBIC IDENTIFY: CPT

## 2018-08-03 PROCEDURE — 96361 HYDRATE IV INFUSION ADD-ON: CPT

## 2018-08-03 PROCEDURE — 87186 SC STD MICRODIL/AGAR DIL: CPT

## 2018-08-03 PROCEDURE — 85025 COMPLETE CBC W/AUTO DIFF WBC: CPT | Performed by: EMERGENCY MEDICINE

## 2018-08-03 PROCEDURE — 81001 URINALYSIS AUTO W/SCOPE: CPT

## 2018-08-03 PROCEDURE — 87086 URINE CULTURE/COLONY COUNT: CPT

## 2018-08-03 PROCEDURE — 74176 CT ABD & PELVIS W/O CONTRAST: CPT

## 2018-08-03 RX ORDER — TRAMADOL HYDROCHLORIDE 50 MG/1
50 TABLET ORAL EVERY 6 HOURS PRN
Qty: 15 TABLET | Refills: 0 | Status: SHIPPED | OUTPATIENT
Start: 2018-08-03 | End: 2018-08-06

## 2018-08-03 RX ORDER — KETOROLAC TROMETHAMINE 30 MG/ML
15 INJECTION, SOLUTION INTRAMUSCULAR; INTRAVENOUS ONCE
Status: COMPLETED | OUTPATIENT
Start: 2018-08-03 | End: 2018-08-03

## 2018-08-03 RX ORDER — METHYLPREDNISOLONE 4 MG/1
TABLET ORAL
Qty: 21 TABLET | Refills: 0 | Status: SHIPPED | OUTPATIENT
Start: 2018-08-03 | End: 2018-08-03

## 2018-08-03 RX ORDER — SULFAMETHOXAZOLE AND TRIMETHOPRIM 800; 160 MG/1; MG/1
1 TABLET ORAL 2 TIMES DAILY
Qty: 6 TABLET | Refills: 0 | Status: SHIPPED | OUTPATIENT
Start: 2018-08-03 | End: 2018-08-06

## 2018-08-03 RX ADMIN — SODIUM CHLORIDE 1000 ML: 0.9 INJECTION, SOLUTION INTRAVENOUS at 12:47

## 2018-08-03 RX ADMIN — KETOROLAC TROMETHAMINE 15 MG: 30 INJECTION, SOLUTION INTRAMUSCULAR at 12:49

## 2018-08-03 NOTE — ED NOTES
Attempted twice to secure an IV  Robert RN took site [facility] in to attempt to secure       April Hermes Gonzalez RN  08/03/18 1424

## 2018-08-03 NOTE — DISCHARGE INSTRUCTIONS
Kidney Infection   WHAT YOU NEED TO KNOW:   A kidney infection, or pyelonephritis, is a bacterial infection  The infection usually starts in your bladder or urethra and moves into your kidney  One or both kidneys may be infected  DISCHARGE INSTRUCTIONS:   Return to the emergency department if:   · You have a fever and chills  · You cannot stop vomiting  · You have severe pain in your abdomen, lower back, or sides  Contact your healthcare provider if:   · You continue to have a fever after you take antibiotics for 3 days  · You have pain when you urinate, even after treatment  · Your signs and symptoms return  · You have questions or concerns about your condition or care  Medicines: You may  need any of the following:  · Antibiotics  treat your bacterial infection  · Acetaminophen  decreases pain and fever  It is available without a doctor's order  Ask how much to take and how often to take it  Follow directions  Read the labels of all other medicines you are using to see if they also contain acetaminophen, or ask your doctor or pharmacist  Acetaminophen can cause liver damage if not taken correctly  Do not use more than 4 grams (4,000 milligrams) total of acetaminophen in one day  · NSAIDs , such as ibuprofen, help decrease swelling, pain, and fever  This medicine is available with or without a doctor's order  NSAIDs can cause stomach bleeding or kidney problems in certain people  If you take blood thinner medicine, always ask if NSAIDs are safe for you  Always read the medicine label and follow directions  Do not give these medicines to children under 10months of age without direction from your child's healthcare provider  · Prescription pain medicine  may be given  Ask how to take this medicine safely  · Take your medicine as directed  Contact your healthcare provider if you think your medicine is not helping or if you have side effects   Tell him of her if you are allergic to any medicine  Keep a list of the medicines, vitamins, and herbs you take  Include the amounts, and when and why you take them  Bring the list or the pill bottles to follow-up visits  Carry your medicine list with you in case of an emergency  Drink liquids as directed: You may need to drink extra liquids to help flush your kidneys and urinary system  Water is the best liquid to drink  Ask your healthcare provider how much liquid to drink each day and which liquids are best for you  Urinate as soon as you feel the urge: This will help flush bacteria from your urinary system  Do not wait or hold your urine for too long  Clean your genital area every day with soap and water:  Wipe from front to back after you urinate or have a bowel movement  Wear cotton underwear  Fabrics such as nylon and polyester can stay damp  This can increase your risk for infection  Urinate within 15 minutes after you have sex  Follow up with your healthcare provider as directed:  Write down your questions so you remember to ask them during your visits  © 2017 [facility] Information is for End User's use only and may not be sold, redistributed or otherwise used for commercial purposes  All illustrations and images included in CareNotes® are the copyrighted property of [employer] D [employer] M [employer]  or [address]  The above information is an  only  It is not intended as medical advice for individual conditions or treatments  Talk to your doctor, nurse or pharmacist before following any medical regimen to see if it is safe and effective for you  Acute Low Back Pain   WHAT YOU NEED TO KNOW:   Acute low back pain is sudden discomfort in your lower back area that lasts for up to 6 weeks  The discomfort makes it difficult to tolerate activity  DISCHARGE INSTRUCTIONS:   Return to the emergency department if:   · You have severe pain      · You have sudden stiffness and heaviness on both buttocks down to both legs  · You have numbness or weakness in one leg, or pain in both legs  · You have numbness in your genital area or across your lower back  · You cannot control your urine or bowel movements  Contact your healthcare provider if:   · You have a fever  · You have pain at night or when you rest     · Your pain does not get better with treatment  · You have pain that worsens when you cough or sneeze  · You suddenly feel something pop or snap in your back  · You have questions or concerns about your condition or care  Medicines: The following medicines may be ordered by your healthcare provider:  · Acetaminophen  decreases pain  It is available without a doctor's order  Ask how much to take and how often to take it  Follow directions  Acetaminophen can cause liver damage if not taken correctly  · NSAIDs  help decrease swelling and pain  This medicine is available with or without a doctor's order  NSAIDs can cause stomach bleeding or kidney problems in certain people  If you take blood thinner medicine, always ask your healthcare provider if NSAIDs are safe for you  Always read the medicine label and follow directions  · Prescription pain medicine  may be given  Ask your healthcare provider how to take this medicine safely  · Muscle relaxers  decrease pain by relaxing the muscles in your lower spine  · Take your medicine as directed  Contact your healthcare provider if you think your medicine is not helping or if you have side effects  Tell him of her if you are allergic to any medicine  Keep a list of the medicines, vitamins, and herbs you take  Include the amounts, and when and why you take them  Bring the list or the pill bottles to follow-up visits  Carry your medicine list with you in case of an emergency  Self-care:   · Stay active  as much as you can without causing more pain  Bed rest could make your back pain worse   Start with some light exercises such as walking  Avoid heavy lifting until your pain is gone  Ask for more information about the activities or exercises that are right for you  · Ice  helps decrease swelling, pain, and muscle spams  Put crushed ice in a plastic bag  Cover it with a towel  Place it on your lower back for 20 to 30 minutes every 2 hours  Do this for about 2 to 3 days after your pain starts, or as directed  · Heat  helps decrease pain and muscle spasms  Start to use heat after treatment with ice has stopped  Use a small towel dampened with warm water or a heating pad, or sit in a warm bath  Apply heat on the area for 20 to 30 minutes every 2 hours for as many days as directed  Alternate heat and ice  Prevent acute low back pain:   · Use proper body mechanics  ¨ Bend at the hips and knees when you  objects  Do not bend from the waist  Use your leg muscles as you lift the load  Do not use your back  Keep the object close to your chest as you lift it  Try not to twist or lift anything above your waist     ¨ Change your position often when you stand for long periods of time  Rest one foot on a small box or footrest, and then switch to the other foot often  ¨ Try not to sit for long periods of time  When you do, sit in a straight-backed chair with your feet flat on the floor  Never reach, pull, or push while you are sitting  · Do exercises that strengthen your back muscles  Warm up before you exercise  Ask your healthcare provider the best exercises for you  · Maintain a healthy weight  Ask your healthcare provider how much you should weigh  Ask him to help you create a weight loss plan if you are overweight  Follow up with your healthcare provider as directed:  Return for a follow-up visit if you still have pain after 1 to 3 weeks of treatment  You may need to visit an orthopedist if your back pain lasts more than 12 weeks  Write down your questions so you remember to ask them during your visits    © 2017 [facility] [address] is for End User's use only and may not be sold, redistributed or otherwise used for commercial purposes  All illustrations and images included in CareNotes® are the copyrighted property of A D [employer] M [employer] Inc  or [employer]  The above information is an  only  It is not intended as medical advice for individual conditions or treatments  Talk to your doctor, nurse or pharmacist before following any medical regimen to see if it is safe and effective for you

## 2018-08-03 NOTE — ED NOTES
Third attempt with [facility], by [employer], unsuccessful  Dr Mary Portillo aware, rad sat for CT scan  Will try again upon her return from [place]       Quita Carrel, RN  08/03/18 4256

## 2018-08-04 NOTE — ED PROVIDER NOTES
History  Chief Complaint   Patient presents with    Back Pain     PT reports "I have some back pain, on the upper left side and it does travel, all over " PT denies any injury     69-year-old female presents with chief complaint of left-sided flank and upper back pain  Onset was 2 days ago  No fevers, chills  Patient denies dysuria but states she always has urinary frequency and urgency  No gross hematuria  Some nausea but no vomiting  History provided by:  Patient   used: No    Flank Pain   Pain location:  L flank  Pain quality: aching and sharp    Pain radiates to:  Does not radiate  Pain severity:  Moderate  Onset quality:  Gradual  Duration:  2 days  Timing:  Constant  Progression:  Worsening  Chronicity:  New  Context: not sick contacts    Relieved by:  Nothing  Worsened by:  Nothing  Ineffective treatments:  None tried  Associated symptoms: nausea    Associated symptoms: no chest pain, no chills, no constipation, no diarrhea, no dysuria, no fatigue, no fever, no hematuria, no shortness of breath and no vomiting    Risk factors: being elderly        Prior to Admission Medications   Prescriptions Last Dose Informant Patient Reported? Taking?    Calcium Carb-Cholecalciferol (OS-KENDRICK PO)  Self Yes Yes   Sig: Take by mouth   MAGNESIUM OXIDE PO   Yes Yes   Sig: Take by mouth   apixaban (ELIQUIS) 5 mg   No Yes   Sig: Take 1 tablet (5 mg total) by mouth 2 (two) times a day   aspirin (ECOTRIN LOW STRENGTH) 81 mg EC tablet  Self No Yes   Sig: Take 1 tablet by mouth daily   co-enzyme Q-10 50 MG capsule   No Yes   Sig: Take 4 capsules (200 mg total) by mouth daily   cycloSPORINE modified (NEORAL) 25 mg capsule  Self Yes Yes   Sig: Take 50 mg by mouth 2 (two) times a day   levETIRAcetam (KEPPRA) 500 mg tablet   No Yes   Sig: Take 1 tablet (500 mg total) by mouth every 12 (twelve) hours   levothyroxine 112 mcg tablet  Self No Yes   Sig: TAKE 1 TABLET EVERY DAY AS DIRECTED   metoprolol tartrate (LOPRESSOR) 25 mg tablet   No Yes   Sig: TAKE 1 & 1/2 (ONE & ONE-HALF) TABLETS BY MOUTH TWICE DAILY   mycophenolate (CELLCEPT) 250 mg capsule  Self Yes Yes   Sig: Take 250 mg by mouth 2 (two) times a day 3 tabs each dose=750 mg    omeprazole (PriLOSEC) 40 MG capsule  Self Yes Yes   Sig: Take 40 mg by mouth daily   oxybutynin (DITROPAN) 5 mg tablet  Self Yes Yes   Sig: Take 5 mg by mouth 2 (two) times a day     pravastatin (PRAVACHOL) 20 mg tablet  Self No Yes   Sig: Take 1 tablet (20 mg total) by mouth daily at bedtime      Facility-Administered Medications: None       Past Medical History:   Diagnosis Date    Anemia     Anxiety     Arthritis     Benign paroxysmal vertigo, unspecified ear     onset: 05/06/2010    Cirrhosis ([facility] )     onset: 03/30/2005    Disease of thyroid gland     GERD (gastroesophageal reflux disease)     History of transfusion     Liver transplant candidate     onset: 09/16/2008    Osteoporosis     Pelvic fracture ([facility] )     onset: 10/14/2008       Past Surgical History:   Procedure Laterality Date    ABDOMINAL SURGERY      APPENDECTOMY      BLADDER AUGMENTATION N/A     CATARACT EXTRACTION      CHOLECYSTECTOMY N/A     COLONOSCOPY N/A 4/20/2017    Procedure: COLONOSCOPY;  Surgeon: Daryl Riddle MD;  Location: [place] [address] GI LAB; Service:     ESOPHAGOGASTRODUODENOSCOPY N/A 3/31/2016    Procedure: ESOPHAGOGASTRODUODENOSCOPY (EGD); Surgeon: Daryl Riddle MD;  Location: [place] [address] GI LAB; Service:     ESOPHAGOGASTRODUODENOSCOPY N/A 4/20/2017    Procedure: ESOPHAGOGASTRODUODENOSCOPY (EGD); Surgeon: Daryl Riddle MD;  Location: [place] [address] GI LAB;   Service:     EYE SURGERY      cataracts removed both eyes lens implant    HERNIA REPAIR      JOINT REPLACEMENT      left knee replacement    LIVER TRANSPLANTATION N/A     TONSILECTOMY AND ADNOIDECTOMY N/A     TUBAL LIGATION         Family History   Problem Relation Age of Onset    Cancer Mother         breast    Cancer Father     Crohn's disease Brother     Arthritis Family     Breast cancer Family      I have reviewed and agree with the history as documented  Social History   Substance Use Topics    Smoking status: Former Smoker     Years: 5 00     Quit date: 1/21/1975    Smokeless tobacco: Never Used      Comment: smoked for 5 years-1/2 to 1 ppd    Alcohol use No        Review of Systems   Constitutional: Negative for chills, diaphoresis, fatigue and fever  Respiratory: Negative for shortness of breath  Cardiovascular: Negative for chest pain and palpitations  Gastrointestinal: Positive for nausea  Negative for constipation, diarrhea and vomiting  Genitourinary: Positive for flank pain  Negative for dysuria, frequency and hematuria  Skin: Negative for rash  All other systems reviewed and are negative  Physical Exam  Physical Exam   Constitutional: She is oriented to person, place, and time  She appears well-developed and well-nourished  She appears distressed  HENT:   Head: Normocephalic and atraumatic  Eyes: EOM are normal  Pupils are equal, round, and reactive to light  Neck: Normal range of motion  No JVD present  Cardiovascular: Normal rate, regular rhythm, normal heart sounds and intact distal pulses  Exam reveals no gallop and no friction rub  No murmur heard  Pulmonary/Chest: Effort normal and breath sounds normal  No respiratory distress  She has no wheezes  She has no rales  She exhibits no tenderness  Abdominal: Soft  She exhibits no distension and no mass  There is no tenderness  There is no guarding  Musculoskeletal: Normal range of motion  She exhibits no tenderness  Neurological: She is alert and oriented to person, place, and time  Skin: Skin is warm and dry  Psychiatric: She has a normal mood and affect  Her behavior is normal  Judgment and thought content normal    Nursing note and vitals reviewed        Vital Signs  ED Triage Vitals   Temperature Pulse Respirations Blood Pressure SpO2   08/03/18 1047 08/03/18 1047 08/03/18 1047 08/03/18 1054 08/03/18 1047   98 1 °F (36 7 °C) 71 16 152/71 99 %      Temp Source Heart Rate Source Patient Position - Orthostatic VS BP Location FiO2 (%)   08/03/18 1047 08/03/18 1047 08/03/18 1047 08/03/18 1047 --   Oral Monitor Lying Right arm       Pain Score       08/03/18 1047       No Pain           Vitals:    08/03/18 1054 08/03/18 1300 08/03/18 1319 08/03/18 1330   BP: 152/71 164/70 164/70 163/70   Pulse:  68 68 72   Patient Position - Orthostatic VS: Lying Lying Lying Lying       Visual Acuity      ED Medications  Medications   ketorolac (TORADOL) injection 15 mg (15 mg Intravenous Given 8/3/18 1249)   sodium chloride 0 9 % bolus 1,000 mL (0 mL Intravenous Stopped 8/3/18 1347)       Diagnostic Studies  Results Reviewed     Procedure Component Value Units Date/Time    Urine culture [[id number]]  (Abnormal) Collected:  08/03/18 1327    Lab Status:  Preliminary result Specimen:  Urine from Urine, Clean Catch Updated:  08/04/18 1156     Urine Culture >100,000 cfu/ml Gram Negative Esteban Enteric Like (A)    Urine Microscopic [[id number]]  (Abnormal) Collected:  08/03/18 1327    Lab Status:  Final result Specimen:  Urine from Urine, Clean Catch Updated:  08/03/18 1335     RBC, UA 2-4 (A) /hpf      WBC, UA 10-20 (A) /hpf      Epithelial Cells Occasional /hpf      Bacteria, UA Moderate (A) /hpf     ED Urine Macroscopic [[id number]]  (Abnormal) Collected:  08/03/18 1327    Lab Status:  Final result Specimen:  Urine Updated:  08/03/18 1318     Color, UA Yellow     Clarity, UA Cloudy     pH, UA 5 5     Leukocytes, UA Small (A)     Nitrite, UA Positive (A)     Protein, UA Negative mg/dl      Glucose, UA Negative mg/dl      Ketones, UA Negative mg/dl      Urobilinogen, UA 0 2 E U /dl      Bilirubin, UA Negative     Blood, UA Moderate (A)     Specific [place], UA 1 010    Narrative:       CLINITEK RESULT    Comprehensive metabolic panel [[id number]]  (Abnormal) Collected:  08/03/18 1118    Lab Status:  Final result Specimen:  Blood from Arm, Right Updated:  08/03/18 1151     Sodium 138 mmol/L      Potassium 5 2 mmol/L      Chloride 108 mmol/L      CO2 22 mmol/L      Anion Gap 8 mmol/L      BUN 26 (H) mg/dL      Creatinine 1 08 mg/dL      Glucose 114 mg/dL      Calcium 8 7 mg/dL      AST 51 (H) U/L      ALT 43 U/L      Alkaline Phosphatase 155 (H) U/L      Total Protein 7 2 g/dL      Albumin 3 2 (L) g/dL      Total Bilirubin 0 50 mg/dL      eGFR 51 ml/min/1 73sq m     Narrative:         National Kidney Disease Education Program recommendations are as follows:  GFR calculation is accurate only with a steady state creatinine  Chronic Kidney disease less than 60 ml/min/1 73 sq  meters  Kidney failure less than 15 ml/min/1 73 sq  meters  CBC and differential [[id number]]  (Abnormal) Collected:  08/03/18 1118    Lab Status:  Final result Specimen:  Blood from Arm, Right Updated:  08/03/18 1125     WBC 4 90 Thousand/uL      RBC 3 74 (L) Million/uL      Hemoglobin 10 3 (L) g/dL      Hematocrit 33 5 (L) %      MCV 90 fL      MCH 27 5 pg      MCHC 30 7 (L) g/dL      RDW 13 4 %      MPV 10 1 fL      Platelets 792 Thousands/uL      nRBC 0 /100 WBCs      Neutrophils Relative 70 %      Immat GRANS % 0 %      Lymphocytes Relative 14 %      Monocytes Relative 8 %      Eosinophils Relative 7 (H) %      Basophils Relative 1 %      Neutrophils Absolute 3 45 Thousands/µL      Immature Grans Absolute 0 02 Thousand/uL      Lymphocytes Absolute 0 66 Thousands/µL      Monocytes Absolute 0 40 Thousand/µL      Eosinophils Absolute 0 34 Thousand/µL      Basophils Absolute 0 03 Thousands/µL                  CT renal stone study abdomen pelvis without contrast   Final Result by Job Duane, MD (08/03 1236)      No urinary tract calculi identified               Workstation performed: [id number]                    Procedures  Procedures       Phone Contacts  ED Phone Contact    ED Course MDM  Number of Diagnoses or Management Options  Musculoskeletal back pain: new and requires workup  Pyelonephritis: new and requires workup  Diagnosis management comments: Background: 74 y o  female with left sided flank pain    Differential DX includes but is not limited to: ureterolithiasis, pyelonephritis, musculoskeletal flank pain, costochondritis, mesenteric adenitis    Plan: cbc, bmp, urinalysis, ct stone study         Amount and/or Complexity of Data Reviewed  Clinical lab tests: ordered and reviewed  Tests in the radiology section of CPT®: ordered and reviewed    Risk of Complications, Morbidity, and/or Mortality  Presenting problems: high  Diagnostic procedures: high  Management options: high    Patient Progress  Patient progress: stable    CritCare Time    Disposition  Final diagnoses:   Musculoskeletal back pain   Pyelonephritis     Time reflects when diagnosis was documented in both MDM as applicable and the Disposition within this note     Time User Action Codes Description Comment    8/3/2018  1:05 PM Megan Olivasr B Add [M54 9] Musculoskeletal back pain     8/3/2018  1:22 PM Philemon Spina Add [N12] Pyelonephritis     8/3/2018  1:22 PM Philemon Spina Modify [M54 9] Musculoskeletal back pain     8/3/2018  1:22 PM Philemon Spina Modify [N12] Pyelonephritis       ED Disposition     ED Disposition Condition Comment    Discharge  Ming Koroma 60 discharge to home/self care  Condition at discharge: Good        Follow-up Information     Follow up With Specialties Details Why Αμαλίας MD [facility], [facility] Schedule an appointment as soon as possible for a visit As needed [address] [place] [address]  [phone]            Discharge Medication List as of 8/3/2018  1:25 PM      START taking these medications    Details   sulfamethoxazole-trimethoprim (BACTRIM DS) 800-160 mg per tablet Take 1 tablet by mouth 2 (two) times a day for 3 days smx-tmp DS (BACTRIM) 800-160 mg tabs (1tab q12 D10), Starting Fri 8/3/2018, Until Mon 8/6/2018, Normal      traMADol (ULTRAM) 50 mg tablet Take 1 tablet (50 mg total) by mouth every 6 (six) hours as needed for moderate pain for up to 15 doses, Starting Fri 8/3/2018, Normal         CONTINUE these medications which have NOT CHANGED    Details   apixaban (ELIQUIS) 5 mg Take 1 tablet (5 mg total) by mouth 2 (two) times a day, Starting Thu 8/2/2018, Normal      aspirin (ECOTRIN LOW STRENGTH) 81 mg EC tablet Take 1 tablet by mouth daily, Starting Tue 10/31/2017, Normal      Calcium Carb-Cholecalciferol (OS-KENDRICK PO) Take by mouth, Historical Med      co-enzyme Q-10 50 MG capsule Take 4 capsules (200 mg total) by mouth daily, Starting Thu 7/19/2018, Print      cycloSPORINE modified (NEORAL) 25 mg capsule Take 50 mg by mouth 2 (two) times a day, Historical Med      levETIRAcetam (KEPPRA) 500 mg tablet Take 1 tablet (500 mg total) by mouth every 12 (twelve) hours, Starting Mon 7/16/2018, Normal      levothyroxine 112 mcg tablet TAKE 1 TABLET EVERY DAY AS DIRECTED, Normal      MAGNESIUM OXIDE PO Take by mouth, Historical Med      metoprolol tartrate (LOPRESSOR) 25 mg tablet TAKE 1 & 1/2 (ONE & ONE-HALF) TABLETS BY MOUTH TWICE DAILY, Normal      mycophenolate (CELLCEPT) 250 mg capsule Take 250 mg by mouth 2 (two) times a day 3 tabs each dose=750 mg , Historical Med      omeprazole (PriLOSEC) 40 MG capsule Take 40 mg by mouth daily, Historical Med      oxybutynin (DITROPAN) 5 mg tablet Take 5 mg by mouth 2 (two) times a day  , Historical Med      pravastatin (PRAVACHOL) 20 mg tablet Take 1 tablet (20 mg total) by mouth daily at bedtime, Starting Mon 4/2/2018, Normal         STOP taking these medications       methylprednisolone (MEDROL) 4 mg tablet Comments:   Reason for Stopping:             No discharge procedures on file      ED Provider  Electronically Signed by           Bonita Wynn MD  08/04/18 8919

## 2018-08-05 LAB — BACTERIA UR CULT: ABNORMAL

## 2018-08-06 ENCOUNTER — OFFICE VISIT (OUTPATIENT)
Dept: FAMILY MEDICINE CLINIC | Facility: CLINIC | Age: 73
End: 2018-08-06
Payer: MEDICARE

## 2018-08-06 ENCOUNTER — TELEPHONE (OUTPATIENT)
Dept: ADMINISTRATIVE | Facility: OTHER | Age: 73
End: 2018-08-06

## 2018-08-06 VITALS
RESPIRATION RATE: 20 BRPM | SYSTOLIC BLOOD PRESSURE: 118 MMHG | WEIGHT: 206 LBS | DIASTOLIC BLOOD PRESSURE: 60 MMHG | TEMPERATURE: 97 F | BODY MASS INDEX: 37.91 KG/M2 | HEIGHT: 62 IN | HEART RATE: 68 BPM

## 2018-08-06 DIAGNOSIS — N10 ACUTE PYELONEPHRITIS: Primary | ICD-10-CM

## 2018-08-06 PROCEDURE — 99213 OFFICE O/P EST LOW 20 MIN: CPT | Performed by: NURSE PRACTITIONER

## 2018-08-06 RX ORDER — CIPROFLOXACIN 250 MG/1
250 TABLET, FILM COATED ORAL EVERY 12 HOURS SCHEDULED
Qty: 10 TABLET | Refills: 0 | Status: SHIPPED | OUTPATIENT
Start: 2018-08-06 | End: 2018-08-11

## 2018-08-06 NOTE — PATIENT INSTRUCTIONS
Urinary Traction Infection in Older Adults   AMBULATORY CARE:   A urinary tract infection  (UTI) is caused by bacteria that get inside your urinary tract  Your urinary tract includes your kidneys, ureters, bladder, and urethra  Urine is made in your kidneys, and it flows from the ureters to the bladder  Urine leaves the bladder through the urethra  A UTI is more common in your lower urinary tract, which includes your bladder and urethra  Common signs and symptoms include the following:   · Fever and chills    · Pain or burning when you urinate    · Urine that smells bad or looks cloudy, or blood in your urine    · Urinating more often or waking from sleep to urinate    · Sudden, strong need to urinate    · Pain or pressure in your lower abdomen     · Leaking urine    · Confusion or agitation    · Fatigue, shakiness, and weakness  Seek care immediately if:   · You are urinating very little or not at all  · You are vomiting  · You have a high fever with shaking chills  · You have side or back pain that gets worse  Contact your healthcare provider if:   · You have a fever  · You are a woman and you have increased white or yellow discharge from your vagina  · You do not feel better after 2 days of taking antibiotics  · You have questions or concerns about your condition or care  Treatment:  Medicines treat the bacterial infection or decrease pain and burning when you urinate  You may also need medicines to decrease the urge to urinate often  Your healthcare provider may recommend cranberry juice or cranberry supplements to help decrease your symptoms  Self-care:   · Urinate when you feel the urge  Do not hold your urine because bacteria can grow in the bladder if urine stays in the bladder too long  It may be helpful to urinate at least every 3 to 4 hours  · Drink liquids as directed  Liquids can help flush bacteria from your urinary tract   Ask how much liquid to drink each day and which liquids are best for you  You may need to drink more liquids than usual to help flush out the bacteria  Do not drink alcohol, caffeine, and citrus juices  These can irritate your bladder and increase your symptoms  · Apply heat  on your abdomen for 20 to 30 minutes every 2 hours for as many days as directed  Heat helps decrease discomfort and pressure in your bladder  Prevent a UTI:   · Women should wipe front to back  after urinating or having a bowel movement  This may prevent germs from getting into the urinary tract  · Urinate after you have sex  to flush away bacteria that can enter your urinary tract during sex  · Wear cotton underwear and clothes that fit loose  Tight pants and nylon underwear can trap moisture and cause bacteria to grow  Follow up with your healthcare provider as directed:  Write down your questions so you remember to ask them during your visits  © 2017 [employer] Information is for End User's use only and may not be sold, redistributed or otherwise used for commercial purposes  All illustrations and images included in CareNotes® are the copyrighted property of [employer] , [employer]  or [employer]ask[employer]  The above information is an  only  It is not intended as medical advice for individual conditions or treatments  Talk to your doctor, nurse or pharmacist before following any medical regimen to see if it is safe and effective for you

## 2018-08-06 NOTE — TELEPHONE ENCOUNTER
Ruperto Juarez is calling to make sure we reach out to this patient asap  She would like us to triage patient      Thank You

## 2018-08-06 NOTE — TELEPHONE ENCOUNTER
Spoke with pt - willing to fill out [employer] Patient assistance form for eliquis - will  form, complete and return to office for faxing

## 2018-08-06 NOTE — TELEPHONE ENCOUNTER
I called pt to follow up on her ED visit on Friday 8/3  Pt states she took the Tramadol & Bactrim as prescribed by the ED and vomited multiple times  She states she cannot take that medication  She is not feeling better  Please let Dr Simona Mehta know, as she was diagnosed with pyelonephritis  ED visit documented in ED log

## 2018-08-06 NOTE — TELEPHONE ENCOUNTER
Pt called said no one has called her back yet  Spoke with patient said she was in the hospital was Rx bactrim and Tramadol when she got home she took one dose together and vomited (i filled two bags of vomit) eventually it stopped she has not taken any more doses since     Appt made with Glendy this afternoon for a ED follow up 30 mins

## 2018-08-06 NOTE — PROGRESS NOTES
Assessment/Plan:    Reviewed urine culture, positive for >100,000 colonies of Klebsiella  Unsure if vomiting was secondary to infection or antibiotics  She was unable to tell me how many doses she took, but there were 3 pills missing from her bottle  Will switch to Cipro  Encouraged to push fluids  Follow up as needed for worsening or persistent symptoms  Problem List Items Addressed This Visit     None      Visit Diagnoses     Acute pyelonephritis    -  Primary    Relevant Medications    ciprofloxacin (CIPRO) 250 mg tablet          Patient Instructions   Urinary Traction Infection in Older Adults   AMBULATORY CARE:   A urinary tract infection  (UTI) is caused by bacteria that get inside your urinary tract  Your urinary tract includes your kidneys, ureters, bladder, and urethra  Urine is made in your kidneys, and it flows from the ureters to the bladder  Urine leaves the bladder through the urethra  A UTI is more common in your lower urinary tract, which includes your bladder and urethra  Common signs and symptoms include the following:   · Fever and chills    · Pain or burning when you urinate    · Urine that smells bad or looks cloudy, or blood in your urine    · Urinating more often or waking from sleep to urinate    · Sudden, strong need to urinate    · Pain or pressure in your lower abdomen     · Leaking urine    · Confusion or agitation    · Fatigue, shakiness, and weakness  Seek care immediately if:   · You are urinating very little or not at all  · You are vomiting  · You have a high fever with shaking chills  · You have side or back pain that gets worse  Contact your healthcare provider if:   · You have a fever  · You are a woman and you have increased white or yellow discharge from your vagina  · You do not feel better after 2 days of taking antibiotics  · You have questions or concerns about your condition or care    Treatment:  Medicines treat the bacterial infection or decrease pain and burning when you urinate  You may also need medicines to decrease the urge to urinate often  Your healthcare provider may recommend cranberry juice or cranberry supplements to help decrease your symptoms  Self-care:   · Urinate when you feel the urge  Do not hold your urine because bacteria can grow in the bladder if urine stays in the bladder too long  It may be helpful to urinate at least every 3 to 4 hours  · Drink liquids as directed  Liquids can help flush bacteria from your urinary tract  Ask how much liquid to drink each day and which liquids are best for you  You may need to drink more liquids than usual to help flush out the bacteria  Do not drink alcohol, caffeine, and citrus juices  These can irritate your bladder and increase your symptoms  · Apply heat  on your abdomen for 20 to 30 minutes every 2 hours for as many days as directed  Heat helps decrease discomfort and pressure in your bladder  Prevent a UTI:   · Women should wipe front to back  after urinating or having a bowel movement  This may prevent germs from getting into the urinary tract  · Urinate after you have sex  to flush away bacteria that can enter your urinary tract during sex  · Wear cotton underwear and clothes that fit loose  Tight pants and nylon underwear can trap moisture and cause bacteria to grow  Follow up with your healthcare provider as directed:  Write down your questions so you remember to ask them during your visits  © 2017 [employer] Information is for End User's use only and may not be sold, redistributed or otherwise used for commercial purposes  All illustrations and images included in CareNotes® are the copyrighted property of A D A M , Inc  or [employer]  The above information is an  only  It is not intended as medical advice for individual conditions or treatments   Talk to your doctor, nurse or pharmacist before following any medical regimen to see if it is safe and effective for you  No Follow-up on file  Subjective:      Patient ID: Neftaly Chi is a 74 y o  female  Chief Complaint   Patient presents with    Follow-up     ER F/U  Diagnosed with UTI and she cannot tolerate the Bactrim  She was vomiting from it  Bradly Moreno       Here today for ER f/u  She was seen in 8/3/18 for pyelonephritis  Discharged home on Bactrim DS and Tramadol  The next day she developed profuse vomiting  She improved throughout the day and was able to eat and drink normally by yesterday  Today she is feeling well  She denies any urinary symptoms, fevers, recurrent nausea or vomiting, or flank pain  The following portions of the patient's history were reviewed and updated as appropriate: allergies, current medications, past family history, past medical history, past social history, past surgical history and problem list     Review of Systems   Constitutional: Negative for chills, fatigue and fever  Respiratory: Negative for cough and shortness of breath  Cardiovascular: Negative for chest pain  Gastrointestinal: Positive for vomiting  Negative for abdominal pain, diarrhea and nausea  Genitourinary: Negative for dysuria, flank pain, frequency, hematuria, pelvic pain, urgency and vaginal discharge  Musculoskeletal: Negative for arthralgias and back pain           Current Outpatient Prescriptions   Medication Sig Dispense Refill    apixaban (ELIQUIS) 5 mg Take 1 tablet (5 mg total) by mouth 2 (two) times a day 60 tablet 3    aspirin (ECOTRIN LOW STRENGTH) 81 mg EC tablet Take 1 tablet by mouth daily 30 tablet 0    Calcium Carb-Cholecalciferol (OS-KENDRICK PO) Take by mouth daily        co-enzyme Q-10 50 MG capsule Take 4 capsules (200 mg total) by mouth daily (Patient taking differently: Take 200 mg by mouth daily 1 daily ) 120 capsule 0    cycloSPORINE modified (NEORAL) 25 mg capsule Take 50 mg by mouth 2 (two) times a day  levETIRAcetam (KEPPRA) 500 mg tablet Take 1 tablet (500 mg total) by mouth every 12 (twelve) hours 180 tablet 1    levothyroxine 112 mcg tablet TAKE 1 TABLET EVERY DAY AS DIRECTED 90 tablet 3    MAGNESIUM OXIDE PO Take by mouth daily        metoprolol tartrate (LOPRESSOR) 25 mg tablet TAKE 1 & 1/2 (ONE & ONE-HALF) TABLETS BY MOUTH TWICE DAILY 270 tablet 3    mycophenolate (CELLCEPT) 250 mg capsule Take 250 mg by mouth 2 (two) times a day 3 tabs each dose=750 mg       omeprazole (PriLOSEC) 40 MG capsule Take 40 mg by mouth daily      oxybutynin (DITROPAN) 5 mg tablet Take 5 mg by mouth 2 (two) times a day        pravastatin (PRAVACHOL) 20 mg tablet Take 1 tablet (20 mg total) by mouth daily at bedtime 90 tablet 0    ciprofloxacin (CIPRO) 250 mg tablet Take 1 tablet (250 mg total) by mouth every 12 (twelve) hours for 5 days 10 tablet 0     No current facility-administered medications for this visit  Objective:    /60   Pulse 68   Temp (!) 97 °F (36 1 °C)   Resp 20   Ht 5' 2" (1 575 m)   Wt 93 4 kg (206 lb)   BMI 37 68 kg/m²        Physical Exam   Constitutional: She appears well-developed and well-nourished  Cardiovascular: Normal rate, regular rhythm, S1 normal and S2 normal     Pulmonary/Chest: Effort normal and breath sounds normal    Abdominal: Bowel sounds are normal  She exhibits no distension  There is no hepatosplenomegaly  There is no tenderness  There is no CVA tenderness  Skin: Skin is warm, dry and intact  Psychiatric: She has a normal mood and affect  Nursing note and vitals reviewed               Emely Shipman

## 2018-08-31 ENCOUNTER — TELEPHONE (OUTPATIENT)
Dept: CARDIOLOGY CLINIC | Facility: CLINIC | Age: 73
End: 2018-08-31

## 2018-08-31 NOTE — TELEPHONE ENCOUNTER
Spoke with [facility], She's calling teena to get a run out of her out of pocket expenses so that the information can be sent to [employer]

## 2018-08-31 NOTE — TELEPHONE ENCOUNTER
A phone call was placed to [employer] [facility] [employer] to obtain status of assistance for patient  I spoke with Alma Morrison, who stated that there was a signature missing, and a date  She also stated that we need the patient's out of pocket information  I will call the patient to discuss this with her as well as obtain a signature from dr Derrek Talley and send over updated paperwork

## 2018-09-04 ENCOUNTER — TELEPHONE (OUTPATIENT)
Dept: CARDIOLOGY CLINIC | Facility: CLINIC | Age: 73
End: 2018-09-04

## 2018-09-04 NOTE — TELEPHONE ENCOUNTER
Spoke with Tamar Velez and I have been working together on this  She confirmed that she will be bringing her EOB tomorrow to be sent with the remainder of the paperwork to [employer]

## 2018-09-05 ENCOUNTER — TELEPHONE (OUTPATIENT)
Dept: CARDIOLOGY CLINIC | Facility: CLINIC | Age: 73
End: 2018-09-05

## 2018-09-05 NOTE — TELEPHONE ENCOUNTER
Pt brought [facility] records for [facility] - faxed to [phone] (9/5/18) - records are from 6/30/18 and 12/31/17 - total year to date records of prescription OOP costs

## 2018-09-11 ENCOUNTER — TELEPHONE (OUTPATIENT)
Dept: CARDIOLOGY CLINIC | Facility: CLINIC | Age: 73
End: 2018-09-11

## 2018-09-11 NOTE — TELEPHONE ENCOUNTER
Spoke with [employer], all of the paperwork has been rcd,   The woman whom I spoke to was sending it over to processing today  Determination should be finalized by tomorrow

## 2018-09-11 NOTE — TELEPHONE ENCOUNTER
Fax rcd  Stating that application is being processed and to allow 1-2 business days for a decision  Scanned into chart

## 2018-09-20 ENCOUNTER — TRANSCRIBE ORDERS (OUTPATIENT)
Dept: ADMINISTRATIVE | Facility: HOSPITAL | Age: 73
End: 2018-09-20

## 2018-09-20 ENCOUNTER — APPOINTMENT (OUTPATIENT)
Dept: LAB | Facility: HOSPITAL | Age: 73
End: 2018-09-20
Payer: MEDICARE

## 2018-09-20 ENCOUNTER — TELEPHONE (OUTPATIENT)
Dept: CARDIOLOGY CLINIC | Facility: CLINIC | Age: 73
End: 2018-09-20

## 2018-09-20 ENCOUNTER — REMOTE DEVICE CLINIC VISIT (OUTPATIENT)
Dept: CARDIOLOGY CLINIC | Facility: CLINIC | Age: 73
End: 2018-09-20
Payer: MEDICARE

## 2018-09-20 DIAGNOSIS — Z86.73 PERSONAL HISTORY OF TIA (TRANSIENT ISCHEMIC ATTACK): Primary | ICD-10-CM

## 2018-09-20 DIAGNOSIS — Z95.818 PRESENCE OF CARDIAC DEVICE: ICD-10-CM

## 2018-09-20 DIAGNOSIS — I21.4 NON-ST ELEVATION (NSTEMI) MYOCARDIAL INFARCTION (HCC): ICD-10-CM

## 2018-09-20 DIAGNOSIS — D63.8 ANEMIA IN OTHER CHRONIC DISEASES CLASSIFIED ELSEWHERE: ICD-10-CM

## 2018-09-20 LAB
BASOPHILS # BLD AUTO: 0.03 THOUSANDS/ΜL (ref 0–0.1)
BASOPHILS NFR BLD AUTO: 1 % (ref 0–1)
EOSINOPHIL # BLD AUTO: 0.32 THOUSAND/ΜL (ref 0–0.61)
EOSINOPHIL NFR BLD AUTO: 7 % (ref 0–6)
ERYTHROCYTE [DISTWIDTH] IN BLOOD BY AUTOMATED COUNT: 13.7 % (ref 11.6–15.1)
HCT VFR BLD AUTO: 34.1 % (ref 34.8–46.1)
HGB BLD-MCNC: 10 G/DL (ref 11.5–15.4)
IMM GRANULOCYTES # BLD AUTO: 0.04 THOUSAND/UL (ref 0–0.2)
IMM GRANULOCYTES NFR BLD AUTO: 1 % (ref 0–2)
LYMPHOCYTES # BLD AUTO: 0.76 THOUSANDS/ΜL (ref 0.6–4.47)
LYMPHOCYTES NFR BLD AUTO: 16 % (ref 14–44)
MCH RBC QN AUTO: 26.9 PG (ref 26.8–34.3)
MCHC RBC AUTO-ENTMCNC: 29.3 G/DL (ref 31.4–37.4)
MCV RBC AUTO: 92 FL (ref 82–98)
MONOCYTES # BLD AUTO: 0.49 THOUSAND/ΜL (ref 0.17–1.22)
MONOCYTES NFR BLD AUTO: 11 % (ref 4–12)
NEUTROPHILS # BLD AUTO: 3.01 THOUSANDS/ΜL (ref 1.85–7.62)
NEUTS SEG NFR BLD AUTO: 64 % (ref 43–75)
NRBC BLD AUTO-RTO: 0 /100 WBCS
PLATELET # BLD AUTO: 244 THOUSANDS/UL (ref 149–390)
PMV BLD AUTO: 10.1 FL (ref 8.9–12.7)
RBC # BLD AUTO: 3.72 MILLION/UL (ref 3.81–5.12)
WBC # BLD AUTO: 4.65 THOUSAND/UL (ref 4.31–10.16)

## 2018-09-20 PROCEDURE — 93299 PR REM INTERROG ICPMS/SCRMS <30 D TECH REVIEW: CPT | Performed by: INTERNAL MEDICINE

## 2018-09-20 PROCEDURE — 36415 COLL VENOUS BLD VENIPUNCTURE: CPT

## 2018-09-20 PROCEDURE — 93298 REM INTERROG DEV EVAL SCRMS: CPT | Performed by: INTERNAL MEDICINE

## 2018-09-20 PROCEDURE — 85025 COMPLETE CBC W/AUTO DIFF WBC: CPT

## 2018-09-20 NOTE — PROGRESS NOTES
Results for orders placed or performed in visit on 09/20/18   Cardiac EP device report    Narrative    CARELINK TRANSMISSION: (LOOP) NO PATIENT OR DEVICE ACTIVATED EPISODES  NORMAL DEVICE FUNCTION  ---LYNN

## 2018-09-21 ENCOUNTER — TELEPHONE (OUTPATIENT)
Dept: CARDIOLOGY CLINIC | Facility: CLINIC | Age: 73
End: 2018-09-21

## 2018-09-21 NOTE — TELEPHONE ENCOUNTER
Spoke to PT, stated she would called back after she speaks to insurance co  to find out and equivalent to eliquis that she can afford

## 2018-09-24 ENCOUNTER — TELEPHONE (OUTPATIENT)
Dept: CARDIOLOGY CLINIC | Facility: CLINIC | Age: 73
End: 2018-09-24

## 2018-09-24 NOTE — TELEPHONE ENCOUNTER
Patient called insurance company  She states that the only alternative for her is warfarin  She would like to move forward with this if possible

## 2018-09-26 ENCOUNTER — OFFICE VISIT (OUTPATIENT)
Dept: HEMATOLOGY ONCOLOGY | Facility: MEDICAL CENTER | Age: 73
End: 2018-09-26
Payer: MEDICARE

## 2018-09-26 VITALS
WEIGHT: 206 LBS | OXYGEN SATURATION: 99 % | DIASTOLIC BLOOD PRESSURE: 70 MMHG | SYSTOLIC BLOOD PRESSURE: 146 MMHG | TEMPERATURE: 97.6 F | HEART RATE: 81 BPM | HEIGHT: 62 IN | BODY MASS INDEX: 37.91 KG/M2 | RESPIRATION RATE: 18 BRPM

## 2018-09-26 DIAGNOSIS — D63.8 ANEMIA IN OTHER CHRONIC DISEASES CLASSIFIED ELSEWHERE: Chronic | ICD-10-CM

## 2018-09-26 DIAGNOSIS — Z94.4 LIVER TRANSPLANTED (HCC): Primary | ICD-10-CM

## 2018-09-26 PROCEDURE — 99214 OFFICE O/P EST MOD 30 MIN: CPT | Performed by: INTERNAL MEDICINE

## 2018-09-26 NOTE — PROGRESS NOTES
Ashleigh Stephens [facility]  1945  [address] [facility] [place] [phone]    DISCUSSION  SUMMARY:    22-year-old female with history of anemia of chronic renal insufficiency/anemia of chronic disease (likely multiple etiologies)  Hemoglobin levels had previously been good/acceptable with RONNA treatment  Unfortunately patient recently suffered a CVA and MI  Patient is on aspirin and eliquis -cardiology and neurology follow-ups are ongoing  The hemoglobin level is lower as compared to when patient was on Procrit but is okay/acceptable  There has been no significant/persistent downward trend in the hemoglobin and hematocrit levels  Symptomatically patient seems to be stable  We rediscussed the serious potential cardiac, thrombolic and embolic complications of an erythroid stimulating agent - EPO can not be given  We also discussed what to monitor for in regard to progressive anemia  Patient will call the office if she has persistent or repetitive episodes of dizziness  In the future, if necessary, transfusions can be used to treat the anemia  Patient is to return in 4 months with a repeat CBC before  [facility] also has a follow-up with her liver transplant team pending  Patient knows to call the office if she has any other hematology questions or concerns  Carefully review your medication list and verify that the list is accurate and up-to-date  Please call the hematology/oncology office if there are medications missing from the list, medications on the list that you are not currently taking or if there is a dosage or instruction that is different from how you're taking that medication      Patient goals and areas of care:  Monitor hemoglobin level  Patient is able to self-care   _____________________________________________________________________________________    Chief Complaint   Patient presents with    Follow-up     Anemia History of Present Illness:    77-year-old female with history of anemia (anemia of chronic renal insufficiency/anemia of chronic disease previously on Procrit)  Mrs Luis Desouza was recently admitted to the hospital after her partner noticed she was garbling her words  Patient was found to have a CVA  Patient was also found to have a non-ST elevated MI  Mrs Luis Desouza did not have any chest pain or pressure or other cardiac symptoms  Patient also does not have any neurologic sequelae  Patient is now on aspirin and eliquis  Patient is also on Keppra  Mrs Luis Desouza is back for follow-up  Recently the plan has been observation  Patient states okay, fatigue is the same as before  Mrs Luis Desouza had 1 episode of dizziness this morning - resolved within 1 minute  Patient denies any problems with excessive bruising or bleeding  No fevers, chills or sweats  Mrs Luis Desouza states that her activities have been about the same as before  Patient is on CellCept and cyclosporine (liver transplant)- no recent issues  Appetite is +/-but no nausea vomiting  Patient denies any other GI,  or GYN issues  No shortness of breath at rest, mild dyspnea on exertion - same as before  Patient has a history of falls and recently completed her balance center course  No recent falls  Review of Systems   Constitutional: Positive for fatigue  HENT: Negative  Eyes: Negative  Respiratory: Negative  Cardiovascular: Negative  Gastrointestinal: Negative  Endocrine: Negative  Genitourinary: Negative  Musculoskeletal: Negative  Skin: Negative  Allergic/Immunologic: Negative  Neurological: Negative  Hematological: Negative  Psychiatric/Behavioral: Negative  All other systems reviewed and are negative       Patient Active Problem List   Diagnosis    Liver transplanted [facility] )    Hypothyroidism    Long-term use of immunosuppressant medication    Anemia    TIA (transient ischemic attack)    HLD (hyperlipidemia)    History of stroke    Paroxysmal SVT (supraventricular tachycardia) (HCC)    Altered mental status    [facility] to [facility]    Non-ST elevation myocardial infarction (NSTEMI), type 2    Abnormal EEG    Ataxic gait    Benign essential hypertension    Chronic joint pain    Chronic kidney insufficiency    Cyst of diaphragm    Depression    Elevated homocysteine (HCC)    Hypercalcemia    Lung nodule, solitary    Peripheral arterial disease (HCC)    Sciatica    [facility]cer[facility] complications [facility]    Asymptomatic stenosis of left carotid artery     Past Medical History:   Diagnosis Date    Anemia     Anxiety     Arthritis     Benign paroxysmal vertigo, unspecified ear     onset: 05/06/2010    [facility]     onset: 03/30/2005    Disease of thyroid gland     GERD (gastroesophageal reflux disease)     History of transfusion     Liver transplant candidate     onset: 09/16/2008    Osteoporosis     Pelvic fracture ([facility] )     onset: 10/14/2008     Past Surgical History:   Procedure Laterality Date    ABDOMINAL SURGERY      APPENDECTOMY      BLADDER AUGMENTATION N/A     CATARACT EXTRACTION      CHOLECYSTECTOMY N/A     COLONOSCOPY N/A 4/20/2017    Procedure: COLONOSCOPY;  Surgeon: Charbel Gonzalez MD;  Location: [place] [address] GI LAB; Service:     ESOPHAGOGASTRODUODENOSCOPY N/A 3/31/2016    Procedure: ESOPHAGOGASTRODUODENOSCOPY (EGD); Surgeon: Charbel Gonzalez MD;  Location: [place] [address] GI LAB; Service:     ESOPHAGOGASTRODUODENOSCOPY N/A 4/20/2017    Procedure: ESOPHAGOGASTRODUODENOSCOPY (EGD); Surgeon: Charbel Gonzalez MD;  Location: [place] [address] GI LAB;   Service:     EYE SURGERY      cataracts removed both eyes lens implant    HERNIA REPAIR      JOINT REPLACEMENT      left knee replacement    LIVER TRANSPLANTATION N/A     TONSILECTOMY AND ADNOIDECTOMY N/A     TUBAL LIGATION       Family History   Problem Relation Age of Onset    Cancer Mother         breast    Cancer Father     Crohn's disease Brother     Arthritis Family     Breast cancer Family      Social History     Social History    Marital status:      Spouse name: N/A    Number of children: N/A    Years of education: Completed 12th grade     Occupational History    Not on file       Social History Main Topics    Smoking status: Former Smoker     Years: 5 00     Quit date: 1/21/1975    Smokeless tobacco: Never Used      Comment: smoked for 5 years-1/2 to 1 ppd    Alcohol use No    Drug use: No    Sexual activity: No     Other Topics Concern    Not on file     Social History Narrative    Daily coffee consumption (1 cup/day)           Current Outpatient Prescriptions:     apixaban (ELIQUIS) 5 mg, Take 1 tablet (5 mg total) by mouth 2 (two) times a day, Disp: 60 tablet, Rfl: 3    aspirin (ECOTRIN LOW STRENGTH) 81 mg EC tablet, Take 1 tablet by mouth daily, Disp: 30 tablet, Rfl: 0    Calcium Carb-Cholecalciferol (OS-KENDRICK PO), Take by mouth daily  , Disp: , Rfl:     co-enzyme Q-10 50 MG capsule, Take 4 capsules (200 mg total) by mouth daily (Patient taking differently: Take 200 mg by mouth daily 1 daily ), Disp: 120 capsule, Rfl: 0    cycloSPORINE modified (NEORAL) 25 mg capsule, Take 50 mg by mouth 2 (two) times a day, Disp: , Rfl:     levETIRAcetam (KEPPRA) 500 mg tablet, Take 1 tablet (500 mg total) by mouth every 12 (twelve) hours, Disp: 180 tablet, Rfl: 1    levothyroxine 112 mcg tablet, TAKE 1 TABLET EVERY DAY AS DIRECTED, Disp: 90 tablet, Rfl: 3    MAGNESIUM OXIDE PO, Take by mouth daily  , Disp: , Rfl:     metoprolol tartrate (LOPRESSOR) 25 mg tablet, TAKE 1 & 1/2 (ONE & ONE-HALF) TABLETS BY MOUTH TWICE DAILY, Disp: 270 tablet, Rfl: 3    mycophenolate (CELLCEPT) 250 mg capsule, Take 250 mg by mouth 2 (two) times a day 3 tabs each dose=750 mg , Disp: , Rfl:     omeprazole (PriLOSEC) 40 MG capsule, Take 40 mg by mouth daily, Disp: , Rfl:     oxybutynin (DITROPAN) 5 mg tablet, Take 5 mg by mouth 2 (two) times a day  , Disp: , Rfl:     pravastatin (PRAVACHOL) 20 mg tablet, Take 1 tablet (20 mg total) by mouth daily at bedtime, Disp: 90 tablet, Rfl: 0    Allergies   Allergen Reactions    Tetracyclines & Related Hives    Vancomycin Other (See Comments) and Itching     Reaction Date: [id number]; Pt unsure of reaction    Prograf [Tacrolimus] Anxiety     Reaction Date: [id number];        Vitals:    09/26/18 1432   BP: 146/70   Pulse: 81   Resp: 18   Temp: 97 6 °F (36 4 °C)   SpO2: 99%     Physical Exam   Constitutional: She is oriented to person, place, and time  She appears well-developed and well-nourished  HENT:   Head: Normocephalic and atraumatic  Right Ear: External ear normal    Left Ear: External ear normal    Nose: Nose normal    Mouth/Throat: Oropharynx is clear and moist    Eyes: Conjunctivae and EOM are normal  Pupils are equal, round, and reactive to light  Neck: Normal range of motion  Neck supple  Cardiovascular: Normal rate, regular rhythm, normal heart sounds and intact distal pulses  Pulmonary/Chest: Effort normal and breath sounds normal    Abdominal: Soft  Bowel sounds are normal     Abdomen is obese, nontender, well-healed suture lines, +bowel sounds   Musculoskeletal: Normal range of motion  Neurological: She is alert and oriented to person, place, and time  She has normal reflexes  Skin: Skin is warm  Skin is slightly pale, warm, no petechiae, few upper extremity ecchymoses, good turgor, no active bleeding   Psychiatric: She has a normal mood and affect   Her behavior is normal  Judgment and thought content normal    Extremities: 1 +bilateral lower extremity edema, no cords, pulses are 1+  Lymphatics:  No adenopathy in the neck, supraclavicular region, axilla and groin bilaterally    Labs:    09/20/2018 WBC = 4 6 hemoglobin = 10 0 hematocrit = 34 1 MCV = 92 RDW = 13 7 platelet = 464 neutrophil = 64%    08/03/2018 BUN = 26 creatinine = 1 08 AST = 51 ALT = 43 alkaline phosphatase = 155 total bilirubin = 0 5 GFR = 51  06/15/2018 WBC = 5 52 hemoglobin = 10 8 hematocrit = 35 7 MCV = 91 platelet = 142 neutrophil = 70%  3/13/ 18 WBC = 5 65 hemoglobin = 11 3 hematocrit = 35 1 MCV = 87 platelet = 222 neutrophil = 60%    1/3/18 WBC = 5 3 hemoglobin = 11 1 hematocrit = 33 8 MCV = 87 platelets = 465 iron = 46 ferritin = 127 TIBC = 254 iron saturation = 18%   12/04/2017 WBC = 5 6 hemoglobin = 11 hematocrit = 33 7 MCV = 86 platelet = 139 neutrophil = 74%  11/8/17 hemoglobin = 10 9 hematocrit = 34   11/7/17 BUN = 12 creatinine = 0 92

## 2018-09-27 NOTE — TELEPHONE ENCOUNTER
Can we schedule this patient for a followup to discuss coumadin therapy with dr August Don?   Thank you

## 2018-09-27 NOTE — TELEPHONE ENCOUNTER
Can you let her know  I would like her to either have a follow-up with me or her primary to have a formal discussion and set her up with coumadin  In the meantime, we can give her samples of eliquis   We should not change her over to coumadin over the phone

## 2018-10-09 ENCOUNTER — TRANSCRIBE ORDERS (OUTPATIENT)
Dept: ADMINISTRATIVE | Facility: HOSPITAL | Age: 73
End: 2018-10-09

## 2018-10-09 ENCOUNTER — TELEPHONE (OUTPATIENT)
Dept: CARDIOLOGY CLINIC | Facility: CLINIC | Age: 73
End: 2018-10-09

## 2018-10-09 DIAGNOSIS — Z13.820 SCREENING FOR OSTEOPOROSIS: Primary | ICD-10-CM

## 2018-10-12 ENCOUNTER — TELEPHONE (OUTPATIENT)
Dept: CARDIOLOGY CLINIC | Facility: CLINIC | Age: 73
End: 2018-10-12

## 2018-10-12 NOTE — TELEPHONE ENCOUNTER
Called patient  She has one box left of Eliquis  Not enough to hold her over until appointment scheduled on 10/25th  Spoke with TOMY Elliott; patient aware will dispense 2 boxes of Eliquis  Pt will be there today for pick-up

## 2018-10-22 ENCOUNTER — HOSPITAL ENCOUNTER (OUTPATIENT)
Dept: RADIOLOGY | Facility: HOSPITAL | Age: 73
Discharge: HOME | End: 2018-10-22
Attending: INTERNAL MEDICINE
Payer: MEDICARE

## 2018-10-22 DIAGNOSIS — Z13.820 SCREENING FOR OSTEOPOROSIS: ICD-10-CM

## 2018-10-22 DIAGNOSIS — Z12.31 SCREENING MAMMOGRAM, ENCOUNTER FOR: ICD-10-CM

## 2018-10-22 PROCEDURE — 77067 SCR MAMMO BI INCL CAD: CPT

## 2018-10-22 PROCEDURE — 77080 DXA BONE DENSITY AXIAL: CPT

## 2018-10-30 ENCOUNTER — OFFICE VISIT (OUTPATIENT)
Dept: CARDIOLOGY CLINIC | Facility: CLINIC | Age: 73
End: 2018-10-30
Payer: MEDICARE

## 2018-10-30 VITALS
HEIGHT: 62 IN | SYSTOLIC BLOOD PRESSURE: 118 MMHG | WEIGHT: 205 LBS | OXYGEN SATURATION: 95 % | HEART RATE: 75 BPM | BODY MASS INDEX: 37.73 KG/M2 | DIASTOLIC BLOOD PRESSURE: 58 MMHG

## 2018-10-30 DIAGNOSIS — I10 ESSENTIAL HYPERTENSION: ICD-10-CM

## 2018-10-30 DIAGNOSIS — I48.0 PAROXYSMAL ATRIAL FIBRILLATION (HCC): Primary | ICD-10-CM

## 2018-10-30 PROCEDURE — 99214 OFFICE O/P EST MOD 30 MIN: CPT | Performed by: INTERNAL MEDICINE

## 2018-10-30 PROCEDURE — 93000 ELECTROCARDIOGRAM COMPLETE: CPT | Performed by: INTERNAL MEDICINE

## 2018-10-30 NOTE — PROGRESS NOTES
Cardiology Follow Up    Claudia Regalado [facility]  1945  [id number]  SANDEEP IZAGUIRRE [facility] [address]  [facility] [address] [id number]    Interval History: 78-year-old woman with a history of liver transplant, multiple CVA, nstemi likely typ2, chronic anemia receiving Procrit injections with recent prolonged hospitalization secondary to recurrent CVA  She has been placed on novel oral anticoagulation and has not had a further event  Her loop recorder was interrogated in the hospital and did not show any evidence of atrial fibrillation  She reports note trouble with her gait or unilateral weakness  She denies having any chest tightness  She denies having significant bleeding or bruising current the  She denies having any falls  Her hospitalization records were reviewed with      February 5, 2018:  She denies having significant bleeding or bruising on anticoagulation  She denies having any recurrence of unilateral weakness or speech difficulty  She denies having any chest pain  Her blood pressures been well controlled  4/27: She has felt dizzy since starting keppra  Still taking clopidogrel against medical advice  No chest pain  No arrhythmia  NO edema    10/30/2018: She has been compliant with Eliquis therapy without significant bleeding or bruising  She denies having any falls  She reports having difficulty with obtaining the medication as it is very expensive on her medical plan  No unilateral weakness  BP controlled      Patient Active Problem List   Diagnosis    Liver transplanted ([address] )    Hypothyroidism    Long-term use of immunosuppressant medication    Anemia    TIA (transient ischemic attack)    HLD (hyperlipidemia)    History of stroke    Paroxysmal SVT (supraventricular tachycardia) (HCC)    Altered mental status    Cerebrovascular accident (CVA) due to embolism ([address] )    Non-ST elevation myocardial infarction (NSTEMI), type 2    Abnormal EEG    Ataxic gait    Benign essential hypertension    Chronic joint pain    Chronic kidney insufficiency    Cyst of diaphragm    Depression    Elevated homocysteine (HCC)    Hypercalcemia    Lung nodule, solitary    Peripheral arterial disease (HCC)    Sciatica    Ulcerative colitis without complications (HCC)    Asymptomatic stenosis of left carotid artery     Past Medical History:   Diagnosis Date    Anemia     Anxiety     Arthritis     Benign paroxysmal vertigo, unspecified ear     onset: 05/06/2010    Cirrhosis ([place] [address] )     onset: 03/30/2005    Disease of thyroid gland     GERD (gastroesophageal reflux disease)     History of transfusion     Liver transplant candidate     onset: 09/16/2008    Osteoporosis     Pelvic fracture ([place] [address] )     onset: 10/14/2008     Social History     Social History    Marital status:      Spouse name: N/A    Number of children: N/A    Years of education: Completed 12th grade     Occupational History    Not on file  Social History Main Topics    Smoking status: Former Smoker     Years: 5 00     Quit date: 1/21/1975    Smokeless tobacco: Never Used      Comment: smoked for 5 years-1/2 to 1 ppd    Alcohol use No    Drug use: No    Sexual activity: No     Other Topics Concern    Not on file     Social History Narrative    Daily coffee consumption (1 cup/day)          Family History   Problem Relation Age of Onset    Cancer Mother         breast    Cancer Father     Crohn's disease Brother     Arthritis Family     Breast cancer Family      Past Surgical History:   Procedure Laterality Date    ABDOMINAL SURGERY      APPENDECTOMY      BLADDER AUGMENTATION N/A     CATARACT EXTRACTION      CHOLECYSTECTOMY N/A     COLONOSCOPY N/A 4/20/2017    Procedure: COLONOSCOPY;  Surgeon: Tommie Soler MD;  Location: [place] [address] GI LAB;   Service:     ESOPHAGOGASTRODUODENOSCOPY N/A 3/31/2016    Procedure: ESOPHAGOGASTRODUODENOSCOPY (EGD); Surgeon: Hayley Samayoa MD;  Location: [facility] GI LAB; Service:     ESOPHAGOGASTRODUODENOSCOPY N/A 4/20/2017    Procedure: ESOPHAGOGASTRODUODENOSCOPY (EGD); Surgeon: Hayley Samayoa MD;  Location: [facility] GI LAB; Service:     EYE SURGERY      cataracts removed both eyes lens implant    HERNIA REPAIR      JOINT REPLACEMENT      left knee replacement    LIVER TRANSPLANTATION N/A     TONSILECTOMY AND ADNOIDECTOMY N/A     TUBAL LIGATION         Current Outpatient Prescriptions:     apixaban (ELIQUIS) 5 mg, Take 1 tablet (5 mg total) by mouth 2 (two) times a day, Disp: 60 tablet, Rfl: 3    aspirin (ECOTRIN LOW STRENGTH) 81 mg EC tablet, Take 1 tablet by mouth daily, Disp: 30 tablet, Rfl: 0    Calcium Carb-Cholecalciferol (OS-KENDRICK PO), Take by mouth daily  , Disp: , Rfl:     cycloSPORINE modified (NEORAL) 25 mg capsule, Take 50 mg by mouth 2 (two) times a day, Disp: , Rfl:     levothyroxine 112 mcg tablet, TAKE 1 TABLET EVERY DAY AS DIRECTED, Disp: 90 tablet, Rfl: 3    metoprolol tartrate (LOPRESSOR) 25 mg tablet, TAKE 1 & 1/2 (ONE & ONE-HALF) TABLETS BY MOUTH TWICE DAILY, Disp: 270 tablet, Rfl: 3    mycophenolate (CELLCEPT) 250 mg capsule, Take 250 mg by mouth 2 (two) times a day 3 tabs each dose=750 mg , Disp: , Rfl:     omeprazole (PriLOSEC) 40 MG capsule, Take 40 mg by mouth daily, Disp: , Rfl:     oxybutynin (DITROPAN) 5 mg tablet, Take 5 mg by mouth 2 (two) times a day  , Disp: , Rfl:     pravastatin (PRAVACHOL) 20 mg tablet, Take 1 tablet (20 mg total) by mouth daily at bedtime, Disp: 90 tablet, Rfl: 0    levETIRAcetam (KEPPRA) 500 mg tablet, Take 1 tablet (500 mg total) by mouth every 12 (twelve) hours (Patient not taking: Reported on 10/30/2018 ), Disp: 180 tablet, Rfl: 1  Allergies   Allergen Reactions    Tetracyclines & Related Hives    Vancomycin Other (See Comments) and Itching     Reaction Date: [id number];    Pt unsure of reaction    Prograf [Tacrolimus] Anxiety     Reaction Date: [id number]; Review of Systems:  Review of Systems   Constitutional: Negative  Negative for activity change, appetite change, chills, diaphoresis, fatigue, fever and unexpected weight change  HENT: Negative  Negative for congestion, dental problem, drooling, ear discharge, ear pain, facial swelling, hearing loss, mouth sores, nosebleeds, postnasal drip, rhinorrhea, sinus pain, sinus pressure, sneezing, sore throat, tinnitus, trouble swallowing and voice change  Eyes: Negative  Negative for photophobia, pain, redness, itching and visual disturbance  Respiratory: Negative  Negative for apnea, cough, choking, chest tightness, shortness of breath, wheezing and stridor  Cardiovascular: Negative  Negative for chest pain, palpitations and leg swelling  Gastrointestinal: Negative  Negative for abdominal distention, abdominal pain, anal bleeding, blood in stool, constipation, diarrhea, nausea, rectal pain and vomiting  Endocrine: Negative  Negative for cold intolerance, heat intolerance, polydipsia, polyphagia and polyuria  Genitourinary: Negative  Negative for decreased urine volume, difficulty urinating, dyspareunia, dysuria, enuresis, flank pain, frequency, genital sores, hematuria, menstrual problem, pelvic pain, urgency, vaginal bleeding, vaginal discharge and vaginal pain  Musculoskeletal: Negative  Negative for arthralgias, back pain, gait problem, joint swelling, myalgias, neck pain and neck stiffness  Skin: Negative  Negative for color change, pallor, rash and wound  Allergic/Immunologic: Negative  Negative for environmental allergies, food allergies and immunocompromised state  Neurological: Negative for dizziness, tremors, seizures, syncope, facial asymmetry, speech difficulty, weakness, light-headedness, numbness and headaches  Hematological: Negative  Negative for adenopathy  Does not bruise/bleed easily  Psychiatric/Behavioral: Negative  Negative for agitation, behavioral problems, confusion, decreased concentration, dysphoric mood, hallucinations, self-injury, sleep disturbance and suicidal ideas  The patient is not nervous/anxious and is not hyperactive  All other systems reviewed and are negative  Vitals:    10/30/18 1355   BP: 118/58   BP Location: Right arm   Patient Position: Sitting   Cuff Size: Large   Pulse: 75   SpO2: 95%   Weight: 93 kg (205 lb)   Height: 5' 2" (1 575 m)     Physical Exam:  Physical Exam   Constitutional: She is oriented to person, place, and time  She appears well-developed and well-nourished  No distress  HENT:   Head: Normocephalic and atraumatic  Right Ear: External ear normal    Left Ear: External ear normal    Eyes: Pupils are equal, round, and reactive to light  Conjunctivae are normal  Right eye exhibits no discharge  Left eye exhibits no discharge  No scleral icterus  Neck: Normal range of motion  Neck supple  No JVD present  No tracheal deviation present  No thyromegaly present  Cardiovascular: Normal rate and regular rhythm  Exam reveals gallop  Exam reveals no friction rub  Murmur heard  Pulmonary/Chest: Effort normal and breath sounds normal  No stridor  No respiratory distress  She has no wheezes  She has no rales  She exhibits no tenderness  Abdominal: Soft  Bowel sounds are normal  She exhibits distension  She exhibits no mass  There is no tenderness  There is no rebound and no guarding  Musculoskeletal: Normal range of motion  She exhibits no edema, tenderness or deformity  Neurological: She is alert and oriented to person, place, and time  She has normal reflexes  No cranial nerve deficit  She exhibits normal muscle tone  Coordination normal    Skin: Skin is warm and dry  No rash noted  She is not diaphoretic  No erythema  No pallor  Psychiatric: She has a normal mood and affect   Her behavior is normal  Judgment and thought content normal    Nursing note and vitals reviewed  1  Paroxysmal atrial fibrillation (HCC)  POCT ECG   2  Essential hypertension  POCT ECG        Atrial fibrillation--continue Eliquis therapy plus metoprolol  She is high risk for recurrent CVA  Will try for financial assistance? They tried paper work  Metoprolol 1 5 tablet twice a day    CVA-continue Eliquis plus aspirin 81 mg with food    Hyperlipidemia-continue pravastatin statin 20 mg daily  Yearly lipid +liver function test    Liver transplant-continue immunosuppressants  Statin cleared by hepatology    Seizures- discussed with neurology about keppra side effects          Swati Bermudez  Please call with any questions or suggestions

## 2018-11-02 DIAGNOSIS — I48.21 PERMANENT ATRIAL FIBRILLATION (HCC): ICD-10-CM

## 2018-11-02 NOTE — TELEPHONE ENCOUNTER
S/w pt - made aware of the OOP costs needed; pt would like 90-day rx sent to [facility] - will pay full cost to try to get to the proper amount and will address this when she needs another refill at that time

## 2018-11-02 NOTE — TELEPHONE ENCOUNTER
Left v/m for pt to contact the office - pt will need $458 30 more to qualify for the [employer], based upon her income and OOP rx costs  If pt purchases Eliquis at $330, she will need $128 30; if this # is not met pt will not qualify

## 2018-11-11 NOTE — PROGRESS NOTES
Subjective:      Patient ID: Audrey Denis is a 74 y o  female  Chief Complaint   Patient presents with    Follow-up     4 month f/u prcma       She is here for follow up visit  She is very upset because she is having a problem with her memory  This has been ever since her stroke  Otherwise she feels well, follows regularly with GI for ulcerative colitis and with transplant medicine  Denies chest pain, dyspnea, headache, or new neurologic symptoms  The following portions of the patient's history were reviewed and updated as appropriate: allergies, current medications, past family history, past medical history, past social history, past surgical history and problem list     Review of Systems   Constitutional: Negative  Respiratory: Negative  Cardiovascular: Negative  Neurological:        Memory issues as above            Current Outpatient Prescriptions   Medication Sig Dispense Refill    apixaban (ELIQUIS) 5 mg Take 1 tablet (5 mg total) by mouth 2 (two) times a day 180 tablet 3    aspirin (ECOTRIN LOW STRENGTH) 81 mg EC tablet Take 1 tablet by mouth daily 30 tablet 0    Calcium Carb-Cholecalciferol (OS-KENDRICK PO) Take by mouth daily        cycloSPORINE modified (NEORAL) 25 mg capsule Take 50 mg by mouth 2 (two) times a day      levothyroxine 112 mcg tablet TAKE 1 TABLET EVERY DAY AS DIRECTED 90 tablet 3    metoprolol tartrate (LOPRESSOR) 25 mg tablet TAKE 1 & 1/2 (ONE & ONE-HALF) TABLETS BY MOUTH TWICE DAILY 270 tablet 3    mycophenolate (CELLCEPT) 250 mg capsule Take 250 mg by mouth 2 (two) times a day 3 tabs each dose=750 mg       omeprazole (PriLOSEC) 40 MG capsule Take 40 mg by mouth daily      oxybutynin (DITROPAN) 5 mg tablet Take 5 mg by mouth 2 (two) times a day        pravastatin (PRAVACHOL) 20 mg tablet Take 1 tablet (20 mg total) by mouth daily at bedtime 90 tablet 0    levETIRAcetam (KEPPRA) 500 mg tablet Take 1 tablet (500 mg total) by mouth every 12 (twelve) hours (Patient not taking: Reported on 10/30/2018 ) 180 tablet 1     No current facility-administered medications for this visit  Objective:    /70   Pulse 76   Temp 98 1 °F (36 7 °C)   Resp 16   Ht 5' 2" (1 575 m)   Wt 92 5 kg (204 lb)   BMI 37 31 kg/m²        Physical Exam   Constitutional: She is oriented to person, place, and time  She appears well-developed and well-nourished  Eyes: Conjunctivae are normal    Neck: Neck supple  No JVD present  No thyromegaly present  Cardiovascular: Normal rate, regular rhythm, normal heart sounds and intact distal pulses  Exam reveals no gallop and no friction rub  No murmur heard  Pulmonary/Chest: Effort normal and breath sounds normal  She has no wheezes  She has no rales  Abdominal: Soft  Bowel sounds are normal  She exhibits no distension  There is no tenderness  Musculoskeletal: She exhibits no edema  Neurological: She is alert and oriented to person, place, and time  She has normal reflexes  Assessment/Plan:    Ulcerative colitis without complications ([place] [address] )  She sees GI regularly and her UC is well controlled  Liver transplanted ([place] [address] )  Continues with immune suppressants and is up to date with transplant follow up  Cerebrovascular accident (CVA) due to embolism [facility])  She is having some memory issues since the stroke  We discussed possible medications for memory loss but she opts not to  Discussed need for control of other risk factors, need for physical and mental activity  Continue aspirin and eliquis  Benign essential hypertension  Stable on current medications, will continue  Anemia  She continues follow up with hematology; this is stable          Diagnoses and all orders for this visit:    Liver transplanted ([place] [address] )    Ulcerative colitis without complications, unspecified location [facility])    Benign essential hypertension    Anemia in other chronic diseases classified elsewhere    Cerebrovascular accident (CVA) due to embolism of cerebral artery [address] [address] )    Memory loss          Return in about 3 months (around 2/12/2019)         Olga Alvarenga MD

## 2018-11-12 ENCOUNTER — OFFICE VISIT (OUTPATIENT)
Dept: FAMILY MEDICINE CLINIC | Facility: CLINIC | Age: 73
End: 2018-11-12
Payer: MEDICARE

## 2018-11-12 VITALS
RESPIRATION RATE: 16 BRPM | HEART RATE: 76 BPM | HEIGHT: 62 IN | WEIGHT: 204 LBS | SYSTOLIC BLOOD PRESSURE: 110 MMHG | BODY MASS INDEX: 37.54 KG/M2 | DIASTOLIC BLOOD PRESSURE: 70 MMHG | TEMPERATURE: 98.1 F

## 2018-11-12 DIAGNOSIS — K51.90 ULCERATIVE COLITIS WITHOUT COMPLICATIONS, UNSPECIFIED LOCATION (HCC): ICD-10-CM

## 2018-11-12 DIAGNOSIS — I63.40 CEREBROVASCULAR ACCIDENT (CVA) DUE TO EMBOLISM OF CEREBRAL ARTERY (HCC): ICD-10-CM

## 2018-11-12 DIAGNOSIS — Z94.4 LIVER TRANSPLANTED (HCC): Primary | ICD-10-CM

## 2018-11-12 DIAGNOSIS — R41.3 MEMORY LOSS: ICD-10-CM

## 2018-11-12 DIAGNOSIS — D63.8 ANEMIA IN OTHER CHRONIC DISEASES CLASSIFIED ELSEWHERE: Chronic | ICD-10-CM

## 2018-11-12 DIAGNOSIS — I10 BENIGN ESSENTIAL HYPERTENSION: ICD-10-CM

## 2018-11-12 PROCEDURE — 99214 OFFICE O/P EST MOD 30 MIN: CPT | Performed by: INTERNAL MEDICINE

## 2018-11-12 NOTE — ASSESSMENT & PLAN NOTE
She is having some memory issues since the stroke  We discussed possible medications for memory loss but she opts not to  Discussed need for control of other risk factors, need for physical and mental activity  Continue aspirin and eliquis

## 2018-11-14 ENCOUNTER — TELEPHONE (OUTPATIENT)
Dept: NEUROLOGY | Facility: CLINIC | Age: 73
End: 2018-11-14

## 2018-11-14 NOTE — TELEPHONE ENCOUNTER
Pt states that she just spoke w/ someone in our office this morning but I don't see any encounter  Pt c/o increased confusion "I don't remember things from day to day"  Saw PCP yesterday, she was that told that she was doing fine and nothing can be done w/ her memory issues  Asking if she should make an appt w/ you? Last office visit 7/19/18  Pls advise             [phone]

## 2018-11-15 ENCOUNTER — TELEPHONE (OUTPATIENT)
Dept: CARDIOLOGY CLINIC | Facility: CLINIC | Age: 73
End: 2018-11-15

## 2018-11-15 NOTE — TELEPHONE ENCOUNTER
This sounds somewhat out of character for her    At the time of her last visit with me it looks like she just had intermittent confusion  She does have some spike activity on her EEG so there may be some seizure risk as well    Please see if she feels that this is different than it was this past summer   and if so, is it suddenly much worse or has it been a gradual decline? If it has been a gradual decline then we should have her seen in the office (possibly memory clinic as that is her main complaint?)      If this is a sudden and persistent change recently she should potentially go to the hospital/ER

## 2018-11-15 NOTE — TELEPHONE ENCOUNTER
Please call patient regarding Eliquis needs refill but saying cost is over $300 a month asked her if she tried to get assistance  Not sure sees dr Dave Holman

## 2018-11-15 NOTE — TELEPHONE ENCOUNTER
See previous notes - pt cannot qualify until her out of pocket expenses are met - pt has been made aware of this  Please contact pt

## 2018-11-29 DIAGNOSIS — I48.21 PERMANENT ATRIAL FIBRILLATION (HCC): ICD-10-CM

## 2018-12-03 ENCOUNTER — TELEPHONE (OUTPATIENT)
Dept: CARDIOLOGY CLINIC | Facility: CLINIC | Age: 73
End: 2018-12-03

## 2018-12-03 NOTE — TELEPHONE ENCOUNTER
SPOKE TO SHRUTHI IN REGARDS TO HER ELIQUIS  She is confused, doesn't know what to do as far as her blood thinner goes  Is there any way you can give her a call? She is not able to qualify for less expensive eliquis, There was talk about warfarin prior but she doesn't know what to do

## 2018-12-03 NOTE — TELEPHONE ENCOUNTER
Were we able to try and see if see qualifies for preascription assistance from [facility]?

## 2018-12-12 ENCOUNTER — TELEPHONE (OUTPATIENT)
Dept: CARDIOLOGY CLINIC | Facility: CLINIC | Age: 73
End: 2018-12-12

## 2018-12-12 NOTE — TELEPHONE ENCOUNTER
S/w patient in regards to her eliquis    Dr Petty Hoffman spoke to [facility] and they specifically gave samples for Sidney Diaz

## 2018-12-13 ENCOUNTER — TELEPHONE (OUTPATIENT)
Dept: CARDIOLOGY CLINIC | Facility: CLINIC | Age: 73
End: 2018-12-13

## 2018-12-20 ENCOUNTER — REMOTE DEVICE CLINIC VISIT (OUTPATIENT)
Dept: CARDIOLOGY CLINIC | Facility: CLINIC | Age: 73
End: 2018-12-20
Payer: MEDICARE

## 2018-12-20 DIAGNOSIS — Z86.73 PERSONAL HISTORY OF TIA (TRANSIENT ISCHEMIC ATTACK): Primary | ICD-10-CM

## 2018-12-20 DIAGNOSIS — Z95.818 PRESENCE OF CARDIAC DEVICE: ICD-10-CM

## 2018-12-20 PROCEDURE — 93299 PR REM INTERROG ICPMS/SCRMS <30 D TECH REVIEW: CPT | Performed by: INTERNAL MEDICINE

## 2018-12-20 PROCEDURE — 93298 REM INTERROG DEV EVAL SCRMS: CPT | Performed by: INTERNAL MEDICINE

## 2018-12-21 NOTE — PROGRESS NOTES
Results for orders placed or performed in visit on 12/20/18   Cardiac EP device report    Narrative    CARELINK TRANSMISSION: (LOOP) NO PATIENT OR DEVICE ACTIVATED EPISODES  NORMAL DEVICE FUNCTION  ---LYNN

## 2019-01-03 ENCOUNTER — OFFICE VISIT (OUTPATIENT)
Dept: NEUROLOGY | Facility: CLINIC | Age: 74
End: 2019-01-03
Payer: MEDICARE

## 2019-01-03 VITALS
BODY MASS INDEX: 38.64 KG/M2 | HEIGHT: 62 IN | DIASTOLIC BLOOD PRESSURE: 74 MMHG | SYSTOLIC BLOOD PRESSURE: 124 MMHG | WEIGHT: 210 LBS

## 2019-01-03 DIAGNOSIS — Z87.898 HISTORY OF SEIZURE: ICD-10-CM

## 2019-01-03 DIAGNOSIS — Z86.73 HISTORY OF STROKE: ICD-10-CM

## 2019-01-03 DIAGNOSIS — F01.50 VASCULAR DEMENTIA WITHOUT BEHAVIORAL DISTURBANCE (HCC): Primary | ICD-10-CM

## 2019-01-03 PROBLEM — F03.90 DEMENTIA (HCC): Status: ACTIVE | Noted: 2018-11-12

## 2019-01-03 PROCEDURE — 99215 OFFICE O/P EST HI 40 MIN: CPT | Performed by: PSYCHIATRY & NEUROLOGY

## 2019-01-03 NOTE — LETTER
January 3, 2019     Any Frias MD  [address]  [address] [place] [address]    Patient: Leif Elder   YOB: 1945   Date of Visit: 1/3/2019       Dear Dr Remy Bucio:    Thank you for referring Kelly Bardales to me for evaluation  Below are my notes for this consultation  If you have questions, please do not hesitate to call me  I look forward to following your patient along with you  Sincerely,        Jas Muller MD        CC: No Recipients  Jas Muller MD  1/3/2019  2:55 PM  Sign at close encounter  Assessment/Plan:    Dementia  The patient is a 70-year-old woman with past medical history significant for vascular risk factors, multiple prior strokes, seizures and advanced subcortical white matter disease who presents for evaluation progressive memory loss, starting at least 3 years ago  The patient struggled on bedside testing today scoring a 14/30 on the MoCA (13+1)  Her memory dysfunction is significant enough to impair her day-to-day functioning  There are several potential explanations for patient's memory dysfunction  Certainly this may be vascular dementia 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  Her decline has been insidious without abrupt onset or stepwise progression  The patient may have co-pathology with Alzheimer's disease, likely given her age  Neuropsych testing may be helpful to disentangle the two  The patient did have several micro bleeds on her MRI and may be suffering from cerebral amyloid angiopathy (with or without AD pathology)  The micro bleeds may be related to chronic hypertension though the patient denies that HTN has been a significant issue  Finally the patient has a history of seizures and does not appear to be on Keppra any longer, unclear reasons  Recommendations:   - consider an alternative for oxybutynin as this may be worsening the patient's cognition   - EEG - will consider restarting keppra depending on the results     - Neuropsych testing - will consider starting aricept if there is evidence for neurodegenerative copathology   - I defer to vascular neurology and cardiology regarding anticoagulation management given stroke history and microbleeds on MRI     - Next will consider MRI brain with NQ to look for evolution of microbleeds vs medial temporal atrophy  Diagnoses and all orders for this visit:    Vascular dementia without behavioral disturbance  -     Ambulatory referral to Neuropsychology; Future    History of seizure  -     EEG Routine and awake; Future        Total time spent today 50 minutes  Greater than 50% of total time was spent with the patient and / or family counseling and / or coordination of care      Subjective:     Patient ID: Joe Watts is a 74 y o  female  I had the pleasure of seeing your patient, Joe Watts in the [facility] at the [address] [place] [address],[address] for Neuroscience  Beckynilton John is a 74year old woman with past history significant for liver transplant on chronic immunosuppressive therapy, chronic anemia, hypertension, multiple embolic strokes and prior seizures on keppra who presents for evaluation of new memory concerns  MRI of the brain shows advanced white matter disease and some scattered microbleeds  The patient and her  believe that her memory troubles started 2-3 years ago  They are unsure as to when her 1st TIA or stroke was but believe it was somewhere around 2015 her 2016  Since starting on Eliquis she denies any recurrent stroke-like episodes  The patient does have a history of seizures and Keppra has been on medication list however the medication list provided by the patient does not include Keppra  She is unclear as to why this would have been removed but also cannot remember started the [address]  Patient's  does feel that her memory troubles are somewhat paroxysmal, with good days and bad days    She denies any TLE type symptoms such as rising gastric sensation, out of body experience, Yeni Tovar, ect  The patient denies a history of learning her memory problems when she was a kid  She always did well in school  She worked as a  for more than 20 years at [employer]  She is currently having difficulty with the finances  She can no longer balance her checkbook or pay the bills reliably  No significant history of head trauma, 1 fall with head strike  The patient is no longer driving, mostly due to feeling frightened  She does get confused about directions while in the car with her   Her story recall is overall good  No tendency to repeat herself  No dangerous memory lapses such as leaving appliances on  No hallucinations, paranoia or wandering  The patient reports that her mood is overall okay  She does get snap be with her  which is new  She has had days where she feels quite depressed and thinks she would rather not be live anymore  No active SI  Most days she does not feel this type of depression  Sleep is fragmented due to nocturia  She snores occasionally, but not consistently  Denies alcohol or drug abuse  She lives at home with her long-term partner  Her daughter is her power of        Gait: no falls   Help with finances: yes  Help with taking medication: no   Help preparing meals: yes  Help with eating: no   Hallucination, delusions, paranoia: no   Help with bathing and showering: no   Help with dressing: no   Help with toileting: no   Help with house hold tasks: yes       The following portions of the patient's history were reviewed and updated as appropriate: allergies, current medications, past family history, past medical history, past social history, past surgical history and problem list       Objective:      /74 (BP Location: Left arm, Patient Position: Sitting, Cuff Size: Standard)   Ht 5' 2" (1 575 m)   Wt 95 3 kg (210 lb)   BMI 38 41 kg/m²      Last EEG was 2017  I reviewed the patient's prior MRI which demonstrates Leukoaraiosis and several micro hemorrhages        Physical Exam    Neurological Exam    GENERAL MEDICAL EXAMINATION:  General appearance: alert, in no apparent distress  Appropriately dressed and groomed  Conversing and interacting appropriately  Eyes: Sclera are non-injected  Ears, nose, Mouth, Throat: Mucous membranes are moist    Resp: Breathing comfortably on RA   Musculoskeletal: No evidence of deformities  No contractures  No Edema  Skin: No visible rashes  Warm and well perfused  Psych: normal and appropriate affect     Mental Status:  Alert and oriented to person place and time  Able to relate history without difficulty  Attentive to conversation  Language is fluent and appropriate with normal prosody  There were no paraphasic errors  Speech was not dysarthric  Able to follow both midline and appendicular commands  MoCA: 14/30 (13+1)  Visuospatial/executive: 1/5  Naming: 3/3  Attention: 3/6  Language: 1/3  Abstraction: 0/2   Memory: 0/5 (3 additional with category; 2 with list)   Orientation: 5/6    Phonemic verbal fluency: 10 F words    General Neurology examination:   EOMI, Face activates symmetrically, subtle attenuation of the left nasolabial fold  Normal bulk and tone throughout  Patient moves all 4 extremities equally and antigravity  No pronator drift  There were no adventitious movements noted  Finger to nose without dysmetria bilaterally  Slight intention tremor on the right  KEENAN were accurate and symmetric with regard to ricardo and speed  Gait:   Able to rise from a chair without the use of arms  Slightly wide based and teetering  Short stride length  Ok arm swign  Review of Systems   Constitutional: Negative  Negative for appetite change and fever  HENT: Negative  Negative for hearing loss, tinnitus, trouble swallowing and voice change  Eyes: Negative  Negative for photophobia and pain     Respiratory: Negative  Negative for shortness of breath  Cardiovascular: Negative  Negative for palpitations  Gastrointestinal: Negative  Negative for nausea and vomiting  Endocrine: Negative  Negative for cold intolerance and heat intolerance  Genitourinary: Negative  Negative for dysuria, frequency and urgency  Musculoskeletal: Negative  Negative for myalgias and neck pain  Skin: Negative  Negative for rash  Neurological: Negative  Negative for dizziness, tremors, seizures, syncope, facial asymmetry, speech difficulty, weakness, light-headedness, numbness and headaches  Memory problems   Hematological: Negative  Does not bruise/bleed easily  Psychiatric/Behavioral: Positive for confusion  Negative for hallucinations and sleep disturbance  The above ROS was reviewed and updated       Victor Manuel Quesada MD  Medical Director   [facility]  Movement and Memory Specialist       Current Outpatient Prescriptions on File Prior to Visit   Medication Sig Dispense Refill    apixaban (ELIQUIS) 5 mg Take 1 tablet (5 mg total) by mouth 2 (two) times a day 180 tablet 3    aspirin (ECOTRIN LOW STRENGTH) 81 mg EC tablet Take 1 tablet by mouth daily 30 tablet 0    Calcium Carb-Cholecalciferol (OS-KENDRICK PO) Take by mouth daily        cycloSPORINE modified (NEORAL) 25 mg capsule Take 50 mg by mouth 2 (two) times a day      levothyroxine 112 mcg tablet TAKE 1 TABLET EVERY DAY AS DIRECTED 90 tablet 3    metoprolol tartrate (LOPRESSOR) 25 mg tablet TAKE 1 & 1/2 (ONE & ONE-HALF) TABLETS BY MOUTH TWICE DAILY 270 tablet 3    mycophenolate (CELLCEPT) 250 mg capsule Take 250 mg by mouth 2 (two) times a day 3 tabs each dose=750 mg       omeprazole (PriLOSEC) 40 MG capsule Take 40 mg by mouth daily      oxybutynin (DITROPAN) 5 mg tablet Take 5 mg by mouth 2 (two) times a day        pravastatin (PRAVACHOL) 20 mg tablet Take 1 tablet (20 mg total) by mouth daily at bedtime 90 tablet 0    levETIRAcetam (KEPPRA) 500 mg tablet Take 1 tablet (500 mg total) by mouth every 12 (twelve) hours 180 tablet 1     No current facility-administered medications on file prior to visit

## 2019-01-03 NOTE — ASSESSMENT & PLAN NOTE
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  Her decline has been insidious without abrupt onset or stepwise progression  The patient may have co-pathology with Alzheimer's disease, likely given her age  Neuropsych testing may be helpful to disentangle the two  The patient did have several micro bleeds on her MRI and may be suffering from cerebral amyloid angiopathy (with or without AD pathology)  The micro bleeds may be related to chronic hypertension though the patient denies that HTN has been a significant issue  Finally the patient has a history of seizures and does not appear to be on Keppra any longer, unclear reasons  Recommendations:   - consider an alternative for oxybutynin as this may be worsening the patient's cognition   - EEG - will consider restarting keppra depending on the results  - Neuropsych testing - will consider starting aricept if there is evidence for neurodegenerative copathology   - I defer to vascular neurology and cardiology regarding anticoagulation management given stroke history and microbleeds on MRI     - Next will consider MRI brain with NQ to look for evolution of microbleeds vs medial temporal atrophy

## 2019-01-03 NOTE — PROGRESS NOTES
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  Her decline has been insidious without abrupt onset or stepwise progression  The patient may have co-pathology with Alzheimer's disease, likely given her age  Neuropsych testing may be helpful to disentangle the two  The patient did have several micro bleeds on her MRI and may be suffering from cerebral amyloid angiopathy (with or without AD pathology)  The micro bleeds may be related to chronic hypertension though the patient denies that HTN has been a significant issue  Finally the patient has a history of seizures and does not appear to be on Keppra any longer, unclear reasons  Recommendations:   - consider an alternative for oxybutynin as this may be worsening the patient's cognition   - EEG - will consider restarting keppra depending on the results  - Neuropsych testing - will consider starting aricept if there is evidence for neurodegenerative copathology   - I defer to vascular neurology and cardiology regarding anticoagulation management given stroke history and microbleeds on MRI     - Next will consider MRI brain with NQ to look for evolution of microbleeds vs medial temporal atrophy  Diagnoses and all orders for this visit:    Vascular dementia without behavioral disturbance  -     Ambulatory referral to Neuropsychology;  Future    History of seizure  -     EEG Routine and awake; Future        Total time spent today 50 minutes  Greater than 50% of total time was spent with the patient and / or family counseling and / or coordination of care      Subjective:     Patient ID: Glenn Blevins is a 74 y o  female  I had the pleasure of seeing your patient, Glenn Blevins in the [facility] at the [address] [place] [address],[address] for Neuroscience  Zuleima Rincon is a 74year old woman with past history significant for liver transplant on chronic immunosuppressive therapy, chronic anemia, hypertension, multiple embolic strokes and prior seizures on keppra who presents for evaluation of new memory concerns  MRI of the brain shows advanced white matter disease and some scattered microbleeds  The patient and her  believe that her memory troubles started 2-3 years ago  They are unsure as to when her 1st TIA or stroke was but believe it was somewhere around 2015 her 2016  Since starting on Eliquis she denies any recurrent stroke-like episodes  The patient does have a history of seizures and Keppra has been on medication list however the medication list provided by the patient does not include Keppra  She is unclear as to why this would have been removed but also cannot remember started the [address]  Patient's  does feel that her memory troubles are somewhat paroxysmal, with good days and bad days  She denies any TLE type symptoms such as rising gastric sensation, out of body experience, Yeni Vu, ect  The patient denies a history of learning her memory problems when she was a kid  She always did well in school  She worked as a  for more than 20 years at [employer]  She is currently having difficulty with the finances  She can no longer balance her checkbook or pay the bills reliably  No significant history of head trauma, 1 fall with head strike  The patient is no longer driving, mostly due to feeling frightened    She does get confused about directions while in the car with her   Her story recall is overall good  No tendency to repeat herself  No dangerous memory lapses such as leaving appliances on  No hallucinations, paranoia or wandering  The patient reports that her mood is overall okay  She does get snap be with her  which is new  She has had days where she feels quite depressed and thinks she would rather not be live anymore  No active SI  Most days she does not feel this type of depression  Sleep is fragmented due to nocturia  She snores occasionally, but not consistently  Denies alcohol or drug abuse  She lives at home with her long-term partner  Her daughter is her power of   Gait: no falls   Help with finances: yes  Help with taking medication: no   Help preparing meals: yes  Help with eating: no   Hallucination, delusions, paranoia: no   Help with bathing and showering: no   Help with dressing: no   Help with toileting: no   Help with house hold tasks: yes       The following portions of the patient's history were reviewed and updated as appropriate: allergies, current medications, past family history, past medical history, past social history, past surgical history and problem list       Objective:      /74 (BP Location: Left arm, Patient Position: Sitting, Cuff Size: Standard)   Ht 5' 2" (1 575 m)   Wt 95 3 kg (210 lb)   BMI 38 41 kg/m²     Last EEG was 2017  I reviewed the patient's prior MRI which demonstrates Leukoaraiosis and several micro hemorrhages        Physical Exam    Neurological Exam    GENERAL MEDICAL EXAMINATION:  General appearance: alert, in no apparent distress  Appropriately dressed and groomed  Conversing and interacting appropriately  Eyes: Sclera are non-injected  Ears, nose, Mouth, Throat: Mucous membranes are moist    Resp: Breathing comfortably on RA   Musculoskeletal: No evidence of deformities  No contractures  No Edema  Skin: No visible rashes   Warm and well perfused  Psych: normal and appropriate affect     Mental Status:  Alert and oriented to person place and time  Able to relate history without difficulty  Attentive to conversation  Language is fluent and appropriate with normal prosody  There were no paraphasic errors  Speech was not dysarthric  Able to follow both midline and appendicular commands  MoCA: 14/30 (13+1)  Visuospatial/executive: 1/5  Naming: 3/3  Attention: 3/6  Language: 1/3  Abstraction: 0/2   Memory: 0/5 (3 additional with category; 2 with list)   Orientation: 5/6    Phonemic verbal fluency: 10 F words    General Neurology examination:   EOMI, Face activates symmetrically, subtle attenuation of the left nasolabial fold  Normal bulk and tone throughout  Patient moves all 4 extremities equally and antigravity  No pronator drift  There were no adventitious movements noted  Finger to nose without dysmetria bilaterally  Slight intention tremor on the right  KEENAN were accurate and symmetric with regard to ricardo and speed  Gait:   Able to rise from a chair without the use of arms  Slightly wide based and teetering  Short stride length  Ok arm swign  Review of Systems   Constitutional: Negative  Negative for appetite change and fever  HENT: Negative  Negative for hearing loss, tinnitus, trouble swallowing and voice change  Eyes: Negative  Negative for photophobia and pain  Respiratory: Negative  Negative for shortness of breath  Cardiovascular: Negative  Negative for palpitations  Gastrointestinal: Negative  Negative for nausea and vomiting  Endocrine: Negative  Negative for cold intolerance and heat intolerance  Genitourinary: Negative  Negative for dysuria, frequency and urgency  Musculoskeletal: Negative  Negative for myalgias and neck pain  Skin: Negative  Negative for rash  Neurological: Negative    Negative for dizziness, tremors, seizures, syncope, facial asymmetry, speech difficulty, weakness, light-headedness, numbness and headaches  Memory problems   Hematological: Negative  Does not bruise/bleed easily  Psychiatric/Behavioral: Positive for confusion  Negative for hallucinations and sleep disturbance  The above ROS was reviewed and updated  Victor Manuel Quesdaa MD  Medical Director   [facility]  Movement and Memory Specialist       Current Outpatient Prescriptions on File Prior to Visit   Medication Sig Dispense Refill    apixaban (ELIQUIS) 5 mg Take 1 tablet (5 mg total) by mouth 2 (two) times a day 180 tablet 3    aspirin (ECOTRIN LOW STRENGTH) 81 mg EC tablet Take 1 tablet by mouth daily 30 tablet 0    Calcium Carb-Cholecalciferol (OS-KENDRICK PO) Take by mouth daily        cycloSPORINE modified (NEORAL) 25 mg capsule Take 50 mg by mouth 2 (two) times a day      levothyroxine 112 mcg tablet TAKE 1 TABLET EVERY DAY AS DIRECTED 90 tablet 3    metoprolol tartrate (LOPRESSOR) 25 mg tablet TAKE 1 & 1/2 (ONE & ONE-HALF) TABLETS BY MOUTH TWICE DAILY 270 tablet 3    mycophenolate (CELLCEPT) 250 mg capsule Take 250 mg by mouth 2 (two) times a day 3 tabs each dose=750 mg       omeprazole (PriLOSEC) 40 MG capsule Take 40 mg by mouth daily      oxybutynin (DITROPAN) 5 mg tablet Take 5 mg by mouth 2 (two) times a day        pravastatin (PRAVACHOL) 20 mg tablet Take 1 tablet (20 mg total) by mouth daily at bedtime 90 tablet 0    levETIRAcetam (KEPPRA) 500 mg tablet Take 1 tablet (500 mg total) by mouth every 12 (twelve) hours 180 tablet 1     No current facility-administered medications on file prior to visit

## 2019-01-03 NOTE — PATIENT INSTRUCTIONS
We will get an EEG and formal thinking and memory testing called "neuropsych testing"  Please come back to see me after the neuropsych testing

## 2019-01-09 DIAGNOSIS — R56.9 SEIZURES (HCC): ICD-10-CM

## 2019-01-09 RX ORDER — LEVETIRACETAM 500 MG/1
TABLET ORAL
Qty: 180 TABLET | Refills: 1 | Status: SHIPPED | OUTPATIENT
Start: 2019-01-09 | End: 2019-07-13 | Stop reason: SDUPTHER

## 2019-01-16 ENCOUNTER — HOSPITAL ENCOUNTER (OUTPATIENT)
Dept: NEUROLOGY | Facility: HOSPITAL | Age: 74
Discharge: HOME | End: 2019-01-16
Attending: PSYCHIATRY & NEUROLOGY
Payer: MEDICARE

## 2019-01-16 DIAGNOSIS — Z87.898 HISTORY OF SEIZURE: ICD-10-CM

## 2019-01-16 PROCEDURE — 95816 EEG AWAKE AND DROWSY: CPT

## 2019-01-16 PROCEDURE — 95812 EEG 41-60 MINUTES: CPT | Performed by: PSYCHIATRY & NEUROLOGY

## 2019-01-21 ENCOUNTER — TELEPHONE (OUTPATIENT)
Dept: NEUROLOGY | Facility: CLINIC | Age: 74
End: 2019-01-21

## 2019-01-21 NOTE — TELEPHONE ENCOUNTER
Patient called in regarding EEG results from last week  I did review impression with patient  I noted the result was reviewed by Dr Christoph Vanegas  Please advise of any recommendations or further interpretation

## 2019-01-21 NOTE — TELEPHONE ENCOUNTER
I looks like she refilled the keppra, I think that it makes sense to restart this  We could get a repeat MRI which we are waiting for her neuropsych testing

## 2019-01-23 NOTE — TELEPHONE ENCOUNTER
Pt seems very frazzled & upset on the phone  Is agreeable to keppra, does not seem that she has started it yet but she is unsure  Agreeable to MRI

## 2019-01-24 DIAGNOSIS — F01.50 VASCULAR DEMENTIA WITHOUT BEHAVIORAL DISTURBANCE (HCC): Primary | ICD-10-CM

## 2019-01-29 ENCOUNTER — APPOINTMENT (OUTPATIENT)
Dept: LAB | Facility: HOSPITAL | Age: 74
End: 2019-01-29
Attending: INTERNAL MEDICINE
Payer: MEDICARE

## 2019-01-29 ENCOUNTER — TELEPHONE (OUTPATIENT)
Dept: HEMATOLOGY ONCOLOGY | Facility: MEDICAL CENTER | Age: 74
End: 2019-01-29

## 2019-01-29 ENCOUNTER — TRANSCRIBE ORDERS (OUTPATIENT)
Dept: ADMINISTRATIVE | Facility: HOSPITAL | Age: 74
End: 2019-01-29

## 2019-01-29 DIAGNOSIS — D63.8 ANEMIA IN OTHER CHRONIC DISEASES CLASSIFIED ELSEWHERE: Chronic | ICD-10-CM

## 2019-01-29 LAB
BASOPHILS # BLD AUTO: 0.05 THOUSANDS/ΜL (ref 0–0.1)
BASOPHILS NFR BLD AUTO: 1 % (ref 0–1)
EOSINOPHIL # BLD AUTO: 0.38 THOUSAND/ΜL (ref 0–0.61)
EOSINOPHIL NFR BLD AUTO: 6 % (ref 0–6)
ERYTHROCYTE [DISTWIDTH] IN BLOOD BY AUTOMATED COUNT: 14.6 % (ref 11.6–15.1)
HCT VFR BLD AUTO: 38.1 % (ref 34.8–46.1)
HGB BLD-MCNC: 11.3 G/DL (ref 11.5–15.4)
IMM GRANULOCYTES # BLD AUTO: 0.07 THOUSAND/UL (ref 0–0.2)
IMM GRANULOCYTES NFR BLD AUTO: 1 % (ref 0–2)
LYMPHOCYTES # BLD AUTO: 0.74 THOUSANDS/ΜL (ref 0.6–4.47)
LYMPHOCYTES NFR BLD AUTO: 12 % (ref 14–44)
MCH RBC QN AUTO: 27.4 PG (ref 26.8–34.3)
MCHC RBC AUTO-ENTMCNC: 29.7 G/DL (ref 31.4–37.4)
MCV RBC AUTO: 93 FL (ref 82–98)
MONOCYTES # BLD AUTO: 0.5 THOUSAND/ΜL (ref 0.17–1.22)
MONOCYTES NFR BLD AUTO: 8 % (ref 4–12)
NEUTROPHILS # BLD AUTO: 4.26 THOUSANDS/ΜL (ref 1.85–7.62)
NEUTS SEG NFR BLD AUTO: 72 % (ref 43–75)
NRBC BLD AUTO-RTO: 0 /100 WBCS
PLATELET # BLD AUTO: 260 THOUSANDS/UL (ref 149–390)
PMV BLD AUTO: 9.6 FL (ref 8.9–12.7)
RBC # BLD AUTO: 4.12 MILLION/UL (ref 3.81–5.12)
WBC # BLD AUTO: 6 THOUSAND/UL (ref 4.31–10.16)

## 2019-01-29 PROCEDURE — 36415 COLL VENOUS BLD VENIPUNCTURE: CPT

## 2019-01-29 PROCEDURE — 85025 COMPLETE CBC W/AUTO DIFF WBC: CPT

## 2019-01-29 NOTE — TELEPHONE ENCOUNTER
Left message blood work is needed before appointment florinda at 2:40 on 1/29  Reschedule if necessary

## 2019-01-30 ENCOUNTER — OFFICE VISIT (OUTPATIENT)
Dept: HEMATOLOGY ONCOLOGY | Facility: MEDICAL CENTER | Age: 74
End: 2019-01-30
Payer: MEDICARE

## 2019-01-30 VITALS
HEART RATE: 74 BPM | WEIGHT: 211.4 LBS | BODY MASS INDEX: 38.9 KG/M2 | HEIGHT: 62 IN | DIASTOLIC BLOOD PRESSURE: 70 MMHG | SYSTOLIC BLOOD PRESSURE: 124 MMHG | OXYGEN SATURATION: 97 % | RESPIRATION RATE: 18 BRPM | TEMPERATURE: 98.9 F

## 2019-01-30 DIAGNOSIS — D63.8 ANEMIA IN OTHER CHRONIC DISEASES CLASSIFIED ELSEWHERE: Primary | Chronic | ICD-10-CM

## 2019-01-30 PROCEDURE — 99213 OFFICE O/P EST LOW 20 MIN: CPT | Performed by: INTERNAL MEDICINE

## 2019-01-30 NOTE — PROGRESS NOTES
[facility] No[facility]  1945  [address] [facility] [phone]    DISCUSSION  SUMMARY:    77-year-old female with history of anemia of chronic renal insufficiency/anemia of chronic disease (likely multiple etiologies)  Hemoglobin levels had previously been good/acceptable with RONNA treatment  Unfortunately patient suffered a CVA and MI in 2018  Patient is on aspirin and eliquis -cardiology and neurology follow-ups are ongoing  The hemoglobin level is good/higher than before  From a clinical standpoint, patient seems to be doing well  The plan is to continue to monitor  As above, an RONNA is now contraindicated  We discussed what patient needs to watch for in regard to progressive anemia  Patient will call the office if she has persistent or repetitive episodes of dizziness  In the future, if necessary, transfusions can be used to treat the anemia  Patient is to return in 4 months with a repeat CBC before  [facility] also has a follow-up with her liver transplant team pending  Patient is on cyclosporin and CellCept; may also be partially responsible for the anemia (bone marrow suppression)  Patient will continue follow-up with Dr Dale Ash for the UTI  Patient knows to call the office if she has any other hematology questions or concerns  Carefully review your medication list and verify that the list is accurate and up-to-date  Please call the hematology/oncology office if there are medications missing from the list, medications on the list that you are not currently taking or if there is a dosage or instruction that is different from how you're taking that medication      Patient goals and areas of care:  Monitor hemoglobin level  Barriers to care:  None  Patient is able to self-care   ___________________________________________________________________________________________________    Chief Complaint   Patient presents with    Follow-up     Anemia of chronic renal insufficiency     History of Present Illness:    49-year-old female with history of anemia (anemia of chronic renal insufficiency/anemia of chronic disease previously on Procrit)  Mrs Yesenia Durham was recently admitted to the hospital after her partner noticed she was garbling her words  Patient was found to have a CVA  Patient was also found to have a non-ST elevated MI  Mrs Yesenia Durham did not have any chest pain or pressure or other cardiac symptoms  Patient also does not have any neurologic sequelae  Patient is now on aspirin and eliquis  Patient is also on Keppra  Mrs Yesenia Durham is back for follow-up  Recently the plan has been observation  Patient states +/-   Mrs Yesenia Durham was recently diagnosed with a urinary tract infection and has been on Bactrim  Patient states that she has had a decreased appetite since being on the antibiotics, no nausea vomiting  No problems with excessive bruising or abnormal bleeding  No fevers, chills or sweats  No recent falls, no headaches or dizziness, no seizures  Patient's partner also agrees there have been no recent CNS issues, mental status changes, garbling speech etc     No recent issues with the liver transplant team, no issues with the cyclosporin or CellCept  Review of Systems   Constitutional: Positive for fatigue  HENT: Negative  Eyes: Negative  Respiratory: Negative  Cardiovascular: Negative  Gastrointestinal: Negative  Endocrine: Negative  Genitourinary: Positive for dysuria  Musculoskeletal: Negative  Skin: Negative  Allergic/Immunologic: Negative  Neurological: Negative  Hematological: Negative  Psychiatric/Behavioral: Negative  All other systems reviewed and are negative       Patient Active Problem List   Diagnosis    Liver transplanted ([facility] )    Hypothyroidism    Long-term use of immunosuppressant medication    Anemia    TIA (transient ischemic attack)    HLD (hyperlipidemia)  History of seizure    Paroxysmal SVT (supraventricular tachycardia) (HCC)    Altered mental status    Cerebrovascular accident (CVA) due to embolism (HCC)    Non-ST elevation myocardial infarction (NSTEMI), type 2    Abnormal EEG    Ataxic gait    Benign essential hypertension    Chronic joint pain    Chronic kidney insufficiency    Cyst of diaphragm    Depression    Elevated homocysteine (HCC)    Hypercalcemia    Lung nodule, solitary    Peripheral arterial disease (HCC)    Sciatica    [facility]    Asymptomatic stenosis of left carotid artery    Dementia     Past Medical History:   Diagnosis Date    Anemia     Anxiety     Arthritis     Benign paroxysmal vertigo, unspecified ear     onset: 05/06/2010    Cirrhosis ([facility] )     onset: 03/30/2005    Disease of thyroid gland     GERD (gastroesophageal reflux disease)     History of transfusion     Liver transplant candidate     onset: 09/16/2008    Osteoporosis     Pelvic fracture ([facility] )     onset: 10/14/2008     Past Surgical History:   Procedure Laterality Date    ABDOMINAL SURGERY      APPENDECTOMY      BLADDER AUGMENTATION N/A     CATARACT EXTRACTION      CHOLECYSTECTOMY N/A     COLONOSCOPY N/A 4/20/2017    Procedure: COLONOSCOPY;  Surgeon: Kaley Petty MD;  Location: [place] [address] GI LAB; Service:     ESOPHAGOGASTRODUODENOSCOPY N/A 3/31/2016    Procedure: ESOPHAGOGASTRODUODENOSCOPY (EGD); Surgeon: Kaley Petty MD;  Location: [place] [address] GI LAB; Service:     ESOPHAGOGASTRODUODENOSCOPY N/A 4/20/2017    Procedure: ESOPHAGOGASTRODUODENOSCOPY (EGD); Surgeon: Kaley Petty MD;  Location: [place] [address] GI LAB;   Service:     EYE SURGERY      cataracts removed both eyes lens implant    HERNIA REPAIR      JOINT REPLACEMENT      left knee replacement    LIVER TRANSPLANTATION N/A     TONSILECTOMY AND ADNOIDECTOMY N/A     TUBAL LIGATION       Family History   Problem Relation Age of Onset    Cancer Mother breast    Cancer Father     Crohn's disease Brother     Arthritis Family     Breast cancer Family      Social History     Social History    Marital status:      Spouse name: N/A    Number of children: N/A    Years of education: Completed 12th grade     Occupational History    Not on file       Social History Main Topics    Smoking status: Former Smoker     Years: 5 00     Quit date: 1/21/1975    Smokeless tobacco: Never Used      Comment: smoked for 5 years-1/2 to 1 ppd    Alcohol use No    Drug use: No    Sexual activity: No     Other Topics Concern    Not on file     Social History Narrative    Daily coffee consumption (1 cup/day)           Current Outpatient Prescriptions:     apixaban (ELIQUIS) 5 mg, Take 1 tablet (5 mg total) by mouth 2 (two) times a day, Disp: 60 tablet, Rfl: 0    apixaban (ELIQUIS) 5 mg, Take 1 tablet (5 mg total) by mouth 2 (two) times a day, Disp: 180 tablet, Rfl: 3    aspirin (ECOTRIN LOW STRENGTH) 81 mg EC tablet, Take 1 tablet by mouth daily, Disp: 30 tablet, Rfl: 0    Calcium Carb-Cholecalciferol (OS-KENDRICK PO), Take by mouth daily  , Disp: , Rfl:     cycloSPORINE modified (NEORAL) 25 mg capsule, Take 50 mg by mouth 2 (two) times a day, Disp: , Rfl:     levETIRAcetam (KEPPRA) 500 mg tablet, TAKE 1 TABLET BY MOUTH EVERY 12 HOURS, Disp: 180 tablet, Rfl: 1    levothyroxine 112 mcg tablet, TAKE 1 TABLET EVERY DAY AS DIRECTED, Disp: 90 tablet, Rfl: 3    metoprolol tartrate (LOPRESSOR) 25 mg tablet, TAKE 1 & 1/2 (ONE & ONE-HALF) TABLETS BY MOUTH TWICE DAILY, Disp: 270 tablet, Rfl: 3    mycophenolate (CELLCEPT) 250 mg capsule, Take 250 mg by mouth 2 (two) times a day 3 tabs each dose=750 mg , Disp: , Rfl:     omeprazole (PriLOSEC) 40 MG capsule, Take 40 mg by mouth daily, Disp: , Rfl:     oxybutynin (DITROPAN) 5 mg tablet, Take 5 mg by mouth 2 (two) times a day  , Disp: , Rfl:     pravastatin (PRAVACHOL) 20 mg tablet, Take 1 tablet (20 mg total) by mouth daily at bedtime, Disp: 90 tablet, Rfl: 0    Allergies   Allergen Reactions    Tetracyclines & Related Hives    Vancomycin Other (See Comments) and Itching     Reaction Date: [id number]; Pt unsure of reaction    Prograf [Tacrolimus] Anxiety     Reaction Date: 16Dec2008;        Vitals:    01/30/19 1437   BP: 124/70   Pulse: 74   Resp: 18   Temp: 98 9 °F (37 2 °C)   SpO2: 97%     Physical Exam   Constitutional: She is oriented to person, place, and time  She appears well-developed and well-nourished  HENT:   Head: Normocephalic and atraumatic  Right Ear: External ear normal    Left Ear: External ear normal    Nose: Nose normal    Mouth/Throat: Oropharynx is clear and moist    Eyes: Pupils are equal, round, and reactive to light  Conjunctivae and EOM are normal    Neck: Normal range of motion  Neck supple  Supple, no JVD   Cardiovascular: Normal rate, regular rhythm, normal heart sounds and intact distal pulses  Pulmonary/Chest: Effort normal and breath sounds normal    Distant breath sounds bilaterally, clear   Abdominal: Soft  Bowel sounds are normal     Abdomen is obese, nontender, well-healed suture lines, +bowel sounds   Musculoskeletal: Normal range of motion  Neurological: She is alert and oriented to person, place, and time  She has normal reflexes  Skin: Skin is warm  Warm, moist, no petechiae or ecchymoses, relatively good color, no active bleeding   Psychiatric: She has a normal mood and affect   Her behavior is normal  Judgment and thought content normal    Extremities: 1 +bilateral lower extremity edema, no cords, pulses are 1+  Lymphatics:  No adenopathy in the neck, supraclavicular region, axilla and groin bilaterally    Labs    01/29/2019 WBC = 6 0 hemoglobin = 11 3 hematocrit = 30 1 MCV = 93 platelet = 290 neutrophil = 72% lymphocytes = 12% monocyte = 8%    09/20/2018 WBC = 4 6 hemoglobin = 10 0 hematocrit = 34 1 MCV = 92 RDW = 13 7 platelet = 763 neutrophil = 64%  08/03/2018 BUN = 26 creatinine = 1 08 AST = 51 ALT = 43 alkaline phosphatase = 155 total bilirubin = 0 5 GFR = 51  06/15/2018 WBC = 5 52 hemoglobin = 10 8 hematocrit = 35 7 MCV = 91 platelet = 734 neutrophil = 70%  3/13/ 18 WBC = 5 65 hemoglobin = 11 3 hematocrit = 35 1 MCV = 87 platelet = 658 neutrophil = 60%    1/3/18 WBC = 5 3 hemoglobin = 11 1 hematocrit = 33 8 MCV = 87 platelets = 444 iron = 46 ferritin = 127 TIBC = 254 iron saturation = 18%   12/04/2017 WBC = 5 6 hemoglobin = 11 hematocrit = 33 7 MCV = 86 platelet = 591 neutrophil = 74%  11/8/17 hemoglobin = 10 9 hematocrit = 34   11/7/17 BUN = 12 creatinine = 0 92

## 2019-02-01 ENCOUNTER — HOSPITAL ENCOUNTER (OUTPATIENT)
Dept: RADIOLOGY | Facility: HOSPITAL | Age: 74
Discharge: HOME | End: 2019-02-01
Attending: PSYCHIATRY & NEUROLOGY
Payer: MEDICARE

## 2019-02-01 DIAGNOSIS — F01.50 VASCULAR DEMENTIA WITHOUT BEHAVIORAL DISTURBANCE (HCC): ICD-10-CM

## 2019-02-01 PROCEDURE — 70551 MRI BRAIN STEM W/O DYE: CPT

## 2019-02-15 ENCOUNTER — TELEPHONE (OUTPATIENT)
Dept: FAMILY MEDICINE CLINIC | Facility: CLINIC | Age: 74
End: 2019-02-15

## 2019-02-18 ENCOUNTER — TELEPHONE (OUTPATIENT)
Dept: NEUROLOGY | Facility: CLINIC | Age: 74
End: 2019-02-18

## 2019-02-18 NOTE — TELEPHONE ENCOUNTER
----- Message from Mario Stover MD sent at 2/11/2019 12:57 PM EST -----  Could you help schedule the patient for a follow up after her neuropsych testing with Dr Jaclyn Holstein

## 2019-03-20 ENCOUNTER — OFFICE VISIT (OUTPATIENT)
Dept: FAMILY MEDICINE CLINIC | Facility: CLINIC | Age: 74
End: 2019-03-20
Payer: MEDICARE

## 2019-03-20 VITALS
HEART RATE: 88 BPM | DIASTOLIC BLOOD PRESSURE: 70 MMHG | RESPIRATION RATE: 20 BRPM | SYSTOLIC BLOOD PRESSURE: 142 MMHG | HEIGHT: 62 IN | WEIGHT: 210 LBS | BODY MASS INDEX: 38.64 KG/M2 | TEMPERATURE: 98.4 F

## 2019-03-20 DIAGNOSIS — R39.11 URINARY HESITANCY: Primary | ICD-10-CM

## 2019-03-20 PROCEDURE — 99213 OFFICE O/P EST LOW 20 MIN: CPT | Performed by: NURSE PRACTITIONER

## 2019-03-20 NOTE — PROGRESS NOTES
Assessment/Plan:    Pt unable to provide urine specimen in the office  She was given a hat and urine specimen cup so that she may collect a specimen at home  Bladder nondistended on exam   I did advise that she would need to see urology for bladder scan/post void residual testing  She will call today to schedule an appt with Dr Melissa Pena List Items Addressed This Visit     None      Visit Diagnoses     Urinary hesitancy    -  Primary            Patient Instructions   Please call Dr Sade Cardoso office to schedule an appointment with this week      Return if symptoms worsen or fail to improve  Subjective:      Patient ID: Gulshan Teague is a 74 y o  female  Chief Complaint   Patient presents with    Urinary Frequency     C/O urinary frequency which started a few weeks ago  No dysuria [id number]       She has urinary hesitancy for the past 2 weeks  She has occasional dribbling, but not voiding normal amounts  Saw Dr Sofi Puga 3 weeks ago, prior to onset of symptoms  Was prescribed Ditropan and stopped taking it per Dr Darell Mondragon she has memory problems  Unsure why she was taking it to begin with, but maybe because of nocturia  No issues with constipation  She denies any dysuria, suprapubic pressure or pain, vaginal symptoms, or flank pain  She is waking 1-2 times per night with the sensation to urinate  Does have some urinary incontinence and wears a pad, although no worse than usual      The following portions of the patient's history were reviewed and updated as appropriate: allergies, current medications, past family history, past medical history, past social history, past surgical history and problem list     Review of Systems   Constitutional: Negative  Gastrointestinal: Negative      Genitourinary:        See HPI         Current Outpatient Medications   Medication Sig Dispense Refill    aspirin (ECOTRIN LOW STRENGTH) 81 mg EC tablet Take 1 tablet by mouth daily 30 tablet 0    cycloSPORINE modified (NEORAL) 25 mg capsule Take 50 mg by mouth 2 (two) times a day      levothyroxine 112 mcg tablet TAKE 1 TABLET EVERY DAY AS DIRECTED 90 tablet 3    metoprolol tartrate (LOPRESSOR) 25 mg tablet TAKE 1 & 1/2 (ONE & ONE-HALF) TABLETS BY MOUTH TWICE DAILY 270 tablet 3    mycophenolate (CELLCEPT) 250 mg capsule Take 250 mg by mouth 2 (two) times a day 3 tabs each dose=750 mg       omeprazole (PriLOSEC) 40 MG capsule Take 40 mg by mouth daily      pravastatin (PRAVACHOL) 20 mg tablet Take 1 tablet (20 mg total) by mouth daily at bedtime 90 tablet 0    apixaban (ELIQUIS) 5 mg Take 1 tablet (5 mg total) by mouth 2 (two) times a day (Patient not taking: Reported on 3/20/2019) 60 tablet 0    apixaban (ELIQUIS) 5 mg Take 1 tablet (5 mg total) by mouth 2 (two) times a day (Patient not taking: Reported on 3/20/2019) 180 tablet 3    Calcium Carb-Cholecalciferol (OS-KENDRICK PO) Take by mouth daily        levETIRAcetam (KEPPRA) 500 mg tablet TAKE 1 TABLET BY MOUTH EVERY 12 HOURS 180 tablet 1    oxybutynin (DITROPAN) 5 mg tablet Take 5 mg by mouth 2 (two) times a day         No current facility-administered medications for this visit  Objective:    /70   Pulse 88   Temp 98 4 °F (36 9 °C)   Resp 20   Ht 5' 2" (1 575 m)   Wt 95 3 kg (210 lb)   BMI 38 41 kg/m²        Physical Exam   Constitutional: She appears well-developed and well-nourished  Cardiovascular: Normal rate, regular rhythm, S1 normal and S2 normal    Pulmonary/Chest: Effort normal and breath sounds normal    Abdominal: Bowel sounds are normal  She exhibits no distension  There is no hepatosplenomegaly  There is no tenderness  There is no CVA tenderness  Bladder nondistended   Skin: Skin is warm, dry and intact  Psychiatric: She has a normal mood and affect  Nursing note and vitals reviewed               Christina Coleman

## 2019-03-29 ENCOUNTER — REMOTE DEVICE CLINIC VISIT (OUTPATIENT)
Dept: CARDIOLOGY CLINIC | Facility: CLINIC | Age: 74
End: 2019-03-29
Payer: MEDICARE

## 2019-03-29 ENCOUNTER — APPOINTMENT (EMERGENCY)
Dept: CT IMAGING | Facility: HOSPITAL | Age: 74
End: 2019-03-29
Payer: MEDICARE

## 2019-03-29 ENCOUNTER — HOSPITAL ENCOUNTER (EMERGENCY)
Facility: HOSPITAL | Age: 74
Discharge: HOME | End: 2019-03-29
Attending: EMERGENCY MEDICINE | Admitting: EMERGENCY MEDICINE
Payer: MEDICARE

## 2019-03-29 VITALS
HEART RATE: 80 BPM | WEIGHT: 210.1 LBS | DIASTOLIC BLOOD PRESSURE: 65 MMHG | SYSTOLIC BLOOD PRESSURE: 149 MMHG | BODY MASS INDEX: 38.43 KG/M2 | TEMPERATURE: 98.7 F | OXYGEN SATURATION: 99 % | RESPIRATION RATE: 18 BRPM

## 2019-03-29 DIAGNOSIS — Z95.818 PRESENCE OF CARDIAC DEVICE: ICD-10-CM

## 2019-03-29 DIAGNOSIS — R74.8 ELEVATED LIVER ENZYMES: Primary | ICD-10-CM

## 2019-03-29 DIAGNOSIS — Z86.73 PERSONAL HISTORY OF TIA (TRANSIENT ISCHEMIC ATTACK): Primary | ICD-10-CM

## 2019-03-29 DIAGNOSIS — R30.1 PAINFUL BLADDER SPASM: ICD-10-CM

## 2019-03-29 LAB
ALBUMIN SERPL BCP-MCNC: 3.1 G/DL (ref 3.5–5)
ALP SERPL-CCNC: 648 U/L (ref 46–116)
ALT SERPL W P-5'-P-CCNC: 188 U/L (ref 12–78)
ANION GAP SERPL CALCULATED.3IONS-SCNC: 10 MMOL/L (ref 4–13)
AST SERPL W P-5'-P-CCNC: 125 U/L (ref 5–45)
BACTERIA UR QL AUTO: ABNORMAL /HPF
BASOPHILS # BLD AUTO: 0.07 THOUSANDS/ΜL (ref 0–0.1)
BASOPHILS NFR BLD AUTO: 1 % (ref 0–1)
BILIRUB SERPL-MCNC: 0.4 MG/DL (ref 0.2–1)
BILIRUB UR QL STRIP: NEGATIVE
BUN SERPL-MCNC: 27 MG/DL (ref 5–25)
CALCIUM SERPL-MCNC: 9.2 MG/DL (ref 8.3–10.1)
CHLORIDE SERPL-SCNC: 106 MMOL/L (ref 100–108)
CLARITY UR: CLEAR
CO2 SERPL-SCNC: 22 MMOL/L (ref 21–32)
COLOR UR: YELLOW
CREAT SERPL-MCNC: 1.11 MG/DL (ref 0.6–1.3)
EOSINOPHIL # BLD AUTO: 0.42 THOUSAND/ΜL (ref 0–0.61)
EOSINOPHIL NFR BLD AUTO: 7 % (ref 0–6)
ERYTHROCYTE [DISTWIDTH] IN BLOOD BY AUTOMATED COUNT: 14.8 % (ref 11.6–15.1)
GFR SERPL CREATININE-BSD FRML MDRD: 49 ML/MIN/1.73SQ M
GLUCOSE SERPL-MCNC: 112 MG/DL (ref 65–140)
GLUCOSE UR STRIP-MCNC: NEGATIVE MG/DL
HCT VFR BLD AUTO: 38.4 % (ref 34.8–46.1)
HGB BLD-MCNC: 11.6 G/DL (ref 11.5–15.4)
HGB UR QL STRIP.AUTO: ABNORMAL
IMM GRANULOCYTES # BLD AUTO: 0.05 THOUSAND/UL (ref 0–0.2)
IMM GRANULOCYTES NFR BLD AUTO: 1 % (ref 0–2)
KETONES UR STRIP-MCNC: NEGATIVE MG/DL
LEUKOCYTE ESTERASE UR QL STRIP: NEGATIVE
LYMPHOCYTES # BLD AUTO: 0.89 THOUSANDS/ΜL (ref 0.6–4.47)
LYMPHOCYTES NFR BLD AUTO: 16 % (ref 14–44)
MCH RBC QN AUTO: 28.6 PG (ref 26.8–34.3)
MCHC RBC AUTO-ENTMCNC: 30.2 G/DL (ref 31.4–37.4)
MCV RBC AUTO: 95 FL (ref 82–98)
MONOCYTES # BLD AUTO: 0.55 THOUSAND/ΜL (ref 0.17–1.22)
MONOCYTES NFR BLD AUTO: 10 % (ref 4–12)
NEUTROPHILS # BLD AUTO: 3.74 THOUSANDS/ΜL (ref 1.85–7.62)
NEUTS SEG NFR BLD AUTO: 65 % (ref 43–75)
NITRITE UR QL STRIP: NEGATIVE
NON-SQ EPI CELLS URNS QL MICRO: ABNORMAL /HPF
NRBC BLD AUTO-RTO: 0 /100 WBCS
PH UR STRIP.AUTO: 6 [PH]
PLATELET # BLD AUTO: 229 THOUSANDS/UL (ref 149–390)
PMV BLD AUTO: 9.5 FL (ref 8.9–12.7)
POTASSIUM SERPL-SCNC: 4.9 MMOL/L (ref 3.5–5.3)
PROT SERPL-MCNC: 8.1 G/DL (ref 6.4–8.2)
PROT UR STRIP-MCNC: NEGATIVE MG/DL
RBC # BLD AUTO: 4.06 MILLION/UL (ref 3.81–5.12)
RBC #/AREA URNS AUTO: ABNORMAL /HPF
SODIUM SERPL-SCNC: 138 MMOL/L (ref 136–145)
SP GR UR STRIP.AUTO: 1.01 (ref 1–1.03)
UROBILINOGEN UR QL STRIP.AUTO: 0.2 E.U./DL
WBC # BLD AUTO: 5.72 THOUSAND/UL (ref 4.31–10.16)
WBC #/AREA URNS AUTO: ABNORMAL /HPF

## 2019-03-29 PROCEDURE — 74176 CT ABD & PELVIS W/O CONTRAST: CPT

## 2019-03-29 PROCEDURE — 99284 EMERGENCY DEPT VISIT MOD MDM: CPT

## 2019-03-29 PROCEDURE — 81001 URINALYSIS AUTO W/SCOPE: CPT | Performed by: EMERGENCY MEDICINE

## 2019-03-29 PROCEDURE — 93298 REM INTERROG DEV EVAL SCRMS: CPT | Performed by: INTERNAL MEDICINE

## 2019-03-29 PROCEDURE — 85025 COMPLETE CBC W/AUTO DIFF WBC: CPT | Performed by: EMERGENCY MEDICINE

## 2019-03-29 PROCEDURE — 93299 PR REM INTERROG ICPMS/SCRMS <30 D TECH REVIEW: CPT | Performed by: INTERNAL MEDICINE

## 2019-03-29 PROCEDURE — 36415 COLL VENOUS BLD VENIPUNCTURE: CPT | Performed by: EMERGENCY MEDICINE

## 2019-03-29 PROCEDURE — 80053 COMPREHEN METABOLIC PANEL: CPT | Performed by: EMERGENCY MEDICINE

## 2019-03-29 RX ORDER — OXYBUTYNIN CHLORIDE 5 MG/1
5 TABLET ORAL 3 TIMES DAILY PRN
Qty: 30 TABLET | Refills: 0 | Status: SHIPPED | OUTPATIENT
Start: 2019-03-29 | End: 2019-03-30 | Stop reason: SDUPTHER

## 2019-03-29 RX ORDER — OXYBUTYNIN CHLORIDE 5 MG/1
5 TABLET ORAL ONCE
Status: COMPLETED | OUTPATIENT
Start: 2019-03-29 | End: 2019-03-29

## 2019-03-29 RX ADMIN — OXYBUTYNIN CHLORIDE 5 MG: 5 TABLET ORAL at 17:05

## 2019-03-29 NOTE — PROGRESS NOTES
Results for orders placed or performed in visit on 03/29/19   Cardiac EP device report    Narrative    CARELINK TRANSMISSION: BATTERY STATUS "OK"  [facility]  ALL EGMS SHOW UNDERSENSING, NO PATIENT ACTIVATED EPISODES  ---LYNN

## 2019-03-30 RX ORDER — OXYBUTYNIN CHLORIDE 5 MG/1
5 TABLET ORAL 3 TIMES DAILY PRN
Qty: 30 TABLET | Refills: 0 | Status: SHIPPED | OUTPATIENT
Start: 2019-03-30 | End: 2020-01-06 | Stop reason: ALTCHOICE

## 2019-04-01 ENCOUNTER — OFFICE VISIT (OUTPATIENT)
Dept: FAMILY MEDICINE CLINIC | Facility: CLINIC | Age: 74
End: 2019-04-01
Payer: MEDICARE

## 2019-04-01 ENCOUNTER — VBI (OUTPATIENT)
Dept: FAMILY MEDICINE CLINIC | Facility: CLINIC | Age: 74
End: 2019-04-01

## 2019-04-01 VITALS
WEIGHT: 208.8 LBS | SYSTOLIC BLOOD PRESSURE: 110 MMHG | HEIGHT: 62 IN | BODY MASS INDEX: 38.42 KG/M2 | TEMPERATURE: 97.8 F | RESPIRATION RATE: 16 BRPM | HEART RATE: 80 BPM | DIASTOLIC BLOOD PRESSURE: 60 MMHG

## 2019-04-01 DIAGNOSIS — N83.202 LEFT OVARIAN CYST: Primary | ICD-10-CM

## 2019-04-01 DIAGNOSIS — Z86.73 HISTORY OF STROKE: ICD-10-CM

## 2019-04-01 DIAGNOSIS — Z94.4 LIVER TRANSPLANTED (HCC): ICD-10-CM

## 2019-04-01 DIAGNOSIS — I10 BENIGN ESSENTIAL HYPERTENSION: ICD-10-CM

## 2019-04-01 DIAGNOSIS — R79.89 ABNORMAL LFTS: ICD-10-CM

## 2019-04-01 DIAGNOSIS — N30.90 CYSTITIS: ICD-10-CM

## 2019-04-01 PROCEDURE — 99214 OFFICE O/P EST MOD 30 MIN: CPT | Performed by: NURSE PRACTITIONER

## 2019-04-01 RX ORDER — PRAVASTATIN SODIUM 20 MG
TABLET ORAL
Qty: 90 TABLET | Refills: 2 | Status: SHIPPED | OUTPATIENT
Start: 2019-04-01 | End: 2019-12-20 | Stop reason: SDUPTHER

## 2019-04-01 RX ORDER — PRAVASTATIN SODIUM 20 MG
20 TABLET ORAL
Qty: 90 TABLET | Refills: 0 | Status: CANCELLED | OUTPATIENT
Start: 2019-04-01

## 2019-04-08 ENCOUNTER — TRANSCRIBE ORDERS (OUTPATIENT)
Dept: ADMINISTRATIVE | Facility: HOSPITAL | Age: 74
End: 2019-04-08

## 2019-04-08 ENCOUNTER — HOSPITAL ENCOUNTER (OUTPATIENT)
Dept: RADIOLOGY | Facility: HOSPITAL | Age: 74
Discharge: HOME | End: 2019-04-08
Payer: MEDICARE

## 2019-04-08 DIAGNOSIS — Z94.4 LIVER REPLACED BY TRANSPLANT (HCC): ICD-10-CM

## 2019-04-08 DIAGNOSIS — T86.40 COMPLICATION OF TRANSPLANTED LIVER, UNSPECIFIED COMPLICATION (HCC): ICD-10-CM

## 2019-04-08 DIAGNOSIS — Z94.4 LIVER REPLACED BY TRANSPLANT (HCC): Primary | ICD-10-CM

## 2019-04-08 PROCEDURE — A9577 INJ MULTIHANCE: HCPCS

## 2019-04-08 PROCEDURE — 74183 MRI ABD W/O CNTR FLWD CNTR: CPT

## 2019-04-08 RX ADMIN — GADOBENATE DIMEGLUMINE 19 ML: 529 INJECTION, SOLUTION INTRAVENOUS at 14:28

## 2019-04-09 DIAGNOSIS — F01.50 VASCULAR DEMENTIA WITHOUT BEHAVIORAL DISTURBANCE (HCC): Primary | ICD-10-CM

## 2019-04-09 DIAGNOSIS — R41.3 OTHER AMNESIA: ICD-10-CM

## 2019-04-16 ENCOUNTER — TRANSCRIBE ORDERS (OUTPATIENT)
Dept: ADMINISTRATIVE | Facility: HOSPITAL | Age: 74
End: 2019-04-16

## 2019-04-16 DIAGNOSIS — Z94.4 LIVER TRANSPLANTED (HCC): Primary | ICD-10-CM

## 2019-04-22 ENCOUNTER — TELEPHONE (OUTPATIENT)
Dept: UROLOGY | Facility: CLINIC | Age: 74
End: 2019-04-22

## 2019-05-01 ENCOUNTER — OFFICE VISIT (OUTPATIENT)
Dept: CARDIOLOGY CLINIC | Facility: CLINIC | Age: 74
End: 2019-05-01
Payer: MEDICARE

## 2019-05-01 VITALS
WEIGHT: 208.5 LBS | SYSTOLIC BLOOD PRESSURE: 120 MMHG | HEIGHT: 62 IN | BODY MASS INDEX: 38.37 KG/M2 | DIASTOLIC BLOOD PRESSURE: 70 MMHG | HEART RATE: 78 BPM | OXYGEN SATURATION: 97 %

## 2019-05-01 DIAGNOSIS — Z86.73 PERSONAL HISTORY OF TIA (TRANSIENT ISCHEMIC ATTACK): Primary | ICD-10-CM

## 2019-05-01 PROCEDURE — 93000 ELECTROCARDIOGRAM COMPLETE: CPT | Performed by: INTERNAL MEDICINE

## 2019-05-01 PROCEDURE — 99214 OFFICE O/P EST MOD 30 MIN: CPT | Performed by: INTERNAL MEDICINE

## 2019-05-01 RX ORDER — CLOPIDOGREL BISULFATE 75 MG/1
75 TABLET ORAL DAILY
Qty: 90 TABLET | Refills: 1 | Status: SHIPPED | OUTPATIENT
Start: 2019-05-01 | End: 2019-10-05 | Stop reason: SDUPTHER

## 2019-05-06 ENCOUNTER — HOSPITAL ENCOUNTER (EMERGENCY)
Facility: HOSPITAL | Age: 74
Discharge: HOME | End: 2019-05-06
Attending: EMERGENCY MEDICINE | Admitting: EMERGENCY MEDICINE
Payer: MEDICARE

## 2019-05-06 VITALS
SYSTOLIC BLOOD PRESSURE: 170 MMHG | OXYGEN SATURATION: 99 % | HEART RATE: 78 BPM | TEMPERATURE: 98 F | WEIGHT: 209.66 LBS | BODY MASS INDEX: 38.35 KG/M2 | DIASTOLIC BLOOD PRESSURE: 78 MMHG | RESPIRATION RATE: 16 BRPM

## 2019-05-06 DIAGNOSIS — R39.15 URINARY URGENCY: Primary | ICD-10-CM

## 2019-05-06 DIAGNOSIS — R34 DECREASED URINATION: ICD-10-CM

## 2019-05-06 LAB
ANION GAP SERPL CALCULATED.3IONS-SCNC: 10 MMOL/L (ref 4–13)
BACTERIA UR QL AUTO: ABNORMAL /HPF
BASOPHILS # BLD AUTO: 0.03 THOUSANDS/ΜL (ref 0–0.1)
BASOPHILS NFR BLD AUTO: 1 % (ref 0–1)
BILIRUB UR QL STRIP: NEGATIVE
BUN SERPL-MCNC: 36 MG/DL (ref 5–25)
CALCIUM SERPL-MCNC: 8.8 MG/DL (ref 8.3–10.1)
CHLORIDE SERPL-SCNC: 107 MMOL/L (ref 100–108)
CLARITY UR: CLEAR
CO2 SERPL-SCNC: 24 MMOL/L (ref 21–32)
COLOR UR: YELLOW
CREAT SERPL-MCNC: 1.23 MG/DL (ref 0.6–1.3)
EOSINOPHIL # BLD AUTO: 0.35 THOUSAND/ΜL (ref 0–0.61)
EOSINOPHIL NFR BLD AUTO: 7 % (ref 0–6)
ERYTHROCYTE [DISTWIDTH] IN BLOOD BY AUTOMATED COUNT: 13.2 % (ref 11.6–15.1)
GFR SERPL CREATININE-BSD FRML MDRD: 43 ML/MIN/1.73SQ M
GLUCOSE SERPL-MCNC: 109 MG/DL (ref 65–140)
GLUCOSE UR STRIP-MCNC: NEGATIVE MG/DL
HCT VFR BLD AUTO: 34.6 % (ref 34.8–46.1)
HGB BLD-MCNC: 11 G/DL (ref 11.5–15.4)
HGB UR QL STRIP.AUTO: ABNORMAL
IMM GRANULOCYTES # BLD AUTO: 0.05 THOUSAND/UL (ref 0–0.2)
IMM GRANULOCYTES NFR BLD AUTO: 1 % (ref 0–2)
KETONES UR STRIP-MCNC: NEGATIVE MG/DL
LEUKOCYTE ESTERASE UR QL STRIP: ABNORMAL
LYMPHOCYTES # BLD AUTO: 0.84 THOUSANDS/ΜL (ref 0.6–4.47)
LYMPHOCYTES NFR BLD AUTO: 16 % (ref 14–44)
MCH RBC QN AUTO: 28.7 PG (ref 26.8–34.3)
MCHC RBC AUTO-ENTMCNC: 31.8 G/DL (ref 31.4–37.4)
MCV RBC AUTO: 90 FL (ref 82–98)
MONOCYTES # BLD AUTO: 0.58 THOUSAND/ΜL (ref 0.17–1.22)
MONOCYTES NFR BLD AUTO: 11 % (ref 4–12)
NEUTROPHILS # BLD AUTO: 3.42 THOUSANDS/ΜL (ref 1.85–7.62)
NEUTS SEG NFR BLD AUTO: 64 % (ref 43–75)
NITRITE UR QL STRIP: NEGATIVE
NON-SQ EPI CELLS URNS QL MICRO: ABNORMAL /HPF
NRBC BLD AUTO-RTO: 0 /100 WBCS
PH UR STRIP.AUTO: 5.5 [PH] (ref 4.5–8)
PLATELET # BLD AUTO: 234 THOUSANDS/UL (ref 149–390)
PMV BLD AUTO: 9.5 FL (ref 8.9–12.7)
POTASSIUM SERPL-SCNC: 4.7 MMOL/L (ref 3.5–5.3)
PROT UR STRIP-MCNC: NEGATIVE MG/DL
RBC # BLD AUTO: 3.83 MILLION/UL (ref 3.81–5.12)
RBC #/AREA URNS AUTO: ABNORMAL /HPF
SODIUM SERPL-SCNC: 141 MMOL/L (ref 136–145)
SP GR UR STRIP.AUTO: 1.01 (ref 1–1.03)
UROBILINOGEN UR QL STRIP.AUTO: 0.2 E.U./DL
WBC # BLD AUTO: 5.27 THOUSAND/UL (ref 4.31–10.16)
WBC #/AREA URNS AUTO: ABNORMAL /HPF

## 2019-05-06 PROCEDURE — 99283 EMERGENCY DEPT VISIT LOW MDM: CPT | Performed by: EMERGENCY MEDICINE

## 2019-05-06 PROCEDURE — 85025 COMPLETE CBC W/AUTO DIFF WBC: CPT | Performed by: EMERGENCY MEDICINE

## 2019-05-06 PROCEDURE — 80048 BASIC METABOLIC PNL TOTAL CA: CPT | Performed by: EMERGENCY MEDICINE

## 2019-05-06 PROCEDURE — 87077 CULTURE AEROBIC IDENTIFY: CPT | Performed by: EMERGENCY MEDICINE

## 2019-05-06 PROCEDURE — 99283 EMERGENCY DEPT VISIT LOW MDM: CPT

## 2019-05-06 PROCEDURE — 87086 URINE CULTURE/COLONY COUNT: CPT | Performed by: EMERGENCY MEDICINE

## 2019-05-06 PROCEDURE — 87186 SC STD MICRODIL/AGAR DIL: CPT | Performed by: EMERGENCY MEDICINE

## 2019-05-06 PROCEDURE — 36415 COLL VENOUS BLD VENIPUNCTURE: CPT | Performed by: EMERGENCY MEDICINE

## 2019-05-06 PROCEDURE — 81001 URINALYSIS AUTO W/SCOPE: CPT

## 2019-05-08 LAB — BACTERIA UR CULT: ABNORMAL

## 2019-05-20 ENCOUNTER — OFFICE VISIT (OUTPATIENT)
Dept: UROLOGY | Facility: AMBULATORY SURGERY CENTER | Age: 74
End: 2019-05-20
Payer: MEDICARE

## 2019-05-20 VITALS
HEIGHT: 62 IN | HEART RATE: 84 BPM | BODY MASS INDEX: 38.83 KG/M2 | WEIGHT: 211 LBS | DIASTOLIC BLOOD PRESSURE: 64 MMHG | SYSTOLIC BLOOD PRESSURE: 126 MMHG

## 2019-05-20 DIAGNOSIS — N30.00 ACUTE CYSTITIS WITHOUT HEMATURIA: Primary | ICD-10-CM

## 2019-05-20 PROCEDURE — 99204 OFFICE O/P NEW MOD 45 MIN: CPT | Performed by: UROLOGY

## 2019-05-20 RX ORDER — ERGOCALCIFEROL 1.25 MG/1
50000 CAPSULE ORAL WEEKLY
COMMUNITY
End: 2019-06-13 | Stop reason: ALTCHOICE

## 2019-06-05 ENCOUNTER — APPOINTMENT (OUTPATIENT)
Dept: LAB | Facility: HOSPITAL | Age: 74
End: 2019-06-05
Attending: INTERNAL MEDICINE
Payer: MEDICARE

## 2019-06-05 ENCOUNTER — TELEPHONE (OUTPATIENT)
Dept: FAMILY MEDICINE CLINIC | Facility: CLINIC | Age: 74
End: 2019-06-05

## 2019-06-05 ENCOUNTER — TRANSCRIBE ORDERS (OUTPATIENT)
Dept: ADMINISTRATIVE | Facility: HOSPITAL | Age: 74
End: 2019-06-05

## 2019-06-05 DIAGNOSIS — D64.9 ANEMIA, UNSPECIFIED TYPE: ICD-10-CM

## 2019-06-05 DIAGNOSIS — R79.89 ABNORMAL LFTS: ICD-10-CM

## 2019-06-05 DIAGNOSIS — D64.9 ANEMIA, UNSPECIFIED TYPE: Primary | ICD-10-CM

## 2019-06-05 DIAGNOSIS — Z94.4 LIVER TRANSPLANTED (HCC): ICD-10-CM

## 2019-06-05 LAB
ALBUMIN SERPL BCP-MCNC: 3.2 G/DL (ref 3.5–5)
ALP SERPL-CCNC: 426 U/L (ref 46–116)
ALT SERPL W P-5'-P-CCNC: 90 U/L (ref 12–78)
AST SERPL W P-5'-P-CCNC: 73 U/L (ref 5–45)
BASOPHILS # BLD AUTO: 0.04 THOUSANDS/ΜL (ref 0–0.1)
BASOPHILS NFR BLD AUTO: 1 % (ref 0–1)
BILIRUB DIRECT SERPL-MCNC: 0.2 MG/DL (ref 0–0.2)
BILIRUB SERPL-MCNC: 0.4 MG/DL (ref 0.2–1)
EOSINOPHIL # BLD AUTO: 0.37 THOUSAND/ΜL (ref 0–0.61)
EOSINOPHIL NFR BLD AUTO: 7 % (ref 0–6)
ERYTHROCYTE [DISTWIDTH] IN BLOOD BY AUTOMATED COUNT: 13 % (ref 11.6–15.1)
HCT VFR BLD AUTO: 37.1 % (ref 34.8–46.1)
HGB BLD-MCNC: 11.4 G/DL (ref 11.5–15.4)
IMM GRANULOCYTES # BLD AUTO: 0.03 THOUSAND/UL (ref 0–0.2)
IMM GRANULOCYTES NFR BLD AUTO: 1 % (ref 0–2)
LYMPHOCYTES # BLD AUTO: 0.81 THOUSANDS/ΜL (ref 0.6–4.47)
LYMPHOCYTES NFR BLD AUTO: 14 % (ref 14–44)
MCH RBC QN AUTO: 28.3 PG (ref 26.8–34.3)
MCHC RBC AUTO-ENTMCNC: 30.7 G/DL (ref 31.4–37.4)
MCV RBC AUTO: 92 FL (ref 82–98)
MONOCYTES # BLD AUTO: 0.5 THOUSAND/ΜL (ref 0.17–1.22)
MONOCYTES NFR BLD AUTO: 9 % (ref 4–12)
NEUTROPHILS # BLD AUTO: 3.87 THOUSANDS/ΜL (ref 1.85–7.62)
NEUTS SEG NFR BLD AUTO: 68 % (ref 43–75)
NRBC BLD AUTO-RTO: 0 /100 WBCS
PLATELET # BLD AUTO: 217 THOUSANDS/UL (ref 149–390)
PMV BLD AUTO: 9.8 FL (ref 8.9–12.7)
PROT SERPL-MCNC: 8.3 G/DL (ref 6.4–8.2)
RBC # BLD AUTO: 4.03 MILLION/UL (ref 3.81–5.12)
WBC # BLD AUTO: 5.62 THOUSAND/UL (ref 4.31–10.16)

## 2019-06-05 PROCEDURE — 85025 COMPLETE CBC W/AUTO DIFF WBC: CPT

## 2019-06-05 PROCEDURE — 80076 HEPATIC FUNCTION PANEL: CPT

## 2019-06-05 PROCEDURE — 36415 COLL VENOUS BLD VENIPUNCTURE: CPT

## 2019-06-06 ENCOUNTER — OFFICE VISIT (OUTPATIENT)
Dept: HEMATOLOGY ONCOLOGY | Facility: MEDICAL CENTER | Age: 74
End: 2019-06-06
Payer: MEDICARE

## 2019-06-06 VITALS
SYSTOLIC BLOOD PRESSURE: 142 MMHG | HEIGHT: 62 IN | DIASTOLIC BLOOD PRESSURE: 70 MMHG | TEMPERATURE: 97.8 F | RESPIRATION RATE: 18 BRPM | HEART RATE: 78 BPM | WEIGHT: 209 LBS | BODY MASS INDEX: 38.46 KG/M2 | OXYGEN SATURATION: 95 %

## 2019-06-06 DIAGNOSIS — D63.8 ANEMIA IN OTHER CHRONIC DISEASES CLASSIFIED ELSEWHERE: Primary | Chronic | ICD-10-CM

## 2019-06-06 PROCEDURE — 99213 OFFICE O/P EST LOW 20 MIN: CPT | Performed by: INTERNAL MEDICINE

## 2019-06-10 ENCOUNTER — APPOINTMENT (EMERGENCY)
Dept: CT IMAGING | Facility: HOSPITAL | Age: 74
End: 2019-06-10
Payer: MEDICARE

## 2019-06-10 ENCOUNTER — HOSPITAL ENCOUNTER (EMERGENCY)
Facility: HOSPITAL | Age: 74
Discharge: HOME | End: 2019-06-10
Attending: EMERGENCY MEDICINE
Payer: MEDICARE

## 2019-06-10 ENCOUNTER — APPOINTMENT (EMERGENCY)
Dept: RADIOLOGY | Facility: HOSPITAL | Age: 74
End: 2019-06-10
Payer: MEDICARE

## 2019-06-10 VITALS
WEIGHT: 211.2 LBS | BODY MASS INDEX: 38.63 KG/M2 | HEART RATE: 80 BPM | SYSTOLIC BLOOD PRESSURE: 152 MMHG | TEMPERATURE: 97.8 F | RESPIRATION RATE: 18 BRPM | OXYGEN SATURATION: 96 % | DIASTOLIC BLOOD PRESSURE: 66 MMHG

## 2019-06-10 DIAGNOSIS — E86.0 DEHYDRATION: ICD-10-CM

## 2019-06-10 DIAGNOSIS — R10.9 LEFT FLANK PAIN: Primary | ICD-10-CM

## 2019-06-10 DIAGNOSIS — N39.0 UTI (URINARY TRACT INFECTION): ICD-10-CM

## 2019-06-10 DIAGNOSIS — D63.8 ANEMIA IN OTHER CHRONIC DISEASES CLASSIFIED ELSEWHERE: Chronic | ICD-10-CM

## 2019-06-10 LAB
ALBUMIN SERPL BCP-MCNC: 3.2 G/DL (ref 3.5–5)
ALP SERPL-CCNC: 419 U/L (ref 46–116)
ALT SERPL W P-5'-P-CCNC: 90 U/L (ref 12–78)
ANION GAP SERPL CALCULATED.3IONS-SCNC: 9 MMOL/L (ref 4–13)
AST SERPL W P-5'-P-CCNC: 76 U/L (ref 5–45)
ATRIAL RATE: 85 BPM
BACTERIA UR QL AUTO: ABNORMAL /HPF
BASOPHILS # BLD AUTO: 0.03 THOUSANDS/ΜL (ref 0–0.1)
BASOPHILS NFR BLD AUTO: 1 % (ref 0–1)
BILIRUB SERPL-MCNC: 0.3 MG/DL (ref 0.2–1)
BILIRUB UR QL STRIP: NEGATIVE
BUN SERPL-MCNC: 31 MG/DL (ref 5–25)
CALCIUM SERPL-MCNC: 9 MG/DL (ref 8.3–10.1)
CHLORIDE SERPL-SCNC: 108 MMOL/L (ref 100–108)
CK SERPL-CCNC: 36 U/L (ref 26–192)
CLARITY UR: CLEAR
CO2 SERPL-SCNC: 24 MMOL/L (ref 21–32)
COLOR UR: YELLOW
CREAT SERPL-MCNC: 0.94 MG/DL (ref 0.6–1.3)
EOSINOPHIL # BLD AUTO: 0.43 THOUSAND/ΜL (ref 0–0.61)
EOSINOPHIL NFR BLD AUTO: 8 % (ref 0–6)
ERYTHROCYTE [DISTWIDTH] IN BLOOD BY AUTOMATED COUNT: 13.1 % (ref 11.6–15.1)
GFR SERPL CREATININE-BSD FRML MDRD: 60 ML/MIN/1.73SQ M
GLUCOSE SERPL-MCNC: 109 MG/DL (ref 65–140)
GLUCOSE UR STRIP-MCNC: NEGATIVE MG/DL
HCT VFR BLD AUTO: 33.8 % (ref 34.8–46.1)
HGB BLD-MCNC: 10.6 G/DL (ref 11.5–15.4)
HGB UR QL STRIP.AUTO: ABNORMAL
IMM GRANULOCYTES # BLD AUTO: 0.03 THOUSAND/UL (ref 0–0.2)
IMM GRANULOCYTES NFR BLD AUTO: 1 % (ref 0–2)
KETONES UR STRIP-MCNC: NEGATIVE MG/DL
LEUKOCYTE ESTERASE UR QL STRIP: NEGATIVE
LIPASE SERPL-CCNC: 115 U/L (ref 73–393)
LYMPHOCYTES # BLD AUTO: 0.89 THOUSANDS/ΜL (ref 0.6–4.47)
LYMPHOCYTES NFR BLD AUTO: 17 % (ref 14–44)
MCH RBC QN AUTO: 28.3 PG (ref 26.8–34.3)
MCHC RBC AUTO-ENTMCNC: 31.4 G/DL (ref 31.4–37.4)
MCV RBC AUTO: 90 FL (ref 82–98)
MONOCYTES # BLD AUTO: 0.47 THOUSAND/ΜL (ref 0.17–1.22)
MONOCYTES NFR BLD AUTO: 9 % (ref 4–12)
NEUTROPHILS # BLD AUTO: 3.35 THOUSANDS/ΜL (ref 1.85–7.62)
NEUTS SEG NFR BLD AUTO: 64 % (ref 43–75)
NITRITE UR QL STRIP: NEGATIVE
NON-SQ EPI CELLS URNS QL MICRO: ABNORMAL /HPF
NRBC BLD AUTO-RTO: 0 /100 WBCS
P AXIS: 59 DEGREES
PH UR STRIP.AUTO: 5.5 [PH] (ref 4.5–8)
PLATELET # BLD AUTO: 200 THOUSANDS/UL (ref 149–390)
PMV BLD AUTO: 9.3 FL (ref 8.9–12.7)
POTASSIUM SERPL-SCNC: 4.5 MMOL/L (ref 3.5–5.3)
PR INTERVAL: 164 MS
PROT SERPL-MCNC: 7.9 G/DL (ref 6.4–8.2)
PROT UR STRIP-MCNC: NEGATIVE MG/DL
QRS AXIS: -18 DEGREES
QRSD INTERVAL: 84 MS
QT INTERVAL: 372 MS
QTC INTERVAL: 424 MS
RBC # BLD AUTO: 3.74 MILLION/UL (ref 3.81–5.12)
RBC #/AREA URNS AUTO: ABNORMAL /HPF
SODIUM SERPL-SCNC: 141 MMOL/L (ref 136–145)
SP GR UR STRIP.AUTO: 1.01 (ref 1–1.03)
T WAVE AXIS: 23 DEGREES
UROBILINOGEN UR QL STRIP.AUTO: 0.2 E.U./DL
VENTRICULAR RATE: 78 BPM
WBC # BLD AUTO: 5.2 THOUSAND/UL (ref 4.31–10.16)
WBC #/AREA URNS AUTO: ABNORMAL /HPF

## 2019-06-10 PROCEDURE — 96375 TX/PRO/DX INJ NEW DRUG ADDON: CPT

## 2019-06-10 PROCEDURE — 96361 HYDRATE IV INFUSION ADD-ON: CPT

## 2019-06-10 PROCEDURE — 96374 THER/PROPH/DIAG INJ IV PUSH: CPT

## 2019-06-10 PROCEDURE — 99284 EMERGENCY DEPT VISIT MOD MDM: CPT | Performed by: EMERGENCY MEDICINE

## 2019-06-10 PROCEDURE — 87186 SC STD MICRODIL/AGAR DIL: CPT | Performed by: EMERGENCY MEDICINE

## 2019-06-10 PROCEDURE — 80053 COMPREHEN METABOLIC PANEL: CPT | Performed by: EMERGENCY MEDICINE

## 2019-06-10 PROCEDURE — 81001 URINALYSIS AUTO W/SCOPE: CPT

## 2019-06-10 PROCEDURE — 87086 URINE CULTURE/COLONY COUNT: CPT | Performed by: EMERGENCY MEDICINE

## 2019-06-10 PROCEDURE — 93005 ELECTROCARDIOGRAM TRACING: CPT

## 2019-06-10 PROCEDURE — 74176 CT ABD & PELVIS W/O CONTRAST: CPT

## 2019-06-10 PROCEDURE — 93010 ELECTROCARDIOGRAM REPORT: CPT | Performed by: INTERNAL MEDICINE

## 2019-06-10 PROCEDURE — 99285 EMERGENCY DEPT VISIT HI MDM: CPT

## 2019-06-10 PROCEDURE — 36415 COLL VENOUS BLD VENIPUNCTURE: CPT | Performed by: EMERGENCY MEDICINE

## 2019-06-10 PROCEDURE — 83690 ASSAY OF LIPASE: CPT | Performed by: EMERGENCY MEDICINE

## 2019-06-10 PROCEDURE — 71045 X-RAY EXAM CHEST 1 VIEW: CPT

## 2019-06-10 PROCEDURE — 87077 CULTURE AEROBIC IDENTIFY: CPT | Performed by: EMERGENCY MEDICINE

## 2019-06-10 PROCEDURE — 85025 COMPLETE CBC W/AUTO DIFF WBC: CPT | Performed by: EMERGENCY MEDICINE

## 2019-06-10 PROCEDURE — 82550 ASSAY OF CK (CPK): CPT | Performed by: EMERGENCY MEDICINE

## 2019-06-10 RX ORDER — SODIUM CHLORIDE 9 MG/ML
125 INJECTION, SOLUTION INTRAVENOUS CONTINUOUS
Status: DISCONTINUED | OUTPATIENT
Start: 2019-06-10 | End: 2019-06-10 | Stop reason: HOSPADM

## 2019-06-10 RX ORDER — SULFAMETHOXAZOLE AND TRIMETHOPRIM 800; 160 MG/1; MG/1
1 TABLET ORAL ONCE
Status: COMPLETED | OUTPATIENT
Start: 2019-06-10 | End: 2019-06-10

## 2019-06-10 RX ORDER — ONDANSETRON 2 MG/ML
4 INJECTION INTRAMUSCULAR; INTRAVENOUS ONCE
Status: COMPLETED | OUTPATIENT
Start: 2019-06-10 | End: 2019-06-10

## 2019-06-10 RX ORDER — HYDROMORPHONE HCL/PF 1 MG/ML
0.5 SYRINGE (ML) INJECTION ONCE
Status: COMPLETED | OUTPATIENT
Start: 2019-06-10 | End: 2019-06-10

## 2019-06-10 RX ORDER — SULFAMETHOXAZOLE AND TRIMETHOPRIM 800; 160 MG/1; MG/1
1 TABLET ORAL 2 TIMES DAILY
Qty: 14 TABLET | Refills: 0 | Status: SHIPPED | OUTPATIENT
Start: 2019-06-10 | End: 2019-06-18

## 2019-06-10 RX ADMIN — HYDROMORPHONE HYDROCHLORIDE 0.5 MG: 1 INJECTION, SOLUTION INTRAMUSCULAR; INTRAVENOUS; SUBCUTANEOUS at 07:47

## 2019-06-10 RX ADMIN — ONDANSETRON 4 MG: 2 INJECTION INTRAMUSCULAR; INTRAVENOUS at 07:47

## 2019-06-10 RX ADMIN — SODIUM CHLORIDE 125 ML/HR: 0.9 INJECTION, SOLUTION INTRAVENOUS at 08:51

## 2019-06-10 RX ADMIN — SULFAMETHOXAZOLE AND TRIMETHOPRIM 1 TABLET: 800; 160 TABLET ORAL at 09:11

## 2019-06-10 RX ADMIN — SODIUM CHLORIDE 500 ML: 0.9 INJECTION, SOLUTION INTRAVENOUS at 07:48

## 2019-06-12 LAB — BACTERIA UR CULT: ABNORMAL

## 2019-06-13 ENCOUNTER — OFFICE VISIT (OUTPATIENT)
Dept: FAMILY MEDICINE CLINIC | Facility: CLINIC | Age: 74
End: 2019-06-13
Payer: MEDICARE

## 2019-06-13 VITALS
BODY MASS INDEX: 38.83 KG/M2 | HEIGHT: 62 IN | DIASTOLIC BLOOD PRESSURE: 72 MMHG | WEIGHT: 211 LBS | RESPIRATION RATE: 16 BRPM | SYSTOLIC BLOOD PRESSURE: 120 MMHG | TEMPERATURE: 97.8 F | HEART RATE: 76 BPM

## 2019-06-13 DIAGNOSIS — M54.6 ACUTE LEFT-SIDED THORACIC BACK PAIN: Primary | ICD-10-CM

## 2019-06-13 DIAGNOSIS — N39.0 URINARY TRACT INFECTION WITHOUT HEMATURIA, SITE UNSPECIFIED: ICD-10-CM

## 2019-06-13 DIAGNOSIS — Z94.4 LIVER TRANSPLANTED (HCC): ICD-10-CM

## 2019-06-13 PROCEDURE — 99213 OFFICE O/P EST LOW 20 MIN: CPT | Performed by: INTERNAL MEDICINE

## 2019-06-14 ENCOUNTER — TELEPHONE (OUTPATIENT)
Dept: FAMILY MEDICINE CLINIC | Facility: CLINIC | Age: 74
End: 2019-06-14

## 2019-06-14 DIAGNOSIS — E03.9 HYPOTHYROIDISM, UNSPECIFIED TYPE: Primary | ICD-10-CM

## 2019-06-14 DIAGNOSIS — E03.9 HYPOTHYROIDISM, UNSPECIFIED TYPE: ICD-10-CM

## 2019-06-15 LAB
T4 FREE SERPL-MCNC: 1.18 NG/DL (ref 0.82–1.77)
TSH SERPL DL<=0.005 MIU/L-ACNC: 1.76 UIU/ML (ref 0.45–4.5)

## 2019-06-16 RX ORDER — LEVOTHYROXINE SODIUM 112 UG/1
TABLET ORAL
Qty: 90 TABLET | Refills: 3 | Status: SHIPPED | OUTPATIENT
Start: 2019-06-16 | End: 2019-06-17 | Stop reason: SDUPTHER

## 2019-06-17 ENCOUNTER — TELEPHONE (OUTPATIENT)
Dept: FAMILY MEDICINE CLINIC | Facility: CLINIC | Age: 74
End: 2019-06-17

## 2019-06-17 DIAGNOSIS — E03.9 HYPOTHYROIDISM, UNSPECIFIED TYPE: ICD-10-CM

## 2019-06-17 RX ORDER — LEVOTHYROXINE SODIUM 112 UG/1
112 TABLET ORAL DAILY
Qty: 90 TABLET | Refills: 0 | Status: SHIPPED | OUTPATIENT
Start: 2019-06-17 | End: 2019-06-17 | Stop reason: SDUPTHER

## 2019-06-17 RX ORDER — LEVOTHYROXINE SODIUM 112 UG/1
112 TABLET ORAL DAILY
Qty: 7 TABLET | Refills: 0 | Status: SHIPPED | OUTPATIENT
Start: 2019-06-17 | End: 2019-08-16 | Stop reason: SDUPTHER

## 2019-06-17 RX ORDER — LEVOTHYROXINE SODIUM 112 UG/1
112 TABLET ORAL DAILY
Qty: 90 TABLET | Refills: 3 | Status: CANCELLED | OUTPATIENT
Start: 2019-06-17

## 2019-06-18 ENCOUNTER — HOSPITAL ENCOUNTER (EMERGENCY)
Facility: HOSPITAL | Age: 74
Discharge: HOME | End: 2019-06-18
Attending: EMERGENCY MEDICINE | Admitting: EMERGENCY MEDICINE
Payer: MEDICARE

## 2019-06-18 VITALS
TEMPERATURE: 97.9 F | DIASTOLIC BLOOD PRESSURE: 65 MMHG | WEIGHT: 212.08 LBS | SYSTOLIC BLOOD PRESSURE: 152 MMHG | OXYGEN SATURATION: 99 % | HEART RATE: 90 BPM | RESPIRATION RATE: 20 BRPM | BODY MASS INDEX: 38.79 KG/M2

## 2019-06-18 DIAGNOSIS — M54.50 LOWER BACK PAIN: Primary | ICD-10-CM

## 2019-06-18 LAB
BACTERIA UR QL AUTO: ABNORMAL /HPF
BILIRUB UR QL STRIP: NEGATIVE
CLARITY UR: CLEAR
COLOR UR: YELLOW
GLUCOSE UR STRIP-MCNC: NEGATIVE MG/DL
HGB UR QL STRIP.AUTO: ABNORMAL
KETONES UR STRIP-MCNC: NEGATIVE MG/DL
LEUKOCYTE ESTERASE UR QL STRIP: NEGATIVE
NITRITE UR QL STRIP: NEGATIVE
NON-SQ EPI CELLS URNS QL MICRO: ABNORMAL /HPF
PH UR STRIP.AUTO: 6 [PH]
PROT UR STRIP-MCNC: NEGATIVE MG/DL
RBC #/AREA URNS AUTO: ABNORMAL /HPF
SP GR UR STRIP.AUTO: 1.02 (ref 1–1.03)
URATE CRY URNS QL MICRO: ABNORMAL /HPF
UROBILINOGEN UR QL STRIP.AUTO: 0.2 E.U./DL
WBC #/AREA URNS AUTO: ABNORMAL /HPF

## 2019-06-18 PROCEDURE — 99283 EMERGENCY DEPT VISIT LOW MDM: CPT | Performed by: EMERGENCY MEDICINE

## 2019-06-18 PROCEDURE — 99283 EMERGENCY DEPT VISIT LOW MDM: CPT

## 2019-06-18 PROCEDURE — 81001 URINALYSIS AUTO W/SCOPE: CPT | Performed by: EMERGENCY MEDICINE

## 2019-06-18 RX ORDER — METHOCARBAMOL 500 MG/1
1000 TABLET, FILM COATED ORAL ONCE
Status: COMPLETED | OUTPATIENT
Start: 2019-06-18 | End: 2019-06-18

## 2019-06-18 RX ORDER — METHOCARBAMOL 500 MG/1
500-1000 TABLET, FILM COATED ORAL 2 TIMES DAILY PRN
Qty: 20 TABLET | Refills: 0 | Status: SHIPPED | OUTPATIENT
Start: 2019-06-18 | End: 2019-09-05 | Stop reason: ALTCHOICE

## 2019-06-18 RX ORDER — IBUPROFEN 400 MG/1
400 TABLET ORAL ONCE
Status: COMPLETED | OUTPATIENT
Start: 2019-06-18 | End: 2019-06-18

## 2019-06-18 RX ADMIN — IBUPROFEN 400 MG: 400 TABLET ORAL at 21:41

## 2019-06-18 RX ADMIN — METHOCARBAMOL TABLETS 1000 MG: 500 TABLET, COATED ORAL at 22:26

## 2019-06-19 ENCOUNTER — HOSPITAL ENCOUNTER (EMERGENCY)
Facility: HOSPITAL | Age: 74
Discharge: HOME | End: 2019-06-19
Attending: EMERGENCY MEDICINE | Admitting: EMERGENCY MEDICINE
Payer: MEDICARE

## 2019-06-19 ENCOUNTER — TELEPHONE (OUTPATIENT)
Dept: FAMILY MEDICINE CLINIC | Facility: CLINIC | Age: 74
End: 2019-06-19

## 2019-06-19 VITALS
OXYGEN SATURATION: 99 % | SYSTOLIC BLOOD PRESSURE: 122 MMHG | TEMPERATURE: 97.5 F | DIASTOLIC BLOOD PRESSURE: 56 MMHG | HEART RATE: 72 BPM | RESPIRATION RATE: 20 BRPM

## 2019-06-19 DIAGNOSIS — M54.6 ACUTE THORACIC BACK PAIN, UNSPECIFIED BACK PAIN LATERALITY: Primary | ICD-10-CM

## 2019-06-19 DIAGNOSIS — M54.9 BACK PAIN: Primary | ICD-10-CM

## 2019-06-19 PROCEDURE — 99283 EMERGENCY DEPT VISIT LOW MDM: CPT

## 2019-06-19 RX ORDER — TRAMADOL HYDROCHLORIDE 50 MG/1
50 TABLET ORAL EVERY 8 HOURS PRN
Qty: 15 TABLET | Refills: 0 | Status: SHIPPED | OUTPATIENT
Start: 2019-06-19 | End: 2019-06-29

## 2019-06-19 RX ORDER — TRAMADOL HYDROCHLORIDE 50 MG/1
50 TABLET ORAL ONCE
Status: COMPLETED | OUTPATIENT
Start: 2019-06-19 | End: 2019-06-19

## 2019-06-19 RX ADMIN — TRAMADOL HYDROCHLORIDE 50 MG: 50 TABLET, COATED ORAL at 11:35

## 2019-06-19 NOTE — ED PROVIDER NOTES
History  Chief Complaint   Patient presents with    Back Pain     Pt reports 2 weeks ago having a UTI with back pain and has been taking 2 ABX for it  Pt reports continued intermittent back pain  Denies urinary symptoms  History provided by:  Patient and medical records   used: No    Back Pain   Associated symptoms: no abdominal pain, no chest pain, no dysuria, no fever, no headaches, no numbness and no weakness     38-year-old female presented with left low back pain radiating across beginning today  Pain mild to moderate, constant, worse with activity  Notes she had similar symptoms about 2 weeks ago, was seen in the ED and ultimately diagnosed with a UTI by urine culture  She denies having any fever, chills, nausea, vomiting  She had an abdominal CT which was unremarkable  Notes that she has been having some urinary difficulties  States that she will urinate for long periods of time, then having urge to go and then will only go a small male  Per labs renal function was at baseline  States that she has an appointment tomorrow for urodynamics  Symptoms including back pain may be related to urinary retention  Will plan bladder scan here, re-evaluate  She has some slight muscular tenderness in the left lumbar region  Prior to Admission Medications   Prescriptions Last Dose Informant Patient Reported?  Taking?   aspirin (ECOTRIN LOW STRENGTH) 81 mg EC tablet  Self No Yes   Sig: Take 1 tablet by mouth daily   clopidogrel (PLAVIX) 75 mg tablet  Self No Yes   Sig: Take 1 tablet (75 mg total) by mouth daily   cycloSPORINE modified (NEORAL) 25 mg capsule  Self Yes Yes   Sig: Take 50 mg by mouth 2 (two) times a day   levETIRAcetam (KEPPRA) 500 mg tablet  Self No Yes   Sig: TAKE 1 TABLET BY MOUTH EVERY 12 HOURS   levothyroxine 112 mcg tablet   No Yes   Sig: Take 1 tablet (112 mcg total) by mouth daily As directed   metoprolol tartrate (LOPRESSOR) 25 mg tablet  Self No Yes   Sig: TAKE 1 & 1/2 (ONE & ONE-HALF) TABLETS BY MOUTH TWICE DAILY   mycophenolate (CELLCEPT) 250 mg capsule  Self Yes Yes   Sig: Take 250 mg by mouth 2 (two) times a day 3 tabs each dose=750 mg    omeprazole (PriLOSEC) 40 MG capsule  Self Yes Yes   Sig: Take 40 mg by mouth daily   oxybutynin (DITROPAN) 5 mg tablet   No Yes   Sig: Take 1 tablet (5 mg total) by mouth 3 (three) times a day as needed (bladder spasms) for up to 30 days   Patient taking differently: Take 5 mg by mouth 2 (two) times a day    pravastatin (PRAVACHOL) 20 mg tablet  Self No Yes   Sig: TAKE 1 TABLET BY MOUTH ONCE DAILY AT BEDTIME   sulfamethoxazole-trimethoprim (BACTRIM DS) 800-160 mg per tablet   No Yes   Sig: Take 1 tablet by mouth 2 (two) times a day for 7 days      Facility-Administered Medications: None       Past Medical History:   Diagnosis Date    Anemia     Anxiety     Arthritis     Benign paroxysmal vertigo, unspecified ear     onset: 05/06/2010    Cirrhosis ([facility] )     onset: 03/30/2005    Disease of thyroid gland     GERD (gastroesophageal reflux disease)     History of transfusion     Liver transplant candidate     onset: 09/16/2008    Osteoporosis     Pelvic fracture ([facility] )     onset: 10/14/2008       Past Surgical History:   Procedure Laterality Date    ABDOMINAL SURGERY      APPENDECTOMY      BLADDER AUGMENTATION N/A     CATARACT EXTRACTION      CHOLECYSTECTOMY N/A     COLONOSCOPY N/A 4/20/2017    Procedure: COLONOSCOPY;  Surgeon: Smita Johnson MD;  Location: [place] [address] GI LAB; Service:     ESOPHAGOGASTRODUODENOSCOPY N/A 3/31/2016    Procedure: ESOPHAGOGASTRODUODENOSCOPY (EGD); Surgeon: Smita Johnson MD;  Location: [place] [address] GI LAB; Service:     ESOPHAGOGASTRODUODENOSCOPY N/A 4/20/2017    Procedure: ESOPHAGOGASTRODUODENOSCOPY (EGD); Surgeon: Smita Johnson MD;  Location: [place] [address] GI LAB;   Service:     EYE SURGERY      cataracts removed both eyes lens implant    HERNIA REPAIR      JOINT REPLACEMENT      left knee replacement    LIVER TRANSPLANTATION N/A     TONSILECTOMY AND ADNOIDECTOMY N/A     TUBAL LIGATION         Family History   Problem Relation Age of Onset    Cancer Mother         breast    Cancer Father     Crohn's disease Brother     Arthritis Family     Breast cancer Family      I have reviewed and agree with the history as documented  Social History     Tobacco Use    Smoking status: Former Smoker     Years: 5 00     Last attempt to quit: 1975     Years since quittin 4    Smokeless tobacco: Never Used    Tobacco comment: smoked for 5 years-1/2 to 1 ppd   Substance Use Topics    Alcohol use: No    Drug use: No        Review of Systems   Constitutional: Negative for activity change, appetite change, fatigue and fever  Respiratory: Negative for chest tightness and shortness of breath  Cardiovascular: Negative for chest pain  Gastrointestinal: Negative for abdominal pain, nausea and vomiting  Genitourinary: Positive for difficulty urinating and urgency  Negative for dysuria  Musculoskeletal: Positive for back pain  Negative for gait problem  Skin: Negative for color change and rash  Neurological: Negative for dizziness, weakness, numbness and headaches  All other systems reviewed and are negative  Physical Exam  Physical Exam   Constitutional: She is oriented to person, place, and time  She appears well-developed and well-nourished  HENT:   Head: Normocephalic  Mouth/Throat: Oropharynx is clear and moist    Cardiovascular: Normal rate and regular rhythm  Pulmonary/Chest: Effort normal  No respiratory distress  Abdominal: Soft  She exhibits no distension  There is no tenderness  Musculoskeletal:   Slight lumbar muscular tenderness  Neurological: She is alert and oriented to person, place, and time  Skin: Skin is warm and dry  No rash noted  Psychiatric: She has a normal mood and affect  Nursing note and vitals reviewed        Vital Signs  ED Triage Vitals   Temperature Pulse Respirations Blood Pressure SpO2   06/18/19 2031 06/18/19 2031 06/18/19 2031 06/18/19 2030 06/18/19 2031   97 9 °F (36 6 °C) 86 20 168/70 99 %      Temp Source Heart Rate Source Patient Position - Orthostatic VS BP Location FiO2 (%)   06/18/19 2031 06/18/19 2031 06/18/19 2031 06/18/19 2031 --   Oral Monitor Lying Right arm       Pain Score       06/18/19 2031       1           Vitals:    06/18/19 2031 06/18/19 2045 06/18/19 2100 06/18/19 2115   BP: 168/70  152/65    Pulse: 86 90 86 90   Patient Position - Orthostatic VS: Lying            Visual Acuity      ED Medications  Medications   methocarbamol (ROBAXIN) tablet 1,000 mg (has no administration in time range)   ibuprofen (MOTRIN) tablet 400 mg (400 mg Oral Given 6/18/19 2141)       Diagnostic Studies  Results Reviewed     Procedure Component Value Units Date/Time    Urine Microscopic [[id number]]  (Abnormal) Collected:  06/18/19 2122    Lab Status:  Final result Specimen:  Urine, Clean Catch Updated:  06/18/19 2144     RBC, UA 2-4 /hpf      WBC, UA 1-2 /hpf      Epithelial Cells Moderate /hpf      Bacteria, UA Occasional /hpf      Uric Acid Cherelle, UA Moderate /hpf     UA w Reflex to Microscopic w Reflex to Culture [[id number]]  (Abnormal) Collected:  06/18/19 2122    Lab Status:  Final result Specimen:  Urine, Clean Catch Updated:  06/18/19 2127     Color, UA Yellow     Clarity, UA Clear     Specific Gravity, UA 1 025     pH, UA 6 0     Leukocytes, UA Negative     Nitrite, UA Negative     Protein, UA Negative mg/dl      Glucose, UA Negative mg/dl      Ketones, UA Negative mg/dl      Urobilinogen, UA 0 2 E U /dl      Bilirubin, UA Negative     Blood, UA Small                 No orders to display              Procedures  Procedures       ED Course                               MDM  Number of Diagnoses or Management Options  Lower back pain: new and requires workup  Diagnosis management comments: 40-year-old female presented with left lower back pain radiating across to the right side beginning today after having similar symptoms about 2 weeks ago  Prior workup included lab work, CT, urinalysis  Urine positive for Klebsiella  She was treated with Bactrim  She reported recurrence of pain this evening but was relatively asymptomatic on arrival   She only developed worsening pain when she sat upright in a chair in the room and had some radiating pain across to the right  She was tender over the left lumbar musculature  Urinalysis equivocal here with 1-2 white cells, occasional bacteria  Doubt symptoms related to UTI  Bladder scan with minimal urine  Patient has urodynamics testing tomorrow  Previous labs with no renal dysfunction  Based on history exam suspect a muscular strain  Treating with NSAID, muscle relaxer  Amount and/or Complexity of Data Reviewed  Clinical lab tests: reviewed and ordered  Tests in the radiology section of CPT®: reviewed    Patient Progress  Patient progress: improved      Disposition  Final diagnoses:   Lower back pain     Time reflects when diagnosis was documented in both MDM as applicable and the Disposition within this note     Time User Action Codes Description Comment    6/18/2019 10:06 PM Antonio Moore Add [M54 5] Lower back pain       ED Disposition     ED Disposition Condition Date/Time Comment    Discharge Stable Tu Jun 18, 2019 10:06 PM Ming Koroma 60 discharge to home/self care  Follow-up Information     Follow up With Specialties Details Why Contact Info    Antonio Bergeron MD Internal Medicine, Family Medicine   [address] [place] [address]  [phone]            Patient's Medications   Discharge Prescriptions    METHOCARBAMOL (ROBAXIN) 500 MG TABLET    Take 1-2 tablets (500-1,000 mg total) by mouth 2 (two) times a day as needed for muscle spasms       Start Date: 6/18/2019 End Date: --       Order Dose: 500-1,000 mg       Quantity: 20 tablet    Refills: 0     No discharge procedures on file     ED Provider  Electronically Signed by           Radha Ellis MD  06/18/19 1880

## 2019-06-21 ENCOUNTER — TELEPHONE (OUTPATIENT)
Dept: NEUROLOGY | Facility: CLINIC | Age: 74
End: 2019-06-21

## 2019-06-21 ENCOUNTER — TELEPHONE (OUTPATIENT)
Dept: PHYSICAL THERAPY | Facility: OTHER | Age: 74
End: 2019-06-21

## 2019-06-21 NOTE — TELEPHONE ENCOUNTER
Pt called today to cancel  appt for 7/31/19   feels she does not need it  I did try to persuade the patient to reconsider    Gave her the positives, her response "I just don't wanna do it"

## 2019-06-24 ENCOUNTER — HOSPITAL ENCOUNTER (OUTPATIENT)
Dept: RADIOLOGY | Facility: HOSPITAL | Age: 74
Discharge: HOME | End: 2019-06-24
Payer: MEDICARE

## 2019-06-24 DIAGNOSIS — N30.00 ACUTE CYSTITIS WITHOUT HEMATURIA: ICD-10-CM

## 2019-06-24 PROCEDURE — 51798 US URINE CAPACITY MEASURE: CPT

## 2019-07-01 ENCOUNTER — APPOINTMENT (OUTPATIENT)
Dept: PHYSICAL THERAPY | Facility: CLINIC | Age: 74
End: 2019-07-01
Payer: MEDICARE

## 2019-07-01 NOTE — PROGRESS NOTES
Subjective:      Patient ID: Kevin Roman is a 74 y o  female  Chief Complaint   Patient presents with    Follow-up     ER f/U-[facility] Wellness Visit       She had to take the muscle relaxers and NSAIDS once and then things were better  She has not yet been to PT because she had to buy a car, was just too busy  Is going to schedule when she leaves here today  She states she has not had any recurrent pain since ER visit  Trying to stay active, but "hates to go to PT "  No neurologic symptoms  Up to date with specialty care and sees her liver transplant specialist in [place] in the near future  The following portions of the patient's history were reviewed and updated as appropriate: allergies, current medications, past family history, past medical history, past social history, past surgical history and problem list     Review of Systems   Constitutional: Negative  Respiratory: Negative  Cardiovascular: Negative  Musculoskeletal: Negative for back pain           Current Outpatient Medications   Medication Sig Dispense Refill    aspirin (ECOTRIN LOW STRENGTH) 81 mg EC tablet Take 1 tablet by mouth daily 30 tablet 0    clopidogrel (PLAVIX) 75 mg tablet Take 1 tablet (75 mg total) by mouth daily 90 tablet 1    cycloSPORINE modified (NEORAL) 25 mg capsule Take 50 mg by mouth 2 (two) times a day      levETIRAcetam (KEPPRA) 500 mg tablet TAKE 1 TABLET BY MOUTH EVERY 12 HOURS 180 tablet 1    levothyroxine 112 mcg tablet Take 1 tablet (112 mcg total) by mouth daily As directed 7 tablet 0    methocarbamol (ROBAXIN) 500 mg tablet Take 1-2 tablets (500-1,000 mg total) by mouth 2 (two) times a day as needed for muscle spasms 20 tablet 0    metoprolol tartrate (LOPRESSOR) 25 mg tablet TAKE 1 & 1/2 (ONE & ONE-HALF) TABLETS BY MOUTH TWICE DAILY 270 tablet 3    mycophenolate (CELLCEPT) 250 mg capsule Take 250 mg by mouth 2 (two) times a day 3 tabs each dose=750 mg       omeprazole (PriLOSEC) 40 MG capsule Take 40 mg by mouth daily      pravastatin (PRAVACHOL) 20 mg tablet TAKE 1 TABLET BY MOUTH ONCE DAILY AT BEDTIME 90 tablet 2    oxybutynin (DITROPAN) 5 mg tablet Take 1 tablet (5 mg total) by mouth 3 (three) times a day as needed (bladder spasms) for up to 30 days (Patient not taking: Reported on 7/3/2019) 30 tablet 0     No current facility-administered medications for this visit  Objective:    /80   Pulse 92   Temp 99 4 °F (37 4 °C)   Resp 16   Ht 5' 2" (1 575 m)   Wt 94 8 kg (209 lb)   BMI 38 23 kg/m²        Physical Exam   Constitutional: She appears well-developed and well-nourished  Eyes: Conjunctivae are normal    Neck: Neck supple  No JVD present  No thyromegaly present  Cardiovascular: Normal rate, regular rhythm, normal heart sounds and intact distal pulses  Exam reveals no gallop and no friction rub  No murmur heard  Pulmonary/Chest: Effort normal and breath sounds normal  She has no wheezes  She has no rales  Musculoskeletal: She exhibits no edema  Lumbar back: She exhibits decreased range of motion  She exhibits no tenderness, no edema, no deformity and no spasm  Assessment/Plan:    Liver transplanted [facility])  Managed by specialists  Paroxysmal SVT (supraventricular tachycardia) [facility])  Sees cardiology regularly and has loop recorder in place  Diagnoses and all orders for this visit:    Medicare annual wellness visit, subsequent    Chronic thoracic back pain, unspecified back pain laterality  Comments: This is now improved  She will make her PT appointment  Was advised to use muscle relaxants sparingly due to possible side effects  Liver [facility] ([facility] )    Paroxysmal SVT (supraventricular tachycardia) ([facility] )          Return in about 4 months (around 11/3/2019)         Emily Collins MD

## 2019-07-03 ENCOUNTER — OFFICE VISIT (OUTPATIENT)
Dept: FAMILY MEDICINE CLINIC | Facility: CLINIC | Age: 74
End: 2019-07-03
Payer: MEDICARE

## 2019-07-03 VITALS
BODY MASS INDEX: 38.46 KG/M2 | WEIGHT: 209 LBS | RESPIRATION RATE: 16 BRPM | DIASTOLIC BLOOD PRESSURE: 80 MMHG | TEMPERATURE: 99.4 F | HEART RATE: 92 BPM | SYSTOLIC BLOOD PRESSURE: 160 MMHG | HEIGHT: 62 IN

## 2019-07-03 DIAGNOSIS — I47.1 PAROXYSMAL SVT (SUPRAVENTRICULAR TACHYCARDIA) (HCC): ICD-10-CM

## 2019-07-03 DIAGNOSIS — M54.6 CHRONIC THORACIC BACK PAIN, UNSPECIFIED BACK PAIN LATERALITY: ICD-10-CM

## 2019-07-03 DIAGNOSIS — G89.29 CHRONIC THORACIC BACK PAIN, UNSPECIFIED BACK PAIN LATERALITY: ICD-10-CM

## 2019-07-03 DIAGNOSIS — Z94.4 LIVER TRANSPLANTED (HCC): ICD-10-CM

## 2019-07-03 DIAGNOSIS — Z00.00 MEDICARE ANNUAL WELLNESS VISIT, SUBSEQUENT: Primary | ICD-10-CM

## 2019-07-03 PROCEDURE — 99213 OFFICE O/P EST LOW 20 MIN: CPT | Performed by: INTERNAL MEDICINE

## 2019-07-03 PROCEDURE — G0439 PPPS, SUBSEQ VISIT: HCPCS | Performed by: INTERNAL MEDICINE

## 2019-07-03 NOTE — PROGRESS NOTES
There are no diagnoses linked to this encounter  Assessment and plan:       1  Recurrent urinary tract infections  - Patient's ultrasound is negative for any anatomic abnormalities  - Patient will follow up in 1 year for symptom reassessment  - She will call us in interim if she has urinary tract infection symptoms so we can arrange for testing  2  Lower urinary tract symptoms  - Discussed behavioral modifications including timed voiding, avoidance of bladder irritants, elevating lower extremities in the evening prior to bed, increasing hydration with water  - she is not currently taking oxybutynin, reports she was previously on Myrbetriq which was cost prohibitive, and at this time she would like to continue to manage her symptoms conservatively  [facility]      Chief Complaint     Chief Complaint   Patient presents with    acute cystitis         History of Present Illness     Davion Sutherland is a 74 y o  female patient recently seen by Dr Speedy Guevara for recurrent urinary tract infections  She is a former patient of Dr Yesenia Amaya  At that appointment she reported longstanding problems worsening over the last month  Prior to this appointment she had developed significant urinary urgency and frequency with small volume voiding despite use of oxybutynin 5 mg as needed for urinary symptoms  She was diagnosed with urinary tract infection caused by Klebsiella and treated with Keflex successfully  Postvoid residual at that appointment was checked revealing 80 mL  Ultrasound of the kidneys and bladder was performed 06/24/2019  This demonstrated no anatomic abnormalities  Small, bilateral, simple cysts were noted as well as a right 4 mm mid pole nonobstructing calculus  This testing revealed no significant postvoid residual volume            Laboratory     Lab Results   Component Value Date    CREATININE 0 94 06/10/2019       No results found for: PSA    No results found for this or any previous visit (from the past 1 hour(s))  Review of Systems     Review of Systems   Constitutional: Negative for chills and fever  Respiratory: Negative for shortness of breath  Cardiovascular: Negative for chest pain  Gastrointestinal: Positive for diarrhea (Ulcerative colitis)  Negative for constipation, nausea and vomiting  Genitourinary: Positive for frequency and urgency  Negative for difficulty urinating, dysuria, enuresis, flank pain and hematuria  Allergies     Allergies   Allergen Reactions    Tetracyclines & Related Hives    Vancomycin Other (See Comments) and Itching     Reaction Date: [id number]; Pt unsure of reaction    Prograf [Tacrolimus] Anxiety     Reaction Date: [id number]; Physical Exam     Physical Exam   Constitutional: She is oriented to person, place, and time  She appears well-developed and well-nourished  No distress  Obese   HENT:   Head: Normocephalic and atraumatic  Right Ear: External ear normal    Left Ear: External ear normal    Nose: Nose normal    Eyes: Right eye exhibits no discharge  Left eye exhibits no discharge  No scleral icterus  Cardiovascular: Normal rate  Pulmonary/Chest: Effort normal    Musculoskeletal:   Ambulates independently but slowly   Neurological: She is alert and oriented to person, place, and time  Skin: Skin is warm and dry  She is not diaphoretic  Psychiatric: She has a normal mood and affect  Her behavior is normal  Judgment and thought content normal    Nursing note and vitals reviewed          Vital Signs     Vitals:    07/05/19 1432   BP: 122/74   BP Location: Left arm   Patient Position: Sitting   Cuff Size: Extra-Large   Pulse: 84   Weight: 94 8 kg (209 lb)   Height: 5' 2" (1 575 m)         Current Medications       Current Outpatient Medications:     aspirin (ECOTRIN LOW STRENGTH) 81 mg EC tablet, Take 1 tablet by mouth daily, Disp: 30 tablet, Rfl: 0    clopidogrel (PLAVIX) 75 mg tablet, Take 1 tablet (75 mg total) by mouth daily, Disp: 90 tablet, Rfl: 1    cycloSPORINE modified (NEORAL) 25 mg capsule, Take 50 mg by mouth 2 (two) times a day, Disp: , Rfl:     levETIRAcetam (KEPPRA) 500 mg tablet, TAKE 1 TABLET BY MOUTH EVERY 12 HOURS, Disp: 180 tablet, Rfl: 1    levothyroxine 112 mcg tablet, Take 1 tablet (112 mcg total) by mouth daily As directed, Disp: 7 tablet, Rfl: 0    methocarbamol (ROBAXIN) 500 mg tablet, Take 1-2 tablets (500-1,000 mg total) by mouth 2 (two) times a day as needed for muscle spasms, Disp: 20 tablet, Rfl: 0    metoprolol tartrate (LOPRESSOR) 25 mg tablet, TAKE 1 & 1/2 (ONE & ONE-HALF) TABLETS BY MOUTH TWICE DAILY, Disp: 270 tablet, Rfl: 3    mycophenolate (CELLCEPT) 250 mg capsule, Take 250 mg by mouth 2 (two) times a day 3 tabs each dose=750 mg , Disp: , Rfl:     omeprazole (PriLOSEC) 40 MG capsule, Take 40 mg by mouth daily, Disp: , Rfl:     pravastatin (PRAVACHOL) 20 mg tablet, TAKE 1 TABLET BY MOUTH ONCE DAILY AT BEDTIME, Disp: 90 tablet, Rfl: 2    oxybutynin (DITROPAN) 5 mg tablet, Take 1 tablet (5 mg total) by mouth 3 (three) times a day as needed (bladder spasms) for up to 30 days (Patient not taking: Reported on 7/3/2019), Disp: 30 tablet, Rfl: 0      Active Problems     Patient Active Problem List   Diagnosis    [facility] )    Hypothyroidism    Long-term use of immunosuppressant medication    Anemia    TIA (transient ischemic attack)    HLD (hyperlipidemia)    History of seizure    Paroxysmal SVT (supraventricular tachycardia) (HCC)    Altered mental status    Cerebrovascular accident (CVA) due to embolism (HCC)    Non-ST elevation myocardial infarction (NSTEMI), type 2    Abnormal EEG    Ataxic gait    Benign essential hypertension    Chronic joint pain    Chronic kidney insufficiency    Cyst of diaphragm    Depression    Elevated homocysteine (HCC)    Hypercalcemia    Lung nodule, solitary    Peripheral arterial disease (HCC)    Sciatica    Ulcerative colitis without complications (HCC)    Asymptomatic stenosis of left carotid artery    Dementia    Left ovarian cyst         Past Medical History     Past Medical History:   Diagnosis Date    Anemia     Anxiety     Arthritis     Benign paroxysmal vertigo, unspecified ear     onset: 05/06/2010    Cirrhosis ([facility] )     onset: 03/30/2005    Disease of thyroid gland     GERD (gastroesophageal reflux disease)     History of transfusion     Liver transplant candidate     onset: 09/16/2008    Osteoporosis     Pelvic fracture ([facility] )     onset: 10/14/2008         Surgical History     Past Surgical History:   Procedure Laterality Date    ABDOMINAL SURGERY      APPENDECTOMY      BLADDER AUGMENTATION N/A     CATARACT EXTRACTION      CHOLECYSTECTOMY N/A     COLONOSCOPY N/A 4/20/2017    Procedure: COLONOSCOPY;  Surgeon: Mojgan Iverson MD;  Location: [facility] GI LAB; Service:     ESOPHAGOGASTRODUODENOSCOPY N/A 3/31/2016    Procedure: ESOPHAGOGASTRODUODENOSCOPY (EGD); Surgeon: Mojgan Iverson MD;  Location: [facility] GI LAB; Service:     ESOPHAGOGASTRODUODENOSCOPY N/A 4/20/2017    Procedure: ESOPHAGOGASTRODUODENOSCOPY (EGD); Surgeon: Mojgan Iverson MD;  Location: [facility] GI LAB;   Service:     EYE SURGERY      cataracts removed both eyes lens implant    HERNIA REPAIR      JOINT REPLACEMENT      left knee replacement    LIVER TRANSPLANTATION N/A     TONSILECTOMY AND ADNOIDECTOMY N/A     TUBAL LIGATION           Family History     Family History   Problem Relation Age of Onset    Cancer Mother         breast    Cancer Father     Crohn's disease Brother     Arthritis Family     Breast cancer Family          Social History     Social History       Radiology

## 2019-07-03 NOTE — PROGRESS NOTES
Assessment and Plan:     Problem List Items Addressed This Visit     None      Visit Diagnoses     Medicare annual wellness visit, subsequent    -  Primary    Chronic thoracic back pain, unspecified back pain laterality             History of Present Illness:     Patient presents for Medicare Annual Wellness visit    Patient Care Team:  Nida Rodríguez MD as PCP - General (Internal Medicine)  MATTHEW Mejía MD Mitchell Katos, MD Felicity Scotland, MD Elonda Dense, MD Dean Lown, MD as Endoscopist     Problem List:     Patient Active Problem List   Diagnosis    Liver transplanted [facility])    Hypothyroidism    Long-term use of immunosuppressant medication    Anemia    TIA (transient ischemic attack)    HLD (hyperlipidemia)    History of seizure    Paroxysmal SVT (supraventricular tachycardia) (HCC)    Altered mental status    Cerebrovascular accident (CVA) due to embolism ([facility] [address]    Non-ST elevation myocardial infarction (NSTEMI), type 2    Abnormal EEG    Ataxic gait    Benign essential hypertension    Chronic joint pain    Chronic kidney insufficiency    Cyst of diaphragm    Depression    Elevated homocysteine (HCC)    Hypercalcemia    Lung nodule, solitary    Peripheral arterial disease ([facility] [address]    Sciatica    Ulcerative colitis without complications ([address] )    Asymptomatic stenosis of left carotid artery    Dementia    Left ovarian cyst      Past Medical and Surgical History:     Past Medical History:   Diagnosis Date    Anemia     Anxiety     Arthritis     Benign paroxysmal vertigo, unspecified ear     onset: 05/06/2010    Cirrhosis ([address] )     onset: 03/30/2005    Disease of thyroid gland     GERD (gastroesophageal reflux disease)     History of transfusion     Liver transplant candidate     onset: 09/16/2008    Osteoporosis     Pelvic fracture ([address] )     onset: 10/14/2008     Past Surgical History:   Procedure Laterality Date    ABDOMINAL SURGERY      APPENDECTOMY      BLADDER AUGMENTATION N/A     CATARACT EXTRACTION      CHOLECYSTECTOMY N/A     COLONOSCOPY N/A 2017    Procedure: COLONOSCOPY;  Surgeon: Lashay Enciso MD;  Location: [place] [address] GI LAB; Service:     ESOPHAGOGASTRODUODENOSCOPY N/A 3/31/2016    Procedure: ESOPHAGOGASTRODUODENOSCOPY (EGD); Surgeon: Lashay Enciso MD;  Location: [place] [address] GI LAB; Service:     ESOPHAGOGASTRODUODENOSCOPY N/A 2017    Procedure: ESOPHAGOGASTRODUODENOSCOPY (EGD); Surgeon: Lashay Enciso MD;  Location: [place] [address] GI LAB;   Service:     EYE SURGERY      cataracts removed both eyes lens implant    HERNIA REPAIR      JOINT REPLACEMENT      left knee replacement    LIVER TRANSPLANTATION N/A     TONSILECTOMY AND ADNOIDECTOMY N/A     TUBAL LIGATION        Family History:     Family History   Problem Relation Age of Onset    Cancer Mother         breast    Cancer Father     Crohn's disease Brother     Arthritis Family     Breast cancer Family       Social History:     Social History     Tobacco Use   Smoking Status Former Smoker    Years: 5 00    Last attempt to quit: 1975    Years since quittin 4   Smokeless Tobacco Never Used   Tobacco Comment    smoked for 5 years-1/2 to 1 ppd     Social History     Substance and Sexual Activity   Alcohol Use No     Social History     Substance and Sexual Activity   Drug Use No      Medications and Allergies:     Current Outpatient Medications   Medication Sig Dispense Refill    aspirin (ECOTRIN LOW STRENGTH) 81 mg EC tablet Take 1 tablet by mouth daily 30 tablet 0    clopidogrel (PLAVIX) 75 mg tablet Take 1 tablet (75 mg total) by mouth daily 90 tablet 1    cycloSPORINE modified (NEORAL) 25 mg capsule Take 50 mg by mouth 2 (two) times a day      levETIRAcetam (KEPPRA) 500 mg tablet TAKE 1 TABLET BY MOUTH EVERY 12 HOURS 180 tablet 1    levothyroxine 112 mcg tablet Take 1 tablet (112 mcg total) by mouth daily As directed 7 tablet 0    methocarbamol (ROBAXIN) 500 mg tablet Take 1-2 tablets (500-1,000 mg total) by mouth 2 (two) times a day as needed for muscle spasms 20 tablet 0    metoprolol tartrate (LOPRESSOR) 25 mg tablet TAKE 1 & 1/2 (ONE & ONE-HALF) TABLETS BY MOUTH TWICE DAILY 270 tablet 3    mycophenolate (CELLCEPT) 250 mg capsule Take 250 mg by mouth 2 (two) times a day 3 tabs each dose=750 mg       omeprazole (PriLOSEC) 40 MG capsule Take 40 mg by mouth daily      pravastatin (PRAVACHOL) 20 mg tablet TAKE 1 TABLET BY MOUTH ONCE DAILY AT BEDTIME 90 tablet 2    oxybutynin (DITROPAN) 5 mg tablet Take 1 tablet (5 mg total) by mouth 3 (three) times a day as needed (bladder spasms) for up to 30 days (Patient not taking: Reported on 7/3/2019) 30 tablet 0     No current facility-administered medications for this visit  Allergies   Allergen Reactions    Tetracyclines & Related Hives    Vancomycin Other (See Comments) and Itching     Reaction Date: [id number]; Pt unsure of reaction    Prograf [Tacrolimus] Anxiety     Reaction Date: [id number]; Immunizations:     Immunization History   Administered Date(s) Administered    H1N1, All Formulations 10/31/2009    Hep A, adult 11/19/1996, 05/21/1997    INFLUENZA 11/01/2018    Influenza Split High Dose Preservative Free IM 10/11/2012, 09/25/2013, 10/31/2014, 09/03/2015, 09/29/2016, 10/17/2017    Influenza TIV (IM) 10/07/2005, 10/03/2006, 10/25/2007, 09/16/2008, 09/09/2009, 09/22/2010, 09/14/2011    Pneumococcal Conjugate 13-Valent 06/30/2015    Pneumococcal Polysaccharide PPV23 09/30/2006, 09/14/2011    Tdap 03/13/2018      Medicare Screening Tests and Risk Assessments: Lorie Kawasaki is here for her Subsequent Wellness visit  Health Risk Assessment:  Patient rates overall health as fair  Patient feels that their physical health rating is Same  Eyesight was rated as Slightly worse  Hearing was rated as Same  Patient feels that their emotional and mental health rating is Same   Pain experienced by patient in the last 7 days has been None  Patient states that she has experienced no weight loss or gain in last 6 months  Emotional/Mental Health:  Patient has not been feeling nervous/anxious  PHQ-9 Depression Screening:    Frequency of the following problems over the past two weeks:      1  Little interest or pleasure in doing things: 0 - not at all      2  Feeling down, depressed, or hopeless: 0 - not at all  PHQ-2 Score: 0          Bladder/Bowel:  Patient has leaked urine accidently in the last six months  Patient reports no loss of bowel control  Immunizations:  Patient has had a flu vaccination within the last year  Patient has received a pneumonia shot  Patient has not received a shingles shot  Patient has not received tetanus/diphtheria shot  Home Safety:  Patient does not have trouble with stairs inside or outside of their home  Patient currently reports that there are no safety hazards present in home, working smoke alarms, working carbon monoxide detectors  Preventative Screenings:   Breast cancer screening performed, colon cancer screen completed, cholesterol screen completed, glaucoma eye exam completed,     Nutrition:  Current diet: Regular and Limited junk food with servings of the following:    Medications:  Patient is not currently taking any over-the-counter supplements  Patient is not able to manage medications  (Additional Comments:  helps  )Lifestyle Choices:  Patient reports no tobacco use  Patient has smoked or used tobacco in the past   Patient has stopped her tobacco use  Tobacco use quit date: 1970  Patient reports no alcohol use  Patient does not drive a vehicle  Patient wears seat belt  Current level of exercise of physical activity described by patient as: walking          Activities of Daily Living:  Can get out of bed by his or her self, able to dress self, able to make own meals, able to do own shopping, able to bathe self, can do own laundry/housekeeping, can manage own money, pay bills and track expenses    Previous Hospitalizations:  Hospitalization or ED visit in past 12 months  Number of hospitalizations within the last year: 1-2        Advanced Directives:  Patient has decided on a power of   Patient has spoken to designated power of   Patient has completed advanced directive  Preventative Screening/Counseling:      Cardiovascular:      General: Risks and Benefits Discussed and Screening Current      Counseling: Healthy Diet, Healthy Weight, Improve Cholesterol, Improve Blood Pressure and Improve Exercise Tolerance          Diabetes:      General: Risks and Benefits Discussed and Screening Current          Colorectal Cancer:      General: Screening Current          Breast Cancer:      General: Screening Current          Cervical Cancer:      General: Screening Not Indicated          Osteoporosis:      General: Patient Declines      Counseling: Calcium and Vitamin D Intake and Regular Weightbearing Exercise          AAA:      General: Screening Not Indicated          Glaucoma:      General: Screening Current          HIV:      General: Screening Not Indicated          Hepatitis C:      General: Screening Not Indicated        Advanced Directives:   Patient has living will for healthcare, has durable POA for healthcare,     Immunizations:      Influenza: Influenza UTD This Year      Pneumococcal: Lifetime Vaccine Completed      Shingrix: Risks & Benefits Discussed      TDAP: Tdap Vaccine UTD      Other Preventative Counseling (Non-Medicare):   Fall Prevention, Increase physical activity and Weight reduction discussed

## 2019-07-03 NOTE — PATIENT INSTRUCTIONS
Obesity   AMBULATORY CARE:   Obesity  is when your body mass index (BMI) is greater than 30  Your healthcare provider will use your height and weight to measure your BMI  The risks of obesity include  many health problems, such as injuries or physical disability  You may need tests to check for the following:  · Diabetes     · High blood pressure or high cholesterol     · Heart disease     · Gallbladder or liver disease     · Cancer of the colon, breast, prostate, liver, or kidney     · Sleep apnea     · Arthritis or gout  Seek care immediately if:   · You have a severe headache, confusion, or difficulty speaking  · You have weakness on one side of your body  · You have chest pain, sweating, or shortness of breath  Contact your healthcare provider if:   · You have symptoms of gallbladder or liver disease, such as pain in your upper abdomen  · You have knee or hip pain and discomfort while walking  · You have symptoms of diabetes, such as intense hunger and thirst, and frequent urination  · You have symptoms of sleep apnea, such as snoring or daytime sleepiness  · You have questions or concerns about your condition or care  Treatment for obesity  focuses on helping you lose weight to improve your health  Even a small decrease in BMI can reduce the risk for many health problems  Your healthcare provider will help you set a weight-loss goal   · Lifestyle changes  are the first step in treating obesity  These include making healthy food choices and getting regular physical activity  Your healthcare provider may suggest a weight-loss program that involves coaching, education, and therapy  · Medicine  may help you lose weight when it is used with a healthy diet and physical activity  · Surgery  can help you lose weight if you are very obese and have other health problems  There are several types of weight-loss surgery  Ask your healthcare provider for more information    Be successful losing weight:   · Set small, realistic goals  An example of a small goal is to walk for 20 minutes 5 days a week  Anther goal is to lose 5% of your body weight  · Tell friends, family members, and coworkers about your goals  and ask for their support  Ask a friend to lose weight with you, or join a weight-loss support group  · Identify foods or triggers that may cause you to overeat , and find ways to avoid them  Remove tempting high-calorie foods from your home and workplace  Place a bowl of fresh fruit on your kitchen counter  If stress causes you to eat, then find other ways to cope with stress  · Keep a diary to track what you eat and drink  Also write down how many minutes of physical activity you do each day  Weigh yourself once a week and record it in your diary  Eating changes: You will need to eat 500 to 1,000 fewer calories each day than you currently eat to lose 1 to 2 pounds a week  The following changes will help you cut calories:  · Eat smaller portions  Use small plates, no larger than 9 inches in diameter  Fill your plate half full of fruits and vegetables  Measure your food using measuring cups until you know what a serving size looks like  · Eat 3 meals and 1 or 2 snacks each day  Plan your meals in advance  Rosalind More and eat at home most of the time  Eat slowly  · Eat fruits and vegetables at every meal   They are low in calories and high in fiber, which makes you feel full  Do not add butter, margarine, or cream sauce to vegetables  Use herbs to season steamed vegetables  · Eat less fat and fewer fried foods  Eat more baked or grilled chicken and fish  These protein sources are lower in calories and fat than red meat  Limit fast food  Dress your salads with olive oil and vinegar instead of bottled dressing  · Limit the amount of sugar you eat  Do not drink sugary beverages  Limit alcohol  Activity changes:  Physical activity is good for your body in many ways   It helps you burn calories and build strong muscles  It decreases stress and depression, and improves your mood  It can also help you sleep better  Talk to your healthcare provider before you begin an exercise program   · Exercise for at least 30 minutes 5 days a week  Start slowly  Set aside time each day for physical activity that you enjoy and that is convenient for you  It is best to do both weight training and an activity that increases your heart rate, such as walking, bicycling, or swimming  · Find ways to be more active  Do yard work and housecleaning  Walk up the stairs instead of using elevators  Spend your leisure time going to events that require walking, such as outdoor festivals or fairs  This extra physical activity can help you lose weight and keep it off  Follow up with your healthcare provider as directed: You may need to meet with a dietitian  Write down your questions so you remember to ask them during your visits  © 2017 [employer] Information is for End User's use only and may not be sold, redistributed or otherwise used for commercial purposes  All illustrations and images included in CareNotes® are the copyrighted property of [employer]  or [employer]askar [employer]h  The above information is an  only  It is not intended as medical advice for individual conditions or treatments  Talk to your doctor, nurse or pharmacist before following any medical regimen to see if it is safe and effective for you  Urinary Incontinence   WHAT YOU NEED TO KNOW:   What is urinary incontinence? Urinary incontinence (UI) is when you lose control of your bladder  What causes UI? UI occurs because your bladder cannot store or empty urine properly  The following are the most common types of UI:  · Stress incontinence  is when you leak urine due to increased bladder pressure  This may happen when you cough, sneeze, or exercise       · Urge incontinence  is when you feel the need to urinate right away and leak urine accidentally  · Mixed incontinence  is when you have both stress and urge UI  What are the signs and symptoms of UI?   · You feel like your bladder does not empty completely when you urinate  · You urinate often and need to urinate immediately  · You leak urine when you sleep, or you wake up with the urge to urinate  · You leak urine when you cough, sneeze, exercise, or laugh  How is UI diagnosed? Your healthcare provider will ask how often you leak urine and whether you have stress or urge symptoms  Tell him which medicines you take, how often you urinate, and how much liquid you drink each day  You may need any of the following tests:  · Urine tests  may show infection or kidney function  · A pelvic exam  may be done to check for blockages  A pelvic exam will also show if your bladder, uterus, or other organs have moved out of place  · An x-ray, ultrasound, or CT  may show problems with parts of your urinary system  You may be given contrast liquid to help your organs show up better in the pictures  Tell the healthcare provider if you have ever had an allergic reaction to contrast liquid  Do not enter the MRI room with anything metal  Metal can cause serious injury  Tell the healthcare provider if you have any metal in or on your body  · A bladder scan  will show how much urine is left in your bladder after you urinate  You will be asked to urinate and then healthcare providers will use a small ultrasound machine to check the urine left in your bladder  · Cystometry  is used to check the function of your urinary system  Your healthcare provider checks the pressure in your bladder while filling it with fluid  Your bladder pressure may also be tested when your bladder is full and while you urinate  How is UI treated? · Medicines  can help strengthen your bladder control      · Electrical stimulation  is used to send a small amount of electrical energy to your pelvic floor muscles  This helps control your bladder function  Electrodes may be placed outside your body or in your rectum  For women, the electrodes may be placed in the vagina  · A bulking agent  may be injected into the wall of your urethra to make it thicker  This helps keep your urethra closed and decreases urine leakage  · Devices  such as a clamp, pessary, or tampon may help stop urine leaks  Ask your healthcare provider for more information about these and other devices  · Surgery  may be needed if other treatments do not work  Several types of surgery can help improve your bladder control  Ask your healthcare provider for more information about the surgery you may need  How can I manage my symptoms? · Do pelvic muscle exercises often  Your pelvic muscles help you stop urinating  Squeeze these muscles tight for 5 seconds, then relax for 5 seconds  Gradually work up to squeezing for 10 seconds  Do 3 sets of 15 repetitions a day, or as directed  This will help strengthen your pelvic muscles and improve bladder control  · A catheter  may be used to help empty your bladder  A catheter is a tiny, plastic tube that is put into your bladder to drain your urine  Your healthcare provider may tell you to use a catheter to prevent your bladder from getting too full and leaking urine  · Keep a UI record  Write down how often you leak urine and how much you leak  Make a note of what you were doing when you leaked urine  · Train your bladder  Go to the bathroom at set times, such as every 2 hours, even if you do not feel the urge to go  You can also try to hold your urine when you feel the urge to go  For example, hold your urine for 5 minutes when you feel the urge to go  As that becomes easier, hold your urine for 10 minutes  · Drink liquids as directed  Ask your healthcare provider how much liquid to drink each day and which liquids are best for you   You may need to limit the amount of liquid you drink to help control your urine leakage  Limit or do not have drinks that contain caffeine or alcohol  Do not drink any liquid right before you go to bed  · Prevent constipation  Eat a variety of high-fiber foods  Good examples are high-fiber cereals, beans, vegetables, and whole-grain breads  Prune juice may help make your bowel movement softer  Walking is the best way to trigger your intestines to have a bowel movement  · Exercise regularly and maintain a healthy weight  Ask your healthcare provider how much you should weigh and about the best exercise plan for you  Weight loss and exercise will decrease pressure on your bladder and help you control your leakage  Ask him to help you create a weight loss plan if you are overweight  When should I seek immediate care? · You have severe pain  · You are confused or cannot think clearly  When should I contact my healthcare provider? · You have a fever  · You see blood in your urine  · You have pain when you urinate  · You have new or worse pain, even after treatment  · Your mouth feels dry or you have vision changes  · Your urine is cloudy or smells bad  · You have questions or concerns about your condition or care  CARE AGREEMENT:   You have the right to help plan your care  Learn about your health condition and how it may be treated  Discuss treatment options with your caregivers to decide what care you want to receive  You always have the right to refuse treatment  The above information is an  only  It is not intended as medical advice for individual conditions or treatments  Talk to your doctor, nurse or pharmacist before following any medical regimen to see if it is safe and effective for you  © 2017 [employer] Information is for End User's use only and may not be sold, redistributed or otherwise used for commercial purposes   All illustrations and images included in CareNotes® are the copyrighted property of [employer]  or Bhaskar Dinh  Cigarette Smoking and Your Health   AMBULATORY CARE:   Risks to your health if you smoke:  Nicotine and other chemicals found in tobacco damage every cell in your body  Even if you are a light smoker, you have an increased risk for cancer, heart disease, and lung disease  If you are pregnant or have diabetes, smoking increases your risk for complications  Benefits to your health if you stop smoking:   · You decrease respiratory symptoms such as coughing, wheezing, and shortness of breath  · You reduce your risk for cancers of the lung, mouth, throat, kidney, bladder, pancreas, stomach, and cervix  If you already have cancer, you increase the benefits of chemotherapy  You also reduce your risk for cancer returning or a second cancer from developing  · You reduce your risk for heart disease, blood clots, heart attack, and stroke  · You reduce your risk for lung infections, and diseases such as pneumonia, asthma, chronic bronchitis, and emphysema  · Your circulation improves  More oxygen can be delivered to your body  If you have diabetes, you lower your risk for complications, such as kidney, artery, and eye diseases  You also lower your risk for nerve damage  Nerve damage can lead to amputations, poor vision, and blindness  · You improve your body's ability to heal and to fight infections  Benefits to the health of others if you stop smoking:  Tobacco is harmful to nonsmokers who breathe in your secondhand smoke  The following are ways the health of others around you may improve when you stop smoking:  · You lower the risks for lung cancer and heart disease in nonsmoking adults  · If you are pregnant, you lower the risk for miscarriage, early delivery, low birth weight, and stillbirth  You also lower your baby's risk for SIDS, obesity, developmental delay, and neurobehavioral problems, such as ADHD  · If you have children, you lower their risk for ear infections, colds, pneumonia, bronchitis, and asthma  For more information and support to stop smoking:   · Smokefree  gov  Phone: 4- 453 - 417-4275  Web Address: www smokefree  gov  Follow up with your healthcare provider as directed:  Write down your questions so you remember to ask them during your visits  © 2017 [employer] Information is for End User's use only and may not be sold, redistributed or otherwise used for commercial purposes  All illustrations and images included in CareNotes® are the copyrighted property of [employer]  or Bhaskar Dinh  The above information is an  only  It is not intended as medical advice for individual conditions or treatments  Talk to your doctor, nurse or pharmacist before following any medical regimen to see if it is safe and effective for you  Fall Prevention   AMBULATORY CARE:   Fall prevention  includes ways to make your home and other areas safer  It also includes ways you can move more carefully to prevent a fall  Health conditions that cause changes in your blood pressure, vision, or muscle strength and coordination may increase your risk for falls  Medicines may also increase your risk for falls if they make you dizzy, weak, or sleepy  Call 911 or have someone else call if:   · You have fallen and are unconscious  · You have fallen and cannot move part of your body  Contact your healthcare provider if:   · You have fallen and have pain or a headache  · You have questions or concerns about your condition or care  Fall prevention tips:   · Stand or sit up slowly  This may help you keep your balance and prevent falls  · Use assistive devices as directed  Your healthcare provider may suggest that you use a cane or walker to help you keep your balance  You may need to have grab bars put in your bathroom near the toilet or in the shower      · Wear shoes that fit well and have soles that   Wear shoes both inside and outside  Use slippers with good   Do not wear shoes with high heels  · Wear a personal alarm  This is a device that allows you to call 911 if you fall and need help  Ask your healthcare provider for more information  · Stay active  Exercise can help strengthen your muscles and improve your balance  Your healthcare provider may recommend water aerobics or walking  He or she may also recommend physical therapy to improve your coordination  Never start an exercise program without talking to your healthcare provider first      · Manage your medical conditions  Keep all appointments with your healthcare providers  Visit your eye doctor as directed  Home safety tips:   · Add items to prevent falls in the bathroom  Put nonslip strips on your bath or shower floor to prevent you from slipping  Use a bath mat if you do not have carpet in the bathroom  This will prevent you from falling when you step out of the bath or shower  Use a shower seat so you do not need to stand while you shower  Sit on the toilet or a chair in your bathroom to dry yourself and put on clothing  This will prevent you from losing your balance from drying or dressing yourself while you are standing  · Keep paths clear  Remove books, shoes, and other objects from walkways and stairs  Place cords for telephones and lamps out of the way so that you do not need to walk over them  Tape them down if you cannot move them  Remove small rugs  If you cannot remove a rug, secure it with double-sided tape  This will prevent you from tripping  · Install bright lights in your home  Use night lights to help light paths to the bathroom or kitchen  Always turn on the light before you start walking  · Keep items you use often on shelves within reach  Do not use a step stool to help you reach an item  · Paint or place reflective tape on the edges of your stairs    This will help you see the stairs better  Follow up with your healthcare provider as directed:  Write down your questions so you remember to ask them during your visits  © 2017 [employer] Information is for End User's use only and may not be sold, redistributed or otherwise used for commercial purposes  All illustrations and images included in CareNotes® are the copyrighted property of [employer]  or Bhaskar [employer]Dnh  The above information is an  only  It is not intended as medical advice for individual conditions or treatments  Talk to your doctor, nurse or pharmacist before following any medical regimen to see if it is safe and effective for you  Advance Directives   WHAT YOU NEED TO KNOW:   What are advance directives? Advance directives are legal documents that state your wishes and plans for medical care  These plans are made ahead of time in case you lose your ability to make decisions for yourself  Advance directives can apply to any medical decision, such as the treatments you want, and if you want to donate organs  What are the types of advance directives? There are many types of advance directives, and each state has rules about how to use them  You may choose a combination of any of the following:  · Living will: This is a written record of the treatment you want  You can also choose which treatments you do not want, which to limit, and which to stop at a certain time  This includes surgery, medicine, IV fluid, and tube feedings  · Durable power of  for healthcare [facility]): This is a written record that states who you want to make healthcare choices for you when you are unable to make them for yourself  This person, called a proxy, is usually a family member or a friend  You may choose more than 1 proxy  · Do not resuscitate (DNR) order:  A DNR order is used in case your heart stops beating or you stop breathing   It is a request not to have certain forms of treatment, such as CPR  A DNR order may be included in other types of advance directives  · Medical directive: This covers the care that you want if you are in a coma, near death, or unable to make decisions for yourself  You can list the treatments you want for each condition  Treatment may include pain medicine, surgery, blood transfusions, dialysis, IV or tube feedings, and a ventilator (breathing machine)  · Values history: This document has questions about your views, beliefs, and how you feel and think about life  This information can help others choose the care that you would choose  Why are advance directives important? An advance directive helps you control your care  Although spoken wishes may be used, it is better to have your wishes written down  Spoken wishes can be misunderstood, or not followed  Treatments may be given even if you do not want them  An advance directive may make it easier for your family to make difficult choices about your care  How do I decide what to put in my advance directives? · Make informed decisions:  Make sure you fully understand treatments or care you may receive  Think about the benefits and problems your decisions could cause for you or your family  Talk to healthcare providers if you have concerns or questions before you write down your wishes  You may also want to talk with your Worship or , or a   Check your state laws to make sure that what you put in your advance directive is legal      · Sign all forms:  Sign and date your advance directive when you have finished  You may also need 2 witnesses to sign the forms  Witnesses cannot be your doctor or his staff, your spouse, heirs or beneficiaries, people you owe money to, or your chosen proxy  Talk to your family, proxy, and healthcare providers about your advance directive  Give each person a copy, and keep one for yourself in a place you can get to easily   Do not keep it hidden or locked away  · Review and revise your plans: You can revise your advance directive at any time, as long as you are able to make decisions  Review your plan every year, and when there are changes in your life, or your health  When you make changes, let your family, proxy, and healthcare providers know  Give each a new copy  Where can I find more information? · [employer]  [address] , [place]  Phone: 6- [phone] - [phone]  Phone: 6- 630 - [phone]  Web Address: http://www  aafp org  · [address] [facility])  [address] [facility], [address]  Phone: [phone]  Phone: [phone]  Web Address: Ben beverly  CARE AGREEMENT:   You have the right to help plan your care  To help with this plan, you must learn about your health condition and treatment options  You must also learn about advance directives and how they are used  Work with your healthcare providers to decide what care will be used to treat you  You always have the right to refuse treatment  The above information is an  only  It is not intended as medical advice for individual conditions or treatments  Talk to your doctor, nurse or pharmacist before following any medical regimen to see if it is safe and effective for you  © 2017 [employer] Information is for End User's use only and may not be sold, redistributed or otherwise used for commercial purposes  All illustrations and images included in CareNotes® are the copyrighted property of [employer]  or Bhaskar Dinh

## 2019-07-05 ENCOUNTER — REMOTE DEVICE CLINIC VISIT (OUTPATIENT)
Dept: CARDIOLOGY CLINIC | Facility: CLINIC | Age: 74
End: 2019-07-05
Payer: MEDICARE

## 2019-07-05 ENCOUNTER — OFFICE VISIT (OUTPATIENT)
Dept: UROLOGY | Facility: CLINIC | Age: 74
End: 2019-07-05
Payer: MEDICARE

## 2019-07-05 VITALS
DIASTOLIC BLOOD PRESSURE: 74 MMHG | HEIGHT: 62 IN | BODY MASS INDEX: 38.46 KG/M2 | SYSTOLIC BLOOD PRESSURE: 122 MMHG | WEIGHT: 209 LBS | HEART RATE: 84 BPM

## 2019-07-05 DIAGNOSIS — Z95.818 PRESENCE OF CARDIAC DEVICE: Primary | ICD-10-CM

## 2019-07-05 DIAGNOSIS — N39.41 URGE INCONTINENCE OF URINE: Primary | ICD-10-CM

## 2019-07-05 DIAGNOSIS — N39.0 FREQUENT URINARY TRACT INFECTIONS: ICD-10-CM

## 2019-07-05 PROCEDURE — 93299 PR REM INTERROG ICPMS/SCRMS <30 D TECH REVIEW: CPT | Performed by: INTERNAL MEDICINE

## 2019-07-05 PROCEDURE — 99213 OFFICE O/P EST LOW 20 MIN: CPT | Performed by: PHYSICIAN ASSISTANT

## 2019-07-05 PROCEDURE — 93298 REM INTERROG DEV EVAL SCRMS: CPT | Performed by: INTERNAL MEDICINE

## 2019-07-05 NOTE — PROGRESS NOTES
Results for orders placed or performed in visit on 07/05/19   Cardiac EP device report    Narrative    CARELINK TRANSMISSION: BATTERY STATUS "OK"   NO PATIENT OR DEVICE ACTIVATED ---LYNN

## 2019-07-13 DIAGNOSIS — R56.9 SEIZURES (HCC): ICD-10-CM

## 2019-07-13 RX ORDER — LEVETIRACETAM 500 MG/1
500 TABLET ORAL EVERY 12 HOURS
Qty: 10 TABLET | Refills: 0 | Status: SHIPPED | OUTPATIENT
Start: 2019-07-13 | End: 2019-07-18 | Stop reason: SDUPTHER

## 2019-07-13 NOTE — TELEPHONE ENCOUNTER
As patient is completely out, will send 5 days supply of keppra and will contact neurology on Monday for further supply   MATTHEW Luna

## 2019-07-13 NOTE — TELEPHONE ENCOUNTER
[facility]     Needs this medication today  Levetiracetam REFILL Medication      Please call patient when ordered [phone]

## 2019-07-15 RX ORDER — LEVETIRACETAM 500 MG/1
500 TABLET ORAL EVERY 12 HOURS
Qty: 180 TABLET | Refills: 0 | OUTPATIENT
Start: 2019-07-15

## 2019-07-15 RX ORDER — LEVETIRACETAM 500 MG/1
TABLET ORAL
Qty: 180 TABLET | Refills: 1 | OUTPATIENT
Start: 2019-07-15

## 2019-07-17 ENCOUNTER — HOSPITAL ENCOUNTER (EMERGENCY)
Facility: HOSPITAL | Age: 74
Discharge: HOME | End: 2019-07-17
Attending: EMERGENCY MEDICINE
Payer: MEDICARE

## 2019-07-17 VITALS
RESPIRATION RATE: 20 BRPM | HEART RATE: 88 BPM | DIASTOLIC BLOOD PRESSURE: 77 MMHG | SYSTOLIC BLOOD PRESSURE: 188 MMHG | TEMPERATURE: 98.4 F | OXYGEN SATURATION: 99 %

## 2019-07-17 DIAGNOSIS — R19.7 DIARRHEA: Primary | ICD-10-CM

## 2019-07-17 LAB
ALBUMIN SERPL BCP-MCNC: 3.1 G/DL (ref 3.5–5)
ALP SERPL-CCNC: 327 U/L (ref 46–116)
ALT SERPL W P-5'-P-CCNC: 69 U/L (ref 12–78)
ANION GAP SERPL CALCULATED.3IONS-SCNC: 8 MMOL/L (ref 4–13)
AST SERPL W P-5'-P-CCNC: 45 U/L (ref 5–45)
BASOPHILS # BLD AUTO: 0.03 THOUSANDS/ΜL (ref 0–0.1)
BASOPHILS NFR BLD AUTO: 1 % (ref 0–1)
BILIRUB SERPL-MCNC: 0.3 MG/DL (ref 0.2–1)
BUN SERPL-MCNC: 32 MG/DL (ref 5–25)
CALCIUM SERPL-MCNC: 8.6 MG/DL (ref 8.3–10.1)
CHLORIDE SERPL-SCNC: 109 MMOL/L (ref 100–108)
CO2 SERPL-SCNC: 27 MMOL/L (ref 21–32)
CREAT SERPL-MCNC: 1.16 MG/DL (ref 0.6–1.3)
EOSINOPHIL # BLD AUTO: 0.27 THOUSAND/ΜL (ref 0–0.61)
EOSINOPHIL NFR BLD AUTO: 5 % (ref 0–6)
ERYTHROCYTE [DISTWIDTH] IN BLOOD BY AUTOMATED COUNT: 13.2 % (ref 11.6–15.1)
GFR SERPL CREATININE-BSD FRML MDRD: 46 ML/MIN/1.73SQ M
GLUCOSE SERPL-MCNC: 111 MG/DL (ref 65–140)
HCT VFR BLD AUTO: 33.9 % (ref 34.8–46.1)
HGB BLD-MCNC: 10.6 G/DL (ref 11.5–15.4)
IMM GRANULOCYTES # BLD AUTO: 0.02 THOUSAND/UL (ref 0–0.2)
IMM GRANULOCYTES NFR BLD AUTO: 0 % (ref 0–2)
LYMPHOCYTES # BLD AUTO: 1.08 THOUSANDS/ΜL (ref 0.6–4.47)
LYMPHOCYTES NFR BLD AUTO: 21 % (ref 14–44)
MCH RBC QN AUTO: 28.4 PG (ref 26.8–34.3)
MCHC RBC AUTO-ENTMCNC: 31.3 G/DL (ref 31.4–37.4)
MCV RBC AUTO: 91 FL (ref 82–98)
MONOCYTES # BLD AUTO: 0.49 THOUSAND/ΜL (ref 0.17–1.22)
MONOCYTES NFR BLD AUTO: 10 % (ref 4–12)
NEUTROPHILS # BLD AUTO: 3.26 THOUSANDS/ΜL (ref 1.85–7.62)
NEUTS SEG NFR BLD AUTO: 63 % (ref 43–75)
NRBC BLD AUTO-RTO: 0 /100 WBCS
PLATELET # BLD AUTO: 228 THOUSANDS/UL (ref 149–390)
PMV BLD AUTO: 9.3 FL (ref 8.9–12.7)
POTASSIUM SERPL-SCNC: 5.1 MMOL/L (ref 3.5–5.3)
POTASSIUM SERPL-SCNC: 5.5 MMOL/L (ref 3.5–5.3)
POTASSIUM SERPL-SCNC: 5.8 MMOL/L (ref 3.5–5.3)
PROT SERPL-MCNC: 7.5 G/DL (ref 6.4–8.2)
RBC # BLD AUTO: 3.73 MILLION/UL (ref 3.81–5.12)
SODIUM SERPL-SCNC: 144 MMOL/L (ref 136–145)
WBC # BLD AUTO: 5.15 THOUSAND/UL (ref 4.31–10.16)

## 2019-07-17 PROCEDURE — 85025 COMPLETE CBC W/AUTO DIFF WBC: CPT | Performed by: EMERGENCY MEDICINE

## 2019-07-17 PROCEDURE — 99284 EMERGENCY DEPT VISIT MOD MDM: CPT | Performed by: PSYCHIATRY & NEUROLOGY

## 2019-07-17 PROCEDURE — 96360 HYDRATION IV INFUSION INIT: CPT

## 2019-07-17 PROCEDURE — 84132 ASSAY OF SERUM POTASSIUM: CPT | Performed by: EMERGENCY MEDICINE

## 2019-07-17 PROCEDURE — 36415 COLL VENOUS BLD VENIPUNCTURE: CPT | Performed by: EMERGENCY MEDICINE

## 2019-07-17 PROCEDURE — 99284 EMERGENCY DEPT VISIT MOD MDM: CPT

## 2019-07-17 PROCEDURE — 96361 HYDRATE IV INFUSION ADD-ON: CPT

## 2019-07-17 PROCEDURE — 93005 ELECTROCARDIOGRAM TRACING: CPT

## 2019-07-17 PROCEDURE — 80053 COMPREHEN METABOLIC PANEL: CPT | Performed by: EMERGENCY MEDICINE

## 2019-07-17 RX ORDER — SODIUM CHLORIDE, SODIUM LACTATE, POTASSIUM CHLORIDE, CALCIUM CHLORIDE 600; 310; 30; 20 MG/100ML; MG/100ML; MG/100ML; MG/100ML
500 INJECTION, SOLUTION INTRAVENOUS CONTINUOUS
Status: DISCONTINUED | OUTPATIENT
Start: 2019-07-17 | End: 2019-07-18 | Stop reason: HOSPADM

## 2019-07-17 RX ADMIN — SODIUM CHLORIDE 500 ML: 0.9 INJECTION, SOLUTION INTRAVENOUS at 20:39

## 2019-07-17 RX ADMIN — SODIUM CHLORIDE, SODIUM LACTATE, POTASSIUM CHLORIDE, AND CALCIUM CHLORIDE 500 ML: .6; .31; .03; .02 INJECTION, SOLUTION INTRAVENOUS at 19:09

## 2019-07-17 NOTE — ED NOTES
Primary RN (claudia) unable to use ultrasound for IV placement  Charge RN aware and will attempt using ultrasound  Bedside commode is at bedside and patient aware we need stool sample  Unable to provide at this time       Kalyani Marin RN  07/17/19 [id number]

## 2019-07-17 NOTE — ED PROVIDER NOTES
History  Chief Complaint   Patient presents with    Diarrhea     Pt presents to the ED with watery diarrhea onset this morning  Denies pain  Denies nausea or vomiting  Chon Espinoza is a 76 yo  f who presents to the ED with watery diarrhea  Says she has gone 4 times a day, usually once per day  States that she has Crohn's disease and this feels like a flare-up  Has an appointment with GI in 2 weeks  Says that she has been going since this morning when she woke up  Episodes have been random  Nothing makes it appreciably better or worse  Last episode of the same was over a year ago  Frequent bowel movements have caused rectal pain  Used Preparation-H suppository for pain, no help  Past medical history significant for liver cirrhosis, Crohn disease, mi, stroke, pelvic fracture, vertigo, anemia, GERD, osteoporosis, and arthritis  Past surgical history includes appendectomy, liver transplant, cholecystectomy, bladder augmentation, tubal ligation, hernia repair  Family medical history includes mother and brother with cancer  No changes to medications  Admits to rectal pain and watery diarrhea  Denies nausea, vomiting, abdominal pain, numbness, tingling, changes in vision, changes in hearing, urinary difficulties, chest pain, palpitations, headaches, trouble ambulating, blood in stool, or blood in urine  Last visit on 07/05/2019 for urinary incontinence, found Klebsiella colonization, was placed on antibiotic  Prior to Admission Medications   Prescriptions Last Dose Informant Patient Reported?  Taking?   aspirin (ECOTRIN LOW STRENGTH) 81 mg EC tablet  Self No Yes   Sig: Take 1 tablet by mouth daily   clopidogrel (PLAVIX) 75 mg tablet  Self No Yes   Sig: Take 1 tablet (75 mg total) by mouth daily   cycloSPORINE modified (NEORAL) 25 mg capsule  Self Yes Yes   Sig: Take 50 mg by mouth 2 (two) times a day   levETIRAcetam (KEPPRA) 500 mg tablet   No Yes   Sig: Take 1 tablet (500 mg total) by mouth every 12 (twelve) hours for 5 days   levothyroxine 112 mcg tablet  Self No Yes   Sig: Take 1 tablet (112 mcg total) by mouth daily As directed   methocarbamol (ROBAXIN) 500 mg tablet  Self No Yes   Sig: Take 1-2 tablets (500-1,000 mg total) by mouth 2 (two) times a day as needed for muscle spasms   metoprolol tartrate (LOPRESSOR) 25 mg tablet  Self No Yes   Sig: TAKE 1 & 1/2 (ONE & ONE-HALF) TABLETS BY MOUTH TWICE DAILY   mycophenolate (CELLCEPT) 250 mg capsule  Self Yes Yes   Sig: Take 250 mg by mouth 2 (two) times a day 3 tabs each dose=750 mg    omeprazole (PriLOSEC) 40 MG capsule  Self Yes Yes   Sig: Take 40 mg by mouth daily   oxybutynin (DITROPAN) 5 mg tablet   No No   Sig: Take 1 tablet (5 mg total) by mouth 3 (three) times a day as needed (bladder spasms) for up to 30 days   Patient not taking: Reported on 7/3/2019   pravastatin (PRAVACHOL) 20 mg tablet  Self No Yes   Sig: TAKE 1 TABLET BY MOUTH ONCE DAILY AT BEDTIME      Facility-Administered Medications: None       Past Medical History:   Diagnosis Date    Anemia     Anxiety     Arthritis     Benign paroxysmal vertigo, unspecified ear     onset: 05/06/2010    Cirrhosis ([facility] )     onset: 03/30/2005    Crohn disease ([facility] )     Disease of thyroid gland     GERD (gastroesophageal reflux disease)     History of transfusion     Liver transplant candidate     onset: 09/16/2008    Osteoporosis     [facility] )     onset: 10/14/2008       Past Surgical History:   Procedure Laterality Date    ABDOMINAL SURGERY      APPENDECTOMY      BLADDER AUGMENTATION N/A     CATARACT EXTRACTION      CHOLECYSTECTOMY N/A     COLONOSCOPY N/A 4/20/2017    Procedure: COLONOSCOPY;  Surgeon: April Samuel MD;  Location: [facility] GI LAB; Service:     ESOPHAGOGASTRODUODENOSCOPY N/A 3/31/2016    Procedure: ESOPHAGOGASTRODUODENOSCOPY (EGD); Surgeon: April Samuel MD;  Location: [facility] GI [facility];   Service:     ESOPHAGOGASTRODUODENOSCOPY N/A 4/20/2017 Procedure: ESOPHAGOGASTRODUODENOSCOPY (EGD); Surgeon: Daryl Riddle MD;  Location: [place] [address] GI LAB; Service:     EYE SURGERY      cataracts removed both eyes lens implant    HERNIA REPAIR      JOINT REPLACEMENT      left knee replacement    LIVER TRANSPLANTATION N/A     TONSILECTOMY AND ADNOIDECTOMY N/A     TUBAL LIGATION         Family History   Problem Relation Age of Onset    Cancer Mother         breast    Cancer Father     Crohn's disease Brother     Arthritis Family     Breast cancer Family      I have reviewed and agree with the history as documented  Social History     Tobacco Use    Smoking status: Former Smoker     Years: 5 00     Last attempt to quit: 1975     Years since quittin 5    Smokeless tobacco: Never Used    Tobacco comment: smoked for 5 years-/ to 1 ppd   Substance Use Topics    Alcohol use: No    Drug use: No        Review of Systems   Constitutional: Negative for fever  HENT: Negative for hearing loss and sore throat  Eyes: Negative for pain and visual disturbance  Respiratory: Negative for shortness of breath  Cardiovascular: Negative for chest pain and palpitations  Gastrointestinal: Positive for diarrhea and rectal pain  Negative for abdominal pain, blood in stool, nausea and vomiting  Genitourinary: Negative for dysuria, frequency, hematuria and urgency  Neurological: Negative for numbness and headaches  All other systems reviewed and are negative        Physical Exam  ED Triage Vitals [19 1647]   Temperature Pulse Respirations Blood Pressure SpO2   98 4 °F (36 9 °C) 90 20 168/73 98 %      Temp Source Heart Rate Source Patient Position - Orthostatic VS BP Location FiO2 (%)   Oral -- Sitting Right arm --      Pain Score       No Pain             Orthostatic Vital Signs  Vitals:    19 1647   BP: 168/73   Pulse: 90   Patient Position - Orthostatic VS: Sitting       Physical Exam   Constitutional: She is oriented to person, place, and time  She appears well-developed and well-nourished  No distress  HENT:   Head: Normocephalic  Right Ear: External ear normal    Left Ear: External ear normal    Eyes: Pupils are equal, round, and reactive to light  Conjunctivae and EOM are normal  Right eye exhibits no discharge  Left eye exhibits no discharge  No scleral icterus  Cardiovascular: Normal rate, regular rhythm, normal heart sounds and intact distal pulses  Exam reveals no gallop and no friction rub  No murmur heard  Pulmonary/Chest: Effort normal and breath sounds normal  No stridor  No respiratory distress  She has no wheezes  She exhibits no tenderness  Abdominal: Soft  Bowel sounds are normal  She exhibits no distension and no mass  There is no tenderness  There is no rebound and no guarding  No hernia  Genitourinary: Rectal exam shows guaiac negative stool  Genitourinary Comments: Positive rectal pain with digit insertion  Musculoskeletal: Normal range of motion  She exhibits no edema or tenderness  Neurological: She is alert and oriented to person, place, and time  No cranial nerve deficit or sensory deficit  Skin: Skin is warm and dry  She is not diaphoretic  Psychiatric: She has a normal mood and affect   Her behavior is normal  Judgment and thought content normal        ED Medications  Medications   lactated ringers infusion 500 mL (has no administration in time range)       Diagnostic Studies  Results Reviewed     Procedure Component Value Units Date/Time    CBC and differential [[id number]]     Lab Status:  No result Specimen:  Blood     Comprehensive metabolic panel [[id number]]     Lab Status:  No result Specimen:  Blood     Clostridium difficile toxin by PCR [[id number]]     Lab Status:  No result Specimen:  Stool from Per Rectum                  No orders to display         Procedures  Procedures        MDM  Number of Diagnoses or Management Options  Diarrhea:   Diagnosis management comments: Pt likely experiencing a flair up of chron's disease as she has had episodes that match these in the past  Additional medical workup warranted with CMP and CBC  Digital rectal exam and hemoccult test done to r/o rectal bleeding  C  Diff toxin PCR conducted to r/o antibiotic side effect  Pt given 500 mL of lactated ringers due to concerns for dehydration 2/2 to multiple bowel movements  Digital rectal exam and hemoccult were negative  CBC showed stable hemoglobin  CMP showed Total bilirubin stable, improved liver enzymes, alkaline phos elevated but improved since last check, mildly elevated potassium  EKG was checked due to elevated potassium  Another 500 mL of lactated ringers were given followed by a sodium recheck  Will attempt to collect C  Diff sample, will give pt sample collection kit if unable to collect ED  Recommend pt continue with follow up plans to see her GI specialist in 2 weeks will sample if necessary  Care continued by Dr Ni Randolph  Disposition  Final diagnoses:   None     ED Disposition     None      Follow-up Information    None         Patient's Medications   Discharge Prescriptions    No medications on file     No discharge procedures on file  ED Provider  Attending physically available and evaluated Shola Sorto I managed the patient along with the ED Attending      Electronically Signed by         Kamila Monroy DO  07/17/19 2008

## 2019-07-17 NOTE — ED NOTES
Went to start first nurse IV and BW on patient, patient refused and stated "I want to wait until the doctor see's me because I don't want to get poked unless I have to " first nurse protocol not initiated at this time       Jenny Oglesby RN  07/17/19 4464

## 2019-07-18 ENCOUNTER — TELEPHONE (OUTPATIENT)
Dept: CARDIOLOGY CLINIC | Facility: CLINIC | Age: 74
End: 2019-07-18

## 2019-07-18 ENCOUNTER — APPOINTMENT (OUTPATIENT)
Dept: LAB | Facility: CLINIC | Age: 74
End: 2019-07-18
Payer: MEDICARE

## 2019-07-18 ENCOUNTER — PATIENT OUTREACH (OUTPATIENT)
Dept: FAMILY MEDICINE CLINIC | Facility: CLINIC | Age: 74
End: 2019-07-18

## 2019-07-18 ENCOUNTER — TRANSCRIBE ORDERS (OUTPATIENT)
Dept: LAB | Facility: CLINIC | Age: 74
End: 2019-07-18

## 2019-07-18 DIAGNOSIS — I47.1 PSVT (PAROXYSMAL SUPRAVENTRICULAR TACHYCARDIA) (HCC): ICD-10-CM

## 2019-07-18 DIAGNOSIS — R19.7 DIARRHEA: ICD-10-CM

## 2019-07-18 DIAGNOSIS — R56.9 SEIZURES (HCC): ICD-10-CM

## 2019-07-18 DIAGNOSIS — Z71.89 COMPLEX CARE COORDINATION: Primary | ICD-10-CM

## 2019-07-18 LAB
ATRIAL RATE: 95 BPM
P AXIS: 58 DEGREES
PR INTERVAL: 156 MS
QRS AXIS: -31 DEGREES
QRSD INTERVAL: 90 MS
QT INTERVAL: 354 MS
QTC INTERVAL: 438 MS
T WAVE AXIS: 9 DEGREES
VENTRICULAR RATE: 92 BPM

## 2019-07-18 PROCEDURE — 87493 C DIFF AMPLIFIED PROBE: CPT | Performed by: EMERGENCY MEDICINE

## 2019-07-18 PROCEDURE — 93010 ELECTROCARDIOGRAM REPORT: CPT | Performed by: INTERNAL MEDICINE

## 2019-07-18 RX ORDER — LEVETIRACETAM 500 MG/1
500 TABLET ORAL EVERY 12 HOURS
Qty: 180 TABLET | Refills: 3 | Status: SHIPPED | OUTPATIENT
Start: 2019-07-18 | End: 2019-09-05 | Stop reason: ALTCHOICE

## 2019-07-18 NOTE — ED CARE HANDOFF
Emergency Department Sign Out Note        Sign out and transfer of care from Dr Fox Kelly  See Separate Emergency Department note  The patient, Maritza Mattson, was evaluated by the previous provider for diarrhea  Workup Completed:  CBC    ED Course / Workup Pending (followup):  CMP, stool for c  Diff, IVF bolus      Patient had elevated potassium on 1st blood draw, unclear if the specimen was hemolyzed  Recheck demonstrated a hemolyzed specimen even higher than the initial level  Patient had a normal EKG  She received 1 L fluid and repeat potassium is 5 1  Patient felt well for discharge will follow up with her PCP  She was provided with a kit for outpatient stool test to be submitted to the lab if symptoms persist or worsen  Patient's  at the bedside to take the patient home  ED Course as of Jul 18 0144 Wed Jul 17, 2019 2149 Patient re-evaluated by me  She has received 1 L of IV fluids  She has not had diarrhea in the department  Her potassium is 5 1  EKG is within normal limits  Patient feels well for discharge  Will send home with stool collection kit to bring back to the lab if symptoms persist         Procedures  MDM    Disposition  Final diagnoses:   Diarrhea     Time reflects when diagnosis was documented in both MDM as applicable and the Disposition within this note     Time User Action Codes Description Comment    7/17/2019  8:06 PM Jose Lara Add [R19 7] Diarrhea       ED Disposition     ED Disposition Condition Date/Time Comment    Discharge Stable Wed Jul 17, 2019  7:22 PM Maritza Mattson discharge to home/self care              Follow-up Information     Follow up With Specialties Details Why Contact Info Additional [address] Emergency Department Emergency Medicine Go to  If symptoms worsen return to the  [facility],[address]  [phone]  ED, [address] [place] [address], [place], [address] MD [facility], Family Medicine   [address]    [place] [address] [phone]       Aissatou Mccauley MD Gastroenterology Schedule an appointment as soon as possible for a visit in 2 days For recheck of current symptoms [address] [address]  [phone]           Discharge Medication List as of 7/17/2019  9:55 PM      CONTINUE these medications which have NOT CHANGED    Details   aspirin (ECOTRIN LOW STRENGTH) 81 mg EC tablet Take 1 tablet by mouth daily, Starting Tue 10/31/2017, Normal      clopidogrel (PLAVIX) 75 mg tablet Take 1 tablet (75 mg total) by mouth daily, Starting Wed 5/1/2019, Normal      cycloSPORINE modified (NEORAL) 25 mg capsule Take 50 mg by mouth 2 (two) times a day, Historical Med      levETIRAcetam (KEPPRA) 500 mg tablet Take 1 tablet (500 mg total) by mouth every 12 (twelve) hours for 5 days, Starting Sat 7/13/2019, Until Thu 7/18/2019, Normal      levothyroxine 112 mcg tablet Take 1 tablet (112 mcg total) by mouth daily As directed, Starting Mon 6/17/2019, Normal      methocarbamol (ROBAXIN) 500 mg tablet Take 1-2 tablets (500-1,000 mg total) by mouth 2 (two) times a day as needed for muscle spasms, Starting Tue 6/18/2019, Print      metoprolol tartrate (LOPRESSOR) 25 mg tablet TAKE 1 & 1/2 (ONE & ONE-HALF) TABLETS BY MOUTH TWICE DAILY, Normal      mycophenolate (CELLCEPT) 250 mg capsule Take 250 mg by mouth 2 (two) times a day 3 tabs each dose=750 mg , Historical Med      omeprazole (PriLOSEC) 40 MG capsule Take 40 mg by mouth daily, Historical Med      oxybutynin (DITROPAN) 5 mg tablet Take 1 tablet (5 mg total) by mouth 3 (three) times a day as needed (bladder spasms) for up to 30 days, Starting Sat 3/30/2019, Until Wed 7/3/2019, Normal      pravastatin (PRAVACHOL) 20 mg tablet TAKE 1 TABLET BY MOUTH ONCE DAILY AT BEDTIME, Normal           Outpatient Discharge Orders   Clostridium difficile toxin by PCR   Standing Status: Future Standing Exp   Date: 07/17/20          ED Provider  Electronically Signed by     Janes Castellanos DO  07/18/19 0638

## 2019-07-18 NOTE — TELEPHONE ENCOUNTER
Patient stopped by office to request refill of keppra  Last refilled 7/13/19 for only 10 tablets  Please sign off if agreeable  Thank you

## 2019-07-18 NOTE — ED NOTES
Pt aware to provide stool sample at home per MD order for outpatient laboratory     [address], RN  07/17/19 0914

## 2019-07-18 NOTE — TELEPHONE ENCOUNTER
Pt stopped in today to refill metoprolol and discuss her recent hospital visit - diarrhea - no medication or dietary changes - just started 7/17/19 - no pain, N/V - will be seeing Dr Sukhwinder Neal for keppra refill   Pt wants cardiologist advise

## 2019-07-18 NOTE — ED NOTES
PT refusing EKG and bolus provider made aware  PT stated "I want out of here, I want nothing else done, I am over it I want out " Charge RN at bedside        Elle Francois  07/17/19 2020       Elle Francois  07/17/19 2020

## 2019-07-19 LAB — C DIFF TOX GENS STL QL NAA+PROBE: NORMAL

## 2019-07-19 NOTE — TELEPHONE ENCOUNTER
Can you please have her speak with her GI doctor? Roddy Goodell hasn't started anything new recently  If GI would like us to review her medications if he thinks it's not her Crohn's disease, we can do that    Thanks

## 2019-07-21 ENCOUNTER — HOSPITAL ENCOUNTER (EMERGENCY)
Facility: HOSPITAL | Age: 74
Discharge: HOME | End: 2019-07-21
Attending: EMERGENCY MEDICINE | Admitting: EMERGENCY MEDICINE
Payer: MEDICARE

## 2019-07-21 ENCOUNTER — APPOINTMENT (EMERGENCY)
Dept: RADIOLOGY | Facility: HOSPITAL | Age: 74
End: 2019-07-21
Payer: MEDICARE

## 2019-07-21 VITALS
BODY MASS INDEX: 37.91 KG/M2 | OXYGEN SATURATION: 98 % | TEMPERATURE: 97 F | HEIGHT: 62 IN | SYSTOLIC BLOOD PRESSURE: 178 MMHG | HEART RATE: 72 BPM | RESPIRATION RATE: 18 BRPM | WEIGHT: 206 LBS | DIASTOLIC BLOOD PRESSURE: 77 MMHG

## 2019-07-21 DIAGNOSIS — R19.7 DIARRHEA: Primary | ICD-10-CM

## 2019-07-21 DIAGNOSIS — K62.89 RECTAL PAIN: ICD-10-CM

## 2019-07-21 LAB
ALBUMIN SERPL BCP-MCNC: 3.1 G/DL (ref 3.5–5)
ALP SERPL-CCNC: 279 U/L (ref 46–116)
ALT SERPL W P-5'-P-CCNC: 46 U/L (ref 12–78)
ANION GAP SERPL CALCULATED.3IONS-SCNC: 10 MMOL/L (ref 4–13)
APTT PPP: 25 SECONDS (ref 24–33)
AST SERPL W P-5'-P-CCNC: 32 U/L (ref 5–45)
BACTERIA UR QL AUTO: ABNORMAL /HPF
BASOPHILS # BLD AUTO: 0.02 THOUSANDS/ΜL (ref 0–0.1)
BASOPHILS NFR BLD AUTO: 0 % (ref 0–1)
BILIRUB SERPL-MCNC: 0.4 MG/DL (ref 0.2–1)
BILIRUB UR QL STRIP: NEGATIVE
BUN SERPL-MCNC: 28 MG/DL (ref 5–25)
CALCIUM SERPL-MCNC: 9.1 MG/DL (ref 8.3–10.1)
CHLORIDE SERPL-SCNC: 105 MMOL/L (ref 100–108)
CLARITY UR: ABNORMAL
CO2 SERPL-SCNC: 23 MMOL/L (ref 21–32)
COLOR UR: ABNORMAL
CREAT SERPL-MCNC: 0.93 MG/DL (ref 0.6–1.3)
EOSINOPHIL # BLD AUTO: 0.29 THOUSAND/ΜL (ref 0–0.61)
EOSINOPHIL NFR BLD AUTO: 6 % (ref 0–6)
ERYTHROCYTE [DISTWIDTH] IN BLOOD BY AUTOMATED COUNT: 13.2 % (ref 11.6–15.1)
GFR SERPL CREATININE-BSD FRML MDRD: 60 ML/MIN/1.73SQ M
GLUCOSE SERPL-MCNC: 99 MG/DL (ref 65–140)
GLUCOSE UR STRIP-MCNC: NEGATIVE MG/DL
HCT VFR BLD AUTO: 34.4 % (ref 34.8–46.1)
HGB BLD-MCNC: 10.6 G/DL (ref 11.5–15.4)
HGB UR QL STRIP.AUTO: ABNORMAL
IMM GRANULOCYTES # BLD AUTO: 0.06 THOUSAND/UL (ref 0–0.2)
IMM GRANULOCYTES NFR BLD AUTO: 1 % (ref 0–2)
INR PPP: 0.95 (ref 0.86–1.16)
KETONES UR STRIP-MCNC: NEGATIVE MG/DL
LEUKOCYTE ESTERASE UR QL STRIP: ABNORMAL
LYMPHOCYTES # BLD AUTO: 0.88 THOUSANDS/ΜL (ref 0.6–4.47)
LYMPHOCYTES NFR BLD AUTO: 17 % (ref 14–44)
MCH RBC QN AUTO: 27.9 PG (ref 26.8–34.3)
MCHC RBC AUTO-ENTMCNC: 30.8 G/DL (ref 31.4–37.4)
MCV RBC AUTO: 91 FL (ref 82–98)
MONOCYTES # BLD AUTO: 0.46 THOUSAND/ΜL (ref 0.17–1.22)
MONOCYTES NFR BLD AUTO: 9 % (ref 4–12)
NEUTROPHILS # BLD AUTO: 3.42 THOUSANDS/ΜL (ref 1.85–7.62)
NEUTS SEG NFR BLD AUTO: 67 % (ref 43–75)
NITRITE UR QL STRIP: NEGATIVE
NON-SQ EPI CELLS URNS QL MICRO: ABNORMAL /HPF
NRBC BLD AUTO-RTO: 0 /100 WBCS
PH UR STRIP.AUTO: 6 [PH]
PLATELET # BLD AUTO: 228 THOUSANDS/UL (ref 149–390)
PMV BLD AUTO: 9.2 FL (ref 8.9–12.7)
POTASSIUM SERPL-SCNC: 4.7 MMOL/L (ref 3.5–5.3)
PROT SERPL-MCNC: 7.7 G/DL (ref 6.4–8.2)
PROT UR STRIP-MCNC: NEGATIVE MG/DL
PROTHROMBIN TIME: 10 SECONDS (ref 9.4–11.7)
RBC # BLD AUTO: 3.8 MILLION/UL (ref 3.81–5.12)
RBC #/AREA URNS AUTO: ABNORMAL /HPF
SODIUM SERPL-SCNC: 138 MMOL/L (ref 136–145)
SP GR UR STRIP.AUTO: 1.01 (ref 1–1.03)
UROBILINOGEN UR QL STRIP.AUTO: 0.2 E.U./DL
WBC # BLD AUTO: 5.13 THOUSAND/UL (ref 4.31–10.16)
WBC #/AREA URNS AUTO: ABNORMAL /HPF

## 2019-07-21 PROCEDURE — 99284 EMERGENCY DEPT VISIT MOD MDM: CPT

## 2019-07-21 PROCEDURE — 85610 PROTHROMBIN TIME: CPT | Performed by: EMERGENCY MEDICINE

## 2019-07-21 PROCEDURE — 36415 COLL VENOUS BLD VENIPUNCTURE: CPT | Performed by: EMERGENCY MEDICINE

## 2019-07-21 PROCEDURE — 80053 COMPREHEN METABOLIC PANEL: CPT | Performed by: EMERGENCY MEDICINE

## 2019-07-21 PROCEDURE — 85025 COMPLETE CBC W/AUTO DIFF WBC: CPT | Performed by: EMERGENCY MEDICINE

## 2019-07-21 PROCEDURE — 74177 CT ABD & PELVIS W/CONTRAST: CPT

## 2019-07-21 PROCEDURE — 81001 URINALYSIS AUTO W/SCOPE: CPT | Performed by: EMERGENCY MEDICINE

## 2019-07-21 PROCEDURE — 85730 THROMBOPLASTIN TIME PARTIAL: CPT | Performed by: EMERGENCY MEDICINE

## 2019-07-21 RX ADMIN — IOHEXOL 50 ML: 240 INJECTION, SOLUTION INTRATHECAL; INTRAVASCULAR; INTRAVENOUS; ORAL at 12:15

## 2019-07-21 RX ADMIN — IOHEXOL 100 ML: 350 INJECTION, SOLUTION INTRAVENOUS at 14:07

## 2019-07-21 NOTE — ED PROVIDER NOTES
History  Chief Complaint   Patient presents with    Diarrhea     Pt sts had 3 loose stools today  Was at [facility] for same 3 days ago and was discharged  Hx crohns disease  Patient is a 68-year-old female who presents with complaint of 3 episodes of loose stool today  Patient was recently seen at a another emergency room [facility] system for similar symptoms  Patient reportedly has history of some Crohn's disease even though it has not been documented that I can find again she has also had a history of C diff in the past a culture has been sent for this but the results are not back  Patient denies any abdominal pain, no nausea or vomiting associated with this  Patient states that she has been having chronic diarrhea has not seen her GI doctor for this but her main complaint is her pain and soreness around her rectum  Patient states there was some blood on the toilet paper when she wiped but there was no blood in the bowl  Patient denies any aggravating or alleviating factors  Prior to Admission Medications   Prescriptions Last Dose Informant Patient Reported?  Taking?   aspirin (ECOTRIN LOW STRENGTH) 81 mg EC tablet  Self No No   Sig: Take 1 tablet by mouth daily   clopidogrel (PLAVIX) 75 mg tablet  Self No No   Sig: Take 1 tablet (75 mg total) by mouth daily   cycloSPORINE modified (NEORAL) 25 mg capsule  Self Yes No   Sig: Take 50 mg by mouth 2 (two) times a day   levETIRAcetam (KEPPRA) 500 mg tablet   No No   Sig: Take 1 tablet (500 mg total) by mouth every 12 (twelve) hours   levothyroxine 112 mcg tablet  Self No No   Sig: Take 1 tablet (112 mcg total) by mouth daily As directed   methocarbamol (ROBAXIN) 500 mg tablet  Self No No   Sig: Take 1-2 tablets (500-1,000 mg total) by mouth 2 (two) times a day as needed for muscle spasms   metoprolol tartrate (LOPRESSOR) 25 mg tablet   No No   Sig: Take 1 5 tablets (37 5 mg total) by mouth 2 (two) times a day   mycophenolate (CELLCEPT) 250 mg capsule  Self Yes No   Sig: Take 250 mg by mouth 2 (two) times a day 3 tabs each dose=750 mg    omeprazole (PriLOSEC) 40 MG capsule  Self Yes No   Sig: Take 40 mg by mouth daily   oxybutynin (DITROPAN) 5 mg tablet   No No   Sig: Take 1 tablet (5 mg total) by mouth 3 (three) times a day as needed (bladder spasms) for up to 30 days   Patient not taking: Reported on 7/3/2019   pravastatin (PRAVACHOL) 20 mg tablet  Self No No   Sig: TAKE 1 TABLET BY MOUTH ONCE DAILY AT BEDTIME      Facility-Administered Medications: None       Past Medical History:   Diagnosis Date    Anemia     Anxiety     Arthritis     Benign paroxysmal vertigo, unspecified ear     onset: 05/06/2010    Cirrhosis ([facility] )     onset: 03/30/2005    Crohn disease ([facility] )     Disease of thyroid gland     GERD (gastroesophageal reflux disease)     History of transfusion     Liver transplant candidate     onset: 09/16/2008    Osteoporosis     Pelvic fracture ([place] [address] )     onset: 10/14/2008       Past Surgical History:   Procedure Laterality Date    ABDOMINAL SURGERY      APPENDECTOMY      BLADDER AUGMENTATION N/A     CATARACT EXTRACTION      CHOLECYSTECTOMY N/A     COLONOSCOPY N/A 4/20/2017    Procedure: COLONOSCOPY;  Surgeon: Laura Lacy MD;  Location: [facility]; Service:     ESOPHAGOGASTRODUODENOSCOPY N/A 3/31/2016    Procedure: ESOPHAGOGASTRODUODENOSCOPY (EGD); Surgeon: Laura Lacy MD;  Location: [facility]; Service:     ESOPHAGOGASTRODUODENOSCOPY N/A 4/20/2017    Procedure: ESOPHAGOGASTRODUODENOSCOPY (EGD); Surgeon: Laura Lacy MD;  Location: [facility];   Service:     EYE SURGERY      cataracts removed both eyes lens implant    HERNIA REPAIR      JOINT REPLACEMENT      left knee replacement    LIVER TRANSPLANTATION N/A     TONSILECTOMY AND ADNOIDECTOMY N/A     TUBAL LIGATION         Family History   Problem Relation Age of Onset    Cancer Mother         breast    Cancer Father     Crohn's disease Brother     Arthritis Family     Breast cancer Family      I have reviewed and agree with the history as documented  Social History     Tobacco Use    Smoking status: Former Smoker     Years: 5 00     Last attempt to quit: 1975     Years since quittin 5    Smokeless tobacco: Never Used    Tobacco comment: smoked for 5 years-1/2 to 1 ppd   Substance Use Topics    Alcohol use: No    Drug use: No        Review of Systems   Constitutional: Negative for chills and fever  HENT: Negative for facial swelling and trouble swallowing  Eyes: Negative for photophobia and visual disturbance  Respiratory: Negative for chest tightness and shortness of breath  Cardiovascular: Negative for chest pain and leg swelling  Gastrointestinal: Positive for diarrhea and rectal pain  Negative for abdominal pain, nausea and vomiting  Genitourinary: Negative for dysuria and flank pain  Musculoskeletal: Negative for back pain and neck pain  Skin: Negative  Neurological: Negative for weakness and numbness  Hematological: Negative  Psychiatric/Behavioral: Negative  Physical Exam  Physical Exam   Constitutional: She is oriented to person, place, and time  She appears well-developed and well-nourished  No distress  HENT:   Head: Normocephalic and atraumatic  Eyes: Pupils are equal, round, and reactive to light  EOM are normal    Neck: Normal range of motion  Cardiovascular: Normal rate and regular rhythm  Pulmonary/Chest: Effort normal and breath sounds normal    Abdominal: Soft  Bowel sounds are normal  She exhibits no distension  There is no tenderness  Genitourinary: Rectal exam shows tenderness  Genitourinary Comments: Excoriated area around the rectum   Musculoskeletal: Normal range of motion  Neurological: She is alert and oriented to person, place, and time  Skin: Skin is warm and dry  Psychiatric: She has a normal mood and affect  Nursing note and vitals reviewed        Vital Signs  ED Triage Vitals   Temperature Pulse Respirations Blood Pressure SpO2   07/21/19 1055 07/21/19 1055 07/21/19 1055 07/21/19 1055 07/21/19 1055   (!) 97 °F (36 1 °C) 81 18 147/69 96 %      Temp src Heart Rate Source Patient Position - Orthostatic VS BP Location FiO2 (%)   -- 07/21/19 1330 07/21/19 1330 07/21/19 1330 --    Monitor Sitting Left arm       Pain Score       07/21/19 1055       No Pain           Vitals:    07/21/19 1055 07/21/19 1330   BP: 147/69 (!) 178/77   Pulse: 81 72   Patient Position - Orthostatic VS:  Sitting         Visual Acuity      ED Medications  Medications   iohexol (OMNIPAQUE) 240 MG/ML solution 50 mL (50 mL Oral Given 7/21/19 1215)   iohexol (OMNIPAQUE) 350 MG/ML injection (MULTI-DOSE) 100 mL (100 mL Intravenous Given 7/21/19 1407)       Diagnostic Studies  Results Reviewed     Procedure Component Value Units Date/Time    Urine Microscopic [[id number]]  (Abnormal) Collected:  07/21/19 1206    Lab Status:  Final result Specimen:  Urine, Clean Catch Updated:  07/21/19 1229     RBC, UA 0-1 /hpf      WBC, UA 4-10 /hpf      Epithelial Cells Moderate /hpf      Bacteria, UA Moderate /hpf     UA w Reflex to Microscopic [[id number]]  (Abnormal) Collected:  07/21/19 1206    Lab Status:  Final result Specimen:  Urine, Clean Catch Updated:  07/21/19 1213     Color, UA Light Yellow     Clarity, UA Slightly Cloudy     Specific [place], UA 1 010     pH, UA 6 0     Leukocytes, UA Small     Nitrite, UA Negative     Protein, UA Negative mg/dl      Glucose, UA Negative mg/dl      Ketones, UA Negative mg/dl      Urobilinogen, UA 0 2 E U /dl      Bilirubin, UA Negative     Blood, UA Small    Comprehensive metabolic panel [[id number]]  (Abnormal) Collected:  07/21/19 1146    Lab Status:  Final result Specimen:  Blood from Arm, Right Updated:  07/21/19 1208     Sodium 138 mmol/L      Potassium 4 7 mmol/L      Chloride 105 mmol/L      CO2 23 mmol/L      ANION GAP 10 mmol/L      BUN 28 mg/dL      Creatinine 0 93 mg/dL      Glucose 99 mg/dL      Calcium 9 1 mg/dL      AST 32 U/L      ALT 46 U/L      Alkaline Phosphatase 279 U/L      Total Protein 7 7 g/dL      Albumin 3 1 g/dL      Total Bilirubin 0 40 mg/dL      eGFR 60 ml/min/1 73sq m     Narrative:       National Kidney Disease Foundation guidelines for Chronic Kidney Disease (CKD):     Stage 1 with normal or high GFR (GFR > 90 mL/min/1 73 square meters)    Stage 2 Mild CKD (GFR = 60-89 mL/min/1 73 square meters)    Stage 3A Moderate CKD (GFR = 45-59 mL/min/1 73 square meters)    Stage 3B Moderate CKD (GFR = 30-44 mL/min/1 73 square meters)    Stage 4 Severe CKD (GFR = 15-29 mL/min/1 73 square meters)    Stage 5 End Stage CKD (GFR <15 mL/min/1 73 square meters)  Note: GFR calculation is accurate only with a steady state creatinine    Protime-INR [[id number]]  (Normal) Collected:  07/21/19 1146    Lab Status:  Final result Specimen:  Blood from Arm, Right Updated:  07/21/19 1207     Protime 10 0 seconds      INR 0 95    APTT [[id number]]  (Normal) Collected:  07/21/19 1146    Lab Status:  Final result Specimen:  Blood from Arm, Right Updated:  07/21/19 1207     PTT 25 seconds     CBC and differential [[id number]]  (Abnormal) Collected:  07/21/19 1146    Lab Status:  Final result Specimen:  Blood from Arm, Right Updated:  07/21/19 1152     WBC 5 13 Thousand/uL      RBC 3 80 Million/uL      Hemoglobin 10 6 g/dL      Hematocrit 34 4 %      MCV 91 fL      MCH 27 9 pg      MCHC 30 8 g/dL      RDW 13 2 %      MPV 9 2 fL      Platelets 581 Thousands/uL      nRBC 0 /100 WBCs      Neutrophils Relative 67 %      Immat GRANS % 1 %      Lymphocytes Relative 17 %      Monocytes Relative 9 %      Eosinophils Relative 6 %      Basophils Relative 0 %      Neutrophils Absolute 3 42 Thousands/µL      Immature Grans Absolute 0 06 Thousand/uL      Lymphocytes Absolute 0 88 Thousands/µL      Monocytes Absolute 0 46 Thousand/µL      Eosinophils Absolute 0 29 Thousand/µL      Basophils Absolute 0 02 Thousands/µL                  CT abdomen pelvis with contrast   Final Result by Cherylene Maiers, MD (07/21 2008)      No acute inflammatory process identified in the abdomen or pelvis  Stable prominent lymph nodes in the right cardiophrenic angle and distal paraesophageal location  Satisfactory appearance of the liver transplant  Stable small hiatal hernia  Workstation performed: [id number]                    Procedures  Procedures       ED Course                               MDM  Number of Diagnoses or Management Options  Diarrhea:   Rectal pain:   Diagnosis management comments: Patient's lab work and CT scan showed no acute abnormalities  The patient was informed of the findings and the need for follow-up with the C diff cultures that were sent  Patient was also recommended she follow up with her GI doctor early I she has not seen in several years  I answered all questions the best my ability, patient and her  state understanding and are agreement with the assessment plan  Amount and/or Complexity of Data Reviewed  Clinical lab tests: ordered and reviewed  Tests in the radiology section of CPT®: ordered and reviewed        Disposition  Final diagnoses:   Diarrhea   Rectal pain     Time reflects when diagnosis was documented in both MDM as applicable and the Disposition within this note     Time User Action Codes Description Comment    7/21/2019  3:29 PM Dagmar Hdz Add [R19 7] Diarrhea     7/21/2019  3:29 PM Dagmar Hdz Add [K62 89] Rectal pain       ED Disposition     ED Disposition Condition Date/Time Comment    Discharge Stable Sun Jul 21, 2019  3:30 PM Ming Koroma 60 discharge to home/self care              Follow-up Information     Follow up With Specialties Details Why Contact Leonid Hannon MD Gastroenterology Call in 1 day  [address] [facility]  [phone]            Discharge Medication List as of 7/21/2019  3:30 PM      CONTINUE these medications which have NOT CHANGED    Details   aspirin (ECOTRIN LOW STRENGTH) 81 mg EC tablet Take 1 tablet by mouth daily, Starting Tue 10/31/2017, Normal      clopidogrel (PLAVIX) 75 mg tablet Take 1 tablet (75 mg total) by mouth daily, Starting Wed 5/1/2019, Normal      cycloSPORINE modified (NEORAL) 25 mg capsule Take 50 mg by mouth 2 (two) times a day, Historical Med      levETIRAcetam (KEPPRA) 500 mg tablet Take 1 tablet (500 mg total) by mouth every 12 (twelve) hours, Starting Thu 7/18/2019, Normal      levothyroxine 112 mcg tablet Take 1 tablet (112 mcg total) by mouth daily As directed, Starting Mon 6/17/2019, Normal      methocarbamol (ROBAXIN) 500 mg tablet Take 1-2 tablets (500-1,000 mg total) by mouth 2 (two) times a day as needed for muscle spasms, Starting Tue 6/18/2019, Print      metoprolol tartrate (LOPRESSOR) 25 mg tablet Take 1 5 tablets (37 5 mg total) by mouth 2 (two) times a day, Starting Fri 7/19/2019, Normal      mycophenolate (CELLCEPT) 250 mg capsule Take 250 mg by mouth 2 (two) times a day 3 tabs each dose=750 mg , Historical Med      omeprazole (PriLOSEC) 40 MG capsule Take 40 mg by mouth daily, Historical Med      oxybutynin (DITROPAN) 5 mg tablet Take 1 tablet (5 mg total) by mouth 3 (three) times a day as needed (bladder spasms) for up to 30 days, Starting Sat 3/30/2019, Until Wed 7/3/2019, Normal      pravastatin (PRAVACHOL) 20 mg tablet TAKE 1 TABLET BY MOUTH ONCE DAILY AT BEDTIME, Normal           No discharge procedures on file      ED Provider  Electronically Signed by           Harvey Garrison MD  07/22/19 4328

## 2019-07-22 ENCOUNTER — PATIENT OUTREACH (OUTPATIENT)
Dept: FAMILY MEDICINE CLINIC | Facility: CLINIC | Age: 74
End: 2019-07-22

## 2019-07-22 ENCOUNTER — TELEPHONE (OUTPATIENT)
Dept: NEUROLOGY | Facility: CLINIC | Age: 74
End: 2019-07-22

## 2019-07-22 ENCOUNTER — TELEPHONE (OUTPATIENT)
Dept: GASTROENTEROLOGY | Facility: AMBULARY SURGERY CENTER | Age: 74
End: 2019-07-22

## 2019-07-22 ENCOUNTER — TELEPHONE (OUTPATIENT)
Dept: CASE MANAGEMENT | Facility: OTHER | Age: 74
End: 2019-07-22

## 2019-07-22 NOTE — TELEPHONE ENCOUNTER
NEW PT    Caro from [facility] called requesting an asap appt at [address] [place] [address] or [facility] [place] for abnormal lab and diarrhea   [phone]

## 2019-07-22 NOTE — TELEPHONE ENCOUNTER
Spoke with Dr Carlos Ho 's office, waiting for  to call back so we can try and set something up howard Laurent, [place]

## 2019-07-22 NOTE — TELEPHONE ENCOUNTER
Spoke with pt, she was able to get in with Dr Keith Rivera the 31st  [facility] and than can we forward this message to Dr Hui Garcia so she is informed   [place], [place]

## 2019-07-22 NOTE — PROGRESS NOTES
Ted Barrera continuew to c/o "watery, Yellow diarrhea" with two ER visits in 3 days  She has history of liver transplant  Transplant "nurse' is from [place], [phone]  Ted Barrera is unable to get GI appointment for a few weeks  Have requested direction from PCP  Ted Barrera lives with significant other, Renu Jennings, who is caregiver  He provides personal care, meals, medication administration  Ted Barrera denies fever, abd  Pain, nausea or vomiting

## 2019-07-22 NOTE — TELEPHONE ENCOUNTER
Please see if we can move up GI appointment; if Dr Rusty Pérez is not able to see her could try Dr Daria Muir or Amy Sequeira

## 2019-07-22 NOTE — PROGRESS NOTES
I alerted Mita Strong that PCP office is attempting to get sooner GI appointment  We review red flags to alert provider of or to return to ER for  Mita Strong has spoken with Jarrett Palmer from transplant team who advised her to attend GI appointment  I alerted Mita Strong that I provided Darlene's number to PCP office

## 2019-07-22 NOTE — TELEPHONE ENCOUNTER
Evita Purcell, significant other, reports he's noticed that the patient has been having more mood swings and her temper has worsened,  "it doesn't take much to set her off"  Quick to argue and bursts into tears and cries very easily  He stated she's been progressively getting worse, and believes it is due to the levetiracetam  (patient in the back ground agreeing with Luis's statement)  He's unsure if he noticed her behavioral changes soon after starting the medication  levetiracetam 500 mg BID  No other med changes  Denies any other sxs   (patient was in ED 7/21 for diarrhea, please review notes and labs)    Questioning if med can be decreased or changed to something  [address]  Please advise

## 2019-07-22 NOTE — TELEPHONE ENCOUNTER
Appointment is scheduled Cici Pina for August 12 th at 3:30 with DR Edilia Ozuna at [address]  Please call pt to see if that works for her       [facility] [place]

## 2019-07-22 NOTE — TELEPHONE ENCOUNTER
Dr Ayush Daniel,  I have contacted Haven Valadez regarding latest ED visits, 2 visits in 3 days  She has HX of liver transplant  She is unable to see Dr Otis Yuan till the end of the month  [facility]k Phos is 279  She c/o ongoing diarrhea, watery  Would you like to see her first? Shall I try to move up GI appointment or get another GI that may be able to see her sooner? Liver transplant team from [place] is asking her for information from the chart  The number is [phone]  Contact is "Marjorie Powers " I am happy to help with this  Please advise

## 2019-07-22 NOTE — TELEPHONE ENCOUNTER
Left message on machine requesting a call back  Spoke with Dr Baron Howell this appointment is okay  Please let pt know it was moved to 8/12 at 3:30  If pt is getting worse before the appointment  She should call our office back   Oziel Smalls

## 2019-07-23 NOTE — TELEPHONE ENCOUNTER
Apt cancelled for Dr Aurelio Page on 08/12/2019 at 3:30pm   Pt will see Dr Tatiana Olivia on  07/31/2019  Task Complete  [id number]

## 2019-07-23 NOTE — TELEPHONE ENCOUNTER
Well, there is no good answer here    But she has been off and on in regard to the [address]  It looks like she was off of it when she last saw Dr Diana Hinkle and then it was restarted    We can do a trial run off of the [address] to see if that is really causing the problem before starting another med    There are definitely other options and some that help with mood stabilization but we should see how she does off of the [address] first so that we do not mix side effects  I suggest that she decrease slowly by 1/2 tab every 5 days       1  1/2 tab AM and 1 tab HS for 5 days  2  1/2 tab Am and HS for 5 days  3  1/2 tab at HS for 7 <-- days  Then off       I would like to hear from them in 4 weeks to let us know how she is doing and we will consider alternatives at that time

## 2019-07-24 ENCOUNTER — TELEPHONE (OUTPATIENT)
Dept: FAMILY MEDICINE CLINIC | Facility: CLINIC | Age: 74
End: 2019-07-24

## 2019-07-24 ENCOUNTER — EVALUATION (OUTPATIENT)
Dept: PHYSICAL THERAPY | Facility: CLINIC | Age: 74
End: 2019-07-24
Payer: MEDICARE

## 2019-07-24 ENCOUNTER — PATIENT OUTREACH (OUTPATIENT)
Dept: FAMILY MEDICINE CLINIC | Facility: CLINIC | Age: 74
End: 2019-07-24

## 2019-07-24 DIAGNOSIS — M54.6 THORACIC SPINE PAIN: Primary | ICD-10-CM

## 2019-07-24 PROCEDURE — 97162 PT EVAL MOD COMPLEX 30 MIN: CPT

## 2019-07-24 PROCEDURE — 97112 NEUROMUSCULAR REEDUCATION: CPT

## 2019-07-24 NOTE — TELEPHONE ENCOUNTER
Patient called because she was advised by Keri BLEVINS to contact Dr Gordy Colbert about her pain in her rectum area  She stated that Guillermina told her about something other than litocain but would numb the pain for her  Patient uses [facility] in [place]      If patient isn't home she said its ok to leave a message

## 2019-07-24 NOTE — PROGRESS NOTES
I am finally able to contact Noel Waggoner  She is c/o continued rectal pain  She is applying a Calazime cream with little relief  The diarrhea is improving  There is no rectal bleeding or abdominal pain  Noel Waggoner will see Dr Claudell Cahill 7/31/19  She is ready to get into the car and drive to the PCP office  I ask her to call and leave a message for Dr Lizett Richards instead  Noel Waggoner did start PT today   "I sat there and cried " I reminded her that the therapist wrote that she should have a "good" prognosis with the program

## 2019-07-24 NOTE — TELEPHONE ENCOUNTER
Spoke with pt, she is aware of message below  Will try for now and see how that helps  Pt has an appointment on 7/31/19 with Oziel Chakraborty

## 2019-07-24 NOTE — PROGRESS NOTES
PT Evaluation     Today's date: 2019  Patient name: Preston Soto  : 1945  MRN: [id number]  Referring provider: Hailey Duarte DO  Dx:   Encounter Diagnosis     ICD-10-CM    1  Thoracic spine pain M54 6        Start Time: 1102  Stop Time: 1145  Total time in clinic (min): 43 minutes    Assessment  Assessment details: Pt is a pleasant 74 y o  female who presents to [address] with hx of mid to low back pain  Today, she presents with no pain, however her thoracolumbar ROM and joint mobility are decreased as compared to age appropriate norms, as are B LE and core strength, postural stability, balance testing, and overall tolerance to activity  Functionally, she is limited in her ability to stand and ambulate, negotiate stairs, perform normal carrying or lifting, and perform normal age appropriate recreation and exercise  She does note recent onset of memory difficulty, for which she is being seen by MD  She is motivated to improve  Pt will benefit from skilled PT to address the aforementioned deficits and limitations in an effort to maximize pain free functional mobility and overall quality of life  Progress as able with these goals in mind  Impairments: abnormal coordination, abnormal gait, abnormal muscle firing, abnormal muscle tone, abnormal or restricted ROM, abnormal movement, activity intolerance, impaired physical strength and pain with function  Understanding of Dx/Px/POC: good   Prognosis: good    Goals  Short term goals (3 weeks):  1) Pt will improve thoracolumbar mobility deficits by 25% pain free  2) Pt will improve B LE and core strength deficits by 1/3 grade MMT  3) Pt will continue to report no mid to low back pain  4) Pt will initiate and progress HEP w/ special emphasis on functional core control and thoracolumbar mobility      Long term goals (6 weeks)  1) Pt will improve FOTO to at least 60   2) Pt will be functionally unlimited w/ stairs, step over step gait pattern and min UE support, in order to reach bedroom without difficulty  3) Pt will perform 10 functional sit<>stand transfers w/o UE support from level chair height, no pain  4) Pt will be independent and compliant w/ HEP in order to maximize functional benefit of skilled PT following d/c          Plan  Plan details: HEP to start: see scanned doc  Pt was given information on [facility] for [employer] as she expressed interest in speaking with someone regarding recent depression  Patient would benefit from: skilled PT  Planned modality interventions: cryotherapy and thermotherapy: hydrocollator packs  Planned therapy interventions: abdominal trunk stabilization, activity modification, ADL retraining, manual therapy, massage, motor coordination training, neuromuscular re-education, patient education, therapeutic training, therapeutic exercise, therapeutic activities, stretching, strengthening, home exercise program, functional ROM exercises, gait training, balance, balance/weight bearing training and body mechanics training  Frequency: 2x week  Duration in visits: 12  Duration in weeks: 6  Treatment plan discussed with: patient        Subjective Evaluation    History of Present Illness  Mechanism of injury: Pt reports pain due to diarrhea  She is waiting for MD follow up  Pt states that she does not know why she is here and is not sure that she wants to be here  Pt has an appointment with GI  on July 31st   She states that she has Chron's and Colitis and was diagnosed about 10 years ago  Pt expressed that she had LBP  She doesn't know when it hurts or how it hurts, she stated she has memory problems which are pretty new  She is seeing Dr Edna Robertson for it  When she has LBP is can go up to a 5/10  Pt reported that she always takes steps by leading w R foot and stepping to  She comes down the stairs the same way    She stated that this is not due to pain but rather, is the only way she is comfortable doing stairs  Pt noted that she had liver transplant 11 years ago  Pt stated that she has been feeling depressed lately due to Chron's/ ulcerative colitis     Quality of life: fair    Pain  Current pain ratin  At best pain ratin  At worst pain ratin  Location: LBP B (nonspecific)   Quality: burning, dull ache and discomfort  Relieving factors: heat  Aggravating factors: stair climbing, standing and walking  Progression: no change    Social Support  Steps to enter house: yes  15  Stairs in house: yes   Lives in: multiple-level home  Lives with: significant other    Employment status: not working  Treatments  Previous treatment: physical therapy  Patient Goals  Patient goals for therapy: increased strength, decreased pain, improved balance and increased motion  Patient goal: be able to move around without holding onto things        Objective     Concurrent Complaints  Negative for bladder dysfunction, bowel dysfunction and saddle (S4) numbness    Additional Special Questions  Pt notes significant diarrhea recently, being seen by GI specialist next week for this    Postural Observations  Seated posture: poor  Standing posture: poor  Correction of posture: makes symptoms better        Palpation     Additional Palpation Details  Pt noted no TTP throughout exam     Neurological Testing     Sensation     Lumbar   Left   Intact: light touch    Right   Intact: light touch    Active Range of Motion     Additional Active Range of Motion Details  Flex: % no pain  Ext: 25-50%  SB R: 50%   SB L: 25-50%  Rot R: 25-50%  Rot L: 25-50%    Guarded with all motions     Strength/Myotome Testing     Left Hip   Planes of Motion   Flexion: 3+  Extension: 3+  Abduction: 3+  Adduction: 3+  External rotation: 3+  Internal rotation: 3+    Right Hip   Planes of Motion   Flexion: 3+  Extension: 3+  Abduction: 3+  Adduction: 3+  External rotation: 3+  Internal rotation: 3+    Left Knee   Flexion: 3+  Extension: 3+    Right Knee   Flexion: 3+  Extension: 3+    Muscle Activation   Patient unable to activate left transverse abdominals and right transverse abdominals  Additional Muscle Activation Details  Core Strength: 1/5 w/ compensations noted at 1+/5 testing     Tests     Lumbar     Left   Negative passive SLR  Right   Negative passive SLR       Additional Tests Details  Manual lumbar traction: not assessed     Ambulation     Ambulation: Level Surfaces     Additional Level Surfaces Ambulation Details  R LE circumducts when advancing, lateral trunk sway, wandering gait pattern, L list, decreased step length and speed, guarded - No AD today     Functional Assessment        Comments  Sit<>stand: x3 reps w/ momentum prior to achieving full standing position, requires UE support, unbalanced initially upon standing     BW squat: to be assessed    Stairs: to be assessed    SLS: x2 sec B w/ UE support on therapist, would not be able to maintain independently     TUG: to be assessed      Flowsheet Rows      Most Recent Value   PT/OT G-Codes   Current Score  40   Projected Score  59   FOTO information reviewed  Yes             Precautions : liver transplant 11 years ago, hx of depression      Manual   7/24 - IE            DTM and TPR             Lumbar mobs             Lumbar traction                                               Exercise Diary   1           Active w/u             HS/glute/piri/HF stretching             TrA progressions   isos, march, single leg ext x 10 each, 90/90 taps x 10 B            Bridge progressions  x10 total           Squat progressions             Hip abd walking/monster walking             Rows/ext             Stairs             Balance               HEP education x 5 mins                                                                                                                                                             Modalities  1           heat             Ice

## 2019-07-24 NOTE — TELEPHONE ENCOUNTER
The only thing I could recommend would be desitin to the sore area    Otherwise she should be seeing her gastroenterologist

## 2019-07-26 NOTE — TELEPHONE ENCOUNTER
Called pt who gave permission for me to talk to [facility] Kuldeep Doll of all of the below  He is unable to write the instructions down at this time  He asked for me to call him in 30 mins as he is in the middle of something  Will call back in 30 min as requested

## 2019-07-26 NOTE — TELEPHONE ENCOUNTER
Patient and her  called back- I read them the below message and discussed the titration as Dr White Fraction suggested  Both patient and  were agreeable  I read them the titration and had them read it back to me when I was finished  They wrote down this information and verbally understood it  Patient and her  are aware to call in 4 weeks to let us know how she is doing off the [address]  Patient and  verbally understood and will call our office with any questions or concerns  WDL

## 2019-08-01 ENCOUNTER — OFFICE VISIT (OUTPATIENT)
Dept: PHYSICAL THERAPY | Facility: CLINIC | Age: 74
End: 2019-08-01
Payer: MEDICARE

## 2019-08-01 DIAGNOSIS — M54.6 THORACIC SPINE PAIN: Primary | ICD-10-CM

## 2019-08-01 PROCEDURE — 97110 THERAPEUTIC EXERCISES: CPT

## 2019-08-01 NOTE — PROGRESS NOTES
Daily Note     Today's date: 2019  Patient name: Shola Sorto  : 1945  MRN: [id number]  Referring provider: Usman Beard DO  Dx:   Encounter Diagnosis     ICD-10-CM    1  Thoracic spine pain M54 6                   Subjective: Pt reports she is taking anitbiotics for diarrhea that she is having more than twice a day  She reports this as her main complaint prior to session  Objective: See treatment diary below    Precautions : liver transplant 11 years ago, hx of depression      Manual    - IE            DTM and TPR             Lumbar mobs             Lumbar traction                                               Exercise Diary   1  2         Active w/u             HS/glute/piri/HF stretching             TrA progressions   isos, march, single leg ext x 10 each, 90/90 taps x 10 B    isos, march, single leg ext x 10 each, 90/90 taps x 10 B          Bridge progressions  x10 total  10x         Squat progressions             Hip abd walking/monster walking    tband red 10x         Rows/ext             Stairs             Balance               HEP education x 5 mins            TrA ball squeeze    10x                                                                                                                                             Modalities  1  2         heat             Ice                                       Assessment: Tolerated treatment fair  Patient would benefit from continued PT  Pt tolerated supine exercises today, she reported understanding of HEP  Plan: Continue per plan of care

## 2019-08-01 NOTE — TELEPHONE ENCOUNTER
Patient and  asking to clarify directions in decreasing keppra  She states she just finished 5 days of 1/2 tab in AM and 1 tab HS  Made them aware to next take the followin  1/2 tab Am and HS for 5 days  3  1/2 tab at HS for 7 <-- days  Then off       They are aware to call in about 4 weeks with update on how the patient is doing  No further questions at this time

## 2019-08-02 ENCOUNTER — PATIENT OUTREACH (OUTPATIENT)
Dept: FAMILY MEDICINE CLINIC | Facility: CLINIC | Age: 74
End: 2019-08-02

## 2019-08-02 NOTE — PROGRESS NOTES
Roland Beard and Kae Soler state diarrhea has decreased to twice daily  Roland Beard saw Dr Tatiana Olivia on 7/31/19, who prescribed more antibiotics for 10 days  Roland Chirag  could not produce name of med  Instructed to avoid dairy, but told that Lactaid and yogurt are safe to consume  Also told to avoid all vegetables and fruit  Rolandradha Beard is continuing with PT which I encourage  She will see liver transplant team 8/8/19, cardiology 8/9  Roland Chirag refuses to see neuro "for my brain " The white matter is taking over " I will continue to encourage appointment with my future calls

## 2019-08-13 ENCOUNTER — OFFICE VISIT (OUTPATIENT)
Dept: CARDIOLOGY CLINIC | Facility: CLINIC | Age: 74
End: 2019-08-13
Payer: MEDICARE

## 2019-08-13 VITALS
HEART RATE: 76 BPM | DIASTOLIC BLOOD PRESSURE: 70 MMHG | SYSTOLIC BLOOD PRESSURE: 144 MMHG | OXYGEN SATURATION: 98 % | HEIGHT: 62 IN | WEIGHT: 207 LBS | BODY MASS INDEX: 38.09 KG/M2

## 2019-08-13 DIAGNOSIS — I21.A1 TYPE 2 MYOCARDIAL INFARCTION WITHOUT ST ELEVATION (HCC): ICD-10-CM

## 2019-08-13 DIAGNOSIS — G45.9 TIA (TRANSIENT ISCHEMIC ATTACK): Primary | ICD-10-CM

## 2019-08-13 PROCEDURE — 99214 OFFICE O/P EST MOD 30 MIN: CPT | Performed by: INTERNAL MEDICINE

## 2019-08-13 NOTE — PROGRESS NOTES
Cardiology Follow Up    [facility]  1945  [id number]  [address] [facility] [address]  [facility] [address] [address]    Interval History: 68-year-old woman with a history of liver transplant, multiple CVA, nstemi likely typ2, chronic anemia receiving Procrit injections with recent prolonged hospitalization secondary to recurrent CVA  She has been placed on novel oral anticoagulation and has not had a further event  Her loop recorder was interrogated in the hospital and did not show any evidence of atrial fibrillation  She reports note trouble with her gait or unilateral weakness  She denies having any chest tightness  She denies having significant bleeding or bruising current the  She denies having any falls  Her hospitalization records were reviewed with      February 5, 2018:  She denies having significant bleeding or bruising on anticoagulation  She denies having any recurrence of unilateral weakness or speech difficulty  She denies having any chest pain  Her blood pressures been well controlled  4/27: She has felt dizzy since starting keppra  Still taking clopidogrel against medical advice  No chest pain  No arrhythmia  NO edema    10/30/2018: She has been compliant with Eliquis therapy without significant bleeding or bruising  She denies having any falls  She reports having difficulty with obtaining the medication as it is very expensive on her medical plan  No unilateral weakness  BP controlled  05/01/2019:  Her loop recorder has been negative for evidence of atrial fibrillation  She denies having significant bleeding or bruising  She denies having any recurrence of CVA  She denies having any significant neurological deficits  08/13/2019:  Her last loop recorder interrogation shows no evidence of atrial fibrillation  She has been well controlled on metoprolol 37 5 mg twice a day  Denies having dizziness or lightheadedness  Her major complaint right now is polyuria  She is compliant with her medications  Denies having significant bleeding or bruising      Patient Active Problem List   Diagnosis    Liver transplanted ([place] [address] )    Hypothyroidism    Long-term use of immunosuppressant medication    Anemia    TIA (transient ischemic attack)    HLD (hyperlipidemia)    History of seizure    Paroxysmal SVT (supraventricular tachycardia) (HCC)    Altered mental status    Cerebrovascular accident (CVA) due to embolism ([place] [address] )    Non-ST elevation myocardial infarction (NSTEMI), type 2    Abnormal EEG    Ataxic gait    Benign essential hypertension    Chronic joint pain    Chronic kidney insufficiency    Cyst of diaphragm    Depression    Elevated homocysteine (HCC)    Hypercalcemia    Lung nodule, solitary    Peripheral arterial disease (HCC)    Sciatica    Ulcerative colitis without complications (HCC)    Asymptomatic stenosis of left carotid artery    Dementia    Left ovarian cyst     Past Medical History:   Diagnosis Date    Anemia     Anxiety     Arthritis     Benign paroxysmal vertigo, unspecified ear     onset: 05/06/2010    Cirrhosis ([place] [address] )     onset: 03/30/2005    Crohn disease ([place] [address] )     Disease of thyroid gland     GERD (gastroesophageal reflux disease)     History of transfusion     Liver transplant candidate     onset: 09/16/2008    Osteoporosis     Pelvic fracture ([place] [address] )     onset: 10/14/2008     Social History     Socioeconomic History    Marital status:      Spouse name: Not on file    Number of children: Not on file    Years of education: Completed 12th grade    Highest education level: Not on file   Occupational History    Not on file   Social Needs    Financial resource strain: Not hard at all   Penny-Tucker insecurity:     Worry: Never true     Inability: Never true   arGEN-X needs:     Medical: No     Non-medical: No   Tobacco Use    Smoking status: Former Smoker     Years: 5 00     Last attempt to quit: 1975     Years since quittin 5    Smokeless tobacco: Never Used    Tobacco comment: smoked for 5 years-1/2 to 1 ppd   Substance and Sexual Activity    Alcohol use: No    Drug use: No    Sexual activity: Not on file   Lifestyle    Physical activity:     Days per week: 2 days     Minutes per session: 30 min    Stress: To some extent   Relationships    Social connections:     Talks on phone: Not on file     Gets together: Not on file     Attends Congregational service: Not on file     Active member of club or organization: Not on file     Attends meetings of clubs or organizations: Not on file     Relationship status: Not on file    Intimate partner violence:     Fear of current or ex partner: Not on file     Emotionally abused: Not on file     Physically abused: Not on file     Forced sexual activity: Not on file   Other Topics Concern    Not on file   Social History Narrative    Daily coffee consumption (1 cup/day)      Family History   Problem Relation Age of Onset    Cancer Mother         breast    Cancer Father     Crohn's disease Brother     Arthritis Family     Breast cancer Family      Past Surgical History:   Procedure Laterality Date    ABDOMINAL SURGERY      APPENDECTOMY      BLADDER AUGMENTATION N/A     CATARACT EXTRACTION      CHOLECYSTECTOMY N/A     COLONOSCOPY N/A 2017    Procedure: COLONOSCOPY;  Surgeon: Joann Carbone MD;  Location: [facility]; Service:     ESOPHAGOGASTRODUODENOSCOPY N/A 3/31/2016    Procedure: ESOPHAGOGASTRODUODENOSCOPY (EGD); Surgeon: Joann Carbone MD;  Location: [facility]; Service:     ESOPHAGOGASTRODUODENOSCOPY N/A 2017    Procedure: ESOPHAGOGASTRODUODENOSCOPY (EGD); Surgeon: Joann Carbone MD;  Location: [facility];   Service:     EYE SURGERY      cataracts removed both eyes lens implant    HERNIA REPAIR      JOINT REPLACEMENT      left knee replacement    LIVER TRANSPLANTATION N/A     TONSILECTOMY AND ADNOIDECTOMY N/A     TUBAL LIGATION         Current Outpatient Medications:     aspirin (ECOTRIN LOW STRENGTH) 81 mg EC tablet, Take 1 tablet by mouth daily, Disp: 30 tablet, Rfl: 0    clopidogrel (PLAVIX) 75 mg tablet, Take 1 tablet (75 mg total) by mouth daily, Disp: 90 tablet, Rfl: 1    cycloSPORINE modified (NEORAL) 25 mg capsule, Take 50 mg by mouth 2 (two) times a day, Disp: , Rfl:     levothyroxine 112 mcg tablet, Take 1 tablet (112 mcg total) by mouth daily As directed, Disp: 7 tablet, Rfl: 0    metoprolol tartrate (LOPRESSOR) 25 mg tablet, Take 1 5 tablets (37 5 mg total) by mouth 2 (two) times a day, Disp: 270 tablet, Rfl: 3    mycophenolate (CELLCEPT) 250 mg capsule, Take 250 mg by mouth 2 (two) times a day 3 tabs each dose=750 mg , Disp: , Rfl:     omeprazole (PriLOSEC) 40 MG capsule, Take 40 mg by mouth daily, Disp: , Rfl:     pravastatin (PRAVACHOL) 20 mg tablet, TAKE 1 TABLET BY MOUTH ONCE DAILY AT BEDTIME, Disp: 90 tablet, Rfl: 2    levETIRAcetam (KEPPRA) 500 mg tablet, Take 1 tablet (500 mg total) by mouth every 12 (twelve) hours (Patient not taking: Reported on 8/13/2019), Disp: 180 tablet, Rfl: 3    methocarbamol (ROBAXIN) 500 mg tablet, Take 1-2 tablets (500-1,000 mg total) by mouth 2 (two) times a day as needed for muscle spasms (Patient not taking: Reported on 8/13/2019), Disp: 20 tablet, Rfl: 0    oxybutynin (DITROPAN) 5 mg tablet, Take 1 tablet (5 mg total) by mouth 3 (three) times a day as needed (bladder spasms) for up to 30 days (Patient not taking: Reported on 7/3/2019), Disp: 30 tablet, Rfl: 0  Allergies   Allergen Reactions    Tetracyclines & Related Hives    Vancomycin Other (See Comments) and Itching     Reaction Date: [id number]; Pt unsure of reaction    Prograf [Tacrolimus] Anxiety     Reaction Date: [id number]; Review of Systems:  Review of Systems   Constitutional: Negative    Negative for activity change, appetite change, chills, diaphoresis, fatigue, fever and unexpected weight change  HENT: Negative  Negative for congestion, dental problem, drooling, ear discharge, ear pain, facial swelling, hearing loss, mouth sores, nosebleeds, postnasal drip, rhinorrhea, sinus pressure, sinus pain, sneezing, sore throat, tinnitus, trouble swallowing and voice change  Eyes: Negative  Negative for photophobia, pain, redness, itching and visual disturbance  Respiratory: Negative  Negative for apnea, cough, choking, chest tightness, shortness of breath, wheezing and stridor  Cardiovascular: Negative  Negative for chest pain, palpitations and leg swelling  Gastrointestinal: Negative  Negative for abdominal distention, abdominal pain, anal bleeding, blood in stool, constipation, diarrhea, nausea, rectal pain and vomiting  Endocrine: Negative  Negative for cold intolerance, heat intolerance, polydipsia, polyphagia and polyuria  Genitourinary: Positive for frequency and urgency  Negative for decreased urine volume, difficulty urinating, dyspareunia, dysuria, enuresis, flank pain, genital sores, hematuria, menstrual problem, pelvic pain, vaginal bleeding, vaginal discharge and vaginal pain  Musculoskeletal: Positive for arthralgias and back pain  Negative for gait problem, joint swelling, myalgias, neck pain and neck stiffness  Skin: Negative  Negative for color change, pallor, rash and wound  Allergic/Immunologic: Negative  Negative for environmental allergies, food allergies and immunocompromised state  Neurological: Negative for dizziness, tremors, seizures, syncope, facial asymmetry, speech difficulty, weakness, light-headedness, numbness and headaches  Hematological: Negative  Negative for adenopathy  Does not bruise/bleed easily  Psychiatric/Behavioral: Negative    Negative for agitation, behavioral problems, confusion, decreased concentration, dysphoric mood, hallucinations, self-injury, sleep disturbance and suicidal ideas  The patient is not nervous/anxious and is not hyperactive  All other systems reviewed and are negative  Vitals:    08/13/19 1427   BP: 144/70   BP Location: Right arm   Patient Position: Sitting   Cuff Size: Large   Pulse: 76   SpO2: 98%   Weight: 93 9 kg (207 lb)   Height: 5' 2" (1 575 m)     Physical Exam:  Physical Exam   Constitutional: She is oriented to person, place, and time  She appears well-developed and well-nourished  No distress  HENT:   Head: Normocephalic and atraumatic  Right Ear: External ear normal    Left Ear: External ear normal    Eyes: Pupils are equal, round, and reactive to light  Conjunctivae are normal  Right eye exhibits no discharge  Left eye exhibits no discharge  No scleral icterus  Neck: Normal range of motion  Neck supple  No JVD present  No tracheal deviation present  No thyromegaly present  Cardiovascular: Normal rate and regular rhythm  Exam reveals gallop  Exam reveals no friction rub  Murmur heard  Pulmonary/Chest: Effort normal and breath sounds normal  No stridor  No respiratory distress  She has no wheezes  She has no rales  She exhibits no tenderness  Abdominal: Soft  Bowel sounds are normal  She exhibits distension  She exhibits no mass  There is no tenderness  There is no rebound and no guarding  Musculoskeletal: Normal range of motion  She exhibits deformity  She exhibits no edema or tenderness  Neurological: She is alert and oriented to person, place, and time  She has normal reflexes  No cranial nerve deficit  She exhibits normal muscle tone  Coordination normal    Skin: Skin is warm and dry  No rash noted  She is not diaphoretic  No erythema  No pallor  Psychiatric: She has a normal mood and affect  Her behavior is normal  Judgment and thought content normal    Nursing note and vitals reviewed  1  TIA (transient ischemic attack)     2   Non-ST elevation myocardial infarction (NSTEMI), type 2        Recurrent CVA- no further occurrence  her loop recorder has been negative for evidence of atrial fibrillation  She is unable to pay for oral anticoagulation such as Eliquis or Xarelto  Plan to keep the patient on aspirin 81 mg plus Plavix 75 mg daily  Continue loop recorder monitoring  If with evidence of atrial fibrillation then the patient will need to be on Coumadin therapy  Continue metoprolol 37 5 mg twice a day    Prior nstemi- aspirin + plavix + pravastatin    Hyperlipidemia-continue pravastatin statin 20 mg daily  Yearly lipid +liver function test    Liver transplant-continue immunosuppressants  Statin cleared by hepatology    Seizures- discussed with neurology about keppra side effects          Delano Bermudez  Please call with any questions or suggestions

## 2019-08-14 ENCOUNTER — OFFICE VISIT (OUTPATIENT)
Dept: PHYSICAL THERAPY | Facility: CLINIC | Age: 74
End: 2019-08-14
Payer: MEDICARE

## 2019-08-14 DIAGNOSIS — M54.6 THORACIC SPINE PAIN: Primary | ICD-10-CM

## 2019-08-14 PROCEDURE — 97110 THERAPEUTIC EXERCISES: CPT

## 2019-08-14 NOTE — PROGRESS NOTES
Daily Note     Today's date: 2019  Patient name: Sharmaine Simmons  : 1945  MRN: [id number]  Referring provider: Beth Malave DO  Dx:   Encounter Diagnosis     ICD-10-CM    1  Thoracic spine pain M54 6        Start Time: 1420  Stop Time: 1455  Total time in clinic (min): 35 minutes    Subjective: Patient states she has no back pain - just very tired - still having issues with diarrhea- not sleeping well     Objective: See treatment diary below    Precautions : liver transplant 11 years ago, hx of depression      Manual    - IE     8-       DTM and TPR             Lumbar mobs             Lumbar traction                                               Exercise Diary   1  2  3       Active w/u      NS x 5 min lvl 1        HS/glute/piri/HF stretching      manual HS 3 x 30 sec ea        TrA progressions   isos, march, single leg ext x 10 each, 90/90 taps x 10 B    isos, march, single leg ext x 10 each, 90/90 taps x 10 B   isos, march,  2 x 10 ea        Bridge progressions  x10 total  10x  10 x hold 5        Squat progressions             Hip abd walking/monster walking    tband red 10x  no        Rows/ext       RTB 2 x 10 ea        Stairs             Balance               HEP education x 5 mins            TrA ball squeeze    10x  10 x supine                                                                                                                                            Modalities  1  2  3       heat             Ice                                     Assessment: Tolerated treatment well  Patient would benefit from continued PT; pt arrived looking very fatigued/sleepy  but was able to complete therex per exercise diary above; denied pain with exercises; good understanding of core activation in supine & sitting; will progress as tolerated        Plan: Continue per plan of care

## 2019-08-16 DIAGNOSIS — E03.9 HYPOTHYROIDISM, UNSPECIFIED TYPE: ICD-10-CM

## 2019-08-16 RX ORDER — LEVOTHYROXINE SODIUM 112 UG/1
TABLET ORAL
Qty: 90 TABLET | Refills: 0 | Status: SHIPPED | OUTPATIENT
Start: 2019-08-16 | End: 2020-06-09

## 2019-08-21 ENCOUNTER — OFFICE VISIT (OUTPATIENT)
Dept: PHYSICAL THERAPY | Facility: CLINIC | Age: 74
End: 2019-08-21
Payer: MEDICARE

## 2019-08-21 DIAGNOSIS — M54.6 THORACIC SPINE PAIN: Primary | ICD-10-CM

## 2019-08-21 PROCEDURE — 97112 NEUROMUSCULAR REEDUCATION: CPT

## 2019-08-21 PROCEDURE — 97110 THERAPEUTIC EXERCISES: CPT

## 2019-08-21 NOTE — PROGRESS NOTES
Progress Note:      Goals  Short term goals (3 weeks): MIN PROGRESS FOR ALL  1) Pt will improve thoracolumbar mobility deficits by 25% pain free  2) Pt will improve B LE and core strength deficits by 1/3 grade MMT  3) Pt will continue to report no mid to low back pain  4) Pt will initiate and progress HEP w/ special emphasis on functional core control and thoracolumbar mobility  Long term goals (6 weeks) ALL PROGRESSED  1) Pt will improve FOTO to at least 60   2) Pt will be functionally unlimited w/ stairs, step over step gait pattern and min UE support, in order to reach bedroom without difficulty  3) Pt will perform 10 functional sit<>stand transfers w/o UE support from level chair height, no pain  4) Pt will be independent and compliant w/ HEP in order to maximize functional benefit of skilled PT following d/c    **all goals extended 2 and 4 weeks, respectively  Objectively, pt is largely in the same place as she was a month ago  Progress has been limited due to illness and difficulty attending sessions  She is very deconditioned and is appropriate for continued skilled services w/ emphasis on functional mobility for home  We will continue on 1-2x/week basis w/ this goal in mind

## 2019-08-21 NOTE — PROGRESS NOTES
Daily Note     Today's date: 2019  Patient name: Brijesh Cosby  : 1945  MRN: [id number]  Referring provider: Kj Helms DO  Dx:   Encounter Diagnosis     ICD-10-CM    1  Thoracic spine pain M54 6        Start Time: 1430  Stop Time: 1515  Total time in clinic (min): 45 minutes    Subjective: Patient states her back feels ok; feels  tired/wiped out; states she just came from the [address] [place] [address] [address] had 3 bowel movts while she was there; pt reports she  is going to make a appt with the Dr later today       Objective: See treatment diary below    Precautions : liver transplant 11 years ago, hx of depression      Manual    - IE    8  FOTO      DTM and TPR             Lumbar mobs             Lumbar traction                                               Exercise Diary   1  2  3  4     Active w/u      NS x 5 min lvl 1   NS x 7 min lvl 1      HS/glute/piri/HF stretching      manual HS 3 x 30 sec ea   Manual HS 3 x 30 sec ea      TrA progressions   isos, march, single leg ext x 10 each, 90/90 taps x 10 B    isos, march, single leg ext x 10 each, 90/90 taps x 10 B   isos, march,  2 x 10 ea   isos, march 2 x 10 ea      Bridge progressions  x10 total  10x  10 x hold 5   10 x hold 5      Squat progressions             Hip abd walking/monster walking    tband red 10x  no   no      Rows/ext       RTB 2 x 10 ea   RTB 2 x 10 ea      Stairs             Balance               HEP education x 5 mins            TrA ball squeeze    10x  10 x supine   2 x 10 supine                             sit to stand x 7                             LAQ x 10 ea hold 5               minisquats x 10               Step ups                                                    Modalities  1  2  3  4     heat             Ice                                   Assessment: Tolerated treatment well   Patient would benefit from continued PT; patient continues to look fatigued but better than last session - able to increase reps & tolerated low reps with new therex - fatigued post minisquats       Plan: Continue per plan of care   Add step ups next session

## 2019-08-27 ENCOUNTER — PATIENT OUTREACH (OUTPATIENT)
Dept: FAMILY MEDICINE CLINIC | Facility: CLINIC | Age: 74
End: 2019-08-27

## 2019-08-27 NOTE — PROGRESS NOTES
Spoke with Rik, office nurse to verify anticoagulation medication  Elba Humphrey had received samples of Eliquis  When these were gone, she could not afford RX of Eliquis or Zarelto  Plavix 75 mg was then ordered to be taken with ASA 81 mg

## 2019-08-28 ENCOUNTER — OFFICE VISIT (OUTPATIENT)
Dept: PHYSICAL THERAPY | Facility: CLINIC | Age: 74
End: 2019-08-28
Payer: MEDICARE

## 2019-08-28 DIAGNOSIS — M54.6 THORACIC SPINE PAIN: Primary | ICD-10-CM

## 2019-08-28 PROCEDURE — 97110 THERAPEUTIC EXERCISES: CPT

## 2019-08-28 PROCEDURE — 97112 NEUROMUSCULAR REEDUCATION: CPT

## 2019-08-28 NOTE — PROGRESS NOTES
Daily Note     Today's date: 2019  Patient name: Sergio Hill  : 1945  MRN: [id number]  Referring provider: Ming Gonzalez DO  Dx:   Encounter Diagnosis     ICD-10-CM    1  Thoracic spine pain M54 6        Start Time: 1430  Stop Time: 1515  Total time in clinic (min): 45 minutes    Subjective: Patient denies pain; having less episodes of "D"; goal is to be able to vacuum/do steps step over step     Objective: See treatment diary below    Precautions : liver transplant 11 years ago, hx of depression      Manual     8  FOTO      DTM and TPR             Lumbar mobs             Lumbar traction                                               Exercise Diary    2  3  4  5   Active w/u      NS x 5 min lvl 1   NS x 7 min lvl 1   NS x 10 min lvl 2    HS/glute/piri/HF stretching      manual HS 3 x 30 sec ea   Manual HS 3 x 30 sec ea   HS SOS 6 x 10  sec ea    TrA progressions   isos, march, single leg ext x 10 each, 90/90 taps x 10 B    isos, march, single leg ext x 10 each, 90/90 taps x 10 B   isos, march,  2 x 10 ea   isos, march 2 x 10 ea   isos, march 2 x 10 ea    Bridge progressions  x10 total  10x  10 x hold 5   10 x hold 5   x 15 w ad sq    Squat progressions             Hip abd walking/monster walking    tband red 10x  no   no      Rows/ext       RTB 2 x 10 ea   RTB 2 x 10 ea   RTB 2 x 10 ea    Stairs             Balance               HEP education x 5 mins            TrA ball squeeze    10x  10 x supine   2 x 10 supine   w bridges                           sit to stand x 7                             LAQ x 10 ea hold 5   LAQ 2 x 10 hold 10             minisquats x 10  minisquats 1 x 10              Step ups 4" x 10 & 6" x 10                                                    Modalities   2  3  4  5   heat             Ice                                     Assessment: Tolerated treatment well   Patient would benefit from continued PT; patient able to tolerate increased time & resistance on Nustep & addition of RLE step ups; continues to fatigue with minisquats       Plan: Continue per plan of care

## 2019-09-04 NOTE — PROGRESS NOTES
Subjective:      Patient ID: Tia Lara is a 74 y o  female  Chief Complaint   Patient presents with    Follow-up     [facility]       Here for follow up visit  Continues to have L upper back pain intermittently, usually worse with bending over to get things out of the dryer  She continues PT for this twice weekly  Can't take tylenol due to liver transplant and can't take NSAIDS due to plavix use  Not currently using heat  No neurologic symptoms  Has upcoming appointment 9/30 with her transplant specialists and has bloodwork to be done for them prior  Denies chest pain, dyspnea, edema, palpitations  Seeing cardiology regularly and was told loop recorder had no abnormalities on most recent check  The following portions of the patient's history were reviewed and updated as appropriate: allergies, current medications, past family history, past medical history, past social history, past surgical history and problem list     Review of Systems   Constitutional: Negative  Respiratory: Negative  Cardiovascular: Negative  Musculoskeletal: Positive for back pain           Current Outpatient Medications   Medication Sig Dispense Refill    aspirin (ECOTRIN LOW STRENGTH) 81 mg EC tablet Take 1 tablet by mouth daily 30 tablet 0    clopidogrel (PLAVIX) 75 mg tablet Take 1 tablet (75 mg total) by mouth daily 90 tablet 1    cycloSPORINE modified (NEORAL) 25 mg capsule Take 50 mg by mouth 2 (two) times a day      levothyroxine 112 mcg tablet TAKE 1 TABLET BY MOUTH ONCE DAILY AS DIRECTED 90 tablet 0    metoprolol tartrate (LOPRESSOR) 25 mg tablet Take 1 5 tablets (37 5 mg total) by mouth 2 (two) times a day 270 tablet 3    mycophenolate (CELLCEPT) 250 mg capsule Take 250 mg by mouth 2 (two) times a day 3 tabs each dose=750 mg       omeprazole (PriLOSEC) 40 MG capsule Take 40 mg by mouth daily      pravastatin (PRAVACHOL) 20 mg tablet TAKE 1 TABLET BY MOUTH ONCE DAILY AT BEDTIME 90 tablet 2  oxybutynin (DITROPAN) 5 mg tablet Take 1 tablet (5 mg total) by mouth 3 (three) times a day as needed (bladder spasms) for up to 30 days 30 tablet 0     No current facility-administered medications for this visit  Objective:    /70   Pulse 68   Temp (!) 97 °F (36 1 °C)   Resp 16   Ht 5' 2" (1 575 m)   Wt 95 7 kg (211 lb)   SpO2 98%   BMI 38 59 kg/m²        Physical Exam   Constitutional: She appears well-developed and well-nourished  Eyes: Conjunctivae are normal    Neck: Neck supple  No JVD present  No thyromegaly present  Cardiovascular: Normal rate, regular rhythm, normal heart sounds and intact distal pulses  Exam reveals no gallop and no friction rub  No murmur heard  Pulmonary/Chest: Effort normal and breath sounds normal  She has no wheezes  She has no rales  Abdominal: Soft  Bowel sounds are normal  She exhibits no distension  There is no tenderness  Musculoskeletal: She exhibits no edema  Assessment/Plan:    Long-term use of immunosuppressant medication  This is managed by her transplant specialists in [facility]  Has upcoming bloodwork ordered through them  Liver transplanted [facility])  Managed by transplant team at [facility]  Continue antirejection medications  HLD (hyperlipidemia)  She will continue pravastatin and advised on low fat diet  Cerebrovascular accident (CVA) due to embolism [facility])  She continues with PT and has no apparent neurologic deficit  Continues on plavix  Benign essential hypertension  Well controlled on current medications and will continue  Chronic left-sided thoracic back pain  This appears musculoskeletal and she will continue PT  Advised to avoid NSAIDS and tylenol due to other health issues and advised moist heat          Diagnoses and all orders for this visit:    Liver transplanted [facility])    Long-term use of immunosuppressant medication    Pure hypercholesterolemia    Cerebrovascular accident (CVA) due to embolism of cerebral artery (HCC)    Benign essential hypertension    Chronic left-sided thoracic back pain          Return in about 4 months (around 1/5/2020)         Ruben Woodruff MD

## 2019-09-05 ENCOUNTER — OFFICE VISIT (OUTPATIENT)
Dept: PHYSICAL THERAPY | Facility: CLINIC | Age: 74
End: 2019-09-05
Payer: MEDICARE

## 2019-09-05 ENCOUNTER — PATIENT OUTREACH (OUTPATIENT)
Dept: FAMILY MEDICINE CLINIC | Facility: CLINIC | Age: 74
End: 2019-09-05

## 2019-09-05 ENCOUNTER — OFFICE VISIT (OUTPATIENT)
Dept: FAMILY MEDICINE CLINIC | Facility: CLINIC | Age: 74
End: 2019-09-05
Payer: MEDICARE

## 2019-09-05 VITALS
OXYGEN SATURATION: 98 % | SYSTOLIC BLOOD PRESSURE: 140 MMHG | WEIGHT: 211 LBS | RESPIRATION RATE: 16 BRPM | DIASTOLIC BLOOD PRESSURE: 70 MMHG | HEIGHT: 62 IN | BODY MASS INDEX: 38.83 KG/M2 | TEMPERATURE: 97 F | HEART RATE: 68 BPM

## 2019-09-05 DIAGNOSIS — E78.00 PURE HYPERCHOLESTEROLEMIA: ICD-10-CM

## 2019-09-05 DIAGNOSIS — M54.6 THORACIC SPINE PAIN: Primary | ICD-10-CM

## 2019-09-05 DIAGNOSIS — I10 BENIGN ESSENTIAL HYPERTENSION: ICD-10-CM

## 2019-09-05 DIAGNOSIS — Z79.899 LONG-TERM USE OF IMMUNOSUPPRESSANT MEDICATION: Chronic | ICD-10-CM

## 2019-09-05 DIAGNOSIS — G89.29 CHRONIC LEFT-SIDED THORACIC BACK PAIN: ICD-10-CM

## 2019-09-05 DIAGNOSIS — I63.40 CEREBROVASCULAR ACCIDENT (CVA) DUE TO EMBOLISM OF CEREBRAL ARTERY (HCC): ICD-10-CM

## 2019-09-05 DIAGNOSIS — M54.6 CHRONIC LEFT-SIDED THORACIC BACK PAIN: ICD-10-CM

## 2019-09-05 DIAGNOSIS — Z94.4 LIVER TRANSPLANTED (HCC): Primary | ICD-10-CM

## 2019-09-05 PROCEDURE — 99214 OFFICE O/P EST MOD 30 MIN: CPT | Performed by: INTERNAL MEDICINE

## 2019-09-05 PROCEDURE — 97112 NEUROMUSCULAR REEDUCATION: CPT

## 2019-09-05 PROCEDURE — 97110 THERAPEUTIC EXERCISES: CPT

## 2019-09-05 NOTE — PROGRESS NOTES
Chart review  Saw PCP today, back pain continues but is getting Pt twice weekly  Will see liver transplant team 9/30, needs labs prior  Cardiology told her loop monitor showed no abnormalities  Return in 4 mos to PCP

## 2019-09-05 NOTE — ASSESSMENT & PLAN NOTE
Managed by transplant team at [facility]  Continue antirejection medications

## 2019-09-05 NOTE — PROGRESS NOTES
Daily Note     Today's date: 2019  Patient name: Sharmaine Simmons  : 1945  MRN: [id number]  Referring provider: Beth Malave DO  Dx:   Encounter Diagnosis     ICD-10-CM    1  Thoracic spine pain M54 6        Start Time: 1510  Stop Time: 1600  Total time in clinic (min): 50 minutes    Subjective: Patient states she is feeling better - getting a little stronger; had some back spasms yesterday but none today       Objective: See treatment diary below    Precautions : liver transplant 11 years ago, hx of depression      Manual  9-5   8-14 8-21  FOTO   8-   DTM and TPR             Lumbar mobs             Lumbar traction                                               Exercise Diary  6   3  4  5   Active w/u  NS lvl 2 x 10 min     NS x 5 min lvl 1   NS x 7 min lvl 1   NS x 10 min lvl 2    HS/glute/piri/HF stretching  manual HS 3 x 30 sec ea     manual HS 3 x 30 sec ea   Manual HS 3 x 30 sec ea   HS SOS 6 x 10  sec ea    TrA progressions   isos, march, x 20    isos, march, single leg ext x 10 each, 90/90 taps x 10 B   iso,  2 x 10 ea   iso 2 x 10 ea   isos, march 2 x 10 ea    Bridge progressions  x10 total reg   10x  10 x hold 5   10 x hold 5   x 15 w ad sq    Squat progressions  minisquats x 10            Hip abd walking/monster walking    tband red 10x  no   no      Rows/ext  RTB 2 x 10 ea     RTB 2 x 10 ea   RTB 2 x 10 ea   RTB 2 x 10 ea    Stairs  step ups - 6" - nv           Balance              HR x 20            TrA ball squeeze  x 10   10x  10 x supine   2 x 10 supine   w bridges                     sit to stand x 7       sit to stand x 7                       LAQ x 10 ea hold 10       LAQ x 10 ea hold 5   LAQ 2 x 10 hold 10             minisquats x 10  minisquats 1 x 10              Step ups 4" x 10 & 6" x 10                                                    Modalities   2  3  4  5   heat             Ice                                         Assessment: Tolerated treatment well  Patient would benefit from continued PT; patient demonstrating improved endurance - minisquats & sit to stands fatiguing but patient will add to her home program       Plan: Continue per plan of care   Assess navigation on steps

## 2019-09-05 NOTE — ASSESSMENT & PLAN NOTE
This appears musculoskeletal and she will continue PT  Advised to avoid NSAIDS and tylenol due to other health issues and advised moist heat

## 2019-09-09 ENCOUNTER — APPOINTMENT (OUTPATIENT)
Dept: PHYSICAL THERAPY | Facility: CLINIC | Age: 74
End: 2019-09-09
Payer: MEDICARE

## 2019-09-11 ENCOUNTER — APPOINTMENT (OUTPATIENT)
Dept: PHYSICAL THERAPY | Facility: CLINIC | Age: 74
End: 2019-09-11
Payer: MEDICARE

## 2019-09-13 ENCOUNTER — OFFICE VISIT (OUTPATIENT)
Dept: PHYSICAL THERAPY | Facility: CLINIC | Age: 74
End: 2019-09-13
Payer: MEDICARE

## 2019-09-13 DIAGNOSIS — M54.6 THORACIC SPINE PAIN: Primary | ICD-10-CM

## 2019-09-13 PROCEDURE — 97112 NEUROMUSCULAR REEDUCATION: CPT

## 2019-09-13 PROCEDURE — 97110 THERAPEUTIC EXERCISES: CPT

## 2019-09-13 NOTE — PROGRESS NOTES
Daily Note     Today's date: 2019  Patient name: Rehan Fernández  : 1945  MRN: [id number]  Referring provider: Kyle Fowler DO  Dx:   Encounter Diagnosis     ICD-10-CM    1   Thoracic spine pain M54 6        Start Time: 1200  Stop Time: 1245  Total time in clinic (min): 45 minutes    Subjective: Patient states she had some back spasms this morning taking clothes out of the dryer but denies LBP/spasms at present       Objective: See treatment diary below    Precautions : liver transplant 11 years ago, hx of depression      Manual  9-5 9-13  8-21  FOTO   8-   DTM and TPR             Lumbar mobs             Lumbar traction                                               Exercise Diary  6 7   4  5   Active w/u  NS lvl 2 x 10 min   NS lvl 2 x 10 min   NS x 5 min lvl 1   NS x 7 min lvl 1   NS x 10 min lvl 2    HS/glute/piri/HF stretching  manual HS 3 x 30 sec ea   manual HS 3 x 30 sec   manual HS 3 x 30 sec ea   Manual HS 3 x 30 sec ea   HS SOS 6 x 10  sec ea    TrA progressions   isos, march, x 20    isos, march, x 20 ea   isos, march,  2 x 10 ea   isos, march 2 x 10 ea   isos, march 2 x 10 ea    Bridge progressions  x10 total reg   10x  w 10 sec hold   10 x hold 5   10 x hold 5   x 15 w ad sq    Squat progressions  minisquats x 10   minisquats 2 x 10            Hip abd walking/monster walking          Rows/ext  RTB 2 x 10 ea GTB  1 x 10 ea    RTB 2 x 10 ea   RTB 2 x 10 ea   RTB 2 x 10 ea    Stairs  step ups - 6" - nv  step ups 6" x 10 ea          Balance              HR x 20   HR x 20          TrA ball squeeze  x 10   20   10 x supine   2 x 10 supine   w bridges                     sit to stand x 7   sit to stand x 10     sit to stand x 7                       LAQ x 10 ea hold 10   LAQ x 10 ea hold 10     LAQ x 10 ea hold 5   LAQ 2 x 10 hold 10             minisquats x 10  minisquats 1 x 10              Step ups 4" x 10 & 6" x 10                                                    Modalities 6 7   4  5 heat             Ice                                   Assessment: Tolerated treatment well  Patient would benefit from continued PT; left HS tighter than right with manual stretching;  patient able to progress reps with minisquats & sit to stands  which are fatiguing to patient; pt able to tolerate  increased resistance with theraband for scap rows/shldr ext - endurance & strength improving slowly       Plan: Continue per plan of care   Assess stair navigation

## 2019-09-16 ENCOUNTER — APPOINTMENT (OUTPATIENT)
Dept: PHYSICAL THERAPY | Facility: CLINIC | Age: 74
End: 2019-09-16
Payer: MEDICARE

## 2019-09-18 ENCOUNTER — APPOINTMENT (OUTPATIENT)
Dept: PHYSICAL THERAPY | Facility: CLINIC | Age: 74
End: 2019-09-18
Payer: MEDICARE

## 2019-09-19 ENCOUNTER — PATIENT OUTREACH (OUTPATIENT)
Dept: FAMILY MEDICINE CLINIC | Facility: CLINIC | Age: 74
End: 2019-09-19

## 2019-09-19 NOTE — PROGRESS NOTES
Madison Nicole did cancel PT this week due to pain in legs  She will resume on Monday  Denies any pain today  Madison Nicole and  acknowledge she is only able to take Tylenol for pain but has not taken any  Have encouraged she communicate with PT provider any discomfort she is feeling  Medications, including Plavix,  are unchanged  Goals are met  I have provided my number to the [facility] and offered future assistance if needed  I am closing this case

## 2019-09-23 ENCOUNTER — OFFICE VISIT (OUTPATIENT)
Dept: PHYSICAL THERAPY | Facility: CLINIC | Age: 74
End: 2019-09-23
Payer: MEDICARE

## 2019-09-23 DIAGNOSIS — M54.6 THORACIC SPINE PAIN: Primary | ICD-10-CM

## 2019-09-23 PROCEDURE — 97140 MANUAL THERAPY 1/> REGIONS: CPT

## 2019-09-23 PROCEDURE — 97110 THERAPEUTIC EXERCISES: CPT

## 2019-09-23 NOTE — PROGRESS NOTES
Daily Note     Today's date: 2019  Patient name: Davion Sutherland  : 1945  MRN: [id number]  Referring provider: Gee Durham DO  Dx:   Encounter Diagnosis     ICD-10-CM    1  Thoracic spine pain M54 6        Start Time: 1345  Stop Time: 1430  Total time in clinic (min): 45 minutes    Subjective: Patient reports "Everything feels good" "The only thing that bothers me is getting clothes out of the dryer" She was educated on use of chair when reaching for clothes in dryer to prevent excessive lumbar flexion, She complied          Objective: See treatment diary below    Precautions : liver transplant 11 years ago, hx of depression      Manual  9-5 9-    8-28   DTM and TPR             Lumbar mobs             Lumbar traction                                               Exercise Diary  6 7 8    5   Active w/u  NS lvl 2 x 10 min   NS lvl 2 x 10 min   NS x 5 min lvl 1   NS x 7 min lvl 1   NS x 10 min lvl 2    HS/glute/piri/HF stretching  manual HS 3 x 30 sec ea   manual HS 3 x 30 sec   manual HS 3 x 30 sec ea   Manual HS 3 x 30 sec ea   HS SOS 6 x 10  sec ea    TrA progressions   isos, march, x 20    isos, march, x 20 ea   isos, march,  2 x 10 ea   isos, march 2 x 10 ea   isos, march 2 x 10 ea    Bridge progressions  x10 total reg   10x  w 10 sec hold   10 x hold 5   10 x hold 5   x 15 w ad sq    Squat progressions  minisquats x 10   minisquats 2 x 10            Hip abd walking/monster walking          Rows/ext  RTB 2 x 10 ea GTB  1 x 10 ea   GTB 2 x 10 ea   RTB 2 x 10 ea   RTB 2 x 10 ea    Stairs  step ups - 6" - nv  step ups 6" x 10 ea          Balance              HR x 20   HR x 20          TrA ball squeeze  x 10   20   10 x supine   2 x 10 supine   w bridges                     sit to stand x 7   sit to stand x 10     sit to stand x 7                       LAQ x 10 ea hold 10   LAQ x 10 ea hold 10     LAQ x 10 ea hold 5   LAQ 2 x 10 hold 10             minisquats x 10  minisquats 1 x 10 Step ups 4" x 10 & 6" x 10                                                    Modalities 6 7 8   5   heat             Ice                                   Assessment: Tolerated treatment well  Patient would benefit from continued PT; [employer] HS tightness in L  She is independent with exercises and does not need cueing  Plan: Potential discharge next visit

## 2019-09-25 ENCOUNTER — OFFICE VISIT (OUTPATIENT)
Dept: PHYSICAL THERAPY | Facility: CLINIC | Age: 74
End: 2019-09-25
Payer: MEDICARE

## 2019-09-25 DIAGNOSIS — M54.6 THORACIC SPINE PAIN: Primary | ICD-10-CM

## 2019-09-25 PROCEDURE — 97112 NEUROMUSCULAR REEDUCATION: CPT

## 2019-09-25 PROCEDURE — 97110 THERAPEUTIC EXERCISES: CPT

## 2019-09-25 NOTE — PROGRESS NOTES
D/C Note:       Goals  Short term goals (3 weeks):   1) Pt will improve thoracolumbar mobility deficits by 25% pain free  - progressed/achieved   2) Pt will improve B LE and core strength deficits by 1/3 grade MMT  - achieved   3) Pt will continue to report no mid to low back pain  - achieved   4) Pt will initiate and progress HEP w/ special emphasis on functional core control and thoracolumbar mobility  - achieved     Long term goals (6 weeks)  1) Pt will improve FOTO to at least 60  - progressed   2) Pt will be functionally unlimited w/ stairs, step over step gait pattern and min UE support, in order to reach bedroom without difficulty  - progressed/achieved   3) Pt will perform 10 functional sit<>stand transfers w/o UE support from level chair height, no pain  - achieved   4) Pt will be independent and compliant w/ HEP in order to maximize functional benefit of skilled PT following d/c - achieved     Palpation     Additional Palpation Details  Pt noted no TTP throughout exam     Neurological Testing     Sensation     Lumbar   Left   Intact: light touch    Right   Intact: light touch    Active Range of Motion     Additional Active Range of Motion Details  Flex: 100% no pain  Ext: 60%  SB R: 60%   SB L: 60%  Rot R: 60%  Rot L: 60%    Strength/Myotome Testing     Left Hip   Planes of Motion   Flexion: 4  Extension: 4  Abduction: 4  Adduction: 4  External rotation: 4  Internal rotation: 4    Right Hip   Planes of Motion   Flexion: 4  Extension: 4  Abduction: 4  Adduction: 4  External rotation: 4  Internal rotation: 4     Left Knee   Flexion: 4-  Extension: 4    Right Knee   Flexion: 4-  Extension: 4    Muscle Activation   Patient unable to activate left transverse abdominals and right transverse abdominals  Additional Muscle Activation Details  Core Strength: 1+-2/5    Tests     Lumbar     Left   Negative passive SLR  Right   Negative passive SLR       Additional Tests Details  Manual lumbar traction: not assessed     Ambulation     Ambulation: Level Surfaces     Additional Level Surfaces Ambulation Details  R LE circumducts when advancing, lateral trunk sway, wandering gait pattern, L list, decreased step length and speed, guarded - No AD today    -9/25: less guarding and forward flexion, improved pace but decreased compared to norms, less fatigue, not antalgic, no AD       Pt reports she feels 100% better  She has made good progress towards all goals and will be d/c to home program today  Program was reviewed with pt by PTA and updated handout was provided

## 2019-09-25 NOTE — PROGRESS NOTES
Daily Note     Today's date: 2019  Patient name: Danny Ruvalcaba  : 1945  MRN: [id number]  Referring provider: Alice Bell DO  Dx:   Encounter Diagnosis     ICD-10-CM    1  Thoracic spine pain M54 6                   Subjective: Patient reports "Everything feels good" "The only thing that bothers me is getting clothes out of the dryer" She was educated on use of chair when reaching for clothes in dryer to prevent excessive lumbar flexion, She complied          Objective: See treatment diary below    Precautions : liver transplant 11 years ago, hx of depression      Manual  9-5 -    DTM and TPR             Lumbar mobs             Lumbar traction                                               Exercise Diary  6 7 8  9    Active w/u  NS lvl 2 x 10 min   NS lvl 2 x 10 min   NS x 5 min lvl 1   NS x 7 min lvl 1   NS x 10 min lvl 2    HS/glute/piri/HF stretching  manual HS 3 x 30 sec ea   manual HS 3 x 30 sec   manual HS 3 x 30 sec ea   Manual HS 3 x 30 sec ea   HS SOS 6 x 10  sec ea    TrA progressions   isos, march, x 20    isos, march, x 20 ea   isos, march,  2 x 10 ea   isos, march 2 x 10 ea   isos, march 2 x 10 ea    Bridge progressions  x10 total reg   10x  w 10 sec hold   10 x hold 5   10 x hold 5   x 15 w ad sq    Squat progressions  minisquats x 10   minisquats 2 x 10            Hip abd walking/monster walking          Rows/ext  RTB 2 x 10 ea GTB  1 x 10 ea   GTB 2 x 10 ea   RTB 2 x 10 ea   RTB 2 x 10 ea    Stairs  step ups - 6" - nv  step ups 6" x 10 ea          Balance              HR x 20   HR x 20          TrA ball squeeze  x 10   20   10 x supine   2 x 10 supine   w bridges                     sit to stand x 7   sit to stand x 10     sit to stand x 7                       LAQ x 10 ea hold 10   LAQ x 10 ea hold 10     LAQ x 10 ea hold 5   LAQ 2 x 10 hold 10             minisquats x 10  minisquats 1 x 10              Step ups 4" x 10 & 6" x 10 Modalities 6 7 8 9    heat             Ice                                   Assessment: Tolerated treatment well  Patient would benefit from continued PT; Demo good understanding of HEP          Plan: Discharge

## 2019-10-02 ENCOUNTER — APPOINTMENT (OUTPATIENT)
Dept: LAB | Facility: HOSPITAL | Age: 74
End: 2019-10-02
Attending: INTERNAL MEDICINE
Payer: MEDICARE

## 2019-10-02 ENCOUNTER — TRANSCRIBE ORDERS (OUTPATIENT)
Dept: ADMINISTRATIVE | Facility: HOSPITAL | Age: 74
End: 2019-10-02

## 2019-10-02 ENCOUNTER — TRANSCRIBE ORDERS (OUTPATIENT)
Dept: HEMATOLOGY ONCOLOGY | Facility: MEDICAL CENTER | Age: 74
End: 2019-10-02

## 2019-10-02 DIAGNOSIS — D63.8 ANEMIA IN OTHER CHRONIC DISEASES CLASSIFIED ELSEWHERE: Primary | ICD-10-CM

## 2019-10-02 DIAGNOSIS — D63.8 ANEMIA IN OTHER CHRONIC DISEASES CLASSIFIED ELSEWHERE: ICD-10-CM

## 2019-10-02 DIAGNOSIS — E03.9 HYPOTHYROIDISM, UNSPECIFIED TYPE: ICD-10-CM

## 2019-10-02 LAB
BASOPHILS # BLD AUTO: 0.04 THOUSANDS/ΜL (ref 0–0.1)
BASOPHILS NFR BLD AUTO: 1 % (ref 0–1)
EOSINOPHIL # BLD AUTO: 0.23 THOUSAND/ΜL (ref 0–0.61)
EOSINOPHIL NFR BLD AUTO: 5 % (ref 0–6)
ERYTHROCYTE [DISTWIDTH] IN BLOOD BY AUTOMATED COUNT: 14 % (ref 11.6–15.1)
HCT VFR BLD AUTO: 36.5 % (ref 34.8–46.1)
HGB BLD-MCNC: 11.1 G/DL (ref 11.5–15.4)
IMM GRANULOCYTES # BLD AUTO: 0.03 THOUSAND/UL (ref 0–0.2)
IMM GRANULOCYTES NFR BLD AUTO: 1 % (ref 0–2)
LYMPHOCYTES # BLD AUTO: 0.7 THOUSANDS/ΜL (ref 0.6–4.47)
LYMPHOCYTES NFR BLD AUTO: 16 % (ref 14–44)
MCH RBC QN AUTO: 28.6 PG (ref 26.8–34.3)
MCHC RBC AUTO-ENTMCNC: 30.4 G/DL (ref 31.4–37.4)
MCV RBC AUTO: 94 FL (ref 82–98)
MONOCYTES # BLD AUTO: 0.43 THOUSAND/ΜL (ref 0.17–1.22)
MONOCYTES NFR BLD AUTO: 10 % (ref 4–12)
NEUTROPHILS # BLD AUTO: 3.05 THOUSANDS/ΜL (ref 1.85–7.62)
NEUTS SEG NFR BLD AUTO: 67 % (ref 43–75)
NRBC BLD AUTO-RTO: 0 /100 WBCS
PLATELET # BLD AUTO: 209 THOUSANDS/UL (ref 149–390)
PMV BLD AUTO: 9.9 FL (ref 8.9–12.7)
RBC # BLD AUTO: 3.88 MILLION/UL (ref 3.81–5.12)
T4 FREE SERPL-MCNC: 1.07 NG/DL (ref 0.76–1.46)
TSH SERPL DL<=0.05 MIU/L-ACNC: 1.34 UIU/ML (ref 0.36–3.74)
WBC # BLD AUTO: 4.48 THOUSAND/UL (ref 4.31–10.16)

## 2019-10-02 PROCEDURE — 84439 ASSAY OF FREE THYROXINE: CPT

## 2019-10-02 PROCEDURE — 36415 COLL VENOUS BLD VENIPUNCTURE: CPT

## 2019-10-02 PROCEDURE — 85025 COMPLETE CBC W/AUTO DIFF WBC: CPT

## 2019-10-02 PROCEDURE — 84443 ASSAY THYROID STIM HORMONE: CPT

## 2019-10-03 ENCOUNTER — OFFICE VISIT (OUTPATIENT)
Dept: HEMATOLOGY ONCOLOGY | Facility: MEDICAL CENTER | Age: 74
End: 2019-10-03
Payer: MEDICARE

## 2019-10-03 VITALS
RESPIRATION RATE: 18 BRPM | BODY MASS INDEX: 38.46 KG/M2 | SYSTOLIC BLOOD PRESSURE: 142 MMHG | TEMPERATURE: 97.6 F | DIASTOLIC BLOOD PRESSURE: 70 MMHG | WEIGHT: 209 LBS | HEIGHT: 62 IN | HEART RATE: 79 BPM | OXYGEN SATURATION: 98 %

## 2019-10-03 DIAGNOSIS — D63.8 ANEMIA IN OTHER CHRONIC DISEASES CLASSIFIED ELSEWHERE: Primary | Chronic | ICD-10-CM

## 2019-10-03 PROCEDURE — 99213 OFFICE O/P EST LOW 20 MIN: CPT | Performed by: INTERNAL MEDICINE

## 2019-10-03 NOTE — PROGRESS NOTES
Clifton Cade [facility]  1945  [address] [facility] [place] [address] [facility] [id number]    DISCUSSION  SUMMARY:    78-year-old female with history of anemia of chronic renal insufficiency/anemia of chronic disease (likely multiple etiologies)  Hemoglobin levels had previously been good/acceptable with RONNA treatment  Unfortunately patient suffered a CVA and MI in 2018  Patient is on aspirin and Plavix - cardiology and neurology follow-ups are ongoing  The hemoglobin level is good/acceptable (the trends are listed below)  White count, differential and platelet count are also good/acceptable  As before, the plan is to continue to monitor only  RONNA is now contraindicated  We rediscussed what to monitor for in regard to progressive anemia  Patient is to return in 4 months with blood work before  [facility] will also follow up with her liver transplant team   They are monitoring the LFTs; patient is on cyclosporin and CellCept  Patient knows to call the office if she has any other hematology questions or concerns  Carefully review your medication list and verify that the list is accurate and up-to-date  Please call the hematology/oncology office if there are medications missing from the list, medications on the list that you are not currently taking or if there is a dosage or instruction that is different from how you're taking that medication  Patient goals and areas of care:  Monitor hemoglobin level  Barriers to care:  None  Patient is able to self-care   ___________________________________________________________________________________________________    Chief Complaint   Patient presents with    Follow-up     Anemia of chronic renal insufficiency     History of Present Illness:    78-year-old female with history of anemia (anemia of chronic renal insufficiency/anemia of chronic disease previously on Procrit)   [facility] was recently admitted to the hospital after her partner noticed she was garbling her words  Patient was found to have a CVA  Patient was also found to have a non-ST elevated MI  Mrs Umaña Caller did not have any chest pain or pressure or other cardiac symptoms  Patient never developed any neurologic sequelae; mental status and speech is now back to normal  Patient is now on aspirin and Plavix (previously on Eliquis, specifics are not presently clear but patient could not afford the Eliquis - switched to Plavix)  Patient is also on Keppra  Mrs Umaña Caller is back for follow-up  Patient states feeling okay, about the same as before  Fatigue is minimal, same as before  Patient continues to be active  Appetite is good, weight is stable  No fevers, chills or sweats  No GI,  or GYN issues  No transplant issues, liver function tests are being monitored  No recent seizures, mental status changes or slurred speech  No recent falls  No specific problems with the cyclosporin or CellCept  Review of Systems   Constitutional: Positive for fatigue  HENT: Negative  Eyes: Negative  Respiratory: Negative  Cardiovascular: Negative  Gastrointestinal: Negative  Endocrine: Negative  Genitourinary: Negative for dysuria  Musculoskeletal: Negative  Skin: Negative  Allergic/Immunologic: Negative  Neurological: Negative  Hematological: Negative  Psychiatric/Behavioral: Negative  All other systems reviewed and are negative       Patient Active Problem List   Diagnosis    [facility] )    Hypothyroidism    Long-term use of immunosuppressant medication    Anemia    TIA (transient ischemic attack)    HLD (hyperlipidemia)    History of seizure    Paroxysmal SVT (supraventricular tachycardia) (HCC)    Altered mental status    Cerebrovascular accident (CVA) due to embolism ([facility] )    Non-ST elevation myocardial infarction (NSTEMI), type 2    Abnormal EEG    Ataxic gait    Benign essential hypertension    Chronic joint pain    Chronic kidney insufficiency    Cyst of diaphragm    Depression    Elevated homocysteine (HCC)    Hypercalcemia    Lung nodule, solitary    Peripheral arterial disease (HCC)    Sciatica    Ulcerative colitis without complications (HCC)    Asymptomatic stenosis of left carotid artery    Dementia (HCC)    Left ovarian cyst    Chronic left-sided thoracic back pain     Past Medical History:   Diagnosis Date    Anemia     Anxiety     Arthritis     Benign paroxysmal vertigo, unspecified ear     onset: 05/06/2010    Cirrhosis ([facility] )     onset: 03/30/2005    Crohn disease ([place] [address] )     Disease of thyroid gland     GERD (gastroesophageal reflux disease)     History of transfusion     Liver transplant candidate     onset: 09/16/2008    Osteoporosis     Pelvic fracture ([facility] )     onset: 10/14/2008     Past Surgical History:   Procedure Laterality Date    ABDOMINAL SURGERY      APPENDECTOMY      BLADDER AUGMENTATION N/A     CATARACT EXTRACTION      CHOLECYSTECTOMY N/A     COLONOSCOPY N/A 4/20/2017    Procedure: COLONOSCOPY;  Surgeon: Corinne Situ, MD;  Location: [place] [address] GI LAB; Service:     ESOPHAGOGASTRODUODENOSCOPY N/A 3/31/2016    Procedure: ESOPHAGOGASTRODUODENOSCOPY (EGD); Surgeon: Corinne Situ, MD;  Location: [place] [address] GI LAB; Service:     ESOPHAGOGASTRODUODENOSCOPY N/A 4/20/2017    Procedure: ESOPHAGOGASTRODUODENOSCOPY (EGD); Surgeon: Corinne Situ, MD;  Location: [place] [address] GI LAB;   Service:     EYE SURGERY      cataracts removed both eyes lens implant    HERNIA REPAIR      JOINT REPLACEMENT      left knee replacement    LIVER TRANSPLANTATION N/A     TONSILECTOMY AND ADNOIDECTOMY N/A     TUBAL LIGATION       Family History   Problem Relation Age of Onset    Cancer Mother         breast    Cancer Father     Crohn's disease Brother     Arthritis Family     Breast cancer Family      Social History     Socioeconomic History    Marital status:  Spouse name: Not on file    Number of children: Not on file    Years of education: Completed 12th grade    Highest education level: Not on file   Occupational History    Not on file   Social Needs    Financial resource strain: Not hard at all   [place] insecurity:     Worry: Never true     Inability: Never true   PROnoise needs:     Medical: No     Non-medical: No   Tobacco Use    Smoking status: Former Smoker     Years: 5 00     Last attempt to quit: 1975     Years since quittin 7    Smokeless tobacco: Never Used    Tobacco comment: smoked for 5 years-/2 to 1 ppd   Substance and Sexual Activity    Alcohol use: No    Drug use: No    Sexual activity: Not on file   Lifestyle    Physical activity:     Days per week: 2 days     Minutes per session: 30 min    Stress:  To some extent   Relationships    Social connections:     Talks on phone: Not on file     Gets together: Not on file     Attends [employer] service: Not on file     Active member of club or organization: Not on file     Attends meetings of clubs or organizations: Not on file     Relationship status: Not on file    Intimate partner violence:     Fear of current or ex partner: Not on file     Emotionally abused: Not on file     Physically abused: Not on file     Forced sexual activity: Not on file   Other Topics Concern    Not on file   Social History Narrative    Daily coffee consumption (1 cup/day)       Current Outpatient Medications:     aspirin (ECOTRIN LOW STRENGTH) 81 mg EC tablet, Take 1 tablet by mouth daily, Disp: 30 tablet, Rfl: 0    clopidogrel (PLAVIX) 75 mg tablet, Take 1 tablet (75 mg total) by mouth daily, Disp: 90 tablet, Rfl: 1    cycloSPORINE modified (NEORAL) 25 mg capsule, Take 50 mg by mouth 2 (two) times a day, Disp: , Rfl:     levothyroxine 112 mcg tablet, TAKE 1 TABLET BY MOUTH ONCE DAILY AS DIRECTED, Disp: 90 tablet, Rfl: 0    metoprolol tartrate (LOPRESSOR) 25 mg tablet, Take 1 5 tablets (37 5 mg total) by mouth 2 (two) times a day, Disp: 270 tablet, Rfl: 3    mycophenolate (CELLCEPT) 250 mg capsule, Take 250 mg by mouth 2 (two) times a day 3 tabs each dose=750 mg , Disp: , Rfl:     omeprazole (PriLOSEC) 40 MG capsule, Take 40 mg by mouth daily, Disp: , Rfl:     pravastatin (PRAVACHOL) 20 mg tablet, TAKE 1 TABLET BY MOUTH ONCE DAILY AT BEDTIME, Disp: 90 tablet, Rfl: 2    oxybutynin (DITROPAN) 5 mg tablet, Take 1 tablet (5 mg total) by mouth 3 (three) times a day as needed (bladder spasms) for up to 30 days, Disp: 30 tablet, Rfl: 0    Allergies   Allergen Reactions    Tetracyclines & Related Hives    Vancomycin Other (See Comments) and Itching     Reaction Date: [id number]; Pt unsure of reaction    Prograf [Tacrolimus] Anxiety     Reaction Date: 16Dec2008;        Vitals:    10/03/19 1527   BP: 142/70   Pulse: 79   Resp: 18   Temp: 97 6 °F (36 4 °C)   SpO2: 98%     Physical Exam   Constitutional: She is oriented to person, place, and time  She appears well-developed and well-nourished  HENT:   Head: Normocephalic and atraumatic  Right Ear: External ear normal    Left Ear: External ear normal    Nose: Nose normal    Mouth/Throat: Oropharynx is clear and moist    Eyes: Pupils are equal, round, and reactive to light  Conjunctivae and EOM are normal    Neck: Normal range of motion  Neck supple  Supple, no JVD   Cardiovascular: Normal rate, regular rhythm, normal heart sounds and intact distal pulses  Pulmonary/Chest: Effort normal and breath sounds normal    Distant breath sounds bilaterally, clear   Abdominal: Soft  Bowel sounds are normal     Abdomen is obese, nontender, well-healed suture lines, +bowel sounds   Musculoskeletal: Normal range of motion  Neurological: She is alert and oriented to person, place, and time  She has normal reflexes  Skin: Skin is warm     Warm, moist, no petechiae or ecchymoses, relatively good color, no active bleeding   Psychiatric: She has a normal mood and affect   Her behavior is normal  Judgment and thought content normal    Extremities: 0-1 +bilateral lower extremity edema, no cords, pulses are 1+  Lymphatics:  No adenopathy in the neck, supraclavicular region, axilla and groin bilaterally    Labs          10/02/2019 WBC = 4 4 hemoglobin = 11 1 hematocrit = 36 5 MCV = 94 platelet = 968 neutrophil = 67%  06/05/2019 WBC = 5 6 hemoglobin = 11 4 hematocrit = 37 1 platelet = 829 neutrophil = 68%  03/29/2019 hemoglobin = 11 6 hematocrit = 38 4  01/29/2019 WBC = 6 0 hemoglobin = 11 3 hematocrit = 30 1 MCV = 93 platelet = 590 neutrophil = 72% lymphocytes = 12% monocyte = 8%    09/20/2018 WBC = 4 6 hemoglobin = 10 0 hematocrit = 34 1 MCV = 92 RDW = 13 7 platelet = 286 neutrophil = 64%  08/03/2018 BUN = 26 creatinine = 1 08 AST = 51 ALT = 43 alkaline phosphatase = 155 total bilirubin = 0 5 GFR = 51  06/15/2018 WBC = 5 52 hemoglobin = 10 8 hematocrit = 35 7 MCV = 91 platelet = 443 neutrophil = 70%  3/13/ 18 WBC = 5 65 hemoglobin = 11 3 hematocrit = 35 1 MCV = 87 platelet = 397 neutrophil = 60%    1/3/18 WBC = 5 3 hemoglobin = 11 1 hematocrit = 33 8 MCV = 87 platelets = 359 iron = 46 ferritin = 127 TIBC = 254 iron saturation = 18%   12/04/2017 WBC = 5 6 hemoglobin = 11 hematocrit = 33 7 MCV = 86 platelet = 713 neutrophil = 74%  11/8/17 hemoglobin = 10 9 hematocrit = 34   11/7/17 BUN = 12 creatinine = 0 92

## 2019-10-04 ENCOUNTER — REMOTE DEVICE CLINIC VISIT (OUTPATIENT)
Dept: CARDIOLOGY CLINIC | Facility: CLINIC | Age: 74
End: 2019-10-04
Payer: MEDICARE

## 2019-10-04 ENCOUNTER — TELEPHONE (OUTPATIENT)
Dept: CARDIOLOGY CLINIC | Facility: CLINIC | Age: 74
End: 2019-10-04

## 2019-10-04 DIAGNOSIS — Z95.818 PRESENCE OF CARDIAC DEVICE: Primary | ICD-10-CM

## 2019-10-04 PROCEDURE — 93299 PR REM INTERROG ICPMS/SCRMS <30 D TECH REVIEW: CPT

## 2019-10-04 PROCEDURE — 93298 REM INTERROG DEV EVAL SCRMS: CPT | Performed by: INTERNAL MEDICINE

## 2019-10-04 NOTE — PROGRESS NOTES
Results for orders placed or performed in visit on 10/04/19   Cardiac EP device report    Narrative    CARELINK TRANSMISSION: BATTERY STATUS "OK"   NO PATIENT OR DEVICE ACTIVATED EPISODES --LYNN

## 2019-10-04 NOTE — TELEPHONE ENCOUNTER
----- Message from Marilu Asencio MD sent at 10/4/2019  2:19 PM EDT -----  Loop recorder without atrial fibrillation

## 2019-10-05 DIAGNOSIS — Z86.73 PERSONAL HISTORY OF TIA (TRANSIENT ISCHEMIC ATTACK): ICD-10-CM

## 2019-10-10 RX ORDER — CLOPIDOGREL BISULFATE 75 MG/1
TABLET ORAL
Qty: 90 TABLET | Refills: 3 | Status: SHIPPED | OUTPATIENT
Start: 2019-10-10 | End: 2020-01-28 | Stop reason: SDUPTHER

## 2019-10-11 ENCOUNTER — IMMUNIZATIONS (OUTPATIENT)
Dept: FAMILY MEDICINE CLINIC | Facility: CLINIC | Age: 74
End: 2019-10-11
Payer: MEDICARE

## 2019-10-11 DIAGNOSIS — Z23 NEED FOR INFLUENZA VACCINATION: Primary | ICD-10-CM

## 2019-10-11 PROCEDURE — G0008 ADMIN INFLUENZA VIRUS VAC: HCPCS

## 2019-10-11 PROCEDURE — 90662 IIV NO PRSV INCREASED AG IM: CPT

## 2019-11-01 ENCOUNTER — TELEPHONE (OUTPATIENT)
Dept: NEUROLOGY | Facility: CLINIC | Age: 74
End: 2019-11-01

## 2019-11-01 NOTE — TELEPHONE ENCOUNTER
Patient calling to to update her status since stopping her Keppra  She was to wait a month and give us a call back to let Dr Ehsan Leary know how she was doing  (Please see encounter 7/22/19)     States she has been off Keppra "for a while now " Feeling fine, no more anger outbursts  Denies any seizure activity  Unsure if she needs any additional medication at this point? Please advise  [facility] on [facility]  Call back number [phone] to leave a detailed message

## 2019-11-11 ENCOUNTER — TRANSCRIBE ORDERS (OUTPATIENT)
Dept: ADMINISTRATIVE | Facility: HOSPITAL | Age: 74
End: 2019-11-11

## 2019-11-11 DIAGNOSIS — Z94.4 HX OF LIVER TRANSPLANT (HCC): Primary | ICD-10-CM

## 2019-11-12 NOTE — TELEPHONE ENCOUNTER
Left detailed message for patient making her aware of below  She will need to be called with any changes in her plan of care

## 2019-11-12 NOTE — TELEPHONE ENCOUNTER
Clinical team, Please will you be sure that Krysten Campos knows that for now, I would not start something new at this point  Edith and Madiha Welch, can I ask you opinion    Krysten Campos had a prior stroke and then some partial seizures and had at least one abnormal EEG with some epileptiform discharges but her most recent EEG was clean  She had side effects on Keppra so we weaned her off and she has had no breakthrough events  I am inclined to just watch her clinically but wanted to see if you thought, considering her prior EEG, if that is reasonable?

## 2019-12-06 NOTE — TELEPHONE ENCOUNTER
Patient spoke with  Felix  who reports gradually increased confusion  Patient scheduled for appointment with Dr Patricia Whitten 11/29/18 1030  I also discussed with patient that if she experiences sudden increase in confusion she should consider being seen in ED/urgent care  Verbalized clear understanding  Mohs Case Number: [id number] Mohs Case Number: [id number]

## 2019-12-20 ENCOUNTER — TELEPHONE (OUTPATIENT)
Dept: NEUROLOGY | Facility: CLINIC | Age: 74
End: 2019-12-20

## 2019-12-20 DIAGNOSIS — Z86.73 HISTORY OF STROKE: ICD-10-CM

## 2019-12-20 RX ORDER — PRAVASTATIN SODIUM 20 MG
20 TABLET ORAL
Qty: 90 TABLET | Refills: 3 | Status: SHIPPED | OUTPATIENT
Start: 2019-12-20 | End: 2020-02-12

## 2019-12-20 NOTE — TELEPHONE ENCOUNTER
Patient called in for refill (in separate encounter)  Reviewed chart  Patient last saw Courtney Bazan for history of stroke on 7/19/18 and was to f/u in 1 year  Attempted to schedule patient for f/u, she is only agreeable to going to [place] location as she lives in [address] [place] [address]  Next available appt in [place] with Courtney Bazan is in November 2020  She would not like to go to [facility] office  Patient is asking if she is able to transfer care to Dr Ho Childress at this time, the location would be much more convenient for her (states office is about 1-2 miles away from her house)  Dr Juan January and Dr Ho Childress- are you agreeable to this? Thanks!

## 2019-12-20 NOTE — TELEPHONE ENCOUNTER
Patient was asking for pravastatin refill  I created a separate encounter for this that was sent to Dr Lorena Smalls to review  Thanks!

## 2019-12-20 NOTE — TELEPHONE ENCOUNTER
Patient requesting refill of pravastatin  Please review and sign if agreeable  Please see telephone encounter as well, thanks!

## 2020-01-02 NOTE — PROGRESS NOTES
Assessment/Plan:    1  Benign essential hypertension  Assessment & Plan:  Well controlled and will continue metoprolol  2  Chronic renal impairment, unspecified CKD stage  Assessment & Plan:  Reviewed labs with patient and these are stable  3  Pure hypercholesterolemia  Assessment & Plan:  Continue pravastatin and will check lipid profile with her next bloodwork through her hepatologist   Jacek Hernandez on weight loss and low fat diet  Orders:  -     Lipid panel; Future  -     Lipid panel    4  Liver transplanted [facility])  Assessment & Plan: This is managed by transplant medicine and she has frequent follow up bloodwork and visits  5  Hypothyroidism, unspecified type  Assessment & Plan:  Clinically euthyroid and recent TSH is within goal, continue levothyroxine 112 mcg        6  Ulcerative colitis without complications, unspecified location [facility])  Assessment & Plan:  Follows regularly with gastroenterology and they manage  7  PSVT (paroxysmal supraventricular tachycardia) ([facility] )          There are no Patient Instructions on file for this visit  Return in about 4 months (around 5/6/2020)  Subjective:      Patient ID: Elda Metcalf is a 66 y o  female  Chief Complaint   Patient presents with    Follow-up     blood pressure check and medication review  [facility]       Here for follow up visit of hypertension and other issues  She feels well physically  Denies chest pain or dyspnea  LFTs are up and her transplant surgeon is following her closely with LFT's to be done every two weeks  He mentioned a liver biopsy if things do not improve but she states she does not want this  She denies abdominal pain, fever, dyspnea, chest pain  She is also seeing Dr Amarilis Law (cardiology) in the near future but feels she is doing well        The following portions of the patient's history were reviewed and updated as appropriate: allergies, current medications, past family history, past medical history, past social history, past surgical history and problem list     Review of Systems   Constitutional: Negative  Respiratory: Negative  Cardiovascular: Negative  Gastrointestinal: Negative  Current Outpatient Medications   Medication Sig Dispense Refill    aspirin (ECOTRIN LOW STRENGTH) 81 mg EC tablet Take 1 tablet by mouth daily 30 tablet 0    clopidogrel (PLAVIX) 75 mg tablet TAKE 1 TABLET BY MOUTH ONCE DAILY 90 tablet 3    cycloSPORINE modified (NEORAL) 25 mg capsule Take 50 mg by mouth 2 (two) times a day      levothyroxine 112 mcg tablet TAKE 1 TABLET BY MOUTH ONCE DAILY AS DIRECTED 90 tablet 0    metoprolol tartrate (LOPRESSOR) 25 mg tablet Take 1 5 tablets (37 5 mg total) by mouth 2 (two) times a day 270 tablet 3    mycophenolate (CELLCEPT) 250 mg capsule Take 250 mg by mouth 2 (two) times a day 3 tabs each dose=750 mg       omeprazole (PriLOSEC) 40 MG capsule Take 40 mg by mouth daily      pravastatin (PRAVACHOL) 20 mg tablet Take 1 tablet (20 mg total) by mouth daily at bedtime 90 tablet 3     No current facility-administered medications for this visit  Objective:    /60   Pulse 68   Temp 98 2 °F (36 8 °C)   Resp 18   Ht 5' 2" (1 575 m)   Wt 94 8 kg (209 lb)   SpO2 98%   BMI 38 23 kg/m²        Physical Exam   Constitutional: She appears well-developed and well-nourished  Eyes: Conjunctivae are normal    Neck: Neck supple  No JVD present  No thyromegaly present  Cardiovascular: Normal rate, regular rhythm, normal heart sounds and intact distal pulses  Exam reveals no gallop and no friction rub  No murmur heard  Pulmonary/Chest: Effort normal and breath sounds normal  She has no wheezes  She has no rales  Abdominal: Soft  Bowel sounds are normal  She exhibits no distension  There is no tenderness  Musculoskeletal: She exhibits no edema                Williams Ortega MD

## 2020-01-06 ENCOUNTER — OFFICE VISIT (OUTPATIENT)
Dept: FAMILY MEDICINE CLINIC | Facility: CLINIC | Age: 75
End: 2020-01-06
Payer: MEDICARE

## 2020-01-06 VITALS
WEIGHT: 209 LBS | BODY MASS INDEX: 38.46 KG/M2 | SYSTOLIC BLOOD PRESSURE: 126 MMHG | OXYGEN SATURATION: 98 % | HEART RATE: 68 BPM | RESPIRATION RATE: 18 BRPM | TEMPERATURE: 98.2 F | HEIGHT: 62 IN | DIASTOLIC BLOOD PRESSURE: 60 MMHG

## 2020-01-06 DIAGNOSIS — K51.90 ULCERATIVE COLITIS WITHOUT COMPLICATIONS, UNSPECIFIED LOCATION (HCC): ICD-10-CM

## 2020-01-06 DIAGNOSIS — E03.9 HYPOTHYROIDISM, UNSPECIFIED TYPE: ICD-10-CM

## 2020-01-06 DIAGNOSIS — I10 BENIGN ESSENTIAL HYPERTENSION: Primary | ICD-10-CM

## 2020-01-06 DIAGNOSIS — N18.9 CHRONIC RENAL IMPAIRMENT, UNSPECIFIED CKD STAGE: ICD-10-CM

## 2020-01-06 DIAGNOSIS — Z94.4 LIVER TRANSPLANTED (HCC): ICD-10-CM

## 2020-01-06 DIAGNOSIS — I47.1 PSVT (PAROXYSMAL SUPRAVENTRICULAR TACHYCARDIA) (HCC): ICD-10-CM

## 2020-01-06 DIAGNOSIS — E78.00 PURE HYPERCHOLESTEROLEMIA: ICD-10-CM

## 2020-01-06 PROCEDURE — 99213 OFFICE O/P EST LOW 20 MIN: CPT | Performed by: INTERNAL MEDICINE

## 2020-01-06 NOTE — ASSESSMENT & PLAN NOTE
Continue pravastatin and will check lipid profile with her next bloodwork through her hepatologist   Marcelo Pacheco on weight loss and low fat diet

## 2020-01-09 ENCOUNTER — REMOTE DEVICE CLINIC VISIT (OUTPATIENT)
Dept: CARDIOLOGY CLINIC | Facility: CLINIC | Age: 75
End: 2020-01-09
Payer: MEDICARE

## 2020-01-09 DIAGNOSIS — Z95.818 PRESENCE OF CARDIAC DEVICE: Primary | ICD-10-CM

## 2020-01-09 LAB
CHOLEST SERPL-MCNC: 155 MG/DL (ref 100–199)
CHOLEST/HDLC SERPL: 2.7 RATIO (ref 0–4.4)
HDLC SERPL-MCNC: 58 MG/DL
LDLC SERPL CALC-MCNC: 72 MG/DL (ref 0–99)
MICRODELETION SYND BLD/T FISH: NORMAL
SL AMB VLDL CHOLESTEROL CALC: 25 MG/DL (ref 5–40)
TRIGL SERPL-MCNC: 127 MG/DL (ref 0–149)

## 2020-01-09 PROCEDURE — 93298 REM INTERROG DEV EVAL SCRMS: CPT | Performed by: INTERNAL MEDICINE

## 2020-01-09 NOTE — PROGRESS NOTES
Results for orders placed or performed in visit on 01/09/20   Cardiac EP device report    Narrative    CARELINK TRANSMISSION: BATTERY STATUS "OK"   NO PATIENT OR DEVICE ACTIVATED EPISODES --LYNN

## 2020-01-28 ENCOUNTER — OFFICE VISIT (OUTPATIENT)
Dept: NEUROLOGY | Facility: CLINIC | Age: 75
End: 2020-01-28
Payer: MEDICARE

## 2020-01-28 VITALS
HEIGHT: 62 IN | DIASTOLIC BLOOD PRESSURE: 80 MMHG | HEART RATE: 70 BPM | WEIGHT: 204 LBS | SYSTOLIC BLOOD PRESSURE: 140 MMHG | BODY MASS INDEX: 37.54 KG/M2

## 2020-01-28 DIAGNOSIS — E78.00 PURE HYPERCHOLESTEROLEMIA: ICD-10-CM

## 2020-01-28 DIAGNOSIS — Z94.4 LIVER TRANSPLANTED (HCC): ICD-10-CM

## 2020-01-28 DIAGNOSIS — Z86.73 PERSONAL HISTORY OF TIA (TRANSIENT ISCHEMIC ATTACK): ICD-10-CM

## 2020-01-28 DIAGNOSIS — I63.40 CEREBROVASCULAR ACCIDENT (CVA) DUE TO EMBOLISM OF CEREBRAL ARTERY (HCC): Primary | ICD-10-CM

## 2020-01-28 DIAGNOSIS — Z87.898 HISTORY OF SEIZURE: ICD-10-CM

## 2020-01-28 DIAGNOSIS — R94.01 ABNORMAL EEG: ICD-10-CM

## 2020-01-28 PROCEDURE — 99215 OFFICE O/P EST HI 40 MIN: CPT | Performed by: PSYCHIATRY & NEUROLOGY

## 2020-01-28 RX ORDER — CLOPIDOGREL BISULFATE 75 MG/1
75 TABLET ORAL DAILY
Qty: 90 TABLET | Refills: 3 | Status: SHIPPED | OUTPATIENT
Start: 2020-01-28 | End: 2021-03-09

## 2020-01-28 NOTE — PROGRESS NOTES
Patient ID: Kumar Almazan is a 66 y o  female  Assessment/Plan: This is a 65 y/o  Female who is here as a follow up of history of seizure, history of CVA  Patient has been maintained on plavix and pravachol for stroke  As far as seizure is concerned, patient was having aggression and behavioral changes while taking Keppra  Dr Sherryll Essex ordered routine EEG which was normal, and patient was recommended to wean off Keppra and has been doing well off Keppra  Diagnoses and all orders for this visit:    Cerebrovascular accident (CVA) due to embolism of cerebral artery ([facility] [address] )  -c/w with combination of Plavix and pravastatin for secondary stroke prevention  -BP goal < 130/80, BP is at goal currently    -will check Carotid ultrasound because last one was in 2018 which showed less than 50% stenosis bilaterally  -Defer to PCP regarding DM and BP management  -does not smoke at this time   -denies any history of snoring    -I advised patient to avoid using NSAIDs for headaches or other pain  -Recommend mediterranean diet & regular exercise regimen atleast 4-5 times a week for 20-30 minutes  -I educated patient/family regarding medication compliance  -     VAS carotid complete study; Future  -     clopidogrel (PLAVIX) 75 mg tablet; Take 1 tablet (75 mg total) by mouth daily    History of seizure  -EEG showed temporal spikes in the past, and patient was on keppra but she developed mood changes and aggression issues, so rpt EEG showed no temporal spikes, so she was recommended to wean off Keppra and to be monitored, and she has been doing well since then     -no mood changes and no new seizures    Pure hypercholesterolemia    Abnormal EEG    Liver transplanted [facility])  -patient following GI at [facility]  -continue with Cellcept     Personal history of TIA (transient ischemic attack)  -     clopidogrel (PLAVIX) 75 mg tablet;  Take 1 tablet (75 mg total) by mouth daily       I would like to follow up in 3 months  I would be happy to see the patient sooner if any new questions/concerns arise  Patient/Guardian was advised to the call the office if they have any questions and concerns in the meantime  Patient/Guardian does understand that if they have any new stroke like symptoms such as facial droop on one side, weakness/paralysis on either side, speech trouble, numbness on one side, balance issues, any vision changes, extreme dizziness or any new headache, to call 9-1-1 immediately or to proceed to the nearest ER immediately  Total combined time spent 41 minutes today  Greater than 50% of total time was spent with the patient and or family counseling and or coordination of care  A brief description of coordination of care: stroke education risk, medication compliance, reviewing EMR, seizure precautions    Subjective:    HPI    This is a 67 y/o Female who is here as a follow up for history of stroke  Patient was noted to have history of seizures with abnormal EEG  Her EEG did show temporal spikes  She was then placed on Keppra 500 mg twice a day which she could not tolerate due to side effects such as anger issues and mood changes and possible depression  She did see Dr Valeria Caro after she saw me in 2017  She also saw Dr Yulissa Macias her for possible dementia  He then recommended a routine EEG repeat which turned out to be normal   With this information he recommended weaning off the [address] in light of her anger and mood issues  She has been doing well off Keppra  Mood changes have subsided  She denies any seizures either  She states that she is complaint with her medications  She is on plavix and pravastatin for stroke prevention  She denies any bleeding or bruising at this time       The following portions of the patient's history were reviewed and updated as appropriate:   She  has a past medical history of Anemia, Anxiety, Arthritis, Benign paroxysmal vertigo, unspecified ear, Cirrhosis ([facility] [address] ), Crohn disease ([facility] ), Disease of thyroid gland, GERD (gastroesophageal reflux disease), History of transfusion, Liver transplant candidate, Osteoporosis, and Pelvic fracture ([place] [address] )  She   Patient Active Problem List    Diagnosis Date Noted    Chronic left-sided thoracic back pain 09/05/2019    Left ovarian cyst 04/01/2019    Dementia ([place] [address] ) 11/12/2018    Asymptomatic stenosis of left carotid artery 07/19/2018    Abnormal EEG 11/08/2017    Altered mental status 11/04/2017    Cerebrovascular accident (CVA) due to embolism ([place] [address] ) 11/04/2017    Non-ST elevation myocardial infarction (NSTEMI), type 2 11/04/2017    History of seizure 10/31/2017    Paroxysmal SVT (supraventricular tachycardia) ([place] [address] ) 10/31/2017    TIA (transient ischemic attack) 10/29/2017    HLD (hyperlipidemia) 10/29/2017    Cyst of diaphragm 04/27/2017    Long-term use of immunosuppressant medication 12/30/2016    Anemia 12/30/2016    Liver transplanted ([place] [address] ) 12/29/2016    Hypothyroidism 12/29/2016    Elevated homocysteine (HCC) 11/11/2016    Chronic joint pain 07/07/2016    Lung nodule, solitary 03/21/2016    Peripheral arterial disease ([place] [address] ) 02/16/2016    Sciatica 09/03/2015    Hypercalcemia 06/30/2015    Ataxic gait 09/25/2013    Chronic kidney insufficiency 05/24/2013    Benign essential hypertension 09/04/2012    Ulcerative colitis without complications ([place] [address] ) 76/44/0981    Depression 05/07/2012     She  has a past surgical history that includes Abdominal surgery; Appendectomy; Eye surgery; Hernia repair; Joint replacement; Liver transplantation (N/A); Cholecystectomy (N/A); TONSILECTOMY AND ADNOIDECTOMY (N/A); Bladder augmentation (N/A); Esophagogastroduodenoscopy (N/A, 3/31/2016); Esophagogastroduodenoscopy (N/A, 4/20/2017); Colonoscopy (N/A, 4/20/2017); Cataract extraction; and Tubal ligation  Her family history includes Arthritis in her family; Breast cancer in her family;  Cancer in her father and mother; Crohn's disease in her brother  She  reports that she quit smoking about 45 years ago  She quit after 5 00 years of use  She has never used smokeless tobacco  She reports that she does not drink alcohol or use drugs  Current Outpatient Medications   Medication Sig Dispense Refill    aspirin (ECOTRIN LOW STRENGTH) 81 mg EC tablet Take 1 tablet by mouth daily 30 tablet 0    clopidogrel (PLAVIX) 75 mg tablet Take 1 tablet (75 mg total) by mouth daily 90 tablet 3    cycloSPORINE modified (NEORAL) 25 mg capsule Take 50 mg by mouth 2 (two) times a day      levothyroxine 112 mcg tablet TAKE 1 TABLET BY MOUTH ONCE DAILY AS DIRECTED 90 tablet 0    metoprolol tartrate (LOPRESSOR) 25 mg tablet Take 1 5 tablets (37 5 mg total) by mouth 2 (two) times a day 270 tablet 3    mycophenolate (CELLCEPT) 250 mg capsule Take 250 mg by mouth 2 (two) times a day 3 tabs each dose=750 mg       omeprazole (PriLOSEC) 40 MG capsule Take 40 mg by mouth daily      pravastatin (PRAVACHOL) 20 mg tablet Take 1 tablet (20 mg total) by mouth daily at bedtime 90 tablet 3     No current facility-administered medications for this visit        Current Outpatient Medications on File Prior to Visit   Medication Sig    aspirin (ECOTRIN LOW STRENGTH) 81 mg EC tablet Take 1 tablet by mouth daily    cycloSPORINE modified (NEORAL) 25 mg capsule Take 50 mg by mouth 2 (two) times a day    levothyroxine 112 mcg tablet TAKE 1 TABLET BY MOUTH ONCE DAILY AS DIRECTED    metoprolol tartrate (LOPRESSOR) 25 mg tablet Take 1 5 tablets (37 5 mg total) by mouth 2 (two) times a day    mycophenolate (CELLCEPT) 250 mg capsule Take 250 mg by mouth 2 (two) times a day 3 tabs each dose=750 mg     omeprazole (PriLOSEC) 40 MG capsule Take 40 mg by mouth daily    pravastatin (PRAVACHOL) 20 mg tablet Take 1 tablet (20 mg total) by mouth daily at bedtime    [DISCONTINUED] clopidogrel (PLAVIX) 75 mg tablet TAKE 1 TABLET BY MOUTH ONCE DAILY     No current facility-administered medications on file prior to visit  She is allergic to tetracyclines & related; vancomycin; and prograf [tacrolimus]            Objective:    Blood pressure 140/80, pulse 70, height 5' 2" (1 575 m), weight 92 5 kg (204 lb), not currently breastfeeding  Physical Exam  General - Patient is alert, awake, follows commands  Speech - no dysarthria noted, no aphasia noted  Neuro:   Cranial nerves: PERRL, EOMI, no facial droop noted, facial sensation intact, tongue midline  Motor: 5/5 throughout, normal tone  Sensory - intact to soft touch throughout  Reflexes - 2+ throughout  Coordination - no ataxia/dysmetria noted  Gait - normal tandem walk    ROS:  I personally reviewed ROS   Review of Systems   Constitutional: Negative  Negative for appetite change and fever  HENT: Negative  Negative for hearing loss, tinnitus, trouble swallowing and voice change  Eyes: Negative  Negative for photophobia and pain  Respiratory: Negative  Negative for shortness of breath  Cardiovascular: Negative  Negative for palpitations  Gastrointestinal: Negative  Negative for nausea and vomiting  Endocrine: Negative  Negative for cold intolerance and heat intolerance  Genitourinary: Negative  Negative for dysuria, frequency and urgency  Musculoskeletal: Positive for gait problem  Negative for myalgias and neck pain  Patient states her balance is off  Skin: Negative  Negative for rash  Neurological: Negative for dizziness, tremors, seizures, syncope, facial asymmetry, speech difficulty, weakness, light-headedness, numbness and headaches  Hematological: Negative  Does not bruise/bleed easily  Psychiatric/Behavioral: Negative  Negative for confusion, hallucinations and sleep disturbance

## 2020-02-03 ENCOUNTER — HOSPITAL ENCOUNTER (OUTPATIENT)
Dept: RADIOLOGY | Facility: HOSPITAL | Age: 75
Discharge: HOME | End: 2020-02-03
Attending: PSYCHIATRY & NEUROLOGY
Payer: MEDICARE

## 2020-02-03 DIAGNOSIS — I63.40 CEREBROVASCULAR ACCIDENT (CVA) DUE TO EMBOLISM OF CEREBRAL ARTERY (HCC): ICD-10-CM

## 2020-02-03 PROCEDURE — 93880 EXTRACRANIAL BILAT STUDY: CPT

## 2020-02-03 PROCEDURE — 93880 EXTRACRANIAL BILAT STUDY: CPT | Performed by: SURGERY

## 2020-02-07 ENCOUNTER — APPOINTMENT (OUTPATIENT)
Dept: LAB | Facility: HOSPITAL | Age: 75
End: 2020-02-07
Attending: INTERNAL MEDICINE
Payer: MEDICARE

## 2020-02-07 ENCOUNTER — TRANSCRIBE ORDERS (OUTPATIENT)
Dept: ADMINISTRATIVE | Facility: HOSPITAL | Age: 75
End: 2020-02-07

## 2020-02-07 DIAGNOSIS — D63.8 ANEMIA IN OTHER CHRONIC DISEASES CLASSIFIED ELSEWHERE: Chronic | ICD-10-CM

## 2020-02-07 LAB
ALBUMIN SERPL BCP-MCNC: 3.4 G/DL (ref 3.5–5)
ALP SERPL-CCNC: 346 U/L (ref 46–116)
ALT SERPL W P-5'-P-CCNC: 72 U/L (ref 12–78)
ANION GAP SERPL CALCULATED.3IONS-SCNC: 10 MMOL/L (ref 4–13)
AST SERPL W P-5'-P-CCNC: 61 U/L (ref 5–45)
BASOPHILS # BLD AUTO: 0.03 THOUSANDS/ΜL (ref 0–0.1)
BASOPHILS NFR BLD AUTO: 1 % (ref 0–1)
BILIRUB SERPL-MCNC: 0.7 MG/DL (ref 0.2–1)
BUN SERPL-MCNC: 30 MG/DL (ref 5–25)
CALCIUM SERPL-MCNC: 8.4 MG/DL (ref 8.3–10.1)
CHLORIDE SERPL-SCNC: 105 MMOL/L (ref 100–108)
CO2 SERPL-SCNC: 22 MMOL/L (ref 21–32)
CREAT SERPL-MCNC: 1.07 MG/DL (ref 0.6–1.3)
EOSINOPHIL # BLD AUTO: 0.2 THOUSAND/ΜL (ref 0–0.61)
EOSINOPHIL NFR BLD AUTO: 4 % (ref 0–6)
ERYTHROCYTE [DISTWIDTH] IN BLOOD BY AUTOMATED COUNT: 13.7 % (ref 11.6–15.1)
GFR SERPL CREATININE-BSD FRML MDRD: 51 ML/MIN/1.73SQ M
GLUCOSE SERPL-MCNC: 108 MG/DL (ref 65–140)
HCT VFR BLD AUTO: 36.8 % (ref 34.8–46.1)
HGB BLD-MCNC: 11.5 G/DL (ref 11.5–15.4)
IMM GRANULOCYTES # BLD AUTO: 0.05 THOUSAND/UL (ref 0–0.2)
IMM GRANULOCYTES NFR BLD AUTO: 1 % (ref 0–2)
LYMPHOCYTES # BLD AUTO: 0.69 THOUSANDS/ΜL (ref 0.6–4.47)
LYMPHOCYTES NFR BLD AUTO: 14 % (ref 14–44)
MCH RBC QN AUTO: 28.3 PG (ref 26.8–34.3)
MCHC RBC AUTO-ENTMCNC: 31.3 G/DL (ref 31.4–37.4)
MCV RBC AUTO: 91 FL (ref 82–98)
MONOCYTES # BLD AUTO: 0.52 THOUSAND/ΜL (ref 0.17–1.22)
MONOCYTES NFR BLD AUTO: 11 % (ref 4–12)
NEUTROPHILS # BLD AUTO: 3.31 THOUSANDS/ΜL (ref 1.85–7.62)
NEUTS SEG NFR BLD AUTO: 69 % (ref 43–75)
NRBC BLD AUTO-RTO: 0 /100 WBCS
PLATELET # BLD AUTO: 239 THOUSANDS/UL (ref 149–390)
PMV BLD AUTO: 9.6 FL (ref 8.9–12.7)
POTASSIUM SERPL-SCNC: 4.5 MMOL/L (ref 3.5–5.3)
PROT SERPL-MCNC: 8 G/DL (ref 6.4–8.2)
RBC # BLD AUTO: 4.06 MILLION/UL (ref 3.81–5.12)
SODIUM SERPL-SCNC: 137 MMOL/L (ref 136–145)
WBC # BLD AUTO: 4.8 THOUSAND/UL (ref 4.31–10.16)

## 2020-02-07 PROCEDURE — 85025 COMPLETE CBC W/AUTO DIFF WBC: CPT

## 2020-02-07 PROCEDURE — 80053 COMPREHEN METABOLIC PANEL: CPT

## 2020-02-07 PROCEDURE — 36415 COLL VENOUS BLD VENIPUNCTURE: CPT

## 2020-02-10 ENCOUNTER — OFFICE VISIT (OUTPATIENT)
Dept: HEMATOLOGY ONCOLOGY | Facility: MEDICAL CENTER | Age: 75
End: 2020-02-10
Payer: MEDICARE

## 2020-02-10 VITALS
SYSTOLIC BLOOD PRESSURE: 110 MMHG | BODY MASS INDEX: 38.83 KG/M2 | DIASTOLIC BLOOD PRESSURE: 72 MMHG | HEIGHT: 62 IN | WEIGHT: 211 LBS | RESPIRATION RATE: 16 BRPM | TEMPERATURE: 97 F | HEART RATE: 74 BPM | OXYGEN SATURATION: 98 %

## 2020-02-10 DIAGNOSIS — D63.8 ANEMIA IN OTHER CHRONIC DISEASES CLASSIFIED ELSEWHERE: Primary | Chronic | ICD-10-CM

## 2020-02-10 PROCEDURE — 1160F RVW MEDS BY RX/DR IN RCRD: CPT | Performed by: INTERNAL MEDICINE

## 2020-02-10 PROCEDURE — 3008F BODY MASS INDEX DOCD: CPT | Performed by: INTERNAL MEDICINE

## 2020-02-10 PROCEDURE — 3074F SYST BP LT 130 MM HG: CPT | Performed by: INTERNAL MEDICINE

## 2020-02-10 PROCEDURE — 1036F TOBACCO NON-USER: CPT | Performed by: INTERNAL MEDICINE

## 2020-02-10 PROCEDURE — 4040F PNEUMOC VAC/ADMIN/RCVD: CPT | Performed by: INTERNAL MEDICINE

## 2020-02-10 PROCEDURE — 3078F DIAST BP <80 MM HG: CPT | Performed by: INTERNAL MEDICINE

## 2020-02-10 PROCEDURE — 99213 OFFICE O/P EST LOW 20 MIN: CPT | Performed by: INTERNAL MEDICINE

## 2020-02-10 NOTE — PROGRESS NOTES
Cade Freddie [facility]  1945  [address] [facility] [place] [address] [id number]    DISCUSSION  SUMMARY:    47-year-old female with history of anemia of chronic renal insufficiency/anemia of chronic disease (likely multiple etiologies)  Hemoglobin levels had previously been good/acceptable with RONNA treatment  Unfortunately patient suffered a CVA and MI in 2018  Patient is on aspirin and Plavix - cardiology and neurology follow-ups are ongoing  The recent hemoglobin level was good/acceptable  The plan is to continue with surveillance  White count, differential and platelet count also good/acceptable  We rediscussed what to monitor for in regard to progressive anemia  Patient is to return in 4 months  [facility] will also follow up with her liver transplant team  They are monitoring the LFTs; patient is on CellCept  Patient knows to call the office if she has any other hematology questions or concerns  Carefully review your medication list and verify that the list is accurate and up-to-date  Please call the hematology/oncology office if there are medications missing from the list, medications on the list that you are not currently taking or if there is a dosage or instruction that is different from how you're taking that medication  Patient goals and areas of care:  Monitor hemoglobin level  Barriers to care:  None  Patient is able to self-care   ___________________________________________________________________________________________________    Chief Complaint   Patient presents with    Follow-up     Anemia     History of Present Illness:    47-year-old female with history of anemia (anemia of chronic renal insufficiency/anemia of chronic disease previously on Procrit)  [facility] was admitted to the hospital in May  2018 after her partner noticed she was garbling her words  Patient was found to have a CVA   Patient was also found to have a non-ST elevated MI  Mrs Alex Reyna did not have any chest pain or pressure or other cardiac symptoms  Procrit was discontinued  Patient never developed any neurologic sequelae; mental status and speech is now back to normal  Patient is now on aspirin and Plavix (previously on Eliquis, specifics are not presently clear but patient could not afford the Eliquis - switched to Plavix)  Patient is also on Keppra  Mrs Alex Reyna is back for follow-up  Patient states feeling +/-, fatigue is about the same as before  Activities are limited but the same as before  No GI,  or gyn issues or bleeding  Appetite is okay, weight is stable  No fevers, chills or sweats  Patient is somewhat concerned as recent liver function tests were abnormal   Patient was seen by the transplant/GI team at the [facility] - there has been consideration to IA liver biopsy  No specific problems with the CellCept  Review of Systems   Constitutional: Positive for fatigue  HENT: Negative  Eyes: Negative  Respiratory: Negative  Cardiovascular: Negative  Gastrointestinal: Negative  Endocrine: Negative  Genitourinary: Negative for dysuria  Musculoskeletal: Negative  Skin: Negative  Allergic/Immunologic: Negative  Neurological: Negative  Hematological: Negative  Psychiatric/Behavioral: Negative  All other systems reviewed and are negative       Patient Active Problem List   Diagnosis    Liver transplanted ([facility] [address] )    Hypothyroidism    Long-term use of immunosuppressant medication    Anemia    TIA (transient ischemic attack)    HLD (hyperlipidemia)    History of seizure    Paroxysmal SVT (supraventricular tachycardia) (HCC)    Altered mental status    Cerebrovascular accident (CVA) due to embolism ([facility] [address] )    Non-ST elevation myocardial infarction (NSTEMI), type 2    Abnormal EEG    Ataxic gait    Benign essential hypertension    Chronic joint pain    Chronic kidney insufficiency    Cyst of diaphragm    Depression    Elevated homocysteine (HCC)    Hypercalcemia    Lung nodule, solitary    Peripheral arterial disease (HCC)    Sciatica    Ulcerative colitis without complications (HCC)    Asymptomatic stenosis of left carotid artery    Dementia (HCC)    Left ovarian cyst    Chronic left-sided thoracic back pain     Past Medical History:   Diagnosis Date    Anemia     Anxiety     Arthritis     Benign paroxysmal vertigo, unspecified ear     onset: 05/06/2010    Cirrhosis ([facility] )     onset: 03/30/2005    Crohn disease ([place] [address] )     Disease of thyroid gland     GERD (gastroesophageal reflux disease)     History of transfusion     Liver transplant candidate     onset: 09/16/2008    Osteoporosis     Pelvic fracture ([facility] )     onset: 10/14/2008     Past Surgical History:   Procedure Laterality Date    ABDOMINAL SURGERY      APPENDECTOMY      BLADDER AUGMENTATION N/A     CATARACT EXTRACTION      CHOLECYSTECTOMY N/A     COLONOSCOPY N/A 4/20/2017    Procedure: COLONOSCOPY;  Surgeon: Dale Hernandez MD;  Location: [place] [address] GI LAB; Service:     ESOPHAGOGASTRODUODENOSCOPY N/A 3/31/2016    Procedure: ESOPHAGOGASTRODUODENOSCOPY (EGD); Surgeon: Dale Hernandez MD;  Location: [place] [address] GI LAB; Service:     ESOPHAGOGASTRODUODENOSCOPY N/A 4/20/2017    Procedure: ESOPHAGOGASTRODUODENOSCOPY (EGD); Surgeon: Dale Hernandez MD;  Location: [place] [address] GI LAB;   Service:     EYE SURGERY      cataracts removed both eyes lens implant    HERNIA REPAIR      JOINT REPLACEMENT      left knee replacement    LIVER TRANSPLANTATION N/A     TONSILECTOMY AND ADNOIDECTOMY N/A     TUBAL LIGATION       Family History   Problem Relation Age of Onset    Cancer Mother         breast    Cancer Father     Crohn's disease Brother     Arthritis Family     Breast cancer Family      Social History     Socioeconomic History    Marital status:      Spouse name: Not on file    Number of children: Not on file    Years of education: Completed 12th grade    Highest education level: Not on file   Occupational History    Not on file   Social Needs    Financial resource strain: Not hard at all    Food insecurity:     Worry: Never true     Inability: Never true   just.me needs:     Medical: No     Non-medical: No   Tobacco Use    Smoking status: Former Smoker     Years: 5 00     Last attempt to quit: 1975     Years since quittin 0    Smokeless tobacco: Never Used    Tobacco comment: smoked for 5 years-1/2 to 1 ppd   Substance and Sexual Activity    Alcohol use: No    Drug use: No    Sexual activity: Not on file   Lifestyle    Physical activity:     Days per week: 2 days     Minutes per session: 30 min    Stress:  To some extent   Relationships    Social connections:     Talks on phone: Not on file     Gets together: Not on file     Attends Taoism service: Not on file     Active member of club or organization: Not on file     Attends meetings of clubs or organizations: Not on file     Relationship status: Not on file    Intimate partner violence:     Fear of current or ex partner: Not on file     Emotionally abused: Not on file     Physically abused: Not on file     Forced sexual activity: Not on file   Other Topics Concern    Not on file   Social History Narrative    Daily coffee consumption (1 cup/day)       Current Outpatient Medications:     aspirin (ECOTRIN LOW STRENGTH) 81 mg EC tablet, Take 1 tablet by mouth daily, Disp: 30 tablet, Rfl: 0    clopidogrel (PLAVIX) 75 mg tablet, Take 1 tablet (75 mg total) by mouth daily, Disp: 90 tablet, Rfl: 3    cycloSPORINE modified (NEORAL) 25 mg capsule, Take 50 mg by mouth 2 (two) times a day, Disp: , Rfl:     levothyroxine 112 mcg tablet, TAKE 1 TABLET BY MOUTH ONCE DAILY AS DIRECTED, Disp: 90 tablet, Rfl: 0    metoprolol tartrate (LOPRESSOR) 25 mg tablet, Take 1 5 tablets (37 5 mg total) by mouth 2 (two) times a day, Disp: 270 tablet, Rfl: 3    mycophenolate (CELLCEPT) 250 mg capsule, Take 250 mg by mouth 2 (two) times a day 3 tabs each dose=750 mg , Disp: , Rfl:     omeprazole (PriLOSEC) 40 MG capsule, Take 40 mg by mouth daily, Disp: , Rfl:     pravastatin (PRAVACHOL) 20 mg tablet, Take 1 tablet (20 mg total) by mouth daily at bedtime, Disp: 90 tablet, Rfl: 3    Allergies   Allergen Reactions    Tetracyclines & Related Hives    Vancomycin Other (See Comments) and Itching     Reaction Date: [id number]; Pt unsure of reaction    Prograf [Tacrolimus] Anxiety     Reaction Date: [id number];        Vitals:    02/10/20 1513   BP: 110/72   Pulse: 74   Resp: 16   Temp: (!) 97 °F (36 1 °C)   SpO2: 98%     Physical Exam   Constitutional: She is oriented to person, place, and time  She appears well-developed and well-nourished  HENT:   Head: Normocephalic and atraumatic  Right Ear: External ear normal    Left Ear: External ear normal    Nose: Nose normal    Mouth/Throat: Oropharynx is clear and moist    Eyes: Pupils are equal, round, and reactive to light  Conjunctivae and EOM are normal    Neck: Normal range of motion  Neck supple  Supple, no JVD   Cardiovascular: Normal rate, regular rhythm, normal heart sounds and intact distal pulses  Pulmonary/Chest: Effort normal and breath sounds normal    Distant breath sounds bilaterally, clear   Abdominal: Soft  Bowel sounds are normal     Abdomen is obese, nontender, well-healed suture lines, +bowel sounds   Musculoskeletal: Normal range of motion  Neurological: She is alert and oriented to person, place, and time  She has normal reflexes  Skin: Skin is warm  Warm, moist, no petechiae or ecchymoses, relatively good color, no active bleeding   Psychiatric: She has a normal mood and affect   Her behavior is normal  Judgment and thought content normal    Extremities: 0-1 +bilateral lower extremity edema, no cords, pulses are 1+  Lymphatics:  No adenopathy in the neck, supraclavicular region, axilla and groin bilaterally    Labs    02/07/2020 WBC = 4 8 hemoglobin = 11 5 hematocrit = 36 8 MCV = 91 platelet = 994 neutrophil = 69% lymphocytes = 14% monocyte = 11%    10/02/2019 WBC = 4 4 hemoglobin = 11 1 hematocrit = 36 5 MCV = 94 platelet = 512 neutrophil = 67%  06/05/2019 WBC = 5 6 hemoglobin = 11 4 hematocrit = 37 1 platelet = 142 neutrophil = 68%  03/29/2019 hemoglobin = 11 6 hematocrit = 38 4  01/29/2019 WBC = 6 0 hemoglobin = 11 3 hematocrit = 30 1 MCV = 93 platelet = 440 neutrophil = 72% lymphocytes = 12% monocyte = 8%  09/20/2018 WBC = 4 6 hemoglobin = 10 0 hematocrit = 34 1 MCV = 92 RDW = 13 7 platelet = 923 neutrophil = 64%  08/03/2018 BUN = 26 creatinine = 1 08 AST = 51 ALT = 43 alkaline phosphatase = 155 total bilirubin = 0 5 GFR = 51  06/15/2018 WBC = 5 52 hemoglobin = 10 8 hematocrit = 35 7 MCV = 91 platelet = 624 neutrophil = 70%  3/13/ 18 WBC = 5 65 hemoglobin = 11 3 hematocrit = 35 1 MCV = 87 platelet = 179 neutrophil = 60%  1/3/18 WBC = 5 3 hemoglobin = 11 1 hematocrit = 33 8 MCV = 87 platelets = 249 iron = 46 ferritin = 127 TIBC = 254 iron saturation = 18%   12/04/2017 WBC = 5 6 hemoglobin = 11 hematocrit = 33 7 MCV = 86 platelet = 649 neutrophil = 74%  11/8/17 hemoglobin = 10 9 hematocrit = 34   11/7/17 BUN = 12 creatinine = 0 92

## 2020-02-12 ENCOUNTER — OFFICE VISIT (OUTPATIENT)
Dept: CARDIOLOGY CLINIC | Facility: CLINIC | Age: 75
End: 2020-02-12
Payer: MEDICARE

## 2020-02-12 VITALS
HEART RATE: 78 BPM | OXYGEN SATURATION: 98 % | BODY MASS INDEX: 38.64 KG/M2 | DIASTOLIC BLOOD PRESSURE: 60 MMHG | SYSTOLIC BLOOD PRESSURE: 122 MMHG | HEIGHT: 62 IN | WEIGHT: 210 LBS

## 2020-02-12 DIAGNOSIS — I48.21 PERMANENT ATRIAL FIBRILLATION (HCC): Primary | ICD-10-CM

## 2020-02-12 DIAGNOSIS — Z95.818 PRESENCE OF CARDIAC DEVICE: ICD-10-CM

## 2020-02-12 DIAGNOSIS — I21.A1 TYPE 2 MYOCARDIAL INFARCTION WITHOUT ST ELEVATION (HCC): ICD-10-CM

## 2020-02-12 DIAGNOSIS — Z86.73 PERSONAL HISTORY OF TIA (TRANSIENT ISCHEMIC ATTACK): ICD-10-CM

## 2020-02-12 DIAGNOSIS — I47.1 PAROXYSMAL SVT (SUPRAVENTRICULAR TACHYCARDIA) (HCC): ICD-10-CM

## 2020-02-12 DIAGNOSIS — Z94.4 LIVER TRANSPLANTED (HCC): ICD-10-CM

## 2020-02-12 DIAGNOSIS — I47.1 PSVT (PAROXYSMAL SUPRAVENTRICULAR TACHYCARDIA) (HCC): ICD-10-CM

## 2020-02-12 PROCEDURE — 1160F RVW MEDS BY RX/DR IN RCRD: CPT | Performed by: INTERNAL MEDICINE

## 2020-02-12 PROCEDURE — 99214 OFFICE O/P EST MOD 30 MIN: CPT | Performed by: INTERNAL MEDICINE

## 2020-02-12 PROCEDURE — 1036F TOBACCO NON-USER: CPT | Performed by: INTERNAL MEDICINE

## 2020-02-12 PROCEDURE — 3074F SYST BP LT 130 MM HG: CPT | Performed by: INTERNAL MEDICINE

## 2020-02-12 PROCEDURE — 93000 ELECTROCARDIOGRAM COMPLETE: CPT | Performed by: INTERNAL MEDICINE

## 2020-02-12 PROCEDURE — 3078F DIAST BP <80 MM HG: CPT | Performed by: INTERNAL MEDICINE

## 2020-02-12 PROCEDURE — 3008F BODY MASS INDEX DOCD: CPT | Performed by: INTERNAL MEDICINE

## 2020-02-12 PROCEDURE — 4040F PNEUMOC VAC/ADMIN/RCVD: CPT | Performed by: INTERNAL MEDICINE

## 2020-02-12 RX ORDER — EZETIMIBE 10 MG/1
10 TABLET ORAL DAILY
Qty: 30 TABLET | Refills: 3 | Status: SHIPPED | OUTPATIENT
Start: 2020-02-12 | End: 2020-06-10

## 2020-02-12 NOTE — PROGRESS NOTES
Cardiology Follow Up    Fransisca Anderson [facility]  1945  [id number]  SANDEEP IZAGUIRRE [facility] [address]  [facility] [place] [address] [address] [id number]    Interval History: 64-year-old woman with a history of liver transplant, multiple CVA, nstemi likely typ2, chronic anemia receiving Procrit injections with recent prolonged hospitalization secondary to recurrent CVA  She has been placed on novel oral anticoagulation and has not had a further event  Her loop recorder was interrogated in the hospital and did not show any evidence of atrial fibrillation  She reports note trouble with her gait or unilateral weakness  She denies having any chest tightness  She denies having significant bleeding or bruising current the  She denies having any falls  Her hospitalization records were reviewed with      February 5, 2018:  She denies having significant bleeding or bruising on anticoagulation  She denies having any recurrence of unilateral weakness or speech difficulty  She denies having any chest pain  Her blood pressures been well controlled  4/27: She has felt dizzy since starting keppra  Still taking clopidogrel against medical advice  No chest pain  No arrhythmia  NO edema    10/30/2018: She has been compliant with Eliquis therapy without significant bleeding or bruising  She denies having any falls  She reports having difficulty with obtaining the medication as it is very expensive on her medical plan  No unilateral weakness  BP controlled  05/01/2019:  Her loop recorder has been negative for evidence of atrial fibrillation  She denies having significant bleeding or bruising  She denies having any recurrence of CVA  She denies having any significant neurological deficits  08/13/2019:  Her last loop recorder interrogation shows no evidence of atrial fibrillation  She has been well controlled on metoprolol 37 5 mg twice a day  Denies having dizziness or lightheadedness  Her major complaint right now is polyuria  She is compliant with her medications  Denies having significant bleeding or bruising      02/12/2020:  Her blood pressures been controlled  She is not in atrial fibrillation  We reviewed her last blood work  It shows persistently elevated liver enzymes  She has been taking the pravastatin      Patient Active Problem List   Diagnosis    Liver transplanted ([place] [address] )    Hypothyroidism    Long-term use of immunosuppressant medication    Anemia    TIA (transient ischemic attack)    HLD (hyperlipidemia)    History of seizure    [facility]ox[facility]    Altered mental status    Cerebrovascular accident (CVA) due to embolism ([place] [address] )    Non-ST elevation myocardial infarction (NSTEMI), type 2    Abnormal EEG    Ataxic gait    Benign essential hypertension    Chronic joint pain    Chronic kidney insufficiency    Cyst of diaphragm    Depression    Elevated homocysteine (HCC)    Hypercalcemia    Lung nodule, solitary    Peripheral arterial disease (HCC)    Sciatica    Ulcerative colitis without complications (HCC)    Asymptomatic stenosis of left carotid artery    Dementia ([place] [address] )    Left ovarian cyst    Chronic left-sided thoracic back pain     Past Medical History:   Diagnosis Date    Anemia     Anxiety     Arthritis     Benign paroxysmal vertigo, unspecified ear     onset: 05/06/2010    Cirrhosis ([place] [address] )     onset: 03/30/2005    Crohn disease ([place] [address] )     Disease of thyroid gland     GERD (gastroesophageal reflux disease)     History of transfusion     Liver transplant candidate     onset: 09/16/2008    Osteoporosis     Pelvic fracture ([place] [address] )     onset: 10/14/2008     Social History     Socioeconomic History    Marital status:      Spouse name: Not on file    Number of children: Not on file    Years of education: Completed 12th grade    Highest education level: Not on file   Occupational History    Not on file   Social Needs    Financial resource strain: Not hard at all   [employer] insecurity:     Worry: Never true     Inability: Never true   [employer] needs:     Medical: No     Non-medical: No   Tobacco Use    Smoking status: Former Smoker     Years: 5 00     Last attempt to quit: 1975     Years since quittin 0    Smokeless tobacco: Never Used    Tobacco comment: smoked for 5 years-1/2 to 1 ppd   Substance and Sexual Activity    Alcohol use: No    Drug use: No    Sexual activity: Not on file   Lifestyle    Physical activity:     Days per week: 2 days     Minutes per session: 30 min    Stress: To some extent   Relationships    Social connections:     Talks on phone: Not on file     Gets together: Not on file     Attends Taoism service: Not on file     Active member of club or organization: Not on file     Attends meetings of clubs or organizations: Not on file     Relationship status: Not on file    Intimate partner violence:     Fear of current or ex partner: Not on file     Emotionally abused: Not on file     Physically abused: Not on file     Forced sexual activity: Not on file   Other Topics Concern    Not on file   Social History Narrative    Daily coffee consumption (1 cup/day)      Family History   Problem Relation Age of Onset    Cancer Mother         breast    Cancer Father     Crohn's disease Brother     Arthritis Family     Breast cancer Family      Past Surgical History:   Procedure Laterality Date    ABDOMINAL SURGERY      APPENDECTOMY      BLADDER AUGMENTATION N/A     CATARACT EXTRACTION      CHOLECYSTECTOMY N/A     COLONOSCOPY N/A 2017    Procedure: COLONOSCOPY;  Surgeon: Pedro Mckeon MD;  Location: [place] [address] GI LAB; Service:     ESOPHAGOGASTRODUODENOSCOPY N/A 3/31/2016    Procedure: ESOPHAGOGASTRODUODENOSCOPY (EGD); Surgeon: Pedro Mckeon MD;  Location: [place] [address] GI LAB;   Service:     ESOPHAGOGASTRODUODENOSCOPY N/A 4/20/2017    Procedure: ESOPHAGOGASTRODUODENOSCOPY (EGD); Surgeon: Sweetie Fontana MD;  Location: [facility] GI LAB; Service:     EYE SURGERY      cataracts removed both eyes lens implant    HERNIA REPAIR      JOINT REPLACEMENT      left knee replacement    LIVER TRANSPLANTATION N/A     TONSILECTOMY AND ADNOIDECTOMY N/A     TUBAL LIGATION         Current Outpatient Medications:     aspirin (ECOTRIN LOW STRENGTH) 81 mg EC tablet, Take 1 tablet by mouth daily, Disp: 30 tablet, Rfl: 0    clopidogrel (PLAVIX) 75 mg tablet, Take 1 tablet (75 mg total) by mouth daily, Disp: 90 tablet, Rfl: 3    cycloSPORINE modified (NEORAL) 25 mg capsule, Take 50 mg by mouth 2 (two) times a day, Disp: , Rfl:     levothyroxine 112 mcg tablet, TAKE 1 TABLET BY MOUTH ONCE DAILY AS DIRECTED, Disp: 90 tablet, Rfl: 0    metoprolol tartrate (LOPRESSOR) 25 mg tablet, Take 1 5 tablets (37 5 mg total) by mouth 2 (two) times a day, Disp: 270 tablet, Rfl: 3    mycophenolate (CELLCEPT) 250 mg capsule, Take 250 mg by mouth 2 (two) times a day 3 tabs each dose=750 mg , Disp: , Rfl:     omeprazole (PriLOSEC) 40 MG capsule, Take 40 mg by mouth daily, Disp: , Rfl:     pravastatin (PRAVACHOL) 20 mg tablet, Take 1 tablet (20 mg total) by mouth daily at bedtime, Disp: 90 tablet, Rfl: 3  Allergies   Allergen Reactions    Tetracyclines & Related Hives    Vancomycin Other (See Comments) and Itching     Reaction Date: [id number]; Pt unsure of reaction    Prograf [Tacrolimus] Anxiety     Reaction Date: 16Dec2008; Review of Systems:  Review of Systems   Constitutional: Negative  Negative for activity change, appetite change, chills, diaphoresis, fatigue, fever and unexpected weight change  HENT: Negative    Negative for congestion, dental problem, drooling, ear discharge, ear pain, facial swelling, hearing loss, mouth sores, nosebleeds, postnasal drip, rhinorrhea, sinus pressure, sinus pain, sneezing, sore throat, tinnitus, trouble swallowing and voice change  Eyes: Negative  Negative for photophobia, pain, redness, itching and visual disturbance  Respiratory: Negative  Negative for apnea, cough, choking, chest tightness, shortness of breath, wheezing and stridor  Cardiovascular: Negative  Negative for chest pain, palpitations and leg swelling  Gastrointestinal: Negative  Negative for abdominal distention, abdominal pain, anal bleeding, blood in stool, constipation, diarrhea, nausea, rectal pain and vomiting  Endocrine: Negative  Negative for cold intolerance, heat intolerance, polydipsia, polyphagia and polyuria  Genitourinary: Positive for frequency and urgency  Negative for decreased urine volume, difficulty urinating, dyspareunia, dysuria, enuresis, flank pain, genital sores, hematuria, menstrual problem, pelvic pain, vaginal bleeding, vaginal discharge and vaginal pain  Musculoskeletal: Positive for arthralgias and back pain  Negative for gait problem, joint swelling, myalgias, neck pain and neck stiffness  Skin: Negative  Negative for color change, pallor, rash and wound  Allergic/Immunologic: Negative  Negative for environmental allergies, food allergies and immunocompromised state  Neurological: Negative for dizziness, tremors, seizures, syncope, facial asymmetry, speech difficulty, weakness, light-headedness, numbness and headaches  Hematological: Negative  Negative for adenopathy  Does not bruise/bleed easily  Psychiatric/Behavioral: Negative  Negative for agitation, behavioral problems, confusion, decreased concentration, dysphoric mood, hallucinations, self-injury, sleep disturbance and suicidal ideas  The patient is not nervous/anxious and is not hyperactive  All other systems reviewed and are negative        Vitals:    02/12/20 1434   BP: 122/60   BP Location: Right arm   Patient Position: Sitting   Cuff Size: Large   Pulse: 78   SpO2: 98%   Weight: 95 3 kg (210 lb)   Height: 5' 2" (1 575 m) Physical Exam:  Physical Exam   Constitutional: She is oriented to person, place, and time  She appears well-developed and well-nourished  No distress  HENT:   Head: Normocephalic and atraumatic  Right Ear: External ear normal    Left Ear: External ear normal    Eyes: Pupils are equal, round, and reactive to light  Conjunctivae are normal  Right eye exhibits no discharge  Left eye exhibits no discharge  No scleral icterus  Neck: Normal range of motion  Neck supple  No JVD present  No tracheal deviation present  No thyromegaly present  Cardiovascular: Normal rate and regular rhythm  Exam reveals gallop  Exam reveals no friction rub  Murmur heard  Pulmonary/Chest: Effort normal and breath sounds normal  No stridor  No respiratory distress  She has no wheezes  She has no rales  She exhibits no tenderness  Abdominal: Soft  Bowel sounds are normal  She exhibits distension  She exhibits no mass  There is no tenderness  There is no rebound and no guarding  Musculoskeletal: Normal range of motion  She exhibits deformity  She exhibits no edema or tenderness  Neurological: She is alert and oriented to person, place, and time  She has normal reflexes  No cranial nerve deficit  She exhibits normal muscle tone  Coordination normal    Skin: Skin is warm and dry  No rash noted  She is not diaphoretic  No erythema  No pallor  Psychiatric: She has a normal mood and affect  Her behavior is normal  Judgment and thought content normal    Nursing note and vitals reviewed  1  Permanent atrial fibrillation  POCT ECG   2  Presence of cardiac device  POCT ECG   3  Personal history of TIA (transient ischemic attack)  POCT ECG   4  Non-ST elevation myocardial infarction (NSTEMI), type 2  POCT ECG   5  PSVT (paroxysmal supraventricular tachycardia) (HCC)  POCT ECG   6  Paroxysmal SVT (supraventricular tachycardia) (HCC)  POCT ECG   7  Liver transplanted ([facility] [address] )  POCT ECG      Recurrent CVA- no further occurrence   her loop recorder has been negative for evidence of atrial fibrillation  She is unable to pay for oral anticoagulation such as Eliquis or Xarelto  Plan to keep the patient on aspirin 81 mg plus Plavix 75 mg daily  Continue loop recorder monitoring  If with evidence of atrial fibrillation then the patient will need to be on Coumadin therapy  Continue metoprolol 37 5 mg twice a day    Prior nstemi- aspirin + plavix + switch to zetia 10mg daily    Hyperlipidemia-her liver enzymes are now elevated  Her ALT and alk-phos have been persistently elevated  She has had recent workup by liver transplant  We will discontinue her pravastatin  We will switch her to Zetia  Given her prior NSTEMI and prior stroke she should continue on lipid modifying medication  Liver transplant-continue immunosuppressants  Statin cleared by hepatology    Seizures- discussed with neurology about keppra side effects          Vitaliy Bermudez  Please call with any questions or suggestions

## 2020-02-24 ENCOUNTER — TELEPHONE (OUTPATIENT)
Dept: CARDIOLOGY CLINIC | Facility: CLINIC | Age: 75
End: 2020-02-24

## 2020-02-25 NOTE — TELEPHONE ENCOUNTER
I left a message for clarification  There are three pharmacies in her chart, wanted to confirm which to send Zetia to

## 2020-04-08 ENCOUNTER — TELEMEDICINE (OUTPATIENT)
Dept: NEUROLOGY | Facility: CLINIC | Age: 75
End: 2020-04-08
Payer: MEDICARE

## 2020-04-08 DIAGNOSIS — Z87.898 HISTORY OF SEIZURE: ICD-10-CM

## 2020-04-08 DIAGNOSIS — I63.40 CEREBROVASCULAR ACCIDENT (CVA) DUE TO EMBOLISM OF CEREBRAL ARTERY (HCC): Primary | ICD-10-CM

## 2020-04-08 DIAGNOSIS — E78.00 PURE HYPERCHOLESTEROLEMIA: ICD-10-CM

## 2020-04-08 PROCEDURE — 99441 PR PHYS/QHP TELEPHONE EVALUATION 5-10 MIN: CPT | Performed by: PSYCHIATRY & NEUROLOGY

## 2020-04-09 ENCOUNTER — REMOTE DEVICE CLINIC VISIT (OUTPATIENT)
Dept: CARDIOLOGY CLINIC | Facility: CLINIC | Age: 75
End: 2020-04-09
Payer: MEDICARE

## 2020-04-09 DIAGNOSIS — Z95.818 PRESENCE OF CARDIAC DEVICE: Primary | ICD-10-CM

## 2020-04-09 PROCEDURE — G2066 INTER DEVC REMOTE 30D: HCPCS | Performed by: INTERNAL MEDICINE

## 2020-04-09 PROCEDURE — 93298 REM INTERROG DEV EVAL SCRMS: CPT | Performed by: INTERNAL MEDICINE

## 2020-05-14 ENCOUNTER — TELEMEDICINE (OUTPATIENT)
Dept: FAMILY MEDICINE CLINIC | Facility: CLINIC | Age: 75
End: 2020-05-14
Payer: MEDICARE

## 2020-05-14 DIAGNOSIS — I10 BENIGN ESSENTIAL HYPERTENSION: ICD-10-CM

## 2020-05-14 DIAGNOSIS — E03.9 HYPOTHYROIDISM, UNSPECIFIED TYPE: ICD-10-CM

## 2020-05-14 DIAGNOSIS — I63.40 CEREBROVASCULAR ACCIDENT (CVA) DUE TO EMBOLISM OF CEREBRAL ARTERY (HCC): ICD-10-CM

## 2020-05-14 DIAGNOSIS — Z94.4 LIVER TRANSPLANTED (HCC): Primary | ICD-10-CM

## 2020-05-14 DIAGNOSIS — E78.00 PURE HYPERCHOLESTEROLEMIA: ICD-10-CM

## 2020-05-14 PROCEDURE — 99442 PR PHYS/QHP TELEPHONE EVALUATION 11-20 MIN: CPT | Performed by: INTERNAL MEDICINE

## 2020-05-14 RX ORDER — PRAVASTATIN SODIUM 20 MG
20 TABLET ORAL
COMMUNITY
Start: 2020-04-14 | End: 2020-05-14

## 2020-06-01 ENCOUNTER — APPOINTMENT (OUTPATIENT)
Dept: LAB | Facility: CLINIC | Age: 75
End: 2020-06-01
Payer: MEDICARE

## 2020-06-01 ENCOUNTER — TRANSCRIBE ORDERS (OUTPATIENT)
Dept: LAB | Facility: CLINIC | Age: 75
End: 2020-06-01

## 2020-06-01 DIAGNOSIS — Z94.4 LIVER REPLACED BY TRANSPLANT (HCC): Primary | ICD-10-CM

## 2020-06-01 DIAGNOSIS — E03.9 HYPOTHYROIDISM, UNSPECIFIED TYPE: ICD-10-CM

## 2020-06-01 DIAGNOSIS — Z94.4 LIVER REPLACED BY TRANSPLANT (HCC): ICD-10-CM

## 2020-06-01 LAB
ALBUMIN SERPL BCP-MCNC: 3.5 G/DL (ref 3.5–5)
ALP SERPL-CCNC: 370 U/L (ref 46–116)
ALT SERPL W P-5'-P-CCNC: 99 U/L (ref 12–78)
ANION GAP SERPL CALCULATED.3IONS-SCNC: 6 MMOL/L (ref 4–13)
AST SERPL W P-5'-P-CCNC: 67 U/L (ref 5–45)
BASOPHILS # BLD AUTO: 0.06 THOUSANDS/ΜL (ref 0–0.1)
BASOPHILS NFR BLD AUTO: 1 % (ref 0–1)
BILIRUB DIRECT SERPL-MCNC: 0.16 MG/DL (ref 0–0.2)
BILIRUB SERPL-MCNC: 0.37 MG/DL (ref 0.2–1)
BUN SERPL-MCNC: 37 MG/DL (ref 5–25)
CALCIUM SERPL-MCNC: 8.5 MG/DL (ref 8.3–10.1)
CHLORIDE SERPL-SCNC: 109 MMOL/L (ref 100–108)
CO2 SERPL-SCNC: 22 MMOL/L (ref 21–32)
CREAT SERPL-MCNC: 1.16 MG/DL (ref 0.6–1.3)
EOSINOPHIL # BLD AUTO: 0.24 THOUSAND/ΜL (ref 0–0.61)
EOSINOPHIL NFR BLD AUTO: 4 % (ref 0–6)
ERYTHROCYTE [DISTWIDTH] IN BLOOD BY AUTOMATED COUNT: 13.8 % (ref 11.6–15.1)
GFR SERPL CREATININE-BSD FRML MDRD: 46 ML/MIN/1.73SQ M
GGT SERPL-CCNC: 747 U/L (ref 5–85)
GLUCOSE P FAST SERPL-MCNC: 115 MG/DL (ref 65–99)
HCT VFR BLD AUTO: 39.1 % (ref 34.8–46.1)
HGB BLD-MCNC: 12.1 G/DL (ref 11.5–15.4)
IMM GRANULOCYTES # BLD AUTO: 0.04 THOUSAND/UL (ref 0–0.2)
IMM GRANULOCYTES NFR BLD AUTO: 1 % (ref 0–2)
LYMPHOCYTES # BLD AUTO: 0.9 THOUSANDS/ΜL (ref 0.6–4.47)
LYMPHOCYTES NFR BLD AUTO: 15 % (ref 14–44)
MAGNESIUM SERPL-MCNC: 2.8 MG/DL (ref 1.6–2.6)
MCH RBC QN AUTO: 28.7 PG (ref 26.8–34.3)
MCHC RBC AUTO-ENTMCNC: 30.9 G/DL (ref 31.4–37.4)
MCV RBC AUTO: 93 FL (ref 82–98)
MONOCYTES # BLD AUTO: 0.57 THOUSAND/ΜL (ref 0.17–1.22)
MONOCYTES NFR BLD AUTO: 10 % (ref 4–12)
NEUTROPHILS # BLD AUTO: 4.04 THOUSANDS/ΜL (ref 1.85–7.62)
NEUTS SEG NFR BLD AUTO: 69 % (ref 43–75)
NRBC BLD AUTO-RTO: 0 /100 WBCS
PHOSPHATE SERPL-MCNC: 4 MG/DL (ref 2.3–4.1)
PLATELET # BLD AUTO: 213 THOUSANDS/UL (ref 149–390)
PMV BLD AUTO: 10.6 FL (ref 8.9–12.7)
POTASSIUM SERPL-SCNC: 5.2 MMOL/L (ref 3.5–5.3)
PROT SERPL-MCNC: 8.3 G/DL (ref 6.4–8.2)
RBC # BLD AUTO: 4.21 MILLION/UL (ref 3.81–5.12)
SODIUM SERPL-SCNC: 137 MMOL/L (ref 136–145)
TSH SERPL DL<=0.05 MIU/L-ACNC: 0.48 UIU/ML (ref 0.36–3.74)
WBC # BLD AUTO: 5.85 THOUSAND/UL (ref 4.31–10.16)

## 2020-06-01 PROCEDURE — 83735 ASSAY OF MAGNESIUM: CPT

## 2020-06-01 PROCEDURE — 85025 COMPLETE CBC W/AUTO DIFF WBC: CPT

## 2020-06-01 PROCEDURE — 82248 BILIRUBIN DIRECT: CPT

## 2020-06-01 PROCEDURE — 84443 ASSAY THYROID STIM HORMONE: CPT

## 2020-06-01 PROCEDURE — 84100 ASSAY OF PHOSPHORUS: CPT

## 2020-06-01 PROCEDURE — 80158 DRUG ASSAY CYCLOSPORINE: CPT

## 2020-06-01 PROCEDURE — 80053 COMPREHEN METABOLIC PANEL: CPT

## 2020-06-01 PROCEDURE — 36415 COLL VENOUS BLD VENIPUNCTURE: CPT

## 2020-06-01 PROCEDURE — 82977 ASSAY OF GGT: CPT

## 2020-06-02 LAB — CYCLOSPORINE BLD IA-MCNC: 70 NG/ML (ref 100–400)

## 2020-06-09 DIAGNOSIS — E03.9 HYPOTHYROIDISM, UNSPECIFIED TYPE: ICD-10-CM

## 2020-06-09 RX ORDER — LEVOTHYROXINE SODIUM 112 UG/1
TABLET ORAL
Qty: 90 TABLET | Refills: 0 | Status: SHIPPED | OUTPATIENT
Start: 2020-06-09 | End: 2020-08-17

## 2020-06-10 DIAGNOSIS — Z94.4 LIVER TRANSPLANTED (HCC): ICD-10-CM

## 2020-06-10 DIAGNOSIS — I47.1 PSVT (PAROXYSMAL SUPRAVENTRICULAR TACHYCARDIA) (HCC): ICD-10-CM

## 2020-06-10 DIAGNOSIS — I21.A1 TYPE 2 MYOCARDIAL INFARCTION WITHOUT ST ELEVATION (HCC): ICD-10-CM

## 2020-06-10 RX ORDER — EZETIMIBE 10 MG/1
TABLET ORAL
Qty: 30 TABLET | Refills: 0 | Status: SHIPPED | OUTPATIENT
Start: 2020-06-10 | End: 2020-06-29

## 2020-06-11 ENCOUNTER — TELEPHONE (OUTPATIENT)
Dept: HEMATOLOGY ONCOLOGY | Facility: CLINIC | Age: 75
End: 2020-06-11

## 2020-06-24 ENCOUNTER — TRANSCRIBE ORDERS (OUTPATIENT)
Dept: LAB | Facility: CLINIC | Age: 75
End: 2020-06-24

## 2020-06-24 ENCOUNTER — APPOINTMENT (OUTPATIENT)
Dept: LAB | Facility: CLINIC | Age: 75
End: 2020-06-24
Payer: MEDICARE

## 2020-06-24 DIAGNOSIS — E83.42 HYPOMAGNESEMIA: ICD-10-CM

## 2020-06-24 DIAGNOSIS — E83.30 DISORDER OF PHOSPHORUS METABOLISM: ICD-10-CM

## 2020-06-24 DIAGNOSIS — Z94.4 LIVER REPLACED BY TRANSPLANT (HCC): Primary | ICD-10-CM

## 2020-06-24 DIAGNOSIS — R74.01 NONSPECIFIC ELEVATION OF LEVELS OF TRANSAMINASE OR LACTIC ACID DEHYDROGENASE (LDH): ICD-10-CM

## 2020-06-24 DIAGNOSIS — B25.9 CYTOMEGALOVIRUS INFECTION, UNSPECIFIED CYTOMEGALOVIRAL INFECTION TYPE (HCC): ICD-10-CM

## 2020-06-24 DIAGNOSIS — R74.02 NONSPECIFIC ELEVATION OF LEVELS OF TRANSAMINASE OR LACTIC ACID DEHYDROGENASE (LDH): ICD-10-CM

## 2020-06-24 DIAGNOSIS — T86.40 COMPLICATION OF TRANSPLANTED LIVER, UNSPECIFIED COMPLICATION (HCC): ICD-10-CM

## 2020-06-24 LAB
ALBUMIN SERPL BCP-MCNC: 3.4 G/DL (ref 3.5–5)
ALP SERPL-CCNC: 284 U/L (ref 46–116)
ALT SERPL W P-5'-P-CCNC: 65 U/L (ref 12–78)
ANION GAP SERPL CALCULATED.3IONS-SCNC: 7 MMOL/L (ref 4–13)
AST SERPL W P-5'-P-CCNC: 49 U/L (ref 5–45)
BASOPHILS # BLD AUTO: 0.04 THOUSANDS/ΜL (ref 0–0.1)
BASOPHILS NFR BLD AUTO: 1 % (ref 0–1)
BILIRUB DIRECT SERPL-MCNC: 0.17 MG/DL (ref 0–0.2)
BILIRUB SERPL-MCNC: 0.51 MG/DL (ref 0.2–1)
BUN SERPL-MCNC: 24 MG/DL (ref 5–25)
CALCIUM SERPL-MCNC: 8.6 MG/DL (ref 8.3–10.1)
CHLORIDE SERPL-SCNC: 108 MMOL/L (ref 100–108)
CO2 SERPL-SCNC: 23 MMOL/L (ref 21–32)
CREAT SERPL-MCNC: 0.98 MG/DL (ref 0.6–1.3)
EOSINOPHIL # BLD AUTO: 0.28 THOUSAND/ΜL (ref 0–0.61)
EOSINOPHIL NFR BLD AUTO: 5 % (ref 0–6)
ERYTHROCYTE [DISTWIDTH] IN BLOOD BY AUTOMATED COUNT: 13.5 % (ref 11.6–15.1)
GFR SERPL CREATININE-BSD FRML MDRD: 56 ML/MIN/1.73SQ M
GGT SERPL-CCNC: 578 U/L (ref 5–85)
GLUCOSE P FAST SERPL-MCNC: 118 MG/DL (ref 65–99)
HCT VFR BLD AUTO: 37.3 % (ref 34.8–46.1)
HGB BLD-MCNC: 11.5 G/DL (ref 11.5–15.4)
IMM GRANULOCYTES # BLD AUTO: 0.04 THOUSAND/UL (ref 0–0.2)
IMM GRANULOCYTES NFR BLD AUTO: 1 % (ref 0–2)
LYMPHOCYTES # BLD AUTO: 0.94 THOUSANDS/ΜL (ref 0.6–4.47)
LYMPHOCYTES NFR BLD AUTO: 16 % (ref 14–44)
MAGNESIUM SERPL-MCNC: 2.4 MG/DL (ref 1.6–2.6)
MCH RBC QN AUTO: 28.3 PG (ref 26.8–34.3)
MCHC RBC AUTO-ENTMCNC: 30.8 G/DL (ref 31.4–37.4)
MCV RBC AUTO: 92 FL (ref 82–98)
MONOCYTES # BLD AUTO: 0.47 THOUSAND/ΜL (ref 0.17–1.22)
MONOCYTES NFR BLD AUTO: 8 % (ref 4–12)
NEUTROPHILS # BLD AUTO: 3.96 THOUSANDS/ΜL (ref 1.85–7.62)
NEUTS SEG NFR BLD AUTO: 69 % (ref 43–75)
NRBC BLD AUTO-RTO: 0 /100 WBCS
PHOSPHATE SERPL-MCNC: 4 MG/DL (ref 2.3–4.1)
PLATELET # BLD AUTO: 227 THOUSANDS/UL (ref 149–390)
PMV BLD AUTO: 10 FL (ref 8.9–12.7)
POTASSIUM SERPL-SCNC: 4.8 MMOL/L (ref 3.5–5.3)
PROT SERPL-MCNC: 8.2 G/DL (ref 6.4–8.2)
RBC # BLD AUTO: 4.07 MILLION/UL (ref 3.81–5.12)
SODIUM SERPL-SCNC: 138 MMOL/L (ref 136–145)
WBC # BLD AUTO: 5.73 THOUSAND/UL (ref 4.31–10.16)

## 2020-06-24 PROCEDURE — 82977 ASSAY OF GGT: CPT

## 2020-06-24 PROCEDURE — 83735 ASSAY OF MAGNESIUM: CPT

## 2020-06-24 PROCEDURE — 80053 COMPREHEN METABOLIC PANEL: CPT

## 2020-06-24 PROCEDURE — 84100 ASSAY OF PHOSPHORUS: CPT

## 2020-06-24 PROCEDURE — 36415 COLL VENOUS BLD VENIPUNCTURE: CPT

## 2020-06-24 PROCEDURE — 80158 DRUG ASSAY CYCLOSPORINE: CPT

## 2020-06-24 PROCEDURE — 82248 BILIRUBIN DIRECT: CPT

## 2020-06-24 PROCEDURE — 85025 COMPLETE CBC W/AUTO DIFF WBC: CPT

## 2020-06-25 LAB — CYCLOSPORINE BLD IA-MCNC: 70 NG/ML (ref 100–400)

## 2020-06-28 DIAGNOSIS — I21.A1 TYPE 2 MYOCARDIAL INFARCTION WITHOUT ST ELEVATION (HCC): ICD-10-CM

## 2020-06-28 DIAGNOSIS — Z94.4 LIVER TRANSPLANTED (HCC): ICD-10-CM

## 2020-06-28 DIAGNOSIS — I47.1 PSVT (PAROXYSMAL SUPRAVENTRICULAR TACHYCARDIA) (HCC): ICD-10-CM

## 2020-06-29 ENCOUNTER — TELEPHONE (OUTPATIENT)
Dept: HEMATOLOGY ONCOLOGY | Facility: MEDICAL CENTER | Age: 75
End: 2020-06-29

## 2020-06-29 RX ORDER — EZETIMIBE 10 MG/1
TABLET ORAL
Qty: 90 TABLET | Refills: 3 | Status: SHIPPED | OUTPATIENT
Start: 2020-06-29 | End: 2021-04-22 | Stop reason: SDUPTHER

## 2020-07-01 ENCOUNTER — OFFICE VISIT (OUTPATIENT)
Dept: NEUROLOGY | Facility: CLINIC | Age: 75
End: 2020-07-01
Payer: MEDICARE

## 2020-07-01 VITALS
BODY MASS INDEX: 39.29 KG/M2 | WEIGHT: 214.8 LBS | DIASTOLIC BLOOD PRESSURE: 64 MMHG | TEMPERATURE: 98.8 F | HEART RATE: 74 BPM | SYSTOLIC BLOOD PRESSURE: 122 MMHG

## 2020-07-01 DIAGNOSIS — R53.83 OTHER FATIGUE: ICD-10-CM

## 2020-07-01 DIAGNOSIS — Z87.898 HISTORY OF SEIZURE: ICD-10-CM

## 2020-07-01 DIAGNOSIS — R06.83 SNORING: ICD-10-CM

## 2020-07-01 DIAGNOSIS — I63.40 CEREBROVASCULAR ACCIDENT (CVA) DUE TO EMBOLISM OF CEREBRAL ARTERY (HCC): Primary | ICD-10-CM

## 2020-07-01 PROCEDURE — 3078F DIAST BP <80 MM HG: CPT | Performed by: PSYCHIATRY & NEUROLOGY

## 2020-07-01 PROCEDURE — 1036F TOBACCO NON-USER: CPT | Performed by: PSYCHIATRY & NEUROLOGY

## 2020-07-01 PROCEDURE — 4040F PNEUMOC VAC/ADMIN/RCVD: CPT | Performed by: PSYCHIATRY & NEUROLOGY

## 2020-07-01 PROCEDURE — 3074F SYST BP LT 130 MM HG: CPT | Performed by: PSYCHIATRY & NEUROLOGY

## 2020-07-01 PROCEDURE — 99215 OFFICE O/P EST HI 40 MIN: CPT | Performed by: PSYCHIATRY & NEUROLOGY

## 2020-07-01 RX ORDER — PRAVASTATIN SODIUM 20 MG
TABLET ORAL
COMMUNITY
Start: 2020-06-28 | End: 2020-10-27

## 2020-07-01 RX ORDER — VITAMIN B COMPLEX
1 CAPSULE ORAL DAILY
Qty: 30 CAPSULE | Refills: 3 | Status: SHIPPED | OUTPATIENT
Start: 2020-07-01

## 2020-07-01 NOTE — PROGRESS NOTES
Patient ID: Wayne Coker is a 66 y o  female  Assessment/Plan: This is a 67 y/o Female who is here as a follow up for history of stroke  Etiology is of the stroke is unclear likely is ESUS, no history of afib on loop recorder so far  PLAN:      Diagnoses and all orders for this visit:    Cerebrovascular accident (CVA) due to embolism of cerebral artery ([address] )  -c/w with combination of aspirin (cardiology does prefer her to be on aspirin, continue with plavix and atorvastatin for secondary stroke prevention  -BP goal < 130/80, BP is at goal    -she has a loop recorder in place, no afib detected so far,   -Defer to PCP regarding BP management  -does not smoke at this time   -denies any history of snoring    -I advised patient to avoid using NSAIDs for headaches or other pain  -Recommend mediterranean diet & regular exercise regimen atleast 4-5 times a week for 20-30 minutes  -I educated patient/family regarding medication compliance    History of seizure  -off medications  -no seizures  Other fatigue  -etiology unclear - will need to rule out vitamin deficiencies  -TSH normal   -recommend that patient start taking B complex vitamins daily  -     b complex vitamins capsule; Take 1 capsule by mouth daily  -     Vitamin B12; Future  -     Vitamin D 1,25 dihydroxy; Future    Snoring  Does not want a sleep study at this time  Other orders  -     pravastatin (PRAVACHOL) 20 mg tablet         I would like to follow up in 6 months  I would be happy to see the patient sooner if any new questions/concerns arise  Patient/Guardian was advised to the call the office if they have any questions and concerns in the meantime       Patient/Guardian does understand that if they have any new stroke like symptoms such as facial droop on one side, weakness/paralysis on either side, speech trouble, numbness on one side, balance issues, any vision changes, extreme dizziness or any new headache, to call 9-1-1 immediately or to proceed to the nearest ER immediately  Subjective:    HPI    This is a 65 y/o F who is here as a follow-up of history of stroke  Patient is doing well overall  Denies any new TIA/CVA like symptoms  Patient states that she is feeling fatigued, and has no energy, and does not have motivation to do anything  All started a few months ago since the quarantine and she states that because she has not been active she just feels just very tired and has to take frequent naps   She is also not getting enough sleep at night due to her waking up several times to use the bathroom  She stated that is taking aspirin, Plavix and Lipitor  She is tolerating them well without any side effects  The following portions of the patient's history were reviewed and updated as appropriate:   She  has a past medical history of Anemia, Anxiety, Arthritis, Benign paroxysmal vertigo, unspecified ear, Cirrhosis ([facility] [address] ), Crohn disease ([facility] [address] ), Disease of thyroid gland, GERD (gastroesophageal reflux disease), History of transfusion, Liver transplant candidate, Osteoporosis, and Pelvic fracture ([facility] [address] )    She   Patient Active Problem List    Diagnosis Date Noted    Other fatigue 07/01/2020    Snoring 07/01/2020    Chronic left-sided thoracic back pain 09/05/2019    Left ovarian cyst 04/01/2019    Dementia ([facility] [address] ) 11/12/2018    Asymptomatic stenosis of left carotid artery 07/19/2018    Abnormal EEG 11/08/2017    Altered mental status 11/04/2017    Cerebrovascular accident (CVA) due to embolism ([facility] [address] ) 11/04/2017    Non-ST elevation myocardial infarction (NSTEMI), type 2 11/04/2017    History of seizure 10/31/2017    Paroxysmal SVT (supraventricular tachycardia) ([facility] [address] ) 10/31/2017    TIA (transient ischemic attack) 10/29/2017    HLD (hyperlipidemia) 10/29/2017    Cyst of diaphragm 04/27/2017    Long-term use of immunosuppressant medication 12/30/2016    Anemia 12/30/2016    Liver transplanted ([facility] [address] ) 12/29/2016    Hypothyroidism 12/29/2016    Elevated homocysteine 11/11/2016    Chronic joint pain 07/07/2016    Lung nodule, solitary 03/21/2016    Peripheral [facility] disease ([facility] ) 02/16/2016    Sciatica 09/03/2015    Hypercalcemia 06/30/2015    Ataxic gait 09/25/2013    Chronic kidney insufficiency 05/24/2013    Benign essential hypertension 09/04/2012    Ulcerative colitis without complications ([facility] ) 93/05/6871    Depression 05/07/2012     She  has a past surgical history that includes Abdominal surgery; Appendectomy; Eye surgery; Hernia repair; Joint replacement; Liver transplantation (N/A); Cholecystectomy (N/A); TONSILECTOMY AND ADNOIDECTOMY (N/A); Bladder augmentation (N/A); Esophagogastroduodenoscopy (N/A, 3/31/2016); Esophagogastroduodenoscopy (N/A, 4/20/2017); Colonoscopy (N/A, 4/20/2017); Cataract extraction; and Tubal ligation  Her family history includes Arthritis in her family; Breast cancer in her family; Cancer in her father and mother; Crohn's disease in her brother  She  reports that she quit smoking about 45 years ago  She quit after 5 00 years of use  She has never used smokeless tobacco  She reports that she does not drink alcohol or use drugs    Current Outpatient Medications   Medication Sig Dispense Refill    aspirin (ECOTRIN LOW STRENGTH) 81 mg EC tablet Take 1 tablet by mouth daily 30 tablet 0    b complex vitamins capsule Take 1 capsule by mouth daily 30 capsule 3    clopidogrel (PLAVIX) 75 mg tablet Take 1 tablet (75 mg total) by mouth daily 90 tablet 3    cycloSPORINE modified (NEORAL) 25 mg capsule Take 50 mg by mouth 2 (two) times a day      ezetimibe (ZETIA) 10 mg tablet Take 1 tablet by mouth once daily 90 tablet 3    levothyroxine 112 mcg tablet TAKE 1 TABLET EVERY DAY AS DIRECTED 90 tablet 0    metoprolol tartrate (LOPRESSOR) 25 mg tablet Take 1 5 tablets (37 5 mg total) by mouth 2 (two) times a day 270 tablet 3    mycophenolate (CELLCEPT) 250 mg capsule Take 250 mg by mouth 2 (two) times a day 3 tabs each dose=750 mg       omeprazole (PriLOSEC) 40 MG capsule Take 40 mg by mouth daily      pravastatin (PRAVACHOL) 20 mg tablet        No current facility-administered medications for this visit  Current Outpatient Medications on File Prior to Visit   Medication Sig    aspirin (ECOTRIN LOW STRENGTH) 81 mg EC tablet Take 1 tablet by mouth daily    clopidogrel (PLAVIX) 75 mg tablet Take 1 tablet (75 mg total) by mouth daily    cycloSPORINE modified (NEORAL) 25 mg capsule Take 50 mg by mouth 2 (two) times a day    ezetimibe (ZETIA) 10 mg tablet Take 1 tablet by mouth once daily    levothyroxine 112 mcg tablet TAKE 1 TABLET EVERY DAY AS DIRECTED    metoprolol tartrate (LOPRESSOR) 25 mg tablet Take 1 5 tablets (37 5 mg total) by mouth 2 (two) times a day    mycophenolate (CELLCEPT) 250 mg capsule Take 250 mg by mouth 2 (two) times a day 3 tabs each dose=750 mg     omeprazole (PriLOSEC) 40 MG capsule Take 40 mg by mouth daily    pravastatin (PRAVACHOL) 20 mg tablet      No current facility-administered medications on file prior to visit  She is allergic to tetracyclines & related; vancomycin; and prograf [tacrolimus]            Objective:    Blood pressure 122/64, pulse 74, temperature 98 8 °F (37 1 °C), weight 97 4 kg (214 lb 12 8 oz), not currently breastfeeding  Physical Exam  General - Patient is alert, awake, follows commands  Speech - no dysarthria noted, no aphasia noted  Neuro:   Cranial nerves: PERRL, EOMI, no facial droop noted, facial sensation intact, tongue midline  Motor: 5/5 throughout, normal tone  Sensory - intact to soft touch throughout  Coordination - no ataxia/dysmetria noted  Gait - normal tandem walk    ROS:  I personally reviewed ROS   Review of Systems   Constitutional: Positive for fatigue  Negative for appetite change and fever  HENT: Negative  Negative for hearing loss, tinnitus, trouble swallowing and voice change  Eyes: Negative  Negative for photophobia and pain  Respiratory: Negative  Negative for shortness of breath  Cardiovascular: Negative  Negative for palpitations  Gastrointestinal: Negative  Negative for nausea and vomiting  Endocrine: Negative  Negative for cold intolerance  Genitourinary: Positive for frequency (night-time)  Negative for dysuria and urgency  Musculoskeletal: Negative  Negative for myalgias and neck pain  Skin: Negative  Negative for rash  Neurological: Negative  Negative for dizziness, tremors, seizures, syncope, facial asymmetry, speech difficulty, weakness, light-headedness, numbness and headaches  Hematological: Negative  Does not bruise/bleed easily  Psychiatric/Behavioral: Negative  Negative for confusion, hallucinations and sleep disturbance

## 2020-07-06 ENCOUNTER — TELEMEDICINE (OUTPATIENT)
Dept: UROLOGY | Facility: CLINIC | Age: 75
End: 2020-07-06
Payer: MEDICARE

## 2020-07-06 DIAGNOSIS — R35.1 NOCTURIA: Primary | ICD-10-CM

## 2020-07-06 PROCEDURE — 99441 PR PHYS/QHP TELEPHONE EVALUATION 5-10 MIN: CPT | Performed by: PHYSICIAN ASSISTANT

## 2020-07-06 NOTE — PROGRESS NOTES
Virtual Brief Visit    Assessment/Plan:    Problem List Items Addressed This Visit     None                Reason for visit is   Chief Complaint   Patient presents with    Virtual Brief Visit        Encounter provider Pushpa Wood PA-C    Provider located at [address] [place] [id number]    Recent Visits  No visits were found meeting these conditions  Showing recent visits within past 7 days and meeting all other requirements     Today's Visits  Date Type Provider Dept   07/06/20 Telemedicine Pushpa Wood PA-C [facility] today's visits and meeting all other requirements     Future Appointments  Date Type Provider Dept   07/06/20 Telemedicine Pushpa Wood PA-C [facility]   Showing future appointments within next 150 days and meeting all other requirements        After connecting through telephone and patient was informed that this is not a secure, HIPAA-complaint platform  She agrees to proceed  , the patient was identified by name and date of birth  Elda Woods was informed that this is a telemedicine visit and that the visit is being conducted through telephone and patient was informed that this is not a secure, HIPAA-complaint platform  She agrees to proceed     My office door was closed  No one else was in the room  She acknowledged consent and understanding of privacy and security of the platform  The patient has agreed to participate and understands she can discontinue the visit at any time  Patient is aware this is a billable service  Subjective    Elda Woods is a 66 y o  female patient previously seen for recurrent urinary tract infections and lower urinary tract symptoms  She needed a reminder on what Urology was and what she sauce for previously  She does not recall this      I was happy to tell her that she has not had any urinary tract infections over the last year but she reported that she continues to be bothered by 6-7 episodes of nocturia nightly  She reports that she urinates a couple of times during the day  She reports not drinking any water and instead drinking root beer, lemon lime soda, and iced tea  HPI     Past Medical History:   Diagnosis Date    Anemia     Anxiety     Arthritis     Benign paroxysmal vertigo, unspecified ear     onset: 05/06/2010    Cirrhosis ([place] [address] )     onset: 03/30/2005    Crohn disease ([place] [address] )     Disease of thyroid gland     GERD (gastroesophageal reflux disease)     History of transfusion     Liver transplant candidate     onset: 09/16/2008    Osteoporosis     Pelvic fracture ([place] [address] )     onset: 10/14/2008       Past Surgical History:   Procedure Laterality Date    ABDOMINAL SURGERY      APPENDECTOMY      BLADDER AUGMENTATION N/A     CATARACT EXTRACTION      CHOLECYSTECTOMY N/A     COLONOSCOPY N/A 4/20/2017    Procedure: COLONOSCOPY;  Surgeon: Fercho Llanes MD;  Location: [facility] GI LAB; Service:     ESOPHAGOGASTRODUODENOSCOPY N/A 3/31/2016    Procedure: ESOPHAGOGASTRODUODENOSCOPY (EGD); Surgeon: Fercho Llanes MD;  Location: [facility] GI LAB; Service:     ESOPHAGOGASTRODUODENOSCOPY N/A 4/20/2017    Procedure: ESOPHAGOGASTRODUODENOSCOPY (EGD); Surgeon: Fercho Llanes MD;  Location: [facility] GI LAB;   Service:     EYE SURGERY      cataracts removed both eyes lens implant    HERNIA REPAIR      JOINT REPLACEMENT      left knee replacement    LIVER TRANSPLANTATION N/A     TONSILECTOMY AND ADNOIDECTOMY N/A     TUBAL LIGATION         Current Outpatient Medications   Medication Sig Dispense Refill    aspirin (ECOTRIN LOW STRENGTH) 81 mg EC tablet Take 1 tablet by mouth daily 30 tablet 0    b complex vitamins capsule Take 1 capsule by mouth daily 30 capsule 3    clopidogrel (PLAVIX) 75 mg tablet Take 1 tablet (75 mg total) by mouth daily 90 tablet 3    cycloSPORINE modified (NEORAL) 25 mg capsule Take 50 mg by mouth 2 (two) times a day      ezetimibe (ZETIA) 10 mg tablet Take 1 tablet by mouth once daily 90 tablet 3    levothyroxine 112 mcg tablet TAKE 1 TABLET EVERY DAY AS DIRECTED 90 tablet 0    metoprolol tartrate (LOPRESSOR) 25 mg tablet Take 1 5 tablets (37 5 mg total) by mouth 2 (two) times a day 270 tablet 3    mycophenolate (CELLCEPT) 250 mg capsule Take 250 mg by mouth 2 (two) times a day 3 tabs each dose=750 mg       omeprazole (PriLOSEC) 40 MG capsule Take 40 mg by mouth daily      pravastatin (PRAVACHOL) 20 mg tablet        No current facility-administered medications for this visit  Allergies   Allergen Reactions    Tetracyclines & Related Hives    Vancomycin Other (See Comments) and Itching     Reaction Date: [id number]; Pt unsure of reaction    Prograf [Tacrolimus] Anxiety     Reaction Date: 16Dec2008; Review of Systems   Constitutional: Negative for chills and fever  HENT: Negative  Eyes: Negative  Cardiovascular: Negative for chest pain  Gastrointestinal: Negative  Endocrine: Negative  Genitourinary: Positive for frequency (at night)  Negative for difficulty urinating, dysuria, enuresis, flank pain, hematuria and urgency  Musculoskeletal: Negative  There were no vitals filed for this visit  As a result of this visit, I have not referred the patient for further respiratory evaluation  1  Recurrent UTI  - No infections over the last year    2  Urinary frequency, primarily at night  - patient has previously tried medications for overactive bladder which were not effective  - I again discussed dietary and behavioral modifications a she is voiding very infrequently throughout the day and hydrating primarily with soda  - I encouraged timed voiding every 2 hours while she is awake, avoidance of bladder irritants including dark sodas, iced tea, caffeine, alcohol, spicy, and acidic foods    I also encouraged her to elevate her legs 2 hours prior to bedtime and wear compression stockings  - she is not particularly interested in follow-ups I have recommended as needed follow-up  I spent 10 minutes directly with the patient during this visit    [id number] [place] acknowledges that she has consented to an online visit or consultation  She understands that the online visit is based solely on information provided by her, and that, in the absence of a face-to-face physical evaluation by the physician, the diagnosis she receives is both limited and provisional in terms of accuracy and completeness  This is not intended to replace a full medical face-to-face evaluation by the physician  Elda Woods understands and accepts these terms

## 2020-07-09 ENCOUNTER — REMOTE DEVICE CLINIC VISIT (OUTPATIENT)
Dept: CARDIOLOGY CLINIC | Facility: CLINIC | Age: 75
End: 2020-07-09
Payer: MEDICARE

## 2020-07-09 DIAGNOSIS — Z95.818 PRESENCE OF CARDIAC DEVICE: Primary | ICD-10-CM

## 2020-07-09 PROCEDURE — G2066 INTER DEVC REMOTE 30D: HCPCS | Performed by: INTERNAL MEDICINE

## 2020-07-09 PROCEDURE — 93298 REM INTERROG DEV EVAL SCRMS: CPT | Performed by: INTERNAL MEDICINE

## 2020-07-09 NOTE — PROGRESS NOTES
Results for orders placed or performed in visit on 07/09/20   Cardiac EP device report    Narrative    CARELINK TRANSMISSION: BATTERY STATUS "OK"  1 AF EPISODE DETECTED LASTING 2 MIN  PATIENT IS ON PLAVIX, ASA AND METOPROLOL   NO PATIENT ACTIVATED EPISODES --LYNN

## 2020-07-12 DIAGNOSIS — I47.1 PSVT (PAROXYSMAL SUPRAVENTRICULAR TACHYCARDIA) (HCC): ICD-10-CM

## 2020-07-14 ENCOUNTER — DOCUMENTATION (OUTPATIENT)
Dept: HEMATOLOGY ONCOLOGY | Facility: MEDICAL CENTER | Age: 75
End: 2020-07-14

## 2020-07-15 ENCOUNTER — TRANSCRIBE ORDERS (OUTPATIENT)
Dept: LAB | Facility: CLINIC | Age: 75
End: 2020-07-15

## 2020-07-15 ENCOUNTER — APPOINTMENT (OUTPATIENT)
Dept: LAB | Facility: CLINIC | Age: 75
End: 2020-07-15
Payer: MEDICARE

## 2020-07-15 ENCOUNTER — OFFICE VISIT (OUTPATIENT)
Dept: HEMATOLOGY ONCOLOGY | Facility: MEDICAL CENTER | Age: 75
End: 2020-07-15
Payer: MEDICARE

## 2020-07-15 VITALS
BODY MASS INDEX: 39.75 KG/M2 | HEART RATE: 86 BPM | TEMPERATURE: 98.6 F | HEIGHT: 62 IN | DIASTOLIC BLOOD PRESSURE: 62 MMHG | OXYGEN SATURATION: 98 % | SYSTOLIC BLOOD PRESSURE: 146 MMHG | RESPIRATION RATE: 18 BRPM | WEIGHT: 216 LBS

## 2020-07-15 DIAGNOSIS — Z79.899 LONG-TERM USE OF IMMUNOSUPPRESSANT MEDICATION: Primary | Chronic | ICD-10-CM

## 2020-07-15 DIAGNOSIS — Z94.4 LIVER REPLACED BY TRANSPLANT (HCC): Primary | ICD-10-CM

## 2020-07-15 DIAGNOSIS — Z94.4 LIVER REPLACED BY TRANSPLANT (HCC): ICD-10-CM

## 2020-07-15 DIAGNOSIS — Z79.01 ADMISSION FOR LONG-TERM (CURRENT) USE OF ANTICOAGULANTS: ICD-10-CM

## 2020-07-15 DIAGNOSIS — Z51.81 ADMISSION FOR LONG-TERM (CURRENT) USE OF ANTICOAGULANTS: ICD-10-CM

## 2020-07-15 DIAGNOSIS — D63.8 ANEMIA IN OTHER CHRONIC DISEASES CLASSIFIED ELSEWHERE: Chronic | ICD-10-CM

## 2020-07-15 LAB
ALBUMIN SERPL BCP-MCNC: 3.3 G/DL (ref 3.5–5)
ALP SERPL-CCNC: 298 U/L (ref 46–116)
ALT SERPL W P-5'-P-CCNC: 75 U/L (ref 12–78)
ANION GAP SERPL CALCULATED.3IONS-SCNC: 5 MMOL/L (ref 4–13)
AST SERPL W P-5'-P-CCNC: 46 U/L (ref 5–45)
BASOPHILS # BLD AUTO: 0.04 THOUSANDS/ΜL (ref 0–0.1)
BASOPHILS NFR BLD AUTO: 1 % (ref 0–1)
BILIRUB DIRECT SERPL-MCNC: 0.17 MG/DL (ref 0–0.2)
BILIRUB SERPL-MCNC: 0.36 MG/DL (ref 0.2–1)
BUN SERPL-MCNC: 33 MG/DL (ref 5–25)
CALCIUM SERPL-MCNC: 9.3 MG/DL (ref 8.3–10.1)
CHLORIDE SERPL-SCNC: 108 MMOL/L (ref 100–108)
CO2 SERPL-SCNC: 24 MMOL/L (ref 21–32)
CREAT SERPL-MCNC: 1.09 MG/DL (ref 0.6–1.3)
EOSINOPHIL # BLD AUTO: 0.21 THOUSAND/ΜL (ref 0–0.61)
EOSINOPHIL NFR BLD AUTO: 3 % (ref 0–6)
ERYTHROCYTE [DISTWIDTH] IN BLOOD BY AUTOMATED COUNT: 14 % (ref 11.6–15.1)
GFR SERPL CREATININE-BSD FRML MDRD: 49 ML/MIN/1.73SQ M
GGT SERPL-CCNC: 575 U/L (ref 5–85)
GLUCOSE SERPL-MCNC: 105 MG/DL (ref 65–140)
HCT VFR BLD AUTO: 35.2 % (ref 34.8–46.1)
HGB BLD-MCNC: 11 G/DL (ref 11.5–15.4)
IMM GRANULOCYTES # BLD AUTO: 0.05 THOUSAND/UL (ref 0–0.2)
IMM GRANULOCYTES NFR BLD AUTO: 1 % (ref 0–2)
LYMPHOCYTES # BLD AUTO: 0.87 THOUSANDS/ΜL (ref 0.6–4.47)
LYMPHOCYTES NFR BLD AUTO: 14 % (ref 14–44)
MAGNESIUM SERPL-MCNC: 2.5 MG/DL (ref 1.6–2.6)
MCH RBC QN AUTO: 28.9 PG (ref 26.8–34.3)
MCHC RBC AUTO-ENTMCNC: 31.3 G/DL (ref 31.4–37.4)
MCV RBC AUTO: 92 FL (ref 82–98)
MONOCYTES # BLD AUTO: 0.58 THOUSAND/ΜL (ref 0.17–1.22)
MONOCYTES NFR BLD AUTO: 9 % (ref 4–12)
NEUTROPHILS # BLD AUTO: 4.42 THOUSANDS/ΜL (ref 1.85–7.62)
NEUTS SEG NFR BLD AUTO: 72 % (ref 43–75)
NRBC BLD AUTO-RTO: 0 /100 WBCS
PHOSPHATE SERPL-MCNC: 4.1 MG/DL (ref 2.3–4.1)
PLATELET # BLD AUTO: 217 THOUSANDS/UL (ref 149–390)
PMV BLD AUTO: 10.1 FL (ref 8.9–12.7)
POTASSIUM SERPL-SCNC: 5 MMOL/L (ref 3.5–5.3)
PROT SERPL-MCNC: 8.2 G/DL (ref 6.4–8.2)
RBC # BLD AUTO: 3.81 MILLION/UL (ref 3.81–5.12)
SODIUM SERPL-SCNC: 137 MMOL/L (ref 136–145)
WBC # BLD AUTO: 6.17 THOUSAND/UL (ref 4.31–10.16)

## 2020-07-15 PROCEDURE — 4040F PNEUMOC VAC/ADMIN/RCVD: CPT | Performed by: INTERNAL MEDICINE

## 2020-07-15 PROCEDURE — 1036F TOBACCO NON-USER: CPT | Performed by: INTERNAL MEDICINE

## 2020-07-15 PROCEDURE — 3078F DIAST BP <80 MM HG: CPT | Performed by: INTERNAL MEDICINE

## 2020-07-15 PROCEDURE — 1160F RVW MEDS BY RX/DR IN RCRD: CPT | Performed by: INTERNAL MEDICINE

## 2020-07-15 PROCEDURE — 82248 BILIRUBIN DIRECT: CPT

## 2020-07-15 PROCEDURE — 83735 ASSAY OF MAGNESIUM: CPT

## 2020-07-15 PROCEDURE — 80053 COMPREHEN METABOLIC PANEL: CPT

## 2020-07-15 PROCEDURE — 84100 ASSAY OF PHOSPHORUS: CPT

## 2020-07-15 PROCEDURE — 99213 OFFICE O/P EST LOW 20 MIN: CPT | Performed by: INTERNAL MEDICINE

## 2020-07-15 PROCEDURE — 3008F BODY MASS INDEX DOCD: CPT | Performed by: INTERNAL MEDICINE

## 2020-07-15 PROCEDURE — 3077F SYST BP >= 140 MM HG: CPT | Performed by: INTERNAL MEDICINE

## 2020-07-15 PROCEDURE — 36415 COLL VENOUS BLD VENIPUNCTURE: CPT

## 2020-07-15 PROCEDURE — 85025 COMPLETE CBC W/AUTO DIFF WBC: CPT

## 2020-07-15 PROCEDURE — 82977 ASSAY OF GGT: CPT

## 2020-07-15 NOTE — PROGRESS NOTES
[facility]  1945  [address] [facility] [place] [address] [facility] [id number]    DISCUSSION  SUMMARY:    27-year-old female with history of anemia of chronic renal insufficiency/anemia of chronic disease (likely multiple etiologies)  The recent CBC parameters including the hemoglobin level are good/acceptable  The plan is to continue with surveillance  As discussed previously, patient had a good response to the RONNA treatment but subsequently developed a CVA in 2018  Patient will continue with the aspirin and Plavix and follow up with Cardiology and Neurology as directed  Patient is nervous about coming in for office visits because of COVID  We discussed options  [facility] agrees to return in 5 months with a CBC before  [facility] will also follow up with her liver transplant team  They are monitoring the LFTs; patient is on CellCept  Patient knows to call the office if she has any other hematology questions or concerns  Carefully review your medication list and verify that the list is accurate and up-to-date  Please call the hematology/oncology office if there are medications missing from the list, medications on the list that you are not currently taking or if there is a dosage or instruction that is different from how you're taking that medication  Patient goals and areas of care:  Monitor hemoglobin level  Barriers to care:  None  Patient is able to self-care   ___________________________________________________________________________________________________    Chief Complaint   Patient presents with    Follow-up     Anemia     History of Present Illness:    27-year-old female with history of anemia (anemia of chronic renal insufficiency/anemia of chronic disease previously on Procrit)  [facility] was admitted to the hospital in May  2018 after her partner noticed she was garbling her words   Patient was found to have a CVA  Patient was also found to have a non-ST elevated MI  Mrs Angelito Anthony did not have any chest pain or pressure or other cardiac symptoms  Procrit was discontinued  Patient never developed any neurologic sequelae; mental status and speech is now back to normal  Patient is now on aspirin and Plavix (previously on Eliquis, specifics are not presently clear but patient could not afford the Eliquis - switched to Plavix)  Mrs Angelito Anthony is back for follow-up  Patient states feeling physically well  Fatigue is minimal, same as before  Activities at home are baseline; patient has avoided going out because of her immune suppressed condition  No problems with excessive bruising or bleeding  No fevers, chills or sweats  Appetite is good, weight is stable  No pain control issues  Patient continues with the CellCept  Liver function tests are elevated/abnormal - transplant team is monitoring  No plans for biopsy at this time  Review of Systems   Constitutional: Positive for fatigue  HENT: Negative  Eyes: Negative  Respiratory: Negative  Cardiovascular: Negative  Gastrointestinal: Negative  Endocrine: Negative  Genitourinary: Negative for dysuria  Musculoskeletal: Negative  Skin: Negative  Allergic/Immunologic: Negative  Neurological: Negative  Hematological: Negative  Psychiatric/Behavioral: Negative  All other systems reviewed and are negative       Patient Active Problem List   Diagnosis    [facility]    Hypothyroidism    Long-term use of immunosuppressant medication    Anemia    TIA (transient ischemic attack)    HLD (hyperlipidemia)    History of seizure    Paroxysmal SVT (supraventricular tachycardia) (HCC)    Altered mental status    Cerebrovascular accident (CVA) [facility] to [facility]ism [facility]    Non-ST elevation myocardial infarction (NSTEMI), type 2    Abnormal EEG    Ataxic gait    Benign essential hypertension    Chronic joint pain    Chronic kidney insufficiency    Cyst of diaphragm    Depression    Elevated homocysteine    Hypercalcemia    Lung nodule, solitary    Peripheral arterial disease (HCC)    Sciatica    Ulcerative colitis without complications (HCC)    Asymptomatic stenosis of left carotid artery    Dementia (HCC)    Left ovarian cyst    Chronic left-sided thoracic back pain    Other fatigue    Snoring     Past Medical History:   Diagnosis Date    Anemia     Anxiety     Arthritis     Benign paroxysmal vertigo, unspecified ear     onset: 05/06/2010    Cirrhosis ([place] [address] )     onset: 03/30/2005    Crohn disease ([place] [address] )     Disease of thyroid gland     GERD (gastroesophageal reflux disease)     History of transfusion     Liver transplant candidate     onset: 09/16/2008    Osteoporosis     Pelvic fracture ([place] [address] )     onset: 10/14/2008     Past Surgical History:   Procedure Laterality Date    ABDOMINAL SURGERY      APPENDECTOMY      BLADDER AUGMENTATION N/A     CATARACT EXTRACTION      CHOLECYSTECTOMY N/A     COLONOSCOPY N/A 4/20/2017    Procedure: COLONOSCOPY;  Surgeon: Tommie Soler MD;  Location: [place] [address] GI LAB; Service:     ESOPHAGOGASTRODUODENOSCOPY N/A 3/31/2016    Procedure: ESOPHAGOGASTRODUODENOSCOPY (EGD); Surgeon: Tommie Soler MD;  Location: [place] [address] GI LAB; Service:     ESOPHAGOGASTRODUODENOSCOPY N/A 4/20/2017    Procedure: ESOPHAGOGASTRODUODENOSCOPY (EGD); Surgeon: Tommie Soler MD;  Location: [place] [address] GI LAB;   Service:     EYE SURGERY      cataracts removed both eyes lens implant    HERNIA REPAIR      JOINT REPLACEMENT      left knee replacement    LIVER TRANSPLANTATION N/A     TONSILECTOMY AND ADNOIDECTOMY N/A     TUBAL LIGATION       Family History   Problem Relation Age of Onset    Cancer Mother         breast    Cancer Father     Crohn's disease Brother     Arthritis Family     Breast cancer Family      Social History     Socioeconomic History    Marital status:      Spouse name: Not on file    Number of children: Not on file    Years of education: Completed 12th grade    Highest education level: Not on file   Occupational History    Not on file   Social Needs    Financial resource strain: Not hard at all    Food insecurity:     Worry: Never true     Inability: Never true   [employer] needs:     Medical: No     Non-medical: No   Tobacco Use    Smoking status: Former Smoker     Years: 5 00     Last attempt to quit: 1975     Years since quittin 5    Smokeless tobacco: Never Used    Tobacco comment: smoked for 5 years-1/2 to 1 ppd   Substance and Sexual Activity    Alcohol use: No    Drug use: No    Sexual activity: Not on file   Lifestyle    Physical activity:     Days per week: 2 days     Minutes per session: 30 min    Stress:  To some extent   Relationships    Social connections:     Talks on phone: Not on file     Gets together: Not on file     Attends Christianity service: Not on file     Active member of club or organization: Not on file     Attends meetings of clubs or organizations: Not on file     Relationship status: Not on file    Intimate partner violence:     Fear of current or ex partner: Not on file     Emotionally abused: Not on file     Physically abused: Not on file     Forced sexual activity: Not on file   Other Topics Concern    Not on file   Social History Narrative    Daily coffee consumption (1 cup/day)       Current Outpatient Medications:     aspirin (ECOTRIN LOW STRENGTH) 81 mg EC tablet, Take 1 tablet by mouth daily, Disp: 30 tablet, Rfl: 0    b complex vitamins capsule, Take 1 capsule by mouth daily, Disp: 30 capsule, Rfl: 3    clopidogrel (PLAVIX) 75 mg tablet, Take 1 tablet (75 mg total) by mouth daily, Disp: 90 tablet, Rfl: 3    cycloSPORINE modified (NEORAL) 25 mg capsule, Take 50 mg by mouth 2 (two) times a day, Disp: , Rfl:     ezetimibe (ZETIA) 10 mg tablet, Take 1 tablet by mouth once daily, Disp: 90 tablet, Rfl: 3    levothyroxine 112 mcg tablet, TAKE 1 TABLET EVERY DAY AS DIRECTED, Disp: 90 tablet, Rfl: 0    metoprolol tartrate (LOPRESSOR) 25 mg tablet, TAKE 1 & 1/2 (ONE & ONE-HALF) TABLETS BY MOUTH TWICE DAILY, Disp: 270 tablet, Rfl: 0    mycophenolate (CELLCEPT) 250 mg capsule, Take 250 mg by mouth 2 (two) times a day 3 tabs each dose=750 mg , Disp: , Rfl:     omeprazole (PriLOSEC) 40 MG capsule, Take 40 mg by mouth daily, Disp: , Rfl:     pravastatin (PRAVACHOL) 20 mg tablet, , Disp: , Rfl:     Allergies   Allergen Reactions    Tetracyclines & Related Hives    Vancomycin Other (See Comments) and Itching     Reaction Date: [id number]; Pt unsure of reaction    Prograf [Tacrolimus] Anxiety     Reaction Date: 16Dec2008;        Vitals:    07/15/20 1410   BP: 146/62   Pulse: 86   Resp: 18   Temp: 98 6 °F (37 °C)     Physical Exam   Constitutional: She is oriented to person, place, and time  She appears well-developed and well-nourished  HENT:   Head: Normocephalic and atraumatic  Right Ear: External ear normal    Left Ear: External ear normal    Nose: Nose normal    Mouth/Throat: Oropharynx is clear and moist    Eyes: Pupils are equal, round, and reactive to light  Conjunctivae and EOM are normal    Neck: Normal range of motion  Neck supple  Supple, no JVD   Cardiovascular: Normal rate, regular rhythm, normal heart sounds and intact distal pulses  Pulmonary/Chest: Effort normal and breath sounds normal    Distant breath sounds bilaterally, clear   Abdominal: Soft  Bowel sounds are normal     Abdomen is obese, nontender, well-healed suture lines, +bowel sounds   Musculoskeletal: Normal range of motion  Neurological: She is alert and oriented to person, place, and time  She has normal reflexes  Skin: Skin is warm  Warm, moist, no petechiae or ecchymoses, relatively good color, no active bleeding   Psychiatric: She has a normal mood and affect   Her behavior is normal  Judgment and thought content normal    Extremities: no lower extremity edema bilaterally, no cords, pulses are 1+  Lymphatics:  No adenopathy in the neck, supraclavicular region, axilla and groin bilaterally    Labs    06/24/2020 WBC = 5 7 hemoglobin = 11 5 hematocrit = 37 3 MCV = 92 platelet = 847 neutrophil = 69% BUN = 24 creatinine = 0 98 calcium = 8 6 AST = 49 ALT = 65 alkaline phosphatase = 284 total bilirubin = 0 51    02/07/2020 WBC = 4 8 hemoglobin = 11 5 hematocrit = 36 8 MCV = 91 platelet = 322 neutrophil = 69% lymphocytes = 14% monocyte = 11%  10/02/2019 WBC = 4 4 hemoglobin = 11 1 hematocrit = 36 5 MCV = 94 platelet = 487 neutrophil = 67%  06/05/2019 WBC = 5 6 hemoglobin = 11 4 hematocrit = 37 1 platelet = 301 neutrophil = 68%  03/29/2019 hemoglobin = 11 6 hematocrit = 38 4  01/29/2019 WBC = 6 0 hemoglobin = 11 3 hematocrit = 30 1 MCV = 93 platelet = 358 neutrophil = 72% lymphocytes = 12% monocyte = 8%  09/20/2018 WBC = 4 6 hemoglobin = 10 0 hematocrit = 34 1 MCV = 92 RDW = 13 7 platelet = 268 neutrophil = 64%

## 2020-08-12 NOTE — PROGRESS NOTES
Assessment/Plan:    1  Medicare annual wellness visit, subsequent    2  Liver transplanted [facility])  Assessment & Plan:  She continues to have labwork ordered by her transplant doctors every 2 weeks and is closely monitored  She was urged to follow up with her specialist as soon as she is able  3  Hypothyroidism, unspecified type  Assessment & Plan:  Last TSH was within normal limits and she is clinically euthyroid and will continue same dose of levothyroxine  4  Benign essential hypertension  Assessment & Plan:  Well controlled and she will continue current medications  5  Ambulatory dysfunction  -     Ambulatory referral to Physical Therapy; Future    6  Pure hypercholesterolemia  Assessment & Plan:  Lipids are controlled with pravastatin and zetia and she will continue, continue to monitor LFT's through her specialist       7  BMI 39 0-39 9,adult        BMI Counseling: Body mass index is 39 32 kg/m²  The BMI is above normal  Nutrition recommendations include reducing portion sizes and decreasing overall calorie intake  Exercise recommendations include exercising 3-5 times per week  Patient Instructions       Medicare Preventive Visit Patient Instructions  Thank you for completing your Welcome to Medicare Visit or Medicare Annual Wellness Visit today  Your next wellness visit will be due in one year (8/17/2021)  The screening/preventive services that you may require over the next 5-10 years are detailed below  Some tests may not apply to you based off risk factors and/or age  Screening tests ordered at today's visit but not completed yet may show as past due  Also, please note that scanned in results may not display below    Preventive Screenings:  Service Recommendations Previous Testing/Comments   Colorectal Cancer Screening  * Colonoscopy    * Fecal Occult Blood Test (FOBT)/Fecal Immunochemical Test (FIT)  * Fecal DNA/Cologuard Test  * Flexible Sigmoidoscopy Age: 54-65 years old   Colonoscopy: every 10 years (may be performed more frequently if at higher risk)  OR  FOBT/FIT: every 1 year  OR  Cologuard: every 3 years  OR  Sigmoidoscopy: every 5 years  Screening may be recommended earlier than age 48 if at higher risk for colorectal cancer  Also, an individualized decision between you and your healthcare provider will decide whether screening between the ages of 74-80 would be appropriate  Colonoscopy: 04/20/2017  FOBT/FIT: Not on file  Cologuard: Not on file  Sigmoidoscopy: Not on file         Breast Cancer Screening Age: 36 years old  Frequency: every 1-2 years  Not required if history of left and right mastectomy Mammogram: 10/22/2018    Screening Current   Cervical Cancer Screening Between the ages of 21-29, pap smear recommended once every 3 years  Between the ages of 33-67, can perform pap smear with HPV co-testing every 5 years  Recommendations may differ for women with a history of total hysterectomy, cervical cancer, or abnormal pap smears in past  Pap Smear: Not on file    Screening Not Indicated   Hepatitis C Screening Once for adults born between 1945 and 1965  More frequently in patients at high risk for Hepatitis C Hep C Antibody: Not on file       Diabetes Screening 1-2 times per year if you're at risk for diabetes or have pre-diabetes Fasting glucose: 114 mg/dL   A1C: 5 9 %    Screening Current   Cholesterol Screening Once every 5 years if you don't have a lipid disorder  May order more often based on risk factors  Lipid panel: 01/08/2020    Screening Not Indicated  History Lipid Disorder     Other Preventive Screenings Covered by Medicare:  1  Abdominal Aortic Aneurysm (AAA) Screening: covered once if your at risk  You're considered to be at risk if you have a family history of AAA    2  Lung Cancer Screening: covers low dose CT scan once per year if you meet all of the following conditions: (1) Age 50-69; (2) No signs or symptoms of lung cancer; (3) Current smoker or have quit smoking within the last 15 years; (4) You have a tobacco smoking history of at least 30 pack years (packs per day multiplied by number of years you smoked); (5) You get a written order from a healthcare provider  3  Glaucoma Screening: covered annually if you're considered high risk: (1) You have diabetes OR (2) Family history of glaucoma OR (3)  aged 48 and older OR (3)  American aged 72 and older  3  Osteoporosis Screening: covered every 2 years if you meet one of the following conditions: (1) You're estrogen deficient and at risk for osteoporosis based off medical history and other findings; (2) Have a vertebral abnormality; (3) On glucocorticoid therapy for more than 3 months; (4) Have primary hyperparathyroidism; (5) On osteoporosis medications and need to assess response to drug therapy  · Last bone density test (DXA Scan): 10/22/2018  5  HIV Screening: covered annually if you're between the age of 12-76  Also covered annually if you are younger than 13 and older than 72 with risk factors for HIV infection  For pregnant patients, it is covered up to 3 times per pregnancy  Immunizations:  Immunization Recommendations   Influenza Vaccine Annual influenza vaccination during flu season is recommended for all persons aged >= 6 months who do not have contraindications   Pneumococcal Vaccine (Prevnar and Pneumovax)  * Prevnar = PCV13  * Pneumovax = PPSV23   Adults 25-60 years old: 1-3 doses may be recommended based on certain risk factors  Adults 72 years old: Prevnar (PCV13) vaccine recommended followed by Pneumovax (PPSV23) vaccine  If already received PPSV23 since turning 65, then PCV13 recommended at least one year after PPSV23 dose     Hepatitis B Vaccine 3 dose series if at intermediate or high risk (ex: diabetes, end stage renal disease, liver disease)   Tetanus (Td) Vaccine - COST NOT COVERED BY MEDICARE PART B Following completion of primary series, a booster dose should be given every 10 years to maintain immunity against tetanus  Td may also be given as tetanus wound prophylaxis  Tdap Vaccine - COST NOT COVERED BY MEDICARE PART B Recommended at least once for all adults  For pregnant patients, recommended with each pregnancy  Shingles Vaccine (Shingrix) - COST NOT COVERED BY MEDICARE PART B  2 shot series recommended in those aged 48 and above     Health Maintenance Due:  There are no preventive care reminders to display for this patient  Immunizations Due:      Topic Date Due    Influenza Vaccine  07/01/2020     Advance Directives   What are advance directives? Advance directives are legal documents that state your wishes and plans for medical care  These plans are made ahead of time in case you lose your ability to make decisions for yourself  Advance directives can apply to any medical decision, such as the treatments you want, and if you want to donate organs  What are the types of advance directives? There are many types of advance directives, and each state has rules about how to use them  You may choose a combination of any of the following:  · Living will: This is a written record of the treatment you want  You can also choose which treatments you do not want, which to limit, and which to stop at a certain time  This includes surgery, medicine, IV fluid, and tube feedings  · Durable power of  for healthcare [facility]): This is a written record that states who you want to make healthcare choices for you when you are unable to make them for yourself  This person, called a proxy, is usually a family member or a friend  You may choose more than 1 proxy  · Do not resuscitate (DNR) order:  A DNR order is used in case your heart stops beating or you stop breathing  It is a request not to have certain forms of treatment, such as CPR  A DNR order may be included in other types of advance directives  · Medical directive:   This covers the care that you want if you are in a coma, near death, or unable to make decisions for yourself  You can list the treatments you want for each condition  Treatment may include pain medicine, surgery, blood transfusions, dialysis, IV or tube feedings, and a ventilator (breathing machine)  · Values history: This document has questions about your views, beliefs, and how you feel and think about life  This information can help others choose the care that you would choose  Why are advance directives important? An advance directive helps you control your care  Although spoken wishes may be used, it is better to have your wishes written down  Spoken wishes can be misunderstood, or not followed  Treatments may be given even if you do not want them  An advance directive may make it easier for your family to make difficult choices about your care  Urinary Incontinence   Urinary incontinence (UI)  is when you lose control of your bladder  UI develops because your bladder cannot store or empty urine properly  The 3 most common types of UI are stress incontinence, urge incontinence, or both  Medicines:   · May be given to help strengthen your bladder control  Report any side effects of medication to your healthcare provider  Do pelvic muscle exercises often:  Your pelvic muscles help you stop urinating  Squeeze these muscles tight for 5 seconds, then relax for 5 seconds  Gradually work up to squeezing for 10 seconds  Do 3 sets of 15 repetitions a day, or as directed  This will help strengthen your pelvic muscles and improve bladder control  Train your bladder:  Go to the bathroom at set times, such as every 2 hours, even if you do not feel the urge to go  You can also try to hold your urine when you feel the urge to go  For example, hold your urine for 5 minutes when you feel the urge to go  As that becomes easier, hold your urine for 10 minutes  Self-care:   · Keep a UI record  Write down how often you leak urine and how much you leak   Make a note of what you were doing when you leaked urine  · Drink liquids as directed  You may need to limit the amount of liquid you drink to help control your urine leakage  Do not drink any liquid right before you go to bed  Limit or do not have drinks that contain caffeine or alcohol  · Prevent constipation  Eat a variety of high-fiber foods  Good examples are high-fiber cereals, beans, vegetables, and whole-grain breads  Walking is the best way to trigger your intestines to have a bowel movement  · Exercise regularly and maintain a healthy weight  Weight loss and exercise will decrease pressure on your bladder and help you control your leakage  · Use a catheter as directed  to help empty your bladder  A catheter is a tiny, plastic tube that is put into your bladder to drain your urine  · Go to behavior therapy as directed  Behavior therapy may be used to help you learn to control your urge to urinate  Weight Management   Why it is important to manage your weight:  Being overweight increases your risk of health conditions such as heart disease, high blood pressure, type 2 diabetes, and certain types of cancer  It can also increase your risk for osteoarthritis, sleep apnea, and other respiratory problems  Aim for a slow, steady weight loss  Even a small amount of weight loss can lower your risk of health problems  How to lose weight safely:  A safe and healthy way to lose weight is to eat fewer calories and get regular exercise  You can lose up about 1 pound a week by decreasing the number of calories you eat by 500 calories each day  Healthy meal plan for weight management:  A healthy meal plan includes a variety of foods, contains fewer calories, and helps you stay healthy  A healthy meal plan includes the following:  · Eat whole-grain foods more often  A healthy meal plan should contain fiber  Fiber is the part of grains, fruits, and vegetables that is not broken down by your body   Whole-grain foods are healthy and provide extra fiber in your diet  Some examples of whole-grain foods are whole-wheat breads and pastas, oatmeal, brown rice, and bulgur  · Eat a variety of vegetables every day  Include dark, leafy greens such as spinach, kale, tomer greens, and mustard greens  Eat yellow and orange vegetables such as carrots, sweet potatoes, and winter squash  · Eat a variety of fruits every day  Choose fresh or canned fruit (canned in its own juice or light syrup) instead of juice  Fruit juice has very little or no fiber  · Eat low-fat dairy foods  Drink fat-free (skim) milk or 1% milk  Eat fat-free yogurt and low-fat cottage cheese  Try low-fat cheeses such as mozzarella and other reduced-fat cheeses  · Choose meat and other protein foods that are low in fat  Choose beans or other legumes such as split peas or lentils  Choose fish, skinless poultry (chicken or turkey), or lean cuts of red meat (beef or pork)  Before you cook meat or poultry, cut off any visible fat  · Use less fat and oil  Try baking foods instead of frying them  Add less fat, such as margarine, sour cream, regular salad dressing and mayonnaise to foods  Eat fewer high-fat foods  Some examples of high-fat foods include french fries, doughnuts, ice cream, and cakes  · Eat fewer sweets  Limit foods and drinks that are high in sugar  This includes candy, cookies, regular soda, and sweetened drinks  Exercise:  Exercise at least 30 minutes per day on most days of the week  Some examples of exercise include walking, biking, dancing, and swimming  You can also fit in more physical activity by taking the stairs instead of the elevator or parking farther away from stores  Ask your healthcare provider about the best exercise plan for you  © Copyright [employer] 2018 Information is for End User's use only and may not be sold, redistributed or otherwise used for commercial purposes   All illustrations and images included in [facility] are the copyrighted property of [employer]  or [address]        Return in about 4 months (around 12/17/2020)  Subjective:      Patient ID: Ze Flores is a 66 y o  female  Chief Complaint   Patient presents with    Follow-up     3 month f/u   [facility] Medicare Wellness Visit       Here for follow up visit  Her daughter thinks she needs PT  She couldn't get out of a chair over the weekend  Does not walk much  She has not seen her liver specialist  She has an appointment in November but does not want to go to [place]  She does go for bloodwork every 2 weeks  Denies chest pain, dyspnea, cough, fever  The following portions of the patient's history were reviewed and updated as appropriate: allergies, current medications, past family history, past medical history, past social history, past surgical history and problem list     Review of Systems   Constitutional: Negative  Respiratory: Negative  Cardiovascular: Negative  Endocrine: Negative  Musculoskeletal: Positive for gait problem           Current Outpatient Medications   Medication Sig Dispense Refill    aspirin (ECOTRIN LOW STRENGTH) 81 mg EC tablet Take 1 tablet by mouth daily 30 tablet 0    b complex vitamins capsule Take 1 capsule by mouth daily 30 capsule 3    clopidogrel (PLAVIX) 75 mg tablet Take 1 tablet (75 mg total) by mouth daily 90 tablet 3    cycloSPORINE modified (NEORAL) 25 mg capsule Take 50 mg by mouth 2 (two) times a day      ezetimibe (ZETIA) 10 mg tablet Take 1 tablet by mouth once daily 90 tablet 3    levothyroxine 112 mcg tablet TAKE 1 TABLET EVERY DAY AS DIRECTED 90 tablet 0    metoprolol tartrate (LOPRESSOR) 25 mg tablet TAKE 1 & 1/2 (ONE & ONE-HALF) TABLETS BY MOUTH TWICE DAILY 270 tablet 0    mycophenolate (CELLCEPT) 250 mg capsule Take 250 mg by mouth 2 (two) times a day 3 tabs each dose=750 mg       omeprazole (PriLOSEC) 40 MG capsule Take 40 mg by mouth daily      pravastatin (PRAVACHOL) 20 mg tablet        No current facility-administered medications for this visit  Objective:    /60   Pulse 80   Temp 97 5 °F (36 4 °C)   Resp 16   Ht 5' 2" (1 575 m)   Wt 97 5 kg (215 lb)   SpO2 96%   BMI 39 32 kg/m²        Physical Exam  Constitutional:       Appearance: She is well-developed  She is obese  Eyes:      Conjunctiva/sclera: Conjunctivae normal    Neck:      Musculoskeletal: Neck supple  Thyroid: No thyromegaly  Vascular: No JVD  Cardiovascular:      Rate and Rhythm: Normal rate and regular rhythm  Heart sounds: Normal heart sounds  No murmur  No friction rub  No gallop  Pulmonary:      Effort: Pulmonary effort is normal       Breath sounds: Normal breath sounds  No wheezing or rales  Abdominal:      General: Bowel sounds are normal  There is no distension  Palpations: Abdomen is soft  Tenderness: There is no abdominal tenderness  Musculoskeletal:      Right lower leg: No edema  Left lower leg: No edema  Neurological:      Mental Status: She is alert                  Aaliyah Aldana MD

## 2020-08-14 ENCOUNTER — TRANSCRIBE ORDERS (OUTPATIENT)
Dept: LAB | Facility: CLINIC | Age: 75
End: 2020-08-14

## 2020-08-14 ENCOUNTER — APPOINTMENT (OUTPATIENT)
Dept: LAB | Facility: CLINIC | Age: 75
End: 2020-08-14
Payer: MEDICARE

## 2020-08-14 DIAGNOSIS — Z51.81 ENCOUNTER FOR THERAPEUTIC DRUG MONITORING: ICD-10-CM

## 2020-08-14 DIAGNOSIS — Z94.4 LIVER REPLACED BY TRANSPLANT (HCC): Primary | ICD-10-CM

## 2020-08-14 DIAGNOSIS — Z94.4 LIVER REPLACED BY TRANSPLANT (HCC): ICD-10-CM

## 2020-08-14 LAB
ALBUMIN SERPL BCP-MCNC: 3.6 G/DL (ref 3.5–5)
ALP SERPL-CCNC: 304 U/L (ref 46–116)
ALT SERPL W P-5'-P-CCNC: 70 U/L (ref 12–78)
ANION GAP SERPL CALCULATED.3IONS-SCNC: 7 MMOL/L (ref 4–13)
AST SERPL W P-5'-P-CCNC: 50 U/L (ref 5–45)
BASOPHILS # BLD AUTO: 0.04 THOUSANDS/ΜL (ref 0–0.1)
BASOPHILS NFR BLD AUTO: 1 % (ref 0–1)
BILIRUB DIRECT SERPL-MCNC: 0.16 MG/DL (ref 0–0.2)
BILIRUB SERPL-MCNC: 0.48 MG/DL (ref 0.2–1)
BUN SERPL-MCNC: 24 MG/DL (ref 5–25)
CALCIUM SERPL-MCNC: 8.8 MG/DL (ref 8.3–10.1)
CHLORIDE SERPL-SCNC: 109 MMOL/L (ref 100–108)
CO2 SERPL-SCNC: 26 MMOL/L (ref 21–32)
CREAT SERPL-MCNC: 0.99 MG/DL (ref 0.6–1.3)
EOSINOPHIL # BLD AUTO: 0.23 THOUSAND/ΜL (ref 0–0.61)
EOSINOPHIL NFR BLD AUTO: 4 % (ref 0–6)
ERYTHROCYTE [DISTWIDTH] IN BLOOD BY AUTOMATED COUNT: 13.8 % (ref 11.6–15.1)
GFR SERPL CREATININE-BSD FRML MDRD: 56 ML/MIN/1.73SQ M
GGT SERPL-CCNC: 648 U/L (ref 5–85)
GLUCOSE P FAST SERPL-MCNC: 114 MG/DL (ref 65–99)
HCT VFR BLD AUTO: 38.4 % (ref 34.8–46.1)
HGB BLD-MCNC: 11.8 G/DL (ref 11.5–15.4)
IMM GRANULOCYTES # BLD AUTO: 0.05 THOUSAND/UL (ref 0–0.2)
IMM GRANULOCYTES NFR BLD AUTO: 1 % (ref 0–2)
LYMPHOCYTES # BLD AUTO: 0.93 THOUSANDS/ΜL (ref 0.6–4.47)
LYMPHOCYTES NFR BLD AUTO: 18 % (ref 14–44)
MAGNESIUM SERPL-MCNC: 2.5 MG/DL (ref 1.6–2.6)
MCH RBC QN AUTO: 28.7 PG (ref 26.8–34.3)
MCHC RBC AUTO-ENTMCNC: 30.7 G/DL (ref 31.4–37.4)
MCV RBC AUTO: 93 FL (ref 82–98)
MONOCYTES # BLD AUTO: 0.49 THOUSAND/ΜL (ref 0.17–1.22)
MONOCYTES NFR BLD AUTO: 9 % (ref 4–12)
NEUTROPHILS # BLD AUTO: 3.56 THOUSANDS/ΜL (ref 1.85–7.62)
NEUTS SEG NFR BLD AUTO: 67 % (ref 43–75)
NRBC BLD AUTO-RTO: 0 /100 WBCS
PHOSPHATE SERPL-MCNC: 3.7 MG/DL (ref 2.3–4.1)
PLATELET # BLD AUTO: 209 THOUSANDS/UL (ref 149–390)
PMV BLD AUTO: 10.4 FL (ref 8.9–12.7)
POTASSIUM SERPL-SCNC: 5.1 MMOL/L (ref 3.5–5.3)
PROT SERPL-MCNC: 8.1 G/DL (ref 6.4–8.2)
RBC # BLD AUTO: 4.11 MILLION/UL (ref 3.81–5.12)
SODIUM SERPL-SCNC: 142 MMOL/L (ref 136–145)
WBC # BLD AUTO: 5.3 THOUSAND/UL (ref 4.31–10.16)

## 2020-08-14 PROCEDURE — 82977 ASSAY OF GGT: CPT

## 2020-08-14 PROCEDURE — 80053 COMPREHEN METABOLIC PANEL: CPT

## 2020-08-14 PROCEDURE — 84100 ASSAY OF PHOSPHORUS: CPT

## 2020-08-14 PROCEDURE — 36415 COLL VENOUS BLD VENIPUNCTURE: CPT

## 2020-08-14 PROCEDURE — 85025 COMPLETE CBC W/AUTO DIFF WBC: CPT

## 2020-08-14 PROCEDURE — 80158 DRUG ASSAY CYCLOSPORINE: CPT

## 2020-08-14 PROCEDURE — 83735 ASSAY OF MAGNESIUM: CPT

## 2020-08-14 PROCEDURE — 82248 BILIRUBIN DIRECT: CPT

## 2020-08-15 DIAGNOSIS — E03.9 HYPOTHYROIDISM, UNSPECIFIED TYPE: ICD-10-CM

## 2020-08-16 LAB — CYCLOSPORINE BLD IA-MCNC: 72 NG/ML (ref 100–400)

## 2020-08-17 ENCOUNTER — OFFICE VISIT (OUTPATIENT)
Dept: FAMILY MEDICINE CLINIC | Facility: CLINIC | Age: 75
End: 2020-08-17
Payer: MEDICARE

## 2020-08-17 VITALS
HEIGHT: 62 IN | HEART RATE: 80 BPM | DIASTOLIC BLOOD PRESSURE: 60 MMHG | RESPIRATION RATE: 16 BRPM | TEMPERATURE: 97.5 F | BODY MASS INDEX: 39.56 KG/M2 | OXYGEN SATURATION: 96 % | SYSTOLIC BLOOD PRESSURE: 110 MMHG | WEIGHT: 215 LBS

## 2020-08-17 DIAGNOSIS — E78.00 PURE HYPERCHOLESTEROLEMIA: ICD-10-CM

## 2020-08-17 DIAGNOSIS — Z94.4 LIVER TRANSPLANTED (HCC): ICD-10-CM

## 2020-08-17 DIAGNOSIS — E03.9 HYPOTHYROIDISM, UNSPECIFIED TYPE: ICD-10-CM

## 2020-08-17 DIAGNOSIS — Z00.00 MEDICARE ANNUAL WELLNESS VISIT, SUBSEQUENT: Primary | ICD-10-CM

## 2020-08-17 DIAGNOSIS — R26.2 AMBULATORY DYSFUNCTION: ICD-10-CM

## 2020-08-17 DIAGNOSIS — I10 BENIGN ESSENTIAL HYPERTENSION: ICD-10-CM

## 2020-08-17 PROCEDURE — 99214 OFFICE O/P EST MOD 30 MIN: CPT | Performed by: INTERNAL MEDICINE

## 2020-08-17 PROCEDURE — 4040F PNEUMOC VAC/ADMIN/RCVD: CPT | Performed by: INTERNAL MEDICINE

## 2020-08-17 PROCEDURE — 3078F DIAST BP <80 MM HG: CPT | Performed by: INTERNAL MEDICINE

## 2020-08-17 PROCEDURE — 1160F RVW MEDS BY RX/DR IN RCRD: CPT | Performed by: INTERNAL MEDICINE

## 2020-08-17 PROCEDURE — 3074F SYST BP LT 130 MM HG: CPT | Performed by: INTERNAL MEDICINE

## 2020-08-17 PROCEDURE — G0439 PPPS, SUBSEQ VISIT: HCPCS | Performed by: INTERNAL MEDICINE

## 2020-08-17 PROCEDURE — 1125F AMNT PAIN NOTED PAIN PRSNT: CPT | Performed by: INTERNAL MEDICINE

## 2020-08-17 PROCEDURE — 1036F TOBACCO NON-USER: CPT | Performed by: INTERNAL MEDICINE

## 2020-08-17 PROCEDURE — 1170F FXNL STATUS ASSESSED: CPT | Performed by: INTERNAL MEDICINE

## 2020-08-17 PROCEDURE — 3008F BODY MASS INDEX DOCD: CPT | Performed by: INTERNAL MEDICINE

## 2020-08-17 RX ORDER — LEVOTHYROXINE SODIUM 112 UG/1
TABLET ORAL
Qty: 90 TABLET | Refills: 0 | Status: SHIPPED | OUTPATIENT
Start: 2020-08-17 | End: 2020-10-23

## 2020-08-17 NOTE — ASSESSMENT & PLAN NOTE
Lipids are controlled with pravastatin and zetia and she will continue, continue to monitor LFT's through her specialist

## 2020-08-17 NOTE — PROGRESS NOTES
Assessment and Plan:     Problem List Items Addressed This Visit     None           Preventive health issues were discussed with patient, and age appropriate screening tests were ordered as noted in patient's After Visit Summary  Personalized health advice and appropriate referrals for health education or preventive services given if needed, as noted in patient's After Visit Summary       History of Present Illness:     Patient presents for Medicare Annual Wellness visit    Patient Care Team:  Pauline Kawasaki, MD as PCP - General (Internal Medicine)  MATTHEW Goldstein MD Lowanda Bandy, DO Mahalia Flakes, MD Glennis Gibes, MD Eppie Diego, MD Trilby Blanc, MD as Endoscopist     Problem List:     Patient Active Problem List   Diagnosis    Liver transplanted [facility])    Hypothyroidism    Long-term use of immunosuppressant medication    Anemia    TIA (transient ischemic attack)    HLD (hyperlipidemia)    History of seizure    Paroxysmal SVT (supraventricular tachycardia) (HCC)    Altered mental status    Cerebrovascular accident (CVA) due to embolism ([address] )    Non-ST elevation myocardial infarction (NSTEMI), type 2    Abnormal EEG    Ataxic gait    Benign essential hypertension    Chronic joint pain    Chronic kidney insufficiency    Cyst of diaphragm    Depression    Elevated homocysteine    Hypercalcemia    Lung nodule, solitary    Peripheral arterial disease ([address] )    Sciatica    Ulcerative colitis without complications ([address] )    Asymptomatic stenosis of left carotid artery    Dementia ([address] )    Left ovarian cyst    Chronic left-sided thoracic back pain    Other fatigue    Snoring      Past Medical and Surgical History:     Past Medical History:   Diagnosis Date    Anemia     Anxiety     Arthritis     Benign paroxysmal vertigo, unspecified ear     onset: 05/06/2010    Cirrhosis ([address] )     onset: 03/30/2005    Crohn disease ([address] )     Disease of thyroid gland     GERD (gastroesophageal reflux disease)     History of transfusion     Liver transplant candidate     onset: 2008    Osteoporosis     Pelvic fracture ([facility] )     onset: 10/14/2008     Past Surgical History:   Procedure Laterality Date    ABDOMINAL SURGERY      APPENDECTOMY      BLADDER AUGMENTATION N/A     CATARACT EXTRACTION      CHOLECYSTECTOMY N/A     COLONOSCOPY N/A 2017    Procedure: COLONOSCOPY;  Surgeon: Janeth Moreira MD;  Location: [place] [address] GI LAB; Service:     ESOPHAGOGASTRODUODENOSCOPY N/A 3/31/2016    Procedure: ESOPHAGOGASTRODUODENOSCOPY (EGD); Surgeon: Janeth Moreira MD;  Location: [place] [address] [facility] LAB; Service:     ESOPHAGOGASTRODUODENOSCOPY N/A 2017    Procedure: ESOPHAGOGASTRODUODENOSCOPY (EGD); Surgeon: Janeth Moreira MD;  Location: [place] [address] [facility] LAB; Service:     EYE SURGERY      cataracts removed both eyes lens implant    HERNIA REPAIR      JOINT REPLACEMENT      left knee replacement    LIVER TRANSPLANTATION N/A     TONSILECTOMY AND ADNOIDECTOMY N/A     TUBAL LIGATION        Family History:     Family History   Problem Relation Age of Onset    Cancer Mother         breast    Cancer Father     Crohn's disease Brother     Arthritis Family     Breast cancer Family       Social History:        Social History     Socioeconomic History    Marital status:      Spouse name: None    Number of children: None    Years of education: Completed 12th grade    Highest education level: None   Occupational History    None   Social Needs    Financial resource strain: Not hard at all   Penny-Tucker insecurity     Worry: Never true     Inability: Never true    Transportation needs     Medical: No     Non-medical: No   Tobacco Use    Smoking status: Former Smoker     Years: 5 00     Last attempt to quit: 1975     Years since quittin 6    Smokeless tobacco: Never Used    Tobacco comment: smoked for 5 years-1/2 to 1 ppd   Substance and Sexual Activity    Alcohol use:  No  Drug use: No    Sexual activity: None   Lifestyle    Physical activity     Days per week: 2 days     Minutes per session: 30 min    Stress: To some extent   Relationships    Social connections     Talks on phone: None     Gets together: None     Attends Anabaptist service: None     Active member of club or organization: None     Attends meetings of clubs or organizations: None     Relationship status: None    Intimate partner violence     Fear of current or ex partner: None     Emotionally abused: None     Physically abused: None     Forced sexual activity: None   Other Topics Concern    None   Social History Narrative    Daily coffee consumption (1 cup/day)      Medications and Allergies:     Current Outpatient Medications   Medication Sig Dispense Refill    aspirin (ECOTRIN LOW STRENGTH) 81 mg EC tablet Take 1 tablet by mouth daily 30 tablet 0    b complex vitamins capsule Take 1 capsule by mouth daily 30 capsule 3    clopidogrel (PLAVIX) 75 mg tablet Take 1 tablet (75 mg total) by mouth daily 90 tablet 3    cycloSPORINE modified (NEORAL) 25 mg capsule Take 50 mg by mouth 2 (two) times a day      ezetimibe (ZETIA) 10 mg tablet Take 1 tablet by mouth once daily 90 tablet 3    levothyroxine 112 mcg tablet TAKE 1 TABLET EVERY DAY AS DIRECTED 90 tablet 0    metoprolol tartrate (LOPRESSOR) 25 mg tablet TAKE 1 & 1/2 (ONE & ONE-HALF) TABLETS BY MOUTH TWICE DAILY 270 tablet 0    mycophenolate (CELLCEPT) 250 mg capsule Take 250 mg by mouth 2 (two) times a day 3 tabs each dose=750 mg       omeprazole (PriLOSEC) 40 MG capsule Take 40 mg by mouth daily      pravastatin (PRAVACHOL) 20 mg tablet        No current facility-administered medications for this visit  Allergies   Allergen Reactions    Tetracyclines & Related Hives    Vancomycin Other (See Comments) and Itching     Reaction Date: [id number];    Pt unsure of reaction    Prograf [Tacrolimus] Anxiety     Reaction Date: [id number]; Immunizations:     Immunization History   Administered Date(s) Administered    H1N1, All Formulations 10/31/2009    Hep A, adult 11/19/1996, 05/21/1997    INFLUENZA 11/01/2018    Influenza Split High Dose Preservative Free IM 10/11/2012, 09/25/2013, 10/31/2014, 09/03/2015, 09/29/2016, 10/17/2017    Influenza TIV (IM) 10/07/2005, 10/03/2006, 10/25/2007, 09/16/2008, 09/09/2009, 09/22/2010, 09/14/2011    Influenza, high dose seasonal 0 5 mL 10/11/2019    Pneumococcal Conjugate 13-Valent 06/30/2015    Pneumococcal Polysaccharide PPV23 09/30/2006, 09/14/2011    Tdap 03/13/2018      Health Maintenance: There are no preventive care reminders to display for this patient  Topic Date Due    Influenza Vaccine  07/01/2020      Medicare Health Risk Assessment:     /60   Pulse 80   Temp 97 5 °F (36 4 °C)   Resp 16   Ht 5' 2" (1 575 m)   Wt 97 5 kg (215 lb)   SpO2 96%   BMI 39 32 kg/m²      Tree Dixon is here for her Subsequent Wellness visit  Health Risk Assessment:   Patient rates overall health as fair  Patient feels that their physical health rating is same  Eyesight was rated as same  Hearing was rated as same  Patient feels that their emotional and mental health rating is same  Pain experienced in the last 7 days has been none  Patient states that she has experienced weight loss or gain in last 6 months  gained    Depression Screening:   PHQ-2 Score: 0      Fall Risk Screening: In the past year, patient has experienced: no history of falling in past year      Urinary Incontinence Screening:   Patient has leaked urine accidently in the last six months  Home Safety:  Patient does not have trouble with stairs inside or outside of their home  Patient has working smoke alarms and has working carbon monoxide detector  Home safety hazards include: none  Nutrition:   Current diet is Regular and Limited junk food  Medications:   Patient is currently taking over-the-counter supplements   OTC medications include: see medication list  Patient is able to manage medications  Help from  and daughter    Activities of Daily Living (ADLs)/Instrumental Activities of Daily Living (IADLs):   Walk and transfer into and out of bed and chair?: Yes  Dress and groom yourself?: Yes    Bathe or shower yourself?: Yes    Feed yourself? Yes  Do your laundry/housekeeping?: Yes  Manage your money, pay your bills and track your expenses?: Yes  Make your own meals?: Yes    Do your own shopping?: Yes    ADL comments: Help from  and daughter    Previous Hospitalizations:   Any hospitalizations or ED visits within the last 12 months?: No      Advance Care Planning:   Living will: Yes    Advanced directive: Yes    End of Life Decisions reviewed with patient: Yes      Cognitive Screening:   Provider or family/friend/caregiver concerned regarding cognition?: No    PREVENTIVE SCREENINGS      Cardiovascular Screening:    General: Screening Not Indicated and History Lipid Disorder      Diabetes Screening:     General: Screening Current      Colorectal Cancer Screening:     General: Screening Current      Breast Cancer Screening:     General: Screening Current      Cervical Cancer Screening:    General: Screening Not Indicated      Osteoporosis Screening:    General: Screening Current and Risks and Benefits Discussed      Abdominal Aortic Aneurysm (AAA) Screening:        General: Screening Not Indicated      Lung Cancer Screening:     General: Screening Not Indicated      Hepatitis C Screening:    General: Screening Not Indicated and Risks and Benefits Discussed    Other Counseling Topics:   Alcohol use counseling, car/seat belt/driving safety, skin self-exam, sunscreen and calcium and vitamin D intake and regular weightbearing exercise         Melba Rivera MD

## 2020-08-17 NOTE — ASSESSMENT & PLAN NOTE
Last TSH was within normal limits and she is clinically euthyroid and will continue same dose of levothyroxine

## 2020-08-17 NOTE — ASSESSMENT & PLAN NOTE
She continues to have labwork ordered by her transplant doctors every 2 weeks and is closely monitored  She was urged to follow up with her specialist as soon as she is able

## 2020-08-17 NOTE — PATIENT INSTRUCTIONS
Medicare Preventive Visit Patient Instructions  Thank you for completing your Welcome to Medicare Visit or Medicare Annual Wellness Visit today  Your next wellness visit will be due in one year (8/17/2021)  The screening/preventive services that you may require over the next 5-10 years are detailed below  Some tests may not apply to you based off risk factors and/or age  Screening tests ordered at today's visit but not completed yet may show as past due  Also, please note that scanned in results may not display below  Preventive Screenings:  Service Recommendations Previous Testing/Comments   Colorectal Cancer Screening  * Colonoscopy    * Fecal Occult Blood Test (FOBT)/Fecal Immunochemical Test (FIT)  * Fecal DNA/Cologuard Test  * Flexible Sigmoidoscopy Age: 54-65 years old   Colonoscopy: every 10 years (may be performed more frequently if at higher risk)  OR  FOBT/FIT: every 1 year  OR  Cologuard: every 3 years  OR  Sigmoidoscopy: every 5 years  Screening may be recommended earlier than age 48 if at higher risk for colorectal cancer  Also, an individualized decision between you and your healthcare provider will decide whether screening between the ages of 74-80 would be appropriate  Colonoscopy: 04/20/2017  FOBT/FIT: Not on file  Cologuard: Not on file  Sigmoidoscopy: Not on file         Breast Cancer Screening Age: 36 years old  Frequency: every 1-2 years  Not required if history of left and right mastectomy Mammogram: 10/22/2018    Screening Current   Cervical Cancer Screening Between the ages of 21-29, pap smear recommended once every 3 years  Between the ages of 33-67, can perform pap smear with HPV co-testing every 5 years     Recommendations may differ for women with a history of total hysterectomy, cervical cancer, or abnormal pap smears in past  Pap Smear: Not on file    Screening Not Indicated   Hepatitis C Screening Once for adults born between 1945 and 1965  More frequently in patients at high risk for Hepatitis C Hep C Antibody: Not on file       Diabetes Screening 1-2 times per year if you're at risk for diabetes or have pre-diabetes Fasting glucose: 114 mg/dL   A1C: 5 9 %    Screening Current   Cholesterol Screening Once every 5 years if you don't have a lipid disorder  May order more often based on risk factors  Lipid panel: 01/08/2020    Screening Not Indicated  History Lipid Disorder     Other Preventive Screenings Covered by Medicare:  1  Abdominal Aortic Aneurysm (AAA) Screening: covered once if your at risk  You're considered to be at risk if you have a family history of AAA  2  Lung Cancer Screening: covers low dose CT scan once per year if you meet all of the following conditions: (1) Age 50-69; (2) No signs or symptoms of lung cancer; (3) Current smoker or have quit smoking within the last 15 years; (4) You have a tobacco smoking history of at least 30 pack years (packs per day multiplied by number of years you smoked); (5) You get a written order from a healthcare provider  3  Glaucoma Screening: covered annually if you're considered high risk: (1) You have diabetes OR (2) Family history of glaucoma OR (3)  aged 48 and older OR (3)  American aged 72 and older  3  Osteoporosis Screening: covered every 2 years if you meet one of the following conditions: (1) You're estrogen deficient and at risk for osteoporosis based off medical history and other findings; (2) Have a vertebral abnormality; (3) On glucocorticoid therapy for more than 3 months; (4) Have primary hyperparathyroidism; (5) On osteoporosis medications and need to assess response to drug therapy  · Last bone density test (DXA Scan): 10/22/2018  5  HIV Screening: covered annually if you're between the age of 12-76  Also covered annually if you are younger than 13 and older than 72 with risk factors for HIV infection   For pregnant patients, it is covered up to 3 times per pregnancy  Immunizations:  Immunization Recommendations   Influenza Vaccine Annual influenza vaccination during flu season is recommended for all persons aged >= 6 months who do not have contraindications   Pneumococcal Vaccine (Prevnar and Pneumovax)  * Prevnar = PCV13  * Pneumovax = PPSV23   Adults 25-60 years old: 1-3 doses may be recommended based on certain risk factors  Adults 72 years old: Prevnar (PCV13) vaccine recommended followed by Pneumovax (PPSV23) vaccine  If already received PPSV23 since turning 65, then PCV13 recommended at least one year after PPSV23 dose  Hepatitis B Vaccine 3 dose series if at intermediate or high risk (ex: diabetes, end stage renal disease, liver disease)   Tetanus (Td) Vaccine - COST NOT COVERED BY MEDICARE PART B Following completion of primary series, a booster dose should be given every 10 years to maintain immunity against tetanus  Td may also be given as tetanus wound prophylaxis  Tdap Vaccine - COST NOT COVERED BY MEDICARE PART B Recommended at least once for all adults  For pregnant patients, recommended with each pregnancy  Shingles Vaccine (Shingrix) - COST NOT COVERED BY MEDICARE PART B  2 shot series recommended in those aged 48 and above     Health Maintenance Due:  There are no preventive care reminders to display for this patient  Immunizations Due:      Topic Date Due    Influenza Vaccine  07/01/2020     Advance Directives   What are advance directives? Advance directives are legal documents that state your wishes and plans for medical care  These plans are made ahead of time in case you lose your ability to make decisions for yourself  Advance directives can apply to any medical decision, such as the treatments you want, and if you want to donate organs  What are the types of advance directives? There are many types of advance directives, and each state has rules about how to use them   You may choose a combination of any of the following:  · Living will: This is a written record of the treatment you want  You can also choose which treatments you do not want, which to limit, and which to stop at a certain time  This includes surgery, medicine, IV fluid, and tube feedings  · Durable power of  for healthcare [facility]): This is a written record that states who you want to make healthcare choices for you when you are unable to make them for yourself  This person, called a proxy, is usually a family member or a friend  You may choose more than 1 proxy  · Do not resuscitate (DNR) order:  A DNR order is used in case your heart stops beating or you stop breathing  It is a request not to have certain forms of treatment, such as CPR  A DNR order may be included in other types of advance directives  · Medical directive: This covers the care that you want if you are in a coma, near death, or unable to make decisions for yourself  You can list the treatments you want for each condition  Treatment may include pain medicine, surgery, blood transfusions, dialysis, IV or tube feedings, and a ventilator (breathing machine)  · Values history: This document has questions about your views, beliefs, and how you feel and think about life  This information can help others choose the care that you would choose  Why are advance directives important? An advance directive helps you control your care  Although spoken wishes may be used, it is better to have your wishes written down  Spoken wishes can be misunderstood, or not followed  Treatments may be given even if you do not want them  An advance directive may make it easier for your family to make difficult choices about your care  Urinary Incontinence   Urinary incontinence (UI)  is when you lose control of your bladder  UI develops because your bladder cannot store or empty urine properly  The 3 most common types of UI are stress incontinence, urge incontinence, or both    Medicines:   · May be given to help strengthen your bladder control  Report any side effects of medication to your healthcare provider  Do pelvic muscle exercises often:  Your pelvic muscles help you stop urinating  Squeeze these muscles tight for 5 seconds, then relax for 5 seconds  Gradually work up to squeezing for 10 seconds  Do 3 sets of 15 repetitions a day, or as directed  This will help strengthen your pelvic muscles and improve bladder control  Train your bladder:  Go to the bathroom at set times, such as every 2 hours, even if you do not feel the urge to go  You can also try to hold your urine when you feel the urge to go  For example, hold your urine for 5 minutes when you feel the urge to go  As that becomes easier, hold your urine for 10 minutes  Self-care:   · Keep a UI record  Write down how often you leak urine and how much you leak  Make a note of what you were doing when you leaked urine  · Drink liquids as directed  You may need to limit the amount of liquid you drink to help control your urine leakage  Do not drink any liquid right before you go to bed  Limit or do not have drinks that contain caffeine or alcohol  · Prevent constipation  Eat a variety of high-fiber foods  Good examples are high-fiber cereals, beans, vegetables, and whole-grain breads  Walking is the best way to trigger your intestines to have a bowel movement  · Exercise regularly and maintain a healthy weight  Weight loss and exercise will decrease pressure on your bladder and help you control your leakage  · Use a catheter as directed  to help empty your bladder  A catheter is a tiny, plastic tube that is put into your bladder to drain your urine  · Go to behavior therapy as directed  Behavior therapy may be used to help you learn to control your urge to urinate      Weight Management   Why it is important to manage your weight:  Being overweight increases your risk of health conditions such as heart disease, high blood pressure, type 2 diabetes, and certain types of cancer  It can also increase your risk for osteoarthritis, sleep apnea, and other respiratory problems  Aim for a slow, steady weight loss  Even a small amount of weight loss can lower your risk of health problems  How to lose weight safely:  A safe and healthy way to lose weight is to eat fewer calories and get regular exercise  You can lose up about 1 pound a week by decreasing the number of calories you eat by 500 calories each day  Healthy meal plan for weight management:  A healthy meal plan includes a variety of foods, contains fewer calories, and helps you stay healthy  A healthy meal plan includes the following:  · Eat whole-grain foods more often  A healthy meal plan should contain fiber  Fiber is the part of grains, fruits, and vegetables that is not broken down by your body  Whole-grain foods are healthy and provide extra fiber in your diet  Some examples of whole-grain foods are whole-wheat breads and pastas, oatmeal, brown rice, and bulgur  · Eat a variety of vegetables every day  Include dark, leafy greens such as spinach, kale, tomer greens, and mustard greens  Eat yellow and orange vegetables such as carrots, sweet potatoes, and winter squash  · Eat a variety of fruits every day  Choose fresh or canned fruit (canned in its own juice or light syrup) instead of juice  Fruit juice has very little or no fiber  · Eat low-fat dairy foods  Drink fat-free (skim) milk or 1% milk  Eat fat-free yogurt and low-fat cottage cheese  Try low-fat cheeses such as mozzarella and other reduced-fat cheeses  · Choose meat and other protein foods that are low in fat  Choose beans or other legumes such as split peas or lentils  Choose fish, skinless poultry (chicken or turkey), or lean cuts of red meat (beef or pork)  Before you cook meat or poultry, cut off any visible fat  · Use less fat and oil  Try baking foods instead of frying them   Add less fat, such as margarine, sour cream, regular salad dressing and mayonnaise to foods  Eat fewer high-fat foods  Some examples of high-fat foods include french fries, doughnuts, ice cream, and cakes  · Eat fewer sweets  Limit foods and drinks that are high in sugar  This includes candy, cookies, regular soda, and sweetened drinks  Exercise:  Exercise at least 30 minutes per day on most days of the week  Some examples of exercise include walking, biking, dancing, and swimming  You can also fit in more physical activity by taking the stairs instead of the elevator or parking farther away from stores  Ask your healthcare provider about the best exercise plan for you  © Copyright [employer] 2018 Information is for End User's use only and may not be sold, redistributed or otherwise used for commercial purposes   All illustrations and images included in CareNotes® are the copyrighted property of A D [employer] M [employer]  or [address] [place] [address]

## 2020-08-21 ENCOUNTER — TRANSCRIBE ORDERS (OUTPATIENT)
Dept: LAB | Facility: CLINIC | Age: 75
End: 2020-08-21

## 2020-08-21 ENCOUNTER — APPOINTMENT (OUTPATIENT)
Dept: LAB | Facility: CLINIC | Age: 75
End: 2020-08-21
Payer: MEDICARE

## 2020-08-21 DIAGNOSIS — Z51.81 ENCOUNTER FOR THERAPEUTIC DRUG MONITORING: ICD-10-CM

## 2020-08-21 DIAGNOSIS — R74.01 NONSPECIFIC ELEVATION OF LEVELS OF TRANSAMINASE OR LACTIC ACID DEHYDROGENASE (LDH): ICD-10-CM

## 2020-08-21 DIAGNOSIS — T86.40 COMPLICATION OF TRANSPLANTED LIVER, UNSPECIFIED COMPLICATION (HCC): ICD-10-CM

## 2020-08-21 DIAGNOSIS — R74.02 NONSPECIFIC ELEVATION OF LEVELS OF TRANSAMINASE OR LACTIC ACID DEHYDROGENASE (LDH): ICD-10-CM

## 2020-08-21 DIAGNOSIS — Z94.4 LIVER REPLACED BY TRANSPLANT (HCC): Primary | ICD-10-CM

## 2020-08-21 LAB
ALBUMIN SERPL BCP-MCNC: 3.6 G/DL (ref 3.5–5)
ALP SERPL-CCNC: 269 U/L (ref 46–116)
ALT SERPL W P-5'-P-CCNC: 49 U/L (ref 12–78)
ANION GAP SERPL CALCULATED.3IONS-SCNC: 6 MMOL/L (ref 4–13)
AST SERPL W P-5'-P-CCNC: 35 U/L (ref 5–45)
BASOPHILS # BLD AUTO: 0.04 THOUSANDS/ΜL (ref 0–0.1)
BASOPHILS NFR BLD AUTO: 1 % (ref 0–1)
BILIRUB DIRECT SERPL-MCNC: 0.2 MG/DL (ref 0–0.2)
BILIRUB SERPL-MCNC: 0.55 MG/DL (ref 0.2–1)
BUN SERPL-MCNC: 31 MG/DL (ref 5–25)
CALCIUM SERPL-MCNC: 8.6 MG/DL (ref 8.3–10.1)
CHLORIDE SERPL-SCNC: 110 MMOL/L (ref 100–108)
CO2 SERPL-SCNC: 24 MMOL/L (ref 21–32)
CREAT SERPL-MCNC: 1.07 MG/DL (ref 0.6–1.3)
EOSINOPHIL # BLD AUTO: 0.21 THOUSAND/ΜL (ref 0–0.61)
EOSINOPHIL NFR BLD AUTO: 4 % (ref 0–6)
ERYTHROCYTE [DISTWIDTH] IN BLOOD BY AUTOMATED COUNT: 13.8 % (ref 11.6–15.1)
GFR SERPL CREATININE-BSD FRML MDRD: 51 ML/MIN/1.73SQ M
GGT SERPL-CCNC: 587 U/L (ref 5–85)
GLUCOSE P FAST SERPL-MCNC: 115 MG/DL (ref 65–99)
HCT VFR BLD AUTO: 36.4 % (ref 34.8–46.1)
HGB BLD-MCNC: 11.3 G/DL (ref 11.5–15.4)
IMM GRANULOCYTES # BLD AUTO: 0.03 THOUSAND/UL (ref 0–0.2)
IMM GRANULOCYTES NFR BLD AUTO: 1 % (ref 0–2)
LYMPHOCYTES # BLD AUTO: 0.74 THOUSANDS/ΜL (ref 0.6–4.47)
LYMPHOCYTES NFR BLD AUTO: 14 % (ref 14–44)
MAGNESIUM SERPL-MCNC: 2.5 MG/DL (ref 1.6–2.6)
MCH RBC QN AUTO: 29 PG (ref 26.8–34.3)
MCHC RBC AUTO-ENTMCNC: 31 G/DL (ref 31.4–37.4)
MCV RBC AUTO: 93 FL (ref 82–98)
MONOCYTES # BLD AUTO: 0.43 THOUSAND/ΜL (ref 0.17–1.22)
MONOCYTES NFR BLD AUTO: 8 % (ref 4–12)
NEUTROPHILS # BLD AUTO: 3.69 THOUSANDS/ΜL (ref 1.85–7.62)
NEUTS SEG NFR BLD AUTO: 72 % (ref 43–75)
NRBC BLD AUTO-RTO: 0 /100 WBCS
PHOSPHATE SERPL-MCNC: 4.4 MG/DL (ref 2.3–4.1)
PLATELET # BLD AUTO: 210 THOUSANDS/UL (ref 149–390)
PMV BLD AUTO: 9.7 FL (ref 8.9–12.7)
POTASSIUM SERPL-SCNC: 4.8 MMOL/L (ref 3.5–5.3)
PROT SERPL-MCNC: 8.2 G/DL (ref 6.4–8.2)
RBC # BLD AUTO: 3.9 MILLION/UL (ref 3.81–5.12)
SODIUM SERPL-SCNC: 140 MMOL/L (ref 136–145)
WBC # BLD AUTO: 5.14 THOUSAND/UL (ref 4.31–10.16)

## 2020-08-21 PROCEDURE — 84100 ASSAY OF PHOSPHORUS: CPT

## 2020-08-21 PROCEDURE — 82248 BILIRUBIN DIRECT: CPT

## 2020-08-21 PROCEDURE — 80158 DRUG ASSAY CYCLOSPORINE: CPT

## 2020-08-21 PROCEDURE — 36415 COLL VENOUS BLD VENIPUNCTURE: CPT

## 2020-08-21 PROCEDURE — 83735 ASSAY OF MAGNESIUM: CPT

## 2020-08-21 PROCEDURE — 80053 COMPREHEN METABOLIC PANEL: CPT

## 2020-08-21 PROCEDURE — 85025 COMPLETE CBC W/AUTO DIFF WBC: CPT

## 2020-08-21 PROCEDURE — 82977 ASSAY OF GGT: CPT

## 2020-08-24 ENCOUNTER — TRANSCRIBE ORDERS (OUTPATIENT)
Dept: ADMINISTRATIVE | Facility: HOSPITAL | Age: 75
End: 2020-08-24

## 2020-08-24 DIAGNOSIS — M81.8 IDIOPATHIC OSTEOPOROSIS: Primary | ICD-10-CM

## 2020-08-24 LAB — CYCLOSPORINE BLD IA-MCNC: 72 NG/ML (ref 100–400)

## 2020-08-27 NOTE — TELEPHONE ENCOUNTER
Call placed to Marta Thompson for consult to Michael Mariano RN  08/27/20 2102 ELIJAH   Okay to wait  Until back in office  af/rma

## 2020-09-28 ENCOUNTER — TRANSCRIBE ORDERS (OUTPATIENT)
Dept: LAB | Facility: CLINIC | Age: 75
End: 2020-09-28

## 2020-09-28 ENCOUNTER — APPOINTMENT (OUTPATIENT)
Dept: LAB | Facility: CLINIC | Age: 75
End: 2020-09-28
Payer: MEDICARE

## 2020-09-28 DIAGNOSIS — R74.01 NONSPECIFIC ELEVATION OF LEVELS OF TRANSAMINASE OR LACTIC ACID DEHYDROGENASE (LDH): ICD-10-CM

## 2020-09-28 DIAGNOSIS — T86.40: ICD-10-CM

## 2020-09-28 DIAGNOSIS — R74.02 NONSPECIFIC ELEVATION OF LEVELS OF TRANSAMINASE OR LACTIC ACID DEHYDROGENASE (LDH): ICD-10-CM

## 2020-09-28 DIAGNOSIS — Z94.4 TRANSPLANTED LIVER (HCC): Primary | ICD-10-CM

## 2020-09-28 DIAGNOSIS — Z94.4 TRANSPLANTED LIVER (HCC): ICD-10-CM

## 2020-09-28 LAB
ALBUMIN SERPL BCP-MCNC: 3.7 G/DL (ref 3.5–5)
ALP SERPL-CCNC: 306 U/L (ref 46–116)
ALT SERPL W P-5'-P-CCNC: 78 U/L (ref 12–78)
ANION GAP SERPL CALCULATED.3IONS-SCNC: 6 MMOL/L (ref 4–13)
AST SERPL W P-5'-P-CCNC: 51 U/L (ref 5–45)
BASOPHILS # BLD AUTO: 0.04 THOUSANDS/ΜL (ref 0–0.1)
BASOPHILS NFR BLD AUTO: 1 % (ref 0–1)
BILIRUB DIRECT SERPL-MCNC: 0.15 MG/DL (ref 0–0.2)
BILIRUB SERPL-MCNC: 0.41 MG/DL (ref 0.2–1)
BUN SERPL-MCNC: 29 MG/DL (ref 5–25)
CALCIUM SERPL-MCNC: 9.5 MG/DL (ref 8.3–10.1)
CHLORIDE SERPL-SCNC: 111 MMOL/L (ref 100–108)
CO2 SERPL-SCNC: 25 MMOL/L (ref 21–32)
CREAT SERPL-MCNC: 1.08 MG/DL (ref 0.6–1.3)
EOSINOPHIL # BLD AUTO: 0.23 THOUSAND/ΜL (ref 0–0.61)
EOSINOPHIL NFR BLD AUTO: 4 % (ref 0–6)
ERYTHROCYTE [DISTWIDTH] IN BLOOD BY AUTOMATED COUNT: 13.4 % (ref 11.6–15.1)
GFR SERPL CREATININE-BSD FRML MDRD: 50 ML/MIN/1.73SQ M
GGT SERPL-CCNC: 612 U/L (ref 5–85)
GLUCOSE SERPL-MCNC: 103 MG/DL (ref 65–140)
HCT VFR BLD AUTO: 38.4 % (ref 34.8–46.1)
HGB BLD-MCNC: 11.6 G/DL (ref 11.5–15.4)
IMM GRANULOCYTES # BLD AUTO: 0.03 THOUSAND/UL (ref 0–0.2)
IMM GRANULOCYTES NFR BLD AUTO: 1 % (ref 0–2)
LYMPHOCYTES # BLD AUTO: 0.91 THOUSANDS/ΜL (ref 0.6–4.47)
LYMPHOCYTES NFR BLD AUTO: 15 % (ref 14–44)
MAGNESIUM SERPL-MCNC: 2.6 MG/DL (ref 1.6–2.6)
MCH RBC QN AUTO: 28.6 PG (ref 26.8–34.3)
MCHC RBC AUTO-ENTMCNC: 30.2 G/DL (ref 31.4–37.4)
MCV RBC AUTO: 95 FL (ref 82–98)
MONOCYTES # BLD AUTO: 0.46 THOUSAND/ΜL (ref 0.17–1.22)
MONOCYTES NFR BLD AUTO: 8 % (ref 4–12)
NEUTROPHILS # BLD AUTO: 4.22 THOUSANDS/ΜL (ref 1.85–7.62)
NEUTS SEG NFR BLD AUTO: 71 % (ref 43–75)
NRBC BLD AUTO-RTO: 0 /100 WBCS
PHOSPHATE SERPL-MCNC: 4.3 MG/DL (ref 2.3–4.1)
PLATELET # BLD AUTO: 235 THOUSANDS/UL (ref 149–390)
PMV BLD AUTO: 10.4 FL (ref 8.9–12.7)
POTASSIUM SERPL-SCNC: 4.8 MMOL/L (ref 3.5–5.3)
PROT SERPL-MCNC: 9 G/DL (ref 6.4–8.2)
RBC # BLD AUTO: 4.06 MILLION/UL (ref 3.81–5.12)
SODIUM SERPL-SCNC: 142 MMOL/L (ref 136–145)
WBC # BLD AUTO: 5.89 THOUSAND/UL (ref 4.31–10.16)

## 2020-09-28 PROCEDURE — 85025 COMPLETE CBC W/AUTO DIFF WBC: CPT

## 2020-09-28 PROCEDURE — 36415 COLL VENOUS BLD VENIPUNCTURE: CPT

## 2020-09-28 PROCEDURE — 83735 ASSAY OF MAGNESIUM: CPT

## 2020-09-28 PROCEDURE — 80053 COMPREHEN METABOLIC PANEL: CPT

## 2020-09-28 PROCEDURE — 82977 ASSAY OF GGT: CPT

## 2020-09-28 PROCEDURE — 82248 BILIRUBIN DIRECT: CPT

## 2020-09-28 PROCEDURE — 84100 ASSAY OF PHOSPHORUS: CPT

## 2020-10-09 DIAGNOSIS — I47.1 PSVT (PAROXYSMAL SUPRAVENTRICULAR TACHYCARDIA) (HCC): ICD-10-CM

## 2020-10-13 ENCOUNTER — APPOINTMENT (OUTPATIENT)
Dept: LAB | Facility: CLINIC | Age: 75
End: 2020-10-13
Payer: MEDICARE

## 2020-10-13 ENCOUNTER — TRANSCRIBE ORDERS (OUTPATIENT)
Dept: LAB | Facility: CLINIC | Age: 75
End: 2020-10-13

## 2020-10-13 DIAGNOSIS — Z94.4 LIVER REPLACED BY TRANSPLANT (HCC): Primary | ICD-10-CM

## 2020-10-13 DIAGNOSIS — Z51.81 ENCOUNTER FOR THERAPEUTIC DRUG MONITORING: ICD-10-CM

## 2020-10-13 DIAGNOSIS — R74.02 NONSPECIFIC ELEVATION OF LEVELS OF TRANSAMINASE OR LACTIC ACID DEHYDROGENASE (LDH): ICD-10-CM

## 2020-10-13 DIAGNOSIS — R74.01 NONSPECIFIC ELEVATION OF LEVELS OF TRANSAMINASE OR LACTIC ACID DEHYDROGENASE (LDH): ICD-10-CM

## 2020-10-13 DIAGNOSIS — T86.40 COMPLICATION OF TRANSPLANTED LIVER, UNSPECIFIED COMPLICATION (HCC): ICD-10-CM

## 2020-10-13 LAB
ALBUMIN SERPL BCP-MCNC: 3.7 G/DL (ref 3.5–5)
ALP SERPL-CCNC: 282 U/L (ref 46–116)
ALT SERPL W P-5'-P-CCNC: 60 U/L (ref 12–78)
ANION GAP SERPL CALCULATED.3IONS-SCNC: 4 MMOL/L (ref 4–13)
AST SERPL W P-5'-P-CCNC: 44 U/L (ref 5–45)
BASOPHILS # BLD AUTO: 0.04 THOUSANDS/ΜL (ref 0–0.1)
BASOPHILS NFR BLD AUTO: 1 % (ref 0–1)
BILIRUB DIRECT SERPL-MCNC: 0.2 MG/DL (ref 0–0.2)
BILIRUB SERPL-MCNC: 0.46 MG/DL (ref 0.2–1)
BUN SERPL-MCNC: 32 MG/DL (ref 5–25)
CALCIUM SERPL-MCNC: 8.8 MG/DL (ref 8.3–10.1)
CHLORIDE SERPL-SCNC: 110 MMOL/L (ref 100–108)
CO2 SERPL-SCNC: 25 MMOL/L (ref 21–32)
CREAT SERPL-MCNC: 0.95 MG/DL (ref 0.6–1.3)
EOSINOPHIL # BLD AUTO: 0.25 THOUSAND/ΜL (ref 0–0.61)
EOSINOPHIL NFR BLD AUTO: 4 % (ref 0–6)
ERYTHROCYTE [DISTWIDTH] IN BLOOD BY AUTOMATED COUNT: 13.3 % (ref 11.6–15.1)
GFR SERPL CREATININE-BSD FRML MDRD: 58 ML/MIN/1.73SQ M
GGT SERPL-CCNC: 568 U/L (ref 5–85)
GLUCOSE P FAST SERPL-MCNC: 112 MG/DL (ref 65–99)
HCT VFR BLD AUTO: 37.7 % (ref 34.8–46.1)
HGB BLD-MCNC: 11.8 G/DL (ref 11.5–15.4)
IMM GRANULOCYTES # BLD AUTO: 0.04 THOUSAND/UL (ref 0–0.2)
IMM GRANULOCYTES NFR BLD AUTO: 1 % (ref 0–2)
LYMPHOCYTES # BLD AUTO: 0.88 THOUSANDS/ΜL (ref 0.6–4.47)
LYMPHOCYTES NFR BLD AUTO: 15 % (ref 14–44)
MAGNESIUM SERPL-MCNC: 2.5 MG/DL (ref 1.6–2.6)
MCH RBC QN AUTO: 28.9 PG (ref 26.8–34.3)
MCHC RBC AUTO-ENTMCNC: 31.3 G/DL (ref 31.4–37.4)
MCV RBC AUTO: 92 FL (ref 82–98)
MONOCYTES # BLD AUTO: 0.5 THOUSAND/ΜL (ref 0.17–1.22)
MONOCYTES NFR BLD AUTO: 8 % (ref 4–12)
NEUTROPHILS # BLD AUTO: 4.35 THOUSANDS/ΜL (ref 1.85–7.62)
NEUTS SEG NFR BLD AUTO: 71 % (ref 43–75)
NRBC BLD AUTO-RTO: 0 /100 WBCS
PHOSPHATE SERPL-MCNC: 3.6 MG/DL (ref 2.3–4.1)
PLATELET # BLD AUTO: 235 THOUSANDS/UL (ref 149–390)
PMV BLD AUTO: 9.9 FL (ref 8.9–12.7)
POTASSIUM SERPL-SCNC: 4.7 MMOL/L (ref 3.5–5.3)
PROT SERPL-MCNC: 8.5 G/DL (ref 6.4–8.2)
RBC # BLD AUTO: 4.08 MILLION/UL (ref 3.81–5.12)
SODIUM SERPL-SCNC: 139 MMOL/L (ref 136–145)
WBC # BLD AUTO: 6.06 THOUSAND/UL (ref 4.31–10.16)

## 2020-10-13 PROCEDURE — 85025 COMPLETE CBC W/AUTO DIFF WBC: CPT

## 2020-10-13 PROCEDURE — 36415 COLL VENOUS BLD VENIPUNCTURE: CPT

## 2020-10-13 PROCEDURE — 80158 DRUG ASSAY CYCLOSPORINE: CPT

## 2020-10-13 PROCEDURE — 80053 COMPREHEN METABOLIC PANEL: CPT

## 2020-10-13 PROCEDURE — 83735 ASSAY OF MAGNESIUM: CPT

## 2020-10-13 PROCEDURE — 82248 BILIRUBIN DIRECT: CPT

## 2020-10-13 PROCEDURE — 84100 ASSAY OF PHOSPHORUS: CPT

## 2020-10-13 PROCEDURE — 82977 ASSAY OF GGT: CPT

## 2020-10-14 LAB — CYCLOSPORINE BLD IA-MCNC: 71 NG/ML (ref 100–400)

## 2020-10-20 ENCOUNTER — TELEPHONE (OUTPATIENT)
Dept: HEMATOLOGY ONCOLOGY | Facility: CLINIC | Age: 75
End: 2020-10-20

## 2020-10-21 ENCOUNTER — TRANSCRIBE ORDERS (OUTPATIENT)
Dept: LAB | Facility: CLINIC | Age: 75
End: 2020-10-21

## 2020-10-23 DIAGNOSIS — E03.9 HYPOTHYROIDISM, UNSPECIFIED TYPE: ICD-10-CM

## 2020-10-23 RX ORDER — LEVOTHYROXINE SODIUM 112 UG/1
TABLET ORAL
Qty: 90 TABLET | Refills: 0 | Status: SHIPPED | OUTPATIENT
Start: 2020-10-23 | End: 2020-12-02

## 2020-10-26 ENCOUNTER — IMMUNIZATIONS (OUTPATIENT)
Dept: FAMILY MEDICINE CLINIC | Facility: CLINIC | Age: 75
End: 2020-10-26
Payer: MEDICARE

## 2020-10-26 ENCOUNTER — HOSPITAL ENCOUNTER (OUTPATIENT)
Dept: RADIOLOGY | Facility: HOSPITAL | Age: 75
Discharge: HOME | End: 2020-10-26
Payer: MEDICARE

## 2020-10-26 VITALS — TEMPERATURE: 98.4 F

## 2020-10-26 DIAGNOSIS — Z23 NEED FOR VACCINATION: Primary | ICD-10-CM

## 2020-10-26 DIAGNOSIS — M81.8 IDIOPATHIC OSTEOPOROSIS: ICD-10-CM

## 2020-10-26 PROCEDURE — G0008 ADMIN INFLUENZA VIRUS VAC: HCPCS

## 2020-10-26 PROCEDURE — 77080 DXA BONE DENSITY AXIAL: CPT

## 2020-10-26 PROCEDURE — 90662 IIV NO PRSV INCREASED AG IM: CPT

## 2020-10-27 ENCOUNTER — TRANSCRIBE ORDERS (OUTPATIENT)
Dept: LAB | Facility: CLINIC | Age: 75
End: 2020-10-27

## 2020-10-27 ENCOUNTER — OFFICE VISIT (OUTPATIENT)
Dept: CARDIOLOGY CLINIC | Facility: CLINIC | Age: 75
End: 2020-10-27
Payer: MEDICARE

## 2020-10-27 ENCOUNTER — APPOINTMENT (OUTPATIENT)
Dept: LAB | Facility: CLINIC | Age: 75
End: 2020-10-27
Payer: MEDICARE

## 2020-10-27 VITALS
OXYGEN SATURATION: 97 % | BODY MASS INDEX: 40.3 KG/M2 | DIASTOLIC BLOOD PRESSURE: 64 MMHG | HEIGHT: 62 IN | TEMPERATURE: 98.4 F | SYSTOLIC BLOOD PRESSURE: 126 MMHG | HEART RATE: 72 BPM | WEIGHT: 219 LBS

## 2020-10-27 DIAGNOSIS — Z95.818 PRESENCE OF CARDIAC DEVICE: ICD-10-CM

## 2020-10-27 DIAGNOSIS — Z94.4 TRANSPLANTED LIVER (HCC): Primary | ICD-10-CM

## 2020-10-27 DIAGNOSIS — Z79.01 ADMISSION FOR LONG-TERM (CURRENT) USE OF ANTICOAGULANTS: ICD-10-CM

## 2020-10-27 DIAGNOSIS — I47.1 PSVT (PAROXYSMAL SUPRAVENTRICULAR TACHYCARDIA) (HCC): ICD-10-CM

## 2020-10-27 DIAGNOSIS — I21.A1 TYPE 2 MYOCARDIAL INFARCTION WITHOUT ST ELEVATION (HCC): ICD-10-CM

## 2020-10-27 DIAGNOSIS — Z94.4 TRANSPLANTED LIVER (HCC): ICD-10-CM

## 2020-10-27 DIAGNOSIS — I48.21 PERMANENT ATRIAL FIBRILLATION (HCC): Primary | ICD-10-CM

## 2020-10-27 DIAGNOSIS — Z86.73 PERSONAL HISTORY OF TIA (TRANSIENT ISCHEMIC ATTACK): ICD-10-CM

## 2020-10-27 DIAGNOSIS — Z51.81 ADMISSION FOR LONG-TERM (CURRENT) USE OF ANTICOAGULANTS: ICD-10-CM

## 2020-10-27 LAB
ALBUMIN SERPL BCP-MCNC: 3.6 G/DL (ref 3.5–5)
ALP SERPL-CCNC: 263 U/L (ref 46–116)
ALT SERPL W P-5'-P-CCNC: 56 U/L (ref 12–78)
ANION GAP SERPL CALCULATED.3IONS-SCNC: 4 MMOL/L (ref 4–13)
AST SERPL W P-5'-P-CCNC: 38 U/L (ref 5–45)
BASOPHILS # BLD AUTO: 0.04 THOUSANDS/ΜL (ref 0–0.1)
BASOPHILS NFR BLD AUTO: 1 % (ref 0–1)
BILIRUB DIRECT SERPL-MCNC: 0.18 MG/DL (ref 0–0.2)
BILIRUB SERPL-MCNC: 0.5 MG/DL (ref 0.2–1)
BUN SERPL-MCNC: 27 MG/DL (ref 5–25)
CALCIUM SERPL-MCNC: 8.8 MG/DL (ref 8.3–10.1)
CHLORIDE SERPL-SCNC: 112 MMOL/L (ref 100–108)
CO2 SERPL-SCNC: 25 MMOL/L (ref 21–32)
CREAT SERPL-MCNC: 0.95 MG/DL (ref 0.6–1.3)
EOSINOPHIL # BLD AUTO: 0.2 THOUSAND/ΜL (ref 0–0.61)
EOSINOPHIL NFR BLD AUTO: 3 % (ref 0–6)
ERYTHROCYTE [DISTWIDTH] IN BLOOD BY AUTOMATED COUNT: 13.7 % (ref 11.6–15.1)
GFR SERPL CREATININE-BSD FRML MDRD: 58 ML/MIN/1.73SQ M
GGT SERPL-CCNC: 485 U/L (ref 5–85)
GLUCOSE P FAST SERPL-MCNC: 110 MG/DL (ref 65–99)
HCT VFR BLD AUTO: 35.5 % (ref 34.8–46.1)
HGB BLD-MCNC: 10.8 G/DL (ref 11.5–15.4)
IMM GRANULOCYTES # BLD AUTO: 0.02 THOUSAND/UL (ref 0–0.2)
IMM GRANULOCYTES NFR BLD AUTO: 0 % (ref 0–2)
LYMPHOCYTES # BLD AUTO: 0.78 THOUSANDS/ΜL (ref 0.6–4.47)
LYMPHOCYTES NFR BLD AUTO: 13 % (ref 14–44)
MAGNESIUM SERPL-MCNC: 2.6 MG/DL (ref 1.6–2.6)
MCH RBC QN AUTO: 28.3 PG (ref 26.8–34.3)
MCHC RBC AUTO-ENTMCNC: 30.4 G/DL (ref 31.4–37.4)
MCV RBC AUTO: 93 FL (ref 82–98)
MONOCYTES # BLD AUTO: 0.55 THOUSAND/ΜL (ref 0.17–1.22)
MONOCYTES NFR BLD AUTO: 10 % (ref 4–12)
NEUTROPHILS # BLD AUTO: 4.21 THOUSANDS/ΜL (ref 1.85–7.62)
NEUTS SEG NFR BLD AUTO: 73 % (ref 43–75)
NRBC BLD AUTO-RTO: 0 /100 WBCS
PHOSPHATE SERPL-MCNC: 3.6 MG/DL (ref 2.3–4.1)
PLATELET # BLD AUTO: 207 THOUSANDS/UL (ref 149–390)
PMV BLD AUTO: 9.8 FL (ref 8.9–12.7)
POTASSIUM SERPL-SCNC: 4.7 MMOL/L (ref 3.5–5.3)
PROT SERPL-MCNC: 8.2 G/DL (ref 6.4–8.2)
RBC # BLD AUTO: 3.82 MILLION/UL (ref 3.81–5.12)
SODIUM SERPL-SCNC: 141 MMOL/L (ref 136–145)
WBC # BLD AUTO: 5.8 THOUSAND/UL (ref 4.31–10.16)

## 2020-10-27 PROCEDURE — 82977 ASSAY OF GGT: CPT

## 2020-10-27 PROCEDURE — 85025 COMPLETE CBC W/AUTO DIFF WBC: CPT

## 2020-10-27 PROCEDURE — 82248 BILIRUBIN DIRECT: CPT

## 2020-10-27 PROCEDURE — 93000 ELECTROCARDIOGRAM COMPLETE: CPT | Performed by: INTERNAL MEDICINE

## 2020-10-27 PROCEDURE — 99214 OFFICE O/P EST MOD 30 MIN: CPT | Performed by: INTERNAL MEDICINE

## 2020-10-27 PROCEDURE — 83735 ASSAY OF MAGNESIUM: CPT

## 2020-10-27 PROCEDURE — 80053 COMPREHEN METABOLIC PANEL: CPT

## 2020-10-27 PROCEDURE — 36415 COLL VENOUS BLD VENIPUNCTURE: CPT

## 2020-10-27 PROCEDURE — 80158 DRUG ASSAY CYCLOSPORINE: CPT

## 2020-10-27 PROCEDURE — 84100 ASSAY OF PHOSPHORUS: CPT

## 2020-10-28 LAB — CYCLOSPORINE BLD IA-MCNC: 66 NG/ML (ref 100–400)

## 2020-11-10 ENCOUNTER — TRANSCRIBE ORDERS (OUTPATIENT)
Dept: LAB | Facility: CLINIC | Age: 75
End: 2020-11-10

## 2020-11-10 ENCOUNTER — LAB (OUTPATIENT)
Dept: LAB | Facility: CLINIC | Age: 75
End: 2020-11-10
Payer: MEDICARE

## 2020-11-10 DIAGNOSIS — Z51.81 ENCOUNTER FOR THERAPEUTIC DRUG MONITORING: ICD-10-CM

## 2020-11-10 DIAGNOSIS — Z94.4 LIVER TRANSPLANTED (HCC): ICD-10-CM

## 2020-11-10 DIAGNOSIS — Z94.4 LIVER TRANSPLANTED (HCC): Primary | ICD-10-CM

## 2020-11-10 DIAGNOSIS — R74.02 NONSPECIFIC ELEVATION OF LEVELS OF TRANSAMINASE OR LACTIC ACID DEHYDROGENASE (LDH): ICD-10-CM

## 2020-11-10 DIAGNOSIS — R74.01 NONSPECIFIC ELEVATION OF LEVELS OF TRANSAMINASE OR LACTIC ACID DEHYDROGENASE (LDH): ICD-10-CM

## 2020-11-10 LAB
ALBUMIN SERPL BCP-MCNC: 3.6 G/DL (ref 3.5–5)
ALP SERPL-CCNC: 265 U/L (ref 46–116)
ALT SERPL W P-5'-P-CCNC: 56 U/L (ref 12–78)
ANION GAP SERPL CALCULATED.3IONS-SCNC: 5 MMOL/L (ref 4–13)
AST SERPL W P-5'-P-CCNC: 41 U/L (ref 5–45)
BASOPHILS # BLD AUTO: 0.03 THOUSANDS/ΜL (ref 0–0.1)
BASOPHILS NFR BLD AUTO: 1 % (ref 0–1)
BILIRUB DIRECT SERPL-MCNC: 0.13 MG/DL (ref 0–0.2)
BILIRUB SERPL-MCNC: 0.46 MG/DL (ref 0.2–1)
BUN SERPL-MCNC: 28 MG/DL (ref 5–25)
CALCIUM SERPL-MCNC: 9.5 MG/DL (ref 8.3–10.1)
CHLORIDE SERPL-SCNC: 109 MMOL/L (ref 100–108)
CO2 SERPL-SCNC: 26 MMOL/L (ref 21–32)
CREAT SERPL-MCNC: 0.97 MG/DL (ref 0.6–1.3)
EOSINOPHIL # BLD AUTO: 0.22 THOUSAND/ΜL (ref 0–0.61)
EOSINOPHIL NFR BLD AUTO: 4 % (ref 0–6)
ERYTHROCYTE [DISTWIDTH] IN BLOOD BY AUTOMATED COUNT: 13.5 % (ref 11.6–15.1)
GFR SERPL CREATININE-BSD FRML MDRD: 57 ML/MIN/1.73SQ M
GGT SERPL-CCNC: 476 U/L (ref 5–85)
GLUCOSE P FAST SERPL-MCNC: 105 MG/DL (ref 65–99)
HCT VFR BLD AUTO: 36.1 % (ref 34.8–46.1)
HGB BLD-MCNC: 11 G/DL (ref 11.5–15.4)
IMM GRANULOCYTES # BLD AUTO: 0.03 THOUSAND/UL (ref 0–0.2)
IMM GRANULOCYTES NFR BLD AUTO: 1 % (ref 0–2)
LYMPHOCYTES # BLD AUTO: 0.86 THOUSANDS/ΜL (ref 0.6–4.47)
LYMPHOCYTES NFR BLD AUTO: 17 % (ref 14–44)
MAGNESIUM SERPL-MCNC: 2.5 MG/DL (ref 1.6–2.6)
MCH RBC QN AUTO: 28.5 PG (ref 26.8–34.3)
MCHC RBC AUTO-ENTMCNC: 30.5 G/DL (ref 31.4–37.4)
MCV RBC AUTO: 94 FL (ref 82–98)
MONOCYTES # BLD AUTO: 0.45 THOUSAND/ΜL (ref 0.17–1.22)
MONOCYTES NFR BLD AUTO: 9 % (ref 4–12)
NEUTROPHILS # BLD AUTO: 3.47 THOUSANDS/ΜL (ref 1.85–7.62)
NEUTS SEG NFR BLD AUTO: 68 % (ref 43–75)
NRBC BLD AUTO-RTO: 0 /100 WBCS
PHOSPHATE SERPL-MCNC: 4.6 MG/DL (ref 2.3–4.1)
PLATELET # BLD AUTO: 227 THOUSANDS/UL (ref 149–390)
PMV BLD AUTO: 10.1 FL (ref 8.9–12.7)
POTASSIUM SERPL-SCNC: 4.6 MMOL/L (ref 3.5–5.3)
PROT SERPL-MCNC: 8.2 G/DL (ref 6.4–8.2)
RBC # BLD AUTO: 3.86 MILLION/UL (ref 3.81–5.12)
SODIUM SERPL-SCNC: 140 MMOL/L (ref 136–145)
WBC # BLD AUTO: 5.06 THOUSAND/UL (ref 4.31–10.16)

## 2020-11-10 PROCEDURE — 84100 ASSAY OF PHOSPHORUS: CPT

## 2020-11-10 PROCEDURE — 80053 COMPREHEN METABOLIC PANEL: CPT

## 2020-11-10 PROCEDURE — 82248 BILIRUBIN DIRECT: CPT

## 2020-11-10 PROCEDURE — 85025 COMPLETE CBC W/AUTO DIFF WBC: CPT

## 2020-11-10 PROCEDURE — 36415 COLL VENOUS BLD VENIPUNCTURE: CPT

## 2020-11-10 PROCEDURE — 82977 ASSAY OF GGT: CPT

## 2020-11-10 PROCEDURE — 83735 ASSAY OF MAGNESIUM: CPT

## 2020-11-10 PROCEDURE — 80158 DRUG ASSAY CYCLOSPORINE: CPT

## 2020-11-11 LAB — CYCLOSPORINE BLD IA-MCNC: 64 NG/ML (ref 100–400)

## 2020-11-21 DIAGNOSIS — Z86.73 HISTORY OF STROKE: ICD-10-CM

## 2020-11-23 RX ORDER — PRAVASTATIN SODIUM 20 MG
TABLET ORAL
Qty: 90 TABLET | Refills: 3 | Status: SHIPPED | OUTPATIENT
Start: 2020-11-23 | End: 2021-12-06

## 2020-11-25 ENCOUNTER — APPOINTMENT (OUTPATIENT)
Dept: LAB | Facility: CLINIC | Age: 75
End: 2020-11-25
Payer: MEDICARE

## 2020-11-25 ENCOUNTER — TRANSCRIBE ORDERS (OUTPATIENT)
Dept: LAB | Facility: CLINIC | Age: 75
End: 2020-11-25

## 2020-11-25 DIAGNOSIS — Z51.81 ENCOUNTER FOR THERAPEUTIC DRUG MONITORING: ICD-10-CM

## 2020-11-25 DIAGNOSIS — R74.02 NONSPECIFIC ELEVATION OF LEVELS OF TRANSAMINASE OR LACTIC ACID DEHYDROGENASE (LDH): ICD-10-CM

## 2020-11-25 DIAGNOSIS — R74.01 NONSPECIFIC ELEVATION OF LEVELS OF TRANSAMINASE OR LACTIC ACID DEHYDROGENASE (LDH): ICD-10-CM

## 2020-11-25 DIAGNOSIS — T86.40 COMPLICATION OF TRANSPLANTED LIVER, UNSPECIFIED COMPLICATION (HCC): ICD-10-CM

## 2020-11-25 DIAGNOSIS — Z94.4 LIVER REPLACED BY TRANSPLANT (HCC): Primary | ICD-10-CM

## 2020-11-25 LAB
ALBUMIN SERPL BCP-MCNC: 3.8 G/DL (ref 3.5–5)
ALP SERPL-CCNC: 222 U/L (ref 46–116)
ALT SERPL W P-5'-P-CCNC: 43 U/L (ref 12–78)
ANION GAP SERPL CALCULATED.3IONS-SCNC: 5 MMOL/L (ref 4–13)
AST SERPL W P-5'-P-CCNC: 30 U/L (ref 5–45)
BASOPHILS # BLD AUTO: 0.05 THOUSANDS/ΜL (ref 0–0.1)
BASOPHILS NFR BLD AUTO: 1 % (ref 0–1)
BILIRUB DIRECT SERPL-MCNC: 0.19 MG/DL (ref 0–0.2)
BILIRUB SERPL-MCNC: 0.56 MG/DL (ref 0.2–1)
BUN SERPL-MCNC: 32 MG/DL (ref 5–25)
CALCIUM SERPL-MCNC: 10.2 MG/DL (ref 8.3–10.1)
CHLORIDE SERPL-SCNC: 104 MMOL/L (ref 100–108)
CO2 SERPL-SCNC: 29 MMOL/L (ref 21–32)
CREAT SERPL-MCNC: 1.14 MG/DL (ref 0.6–1.3)
EOSINOPHIL # BLD AUTO: 0.29 THOUSAND/ΜL (ref 0–0.61)
EOSINOPHIL NFR BLD AUTO: 5 % (ref 0–6)
ERYTHROCYTE [DISTWIDTH] IN BLOOD BY AUTOMATED COUNT: 13.5 % (ref 11.6–15.1)
GFR SERPL CREATININE-BSD FRML MDRD: 47 ML/MIN/1.73SQ M
GGT SERPL-CCNC: 405 U/L (ref 5–85)
GLUCOSE P FAST SERPL-MCNC: 118 MG/DL (ref 65–99)
HCT VFR BLD AUTO: 39.1 % (ref 34.8–46.1)
HGB BLD-MCNC: 12.1 G/DL (ref 11.5–15.4)
IMM GRANULOCYTES # BLD AUTO: 0.04 THOUSAND/UL (ref 0–0.2)
IMM GRANULOCYTES NFR BLD AUTO: 1 % (ref 0–2)
LYMPHOCYTES # BLD AUTO: 0.97 THOUSANDS/ΜL (ref 0.6–4.47)
LYMPHOCYTES NFR BLD AUTO: 15 % (ref 14–44)
MAGNESIUM SERPL-MCNC: 2.4 MG/DL (ref 1.6–2.6)
MCH RBC QN AUTO: 28.7 PG (ref 26.8–34.3)
MCHC RBC AUTO-ENTMCNC: 30.9 G/DL (ref 31.4–37.4)
MCV RBC AUTO: 93 FL (ref 82–98)
MONOCYTES # BLD AUTO: 0.54 THOUSAND/ΜL (ref 0.17–1.22)
MONOCYTES NFR BLD AUTO: 8 % (ref 4–12)
NEUTROPHILS # BLD AUTO: 4.61 THOUSANDS/ΜL (ref 1.85–7.62)
NEUTS SEG NFR BLD AUTO: 70 % (ref 43–75)
NRBC BLD AUTO-RTO: 0 /100 WBCS
PHOSPHATE SERPL-MCNC: 5 MG/DL (ref 2.3–4.1)
PLATELET # BLD AUTO: 235 THOUSANDS/UL (ref 149–390)
PMV BLD AUTO: 10.3 FL (ref 8.9–12.7)
POTASSIUM SERPL-SCNC: 4.2 MMOL/L (ref 3.5–5.3)
PROT SERPL-MCNC: 8.6 G/DL (ref 6.4–8.2)
RBC # BLD AUTO: 4.22 MILLION/UL (ref 3.81–5.12)
SODIUM SERPL-SCNC: 138 MMOL/L (ref 136–145)
WBC # BLD AUTO: 6.5 THOUSAND/UL (ref 4.31–10.16)

## 2020-11-25 PROCEDURE — 82248 BILIRUBIN DIRECT: CPT

## 2020-11-25 PROCEDURE — 82977 ASSAY OF GGT: CPT

## 2020-11-25 PROCEDURE — 84100 ASSAY OF PHOSPHORUS: CPT

## 2020-11-25 PROCEDURE — 36415 COLL VENOUS BLD VENIPUNCTURE: CPT

## 2020-11-25 PROCEDURE — 85025 COMPLETE CBC W/AUTO DIFF WBC: CPT

## 2020-11-25 PROCEDURE — 83735 ASSAY OF MAGNESIUM: CPT

## 2020-11-25 PROCEDURE — 80158 DRUG ASSAY CYCLOSPORINE: CPT

## 2020-11-25 PROCEDURE — 80053 COMPREHEN METABOLIC PANEL: CPT

## 2020-11-27 LAB — CYCLOSPORINE BLD IA-MCNC: 63 NG/ML (ref 100–400)

## 2020-12-02 DIAGNOSIS — E03.9 HYPOTHYROIDISM, UNSPECIFIED TYPE: ICD-10-CM

## 2020-12-02 RX ORDER — LEVOTHYROXINE SODIUM 112 UG/1
TABLET ORAL
Qty: 7 TABLET | Refills: 0 | Status: SHIPPED | OUTPATIENT
Start: 2020-12-02 | End: 2021-01-04

## 2020-12-09 ENCOUNTER — LAB (OUTPATIENT)
Dept: LAB | Facility: CLINIC | Age: 75
End: 2020-12-09
Payer: MEDICARE

## 2020-12-09 ENCOUNTER — TRANSCRIBE ORDERS (OUTPATIENT)
Dept: LAB | Facility: CLINIC | Age: 75
End: 2020-12-09

## 2020-12-09 DIAGNOSIS — R74.02 NONSPECIFIC ELEVATION OF LEVELS OF TRANSAMINASE OR LACTIC ACID DEHYDROGENASE (LDH): ICD-10-CM

## 2020-12-09 DIAGNOSIS — T86.40 COMPLICATION OF TRANSPLANTED LIVER, UNSPECIFIED COMPLICATION (HCC): ICD-10-CM

## 2020-12-09 DIAGNOSIS — R74.01 NONSPECIFIC ELEVATION OF LEVELS OF TRANSAMINASE OR LACTIC ACID DEHYDROGENASE (LDH): ICD-10-CM

## 2020-12-09 DIAGNOSIS — E83.42 HYPOMAGNESEMIA: ICD-10-CM

## 2020-12-09 DIAGNOSIS — Z51.81 ENCOUNTER FOR THERAPEUTIC DRUG MONITORING: ICD-10-CM

## 2020-12-09 DIAGNOSIS — Z94.4 LIVER REPLACED BY TRANSPLANT (HCC): Primary | ICD-10-CM

## 2020-12-09 DIAGNOSIS — E83.30 DISORDER OF PHOSPHORUS METABOLISM: ICD-10-CM

## 2020-12-09 DIAGNOSIS — Z94.4 LIVER REPLACED BY TRANSPLANT (HCC): ICD-10-CM

## 2020-12-09 LAB
ALBUMIN SERPL BCP-MCNC: 3.7 G/DL (ref 3.5–5)
ALP SERPL-CCNC: 245 U/L (ref 46–116)
ALT SERPL W P-5'-P-CCNC: 59 U/L (ref 12–78)
ANION GAP SERPL CALCULATED.3IONS-SCNC: 4 MMOL/L (ref 4–13)
AST SERPL W P-5'-P-CCNC: 48 U/L (ref 5–45)
BASOPHILS # BLD AUTO: 0.04 THOUSANDS/ΜL (ref 0–0.1)
BASOPHILS NFR BLD AUTO: 1 % (ref 0–1)
BILIRUB DIRECT SERPL-MCNC: 0.19 MG/DL (ref 0–0.2)
BILIRUB SERPL-MCNC: 0.49 MG/DL (ref 0.2–1)
BUN SERPL-MCNC: 34 MG/DL (ref 5–25)
CALCIUM SERPL-MCNC: 9.5 MG/DL (ref 8.3–10.1)
CHLORIDE SERPL-SCNC: 109 MMOL/L (ref 100–108)
CO2 SERPL-SCNC: 28 MMOL/L (ref 21–32)
CREAT SERPL-MCNC: 1.11 MG/DL (ref 0.6–1.3)
EOSINOPHIL # BLD AUTO: 0.31 THOUSAND/ΜL (ref 0–0.61)
EOSINOPHIL NFR BLD AUTO: 5 % (ref 0–6)
ERYTHROCYTE [DISTWIDTH] IN BLOOD BY AUTOMATED COUNT: 13.8 % (ref 11.6–15.1)
GFR SERPL CREATININE-BSD FRML MDRD: 48 ML/MIN/1.73SQ M
GGT SERPL-CCNC: 384 U/L (ref 5–85)
GLUCOSE P FAST SERPL-MCNC: 109 MG/DL (ref 65–99)
HCT VFR BLD AUTO: 37.1 % (ref 34.8–46.1)
HGB BLD-MCNC: 11.5 G/DL (ref 11.5–15.4)
IMM GRANULOCYTES # BLD AUTO: 0.04 THOUSAND/UL (ref 0–0.2)
IMM GRANULOCYTES NFR BLD AUTO: 1 % (ref 0–2)
LYMPHOCYTES # BLD AUTO: 1.09 THOUSANDS/ΜL (ref 0.6–4.47)
LYMPHOCYTES NFR BLD AUTO: 18 % (ref 14–44)
MAGNESIUM SERPL-MCNC: 2.5 MG/DL (ref 1.6–2.6)
MCH RBC QN AUTO: 29 PG (ref 26.8–34.3)
MCHC RBC AUTO-ENTMCNC: 31 G/DL (ref 31.4–37.4)
MCV RBC AUTO: 94 FL (ref 82–98)
MONOCYTES # BLD AUTO: 0.57 THOUSAND/ΜL (ref 0.17–1.22)
MONOCYTES NFR BLD AUTO: 9 % (ref 4–12)
NEUTROPHILS # BLD AUTO: 4.16 THOUSANDS/ΜL (ref 1.85–7.62)
NEUTS SEG NFR BLD AUTO: 66 % (ref 43–75)
NRBC BLD AUTO-RTO: 0 /100 WBCS
PHOSPHATE SERPL-MCNC: 4.2 MG/DL (ref 2.3–4.1)
PLATELET # BLD AUTO: 211 THOUSANDS/UL (ref 149–390)
PMV BLD AUTO: 9.7 FL (ref 8.9–12.7)
POTASSIUM SERPL-SCNC: 5.2 MMOL/L (ref 3.5–5.3)
PROT SERPL-MCNC: 8.2 G/DL (ref 6.4–8.2)
RBC # BLD AUTO: 3.96 MILLION/UL (ref 3.81–5.12)
SODIUM SERPL-SCNC: 141 MMOL/L (ref 136–145)
WBC # BLD AUTO: 6.21 THOUSAND/UL (ref 4.31–10.16)

## 2020-12-09 PROCEDURE — 82248 BILIRUBIN DIRECT: CPT

## 2020-12-09 PROCEDURE — 85025 COMPLETE CBC W/AUTO DIFF WBC: CPT

## 2020-12-09 PROCEDURE — 83735 ASSAY OF MAGNESIUM: CPT

## 2020-12-09 PROCEDURE — 36415 COLL VENOUS BLD VENIPUNCTURE: CPT

## 2020-12-09 PROCEDURE — 80158 DRUG ASSAY CYCLOSPORINE: CPT

## 2020-12-09 PROCEDURE — 84100 ASSAY OF PHOSPHORUS: CPT

## 2020-12-09 PROCEDURE — 80053 COMPREHEN METABOLIC PANEL: CPT

## 2020-12-09 PROCEDURE — 82977 ASSAY OF GGT: CPT

## 2020-12-10 LAB — CYCLOSPORINE BLD IA-MCNC: 85 NG/ML (ref 100–400)

## 2020-12-16 ENCOUNTER — TELEPHONE (OUTPATIENT)
Dept: HEMATOLOGY ONCOLOGY | Facility: CLINIC | Age: 75
End: 2020-12-16

## 2020-12-18 ENCOUNTER — TELEPHONE (OUTPATIENT)
Dept: HEMATOLOGY ONCOLOGY | Facility: CLINIC | Age: 75
End: 2020-12-18

## 2020-12-18 ENCOUNTER — DOCUMENTATION (OUTPATIENT)
Dept: HEMATOLOGY ONCOLOGY | Facility: MEDICAL CENTER | Age: 75
End: 2020-12-18

## 2020-12-21 ENCOUNTER — OFFICE VISIT (OUTPATIENT)
Dept: HEMATOLOGY ONCOLOGY | Facility: MEDICAL CENTER | Age: 75
End: 2020-12-21
Payer: MEDICARE

## 2020-12-21 VITALS
HEART RATE: 75 BPM | OXYGEN SATURATION: 98 % | HEIGHT: 62 IN | SYSTOLIC BLOOD PRESSURE: 131 MMHG | TEMPERATURE: 97.2 F | WEIGHT: 223 LBS | DIASTOLIC BLOOD PRESSURE: 81 MMHG | RESPIRATION RATE: 17 BRPM | BODY MASS INDEX: 41.04 KG/M2

## 2020-12-21 DIAGNOSIS — D63.8 ANEMIA IN OTHER CHRONIC DISEASES CLASSIFIED ELSEWHERE: Primary | Chronic | ICD-10-CM

## 2020-12-21 PROCEDURE — 99213 OFFICE O/P EST LOW 20 MIN: CPT | Performed by: INTERNAL MEDICINE

## 2020-12-23 ENCOUNTER — TRANSCRIBE ORDERS (OUTPATIENT)
Dept: LAB | Facility: CLINIC | Age: 75
End: 2020-12-23

## 2020-12-23 ENCOUNTER — LAB (OUTPATIENT)
Dept: LAB | Facility: CLINIC | Age: 75
End: 2020-12-23
Payer: MEDICARE

## 2020-12-23 DIAGNOSIS — Z94.4 LIVER REPLACED BY TRANSPLANT (HCC): Primary | ICD-10-CM

## 2020-12-23 DIAGNOSIS — Z51.81 ENCOUNTER FOR THERAPEUTIC DRUG MONITORING: ICD-10-CM

## 2020-12-23 LAB
ALBUMIN SERPL BCP-MCNC: 3.6 G/DL (ref 3.5–5)
ALP SERPL-CCNC: 255 U/L (ref 46–116)
ALT SERPL W P-5'-P-CCNC: 49 U/L (ref 12–78)
ANION GAP SERPL CALCULATED.3IONS-SCNC: 6 MMOL/L (ref 4–13)
AST SERPL W P-5'-P-CCNC: 39 U/L (ref 5–45)
BASOPHILS # BLD AUTO: 0.04 THOUSANDS/ΜL (ref 0–0.1)
BASOPHILS NFR BLD AUTO: 1 % (ref 0–1)
BILIRUB DIRECT SERPL-MCNC: 0.17 MG/DL (ref 0–0.2)
BILIRUB SERPL-MCNC: 0.43 MG/DL (ref 0.2–1)
BUN SERPL-MCNC: 35 MG/DL (ref 5–25)
CALCIUM SERPL-MCNC: 9 MG/DL (ref 8.3–10.1)
CHLORIDE SERPL-SCNC: 108 MMOL/L (ref 100–108)
CO2 SERPL-SCNC: 24 MMOL/L (ref 21–32)
CREAT SERPL-MCNC: 1.06 MG/DL (ref 0.6–1.3)
EOSINOPHIL # BLD AUTO: 0.26 THOUSAND/ΜL (ref 0–0.61)
EOSINOPHIL NFR BLD AUTO: 5 % (ref 0–6)
ERYTHROCYTE [DISTWIDTH] IN BLOOD BY AUTOMATED COUNT: 13.8 % (ref 11.6–15.1)
GFR SERPL CREATININE-BSD FRML MDRD: 51 ML/MIN/1.73SQ M
GGT SERPL-CCNC: 409 U/L (ref 5–85)
GLUCOSE P FAST SERPL-MCNC: 108 MG/DL (ref 65–99)
HCT VFR BLD AUTO: 36.4 % (ref 34.8–46.1)
HGB BLD-MCNC: 11.2 G/DL (ref 11.5–15.4)
IMM GRANULOCYTES # BLD AUTO: 0.04 THOUSAND/UL (ref 0–0.2)
IMM GRANULOCYTES NFR BLD AUTO: 1 % (ref 0–2)
LYMPHOCYTES # BLD AUTO: 1.05 THOUSANDS/ΜL (ref 0.6–4.47)
LYMPHOCYTES NFR BLD AUTO: 19 % (ref 14–44)
MAGNESIUM SERPL-MCNC: 2.6 MG/DL (ref 1.6–2.6)
MCH RBC QN AUTO: 28.6 PG (ref 26.8–34.3)
MCHC RBC AUTO-ENTMCNC: 30.8 G/DL (ref 31.4–37.4)
MCV RBC AUTO: 93 FL (ref 82–98)
MONOCYTES # BLD AUTO: 0.47 THOUSAND/ΜL (ref 0.17–1.22)
MONOCYTES NFR BLD AUTO: 9 % (ref 4–12)
NEUTROPHILS # BLD AUTO: 3.66 THOUSANDS/ΜL (ref 1.85–7.62)
NEUTS SEG NFR BLD AUTO: 65 % (ref 43–75)
NRBC BLD AUTO-RTO: 0 /100 WBCS
PHOSPHATE SERPL-MCNC: 3.5 MG/DL (ref 2.3–4.1)
PLATELET # BLD AUTO: 216 THOUSANDS/UL (ref 149–390)
PMV BLD AUTO: 10.4 FL (ref 8.9–12.7)
POTASSIUM SERPL-SCNC: 4.4 MMOL/L (ref 3.5–5.3)
PROT SERPL-MCNC: 8.1 G/DL (ref 6.4–8.2)
RBC # BLD AUTO: 3.91 MILLION/UL (ref 3.81–5.12)
SODIUM SERPL-SCNC: 138 MMOL/L (ref 136–145)
WBC # BLD AUTO: 5.52 THOUSAND/UL (ref 4.31–10.16)

## 2020-12-23 PROCEDURE — 83735 ASSAY OF MAGNESIUM: CPT

## 2020-12-23 PROCEDURE — 82248 BILIRUBIN DIRECT: CPT

## 2020-12-23 PROCEDURE — 80053 COMPREHEN METABOLIC PANEL: CPT

## 2020-12-23 PROCEDURE — 80195 ASSAY OF SIROLIMUS: CPT

## 2020-12-23 PROCEDURE — 84100 ASSAY OF PHOSPHORUS: CPT

## 2020-12-23 PROCEDURE — 85025 COMPLETE CBC W/AUTO DIFF WBC: CPT

## 2020-12-23 PROCEDURE — 36415 COLL VENOUS BLD VENIPUNCTURE: CPT

## 2020-12-23 PROCEDURE — 82977 ASSAY OF GGT: CPT

## 2020-12-29 LAB — SIROLIMUS BLD-MCNC: <1 NG/ML (ref 3–20)

## 2020-12-31 DIAGNOSIS — E03.9 HYPOTHYROIDISM, UNSPECIFIED TYPE: ICD-10-CM

## 2021-01-04 RX ORDER — LEVOTHYROXINE SODIUM 112 UG/1
TABLET ORAL
Qty: 90 TABLET | Refills: 0 | Status: SHIPPED | OUTPATIENT
Start: 2021-01-04 | End: 2021-04-08

## 2021-01-04 NOTE — PROGRESS NOTES
Assessment/Plan:    There are no diagnoses linked to this encounter  There are no Patient Instructions on file for this visit  No follow-ups on file  Subjective:      Patient ID: Becki Carbajal is a 66 y o  female  No chief complaint on file  Here for follow up visit  She continues to get labwork every 2 weeks for her history of liver transplant and has been compliant with this  She feels well and denies abdominal pain, fever, rash  There is no chest pain or dyspnea  She states she is getting around okay at home with out falls  She has a cane and was encouraged to use it  The following portions of the patient's history were reviewed and updated as appropriate: allergies, current medications, past family history, past medical history, past social history, past surgical history and problem list     Review of Systems   Constitutional: Negative  Respiratory: Negative  Cardiovascular: Negative  Endocrine: Negative  Neurological: Negative            Current Outpatient Medications   Medication Sig Dispense Refill    aspirin (ECOTRIN LOW STRENGTH) 81 mg EC tablet Take 1 tablet by mouth daily 30 tablet 0    b complex vitamins capsule Take 1 capsule by mouth daily 30 capsule 3    clopidogrel (PLAVIX) 75 mg tablet Take 1 tablet (75 mg total) by mouth daily 90 tablet 3    cycloSPORINE modified (NEORAL) 25 mg capsule Take 50 mg by mouth 2 (two) times a day      ezetimibe (ZETIA) 10 mg tablet Take 1 tablet by mouth once daily 90 tablet 3    levothyroxine 112 mcg tablet TAKE 1 TABLET BY MOUTH ONCE DAILY AS DIRECTED 7 tablet 0    metoprolol tartrate (LOPRESSOR) 25 mg tablet TAKE 1 & 1/2 (ONE & ONE-HALF) TABLETS BY MOUTH TWICE DAILY 270 tablet 5    mycophenolate (CELLCEPT) 250 mg capsule Take 250 mg by mouth 2 (two) times a day 3 tabs each dose=750 mg       omeprazole (PriLOSEC) 40 MG capsule Take 40 mg by mouth daily      pravastatin (PRAVACHOL) 20 mg tablet TAKE 1 TABLET BY MOUTH ONCE DAILY AT BEDTIME 90 tablet 3     No current facility-administered medications for this visit  Objective: There were no vitals taken for this visit  Physical Exam  Constitutional:       Appearance: She is well-developed  She is obese  Eyes:      Conjunctiva/sclera: Conjunctivae normal    Neck:      Musculoskeletal: Neck supple  Thyroid: No thyromegaly  Vascular: No JVD  Cardiovascular:      Rate and Rhythm: Normal rate and regular rhythm  Heart sounds: Normal heart sounds  No murmur  No friction rub  No gallop  Pulmonary:      Effort: Pulmonary effort is normal       Breath sounds: Normal breath sounds  No wheezing or rales  Abdominal:      General: Bowel sounds are normal  There is no distension  Palpations: Abdomen is soft  Tenderness: There is no abdominal tenderness  Neurological:      Mental Status: She is alert                  Nani Grant MD

## 2021-01-04 NOTE — TELEPHONE ENCOUNTER
Requested medication(s) are due for refill today: Yes  Patient has already received a courtesy refill: No  Other reason request has been forwarded to provider:failed protocol--last tsh  ???

## 2021-01-07 ENCOUNTER — REMOTE DEVICE CLINIC VISIT (OUTPATIENT)
Dept: CARDIOLOGY CLINIC | Facility: CLINIC | Age: 76
End: 2021-01-07
Payer: MEDICARE

## 2021-01-07 ENCOUNTER — OFFICE VISIT (OUTPATIENT)
Dept: FAMILY MEDICINE CLINIC | Facility: CLINIC | Age: 76
End: 2021-01-07
Payer: MEDICARE

## 2021-01-07 VITALS
RESPIRATION RATE: 22 BRPM | SYSTOLIC BLOOD PRESSURE: 130 MMHG | DIASTOLIC BLOOD PRESSURE: 60 MMHG | BODY MASS INDEX: 40.85 KG/M2 | HEART RATE: 76 BPM | HEIGHT: 62 IN | TEMPERATURE: 97.9 F | WEIGHT: 222 LBS

## 2021-01-07 DIAGNOSIS — E03.9 HYPOTHYROIDISM, UNSPECIFIED TYPE: ICD-10-CM

## 2021-01-07 DIAGNOSIS — E66.01 OBESITY, MORBID (HCC): ICD-10-CM

## 2021-01-07 DIAGNOSIS — I10 BENIGN ESSENTIAL HYPERTENSION: ICD-10-CM

## 2021-01-07 DIAGNOSIS — E78.00 PURE HYPERCHOLESTEROLEMIA: ICD-10-CM

## 2021-01-07 DIAGNOSIS — Z94.4 LIVER TRANSPLANTED (HCC): Primary | ICD-10-CM

## 2021-01-07 DIAGNOSIS — K51.90 ULCERATIVE COLITIS WITHOUT COMPLICATIONS, UNSPECIFIED LOCATION (HCC): ICD-10-CM

## 2021-01-07 DIAGNOSIS — Z95.818 PRESENCE OF CARDIAC DEVICE: Primary | ICD-10-CM

## 2021-01-07 PROBLEM — M85.89 OSTEOPENIA OF MULTIPLE SITES: Status: ACTIVE | Noted: 2020-11-06

## 2021-01-07 PROCEDURE — 93298 REM INTERROG DEV EVAL SCRMS: CPT | Performed by: INTERNAL MEDICINE

## 2021-01-07 PROCEDURE — 99214 OFFICE O/P EST MOD 30 MIN: CPT | Performed by: INTERNAL MEDICINE

## 2021-01-07 PROCEDURE — G2066 INTER DEVC REMOTE 30D: HCPCS | Performed by: INTERNAL MEDICINE

## 2021-01-07 RX ORDER — CALCIUM CARBONATE 500(1250)
500 TABLET ORAL
COMMUNITY
Start: 2020-11-06

## 2021-01-07 RX ORDER — CHOLECALCIFEROL (VITAMIN D3) 50 MCG
1000 TABLET ORAL
COMMUNITY
Start: 2020-11-06

## 2021-01-07 NOTE — PROGRESS NOTES
Results for orders placed or performed in visit on 01/07/21   Cardiac EP device report    Narrative    CARELINK TRANSMISSION: BATTERY STATUS "OK"  NO PATIENT OR DEVICE ACTIVATED EPISODES  ---LYNN

## 2021-01-07 NOTE — ASSESSMENT & PLAN NOTE
She is clinically euthyroid and will continue current dose of levothyroxine  Will check TSH prior to follow up visit in May

## 2021-01-07 NOTE — ASSESSMENT & PLAN NOTE
Will continue pravastatin at current dose and check lipid profile just prior to follow up visit in 4 months  Advised on need for weight loss and low fat diet

## 2021-01-08 ENCOUNTER — TELEPHONE (OUTPATIENT)
Dept: GASTROENTEROLOGY | Facility: CLINIC | Age: 76
End: 2021-01-08

## 2021-01-08 NOTE — TELEPHONE ENCOUNTER
Pt lmom on 1/7/21 to schedule an appt with Dr Genaro Sterling   Pt was due for recall colon 9/2020 for hx of Ulcerative Colitis  I lmom for pt to please call back

## 2021-01-13 ENCOUNTER — OFFICE VISIT (OUTPATIENT)
Dept: NEUROLOGY | Facility: CLINIC | Age: 76
End: 2021-01-13
Payer: MEDICARE

## 2021-01-13 VITALS
SYSTOLIC BLOOD PRESSURE: 120 MMHG | BODY MASS INDEX: 41.41 KG/M2 | DIASTOLIC BLOOD PRESSURE: 70 MMHG | HEART RATE: 81 BPM | HEIGHT: 62 IN | WEIGHT: 225 LBS

## 2021-01-13 DIAGNOSIS — R53.83 OTHER FATIGUE: ICD-10-CM

## 2021-01-13 DIAGNOSIS — E78.5 HLD (HYPERLIPIDEMIA): ICD-10-CM

## 2021-01-13 DIAGNOSIS — I63.9 CEREBROVASCULAR ACCIDENT (CVA) DUE TO EMBOLISM (HCC): Primary | ICD-10-CM

## 2021-01-13 DIAGNOSIS — Z87.898 HISTORY OF SEIZURE: ICD-10-CM

## 2021-01-13 PROCEDURE — 99214 OFFICE O/P EST MOD 30 MIN: CPT | Performed by: PSYCHIATRY & NEUROLOGY

## 2021-01-13 NOTE — PROGRESS NOTES
Patient ID: Debra Kumar is a 66 y o  female  Assessment/Plan: This is a 66 y/o F who is here as a follow up for history of CVA  Patient still has a loop recorder in place, no afib detected so far  PLAN:      Diagnoses and all orders for this visit:    HLD (hyperlipidemia)  -c/w pravastatin  History of seizure  -off medications  -no seizures    Other fatigue  -she is still taking b complex vitamins  -likely 2/2 COVID-19 and she says that she does not get out much     Cerebrovascular accident (CVA) due to embolism ([facility] [address] )  Treatment -   -c/w with combination of aspirin (cardiology on the patient), plavix and atorvastatin for secondary stroke prevention  -has a loop recorder in place, no afib detected so far  Risk factor modifications/prevention  -BP goal < 130/80, BP is at goal    -Defer to PCP regarding DM and BP management    Therapy -   -finished with therapy     Counseling -   -does not smoke at this time   -denies any history of snoring    -I advised patient to avoid using NSAIDs for headaches or other pain and to stick to tylenol if needed  -Recommend mediterranean diet & regular exercise regimen atleast 4-5 times a week for 20-30 minutes  -I educated patient/family regarding medication compliance       Follow up - 1 year  I would be happy to see the patient sooner if any new questions/concerns arise  Patient/Guardian was advised to the call the office if they have any questions and concerns in the meantime  Patient/Guardian does understand that if they have any new stroke like symptoms such as facial droop on one side, weakness/paralysis on either side, speech trouble, numbness on one side, balance issues, any vision changes, extreme dizziness or any new headache, to call 9-1-1 immediately or to proceed to the nearest ER immediately  Subjective:    HPI    This is a 65 y/o Female who is here as a follow up for history of CVA  Patient is doing well    She denies any new TIA/CVA like symptoms  She is complaint w/ her medications  She is on aspirin, Plavix and atorvastatin for stroke prevention  She does not have any residual deficits, she still has fatigue, but seems to be improving she states  She is tired of being at home due to COVID-19  The following portions of the patient's history were reviewed and updated as appropriate:   She  has a past medical history of Anemia, Anxiety, Arthritis, Benign paroxysmal vertigo, unspecified ear, Cirrhosis ([facility] [address] ), Crohn disease ([facility] [address] ), Disease of thyroid gland, GERD (gastroesophageal reflux disease), History of transfusion, Liver transplant candidate, Osteoporosis, and Pelvic fracture ([facility] [address] )    She   Patient Active Problem List    Diagnosis Date Noted    Obesity, morbid ([facility] ) 01/07/2021    Osteopenia of multiple sites 11/06/2020    Other fatigue 07/01/2020    Snoring 07/01/2020    Chronic left-sided thoracic back pain 09/05/2019    Left ovarian cyst 04/01/2019    Dementia ([facility] ) 11/12/2018    Asymptomatic stenosis of left carotid artery 07/19/2018    Abnormal EEG 11/08/2017    Altered mental status 11/04/2017    Cerebrovascular accident (CVA) due to embolism ([facility] [address] ) 11/04/2017    Non-ST elevation myocardial infarction (NSTEMI), type 2 11/04/2017    History of seizure 10/31/2017    Paroxysmal SVT (supraventricular tachycardia) ([facility] ) 10/31/2017    TIA (transient ischemic attack) 10/29/2017    HLD (hyperlipidemia) 10/29/2017    Cyst of diaphragm 04/27/2017    Long-term use of immunosuppressant medication 12/30/2016    Anemia 12/30/2016    Liver transplanted ([facility] [address] ) 12/29/2016    Hypothyroidism 12/29/2016    Elevated homocysteine 11/11/2016    Chronic joint pain 07/07/2016    Lung nodule, solitary 03/21/2016    Sciatica 09/03/2015    Hypercalcemia 06/30/2015    Ataxic gait 09/25/2013    Chronic kidney insufficiency 05/24/2013    Benign essential hypertension 09/04/2012    Ulcerative colitis without complications ([facility] ) 09/04/2012    Depression 05/07/2012     She  has a past surgical history that includes Abdominal surgery; Appendectomy; Eye surgery; Hernia repair; Joint replacement; Liver transplantation (N/A); Cholecystectomy (N/A); TONSILECTOMY AND ADNOIDECTOMY (N/A); Bladder augmentation (N/A); Esophagogastroduodenoscopy (N/A, 3/31/2016); Esophagogastroduodenoscopy (N/A, 4/20/2017); Colonoscopy (N/A, 4/20/2017); Cataract extraction; and Tubal ligation  Her family history includes Arthritis in her family; Breast cancer in her family; Cancer in her father and mother; Crohn's disease in her brother  She  reports that she quit smoking about 46 years ago  She quit after 5 00 years of use  She has never used smokeless tobacco  She reports that she does not drink alcohol or use drugs    Current Outpatient Medications   Medication Sig Dispense Refill    aspirin (ECOTRIN LOW STRENGTH) 81 mg EC tablet Take 1 tablet by mouth daily 30 tablet 0    b complex vitamins capsule Take 1 capsule by mouth daily 30 capsule 3    BUDESONIDE PO Take 3 mg by mouth      Calcium 500 MG tablet Take 500 mg by mouth      Cholecalciferol (Vitamin D) 50 MCG (2000 UT) tablet Take 1,000 Units by mouth      clopidogrel (PLAVIX) 75 mg tablet Take 1 tablet (75 mg total) by mouth daily 90 tablet 3    cycloSPORINE modified (NEORAL) 25 mg capsule Take 50 mg by mouth 2 (two) times a day      ezetimibe (ZETIA) 10 mg tablet Take 1 tablet by mouth once daily 90 tablet 3    levothyroxine 112 mcg tablet TAKE 1 TABLET EVERY DAY AS DIRECTED 90 tablet 0    metoprolol tartrate (LOPRESSOR) 25 mg tablet TAKE 1 & 1/2 (ONE & ONE-HALF) TABLETS BY MOUTH TWICE DAILY 270 tablet 5    mycophenolate (CELLCEPT) 250 mg capsule Take 250 mg by mouth 2 (two) times a day 3 tabs each dose=750 mg       omeprazole (PriLOSEC) 40 MG capsule Take 40 mg by mouth daily      pravastatin (PRAVACHOL) 20 mg tablet TAKE 1 TABLET BY MOUTH ONCE DAILY AT BEDTIME 90 tablet 3     No current facility-administered medications for this visit  Current Outpatient Medications on File Prior to Visit   Medication Sig    aspirin (ECOTRIN LOW STRENGTH) 81 mg EC tablet Take 1 tablet by mouth daily    b complex vitamins capsule Take 1 capsule by mouth daily    BUDESONIDE PO Take 3 mg by mouth    Calcium 500 MG tablet Take 500 mg by mouth    Cholecalciferol (Vitamin D) 50 MCG (2000 UT) tablet Take 1,000 Units by mouth    clopidogrel (PLAVIX) 75 mg tablet Take 1 tablet (75 mg total) by mouth daily    cycloSPORINE modified (NEORAL) 25 mg capsule Take 50 mg by mouth 2 (two) times a day    ezetimibe (ZETIA) 10 mg tablet Take 1 tablet by mouth once daily    levothyroxine 112 mcg tablet TAKE 1 TABLET EVERY DAY AS DIRECTED    metoprolol tartrate (LOPRESSOR) 25 mg tablet TAKE 1 & 1/2 (ONE & ONE-HALF) TABLETS BY MOUTH TWICE DAILY    mycophenolate (CELLCEPT) 250 mg capsule Take 250 mg by mouth 2 (two) times a day 3 tabs each dose=750 mg     omeprazole (PriLOSEC) 40 MG capsule Take 40 mg by mouth daily    pravastatin (PRAVACHOL) 20 mg tablet TAKE 1 TABLET BY MOUTH ONCE DAILY AT BEDTIME     No current facility-administered medications on file prior to visit  She is allergic to tetracyclines & related; vancomycin; and prograf [tacrolimus]            Objective:    Blood pressure 120/70, pulse 81, height 5' 2" (1 575 m), weight 102 kg (225 lb), not currently breastfeeding  Physical Exam  General - Patient is alert, awake, oriented to time, place and person, follows commands  Speech - no dysarthria noted, no aphasia noted  Neuro:   Cranial nerves: PERRL, EOMI, facial sensation intact, able to raise eyebrows symmetrically, cannot assess facial droop and tongue deviation due to face mask requirement  Motor: 5/5 throughout, normal tone, no pronator drift noted     Sensory - intact to soft touch throughout  Coordination - no ataxia/dysmetria noted  Gait - normal tandem walk without     ROS:  I personally reviewed ROS   Review of Systems   Constitutional: Negative  Negative for appetite change and fever  HENT: Negative  Negative for hearing loss, tinnitus, trouble swallowing and voice change  Eyes: Negative  Negative for photophobia and pain  Respiratory: Negative  Negative for shortness of breath  Cardiovascular: Negative  Negative for palpitations  Gastrointestinal: Negative  Negative for nausea and vomiting  Endocrine: Negative  Negative for cold intolerance  Genitourinary: Negative  Negative for dysuria, frequency and urgency  Musculoskeletal: Negative  Negative for myalgias and neck pain  Skin: Negative  Negative for rash  Neurological: Negative  Negative for dizziness, tremors, seizures, syncope, facial asymmetry, speech difficulty, weakness, light-headedness, numbness and headaches  Hematological: Negative  Does not bruise/bleed easily  Psychiatric/Behavioral: Negative  Negative for confusion, hallucinations and sleep disturbance

## 2021-01-14 ENCOUNTER — APPOINTMENT (OUTPATIENT)
Dept: LAB | Facility: CLINIC | Age: 76
End: 2021-01-14
Payer: MEDICARE

## 2021-01-14 ENCOUNTER — TRANSCRIBE ORDERS (OUTPATIENT)
Dept: LAB | Facility: CLINIC | Age: 76
End: 2021-01-14

## 2021-01-14 DIAGNOSIS — Z94.4 LIVER REPLACED BY TRANSPLANT (HCC): Primary | ICD-10-CM

## 2021-01-14 DIAGNOSIS — Z51.81 ENCOUNTER FOR THERAPEUTIC DRUG MONITORING: ICD-10-CM

## 2021-01-14 PROCEDURE — 80158 DRUG ASSAY CYCLOSPORINE: CPT

## 2021-01-15 LAB — CYCLOSPORINE BLD IA-MCNC: 65 NG/ML (ref 100–400)

## 2021-01-21 ENCOUNTER — IMMUNIZATIONS (OUTPATIENT)
Dept: FAMILY MEDICINE CLINIC | Facility: HOSPITAL | Age: 76
End: 2021-01-21

## 2021-01-21 DIAGNOSIS — Z23 ENCOUNTER FOR IMMUNIZATION: Primary | ICD-10-CM

## 2021-01-21 PROCEDURE — 0011A SARS-COV-2 / COVID-19 MRNA VACCINE (MODERNA) 100 MCG: CPT

## 2021-01-21 PROCEDURE — 91301 SARS-COV-2 / COVID-19 MRNA VACCINE (MODERNA) 100 MCG: CPT

## 2021-02-17 ENCOUNTER — TRANSCRIBE ORDERS (OUTPATIENT)
Dept: LAB | Facility: CLINIC | Age: 76
End: 2021-02-17

## 2021-02-17 ENCOUNTER — LAB (OUTPATIENT)
Dept: LAB | Facility: CLINIC | Age: 76
End: 2021-02-17
Payer: MEDICARE

## 2021-02-17 DIAGNOSIS — Z94.4 LIVER REPLACED BY TRANSPLANT (HCC): Primary | ICD-10-CM

## 2021-02-17 DIAGNOSIS — Z51.81 ENCOUNTER FOR THERAPEUTIC DRUG MONITORING: ICD-10-CM

## 2021-02-17 PROCEDURE — 80158 DRUG ASSAY CYCLOSPORINE: CPT

## 2021-02-18 LAB — CYCLOSPORINE BLD IA-MCNC: 83 NG/ML (ref 100–400)

## 2021-02-19 ENCOUNTER — IMMUNIZATIONS (OUTPATIENT)
Dept: FAMILY MEDICINE CLINIC | Facility: HOSPITAL | Age: 76
End: 2021-02-19

## 2021-02-19 DIAGNOSIS — Z23 ENCOUNTER FOR IMMUNIZATION: Primary | ICD-10-CM

## 2021-02-19 PROCEDURE — 0012A SARS-COV-2 / COVID-19 MRNA VACCINE (MODERNA) 100 MCG: CPT

## 2021-02-19 PROCEDURE — 91301 SARS-COV-2 / COVID-19 MRNA VACCINE (MODERNA) 100 MCG: CPT

## 2021-03-04 DIAGNOSIS — I63.40 CEREBROVASCULAR ACCIDENT (CVA) DUE TO EMBOLISM OF CEREBRAL ARTERY (HCC): ICD-10-CM

## 2021-03-04 DIAGNOSIS — Z86.73 PERSONAL HISTORY OF TIA (TRANSIENT ISCHEMIC ATTACK): ICD-10-CM

## 2021-03-05 DIAGNOSIS — Z86.73 PERSONAL HISTORY OF TIA (TRANSIENT ISCHEMIC ATTACK): ICD-10-CM

## 2021-03-05 DIAGNOSIS — I63.40 CEREBROVASCULAR ACCIDENT (CVA) DUE TO EMBOLISM OF CEREBRAL ARTERY (HCC): ICD-10-CM

## 2021-03-05 NOTE — TELEPHONE ENCOUNTER
Patient calling in requesting refill of Plavix  States she does not have any medication left       Order pended below, please sign if agreeable

## 2021-03-09 RX ORDER — CLOPIDOGREL BISULFATE 75 MG/1
75 TABLET ORAL DAILY
Qty: 90 TABLET | Refills: 3 | Status: SHIPPED | OUTPATIENT
Start: 2021-03-09 | End: 2021-10-12 | Stop reason: SDUPTHER

## 2021-03-09 RX ORDER — CLOPIDOGREL BISULFATE 75 MG/1
TABLET ORAL
Qty: 90 TABLET | Refills: 0 | Status: SHIPPED | OUTPATIENT
Start: 2021-03-09 | End: 2021-05-21 | Stop reason: HOSPADM

## 2021-04-08 DIAGNOSIS — E03.9 HYPOTHYROIDISM, UNSPECIFIED TYPE: ICD-10-CM

## 2021-04-08 RX ORDER — LEVOTHYROXINE SODIUM 112 UG/1
TABLET ORAL
Qty: 90 TABLET | Refills: 0 | Status: SHIPPED | OUTPATIENT
Start: 2021-04-08 | End: 2021-06-09

## 2021-04-09 ENCOUNTER — OFFICE VISIT (OUTPATIENT)
Dept: FAMILY MEDICINE CLINIC | Facility: CLINIC | Age: 76
End: 2021-04-09
Payer: MEDICARE

## 2021-04-09 VITALS
HEART RATE: 76 BPM | BODY MASS INDEX: 41.41 KG/M2 | SYSTOLIC BLOOD PRESSURE: 132 MMHG | RESPIRATION RATE: 20 BRPM | TEMPERATURE: 98.2 F | HEIGHT: 62 IN | WEIGHT: 225 LBS | DIASTOLIC BLOOD PRESSURE: 68 MMHG

## 2021-04-09 DIAGNOSIS — H92.02 LEFT EAR PAIN: Primary | ICD-10-CM

## 2021-04-09 PROCEDURE — 99213 OFFICE O/P EST LOW 20 MIN: CPT | Performed by: FAMILY MEDICINE

## 2021-04-09 NOTE — PROGRESS NOTES
Assessment/Plan:       Diagnoses and all orders for this visit:    Left ear pain  Comments:  no abnormalities seen on examination  counseled on supportive care  return if symptoms worsen or fail to improve  Subjective:      Patient ID: Uli Trimble is a 66 y o  female  Earache   There is pain in the left ear  This is a new problem  Episode onset: 2 days  The problem occurs constantly  The problem has been gradually worsening  There has been no fever  The pain is mild  Associated symptoms include rhinorrhea  Pertinent negatives include no abdominal pain, coughing, diarrhea, ear discharge, headaches, hearing loss, neck pain, rash, sore throat or vomiting  She has tried nothing for the symptoms  The following portions of the patient's history were reviewed and updated as appropriate: allergies, current medications, past family history, past medical history, past social history, past surgical history and problem list     Review of Systems   HENT: Positive for ear pain and rhinorrhea  Negative for ear discharge, hearing loss and sore throat  Respiratory: Negative for cough  Gastrointestinal: Negative for abdominal pain, diarrhea and vomiting  Musculoskeletal: Negative for neck pain  Skin: Negative for rash  Neurological: Negative for headaches  Objective:      /68   Pulse 76   Temp 98 2 °F (36 8 °C)   Resp 20   Ht 5' 2" (1 575 m)   Wt 102 kg (225 lb)   BMI 41 15 kg/m²          Physical Exam  Constitutional:       General: She is not in acute distress  Appearance: She is well-developed  She is not diaphoretic  HENT:      Head: Normocephalic and atraumatic  Right Ear: Tympanic membrane, ear canal and external ear normal  There is no impacted cerumen  Left Ear: Tympanic membrane, ear canal and external ear normal  There is no impacted cerumen  Eyes:      General: No scleral icterus  Right eye: No discharge  Left eye: No discharge  Conjunctiva/sclera: Conjunctivae normal    Neck:      Musculoskeletal: Normal range of motion  Pulmonary:      Effort: Pulmonary effort is normal    Skin:     General: Skin is warm  Neurological:      Mental Status: She is alert and oriented to person, place, and time  Psychiatric:         Behavior: Behavior normal          Thought Content:  Thought content normal          Judgment: Judgment normal

## 2021-04-22 ENCOUNTER — OFFICE VISIT (OUTPATIENT)
Dept: CARDIOLOGY CLINIC | Facility: CLINIC | Age: 76
End: 2021-04-22
Payer: MEDICARE

## 2021-04-22 VITALS
WEIGHT: 226 LBS | HEART RATE: 78 BPM | OXYGEN SATURATION: 97 % | HEIGHT: 62 IN | SYSTOLIC BLOOD PRESSURE: 130 MMHG | DIASTOLIC BLOOD PRESSURE: 52 MMHG | BODY MASS INDEX: 41.59 KG/M2 | TEMPERATURE: 99.3 F

## 2021-04-22 DIAGNOSIS — I21.A1 TYPE 2 MYOCARDIAL INFARCTION WITHOUT ST ELEVATION (HCC): ICD-10-CM

## 2021-04-22 DIAGNOSIS — Z86.73 PERSONAL HISTORY OF TIA (TRANSIENT ISCHEMIC ATTACK): ICD-10-CM

## 2021-04-22 DIAGNOSIS — I47.1 PSVT (PAROXYSMAL SUPRAVENTRICULAR TACHYCARDIA) (HCC): ICD-10-CM

## 2021-04-22 DIAGNOSIS — Z95.818 PRESENCE OF CARDIAC DEVICE: ICD-10-CM

## 2021-04-22 DIAGNOSIS — I48.21 PERMANENT ATRIAL FIBRILLATION (HCC): Primary | ICD-10-CM

## 2021-04-22 DIAGNOSIS — Z94.4 LIVER TRANSPLANTED (HCC): ICD-10-CM

## 2021-04-22 PROCEDURE — 93000 ELECTROCARDIOGRAM COMPLETE: CPT | Performed by: INTERNAL MEDICINE

## 2021-04-22 PROCEDURE — 99214 OFFICE O/P EST MOD 30 MIN: CPT | Performed by: INTERNAL MEDICINE

## 2021-04-22 RX ORDER — EZETIMIBE 10 MG/1
10 TABLET ORAL DAILY
Qty: 90 TABLET | Refills: 3 | Status: SHIPPED | OUTPATIENT
Start: 2021-04-22 | End: 2022-06-02

## 2021-04-22 RX ORDER — ASPIRIN 81 MG/1
81 TABLET ORAL
Qty: 90 TABLET | Refills: 3 | Status: SHIPPED | COMMUNITY
Start: 2021-04-22

## 2021-04-22 NOTE — PROGRESS NOTES
Cardiology Follow Up    [facility]  1945  [id number]  SANDEEP IZAGUIRRE [facility] [address]  [facility] [address] [id number]    Interval History: 75-year-old woman with a history of liver transplant, multiple CVA, nstemi likely typ2, chronic anemia receiving Procrit injections with recent prolonged hospitalization secondary to recurrent CVA  She has been placed on novel oral anticoagulation and has not had a further event  Her loop recorder was interrogated in the hospital and did not show any evidence of atrial fibrillation  She reports note trouble with her gait or unilateral weakness  She denies having any chest tightness  She denies having significant bleeding or bruising current the  She denies having any falls  Her hospitalization records were reviewed with      February 5, 2018:  She denies having significant bleeding or bruising on anticoagulation  She denies having any recurrence of unilateral weakness or speech difficulty  She denies having any chest pain  Her blood pressures been well controlled  4/27: She has felt dizzy since starting keppra  Still taking clopidogrel against medical advice  No chest pain  No arrhythmia  NO edema    10/30/2018: She has been compliant with Eliquis therapy without significant bleeding or bruising  She denies having any falls  She reports having difficulty with obtaining the medication as it is very expensive on her medical plan  No unilateral weakness  BP controlled  05/01/2019:  Her loop recorder has been negative for evidence of atrial fibrillation  She denies having significant bleeding or bruising  She denies having any recurrence of CVA  She denies having any significant neurological deficits  08/13/2019:  Her last loop recorder interrogation shows no evidence of atrial fibrillation  She has been well controlled on metoprolol 37 5 mg twice a day  Denies having dizziness or lightheadedness  Her major complaint right now is polyuria  She is compliant with her medications  Denies having significant bleeding or bruising      02/12/2020:  Her blood pressures been controlled  She is not in atrial fibrillation  We reviewed her last blood work  It shows persistently elevated liver enzymes  She has been taking the pravastatin  10/27/2020:  2 minutes episode of atrial fibrillation in May by her loop recorder  No sustained episodes since  She is currently in sinus rhythm  Her heart rhythm is well controlled  Her blood pressures are controlled  She denies having chest pain  She denies having any bleeding      Patient Active Problem List   Diagnosis    Liver transplanted ([facility] )    Hypothyroidism    Long-term use of immunosuppressant medication    Anemia    TIA (transient ischemic attack)    HLD (hyperlipidemia)    History of seizure    Paroxysmal SVT (supraventricular tachycardia) (HCC)    Altered mental status    Cerebrovascular accident (CVA) due to embolism ([facility] )    Non-ST elevation myocardial infarction (NSTEMI), type 2    Abnormal EEG    Ataxic gait    Benign essential hypertension    Chronic joint pain    Chronic kidney insufficiency    Cyst of diaphragm    Depression    Elevated homocysteine    Hypercalcemia    Lung nodule, solitary    Sciatica    Ulcerative colitis without complications ([facility] )    Asymptomatic stenosis of left carotid artery    Dementia ([facility] )    Left ovarian cyst    Chronic left-sided thoracic back pain    Other fatigue    Snoring    Osteopenia of multiple sites    Obesity, morbid ([facility] )     Past Medical History:   Diagnosis Date    Anemia     Anxiety     Arthritis     Benign paroxysmal vertigo, unspecified ear     onset: 05/06/2010    Cirrhosis ([facility] )     onset: 03/30/2005    Crohn disease ([facility] )     Disease of thyroid gland     GERD (gastroesophageal reflux disease)     History of transfusion  Liver transplant candidate     onset: 2008    Osteoporosis     Pelvic fracture (HCC)     onset: 10/14/2008     Social History     Socioeconomic History    Marital status:      Spouse name: Not on file    Number of children: Not on file    Years of education: Completed 12th grade    Highest education level: Not on file   Occupational History    Not on file   Social Needs    Financial resource strain: Not hard at all   P[employer] insecurity     Worry: Never true     Inability: Never true   [employer] needs     Medical: No     Non-medical: No   Tobacco Use    Smoking status: Former Smoker     Years: 5 00     Quit date: 1975     Years since quittin 2    Smokeless tobacco: Never Used    Tobacco comment: smoked for 5 years-/2 to 1 ppd   Substance and Sexual Activity    Alcohol use: No    Drug use: No    Sexual activity: Not on file   Lifestyle    Physical activity     Days per week: 2 days     Minutes per session: 30 min    Stress:  To some extent   Relationships    Social connections     Talks on phone: Not on file     Gets together: Not on file     Attends [employer] service: Not on file     Active member of club or organization: Not on file     Attends meetings of clubs or organizations: Not on file     Relationship status: Not on file    Intimate partner violence     Fear of current or ex partner: Not on file     Emotionally abused: Not on file     Physically abused: Not on file     Forced sexual activity: Not on file   Other Topics Concern    Not on file   Social History Narrative    Daily coffee consumption (1 cup/day)      Family History   Problem Relation Age of Onset    Cancer Mother         breast    Cancer Father     Crohn's disease Brother     Arthritis Family     Breast cancer Family      Past Surgical History:   Procedure Laterality Date    ABDOMINAL SURGERY      APPENDECTOMY      BLADDER AUGMENTATION N/A     CATARACT EXTRACTION      CHOLECYSTECTOMY N/A     COLONOSCOPY N/A 4/20/2017    Procedure: COLONOSCOPY;  Surgeon: Paxton Lieberman MD;  Location: [place] [address] [facility] LAB; Service:     ESOPHAGOGASTRODUODENOSCOPY N/A 3/31/2016    Procedure: ESOPHAGOGASTRODUODENOSCOPY (EGD); Surgeon: Paxton Lieberman MD;  Location: [place] [address] [facility]; Service:     ESOPHAGOGASTRODUODENOSCOPY N/A 4/20/2017    Procedure: ESOPHAGOGASTRODUODENOSCOPY (EGD); Surgeon: Paxton Lieberman MD;  Location: [place] [address] [facility];   Service:     EYE SURGERY      cataracts removed both eyes lens implant    HERNIA REPAIR      JOINT REPLACEMENT      left knee replacement    LIVER TRANSPLANTATION N/A     TONSILECTOMY AND ADNOIDECTOMY N/A     TUBAL LIGATION         Current Outpatient Medications:     aspirin (ECOTRIN LOW STRENGTH) 81 mg EC tablet, Take 1 tablet (81 mg total) by mouth daily with breakfast, Disp: 90 tablet, Rfl: 3    b complex vitamins capsule, Take 1 capsule by mouth daily, Disp: 30 capsule, Rfl: 3    BUDESONIDE PO, Take 3 mg by mouth, Disp: , Rfl:     Calcium 500 MG tablet, Take 500 mg by mouth, Disp: , Rfl:     clopidogrel (PLAVIX) 75 mg tablet, Take 1 tablet by mouth once daily, Disp: 90 tablet, Rfl: 0    clopidogrel (PLAVIX) 75 mg tablet, Take 1 tablet (75 mg total) by mouth daily, Disp: 90 tablet, Rfl: 3    cycloSPORINE modified (NEORAL) 25 mg capsule, Take 50 mg by mouth 2 (two) times a day, Disp: , Rfl:     ezetimibe (ZETIA) 10 mg tablet, Take 1 tablet (10 mg total) by mouth daily, Disp: 90 tablet, Rfl: 3    levothyroxine 112 mcg tablet, TAKE 1 TABLET EVERY DAY AS DIRECTED, Disp: 90 tablet, Rfl: 0    metoprolol tartrate (LOPRESSOR) 25 mg tablet, Take 1 5 tablets (37 5 mg total) by mouth 2 (two) times a day, Disp: 270 tablet, Rfl: 3    mycophenolate (CELLCEPT) 250 mg capsule, Take 250 mg by mouth 2 (two) times a day 3 tabs each dose=750 mg , Disp: , Rfl:     omeprazole (PriLOSEC) 40 MG capsule, Take 40 mg by mouth daily, Disp: , Rfl:     pravastatin (PRAVACHOL) 20 mg tablet, TAKE 1 TABLET BY MOUTH ONCE DAILY AT BEDTIME, Disp: 90 tablet, Rfl: 3    Cholecalciferol (Vitamin D) 50 MCG (2000 UT) tablet, Take 1,000 Units by mouth, Disp: , Rfl:   Allergies   Allergen Reactions    Tetracyclines & Related Hives    Vancomycin Other (See Comments) and Itching     Reaction Date: [id number]; Pt unsure of reaction    Prograf [Tacrolimus] Anxiety     Reaction Date: [id number]; Review of Systems:  Review of Systems   Constitutional: Negative  Negative for activity change, appetite change, chills, diaphoresis, fatigue, fever and unexpected weight change  HENT: Negative  Negative for congestion, dental problem, drooling, ear discharge, ear pain, facial swelling, hearing loss, mouth sores, nosebleeds, postnasal drip, rhinorrhea, sinus pressure, sinus pain, sneezing, sore throat, tinnitus, trouble swallowing and voice change  Eyes: Negative  Negative for photophobia, pain, redness, itching and visual disturbance  Respiratory: Negative  Negative for apnea, cough, choking, chest tightness, shortness of breath, wheezing and stridor  Cardiovascular: Negative  Negative for chest pain, palpitations and leg swelling  Gastrointestinal: Negative  Negative for abdominal distention, abdominal pain, anal bleeding, blood in stool, constipation, diarrhea, nausea, rectal pain and vomiting  Endocrine: Negative  Negative for cold intolerance, heat intolerance, polydipsia, polyphagia and polyuria  Genitourinary: Positive for frequency and urgency  Negative for decreased urine volume, difficulty urinating, dyspareunia, dysuria, enuresis, flank pain, genital sores, hematuria, menstrual problem, pelvic pain, vaginal bleeding, vaginal discharge and vaginal pain  Musculoskeletal: Positive for arthralgias and back pain  Negative for gait problem, joint swelling, myalgias, neck pain and neck stiffness  Skin: Negative  Negative for color change, pallor, rash and wound     Allergic/Immunologic: Negative  Negative for environmental allergies, food allergies and immunocompromised state  Neurological: Negative for dizziness, tremors, seizures, syncope, facial asymmetry, speech difficulty, weakness, light-headedness, numbness and headaches  Hematological: Negative  Negative for adenopathy  Does not bruise/bleed easily  Psychiatric/Behavioral: Negative  Negative for agitation, behavioral problems, confusion, decreased concentration, dysphoric mood, hallucinations, self-injury, sleep disturbance and suicidal ideas  The patient is not nervous/anxious and is not hyperactive  All other systems reviewed and are negative  Vitals:    04/22/21 1254   BP: 130/52   BP Location: Right arm   Patient Position: Sitting   Cuff Size: Large   Pulse: 78   Temp: 99 3 °F (37 4 °C)   SpO2: 97%   Weight: 103 kg (226 lb)   Height: 5' 2" (1 575 m)     Physical Exam:  Physical Exam   Constitutional: She is oriented to person, place, and time  She appears well-developed and well-nourished  No distress  HENT:   Head: Normocephalic and atraumatic  Right Ear: External ear normal    Left Ear: External ear normal    Eyes: Pupils are equal, round, and reactive to light  Conjunctivae are normal  Right eye exhibits no discharge  Left eye exhibits no discharge  No scleral icterus  Neck: Normal range of motion  Neck supple  No JVD present  No tracheal deviation present  No thyromegaly present  Cardiovascular: Normal rate and regular rhythm  Exam reveals gallop  Exam reveals no friction rub  Murmur heard  Pulmonary/Chest: Effort normal and breath sounds normal  No stridor  No respiratory distress  She has no wheezes  She has no rales  She exhibits no tenderness  Abdominal: Soft  Bowel sounds are normal  She exhibits distension  She exhibits no mass  There is no abdominal tenderness  There is no rebound and no guarding  Musculoskeletal: Normal range of motion  General: Deformity present   No tenderness or edema  Neurological: She is alert and oriented to person, place, and time  She has normal reflexes  No cranial nerve deficit  She exhibits normal muscle tone  Coordination normal    Skin: Skin is warm and dry  No rash noted  She is not diaphoretic  No erythema  No pallor  Psychiatric: She has a normal mood and affect  Her behavior is normal  Judgment and thought content normal    Nursing note and vitals reviewed  1  Permanent atrial fibrillation (HCC)  POCT ECG    aspirin (ECOTRIN LOW STRENGTH) 81 mg EC tablet   2  Presence of cardiac device  POCT ECG    aspirin (ECOTRIN LOW STRENGTH) 81 mg EC tablet   3  PSVT (paroxysmal supraventricular tachycardia) (HCC)  POCT ECG    ezetimibe (ZETIA) 10 mg tablet    metoprolol tartrate (LOPRESSOR) 25 mg tablet    aspirin (ECOTRIN LOW STRENGTH) 81 mg EC tablet   4  Non-ST elevation myocardial infarction (NSTEMI), type 2  POCT ECG    ezetimibe (ZETIA) 10 mg tablet    aspirin (ECOTRIN LOW STRENGTH) 81 mg EC tablet   5  Personal history of TIA (transient ischemic attack)  POCT ECG    aspirin (ECOTRIN LOW STRENGTH) 81 mg EC tablet   6  Liver transplanted (HCC)  ezetimibe (ZETIA) 10 mg tablet    aspirin (ECOTRIN LOW STRENGTH) 81 mg EC tablet      Recurrent CVA- brief episode of afib- 2 minutes  No major re-occurrnece  Plan to keep the patient on aspirin 81 mg plus Plavix 75 mg daily  Continue loop recorder monitoring  If with evidence of atrial fibrillation then the patient will need to be on Coumadin therapy  Continue metoprolol 37 5 mg twice a day    Prior nstemi- aspirin + plavix + zetia 10mg daily + pravastatin    Hyperlipidemia-her liver enzymes are now elevated  Her ALT and alk-phos have been persistently elevated  She has had recent workup by liver transplant  Continue zetia + pravastatin  Monitor alt/ast/ final determination b    Liver transplant-continue immunosuppressants    Statin cleared by hepatology    Seizures- discussed with neurology about keppra side effects          Tracey Bermudez  Please call with any questions or suggestions

## 2021-05-03 ENCOUNTER — APPOINTMENT (OUTPATIENT)
Dept: LAB | Facility: CLINIC | Age: 76
End: 2021-05-03
Payer: MEDICARE

## 2021-05-07 ENCOUNTER — OFFICE VISIT (OUTPATIENT)
Dept: FAMILY MEDICINE CLINIC | Facility: CLINIC | Age: 76
End: 2021-05-07
Payer: MEDICARE

## 2021-05-07 VITALS
DIASTOLIC BLOOD PRESSURE: 66 MMHG | OXYGEN SATURATION: 97 % | BODY MASS INDEX: 41.59 KG/M2 | RESPIRATION RATE: 20 BRPM | HEART RATE: 82 BPM | SYSTOLIC BLOOD PRESSURE: 144 MMHG | HEIGHT: 62 IN | WEIGHT: 226 LBS | TEMPERATURE: 98 F

## 2021-05-07 DIAGNOSIS — R35.0 URINARY FREQUENCY: Primary | ICD-10-CM

## 2021-05-07 PROCEDURE — 99213 OFFICE O/P EST LOW 20 MIN: CPT | Performed by: FAMILY MEDICINE

## 2021-05-07 NOTE — PROGRESS NOTES
Assessment/Plan:     Diagnoses and all orders for this visit:    Overactive Bladder  -     Ambulatory referral to Urology; Future  - She is unable to provide urine sample for us today  - No records available at this time from her prior urologist, Dr Pattie José  - She would like to see a new urologist in the area for a second opinion  Subjective:      Patient ID: Fanta Sarmiento is a 66 y o  female  HPI  Rica Pan reports a few month history of urinary frequency, worse at night  She went to see urologist, Dr Pattie José, and was started on myrbetriq for overactive bladder  States that the medication helped her, but it was too expensive and she can't afford it  She would like a second opinion from another urologist    Denies any new symptoms including fevers, chills, flank pain, hematuria, dysuria, urgency, nausea, vomiting, abdominal pain, weakness, fatigue  The following portions of the patient's history were reviewed and updated as appropriate: allergies, current medications, past family history, past medical history, past social history, past surgical history and problem list     Review of Systems   Constitutional: Negative  HENT: Negative  Eyes: Negative  Respiratory: Negative  Cardiovascular: Negative  Gastrointestinal: Negative  Endocrine: Negative  Genitourinary: Positive for frequency  Musculoskeletal: Negative  Skin: Negative  Allergic/Immunologic: Negative  Neurological: Negative  Hematological: Negative  Psychiatric/Behavioral: Negative  Objective:      /66   Pulse 82   Temp 98 °F (36 7 °C)   Resp 20   Ht 5' 2" (1 575 m)   Wt 103 kg (226 lb)   SpO2 97%   BMI 41 34 kg/m²          Physical Exam  Constitutional:       General: She is not in acute distress  Appearance: She is well-developed  She is not diaphoretic  HENT:      Head: Normocephalic and atraumatic  Eyes:      General: No scleral icterus  Right eye: No discharge  Left eye: No discharge  Conjunctiva/sclera: Conjunctivae normal    Neck:      Musculoskeletal: Normal range of motion  Cardiovascular:      Rate and Rhythm: Normal rate and regular rhythm  Pulmonary:      Effort: Pulmonary effort is normal       Breath sounds: Normal breath sounds  Abdominal:      General: Bowel sounds are normal       Palpations: Abdomen is soft  Tenderness: There is no right CVA tenderness or left CVA tenderness  Skin:     General: Skin is warm  Neurological:      Mental Status: She is alert and oriented to person, place, and time  Psychiatric:         Behavior: Behavior normal          Thought Content:  Thought content normal          Judgment: Judgment normal

## 2021-05-18 ENCOUNTER — APPOINTMENT (EMERGENCY)
Dept: RADIOLOGY | Facility: HOSPITAL | Age: 76
DRG: 872 | End: 2021-05-18
Payer: MEDICARE

## 2021-05-18 ENCOUNTER — TELEPHONE (OUTPATIENT)
Dept: FAMILY MEDICINE CLINIC | Facility: CLINIC | Age: 76
End: 2021-05-18

## 2021-05-18 ENCOUNTER — HOSPITAL ENCOUNTER (INPATIENT)
Facility: HOSPITAL | Age: 76
LOS: 3 days | Discharge: HOME HEALTH | DRG: 872 | End: 2021-05-21
Attending: EMERGENCY MEDICINE | Admitting: FAMILY MEDICINE
Payer: MEDICARE

## 2021-05-18 DIAGNOSIS — N17.9 AKI (ACUTE KIDNEY INJURY) (HCC): ICD-10-CM

## 2021-05-18 DIAGNOSIS — A41.9 SEPSIS (HCC): ICD-10-CM

## 2021-05-18 DIAGNOSIS — Z94.4 LIVER TRANSPLANTED (HCC): ICD-10-CM

## 2021-05-18 DIAGNOSIS — N39.0 UTI (URINARY TRACT INFECTION): ICD-10-CM

## 2021-05-18 DIAGNOSIS — R50.9 FEVER: Primary | ICD-10-CM

## 2021-05-18 LAB
ALBUMIN SERPL BCP-MCNC: 3.2 G/DL (ref 3.5–5)
ALP SERPL-CCNC: 265 U/L (ref 46–116)
ALT SERPL W P-5'-P-CCNC: 60 U/L (ref 12–78)
ANION GAP SERPL CALCULATED.3IONS-SCNC: 9 MMOL/L (ref 4–13)
APTT PPP: 26 SECONDS (ref 23–37)
AST SERPL W P-5'-P-CCNC: 49 U/L (ref 5–45)
BACTERIA UR QL AUTO: ABNORMAL /HPF
BASOPHILS # BLD AUTO: 0.02 THOUSANDS/ΜL (ref 0–0.1)
BASOPHILS NFR BLD AUTO: 0 % (ref 0–1)
BILIRUB SERPL-MCNC: 0.62 MG/DL (ref 0.2–1)
BILIRUB UR QL STRIP: NEGATIVE
BUN SERPL-MCNC: 25 MG/DL (ref 5–25)
CALCIUM ALBUM COR SERPL-MCNC: 9.4 MG/DL (ref 8.3–10.1)
CALCIUM SERPL-MCNC: 8.8 MG/DL (ref 8.3–10.1)
CHLORIDE SERPL-SCNC: 102 MMOL/L (ref 100–108)
CLARITY UR: ABNORMAL
CO2 SERPL-SCNC: 25 MMOL/L (ref 21–32)
COLOR UR: ABNORMAL
CREAT SERPL-MCNC: 1.05 MG/DL (ref 0.6–1.3)
EOSINOPHIL # BLD AUTO: 0.12 THOUSAND/ΜL (ref 0–0.61)
EOSINOPHIL NFR BLD AUTO: 1 % (ref 0–6)
ERYTHROCYTE [DISTWIDTH] IN BLOOD BY AUTOMATED COUNT: 14 % (ref 11.6–15.1)
GFR SERPL CREATININE-BSD FRML MDRD: 51 ML/MIN/1.73SQ M
GLUCOSE SERPL-MCNC: 114 MG/DL (ref 65–140)
GLUCOSE UR STRIP-MCNC: NEGATIVE MG/DL
HCT VFR BLD AUTO: 32.9 % (ref 34.8–46.1)
HGB BLD-MCNC: 10.7 G/DL (ref 11.5–15.4)
HGB UR QL STRIP.AUTO: ABNORMAL
INR PPP: 1 (ref 0.84–1.19)
KETONES UR STRIP-MCNC: NEGATIVE MG/DL
LACTATE SERPL-SCNC: 0.5 MMOL/L (ref 0.5–2)
LEUKOCYTE ESTERASE UR QL STRIP: ABNORMAL
LYMPHOCYTES # BLD AUTO: 0.75 THOUSANDS/ΜL (ref 0.6–4.47)
LYMPHOCYTES NFR BLD AUTO: 8 % (ref 14–44)
MCH RBC QN AUTO: 29.4 PG (ref 26.8–34.3)
MCHC RBC AUTO-ENTMCNC: 32.5 G/DL (ref 31.4–37.4)
MCV RBC AUTO: 90 FL (ref 82–98)
MONOCYTES # BLD AUTO: 0.79 THOUSAND/ΜL (ref 0.17–1.22)
MONOCYTES NFR BLD AUTO: 9 % (ref 4–12)
NEUTROPHILS # BLD AUTO: 7.53 THOUSANDS/ΜL (ref 1.85–7.62)
NEUTS SEG NFR BLD AUTO: 82 % (ref 43–75)
NITRITE UR QL STRIP: POSITIVE
NON-SQ EPI CELLS URNS QL MICRO: ABNORMAL /HPF
PH UR STRIP.AUTO: 6.5 [PH]
PLATELET # BLD AUTO: 202 THOUSANDS/UL (ref 149–390)
PMV BLD AUTO: 9.5 FL (ref 8.9–12.7)
POTASSIUM SERPL-SCNC: 4.6 MMOL/L (ref 3.5–5.3)
PROT SERPL-MCNC: 7.6 G/DL (ref 6.4–8.2)
PROT UR STRIP-MCNC: NEGATIVE MG/DL
PROTHROMBIN TIME: 13.1 SECONDS (ref 11.6–14.5)
RBC # BLD AUTO: 3.64 MILLION/UL (ref 3.81–5.12)
RBC #/AREA URNS AUTO: ABNORMAL /HPF
SARS-COV-2 RNA RESP QL NAA+PROBE: NEGATIVE
SODIUM SERPL-SCNC: 136 MMOL/L (ref 136–145)
SP GR UR STRIP.AUTO: 1.01 (ref 1–1.03)
TROPONIN I SERPL-MCNC: <0.02 NG/ML
UROBILINOGEN UR QL STRIP.AUTO: 0.2 E.U./DL
WBC # BLD AUTO: 9.21 THOUSAND/UL (ref 4.31–10.16)
WBC #/AREA URNS AUTO: ABNORMAL /HPF

## 2021-05-18 PROCEDURE — 81001 URINALYSIS AUTO W/SCOPE: CPT | Performed by: EMERGENCY MEDICINE

## 2021-05-18 PROCEDURE — 85610 PROTHROMBIN TIME: CPT | Performed by: EMERGENCY MEDICINE

## 2021-05-18 PROCEDURE — 1123F ACP DISCUSS/DSCN MKR DOCD: CPT | Performed by: EMERGENCY MEDICINE

## 2021-05-18 PROCEDURE — 87186 SC STD MICRODIL/AGAR DIL: CPT | Performed by: EMERGENCY MEDICINE

## 2021-05-18 PROCEDURE — 99285 EMERGENCY DEPT VISIT HI MDM: CPT

## 2021-05-18 PROCEDURE — 71045 X-RAY EXAM CHEST 1 VIEW: CPT

## 2021-05-18 PROCEDURE — 87077 CULTURE AEROBIC IDENTIFY: CPT | Performed by: EMERGENCY MEDICINE

## 2021-05-18 PROCEDURE — U0003 INFECTIOUS AGENT DETECTION BY NUCLEIC ACID (DNA OR RNA); SEVERE ACUTE RESPIRATORY SYNDROME CORONAVIRUS 2 (SARS-COV-2) (CORONAVIRUS DISEASE [COVID-19]), AMPLIFIED PROBE TECHNIQUE, MAKING USE OF HIGH THROUGHPUT TECHNOLOGIES AS DESCRIBED BY CMS-2020-01-R: HCPCS | Performed by: EMERGENCY MEDICINE

## 2021-05-18 PROCEDURE — 87086 URINE CULTURE/COLONY COUNT: CPT | Performed by: EMERGENCY MEDICINE

## 2021-05-18 PROCEDURE — 80053 COMPREHEN METABOLIC PANEL: CPT | Performed by: EMERGENCY MEDICINE

## 2021-05-18 PROCEDURE — 96365 THER/PROPH/DIAG IV INF INIT: CPT

## 2021-05-18 PROCEDURE — 99222 1ST HOSP IP/OBS MODERATE 55: CPT | Performed by: PHYSICIAN ASSISTANT

## 2021-05-18 PROCEDURE — 87040 BLOOD CULTURE FOR BACTERIA: CPT | Performed by: EMERGENCY MEDICINE

## 2021-05-18 PROCEDURE — U0005 INFEC AGEN DETEC AMPLI PROBE: HCPCS | Performed by: EMERGENCY MEDICINE

## 2021-05-18 PROCEDURE — 85025 COMPLETE CBC W/AUTO DIFF WBC: CPT | Performed by: EMERGENCY MEDICINE

## 2021-05-18 PROCEDURE — 99285 EMERGENCY DEPT VISIT HI MDM: CPT | Performed by: EMERGENCY MEDICINE

## 2021-05-18 PROCEDURE — 06HY33Z INSERTION OF INFUSION DEVICE INTO LOWER VEIN, PERCUTANEOUS APPROACH: ICD-10-PCS | Performed by: EMERGENCY MEDICINE

## 2021-05-18 PROCEDURE — 83605 ASSAY OF LACTIC ACID: CPT | Performed by: EMERGENCY MEDICINE

## 2021-05-18 PROCEDURE — 87185 SC STD ENZYME DETCJ PER NZM: CPT | Performed by: EMERGENCY MEDICINE

## 2021-05-18 PROCEDURE — 93005 ELECTROCARDIOGRAM TRACING: CPT

## 2021-05-18 PROCEDURE — 85730 THROMBOPLASTIN TIME PARTIAL: CPT | Performed by: EMERGENCY MEDICINE

## 2021-05-18 PROCEDURE — 36415 COLL VENOUS BLD VENIPUNCTURE: CPT | Performed by: EMERGENCY MEDICINE

## 2021-05-18 PROCEDURE — 84145 PROCALCITONIN (PCT): CPT | Performed by: EMERGENCY MEDICINE

## 2021-05-18 PROCEDURE — 84484 ASSAY OF TROPONIN QUANT: CPT | Performed by: EMERGENCY MEDICINE

## 2021-05-18 RX ORDER — CEFTRIAXONE 1 G/50ML
1000 INJECTION, SOLUTION INTRAVENOUS ONCE
Status: COMPLETED | OUTPATIENT
Start: 2021-05-18 | End: 2021-05-18

## 2021-05-18 RX ORDER — ACETAMINOPHEN 325 MG/1
650 TABLET ORAL ONCE
Status: COMPLETED | OUTPATIENT
Start: 2021-05-18 | End: 2021-05-18

## 2021-05-18 RX ADMIN — ACETAMINOPHEN 650 MG: 325 TABLET, FILM COATED ORAL at 22:19

## 2021-05-18 RX ADMIN — CEFTRIAXONE 1000 MG: 1 INJECTION, SOLUTION INTRAVENOUS at 22:34

## 2021-05-18 NOTE — TELEPHONE ENCOUNTER
Pt  called stating the pt doesn't feel well and she is running a 101 3 fever  Pt had a liver transplant 11 years ago and was told to go to the hospital if a fever this high happened  Pt  wanted to speak to a nurse or doctor before making this decision   Pls advise

## 2021-05-18 NOTE — TELEPHONE ENCOUNTER
Spoke to patient  and he was asking if patient should go to the ER  Dr Stephen James reviewed patients chart and stated that because patient has a Liver transplant and is on immuno suppressants  Patient should be seen in the ER for further evaluation  Patient stated that she does not feel good at all and I told her then she should definitely  go to the ER  Patient and  agreed   [address] [facility], [place]

## 2021-05-19 LAB
ANION GAP SERPL CALCULATED.3IONS-SCNC: 8 MMOL/L (ref 4–13)
BASOPHILS # BLD AUTO: 0.03 THOUSANDS/ΜL (ref 0–0.1)
BASOPHILS NFR BLD AUTO: 1 % (ref 0–1)
BUN SERPL-MCNC: 24 MG/DL (ref 5–25)
CALCIUM SERPL-MCNC: 8.5 MG/DL (ref 8.3–10.1)
CHLORIDE SERPL-SCNC: 105 MMOL/L (ref 100–108)
CO2 SERPL-SCNC: 26 MMOL/L (ref 21–32)
CREAT SERPL-MCNC: 1.03 MG/DL (ref 0.6–1.3)
EOSINOPHIL # BLD AUTO: 0.1 THOUSAND/ΜL (ref 0–0.61)
EOSINOPHIL NFR BLD AUTO: 2 % (ref 0–6)
ERYTHROCYTE [DISTWIDTH] IN BLOOD BY AUTOMATED COUNT: 13.6 % (ref 11.6–15.1)
GFR SERPL CREATININE-BSD FRML MDRD: 53 ML/MIN/1.73SQ M
GLUCOSE SERPL-MCNC: 114 MG/DL (ref 65–140)
HCT VFR BLD AUTO: 32 % (ref 34.8–46.1)
HGB BLD-MCNC: 9.8 G/DL (ref 11.5–15.4)
IMM GRANULOCYTES # BLD AUTO: 0.04 THOUSAND/UL (ref 0–0.2)
IMM GRANULOCYTES NFR BLD AUTO: 1 % (ref 0–2)
LYMPHOCYTES # BLD AUTO: 0.71 THOUSANDS/ΜL (ref 0.6–4.47)
LYMPHOCYTES NFR BLD AUTO: 11 % (ref 14–44)
MCH RBC QN AUTO: 27.9 PG (ref 26.8–34.3)
MCHC RBC AUTO-ENTMCNC: 30.6 G/DL (ref 31.4–37.4)
MCV RBC AUTO: 91 FL (ref 82–98)
MONOCYTES # BLD AUTO: 0.75 THOUSAND/ΜL (ref 0.17–1.22)
MONOCYTES NFR BLD AUTO: 11 % (ref 4–12)
NEUTROPHILS # BLD AUTO: 4.93 THOUSANDS/ΜL (ref 1.85–7.62)
NEUTS SEG NFR BLD AUTO: 74 % (ref 43–75)
NRBC BLD AUTO-RTO: 0 /100 WBCS
PLATELET # BLD AUTO: 164 THOUSANDS/UL (ref 149–390)
PMV BLD AUTO: 9.2 FL (ref 8.9–12.7)
POTASSIUM SERPL-SCNC: 4.1 MMOL/L (ref 3.5–5.3)
PROCALCITONIN SERPL-MCNC: 0.09 NG/ML
RBC # BLD AUTO: 3.51 MILLION/UL (ref 3.81–5.12)
SODIUM SERPL-SCNC: 139 MMOL/L (ref 136–145)
WBC # BLD AUTO: 6.56 THOUSAND/UL (ref 4.31–10.16)

## 2021-05-19 PROCEDURE — 85025 COMPLETE CBC W/AUTO DIFF WBC: CPT | Performed by: PHYSICIAN ASSISTANT

## 2021-05-19 PROCEDURE — 97162 PT EVAL MOD COMPLEX 30 MIN: CPT

## 2021-05-19 PROCEDURE — 80048 BASIC METABOLIC PNL TOTAL CA: CPT | Performed by: PHYSICIAN ASSISTANT

## 2021-05-19 PROCEDURE — 99232 SBSQ HOSP IP/OBS MODERATE 35: CPT | Performed by: FAMILY MEDICINE

## 2021-05-19 PROCEDURE — 97530 THERAPEUTIC ACTIVITIES: CPT

## 2021-05-19 RX ORDER — CALCIUM CARBONATE 500(1250)
1 TABLET ORAL
Status: DISCONTINUED | OUTPATIENT
Start: 2021-05-19 | End: 2021-05-21 | Stop reason: HOSPADM

## 2021-05-19 RX ORDER — CLOPIDOGREL BISULFATE 75 MG/1
75 TABLET ORAL DAILY
Status: DISCONTINUED | OUTPATIENT
Start: 2021-05-19 | End: 2021-05-21 | Stop reason: HOSPADM

## 2021-05-19 RX ORDER — PANTOPRAZOLE SODIUM 40 MG/1
40 TABLET, DELAYED RELEASE ORAL
Status: DISCONTINUED | OUTPATIENT
Start: 2021-05-19 | End: 2021-05-21 | Stop reason: HOSPADM

## 2021-05-19 RX ORDER — ASPIRIN 81 MG/1
81 TABLET ORAL
Status: DISCONTINUED | OUTPATIENT
Start: 2021-05-19 | End: 2021-05-21 | Stop reason: HOSPADM

## 2021-05-19 RX ORDER — CYCLOSPORINE 25 MG/1
50 CAPSULE, LIQUID FILLED ORAL 2 TIMES DAILY
Status: DISCONTINUED | OUTPATIENT
Start: 2021-05-19 | End: 2021-05-21 | Stop reason: HOSPADM

## 2021-05-19 RX ORDER — MYCOPHENOLATE MOFETIL 250 MG/1
750 CAPSULE ORAL 2 TIMES DAILY
Status: DISCONTINUED | OUTPATIENT
Start: 2021-05-19 | End: 2021-05-21 | Stop reason: HOSPADM

## 2021-05-19 RX ORDER — ONDANSETRON 2 MG/ML
4 INJECTION INTRAMUSCULAR; INTRAVENOUS EVERY 6 HOURS PRN
Status: DISCONTINUED | OUTPATIENT
Start: 2021-05-19 | End: 2021-05-21 | Stop reason: HOSPADM

## 2021-05-19 RX ORDER — CALCIUM CARBONATE 200(500)MG
1000 TABLET,CHEWABLE ORAL DAILY PRN
Status: DISCONTINUED | OUTPATIENT
Start: 2021-05-19 | End: 2021-05-21 | Stop reason: HOSPADM

## 2021-05-19 RX ORDER — EZETIMIBE 10 MG/1
10 TABLET ORAL DAILY
Status: DISCONTINUED | OUTPATIENT
Start: 2021-05-19 | End: 2021-05-21 | Stop reason: HOSPADM

## 2021-05-19 RX ORDER — MELATONIN
1000 DAILY
Status: DISCONTINUED | OUTPATIENT
Start: 2021-05-19 | End: 2021-05-21 | Stop reason: HOSPADM

## 2021-05-19 RX ORDER — LEVOTHYROXINE SODIUM 112 UG/1
112 TABLET ORAL
Status: DISCONTINUED | OUTPATIENT
Start: 2021-05-19 | End: 2021-05-21 | Stop reason: HOSPADM

## 2021-05-19 RX ORDER — PRAVASTATIN SODIUM 20 MG
20 TABLET ORAL
Status: DISCONTINUED | OUTPATIENT
Start: 2021-05-19 | End: 2021-05-21 | Stop reason: HOSPADM

## 2021-05-19 RX ORDER — ACETAMINOPHEN 325 MG/1
650 TABLET ORAL EVERY 6 HOURS PRN
Status: DISCONTINUED | OUTPATIENT
Start: 2021-05-19 | End: 2021-05-21 | Stop reason: HOSPADM

## 2021-05-19 RX ORDER — POLYETHYLENE GLYCOL 3350 17 G/17G
17 POWDER, FOR SOLUTION ORAL DAILY PRN
Status: DISCONTINUED | OUTPATIENT
Start: 2021-05-19 | End: 2021-05-21 | Stop reason: HOSPADM

## 2021-05-19 RX ADMIN — MYCOPHENOLATE MOFETIL 750 MG: 250 CAPSULE ORAL at 17:23

## 2021-05-19 RX ADMIN — B-COMPLEX W/ C & FOLIC ACID TAB 1 TABLET: TAB at 09:36

## 2021-05-19 RX ADMIN — ASPIRIN 81 MG: 81 TABLET, COATED ORAL at 09:42

## 2021-05-19 RX ADMIN — CYCLOSPORINE 50 MG: 25 CAPSULE ORAL at 17:23

## 2021-05-19 RX ADMIN — Medication 1000 UNITS: at 09:36

## 2021-05-19 RX ADMIN — CALCIUM 1 TABLET: 500 TABLET ORAL at 09:35

## 2021-05-19 RX ADMIN — MYCOPHENOLATE MOFETIL 750 MG: 250 CAPSULE ORAL at 09:36

## 2021-05-19 RX ADMIN — METOPROLOL TARTRATE 37.5 MG: 25 TABLET, FILM COATED ORAL at 09:35

## 2021-05-19 RX ADMIN — METOPROLOL TARTRATE 37.5 MG: 25 TABLET, FILM COATED ORAL at 21:41

## 2021-05-19 RX ADMIN — CYCLOSPORINE 50 MG: 25 CAPSULE ORAL at 09:43

## 2021-05-19 RX ADMIN — CLOPIDOGREL BISULFATE 75 MG: 75 TABLET ORAL at 09:36

## 2021-05-19 RX ADMIN — ENOXAPARIN SODIUM 40 MG: 40 INJECTION SUBCUTANEOUS at 09:43

## 2021-05-19 RX ADMIN — PRAVASTATIN SODIUM 20 MG: 20 TABLET ORAL at 21:41

## 2021-05-19 RX ADMIN — LEVOTHYROXINE SODIUM 112 MCG: 112 TABLET ORAL at 05:22

## 2021-05-19 RX ADMIN — EZETIMIBE 10 MG: 10 TABLET ORAL at 09:43

## 2021-05-19 RX ADMIN — POLYETHYLENE GLYCOL 3350 17 G: 17 POWDER, FOR SOLUTION ORAL at 11:43

## 2021-05-19 RX ADMIN — PRAVASTATIN SODIUM 20 MG: 20 TABLET ORAL at 01:17

## 2021-05-19 RX ADMIN — PANTOPRAZOLE SODIUM 40 MG: 40 TABLET, DELAYED RELEASE ORAL at 05:22

## 2021-05-19 NOTE — PLAN OF CARE
Problem: Potential for Falls  Goal: Patient will remain free of falls  Description: INTERVENTIONS:  - Assess patient frequently for physical needs  -  Identify cognitive and physical deficits and behaviors that affect risk of falls    -  [place] fall precautions as indicated by assessment   - Educate patient/family on patient safety including physical limitations  - Instruct patient to call for assistance with activity based on assessment  - Modify environment to reduce risk of injury  - Consider OT/PT consult to assist with strengthening/mobility  Outcome: Progressing     Problem: Prexisting or High Potential for Compromised Skin Integrity  Goal: Skin integrity is maintained or improved  Description: INTERVENTIONS:  - Identify patients at risk for skin breakdown  - Assess and monitor skin integrity  - Assess and monitor nutrition and hydration status  - Monitor labs   - Assess for incontinence   - Turn and reposition patient  - Assist with mobility/ambulation  - Relieve pressure over bony prominences  - Avoid friction and shearing  - Provide appropriate hygiene as needed including keeping skin clean and dry  - Evaluate need for skin moisturizer/barrier cream  - Collaborate with interdisciplinary team   - Patient/family teaching  - Consider wound care consult   Outcome: Progressing     Problem: INFECTION - ADULT  Goal: Absence or prevention of progression during hospitalization  Description: INTERVENTIONS:  - Assess and monitor for signs and symptoms of infection  - Monitor lab/diagnostic results  - Monitor all insertion sites  - Monitor secretions for changes in amount and color  - [place] appropriate cooling/warming therapies per order  - Administer medications as ordered  - Instruct and encourage patient and family to use good hand hygiene technique  - Identify and instruct in appropriate isolation precautions for identified infection/condition  Outcome: Progressing     Problem: DISCHARGE PLANNING  Goal: Discharge to home or other facility with appropriate resources  Description: INTERVENTIONS:  - Identify barriers to discharge w/patient and caregiver  - Arrange for needed discharge resources and transportation as appropriate  - Identify discharge learning needs   - Refer to Case Management Department for coordinating discharge planning if the patient needs post-hospital services based on physician/advanced practitioner order or complex needs related to functional status, cognitive ability, or social support system  Outcome: Progressing     Problem: Knowledge Deficit  Goal: Patient/family/caregiver demonstrates understanding of disease process, treatment plan, medications, and discharge instructions  Description: Complete learning assessment and assess knowledge base    Interventions:  - Provide teaching at level of understanding  - Provide teaching via preferred learning methods  Outcome: Progressing     Problem: GENITOURINARY - ADULT  Goal: Urinary catheter remains patent  Description: INTERVENTIONS:  - Assess patency of urinary catheter  - Follow guidelines for intermittent irrigation of non-functioning urinary catheter  Outcome: Progressing

## 2021-05-19 NOTE — ASSESSMENT & PLAN NOTE
POA as evidence by tachycardia, tachypnea, fever due to UTI  - lactic acid 0 5  - received doses of IV Rocephin and transitioned to Vantin on discharge  - urine culture growing E coli    Blood cultures negative so far

## 2021-05-19 NOTE — ED NOTES
Patient '/partner' at the bedside took home patient's purse and her necklace with two crosses that she came into the ED with        Duane Boll, RN  05/19/21 5827

## 2021-05-19 NOTE — ASSESSMENT & PLAN NOTE
Patient follows with GI, states she is unsure if she is still taking budesonide    Advised to continue budesonide if she is indeed taking it at home

## 2021-05-19 NOTE — ASSESSMENT & PLAN NOTE
Patient started with urinary frequency, urgency today with fevers and chills    - UA showed UTI  - continue IV Abx with ceftriaxone  - follow-up urine culture results  - monitor urine output, Alberts was placed in the ED

## 2021-05-19 NOTE — PROGRESS NOTES
[facility] Internal Medicine Progress Note  Patient: Maritza Robert 66 y o  female   MRN: [id number]  PCP: Jayro Florez MD  Unit/Bed#: [address] [place] [address] Encounter: [id number]  Date Of Visit: 05/19/21    Problem List:    Principal Problem:    Sepsis ([place] [address] )  Active Problems:    UTI (urinary tract infection)    Liver transplanted ([place] [address] )    Hypothyroidism    HLD (hyperlipidemia)    Benign essential hypertension    Ulcerative colitis without complications ([place] [address] )    Dementia ([place] [address] )    Obesity, morbid ([place] [address] )      Assessment & Plan:    * Sepsis ([place] [address] )  Assessment & Plan  POA as evidence by tachycardia, tachypnea, fever due to UTI  - lactic acid 0 5  - continue IV Rocephin  - urine culture, blood cultures pending    UTI (urinary tract infection)  Assessment & Plan  Patient started with urinary frequency, urgency with fevers and chills  - UA shows UTI  - continue IV Abx with ceftriaxone  - follow-up urine culture results  - monitor urine output, Alberts was placed in the ED     Obesity, morbid ([place] [address] )  Assessment & Plan  Body mass index is 41 32 kg/m²  Dietary/lifestyle modification    Dementia ([place] [address] )  Assessment & Plan  History of mild dementia, currently at baseline  Ulcerative colitis without complications [facility])  Assessment & Plan  Patient follows with GI, states she is unsure if she is still taking budesonide  Benign essential hypertension  Assessment & Plan  Continue Lopressor  HLD (hyperlipidemia)  Assessment & Plan  Continue pravastatin, Zetia  Hypothyroidism  Assessment & Plan  Continue levothyroxine 112 mcg daily  Liver transplanted [facility])  Assessment & Plan  History of liver transplant  Continue CellCept, cyclosporine        VTE Pharmacologic Prophylaxis:   Pharmacologic: Enoxaparin (Lovenox)  Mechanical VTE Prophylaxis in Place: Yes    Patient Centered Rounds: I have performed bedside rounds with nursing staff today      Discussions with Specialists or Other Care Team Provider: yes    Education and Discussions with Family / Patient: yes - Arminda Jones    Time Spent for Care: 30 minutes  More than 50% of total time spent on counseling and coordination of care as described above  Current Length of Stay: 1 day(s)    Current Patient Status: Inpatient   Certification Statement: The patient will continue to require additional inpatient hospital stay due to Sepsis due to UTI    Discharge Plan: home    Code Status: Level 1 - Full Code      Subjective:   Patient states she does not feel well  denies any pain, shortness of breath    Objective:     Vitals:   Temp (24hrs), Av 1 °F (37 3 °C), Min:98 1 °F (36 7 °C), Max:101 5 °F (38 6 °C)    Temp:  [98 1 °F (36 7 °C)-101 5 °F (38 6 °C)] 98 9 °F (37 2 °C)  HR:  [] 83  Resp:  [16-30] 18  BP: (148-197)/() 153/66  SpO2:  [95 %-98 %] 97 %  Body mass index is 41 32 kg/m²  Input and Output Summary (last 24 hours): Intake/Output Summary (Last 24 hours) at 2021 0917  Last data filed at 2021 0656  Gross per 24 hour   Intake 140 ml   Output 800 ml   Net -660 ml       Physical Exam:     Physical Exam  Constitutional:       General: She is not in acute distress  Appearance: She is not diaphoretic  HENT:      Head: Normocephalic and atraumatic  Eyes:      General:         Right eye: No discharge  Left eye: No discharge  Cardiovascular:      Rate and Rhythm: Normal rate and regular rhythm  Pulmonary:      Effort: Pulmonary effort is normal  No respiratory distress  Breath sounds: Normal breath sounds  No wheezing or rales  Abdominal:      General: Bowel sounds are normal  There is no distension  Palpations: Abdomen is soft  Tenderness: There is no abdominal tenderness  Musculoskeletal:      Right lower leg: No edema  Left lower leg: No edema  Neurological:      Mental Status: She is alert and oriented to person, place, and time           Additional Data:     Labs:    Results from last 7 days   Lab Units 21  0522 WBC Thousand/uL 6 56   HEMOGLOBIN g/dL 9 8*   HEMATOCRIT % 32 0*   PLATELETS Thousands/uL 164   NEUTROS PCT % 74   LYMPHS PCT % 11*   MONOS PCT % 11   EOS PCT % 2     Results from last 7 days   Lab Units 05/19/21  0519 05/18/21 2201   POTASSIUM mmol/L 4 1 4 6   CHLORIDE mmol/L 105 102   CO2 mmol/L 26 25   BUN mg/dL 24 25   CREATININE mg/dL 1 03 1 05   CALCIUM mg/dL 8 5 8 8   ALK PHOS U/L  --  265*   ALT U/L  --  60   AST U/L  --  49*     Results from last 7 days   Lab Units 05/18/21 2201   INR  1 00       * I Have Reviewed All Lab Data Listed Above  * Additional Pertinent Lab Tests Reviewed: [address] 64 Admission Reviewed      Imaging:  Imaging Reports Reviewed Today Include: CXR  Imaging Personally Reviewed by Myself Includes:  N/A    Recent Cultures (last 7 days):     Results from last 7 days   Lab Units 05/18/21 2201   BLOOD CULTURE  Received in Microbiology Lab  Culture in Progress  Received in Microbiology Lab  Culture in Progress         Last 24 Hours Medication List:   Current Facility-Administered Medications   Medication Dose Route Frequency Provider Last Rate    acetaminophen  650 mg Oral Q6H PRN Fleurette Fabry, PA-KING      aspirin  81 mg Oral Daily With Breakfast Fleurette Fabry, PA-KING      calcium carbonate  1 tablet Oral Daily With Breakfast Fleurette Fabry, PA-KING      calcium carbonate  1,000 mg Oral Daily PRN Fleurette Fabry, PA-C      cholecalciferol  1,000 Units Oral Daily Fleurette Fabry, PA-C      clopidogrel  75 mg Oral Daily Fleurette Fabry, PA-C      cycloSPORINE modified  50 mg Oral BID Fleurette Fabry, PA-KING      enoxaparin  40 mg Subcutaneous Daily Fleurette Fabry, PA-KING      ezetimibe  10 mg Oral Daily Fleurette Fabry, [place]      levothyroxine  112 mcg Oral Early Morning Fleurette Fabry, PA-KING      metoprolol tartrate  37 5 mg Oral BID Fleurette Fabry, PA-KING      multivitamin stress formula  1 tablet Oral Daily Fleurette Fabry, PA-C      mycophenolate  750 mg Oral BID Fleurette Fabry, PA-C      ondansetron  4 mg Intravenous Q6H PRN Daryel Kind, PA-C      pantoprazole  40 mg Oral Early Morning Daryel Kind, PA-C      polyethylene glycol  17 g Oral Daily PRN Daryel Kind, PA-C      pravastatin  20 mg Oral HS Daryel Kind, PA-C            Today, Patient Was Seen By: Deanna Callahan DO    ** Please Note: "This note has been constructed using a voice recognition system  Therefore there may be syntax, spelling, and/or grammatical errors   Please call if you have any questions  "**

## 2021-05-19 NOTE — PHYSICAL THERAPY NOTE
PT EVALUATION       05/19/21 1530   PT Last Visit   PT Visit Date 05/19/21   Note Type   Note type Evaluation   Pain Assessment   Pain Assessment Tool Pain Assessment not indicated - pt denies pain   Home Living   Type of [address] [facility] [address] Two level;Bed/bath upstairs;1/2 bath on main level  (3 SPEEDY with 2 rails in garage)   [address] [facility] [facility]  (RW;rollator)   Prior Function   Level of [place] Independent with ADLs and functional mobility  (except bathing, significant other assists)   Lives With Significant other   Receives Help From Other (Comment)  (Significant other)   ADL Assistance Independent  (except for bathing)   Comments Pt amb w/out AD inside, is a "furniture/wall" walker at times  Occasional use of cane inside  When outside uses a cane    Restrictions/Precautions   Other Precautions Fall Risk;Bed Alarm; Chair Alarm   General   Additional Pertinent History Pt adm with fever and general malaise, temp of 101 3, urinary frequency, urgency and dysuria and found to have a UTI  Family/Caregiver Present Yes  (pt's significant other)   Cognition   Overall Cognitive Status WFL   Arousal/Participation Cooperative   Orientation Level Oriented X4   Following Commands Follows all commands and directions without difficulty   RLE Assessment   RLE Assessment WFL  (3 to 3+/5)   LLE Assessment   LLE Assessment WFL  (3 to 3+/5)   Bed Mobility   Supine to Sit 5  Supervision   Transfers   Sit to Stand 4  Minimal assistance   Additional items Assist x 1;Verbal cues   Stand to Sit 4  Minimal assistance   Additional items Assist x 1;Verbal cues   Ambulation/Elevation   Gait pattern   (mild, generalized unsteadiness)   Gait Assistance 4  Minimal assist   Additional items Assist x 1;Verbal cues; Tactile cues   Assistive Device None  (minimal hand hold A)   Distance 25 feet with change in direction   Balance   Static Sitting Good   Static Standing Fair   Dynamic Standing Fair -   Ambulatory Fair -   Activity Tolerance   Activity Tolerance Patient limited by fatigue;Treatment limited secondary to medical complications (Comment)  (weakness and deconditioning)   Assessment   Problem List Decreased strength;Decreased range of motion;Decreased endurance; Impaired balance;Decreased mobility; Decreased coordination;Decreased safety awareness   Assessment Patient seen for Physical Therapy evaluation  Patient admitted with Sepsis ([facility] [address] )  Comorbidities affecting patient's physical performance include: liver transplant, HLD, HTN, dementia, obesity, UTI, anemia, TIA, seizure, AMS, ataxic gait  Personal factors affecting patient at time of initial evaluation include: lives in two story house, ambulating with assistive device, stairs to enter home, inability to navigate community distances, inability to navigate level surfaces without external assistance and inability to perform dynamic tasks in community  Prior to admission, patient was independent with functional mobility with cane, independent with functional mobility without assistive device, independent with ADLS, living with significant other in a two level home with 3 steps to enter and ambulating household distance  Please find objective findings from Physical Therapy assessment regarding body systems outlined above with impairments and limitations including weakness, decreased ROM, impaired balance, decreased endurance, impaired coordination, gait deviations, decreased activity tolerance, decreased functional mobility tolerance, decreased safety awareness and fall risk  The Barthel Index was used as a functional outcome tool presenting with a score of 40 today indicating marked limitations of functional mobility and ADLS  Patient's clinical presentation is currently evolving as seen in patient's presentation of vital sign response, increased fall risk, new onset of impairment of functional mobility, decreased endurance and new onset of weakness   Pt would benefit from continued Physical Therapy treatment to address deficits as defined above and maximize level of functional mobility  As demonstrated by objective findings, the assigned level of complexity for this evaluation is moderate  The patient's [facility] Basic Mobility Inpatient Short Form Raw Score is 18, Standardized Score is 41 05  A standardized score less than 42 9 suggests the patient may benefit from discharge to post-acute rehabilitation services  Please also refer to the recommendation of the Physical Therapist for safe discharge planning  Despite ampac score, pt is safe for dc home with assist of significant other, cont amb with RW and home PT  Goals   Patient Goals go home   STG Expiration Date 05/26/21   Short Term Goal #1 bed mob - I; trans - I   Short Term Goal #2 pt will amb with a RW functional household distances - min A/S; balance with RW - F/F+ for gait and mobility   LTG Expiration Date 06/02/21   Long Term Goal #1 pt will amb w/wout AD functional household distances - I; balance w/wout AD - F+/G for safe gait and mobility  Long Term Goal #2 strength LEs - 3+ to 4-/5   Plan   Treatment/Interventions ADL retraining;Functional transfer training;LE strengthening/ROM; Elevations; Therapeutic exercise; Endurance training;Patient/family training;Equipment eval/education; Bed mobility;Gait training; Compensatory technique education   PT Frequency 5x/wk   Recommendation   PT Discharge Recommendation Home with home health rehabilitation   Equipment Recommended   (pt has a cane, RW and rollator)   [facility] Basic Mobility Inpatient   Turning in Bed Without Bedrails 3   Lying on Back to Sitting on Edge of Flat Bed 3   Moving Bed to Chair 3   Standing Up From Chair 3   Walk in Room 3   Climb 3-5 Stairs 3   Basic Mobility Inpatient Raw Score 18   Basic Mobility Standardized Score 41 05   Barthel Index   Feeding 5   Bathing 0   Grooming Score 0   Dressing Score 5   Bladder Score 0   Bowels Score 10   Toilet Use Score 5 Transfers (Bed/Chair) Score 10   Mobility (Level Surface) Score 0   Stairs Score 5   Barthel Index Score 36   Licensure   [place] License Number  Ramón [place], [place] [id number]     Time In:1530  Time Out:1540  Total Time: 10 mins      S:  "ok"  O:  Pt trans sit to stand with min A/S and amb with a RW 60 feet with change in direction and min A/S  Pt trans stand to sit with S and to supine in bed with S  Pt minimally SOB with amb but recovers quickly with rest   A:  Pt will benefit from cont amb with RW and cont skilled PT services to return pt to her prior level of function  P:  Cont per PT POC  DCP - home PT  Cont amb with RW      [place]ley [place], [place]   [id number]

## 2021-05-19 NOTE — H&P
[address] 1945, 66 y o  female MRN: [id number]  Unit/Bed#: [address]  Encounter: [id number]  Primary Care Provider: Jesus Mann MD   Date and time admitted to hospital: 5/18/2021  7:43 PM    * Sepsis [facility])  Assessment & Plan  POA as evidence by tachycardia, tachypnea, hyperthermia, evidence of UTI  - lactic acid 0 5  - continue IV Abx in immunosuppressed state  - urine culture, blood cultures pending  - continue ceftriaxone    UTI (urinary tract infection)  Assessment & Plan  Patient started with urinary frequency, urgency today with fevers and chills  - UA showed UTI  - continue IV Abx with ceftriaxone  - follow-up urine culture results  - monitor urine output, Alberts was placed in the ED     Dementia [facility])  Assessment & Plan  History of mild dementia, currently at baseline    Ulcerative colitis without complications [facility])  Assessment & Plan  Patient follows with GI, states she is unsure if she is still taking budesonide    Benign essential hypertension  Assessment & Plan  Continue Lopressor    HLD (hyperlipidemia)  Assessment & Plan  Continue pravastatin, Zetia    Hypothyroidism  Assessment & Plan  Continue levothyroxine 112 mcg daily    Liver transplanted [facility])  Assessment & Plan  History of liver transplant  Continue CellCept, cyclosporine    VTE Prophylaxis: Enoxaparin (Lovenox)  / sequential compression device   Code Status:  Level 1  POLST: POLST is not applicable to this patient  Discussion with family:  No    Anticipated Length of Stay:  Patient will be admitted on an Inpatient basis with an anticipated length of stay of  > 2 midnights  Justification for Hospital Stay:  Sepsis, UTI    Total Time for Visit, including Counseling / Coordination of Care: 30 minutes  Greater than 50% of this total time spent on direct patient counseling and coordination of care  Chief Complaint:   Fever    History of Present Illness: Wong Dong is a 66 y o  female who presents with PMH of liver transplant , HTN, HLD, ulcerative colitis, hypothyroidism, GERD  She presented to the ED today for evaluation of fevers, and general malaise  She states she has been feeling unwell since she woke up this AM   Her  took her temperature around 6:30 p m  Today and she had a temp of 101 3° F  She called her PCP who recommended she be evaluated in the ED  She does admit to experiencing urinary frequency and urgency and dysuria, denies hematuria  She denies any nausea, vomiting, abdominal pain but hasn't had an appetite today  She also reports having an intermittent cough, nonproductive  She denies headaches, congestion, SOB, chest pain  In the ED she was noted to have a temp 101 5° F on arrival was tachycardic and tachypneic  Her labs were generally unremarkable including a negative COVID test, however she did have a UA that showed evidence of UTI  She was given IV Abx in the ED  A femoral line was placed in the ED due to difficult IV access  A Alberts catheter was also placed in the ED  Review of Systems:    Review of Systems   Constitutional: Positive for appetite change, chills, fatigue and fever  HENT: Negative for congestion, rhinorrhea and sore throat  Eyes: Negative for visual disturbance  Respiratory: Negative for cough, shortness of breath and wheezing  Cardiovascular: Negative for chest pain, palpitations and leg swelling  Gastrointestinal: Negative for abdominal distention, abdominal pain, diarrhea, nausea and vomiting  Genitourinary: Positive for dysuria, frequency and urgency  Negative for hematuria  Musculoskeletal: Negative for gait problem  Skin: Negative for color change  Neurological: Negative for dizziness, weakness, light-headedness and headaches          Past Medical and Surgical History:     Past Medical History:   Diagnosis Date    Anemia     Anxiety     Arthritis     Benign paroxysmal vertigo, unspecified ear     onset: 05/06/2010    Cirrhosis ([facility] )     onset: 03/30/2005    Crohn disease ([place] [address] )     Disease of thyroid gland     GERD (gastroesophageal reflux disease)     History of transfusion     Liver transplant candidate     onset: 09/16/2008    Osteoporosis     Pelvic fracture ([place] [address] )     onset: 10/14/2008       Past Surgical History:   Procedure Laterality Date    ABDOMINAL SURGERY      APPENDECTOMY      BLADDER AUGMENTATION N/A     CATARACT EXTRACTION      CHOLECYSTECTOMY N/A     COLONOSCOPY N/A 4/20/2017    Procedure: COLONOSCOPY;  Surgeon: Peg Bowie MD;  Location: [place] [address] GI LAB; Service:     ESOPHAGOGASTRODUODENOSCOPY N/A 3/31/2016    Procedure: ESOPHAGOGASTRODUODENOSCOPY (EGD); Surgeon: Peg Bowie MD;  Location: [place] [address] GI LAB; Service:     ESOPHAGOGASTRODUODENOSCOPY N/A 4/20/2017    Procedure: ESOPHAGOGASTRODUODENOSCOPY (EGD); Surgeon: Peg Bowie MD;  Location: [place] [address] GI LAB; Service:     EYE SURGERY      cataracts removed both eyes lens implant    HERNIA REPAIR      JOINT REPLACEMENT      left knee replacement    LIVER TRANSPLANTATION N/A     TONSILECTOMY AND ADNOIDECTOMY N/A     TUBAL LIGATION         Meds/Allergies:    Prior to Admission medications    Medication Sig Start Date End Date Taking?  Authorizing Provider   aspirin (ECOTRIN LOW STRENGTH) 81 mg EC tablet Take 1 tablet (81 mg total) by mouth daily with breakfast 4/22/21   Belkys Cee MD   b complex vitamins capsule Take 1 capsule by mouth daily 7/1/20   Chacorta Arango MD   BUDESONIDE PO Take 3 mg by mouth    Historical Provider, MD   Calcium 500 MG tablet Take 500 mg by mouth 11/6/20   Historical Provider, MD   Cholecalciferol (Vitamin D) 50 MCG (2000 UT) tablet Take 1,000 Units by mouth 11/6/20   Historical Provider, MD   clopidogrel (PLAVIX) 75 mg tablet Take 1 tablet by mouth once daily  Patient not taking: Reported on 5/7/2021 3/9/21   Chcaorta Arango MD   clopidogrel (PLAVIX) 75 mg tablet Take 1 tablet (75 mg total) by mouth daily 3/9/21   Aminata Riley MD   cycloSPORINE modified (NEORAL) 25 mg capsule Take 50 mg by mouth 2 (two) times a day    Historical Provider, MD   ezetimibe (ZETIA) 10 mg tablet Take 1 tablet (10 mg total) by mouth daily 21   Ernestina Kasper MD   levothyroxine 112 mcg tablet TAKE 1 TABLET EVERY DAY AS DIRECTED 21   Melba Rivera MD   metoprolol tartrate (LOPRESSOR) 25 mg tablet Take 1 5 tablets (37 5 mg total) by mouth 2 (two) times a day 21   Ernestina Kasper MD   mycophenolate (CELLCEPT) 250 mg capsule Take 250 mg by mouth 2 (two) times a day 3 tabs each dose=750 mg     Historical Provider, MD   omeprazole (PriLOSEC) 40 MG capsule Take 40 mg by mouth daily    Historical Provider, MD   pravastatin (PRAVACHOL) 20 mg tablet TAKE 1 TABLET BY MOUTH ONCE DAILY AT BEDTIME 20   Karen Woodruff MD       Allergies: Allergies   Allergen Reactions    Tetracyclines & Related Hives    Vancomycin Other (See Comments) and Itching     Reaction Date: ; Pt unsure of reaction    Prograf [Tacrolimus] Anxiety     Reaction Date: 2008;         Social History:    Social History     Substance and Sexual Activity   Alcohol Use Never    Frequency: Never     Social History     Tobacco Use   Smoking Status Former Smoker    Years: 5 00    Quit date: 1975    Years since quittin 3   Smokeless Tobacco Never Used   Tobacco Comment    smoked for 5 years-1/2 to 1 ppd     Social History     Substance and Sexual Activity   Drug Use Never       Family History:    Family History   Problem Relation Age of Onset    Cancer Mother         breast    Cancer Father     Crohn's disease Brother     Arthritis Family     Breast cancer Family        Physical Exam:     Vitals:   Blood Pressure: 157/79 (21)  Pulse: 92 (21)  Temperature: 99 3 °F (37 4 °C) (21)  Temp Source: Oral (21)  Respirations: (!) 23 (21)  Weight - Scale: 103 kg (226 lb)(last weight used ) (05/18/21 1933)  SpO2: 96 % (05/19/21 0015)    Physical Exam  Vitals signs reviewed  Constitutional:       General: She is not in acute distress  Appearance: She is well-developed  She is obese  She is not ill-appearing  HENT:      Head: Normocephalic and atraumatic  Cardiovascular:      Rate and Rhythm: Normal rate and regular rhythm  Heart sounds: Normal heart sounds  No murmur  No gallop  Pulmonary:      Effort: Pulmonary effort is normal  No respiratory distress  Breath sounds: Normal breath sounds  No wheezing, rhonchi or rales  Abdominal:      General: Bowel sounds are normal  There is no distension  Palpations: Abdomen is soft  Tenderness: There is no abdominal tenderness  There is no guarding  Musculoskeletal:         General: No tenderness  Right lower leg: No edema  Left lower leg: No edema  Skin:     General: Skin is warm and dry  Findings: No erythema or rash  Neurological:      Mental Status: She is alert and oriented to person, place, and time  Psychiatric:         Mood and Affect: Mood normal          Behavior: Behavior normal        Additional Data:     Lab Results: I have personally reviewed pertinent reports        Results from last 7 days   Lab Units 05/18/21  2201   WBC Thousand/uL 9 21   HEMOGLOBIN g/dL 10 7*   HEMATOCRIT % 32 9*   PLATELETS Thousands/uL 202   NEUTROS PCT % 82*   LYMPHS PCT % 8*   MONOS PCT % 9   EOS PCT % 1     Results from last 7 days   Lab Units 05/18/21  2201   SODIUM mmol/L 136   POTASSIUM mmol/L 4 6   CHLORIDE mmol/L 102   CO2 mmol/L 25   BUN mg/dL 25   CREATININE mg/dL 1 05   ANION GAP mmol/L 9   CALCIUM mg/dL 8 8   ALBUMIN g/dL 3 2*   TOTAL BILIRUBIN mg/dL 0 62   ALK PHOS U/L 265*   ALT U/L 60   AST U/L 49*   GLUCOSE RANDOM mg/dL 114     Results from last 7 days   Lab Units 05/18/21  2201   INR  1 00             Results from last 7 days   Lab Units 05/18/21  2201   LACTIC ACID mmol/L 0 5 Imaging: I have personally reviewed pertinent reports  XR chest 1 view portable    (Results Pending)       EKG, Pathology, and Other Studies Reviewed on Admission:   · EKG: NSR, 93 bpm    Allscripts / Epic Records Reviewed: Yes     ** Please Note: This note has been constructed using a voice recognition system   **

## 2021-05-19 NOTE — ED PROCEDURE NOTE
PROCEDURE  Central Line    Date/Time: 5/18/2021 9:30 PM  Performed by: Cat Madrigal DO  Authorized by: Cat Madrigal DO     Patient location:  ED  Other Assisting Provider: No    Consent:     Consent obtained:  Verbal    Consent given by:  Patient    Risks discussed:  Arterial puncture, incorrect placement, nerve damage, bleeding and infection    Alternatives discussed:  No treatment, delayed treatment, alternative treatment, observation and referral  Universal protocol:     Patient identity confirmed:  Verbally with patient and arm band  Pre-procedure details:     Hand hygiene: Hand hygiene performed prior to insertion      Sterile barrier technique: All elements of maximal sterile technique followed      Skin preparation:  2% chlorhexidine  Indications:     Central line indications: medications requiring central line      Site selection rationale:  Difficult IV access  Anesthesia (see MAR for exact dosages): Anesthesia method:  Local infiltration    Local anesthetic:  Lidocaine 1% w/o epi  Procedure details:     Location:  Left femoral    Vessel type: vein      Laterality:  Left    Approach: percutaneous technique used      Patient position:  Flat    Catheter type:  Triple lumen    Landmarks identified: yes      Ultrasound guidance: yes      Ultrasound image availability:  Not saved    Sterile ultrasound techniques: Sterile gel and sterile probe covers were used      Number of attempts:  1    Successful placement: yes    Post-procedure details:     Post-procedure:  Dressing applied and line sutured    Assessment:  Blood return through all ports and free fluid flow    Post-procedure complications: none      Patient tolerance of procedure:   Tolerated well, no immediate complications         Robert Rivera DO  05/18/21 4214

## 2021-05-19 NOTE — ED PROVIDER NOTES
History  Chief Complaint   Patient presents with    Fever - 75 years or older     Patient states she did " not feel well all day ",  took temperature at 6:30 pm and it was 101 3  Patient has a hx of liver transplant  Called PCP and sent to ED     67 yo female c/o fever, chills and weakness that started today  + cough  No vomiting or diarrhea  No chest/belly pain  + dysuria  History provided by:  Patient   used: No    Fever - 75 years or older  Associated symptoms: chills, cough and dysuria    Associated symptoms: no chest pain, no diarrhea, no headaches, no myalgias, no nausea, no rash and no vomiting        Prior to Admission Medications   Prescriptions Last Dose Informant Patient Reported? Taking?    BUDESONIDE PO  Self Yes No   Sig: Take 3 mg by mouth   Calcium 500 MG tablet  Self Yes No   Sig: Take 500 mg by mouth   Cholecalciferol (Vitamin D) 50 MCG (2000 UT) tablet  Self Yes No   Sig: Take 1,000 Units by mouth   aspirin (ECOTRIN LOW STRENGTH) 81 mg EC tablet   No No   Sig: Take 1 tablet (81 mg total) by mouth daily with breakfast   b complex vitamins capsule  Self No No   Sig: Take 1 capsule by mouth daily   clopidogrel (PLAVIX) 75 mg tablet  Self No No   Sig: Take 1 tablet by mouth once daily   Patient not taking: Reported on 5/7/2021   clopidogrel (PLAVIX) 75 mg tablet  Self No No   Sig: Take 1 tablet (75 mg total) by mouth daily   cycloSPORINE modified (NEORAL) 25 mg capsule  Self Yes No   Sig: Take 50 mg by mouth 2 (two) times a day   ezetimibe (ZETIA) 10 mg tablet   No No   Sig: Take 1 tablet (10 mg total) by mouth daily   levothyroxine 112 mcg tablet  Self No No   Sig: TAKE 1 TABLET EVERY DAY AS DIRECTED   metoprolol tartrate (LOPRESSOR) 25 mg tablet   No No   Sig: Take 1 5 tablets (37 5 mg total) by mouth 2 (two) times a day   mycophenolate (CELLCEPT) 250 mg capsule  Self Yes No   Sig: Take 250 mg by mouth 2 (two) times a day 3 tabs each dose=750 mg    omeprazole (PriLOSEC) 40 MG capsule  Self Yes No   Sig: Take 40 mg by mouth daily   pravastatin (PRAVACHOL) 20 mg tablet  Self No No   Sig: TAKE 1 TABLET BY MOUTH ONCE DAILY AT BEDTIME      Facility-Administered Medications: None       Past Medical History:   Diagnosis Date    Anemia     Anxiety     Arthritis     Benign paroxysmal vertigo, unspecified ear     onset: 05/06/2010    Cirrhosis ([place] [address] )     onset: 03/30/2005    Crohn disease ([place] [address] )     Disease of thyroid gland     GERD (gastroesophageal reflux disease)     History of transfusion     Liver transplant candidate     onset: 09/16/2008    Osteoporosis     Pelvic fracture ([place] [address] )     onset: 10/14/2008       Past Surgical History:   Procedure Laterality Date    ABDOMINAL SURGERY      APPENDECTOMY      BLADDER AUGMENTATION N/A     CATARACT EXTRACTION      CHOLECYSTECTOMY N/A     COLONOSCOPY N/A 4/20/2017    Procedure: COLONOSCOPY;  Surgeon: An Baptiste MD;  Location: [place] [address] [facility]; Service:     ESOPHAGOGASTRODUODENOSCOPY N/A 3/31/2016    Procedure: ESOPHAGOGASTRODUODENOSCOPY (EGD); Surgeon: An Baptiste MD;  Location: [place] [address] [facility]; Service:     ESOPHAGOGASTRODUODENOSCOPY N/A 4/20/2017    Procedure: ESOPHAGOGASTRODUODENOSCOPY (EGD); Surgeon: An Baptiste MD;  Location: [place] [address] [facility]; Service:     EYE SURGERY      cataracts removed both eyes lens implant    HERNIA REPAIR      JOINT REPLACEMENT      left knee replacement    LIVER TRANSPLANTATION N/A     TONSILECTOMY AND ADNOIDECTOMY N/A     TUBAL LIGATION         Family History   Problem Relation Age of Onset    Cancer Mother         breast    Cancer Father     Crohn's disease Brother     Arthritis Family     Breast cancer Family      I have reviewed and agree with the history as documented      E-Cigarette/Vaping    E-Cigarette Use Never User      E-Cigarette/Vaping Substances     Social History     Tobacco Use    Smoking status: Former Smoker     Years: 5 00     Quit date: 1/21/1975 Years since quittin 2    Smokeless tobacco: Never Used    Tobacco comment: smoked for 5 years-1/2 to 1 ppd   Substance Use Topics    Alcohol use: Never     Frequency: Never    Drug use: Never       Review of Systems   Constitutional: Positive for chills and fever  HENT: Negative  Eyes: Negative  Respiratory: Positive for cough  Negative for shortness of breath  Cardiovascular: Negative  Negative for chest pain  Gastrointestinal: Negative  Negative for abdominal pain, diarrhea, nausea and vomiting  Genitourinary: Positive for dysuria  Negative for flank pain  Musculoskeletal: Negative  Negative for back pain and myalgias  Skin: Negative  Negative for rash and wound  Neurological: Negative  Negative for dizziness and headaches  Hematological: Does not bruise/bleed easily  Psychiatric/Behavioral: Negative  All other systems reviewed and are negative  Physical Exam  Physical Exam  Vitals signs and nursing note reviewed  Constitutional:       General: She is not in acute distress  Appearance: She is well-developed  She is obese  She is not ill-appearing, toxic-appearing or diaphoretic  HENT:      Head: Normocephalic and atraumatic  Eyes:      Conjunctiva/sclera: Conjunctivae normal    Neck:      Musculoskeletal: Neck supple  Cardiovascular:      Rate and Rhythm: Regular rhythm  Tachycardia present  Heart sounds: Normal heart sounds  No murmur  Pulmonary:      Effort: Pulmonary effort is normal  No respiratory distress  Breath sounds: Normal breath sounds  Abdominal:      General: Bowel sounds are normal  There is no distension  Palpations: Abdomen is soft  Tenderness: There is no abdominal tenderness  Musculoskeletal: Normal range of motion  General: No deformity  Right lower leg: No edema  Left lower leg: No edema  Skin:     General: Skin is warm and dry  Coloration: Skin is not pale  Findings: No rash  Neurological:      General: No focal deficit present  Mental Status: She is alert and oriented to person, place, and time  Cranial Nerves: No cranial nerve deficit  Psychiatric:         Mood and Affect: Mood normal          Behavior: Behavior normal          Vital Signs  ED Triage Vitals   Temperature Pulse Respirations Blood Pressure SpO2   05/18/21 1933 05/18/21 1933 05/18/21 1933 05/18/21 1933 05/18/21 1933   (!) 101 5 °F (38 6 °C) 105 (!) 28 (!) 197/91 98 %      Temp Source Heart Rate Source Patient Position - Orthostatic VS BP Location FiO2 (%)   05/18/21 1933 05/18/21 1933 05/18/21 1933 05/18/21 1933 --   Tympanic Monitor Sitting Left arm       Pain Score       05/18/21 2219       Med Not Given for Pain - for MAR use only           Vitals:    05/18/21 1933 05/18/21 2045 05/18/21 2215   BP: (!) 197/91 (!) 174/84 (!) 175/100   Pulse: 105 94    Patient Position - Orthostatic VS: Sitting           Visual Acuity      ED Medications  Medications   acetaminophen (TYLENOL) tablet 650 mg (650 mg Oral Given 5/18/21 2219)   cefTRIAXone (ROCEPHIN) IVPB (premix in dextrose) 1,000 mg 50 mL (1,000 mg Intravenous New Bag 5/18/21 2234)       Diagnostic Studies  Results Reviewed     Procedure Component Value Units Date/Time    Lactic Acid [[id number]]  (Normal) Collected: 05/18/21 2201    Lab Status: Final result Specimen: Blood from Central Venous Line Updated: 05/18/21 2237     LACTIC ACID 0 5 mmol/L     Narrative:      Result may be elevated if tourniquet was used during collection      Troponin I [[id number]]  (Normal) Collected: 05/18/21 2201    Lab Status: Final result Specimen: Blood from Central Venous Line Updated: 05/18/21 2236     Troponin I <0 02 ng/mL     Comprehensive metabolic panel [[id number]]  (Abnormal) Collected: 05/18/21 2201    Lab Status: Final result Specimen: Blood from Central Venous Line Updated: 05/18/21 2232     Sodium 136 mmol/L      Potassium 4 6 mmol/L      Chloride 102 mmol/L      CO2 25 mmol/L      ANION GAP 9 mmol/L      BUN 25 mg/dL      Creatinine 1 05 mg/dL      Glucose 114 mg/dL      Calcium 8 8 mg/dL      Corrected Calcium 9 4 mg/dL      AST 49 U/L      ALT 60 U/L      Alkaline Phosphatase 265 U/L      Total Protein 7 6 g/dL      Albumin 3 2 g/dL      Total Bilirubin 0 62 mg/dL      eGFR 51 ml/min/1 73sq m     Narrative:      [place] guidelines for Chronic Kidney Disease (CKD):     Stage 1 with normal or high GFR (GFR > 90 mL/min/1 73 square meters)    Stage 2 Mild CKD (GFR = 60-89 mL/min/1 73 square meters)    Stage 3A Moderate CKD (GFR = 45-59 mL/min/1 73 square meters)    Stage 3B Moderate CKD (GFR = 30-44 mL/min/1 73 square meters)    Stage 4 Severe CKD (GFR = 15-29 mL/min/1 73 square meters)    Stage 5 End Stage CKD (GFR <15 mL/min/1 73 square meters)  Note: GFR calculation is accurate only with a steady state creatinine    Novel Coronavirus Emiliana Pantoja [facility] [[id number]] Collected: 05/18/21 2224    Lab Status:  In process Specimen: Nares from Nasopharyngeal Swab Updated: 05/18/21 2228    Urine Microscopic [[id number]]  (Abnormal) Collected: 05/18/21 2211    Lab Status: Final result Specimen: Urine, Clean Catch Updated: 05/18/21 2227     RBC, UA 2-4 /hpf      WBC, UA 10-20 /hpf      Epithelial Cells Occasional /hpf      Bacteria, UA Innumerable /hpf     Protime-INR [[id number]]  (Normal) Collected: 05/18/21 2201    Lab Status: Final result Specimen: Blood from Central Venous Line Updated: 05/18/21 2226     Protime 13 1 seconds      INR 1 00    APTT [[id number]]  (Normal) Collected: 05/18/21 2201    Lab Status: Final result Specimen: Blood from Central Venous Line Updated: 05/18/21 2226     PTT 26 seconds     UA (URINE) with reflex to Scope [[id number]]  (Abnormal) Collected: 05/18/21 2211    Lab Status: Final result Specimen: Urine, Clean Catch Updated: 05/18/21 2219     Color, UA Light Yellow     Clarity, UA Slightly Cloudy     Specific Gravity, UA 1 015 pH, UA 6 5     Leukocytes, UA Small     Nitrite, UA Positive     Protein, UA Negative mg/dl      Glucose, UA Negative mg/dl      Ketones, UA Negative mg/dl      Urobilinogen, UA 0 2 E U /dl      Bilirubin, UA Negative     Blood, UA Large    CBC and differential [[id number]]  (Abnormal) Collected: 05/18/21 2201    Lab Status: Final result Specimen: Blood from Central Venous Line Updated: 05/18/21 2216     WBC 9 21 Thousand/uL      RBC 3 64 Million/uL      Hemoglobin 10 7 g/dL      Hematocrit 32 9 %      MCV 90 fL      MCH 29 4 pg      MCHC 32 5 g/dL      RDW 14 0 %      MPV 9 5 fL      Platelets 867 Thousands/uL      Neutrophils Relative 82 %      Lymphocytes Relative 8 %      Monocytes Relative 9 %      Eosinophils Relative 1 %      Basophils Relative 0 %      Neutrophils Absolute 7 53 Thousands/µL      Lymphocytes Absolute 0 75 Thousands/µL      Monocytes Absolute 0 79 Thousand/µL      Eosinophils Absolute 0 12 Thousand/µL      Basophils Absolute 0 02 Thousands/µL     Urine culture [[id number]] Collected: 05/18/21 2211    Lab Status: In process Specimen: Urine, Clean Catch Updated: 05/18/21 2214    Blood culture #2 [[id number]] Collected: 05/18/21 2201    Lab Status: In process Specimen: Blood from Central Venous Line Updated: 05/18/21 2214    Blood culture #1 [[id number]] Collected: 05/18/21 2201    Lab Status: In process Specimen: Blood from Central Venous Line Updated: 05/18/21 2214    Procalcitonin with AM Reflex [[id number]] Collected: 05/18/21 2201    Lab Status:  In process Specimen: Blood from Central Venous Line Updated: 05/18/21 2213                 XR chest 1 view portable    (Results Pending)              Procedures  ECG 12 Lead Documentation Only    Date/Time: 5/18/2021 10:07 PM  Performed by: Kapil Bui MD  Authorized by: Kapil Bui MD     Indications / Diagnosis:  Fever, sepsis  ECG reviewed by me, the ED Provider: yes    Patient location:  ED  Previous ECG:     Previous ECG: Unavailable  Interpretation:     Interpretation: abnormal    Rate:     ECG rate:  93    ECG rate assessment: normal    Rhythm:     Rhythm: sinus rhythm    Ectopy:     Ectopy: none    QRS:     QRS axis:  Left  Conduction:     Conduction: normal    ST segments:     ST segments:  Non-specific    Depression:  V5 and V6  T waves:     T waves: normal               ED Course                         Initial Sepsis Screening     Row Name 05/18/21 1626                Is the patient's history suggestive of a new or worsening infection? (!) Yes (Proceed)  -ST        Suspected source of infection  urinary tract infection  -ST        Are two or more of the following signs & symptoms of infection both present and new to the patient? (!) Yes (Proceed)  -ST        Indicate SIRS criteria  Hyperthemia > 38 3C (100 9F); Tachycardia > 90 bpm;Tachypnea > 20 resp per min  -ST        If the answer is yes to both questions, suspicion of sepsis is present  --        If severe sepsis is present AND tissue hypoperfusion perists in the hour after fluid resuscitation or lactate > 4, the patient meets criteria for SEPTIC SHOCK  --        Are any of the following organ dysfunction criteria present within 6 hours of suspected infection and SIRS criteria that are NOT considered to be chronic conditions?   No  -ST        Organ dysfunction  --        Date of presentation of severe sepsis  --        Time of presentation of severe sepsis  --        Tissue hypoperfusion persists in the hour after crystalloid fluid administration, evidenced, by either:  --        Was hypotension present within one hour of the conclusion of crystalloid fluid administration?  --        Date of presentation of septic shock  --        Time of presentation of septic shock  --          User Key  (r) = Recorded By, (t) = Taken By, (c) = Cosigned By    [address] Name Provider Lisbeth Benoit MD Physician                        MDM  Number of Diagnoses or Management Options  Fever:   Sepsis [facility]):   UTI (urinary tract infection):   Diagnosis management comments: Will admit for sepsis, UTI  Disposition  Final diagnoses:   Fever   Sepsis ([facility] [address] )   UTI (urinary tract infection)     Time reflects when diagnosis was documented in both MDM as applicable and the Disposition within this note     Time User Action Codes Description Comment    8/52/2017 07:18 PM Yvonnie Mole A Add [S12 1] Fever     5/01/8060 03:49 PM Yvonnie Mole A Add [T97 4] Sepsis ([facility] [address] )     3/34/4585 84:25 PM Genna Morrison Add [Q80 4] UTI (urinary tract infection)       ED Disposition     ED Disposition Condition Date/Time Comment    Admit Stable Tue May 18, 2021 11:05 PM Case was discussed with *hospitalist** and the patient's admission status was agreed to be Admission Status: inpatient status         Follow-up Information    None         Patient's Medications   Discharge Prescriptions    No medications on file     No discharge procedures on file      PDMP Review     None          ED Provider  Electronically Signed by           Colby Ruth MD  96/51/76 [id number]

## 2021-05-19 NOTE — ASSESSMENT & PLAN NOTE
Patient started with urinary frequency, urgency with fevers and chills    - UA shows UTI  - as above received doses of IV ceftriaxone and transitioned to Vantin on discharge  - Alberts was placed in the ED but has since been discontinued

## 2021-05-19 NOTE — ED NOTES
Multiple peripheral IVs attempted by various RNs  Ultrasound guided IVs attempted  Patient has poor peripheral vasculature and cannot be adequately viewed on ultrasound  MD is aware   MD to go to bedside and place central line due to lack of peripheral vasculature that will support IV placement      Claudia De Santiago RN  05/18/21 2224

## 2021-05-19 NOTE — ASSESSMENT & PLAN NOTE
POA as evidence by tachycardia, tachypnea, hyperthermia, evidence of UTI  - lactic acid 0 5  - continue IV Abx in immunosuppressed state  - urine culture, blood cultures pending  - continue ceftriaxone

## 2021-05-19 NOTE — ED NOTES
Attempted x2 for IV in left arm pt   Had swelling immediately and hematoma immediatly formed      Mansi Harding RN  05/18/21 2039

## 2021-05-20 LAB
ANION GAP SERPL CALCULATED.3IONS-SCNC: 10 MMOL/L (ref 4–13)
BUN SERPL-MCNC: 31 MG/DL (ref 5–25)
CALCIUM SERPL-MCNC: 8 MG/DL (ref 8.3–10.1)
CHLORIDE SERPL-SCNC: 104 MMOL/L (ref 100–108)
CO2 SERPL-SCNC: 24 MMOL/L (ref 21–32)
CREAT SERPL-MCNC: 1.32 MG/DL (ref 0.6–1.3)
ERYTHROCYTE [DISTWIDTH] IN BLOOD BY AUTOMATED COUNT: 14 % (ref 11.6–15.1)
GFR SERPL CREATININE-BSD FRML MDRD: 39 ML/MIN/1.73SQ M
GLUCOSE SERPL-MCNC: 113 MG/DL (ref 65–140)
HCT VFR BLD AUTO: 30 % (ref 34.8–46.1)
HGB BLD-MCNC: 9.3 G/DL (ref 11.5–15.4)
MCH RBC QN AUTO: 28.4 PG (ref 26.8–34.3)
MCHC RBC AUTO-ENTMCNC: 31 G/DL (ref 31.4–37.4)
MCV RBC AUTO: 92 FL (ref 82–98)
PLATELET # BLD AUTO: 165 THOUSANDS/UL (ref 149–390)
PMV BLD AUTO: 9.5 FL (ref 8.9–12.7)
POTASSIUM SERPL-SCNC: 4.1 MMOL/L (ref 3.5–5.3)
PROCALCITONIN SERPL-MCNC: 0.19 NG/ML
RBC # BLD AUTO: 3.27 MILLION/UL (ref 3.81–5.12)
SODIUM SERPL-SCNC: 138 MMOL/L (ref 136–145)
WBC # BLD AUTO: 4.99 THOUSAND/UL (ref 4.31–10.16)

## 2021-05-20 PROCEDURE — 99232 SBSQ HOSP IP/OBS MODERATE 35: CPT | Performed by: FAMILY MEDICINE

## 2021-05-20 PROCEDURE — 97535 SELF CARE MNGMENT TRAINING: CPT

## 2021-05-20 PROCEDURE — 84145 PROCALCITONIN (PCT): CPT | Performed by: EMERGENCY MEDICINE

## 2021-05-20 PROCEDURE — 97167 OT EVAL HIGH COMPLEX 60 MIN: CPT

## 2021-05-20 PROCEDURE — 85027 COMPLETE CBC AUTOMATED: CPT | Performed by: FAMILY MEDICINE

## 2021-05-20 PROCEDURE — 80048 BASIC METABOLIC PNL TOTAL CA: CPT | Performed by: FAMILY MEDICINE

## 2021-05-20 RX ORDER — SODIUM CHLORIDE 9 MG/ML
50 INJECTION, SOLUTION INTRAVENOUS CONTINUOUS
Status: DISPENSED | OUTPATIENT
Start: 2021-05-20 | End: 2021-05-20

## 2021-05-20 RX ADMIN — EZETIMIBE 10 MG: 10 TABLET ORAL at 09:22

## 2021-05-20 RX ADMIN — ENOXAPARIN SODIUM 40 MG: 40 INJECTION SUBCUTANEOUS at 09:22

## 2021-05-20 RX ADMIN — ASPIRIN 81 MG: 81 TABLET, COATED ORAL at 09:23

## 2021-05-20 RX ADMIN — METOPROLOL TARTRATE 37.5 MG: 25 TABLET, FILM COATED ORAL at 09:22

## 2021-05-20 RX ADMIN — CALCIUM 1 TABLET: 500 TABLET ORAL at 09:22

## 2021-05-20 RX ADMIN — MYCOPHENOLATE MOFETIL 750 MG: 250 CAPSULE ORAL at 09:22

## 2021-05-20 RX ADMIN — B-COMPLEX W/ C & FOLIC ACID TAB 1 TABLET: TAB at 09:23

## 2021-05-20 RX ADMIN — PRAVASTATIN SODIUM 20 MG: 20 TABLET ORAL at 22:24

## 2021-05-20 RX ADMIN — MYCOPHENOLATE MOFETIL 750 MG: 250 CAPSULE ORAL at 17:53

## 2021-05-20 RX ADMIN — LEVOTHYROXINE SODIUM 112 MCG: 112 TABLET ORAL at 05:54

## 2021-05-20 RX ADMIN — CLOPIDOGREL BISULFATE 75 MG: 75 TABLET ORAL at 09:22

## 2021-05-20 RX ADMIN — METOPROLOL TARTRATE 37.5 MG: 25 TABLET, FILM COATED ORAL at 20:15

## 2021-05-20 RX ADMIN — CYCLOSPORINE 50 MG: 25 CAPSULE ORAL at 17:53

## 2021-05-20 RX ADMIN — Medication 1000 UNITS: at 09:22

## 2021-05-20 RX ADMIN — CYCLOSPORINE 50 MG: 25 CAPSULE ORAL at 09:22

## 2021-05-20 RX ADMIN — PANTOPRAZOLE SODIUM 40 MG: 40 TABLET, DELAYED RELEASE ORAL at 05:54

## 2021-05-20 RX ADMIN — SODIUM CHLORIDE 50 ML/HR: 0.9 INJECTION, SOLUTION INTRAVENOUS at 10:20

## 2021-05-20 NOTE — PROGRESS NOTES
[facility] Internal Medicine Progress Note  Patient: Elda Woods 66 y o  female   MRN: [id number]  PCP: Vasquez Tomas MD  Unit/Bed#: 2 [place] [address] Encounter: [id number]  Date Of Visit: 05/20/21    Problem List:    Principal Problem:    Sepsis ([place] [address] )  Active Problems:    UTI (urinary tract infection)    Liver transplanted ([place] [address] )    Hypothyroidism    HLD (hyperlipidemia)    Benign essential hypertension    Ulcerative colitis without complications ([place] [address] )    Dementia ([place] [address] )    Obesity, morbid ([place] [address] )      Assessment & Plan:    * Sepsis ([place] [address] )  Assessment & Plan  POA as evidence by tachycardia, tachypnea, fever due to UTI  - lactic acid 0 5  - continue IV Rocephin  - urine culture pending  Blood cultures negative so far    UTI (urinary tract infection)  Assessment & Plan  Patient started with urinary frequency, urgency with fevers and chills  - UA shows UTI  - continue IV Abx with ceftriaxone  - follow-up urine culture results  - Alberts was placed in the ED but has since been discontinued    Obesity, morbid ([place] [address] )  Assessment & Plan  Body mass index is 41 32 kg/m²  Dietary/lifestyle modification    Dementia ([place] [address] )  Assessment & Plan  History of mild dementia, currently at baseline    Ulcerative colitis without complications [facility])  Assessment & Plan  Patient follows with GI, states she is unsure if she is still taking budesonide    Benign essential hypertension  Assessment & Plan  Continue Lopressor    HLD (hyperlipidemia)  Assessment & Plan  Continue pravastatin, Zetia    Hypothyroidism  Assessment & Plan  Continue levothyroxine 112 mcg daily    Liver transplanted [facility])  Assessment & Plan  History of liver transplant  Continue CellCept, cyclosporine  VTE Pharmacologic Prophylaxis:   Pharmacologic: Enoxaparin (Lovenox)  Mechanical VTE Prophylaxis in Place: Yes    Patient Centered Rounds: I have performed bedside rounds with nursing staff today      Discussions with Specialists or Other Care Team Provider: yes    Education and Discussions with Family / Patient: yes - per pt  Request with Lukas Albarran    Time Spent for Care: 30 minutes  More than 50% of total time spent on counseling and coordination of care as described above  Current Length of Stay: 2 day(s)    Current Patient Status: Inpatient   Certification Statement: The patient will continue to require additional inpatient hospital stay due to Sepsis due to UTI    Discharge Plan: home    Code Status: Level 1 - Full Code      Subjective:     Patient continues to say that she does not feel well but not elaborating any further    Objective:     Vitals:   Temp (24hrs), Av 3 °F (36 8 °C), Min:97 8 °F (36 6 °C), Max:98 7 °F (37 1 °C)    Temp:  [97 8 °F (36 6 °C)-98 7 °F (37 1 °C)] 98 3 °F (36 8 °C)  HR:  [81-89] 86  Resp:  [16-18] 18  BP: (126-153)/(54-69) 134/60  SpO2:  [93 %-95 %] 93 %  Body mass index is 41 32 kg/m²  Input and Output Summary (last 24 hours): Intake/Output Summary (Last 24 hours) at 2021 0932  Last data filed at 2021 0001  Gross per 24 hour   Intake 460 ml   Output --   Net 460 ml       Physical Exam:     Physical Exam  Constitutional:       General: She is not in acute distress  Appearance: She is not diaphoretic  HENT:      Head: Normocephalic and atraumatic  Eyes:      General:         Right eye: No discharge  Left eye: No discharge  Cardiovascular:      Rate and Rhythm: Normal rate and regular rhythm  Pulmonary:      Effort: Pulmonary effort is normal  No respiratory distress  Breath sounds: Normal breath sounds  No wheezing or rales  Abdominal:      General: Bowel sounds are normal  There is no distension  Palpations: Abdomen is soft  Tenderness: There is no abdominal tenderness  Musculoskeletal:      Right lower leg: No edema  Left lower leg: No edema  Neurological:      Mental Status: She is alert and oriented to person, place, and time           Additional Data: Labs:    Results from last 7 days   Lab Units 05/20/21  0543 05/19/21  0519   WBC Thousand/uL 4 99 6 56   HEMOGLOBIN g/dL 9 3* 9 8*   HEMATOCRIT % 30 0* 32 0*   PLATELETS Thousands/uL 165 164   NEUTROS PCT %  --  74   LYMPHS PCT %  --  11*   MONOS PCT %  --  11   EOS PCT %  --  2     Results from last 7 days   Lab Units 05/20/21  0543  05/18/21  2201   POTASSIUM mmol/L 4 1   < > 4 6   CHLORIDE mmol/L 104   < > 102   CO2 mmol/L 24   < > 25   BUN mg/dL 31*   < > 25   CREATININE mg/dL 1 32*   < > 1 05   CALCIUM mg/dL 8 0*   < > 8 8   ALK PHOS U/L  --   --  265*   ALT U/L  --   --  60   AST U/L  --   --  49*    < > = values in this interval not displayed  Results from last 7 days   Lab Units 05/18/21  2201   INR  1 00       * I Have Reviewed All Lab Data Listed Above  * Additional Pertinent Lab Tests Reviewed: [address] Admission Reviewed      Imaging:  Imaging Reports Reviewed Today Include: CXR  Imaging Personally Reviewed by Myself Includes:  N/A    Recent Cultures (last 7 days):     Results from last 7 days   Lab Units 05/18/21  2201   BLOOD CULTURE  No Growth at 24 hrs  No Growth at 24 hrs         Last 24 Hours Medication List:   Current Facility-Administered Medications   Medication Dose Route Frequency Provider Last Rate    acetaminophen  650 mg Oral Q6H PRN Dena Spoon, PA-C      aspirin  81 mg Oral Daily With Breakfast Dena Spoon, PA-C      calcium carbonate  1 tablet Oral Daily With Breakfast Dena Spoon, PA-C      calcium carbonate  1,000 mg Oral Daily PRN Dena Spoon, PA-C      cholecalciferol  1,000 Units Oral Daily Dena Spoon, PA-C      clopidogrel  75 mg Oral Daily Dena Spoon, PA-C      cycloSPORINE modified  50 mg Oral BID Dena Spoon, PA-C      enoxaparin  40 mg Subcutaneous Daily Dena Spoon, PA-C      ezetimibe  10 mg Oral Daily Dena Spoon, PA-C      levothyroxine  112 mcg Oral Early Morning Dena Spoon, PA-C      metoprolol tartrate 37 5 mg Oral BID Wilver Whitt PA-C      multivitamin stress formula  1 tablet Oral Daily Wilver Whitt PA-C      mycophenolate  750 mg Oral BID Wilver Whitt PA-C      ondansetron  4 mg Intravenous Q6H PRN Wilver Whitt PA-C      pantoprazole  40 mg Oral Early Morning Wilver Whitt PA-C      polyethylene glycol  17 g Oral Daily PRN Wilver Whitt PA-C      pravastatin  20 mg Oral HS Wilver Whitt PA-C      sodium chloride  50 mL/hr Intravenous Continuous Keerthi Quach DO            Today, Patient Was Seen By: Nate Vera DO    ** Please Note: "This note has been constructed using a voice recognition system  Therefore there may be syntax, spelling, and/or grammatical errors   Please call if you have any questions  "**

## 2021-05-20 NOTE — PLAN OF CARE
Problem: Potential for Falls  Goal: Patient will remain free of falls  Description: INTERVENTIONS:  - Assess patient frequently for physical needs  -  Identify cognitive and physical deficits and behaviors that affect risk of falls    -  [place] fall precautions as indicated by assessment   - Educate patient/family on patient safety including physical limitations  - Instruct patient to call for assistance with activity based on assessment  - Modify environment to reduce risk of injury  - Consider OT/PT consult to assist with strengthening/mobility  Outcome: Progressing  Note: Pt uses call bell appropriately     Problem: Prexisting or High Potential for Compromised Skin Integrity  Goal: Skin integrity is maintained or improved  Description: INTERVENTIONS:  - Identify patients at risk for skin breakdown  - Assess and monitor skin integrity  - Assess and monitor nutrition and hydration status  - Monitor labs   - Assess for incontinence   - Turn and reposition patient  - Assist with mobility/ambulation  - Relieve pressure over bony prominences  - Avoid friction and shearing  - Provide appropriate hygiene as needed including keeping skin clean and dry  - Evaluate need for skin moisturizer/barrier cream  - Collaborate with interdisciplinary team   - Patient/family teaching  - Consider wound care consult   Outcome: Progressing     Problem: INFECTION - ADULT  Goal: Absence or prevention of progression during hospitalization  Description: INTERVENTIONS:  - Assess and monitor for signs and symptoms of infection  - Monitor lab/diagnostic results  - Monitor all insertion sites  - Monitor secretions for changes in amount and color  - Harrison appropriate cooling/warming therapies per order  - Administer medications as ordered  - Instruct and encourage patient and family to use good hand hygiene technique  - Identify and instruct in appropriate isolation precautions for identified infection/condition  Outcome: Progressing Problem: DISCHARGE PLANNING  Goal: Discharge to home or other facility with appropriate resources  Description: INTERVENTIONS:  - Identify barriers to discharge w/patient and caregiver  - Arrange for needed discharge resources and transportation as appropriate  - Identify discharge learning needs   - Refer to Case Management Department for coordinating discharge planning if the patient needs post-hospital services based on physician/advanced practitioner order or complex needs related to functional status, cognitive ability, or social support system  Outcome: Progressing     Problem: Knowledge Deficit  Goal: Patient/family/caregiver demonstrates understanding of disease process, treatment plan, medications, and discharge instructions  Description: Complete learning assessment and assess knowledge base    Interventions:  - Provide teaching at level of understanding  - Provide teaching via preferred learning methods  Outcome: Progressing

## 2021-05-20 NOTE — CASE MANAGEMENT
LOS: 1 DAY  UNPLANNED RA RISK SCORE: 21  RA: NO  BUNDLE: NO    CM met with patient and her significant other Ash Chavez whom she lives with  They live in a bi-level house  There are 3 steps to enter and then a total of 14 steps with railings to the bedroom  There is a bathroom on each floor  Patient is independent of all ADL's and ambulates without assistance  She has a RW and cane from after her liver transplant 12 years ago  No other DME  She does not drive  Ash Scott or her daughter Flo Campos provide transportation  She used [facility] and home PT in the past but does not recall which agency  She also states she was in [facility] years ago as well  Discussed PT's recommendation for Home PT at discharge  Patient was provided with a list and has no preference  A referral was sent to [address] [place] [address] in [place]  Patient denies any h/o MH or D&A problems in the past or at present  Patient states she has medication coverage and uses the [address] in [address] [place] [address]  Her PCP is Dr Hector Chavez or Flo Campos provide transport  Patient has a POA and a LW  Her POA is her daughter Itzel Varela [phone]  CM requested she provide a copy for the EMR  CM requested to call patients family but she declines at this time  Patient will have a ride home with her s o  Ash Chavez at [employer]

## 2021-05-20 NOTE — OCCUPATIONAL THERAPY NOTE
Occupational Therapy Evaluation/Treatment       05/20/21 0945   Note Type   Note type Evaluation   Restrictions/Precautions   Other Precautions Chair Alarm; Bed Alarm; Fall Risk   Pain Assessment   Pain Assessment Tool Pain Assessment not indicated - pt denies pain   Home Living   Type of [facility] Two level;Bed/bath upstairs;1/2 bath on main level  (3 SPEEDY)   Bathroom Shower/Tub Walk-in shower   Bathroom Toilet Standard   Bathroom Equipment Shower chair;Grab bars in shower   Home Equipment Cane;Walker  (rollator)   Prior Function   Level of [place] Independent with ADLs and functional mobility  (assist with bathing and dressing )   Lives With Significant other   Receives Help From   (significant other )   ADL Assistance Needs assistance   IADLs Needs assistance   ADL   Eating Assistance 7  Independent   Grooming Assistance 5  Supervision/Setup   UB Bathing Assistance 5  Supervision/Setup   LB Bathing Assistance 4  Minimal Assistance   UB Dressing Assistance 5  Supervision/Setup   LB Dressing Assistance 4  Minimal Assistance   Toileting Assistance  4  Minimal Assistance   Transfers   Sit to Stand 4  Minimal assistance   Stand to Sit 4  Minimal assistance   Functional Mobility   Functional Mobility 5  Supervision   Additional Comments 20 feet   Additional items Rolling walker   Balance   Static Sitting Good   Dynamic Sitting Fair +   Static Standing Fair   Dynamic Standing Fair   Activity Tolerance   Activity Tolerance Patient limited by fatigue   Nurse Made Aware yes, Sandy TANG Assessment   RUE Assessment WFL   LUE Assessment   LUE Assessment   (elbow and  WFL MMT grossly 4-/5)   LUE Overall AROM   L Shoulder Flexion WFL, MMT 3+/5   Cognition   Overall Cognitive Status WFL   Arousal/Participation Cooperative   Attention Within functional limits   Orientation Level Oriented X4   Following Commands Follows all commands and directions without difficulty   Assessment   Limitation Decreased ADL status; Decreased UE strength;Decreased Safe judgement during ADL;Decreased endurance;Decreased self-care trans;Decreased high-level ADLs  (decreased balance and mobility )   Prognosis Good   Assessment Patient evaluated by Occupational Therapy  Patient admitted with Sepsis ([facility] [address] )  The patients occupational profile, medical and therapy history includes a extensive additional review of physical, cognitive, or psychosocial history related to current functional performance  Comorbidities affecting functional mobility and ADLS include:Crohns disease, pelvic fracture, cirrhosis, anemia, anxiety and arthritis  Prior to admission, patient was independent with functional mobility without assistive device, requiring assist for ADLS and requiring assist for IADLS  The evaluation identifies the following performance deficits: weakness, impaired balance, decreased endurance, increased fall risk, new onset of impairment of functional mobility, decreased ADLS, decreased IADLS, decreased activity tolerance, decreased safety awareness, impaired judgement and decreased strength, that result in activity limitations and/or participation restrictions  This evaluation requires clinical decision making of high complexity, because the patient presents with comorbidites that affect occupational performance and required significant modification of tasks or assistance with consideration of multiple treatment options  The Barthel Index was used as a functional outcome tool presenting with a score of 50, indicating marked limitations of functional mobility and ADLS  The patient's raw score on the -PAC Daily Activity inpatient short form is 21, standardized score is 44 27, greater than 39 4  Patients at this level are likely to benefit from DC to home  Please refer to the recommendation of the Occupational Therapist for safe DC planning   Patient will benefit from skilled Occupational Therapy services to address above deficits and facilitate a safe return to prior level of function  Goals   Patient Goals to go home    STG Time Frame   (1-7 days)   Short Term Goal  Goals established to promote patient goal of to go home:  Patient will increase standing tolerance to 3 minutes during ADL task to decrease assistance level and decrease fall risk; Patient will increase bed mobility to independent in preparation for ADLS and transfers; Patient will increase functional mobility to and from bathroom with rolling walker independently to increase performance with ADLS and to use a toilet; Patient will tolerate 10 minutes of UE ROM/strengthening to increase general activity tolerance and performance in ADLS/IADLS; Patient will improve functional activity tolerance to 10 minutes of sustained functional tasks to increase participation in basic self-care and decrease assistance level;     Patient will increase dynamic standing balance to fair to improve postural stability and decrease fall risk during standing ADLS and transfers  LTG Time Frame   (8-14 days)   Long Term Goal Patient will increase standing tolerance to 6 minutes during ADL task to decrease assistance level and decrease fall risk;  Patient will tolerate 20 minutes of UE ROM/strengthening to increase general activity tolerance and performance in ADLS/IADLS; Patient will improve functional activity tolerance to 20 minutes of sustained functional tasks to increase participation in basic self-care and decrease assistance level;  Patient will increase dynamic standing balance to fair+ to improve postural stability and decrease fall risk during standing ADLS and transfers  Pt will score >/= 23/24 on AM-PAC Daily Activity Inpatient scale to promote safe independence with ADLs and functional mobility; Pt will score >/= 75/100 on Barthel Index in order to decrease caregiver assistance needed and increase ability to perform ADLs and functional mobility     Functional Transfer Goals   Pt Will Perform All Functional Transfers   (STG supervision LTG independent )   ADL Goals   Pt Will Perform Grooming Standing at sink  (STG independent )   Pt Will Perform Bathing   (STG supervision LTG Independent )   Pt Will Perform LE Dressing   (STG supervision LTG Independent)   Pt Will Perform Toileting   (STG supervision LTG Independent)   Plan   Treatment Interventions ADL retraining;Functional transfer training;UE strengthening/ROM; Endurance training;Patient/family training;Equipment evaluation/education; Activityengagement; Compensatory technique education   Goal Expiration Date 06/03/21   OT Frequency 3-5x/wk   Additional Treatment Session   Start Time 0935   End Time 0945   Treatment Assessment Patient completed toilet transfer with supervision  Hygiene for urination independent  Patient stood to wash hands at sink with supervision  Functional mobility 20 feet with RW supervision  Patient tolerated well  Cooperative and pleasant  Patient is motivated to return home       Recommendation   OT Discharge Recommendation Home with home health rehabilitation   AM-PAC Daily Activity Inpatient   Lower Body Dressing 3   Bathing 3   Toileting 3   Upper Body Dressing 4   Grooming 4   Eating 4   Daily Activity Raw Score 21   Daily Activity Standardized Score (Calc for Raw Score >=11) 44 27   AM-PAC Applied Cognition Inpatient   Following a Speech/Presentation 4   Understanding Ordinary Conversation 4   Taking Medications 4   Remembering Where Things Are Placed or Put Away 4   Remembering List of 4-5 Errands 4   Taking Care of Complicated Tasks 4   Applied Cognition Raw Score 24   Applied Cognition Standardized Score 62 21   Barthel Index   Feeding 10   Bathing 0   Grooming Score 0   Dressing Score 5   Bladder Score 5   Bowels Score 10   Toilet Use Score 5   Transfers (Bed/Chair) Score 10   Mobility (Level Surface) Score 0   Stairs Score 5   Barthel Index Score 50   Licensure   [place] License Number  [employer] MS OTR/L [id number]

## 2021-05-20 NOTE — PLAN OF CARE
Problem: Potential for Falls  Goal: Patient will remain free of falls  Description: INTERVENTIONS:  - Assess patient frequently for physical needs  -  Identify cognitive and physical deficits and behaviors that affect risk of falls    -  [place] fall precautions as indicated by assessment   - Educate patient/family on patient safety including physical limitations  - Instruct patient to call for assistance with activity based on assessment  - Modify environment to reduce risk of injury  - Consider OT/PT consult to assist with strengthening/mobility  Outcome: Progressing     Problem: Prexisting or High Potential for Compromised Skin Integrity  Goal: Skin integrity is maintained or improved  Description: INTERVENTIONS:  - Identify patients at risk for skin breakdown  - Assess and monitor skin integrity  - Assess and monitor nutrition and hydration status  - Monitor labs   - Assess for incontinence   - Turn and reposition patient  - Assist with mobility/ambulation  - Relieve pressure over bony prominences  - Avoid friction and shearing  - Provide appropriate hygiene as needed including keeping skin clean and dry  - Evaluate need for skin moisturizer/barrier cream  - Collaborate with interdisciplinary team   - Patient/family teaching  - Consider wound care consult   Outcome: Progressing     Problem: INFECTION - ADULT  Goal: Absence or prevention of progression during hospitalization  Description: INTERVENTIONS:  - Assess and monitor for signs and symptoms of infection  - Monitor lab/diagnostic results  - Monitor all insertion sites  - Monitor secretions for changes in amount and color  - [place] appropriate cooling/warming therapies per order  - Administer medications as ordered  - Instruct and encourage patient and family to use good hand hygiene technique  - Identify and instruct in appropriate isolation precautions for identified infection/condition  Outcome: Progressing     Problem: DISCHARGE PLANNING  Goal: Discharge to home or other facility with appropriate resources  Description: INTERVENTIONS:  - Identify barriers to discharge w/patient and caregiver  - Arrange for needed discharge resources and transportation as appropriate  - Identify discharge learning needs   - Refer to Case Management Department for coordinating discharge planning if the patient needs post-hospital services based on physician/advanced practitioner order or complex needs related to functional status, cognitive ability, or social support system  Outcome: Progressing     Problem: Knowledge Deficit  Goal: Patient/family/caregiver demonstrates understanding of disease process, treatment plan, medications, and discharge instructions  Description: Complete learning assessment and assess knowledge base    Interventions:  - Provide teaching at level of understanding  - Provide teaching via preferred learning methods  Outcome: Progressing

## 2021-05-21 VITALS
TEMPERATURE: 97.5 F | DIASTOLIC BLOOD PRESSURE: 56 MMHG | BODY MASS INDEX: 41.57 KG/M2 | OXYGEN SATURATION: 97 % | RESPIRATION RATE: 18 BRPM | HEART RATE: 77 BPM | WEIGHT: 225.9 LBS | HEIGHT: 62 IN | SYSTOLIC BLOOD PRESSURE: 144 MMHG

## 2021-05-21 PROBLEM — N17.9 AKI (ACUTE KIDNEY INJURY) (HCC): Status: ACTIVE | Noted: 2021-05-21

## 2021-05-21 PROBLEM — A41.9 SEPSIS (HCC): Status: RESOLVED | Noted: 2021-05-18 | Resolved: 2021-05-21

## 2021-05-21 LAB
ANION GAP SERPL CALCULATED.3IONS-SCNC: 11 MMOL/L (ref 4–13)
BACTERIA UR CULT: ABNORMAL
BUN SERPL-MCNC: 40 MG/DL (ref 5–25)
CALCIUM SERPL-MCNC: 7.7 MG/DL (ref 8.3–10.1)
CHLORIDE SERPL-SCNC: 106 MMOL/L (ref 100–108)
CO2 SERPL-SCNC: 24 MMOL/L (ref 21–32)
CREAT SERPL-MCNC: 1.29 MG/DL (ref 0.6–1.3)
GFR SERPL CREATININE-BSD FRML MDRD: 40 ML/MIN/1.73SQ M
GLUCOSE SERPL-MCNC: 115 MG/DL (ref 65–140)
POTASSIUM SERPL-SCNC: 4.2 MMOL/L (ref 3.5–5.3)
SODIUM SERPL-SCNC: 141 MMOL/L (ref 136–145)

## 2021-05-21 PROCEDURE — 80048 BASIC METABOLIC PNL TOTAL CA: CPT | Performed by: FAMILY MEDICINE

## 2021-05-21 PROCEDURE — 97110 THERAPEUTIC EXERCISES: CPT | Performed by: PHYSICAL THERAPIST

## 2021-05-21 PROCEDURE — 99239 HOSP IP/OBS DSCHRG MGMT >30: CPT | Performed by: FAMILY MEDICINE

## 2021-05-21 PROCEDURE — 97116 GAIT TRAINING THERAPY: CPT | Performed by: PHYSICAL THERAPIST

## 2021-05-21 RX ORDER — CEFTRIAXONE 1 G/50ML
1000 INJECTION, SOLUTION INTRAVENOUS EVERY 24 HOURS
Status: DISCONTINUED | OUTPATIENT
Start: 2021-05-21 | End: 2021-05-21 | Stop reason: HOSPADM

## 2021-05-21 RX ORDER — SODIUM CHLORIDE 9 MG/ML
50 INJECTION, SOLUTION INTRAVENOUS CONTINUOUS
Status: DISPENSED | OUTPATIENT
Start: 2021-05-21 | End: 2021-05-21

## 2021-05-21 RX ORDER — MYCOPHENOLATE MOFETIL 250 MG/1
750 CAPSULE ORAL 2 TIMES DAILY
Refills: 0
Start: 2021-05-21

## 2021-05-21 RX ORDER — CEFPODOXIME PROXETIL 200 MG/1
200 TABLET, FILM COATED ORAL 2 TIMES DAILY
Qty: 12 TABLET | Refills: 0 | Status: SHIPPED | OUTPATIENT
Start: 2021-05-22 | End: 2021-05-28

## 2021-05-21 RX ADMIN — CLOPIDOGREL BISULFATE 75 MG: 75 TABLET ORAL at 08:41

## 2021-05-21 RX ADMIN — B-COMPLEX W/ C & FOLIC ACID TAB 1 TABLET: TAB at 08:41

## 2021-05-21 RX ADMIN — MYCOPHENOLATE MOFETIL 750 MG: 250 CAPSULE ORAL at 08:41

## 2021-05-21 RX ADMIN — EZETIMIBE 10 MG: 10 TABLET ORAL at 08:41

## 2021-05-21 RX ADMIN — SODIUM CHLORIDE 50 ML/HR: 0.9 INJECTION, SOLUTION INTRAVENOUS at 10:10

## 2021-05-21 RX ADMIN — ENOXAPARIN SODIUM 40 MG: 40 INJECTION SUBCUTANEOUS at 08:41

## 2021-05-21 RX ADMIN — PANTOPRAZOLE SODIUM 40 MG: 40 TABLET, DELAYED RELEASE ORAL at 06:26

## 2021-05-21 RX ADMIN — CALCIUM 1 TABLET: 500 TABLET ORAL at 08:41

## 2021-05-21 RX ADMIN — LEVOTHYROXINE SODIUM 112 MCG: 112 TABLET ORAL at 06:26

## 2021-05-21 RX ADMIN — ASPIRIN 81 MG: 81 TABLET, COATED ORAL at 08:41

## 2021-05-21 RX ADMIN — METOPROLOL TARTRATE 37.5 MG: 25 TABLET, FILM COATED ORAL at 08:42

## 2021-05-21 RX ADMIN — CEFTRIAXONE 1000 MG: 1 INJECTION, SOLUTION INTRAVENOUS at 15:11

## 2021-05-21 RX ADMIN — Medication 1000 UNITS: at 08:44

## 2021-05-21 RX ADMIN — CYCLOSPORINE 50 MG: 25 CAPSULE ORAL at 08:41

## 2021-05-21 NOTE — PHYSICAL THERAPY NOTE
PT TREATMENT     Time In: 1505  Time Out: 6108 05/21/21 1528   PT Last Visit   PT Visit Date 05/21/21   Pain Assessment   Pain Assessment Tool Pain Assessment not indicated - pt denies pain   Pain Score No Pain   Restrictions/Precautions   Weight Bearing Precautions Per Order No   Other Precautions Chair Alarm; Bed Alarm; Fall Risk   General   Chart Reviewed Yes   Family/Caregiver Present Yes   Cognition   Overall Cognitive Status WFL   Arousal/Participation Cooperative   Attention Within functional limits   Orientation Level Oriented X4   Following Commands Follows all commands and directions without difficulty   Transfers   Sit to Stand 6  Modified independent   Additional items Armrests   Stand to Sit 6  Modified independent   Additional items Armrests   Ambulation/Elevation   Gait pattern Short stride; Wide LOUIS   Gait Assistance 5  Supervision   Assistive Device Rolling walker   Distance 150 feet with RW    Balance   Static Sitting Good   Dynamic Sitting Fair +   Static Standing Fair   Dynamic Standing Fair   Ambulatory Fair -   Activity Tolerance   Nurse Made Aware Yes, Sandy    Exercises   Glute Sets Sitting;10 reps;Bilateral   Hip Flexion Sitting;10 reps;Bilateral   Knee AROM Sitting;10 reps;Bilateral   Ankle Pumps Sitting;20 reps;Bilateral   Neuro re-ed STS 10 times from chair, no pushing from arm rests    Assessment   Prognosis Good   Problem List Impaired balance;Decreased mobility; Decreased strength;Decreased endurance   Assessment Talat Pisano is a 66 y o   who presents to IP PT with fever, sepsis, UTI, REZA, and liver transplanted in medical history  Pt seated in recliner upon PT arrival  Pt agreeable to PT treatment today, reporting "I don't feel like doing a lot " Patient able to perform seated exercises with minimal difficulty  Sit <> stand performed supervision 10 times  Patient able to ambulate 150 feet with RW, PT supervision and to hold medication line  Call bell and phone within reach   All needs met and pt reports no further questions for PT at this time  Patient presents with the following deficits: increased pain and fall risk, decreased strength, endurance, balance, and tolerance to activities  Patient would benefit from skilled PT in order to address these deficits in order to be discharged to home for HHPT  The patient's AM-PAC Basic Mobility Inpatient Short Form Raw Score is 18, Standardized Score is 41 05  A standardized score less than 42 9 suggests the patient may benefit from discharge to post-acute rehabilitation services  Please also refer to the recommendation of the Physical Therapist for safe discharge planning  Goals   LTG Expiration Date 06/02/21   Plan   Treatment/Interventions Functional transfer training;ADL retraining;LE strengthening/ROM; Therapeutic exercise; Endurance training;Patient/family training;Bed mobility;Gait training;Spoke to nursing   PT Frequency 5x/wk   Recommendation   PT Discharge Recommendation Home with home health rehabilitation   AM-PAC Basic Mobility Inpatient   Turning in Bed Without Bedrails 3   Lying on Back to Sitting on Edge of Flat Bed 3   Moving Bed to Chair 3   Standing Up From Chair 3   Walk in Room 3   Climb 3-5 Stairs 3   Basic Mobility Inpatient Raw Score 18   Basic Mobility Standardized Score 96 03   Licensure   [place] License Number  Sunil Turner PT, DPT [id number]

## 2021-05-21 NOTE — DISCHARGE SUMMARY
Discharge Summary - [facility]    Patient Information: Gosia Mack 66 y o  female MRN: [id number]  Unit/Bed#: [address] [place] [address] Encounter: [id number]    Discharging Physician / Practitioner: Sherrill Vargas DO  PCP: Mark Walter MD  Admission Date: 5/18/2021  Discharge Date: 05/21/21    Reason for Admission: Fever - 75 years or older (Patient states she did " not feel well all day ",  took temperature at 6:30 pm and it was 101 3  Patient has a hx of liver transplant  Called PCP and sent to ED)      Discharge Diagnoses:     Principal Problem (Resolved):    Sepsis ([facility] )  Active Problems:    UTI (urinary tract infection)    Liver transplanted ([place] [id number] )    Hypothyroidism    HLD (hyperlipidemia)    Benign essential hypertension    Ulcerative colitis without complications (HCC)    Dementia (HCC)    Obesity, morbid ([facility] )    REZA (acute kidney injury) ([place] [address] )        * Sepsis (HCC)-resolved as of 5/21/2021  Assessment & Plan  POA as evidence by tachycardia, tachypnea, fever due to UTI  - lactic acid 0 5  - received doses of IV Rocephin and transitioned to Vantin on discharge  - urine culture growing E coli  Blood cultures negative so far    UTI (urinary tract infection)  Assessment & Plan  Patient started with urinary frequency, urgency with fevers and chills  - UA shows UTI  - as above received doses of IV ceftriaxone and transitioned to Vantin on discharge  - Alberts was placed in the ED but has since been discontinued    REZA (acute kidney injury) [facility])  Assessment & Plan  Results from last 7 days   Lab Units 05/21/21  0525 05/20/21  0543 05/19/21  0519 05/18/21  2201   BUN mg/dL 40* 31* 24 25   CREATININE mg/dL 1 29 1 32* 1 03 1 05     Possibly due to UTI, decreased p o  Intake  Gentle IVF given with improvement on lab work  Repeat lab work as outpatient and follow up with PCP    Obesity, morbid ([facility] )  Assessment & Plan  Body mass index is 41 32 kg/m²     Dietary/lifestyle modification    Dementia [facility])  Assessment & Plan  History of mild dementia, oriented x3    Ulcerative colitis without complications [facility])  Assessment & Plan  Patient follows with GI, states she is unsure if she is still taking budesonide  Advised to continue budesonide if she is indeed taking it at home    Benign essential hypertension  Assessment & Plan  Continue Lopressor  HLD (hyperlipidemia)  Assessment & Plan  Continue pravastatin, Zetia  Hypothyroidism  Assessment & Plan  Continue levothyroxine 112 mcg daily  Liver transplanted [facility])  Assessment & Plan  History of liver transplant  Continue CellCept, cyclosporine      Consultations During Hospital Stay:  Arun Del Rosario TO CASE MANAGEMENT    Procedures Performed:     · none    Significant Findings:     · Refer to hospital course and above listed diagnosis related plan for details    Imaging while in hospital:    Xr Chest 1 View Portable    Result Date: 5/19/2021  Narrative: CHEST INDICATION:  fever  COMPARISON:  Chest radiograph 6/10/2019 EXAM PERFORMED/VIEWS:  XR CHEST PORTABLE FINDINGS: Loop recorder again noted overlying the left chest wall  Cardiomediastinal silhouette appears unremarkable  There is elevation of the right hemidiaphragm  Limited evaluation of the left lung base due to overlapping soft tissues  Otherwise, no focal consolidation, pleural effusion or pneumothorax  Osseous structures appear within normal limits for patient age  Impression: No acute cardiopulmonary disease  Workstation performed: [id number]       Incidental Findings:   · none    Test Results Pending at Discharge (will require follow up):   · As per After Visit Summary     Outpatient Tests Requested:  · BMP    Complications:  Refer to hospital course and above listed diagnosis related plan, if any    Hospital Course: Bhaskar Winters is a 66 y o  female patient who originally presented to the hospital on 5/18/2021 due to Fevers, malaise    Patient reported urinary frequency, urgency, dysuria  Patient was noted to be febrile in the ER  UA suggestive of UTI and patient was admitted  Patient being transitioned to [facility] on discharge  Discharge plan discussed with patient and significant other who was present at bedside    Late addendum 5/22 12:17 p m  - just prior to patient getting discharged yesterday, received notification more from RN that 1/2 blood culture is positive for gram-positive cocci  This is most likely contamination as patient was an extremely difficult stick  Midline was already removed and patient dressed to leave  Spoke with patient and she does not want to stay in the hospital any longer  Discussed with her that this is most likely contaminant and will be followed up on by me  She does understand that the if this turns out to be a true infection, she may need to return back to the hospital   Patient showed understanding and is in agreement with the plan    Please see above list of diagnoses and related plan for additional information  Addendum 5/25 at 9:40 a m  - blood culture growing Peptoniphilus grossensis, skin contaminant  Updated patient regarding results  Patient states she is doing well since discharge    Condition at Discharge: stable     Discharge Day Visit / Exam:     Subjective:  States she feels much better  Looking forward to going home    Vitals: Blood Pressure: 116/57 (05/21/21 0720)  Pulse: 75 (05/21/21 0720)  Temperature: 97 8 °F (36 6 °C) (05/21/21 0720)  Temp Source: Oral (05/21/21 0720)  Respirations: 18 (05/21/21 0720)  Height: 5' 2" (157 5 cm) (05/19/21 0038)  Weight - Scale: 102 kg (225 lb 14 4 oz) (05/19/21 0038)  SpO2: 95 % (05/21/21 0720)  Exam:   Physical Exam  Constitutional:       General: She is not in acute distress  Appearance: She is not diaphoretic  HENT:      Head: Normocephalic and atraumatic  Eyes:      General:         Right eye: No discharge  Left eye: No discharge     Cardiovascular:      Rate and Rhythm: Normal rate and regular rhythm  Pulmonary:      Effort: Pulmonary effort is normal  No respiratory distress  Breath sounds: Normal breath sounds  No wheezing or rales  Abdominal:      General: Bowel sounds are normal  There is no distension  Palpations: Abdomen is soft  Tenderness: There is no abdominal tenderness  Musculoskeletal:      Right lower leg: No edema  Left lower leg: No edema  Neurological:      Mental Status: She is alert and oriented to person, place, and time  Discharge instructions/Information to patient and family:(Discharge Medications and Follow up):   See after visit summary for information provided to patient and family  Provisions for Follow-Up Care:  See after visit summary for information related to follow-up care and any pertinent home health orders  Disposition: Home    Planned Readmission:  No     Discharge Statement:  I spent 35 minutes discharging the patient  This time was spent on the day of discharge  I had direct contact with the patient on the day of discharge  Greater than 50% of the total time was spent examining patient, answering all patient questions, arranging and discussing plan of care with patient as well as directly providing post-discharge instructions  Additional time then spent on discharge activities  Discharge Medications:  See after visit summary for reconciled discharge medications provided to patient and family  ** Please Note: "This note has been constructed using a voice recognition system  Therefore there may be syntax, spelling, and/or grammatical errors   Please call if you have any questions  "**

## 2021-05-21 NOTE — PLAN OF CARE
Problem: Potential for Falls  Goal: Patient will remain free of falls  Description: INTERVENTIONS:  - Assess patient frequently for physical needs  -  Identify cognitive and physical deficits and behaviors that affect risk of falls    -  [place] fall precautions as indicated by assessment   - Educate patient/family on patient safety including physical limitations  - Instruct patient to call for assistance with activity based on assessment  - Modify environment to reduce risk of injury  - Consider OT/PT consult to assist with strengthening/mobility  Outcome: Progressing     Problem: Prexisting or High Potential for Compromised Skin Integrity  Goal: Skin integrity is maintained or improved  Description: INTERVENTIONS:  - Identify patients at risk for skin breakdown  - Assess and monitor skin integrity  - Assess and monitor nutrition and hydration status  - Monitor labs   - Assess for incontinence   - Turn and reposition patient  - Assist with mobility/ambulation  - Relieve pressure over bony prominences  - Avoid friction and shearing  - Provide appropriate hygiene as needed including keeping skin clean and dry  - Evaluate need for skin moisturizer/barrier cream  - Collaborate with interdisciplinary team   - Patient/family teaching  - Consider wound care consult   Outcome: Progressing     Problem: INFECTION - ADULT  Goal: Absence or prevention of progression during hospitalization  Description: INTERVENTIONS:  - Assess and monitor for signs and symptoms of infection  - Monitor lab/diagnostic results  - Monitor all insertion sites  - Monitor secretions for changes in amount and color  - [place] appropriate cooling/warming therapies per order  - Administer medications as ordered  - Instruct and encourage patient and family to use good hand hygiene technique  - Identify and instruct in appropriate isolation precautions for identified infection/condition  Outcome: Progressing     Problem: DISCHARGE PLANNING  Goal: Discharge to home or other facility with appropriate resources  Description: INTERVENTIONS:  - Identify barriers to discharge w/patient and caregiver  - Arrange for needed discharge resources and transportation as appropriate  - Identify discharge learning needs   - Refer to Case Management Department for coordinating discharge planning if the patient needs post-hospital services based on physician/advanced practitioner order or complex needs related to functional status, cognitive ability, or social support system  Outcome: Progressing     Problem: Knowledge Deficit  Goal: Patient/family/caregiver demonstrates understanding of disease process, treatment plan, medications, and discharge instructions  Description: Complete learning assessment and assess knowledge base    Interventions:  - Provide teaching at level of understanding  - Provide teaching via preferred learning methods  Outcome: Progressing

## 2021-05-21 NOTE — ASSESSMENT & PLAN NOTE
Results from last 7 days   Lab Units 05/21/21  0525 05/20/21  0543 05/19/21  0519 05/18/21  2201   BUN mg/dL 40* 31* 24 25   CREATININE mg/dL 1 29 1 32* 1 03 1 05     Possibly due to UTI, decreased p o   Intake  Gentle IVF given with improvement on lab work  Repeat lab work as outpatient and follow up with PCP

## 2021-05-21 NOTE — PROGRESS NOTES
5/26/2021      Chief Complaint   Patient presents with    Follow-up    Urinary Frequency         Assessment and Plan  1  Recurrent UTI  - She has had 1 UTI in the past year (5/18/21) currently being treated with Vantin  - Having no symptoms at this time  - Discussed daily probiotics and cranberry for prevention of UTIs     2  Urinary frequency, primarily at night  - patient has previously tried medications for overactive bladder which were not effective  - I again discussed dietary and behavioral modifications a she is voiding very infrequently throughout the day  Patient previously hydrating primarily with soda  Since her admission, she has been drinking more water  - I encouraged timed voiding every 2 hours while she is awake, avoidance of bladder irritants including dark sodas, iced tea, caffeine, alcohol, spicy, and acidic foods  I also encouraged her to elevate her legs 2 hours prior to bedtime and wear compression stockings  - Will follow up in 6 months for symptom reassessment  - Patient will call with any questions or concerns  - All questions answered; patient understands and agrees with plan          History of Present Illness  Tia Ward is a 66 y o  female patient with a history of recurrent urinary tract infections and lower urinary tract symptoms here for follow up  Patient doing well today  Denies any urinary frequency, urgency, dysuria, fevers, chills, blood in the urine  Patient was hospitalized on 5/18/21 for UTI and was treated with ceftriaxone and discharged home with Vantin  She has 3 pills left to take  Patient states she has not had any other UTI this past year  Patient does, however, still have nocturia x 6-7  Patient states she barely voids throughout the day  She states she used to drink mostly sodas and teas throughout the day, however, since being hospitalize, she is drinking more water   She has not been practicing double voiding or elevation of legs at night as discussed at previous visit  UA today negative for nitrites, leukocytes, and blood  PVR today 62 mL    Review of Systems   Constitutional: Negative for activity change, appetite change, chills and fever  HENT: Negative for congestion and trouble swallowing  Respiratory: Negative for cough and shortness of breath  Cardiovascular: Negative for chest pain, palpitations and leg swelling  Gastrointestinal: Negative for abdominal pain, constipation, diarrhea, nausea and vomiting  Genitourinary: Negative for difficulty urinating, dysuria, flank pain, frequency, hematuria and urgency  Nocturia   Musculoskeletal: Negative for back pain and gait problem  Skin: Negative for wound  Allergic/Immunologic: Negative for immunocompromised state  Neurological: Negative for dizziness and syncope  Hematological: Does not bruise/bleed easily  Psychiatric/Behavioral: Negative for confusion  All other systems reviewed and are negative  Vitals  Vitals:    05/26/21 1329   BP: 132/76   Pulse: 81   Weight: 103 kg (227 lb)   Height: 5' 2" (1 575 m)       Physical Exam  Constitutional:       Appearance: Normal appearance  Comments: Unsteady gait   HENT:      Head: Normocephalic  Neck:      Musculoskeletal: Normal range of motion  Pulmonary:      Effort: Pulmonary effort is normal    Skin:     General: Skin is warm and dry  Neurological:      General: No focal deficit present  Mental Status: She is alert and oriented to person, place, and time  Psychiatric:         Mood and Affect: Mood normal          Behavior: Behavior normal          Thought Content:  Thought content normal          Judgment: Judgment normal            Past History  Past Medical History:   Diagnosis Date    Anemia     Anxiety     Arthritis     Benign paroxysmal vertigo, unspecified ear     onset: 05/06/2010    Cirrhosis ([facility] )     onset: 03/30/2005    Crohn disease ([facility] )     Disease of thyroid gland     GERD (gastroesophageal reflux disease)     History of transfusion     Liver transplant candidate     onset: 2008    Osteoporosis     Pelvic fracture ([facility] )     onset: 10/14/2008     Social History     Socioeconomic History    Marital status:      Spouse name: None    Number of children: None    Years of education: Completed 12th grade    Highest education level: None   Occupational History    None   Social Needs    Financial resource strain: Not hard at all   [place] insecurity     Worry: Never true     Inability: Never true   [employer] needs     Medical: No     Non-medical: No   Tobacco Use    Smoking status: Former Smoker     Years: 5      Quit date: 1975     Years since quittin 3    Smokeless tobacco: Never Used    Tobacco comment: smoked for 5 years-1/2 to 1 ppd   Substance and Sexual Activity    Alcohol use: Never     Frequency: Never     Binge frequency: Never    Drug use: Never    Sexual activity: Not Currently   Lifestyle    Physical activity     Days per week: 2 days     Minutes per session: 30 min    Stress:  To some extent   Relationships    Social connections     Talks on phone: None     Gets together: None     Attends Yarsanism service: None     Active member of club or organization: None     Attends meetings of clubs or organizations: None     Relationship status: None    Intimate partner violence     Fear of current or ex partner: None     Emotionally abused: None     Physically abused: None     Forced sexual activity: None   Other Topics Concern    None   Social History Narrative    Daily coffee consumption (1 cup/day)     Social History     Tobacco Use   Smoking Status Former Smoker    Years: 5     Quit date: 1975    Years since quittin 3   Smokeless Tobacco Never Used   Tobacco Comment    smoked for 5 years-2 to 1 ppd     Family History   Problem Relation Age of Onset    Cancer Mother         breast    Cancer Father     Crohn's disease Brother     Arthritis Family     Breast cancer Family        The following portions of the patient's history were reviewed and updated as appropriate: allergies, current medications, past medical history, past social history, past surgical history and problem list     Results  Recent Results (from the past 1 hour(s))   POCT urine dip    Collection Time: 05/26/21  1:39 PM   Result Value Ref Range    LEUKOCYTE ESTERASE,UA -     NITRITE,UA -     SL AMB POCT UROBILINOGEN 0 1     POCT URINE PROTEIN 10      PH,UA 5 0     BLOOD,UA -     SPECIFIC GRAVITY,UA 1 020     KETONES,UA -     BILIRUBIN,UA -     GLUCOSE, UA -      COLOR,UA yellow     CLARITY,UA clear    POCT Measure PVR    Collection Time: 05/26/21  1:40 PM   Result Value Ref Range    POST-VOID RESIDUAL VOLUME, ML POC 62 mL   ]  No results found for: PSA  Lab Results   Component Value Date    GLUCOSE 106 (H) 02/25/2016    CALCIUM 7 7 (L) 05/21/2021     (H) 02/25/2016    K 4 2 05/21/2021    CO2 24 05/21/2021     05/21/2021    BUN 40 (H) 05/21/2021    CREATININE 1 29 05/21/2021     Lab Results   Component Value Date    WBC 4 99 05/20/2021    HGB 9 3 (L) 05/20/2021    HCT 30 0 (L) 05/20/2021    MCV 92 05/20/2021     05/20/2021       Carline Arreola PA-C

## 2021-05-21 NOTE — DISCHARGE INSTRUCTIONS
If you are taking Budesonide at home continue to do so    If you are not taking it then discontinue it    Continue cyclosporine and CellCept at the dose that you normally take at home

## 2021-05-21 NOTE — PLAN OF CARE
Problem: Potential for Falls  Goal: Patient will remain free of falls  Description: INTERVENTIONS:  - Assess patient frequently for physical needs  -  Identify cognitive and physical deficits and behaviors that affect risk of falls    -  Rosenhayn fall precautions as indicated by assessment   - Educate patient/family on patient safety including physical limitations  - Instruct patient to call for assistance with activity based on assessment  - Modify environment to reduce risk of injury  - Consider OT/PT consult to assist with strengthening/mobility  Outcome: Progressing  Note: Call bell within reach and bed alarm is on     Problem: Prexisting or High Potential for Compromised Skin Integrity  Goal: Skin integrity is maintained or improved  Description: INTERVENTIONS:  - Identify patients at risk for skin breakdown  - Assess and monitor skin integrity  - Assess and monitor nutrition and hydration status  - Monitor labs   - Assess for incontinence   - Turn and reposition patient  - Assist with mobility/ambulation  - Relieve pressure over bony prominences  - Avoid friction and shearing  - Provide appropriate hygiene as needed including keeping skin clean and dry  - Evaluate need for skin moisturizer/barrier cream  - Collaborate with interdisciplinary team   - Patient/family teaching  - Consider wound care consult   Outcome: Progressing  Note: Blanchable redness to sacrum and allevyn is applied     Problem: INFECTION - ADULT  Goal: Absence or prevention of progression during hospitalization  Description: INTERVENTIONS:  - Assess and monitor for signs and symptoms of infection  - Monitor lab/diagnostic results  - Monitor all insertion sites  - Monitor secretions for changes in amount and color  - Rosenhayn appropriate cooling/warming therapies per order  - Administer medications as ordered  - Instruct and encourage patient and family to use good hand hygiene technique  - Identify and instruct in appropriate isolation precautions for identified infection/condition  Outcome: Progressing     Problem: DISCHARGE PLANNING  Goal: Discharge to home or other facility with appropriate resources  Description: INTERVENTIONS:  - Identify barriers to discharge w/patient and caregiver  - Arrange for needed discharge resources and transportation as appropriate  - Identify discharge learning needs   - Refer to Case Management Department for coordinating discharge planning if the patient needs post-hospital services based on physician/advanced practitioner order or complex needs related to functional status, cognitive ability, or social support system  Outcome: Progressing     Problem: Knowledge Deficit  Goal: Patient/family/caregiver demonstrates understanding of disease process, treatment plan, medications, and discharge instructions  Description: Complete learning assessment and assess knowledge base    Interventions:  - Provide teaching at level of understanding  - Provide teaching via preferred learning methods  Outcome: Progressing

## 2021-05-23 LAB
ATRIAL RATE: 93 BPM
P AXIS: -5 DEGREES
PR INTERVAL: 156 MS
QRS AXIS: -28 DEGREES
QRSD INTERVAL: 86 MS
QT INTERVAL: 344 MS
QTC INTERVAL: 427 MS
T WAVE AXIS: 40 DEGREES
VENTRICULAR RATE: 93 BPM

## 2021-05-23 PROCEDURE — 93010 ELECTROCARDIOGRAM REPORT: CPT | Performed by: INTERNAL MEDICINE

## 2021-05-24 ENCOUNTER — TRANSITIONAL CARE MANAGEMENT (OUTPATIENT)
Dept: FAMILY MEDICINE CLINIC | Facility: CLINIC | Age: 76
End: 2021-05-24

## 2021-05-24 LAB
BACTERIA BLD CULT: ABNORMAL
BACTERIA BLD CULT: NORMAL
GRAM STN SPEC: ABNORMAL

## 2021-05-26 ENCOUNTER — OFFICE VISIT (OUTPATIENT)
Dept: UROLOGY | Facility: CLINIC | Age: 76
End: 2021-05-26
Payer: MEDICARE

## 2021-05-26 VITALS
HEART RATE: 81 BPM | BODY MASS INDEX: 41.77 KG/M2 | DIASTOLIC BLOOD PRESSURE: 76 MMHG | SYSTOLIC BLOOD PRESSURE: 132 MMHG | HEIGHT: 62 IN | WEIGHT: 227 LBS

## 2021-05-26 DIAGNOSIS — N39.0 FREQUENT URINARY TRACT INFECTIONS: Primary | ICD-10-CM

## 2021-05-26 LAB
POST-VOID RESIDUAL VOLUME, ML POC: 62 ML
SL AMB  POCT GLUCOSE, UA: NORMAL
SL AMB LEUKOCYTE ESTERASE,UA: NORMAL
SL AMB POCT BILIRUBIN,UA: NORMAL
SL AMB POCT BLOOD,UA: NORMAL
SL AMB POCT CLARITY,UA: CLEAR
SL AMB POCT COLOR,UA: YELLOW
SL AMB POCT KETONES,UA: NORMAL
SL AMB POCT NITRITE,UA: NORMAL
SL AMB POCT PH,UA: 5
SL AMB POCT SPECIFIC GRAVITY,UA: 1.02
SL AMB POCT URINE PROTEIN: 10
SL AMB POCT UROBILINOGEN: 0.1

## 2021-05-26 PROCEDURE — 99213 OFFICE O/P EST LOW 20 MIN: CPT | Performed by: PHYSICIAN ASSISTANT

## 2021-05-26 PROCEDURE — 51798 US URINE CAPACITY MEASURE: CPT | Performed by: PHYSICIAN ASSISTANT

## 2021-05-26 PROCEDURE — 81002 URINALYSIS NONAUTO W/O SCOPE: CPT | Performed by: PHYSICIAN ASSISTANT

## 2021-05-26 NOTE — PATIENT INSTRUCTIONS
Daily cranberry and probiotics  Urinating every 2 hours throughout the daytime  Elevating legs at night  Drinking at least 60 oz of water throughout the day  Compression stockings

## 2021-05-27 ENCOUNTER — OFFICE VISIT (OUTPATIENT)
Dept: FAMILY MEDICINE CLINIC | Facility: CLINIC | Age: 76
End: 2021-05-27
Payer: MEDICARE

## 2021-05-27 VITALS
TEMPERATURE: 96.8 F | WEIGHT: 228 LBS | HEART RATE: 76 BPM | OXYGEN SATURATION: 97 % | DIASTOLIC BLOOD PRESSURE: 72 MMHG | BODY MASS INDEX: 41.96 KG/M2 | SYSTOLIC BLOOD PRESSURE: 124 MMHG | RESPIRATION RATE: 20 BRPM | HEIGHT: 62 IN

## 2021-05-27 DIAGNOSIS — N32.81 OVERACTIVE BLADDER: ICD-10-CM

## 2021-05-27 DIAGNOSIS — N17.9 AKI (ACUTE KIDNEY INJURY) (HCC): Primary | ICD-10-CM

## 2021-05-27 DIAGNOSIS — N39.0 URINARY TRACT INFECTION WITHOUT HEMATURIA, SITE UNSPECIFIED: ICD-10-CM

## 2021-05-27 PROCEDURE — 99495 TRANSJ CARE MGMT MOD F2F 14D: CPT | Performed by: FAMILY MEDICINE

## 2021-05-27 NOTE — CASE MANAGEMENT
Summary of Care was faxed to [facility] in [address] [place] [address] Ellis Zavala is aware

## 2021-05-27 NOTE — PROGRESS NOTES
Assessment/Plan:       Diagnoses and all orders for this visit:    REZA (acute kidney injury) ([facility] [address] )  Comments:  improved  repeat BMP ordered  Urinary tract infection without hematuria, site unspecified  Comments:  resolved  last dose of antibiotic left  she continues to have chronic overactive bladder  Overactive bladder  Comments:  managed by urology  TCM Call (since 4/26/2021)     Date and time call was made  5/24/2021  9:10 AM    Hospital care reviewed  Records reviewed    Patient was hospitialized at  [address] [place] [address]        Date of Admission  05/18/21    Date of discharge  05/21/21    Diagnosis  Sepsis    Disposition  Home    Were the patients medications reviewed and updated  Yes    Current Symptoms  Cough (Comment)  cough medicine does not seem to be going away, but patient states she feels 100% better then when she was in the hospital  jmcma    Cough Severity  Mild      TCM Call (since 4/26/2021)     Post hospital issues  None    Should patient be enrolled in anticoag monitoring? No    Scheduled for follow up? Yes    Patients specialists  Other (comment)    Other specialists names  Dr Brittnee Clifford (gastroenterologist)    Did you obtain your prescribed medications  Yes    Do you need help managing your prescriptions or medications  No (Comment)  Significant other Raymondo Najjar takes care of medications  jmcma    Is transportation to your appointment needed  No (Comment)  Significant other, Raymondo Najjar drives   jmcma    I have advised the patient to call PCP with any new or worsening symptoms  Kyle Sidhu CMA    Living Arrangements  Spouse or Significiant other    Support System  Partner    The type of support provided  Emotional; Financial; Physical; Other (comment)    Do you have social support  Yes, as much as I need    Are you recieving any outpatient services  No    Are you recieving home care services  No    Are you using any community resources  No    Current waiver services  No    Have you fallen in the last 12 months  No    Interperter language line needed  No    Counseling  Patient    Counseling topics  Activities of daily living; instructions for management; patient and family education; Importance of RX compliance; Risk factor reduction; Home health agency benefits    Comments  patient advised to continue medications as prescribed and call office if she has any worsening symptoms  julia        Subjective:      Patient ID: Ascencion Ochoa is a 66 y o  female  HPI  Sunnie Hammans presents today for hospital discharge follow up after being admitted at [address] [place] [address] from 5/18 to 5/21/21 for sepsis 2/2 UTI  She was started on IV antibiotics and transitioned to PO Vantin on discharge  Urine culture grew E  Coli  She had an REZA while in the hospital, but this improved  She has repeat BMP tomorrow  She did see urologist yesterday  The following portions of the patient's history were reviewed and updated as appropriate: allergies, current medications, past family history, past medical history, past social history, past surgical history and problem list     Review of Systems   Constitutional: Negative  HENT: Negative  Eyes: Negative  Respiratory: Negative  Cardiovascular: Negative  Gastrointestinal: Negative  Endocrine: Negative  Genitourinary: Positive for frequency  Musculoskeletal: Negative  Skin: Negative  Allergic/Immunologic: Negative  Neurological: Negative  Hematological: Negative  Psychiatric/Behavioral: Negative  Objective:      /72   Pulse 76   Temp (!) 96 8 °F (36 °C)   Resp 20   Ht 5' 2" (1 575 m)   Wt 103 kg (228 lb)   SpO2 97%   BMI 41 70 kg/m²          Physical Exam  Constitutional:       General: She is not in acute distress  Appearance: She is well-developed  She is not diaphoretic  HENT:      Head: Normocephalic and atraumatic  Neck:      Musculoskeletal: Normal range of motion and neck supple     Cardiovascular:      Rate and Rhythm: Normal rate and regular rhythm  Heart sounds: Normal heart sounds  No murmur  No friction rub  No gallop  Pulmonary:      Effort: Pulmonary effort is normal  No respiratory distress  Breath sounds: Normal breath sounds  No wheezing or rales  Chest:      Chest wall: No tenderness  Abdominal:      General: Abdomen is flat  Bowel sounds are normal  There is no distension  Palpations: Abdomen is soft  There is no mass  Tenderness: There is no abdominal tenderness  There is no right CVA tenderness, left CVA tenderness, guarding or rebound  Hernia: No hernia is present  Musculoskeletal: Normal range of motion  General: No deformity  Skin:     General: Skin is warm and dry  Neurological:      Mental Status: She is alert and oriented to person, place, and time  Psychiatric:         Behavior: Behavior normal          Thought Content:  Thought content normal          Judgment: Judgment normal

## 2021-06-01 ENCOUNTER — TRANSCRIBE ORDERS (OUTPATIENT)
Dept: LAB | Facility: CLINIC | Age: 76
End: 2021-06-01

## 2021-06-01 ENCOUNTER — APPOINTMENT (OUTPATIENT)
Dept: LAB | Facility: CLINIC | Age: 76
End: 2021-06-01
Payer: MEDICARE

## 2021-06-01 DIAGNOSIS — Z51.81 ENCOUNTER FOR THERAPEUTIC DRUG MONITORING: ICD-10-CM

## 2021-06-01 DIAGNOSIS — Z94.4 LIVER REPLACED BY TRANSPLANT (HCC): Primary | ICD-10-CM

## 2021-06-01 PROCEDURE — 80158 DRUG ASSAY CYCLOSPORINE: CPT

## 2021-06-02 ENCOUNTER — OFFICE VISIT (OUTPATIENT)
Dept: GASTROENTEROLOGY | Facility: CLINIC | Age: 76
End: 2021-06-02
Payer: MEDICARE

## 2021-06-02 VITALS
WEIGHT: 228 LBS | DIASTOLIC BLOOD PRESSURE: 71 MMHG | SYSTOLIC BLOOD PRESSURE: 176 MMHG | HEIGHT: 62 IN | BODY MASS INDEX: 41.96 KG/M2 | HEART RATE: 87 BPM

## 2021-06-02 DIAGNOSIS — K44.9 HIATAL HERNIA: ICD-10-CM

## 2021-06-02 DIAGNOSIS — K51.90 ULCERATIVE COLITIS WITHOUT COMPLICATIONS, UNSPECIFIED LOCATION (HCC): ICD-10-CM

## 2021-06-02 DIAGNOSIS — Z94.4 LIVER TRANSPLANTED (HCC): Primary | ICD-10-CM

## 2021-06-02 PROCEDURE — 99214 OFFICE O/P EST MOD 30 MIN: CPT | Performed by: INTERNAL MEDICINE

## 2021-06-02 NOTE — PROGRESS NOTES
[facility] - Outpatient Follow-up Note  Cherelle Weston 66 y o  female MRN: [id number]  Encounter: [id number]          ASSESSMENT AND PLAN:      1  Liver transplanted ([facility] )    2  Ulcerative colitis without complications, unspecified location ([place] [address] )    3  Hiatal hernia        Patient status post liver transplant for primary biliary cholangitis, follows up with the [facility], regularly, according to her stable  History of ulcerative colitis clinically stable, no evidence of acute abdomen ileus obstruction, symptoms seem to be well managed  Last colonoscopy was in April of 2017, EGD was done at the time as well  She is quite concerned about surveillance colonoscopy this time, we can hold off because of her multiple comorbid conditions  Follow-up in my office 1 year   ______________________________________________________________________    SUBJECTIVE:       64-year-old lady, history of liver transplant for primary biliary cholangitis, ulcerative colitis, occasional diarrhea, clinically doing well, denies any blood in stools, has 2-4 BMs a day with no urgency tenesmus incontinence appetite is fair weight stable denies dysphagia coughing choking spells nocturnal reflux regurgitation bronchitis pneumonias  Diet medications more than 10 pertinent systems reviewed  Some of the prior records noted  REVIEW OF SYSTEMS IS OTHERWISE NEGATIVE        Historical Information   Past Medical History:   Diagnosis Date    Anemia     Anxiety     Arthritis     Benign paroxysmal vertigo, unspecified ear     onset: 05/06/2010    Cirrhosis ([facility] )     onset: 03/30/2005    Crohn disease ([facility] )     Disease of thyroid gland     GERD (gastroesophageal reflux disease)     History of transfusion     Liver transplant candidate     onset: 09/16/2008    Osteoporosis     Pelvic fracture ([facility] )     onset: 10/14/2008     Past Surgical History:   Procedure Laterality Date    ABDOMINAL SURGERY      APPENDECTOMY  BLADDER AUGMENTATION N/A     CATARACT EXTRACTION      CHOLECYSTECTOMY N/A     COLONOSCOPY N/A 2017    Procedure: COLONOSCOPY;  Surgeon: Sally Macedo MD;  Location: [place] [address] GI LAB; Service:     ESOPHAGOGASTRODUODENOSCOPY N/A 3/31/2016    Procedure: ESOPHAGOGASTRODUODENOSCOPY (EGD); Surgeon: Sally Macedo MD;  Location: [place] [address] GI LAB; Service:     ESOPHAGOGASTRODUODENOSCOPY N/A 2017    Procedure: ESOPHAGOGASTRODUODENOSCOPY (EGD); Surgeon: Sally Macedo MD;  Location: [place] [address] GI LAB;   Service:     EYE SURGERY      cataracts removed both eyes lens implant    HERNIA REPAIR      JOINT REPLACEMENT      left knee replacement    LIVER TRANSPLANTATION N/A     TONSILECTOMY AND ADNOIDECTOMY N/A     TUBAL LIGATION       Social History   Social History     Substance and Sexual Activity   Alcohol Use Never    Frequency: Never    Binge frequency: Never     Social History     Substance and Sexual Activity   Drug Use Never     Social History     Tobacco Use   Smoking Status Former Smoker    Years: 5 00    Quit date: 1975    Years since quittin 3   Smokeless Tobacco Never Used   Tobacco Comment    smoked for 5 years-1/2 to 1 ppd     Family History   Problem Relation Age of Onset    Cancer Mother         breast    Cancer Father     Crohn's disease Brother     Arthritis Family     Breast cancer Family        Meds/Allergies       Current Outpatient Medications:     aspirin (ECOTRIN LOW STRENGTH) 81 mg EC tablet    b complex vitamins capsule    BUDESONIDE PO    Calcium 500 MG tablet    Cholecalciferol (Vitamin D) 50 MCG ( UT) tablet    clopidogrel (PLAVIX) 75 mg tablet    cycloSPORINE modified (NEORAL) 25 mg capsule    ezetimibe (ZETIA) 10 mg tablet    levothyroxine 112 mcg tablet    metoprolol tartrate (LOPRESSOR) 25 mg tablet    mycophenolate (CELLCEPT) 250 mg capsule    omeprazole (PriLOSEC) 40 MG capsule    pravastatin (PRAVACHOL) 20 mg tablet    Allergies   Allergen Reactions    Tetracyclines & Related Hives    Vancomycin Other (See Comments) and Itching     Reaction Date: [id number]; Pt unsure of reaction    Prograf [Tacrolimus] Anxiety     Reaction Date: [id number]; Objective     Blood pressure (!) 176/71, pulse 87, height 5' 2" (1 575 m), weight 103 kg (228 lb), not currently breastfeeding  Body mass index is 41 7 kg/m²  PHYSICAL EXAM:      General Appearance:   Alert, cooperative, no distress   HEENT:   Normocephalic, atraumatic, anicteric  Neck:  Supple, symmetrical, trachea midline   Lungs:   Clear to auscultation bilaterally; no rales, rhonchi or wheezing; respirations unlabored    Heart[de-identified]   Regular rate and rhythm; no murmur  Abdomen:   Soft, non-tender, non-distended; normal bowel sounds; no masses, no organomegaly    Genitalia:   Deferred    Rectal:   Deferred    Extremities:  No cyanosis, clubbing or edema    Skin:  No jaundice, rashes, or lesions    Lymph nodes:  No palpable cervical lymphadenopathy        Lab Results:   No visits with results within 1 Day(s) from this visit  Latest known visit with results is:   Office Visit on 05/26/2021   Component Date Value    LEUKOCYTE ESTERASE,UA 05/26/2021 -     NITRITE,UA 05/26/2021 -     SL AMB POCT UROBILINOGEN 05/26/2021 0 1     POCT URINE PROTEIN 05/26/2021 10      PH,UA 05/26/2021 5 0     BLOOD,UA 05/26/2021 -     SPECIFIC GRAVITY,UA 05/26/2021 1 020     KETONES,UA 05/26/2021 -     BILIRUBIN,UA 05/26/2021 -     GLUCOSE, UA 05/26/2021 -      COLOR,UA 05/26/2021 yellow     CLARITY,UA 05/26/2021 clear     POST-VOID RESIDUAL VOLUM* 05/26/2021 62          Radiology Results:   Xr Chest 1 View Portable    Result Date: 5/19/2021  Narrative: CHEST INDICATION:  fever  COMPARISON:  Chest radiograph 6/10/2019 EXAM PERFORMED/VIEWS:  XR CHEST PORTABLE FINDINGS: Loop recorder again noted overlying the left chest wall  Cardiomediastinal silhouette appears unremarkable    There is elevation of the right hemidiaphragm  Limited evaluation of the left lung base due to overlapping soft tissues  Otherwise, no focal consolidation, pleural effusion or pneumothorax  Osseous structures appear within normal limits for patient age  Impression: No acute cardiopulmonary disease    Workstation performed: [id number]

## 2021-06-07 DIAGNOSIS — E03.9 HYPOTHYROIDISM, UNSPECIFIED TYPE: ICD-10-CM

## 2021-06-09 RX ORDER — LEVOTHYROXINE SODIUM 112 UG/1
TABLET ORAL
Qty: 90 TABLET | Refills: 3 | Status: SHIPPED | OUTPATIENT
Start: 2021-06-09 | End: 2022-03-16

## 2021-06-15 ENCOUNTER — TELEPHONE (OUTPATIENT)
Dept: FAMILY MEDICINE CLINIC | Facility: CLINIC | Age: 76
End: 2021-06-15

## 2021-07-07 ENCOUNTER — APPOINTMENT (OUTPATIENT)
Dept: LAB | Facility: CLINIC | Age: 76
End: 2021-07-07
Payer: MEDICARE

## 2021-08-10 ENCOUNTER — TELEPHONE (OUTPATIENT)
Dept: FAMILY MEDICINE CLINIC | Facility: CLINIC | Age: 76
End: 2021-08-10

## 2021-08-23 ENCOUNTER — TELEPHONE (OUTPATIENT)
Dept: FAMILY MEDICINE CLINIC | Facility: CLINIC | Age: 76
End: 2021-08-23

## 2021-08-24 NOTE — TELEPHONE ENCOUNTER
Pt aware and information given to pt to call and schedule  No further action needed   Nicole Carter, GONZALO

## 2021-08-31 ENCOUNTER — IMMUNIZATIONS (OUTPATIENT)
Dept: FAMILY MEDICINE CLINIC | Facility: HOSPITAL | Age: 76
End: 2021-08-31

## 2021-08-31 DIAGNOSIS — Z23 ENCOUNTER FOR IMMUNIZATION: Primary | ICD-10-CM

## 2021-08-31 PROCEDURE — 91301 SARS-COV-2 / COVID-19 MRNA VACCINE (MODERNA) 100 MCG: CPT

## 2021-08-31 PROCEDURE — 0011A SARS-COV-2 / COVID-19 MRNA VACCINE (MODERNA) 100 MCG: CPT

## 2021-10-11 DIAGNOSIS — I47.1 PSVT (PAROXYSMAL SUPRAVENTRICULAR TACHYCARDIA) (HCC): ICD-10-CM

## 2021-10-12 DIAGNOSIS — Z86.73 PERSONAL HISTORY OF TIA (TRANSIENT ISCHEMIC ATTACK): ICD-10-CM

## 2021-10-12 DIAGNOSIS — I63.40 CEREBROVASCULAR ACCIDENT (CVA) DUE TO EMBOLISM OF CEREBRAL ARTERY (HCC): ICD-10-CM

## 2021-10-12 RX ORDER — CLOPIDOGREL BISULFATE 75 MG/1
75 TABLET ORAL DAILY
Qty: 90 TABLET | Refills: 3 | Status: SHIPPED | OUTPATIENT
Start: 2021-10-12

## 2021-10-18 DIAGNOSIS — I47.1 PSVT (PAROXYSMAL SUPRAVENTRICULAR TACHYCARDIA) (HCC): ICD-10-CM

## 2021-10-25 ENCOUNTER — TRANSCRIBE ORDERS (OUTPATIENT)
Dept: LAB | Facility: CLINIC | Age: 76
End: 2021-10-25

## 2021-10-25 ENCOUNTER — APPOINTMENT (OUTPATIENT)
Dept: LAB | Facility: CLINIC | Age: 76
End: 2021-10-25
Payer: MEDICARE

## 2021-10-25 DIAGNOSIS — Z51.81 ENCOUNTER FOR THERAPEUTIC DRUG MONITORING: ICD-10-CM

## 2021-10-25 DIAGNOSIS — E83.42 HYPOMAGNESEMIA: ICD-10-CM

## 2021-10-25 DIAGNOSIS — E83.30 DISORDER OF PHOSPHORUS METABOLISM: ICD-10-CM

## 2021-10-25 DIAGNOSIS — R74.01 NONSPECIFIC ELEVATION OF LEVELS OF TRANSAMINASE OR LACTIC ACID DEHYDROGENASE (LDH): ICD-10-CM

## 2021-10-25 DIAGNOSIS — Z94.4 LIVER REPLACED BY TRANSPLANT (HCC): Primary | ICD-10-CM

## 2021-10-25 DIAGNOSIS — T86.40 COMPLICATION OF TRANSPLANTED LIVER, UNSPECIFIED COMPLICATION (HCC): ICD-10-CM

## 2021-10-25 DIAGNOSIS — Z94.4 LIVER REPLACED BY TRANSPLANT (HCC): ICD-10-CM

## 2021-10-25 DIAGNOSIS — R74.02 NONSPECIFIC ELEVATION OF LEVELS OF TRANSAMINASE OR LACTIC ACID DEHYDROGENASE (LDH): ICD-10-CM

## 2021-10-25 LAB
ALBUMIN SERPL BCP-MCNC: 3.4 G/DL (ref 3.5–5)
ALP SERPL-CCNC: 276 U/L (ref 46–116)
ALT SERPL W P-5'-P-CCNC: 56 U/L (ref 12–78)
ANION GAP SERPL CALCULATED.3IONS-SCNC: 3 MMOL/L (ref 4–13)
AST SERPL W P-5'-P-CCNC: 46 U/L (ref 5–45)
BASOPHILS # BLD AUTO: 0.05 THOUSANDS/ΜL (ref 0–0.1)
BASOPHILS NFR BLD AUTO: 1 % (ref 0–1)
BILIRUB DIRECT SERPL-MCNC: 0.14 MG/DL (ref 0–0.2)
BILIRUB SERPL-MCNC: 0.49 MG/DL (ref 0.2–1)
BUN SERPL-MCNC: 29 MG/DL (ref 5–25)
CALCIUM ALBUM COR SERPL-MCNC: 9.9 MG/DL (ref 8.3–10.1)
CALCIUM SERPL-MCNC: 9.4 MG/DL (ref 8.3–10.1)
CHLORIDE SERPL-SCNC: 111 MMOL/L (ref 100–108)
CO2 SERPL-SCNC: 24 MMOL/L (ref 21–32)
CREAT SERPL-MCNC: 1.06 MG/DL (ref 0.6–1.3)
EOSINOPHIL # BLD AUTO: 0.38 THOUSAND/ΜL (ref 0–0.61)
EOSINOPHIL NFR BLD AUTO: 6 % (ref 0–6)
ERYTHROCYTE [DISTWIDTH] IN BLOOD BY AUTOMATED COUNT: 14 % (ref 11.6–15.1)
GFR SERPL CREATININE-BSD FRML MDRD: 51 ML/MIN/1.73SQ M
GGT SERPL-CCNC: 371 U/L (ref 5–85)
GLUCOSE P FAST SERPL-MCNC: 107 MG/DL (ref 65–99)
HCT VFR BLD AUTO: 37.2 % (ref 34.8–46.1)
HGB BLD-MCNC: 11.4 G/DL (ref 11.5–15.4)
IMM GRANULOCYTES # BLD AUTO: 0.05 THOUSAND/UL (ref 0–0.2)
IMM GRANULOCYTES NFR BLD AUTO: 1 % (ref 0–2)
LYMPHOCYTES # BLD AUTO: 1.02 THOUSANDS/ΜL (ref 0.6–4.47)
LYMPHOCYTES NFR BLD AUTO: 17 % (ref 14–44)
MAGNESIUM SERPL-MCNC: 2.6 MG/DL (ref 1.6–2.6)
MCH RBC QN AUTO: 28.2 PG (ref 26.8–34.3)
MCHC RBC AUTO-ENTMCNC: 30.6 G/DL (ref 31.4–37.4)
MCV RBC AUTO: 92 FL (ref 82–98)
MONOCYTES # BLD AUTO: 0.5 THOUSAND/ΜL (ref 0.17–1.22)
MONOCYTES NFR BLD AUTO: 8 % (ref 4–12)
NEUTROPHILS # BLD AUTO: 4.01 THOUSANDS/ΜL (ref 1.85–7.62)
NEUTS SEG NFR BLD AUTO: 67 % (ref 43–75)
NRBC BLD AUTO-RTO: 0 /100 WBCS
PHOSPHATE SERPL-MCNC: 4.5 MG/DL (ref 2.3–4.1)
PLATELET # BLD AUTO: 246 THOUSANDS/UL (ref 149–390)
PMV BLD AUTO: 9.8 FL (ref 8.9–12.7)
POTASSIUM SERPL-SCNC: 4.5 MMOL/L (ref 3.5–5.3)
PROT SERPL-MCNC: 8.3 G/DL (ref 6.4–8.2)
RBC # BLD AUTO: 4.04 MILLION/UL (ref 3.81–5.12)
SODIUM SERPL-SCNC: 138 MMOL/L (ref 136–145)
WBC # BLD AUTO: 6.01 THOUSAND/UL (ref 4.31–10.16)

## 2021-10-25 PROCEDURE — 36415 COLL VENOUS BLD VENIPUNCTURE: CPT

## 2021-10-25 PROCEDURE — 80158 DRUG ASSAY CYCLOSPORINE: CPT

## 2021-10-25 PROCEDURE — 82977 ASSAY OF GGT: CPT

## 2021-10-25 PROCEDURE — 84100 ASSAY OF PHOSPHORUS: CPT

## 2021-10-25 PROCEDURE — 85025 COMPLETE CBC W/AUTO DIFF WBC: CPT

## 2021-10-25 PROCEDURE — 82248 BILIRUBIN DIRECT: CPT

## 2021-10-25 PROCEDURE — 83735 ASSAY OF MAGNESIUM: CPT

## 2021-10-25 PROCEDURE — 80053 COMPREHEN METABOLIC PANEL: CPT

## 2021-10-26 LAB — CYCLOSPORINE BLD IA-MCNC: 50 NG/ML (ref 100–400)

## 2021-10-28 ENCOUNTER — OFFICE VISIT (OUTPATIENT)
Dept: CARDIOLOGY CLINIC | Facility: CLINIC | Age: 76
End: 2021-10-28
Payer: MEDICARE

## 2021-10-28 VITALS
SYSTOLIC BLOOD PRESSURE: 110 MMHG | DIASTOLIC BLOOD PRESSURE: 54 MMHG | TEMPERATURE: 98.2 F | HEIGHT: 62 IN | BODY MASS INDEX: 41.41 KG/M2 | OXYGEN SATURATION: 98 % | WEIGHT: 225 LBS | HEART RATE: 79 BPM

## 2021-10-28 DIAGNOSIS — Z86.73 PERSONAL HISTORY OF TIA (TRANSIENT ISCHEMIC ATTACK): ICD-10-CM

## 2021-10-28 DIAGNOSIS — I47.1 PSVT (PAROXYSMAL SUPRAVENTRICULAR TACHYCARDIA) (HCC): Primary | ICD-10-CM

## 2021-10-28 DIAGNOSIS — I21.A1 TYPE 2 MYOCARDIAL INFARCTION WITHOUT ST ELEVATION (HCC): ICD-10-CM

## 2021-10-28 DIAGNOSIS — Z95.818 PRESENCE OF CARDIAC DEVICE: ICD-10-CM

## 2021-10-28 DIAGNOSIS — Z94.4 LIVER TRANSPLANTED (HCC): ICD-10-CM

## 2021-10-28 DIAGNOSIS — I48.21 PERMANENT ATRIAL FIBRILLATION (HCC): ICD-10-CM

## 2021-10-28 PROCEDURE — 99214 OFFICE O/P EST MOD 30 MIN: CPT | Performed by: INTERNAL MEDICINE

## 2021-10-28 PROCEDURE — 93000 ELECTROCARDIOGRAM COMPLETE: CPT | Performed by: INTERNAL MEDICINE

## 2021-12-01 ENCOUNTER — OFFICE VISIT (OUTPATIENT)
Dept: FAMILY MEDICINE CLINIC | Facility: CLINIC | Age: 76
End: 2021-12-01
Payer: MEDICARE

## 2021-12-01 VITALS
TEMPERATURE: 98.3 F | WEIGHT: 223 LBS | HEIGHT: 61 IN | BODY MASS INDEX: 42.1 KG/M2 | SYSTOLIC BLOOD PRESSURE: 130 MMHG | HEART RATE: 77 BPM | OXYGEN SATURATION: 97 % | DIASTOLIC BLOOD PRESSURE: 60 MMHG | RESPIRATION RATE: 20 BRPM

## 2021-12-01 DIAGNOSIS — Z00.00 MEDICARE ANNUAL WELLNESS VISIT, SUBSEQUENT: Primary | ICD-10-CM

## 2021-12-01 DIAGNOSIS — E03.9 HYPOTHYROIDISM, UNSPECIFIED TYPE: ICD-10-CM

## 2021-12-01 PROBLEM — N39.0 UTI (URINARY TRACT INFECTION): Status: RESOLVED | Noted: 2021-05-18 | Resolved: 2021-12-01

## 2021-12-01 PROCEDURE — G0439 PPPS, SUBSEQ VISIT: HCPCS | Performed by: INTERNAL MEDICINE

## 2021-12-06 DIAGNOSIS — Z86.73 HISTORY OF STROKE: ICD-10-CM

## 2021-12-06 RX ORDER — PRAVASTATIN SODIUM 20 MG
TABLET ORAL
Qty: 90 TABLET | Refills: 3 | Status: SHIPPED | OUTPATIENT
Start: 2021-12-06

## 2021-12-08 ENCOUNTER — APPOINTMENT (OUTPATIENT)
Dept: LAB | Facility: CLINIC | Age: 76
End: 2021-12-08
Payer: MEDICARE

## 2022-01-12 ENCOUNTER — APPOINTMENT (OUTPATIENT)
Dept: LAB | Facility: CLINIC | Age: 77
End: 2022-01-12
Payer: MEDICARE

## 2022-01-13 NOTE — TELEPHONE ENCOUNTER
- Follow up in 4 months for IOP check with [facility]. Called Caro and offered the appointment  Patient needs sooner appointment  Told her I would put it on a wait list and forward message again

## 2022-03-15 DIAGNOSIS — E03.9 HYPOTHYROIDISM, UNSPECIFIED TYPE: ICD-10-CM

## 2022-03-16 RX ORDER — LEVOTHYROXINE SODIUM 112 UG/1
TABLET ORAL
Qty: 90 TABLET | Refills: 3 | Status: SHIPPED | OUTPATIENT
Start: 2022-03-16

## 2022-04-11 ENCOUNTER — APPOINTMENT (OUTPATIENT)
Dept: LAB | Facility: CLINIC | Age: 77
End: 2022-04-11
Payer: MEDICARE

## 2022-05-13 ENCOUNTER — APPOINTMENT (OUTPATIENT)
Dept: LAB | Facility: CLINIC | Age: 77
End: 2022-05-13
Payer: MEDICARE

## 2022-05-13 ENCOUNTER — TRANSCRIBE ORDERS (OUTPATIENT)
Dept: LAB | Facility: CLINIC | Age: 77
End: 2022-05-13

## 2022-05-13 DIAGNOSIS — Z94.4 LIVER REPLACED BY TRANSPLANT (HCC): Primary | ICD-10-CM

## 2022-05-13 DIAGNOSIS — R74.01 NONSPECIFIC ELEVATION OF LEVELS OF TRANSAMINASE OR LACTIC ACID DEHYDROGENASE (LDH): ICD-10-CM

## 2022-05-13 DIAGNOSIS — T86.40 COMPLICATION OF TRANSPLANTED LIVER, UNSPECIFIED COMPLICATION (HCC): ICD-10-CM

## 2022-05-13 DIAGNOSIS — R74.02 NONSPECIFIC ELEVATION OF LEVELS OF TRANSAMINASE OR LACTIC ACID DEHYDROGENASE (LDH): ICD-10-CM

## 2022-05-13 LAB
ALBUMIN SERPL BCP-MCNC: 3.6 G/DL (ref 3.5–5)
ALP SERPL-CCNC: 251 U/L (ref 46–116)
ALT SERPL W P-5'-P-CCNC: 62 U/L (ref 12–78)
ANION GAP SERPL CALCULATED.3IONS-SCNC: 6 MMOL/L (ref 4–13)
AST SERPL W P-5'-P-CCNC: 53 U/L (ref 5–45)
BASOPHILS # BLD AUTO: 0.04 THOUSANDS/ΜL (ref 0–0.1)
BASOPHILS NFR BLD AUTO: 1 % (ref 0–1)
BILIRUB DIRECT SERPL-MCNC: 0.12 MG/DL (ref 0–0.2)
BILIRUB SERPL-MCNC: 0.45 MG/DL (ref 0.2–1)
BUN SERPL-MCNC: 38 MG/DL (ref 5–25)
CALCIUM SERPL-MCNC: 8.8 MG/DL (ref 8.3–10.1)
CHLORIDE SERPL-SCNC: 111 MMOL/L (ref 100–108)
CO2 SERPL-SCNC: 21 MMOL/L (ref 21–32)
CREAT SERPL-MCNC: 1.51 MG/DL (ref 0.6–1.3)
EOSINOPHIL # BLD AUTO: 0.29 THOUSAND/ΜL (ref 0–0.61)
EOSINOPHIL NFR BLD AUTO: 5 % (ref 0–6)
ERYTHROCYTE [DISTWIDTH] IN BLOOD BY AUTOMATED COUNT: 14 % (ref 11.6–15.1)
GFR SERPL CREATININE-BSD FRML MDRD: 32 ML/MIN/1.73SQ M
GGT SERPL-CCNC: 437 U/L (ref 5–85)
GLUCOSE P FAST SERPL-MCNC: 114 MG/DL (ref 65–99)
HCT VFR BLD AUTO: 37.8 % (ref 34.8–46.1)
HGB BLD-MCNC: 11.4 G/DL (ref 11.5–15.4)
IMM GRANULOCYTES # BLD AUTO: 0.04 THOUSAND/UL (ref 0–0.2)
IMM GRANULOCYTES NFR BLD AUTO: 1 % (ref 0–2)
LYMPHOCYTES # BLD AUTO: 1.19 THOUSANDS/ΜL (ref 0.6–4.47)
LYMPHOCYTES NFR BLD AUTO: 20 % (ref 14–44)
MAGNESIUM SERPL-MCNC: 2.4 MG/DL (ref 1.6–2.6)
MCH RBC QN AUTO: 28 PG (ref 26.8–34.3)
MCHC RBC AUTO-ENTMCNC: 30.2 G/DL (ref 31.4–37.4)
MCV RBC AUTO: 93 FL (ref 82–98)
MONOCYTES # BLD AUTO: 0.44 THOUSAND/ΜL (ref 0.17–1.22)
MONOCYTES NFR BLD AUTO: 7 % (ref 4–12)
NEUTROPHILS # BLD AUTO: 3.98 THOUSANDS/ΜL (ref 1.85–7.62)
NEUTS SEG NFR BLD AUTO: 66 % (ref 43–75)
NRBC BLD AUTO-RTO: 0 /100 WBCS
PHOSPHATE SERPL-MCNC: 4.1 MG/DL (ref 2.3–4.1)
PLATELET # BLD AUTO: 225 THOUSANDS/UL (ref 149–390)
PMV BLD AUTO: 10 FL (ref 8.9–12.7)
POTASSIUM SERPL-SCNC: 5.5 MMOL/L (ref 3.5–5.3)
PROT SERPL-MCNC: 8 G/DL (ref 6.4–8.2)
RBC # BLD AUTO: 4.07 MILLION/UL (ref 3.81–5.12)
SODIUM SERPL-SCNC: 138 MMOL/L (ref 136–145)
WBC # BLD AUTO: 5.98 THOUSAND/UL (ref 4.31–10.16)

## 2022-05-13 PROCEDURE — 82977 ASSAY OF GGT: CPT

## 2022-05-13 PROCEDURE — 83735 ASSAY OF MAGNESIUM: CPT

## 2022-05-13 PROCEDURE — 82248 BILIRUBIN DIRECT: CPT

## 2022-05-13 PROCEDURE — 80053 COMPREHEN METABOLIC PANEL: CPT

## 2022-05-13 PROCEDURE — 84100 ASSAY OF PHOSPHORUS: CPT

## 2022-05-13 PROCEDURE — 85025 COMPLETE CBC W/AUTO DIFF WBC: CPT

## 2022-05-13 PROCEDURE — 36415 COLL VENOUS BLD VENIPUNCTURE: CPT

## 2022-05-13 PROCEDURE — 80158 DRUG ASSAY CYCLOSPORINE: CPT

## 2022-05-16 LAB — CYCLOSPORINE BLD IA-MCNC: 57 NG/ML (ref 100–400)

## 2022-05-19 ENCOUNTER — OFFICE VISIT (OUTPATIENT)
Dept: CARDIOLOGY CLINIC | Facility: CLINIC | Age: 77
End: 2022-05-19
Payer: MEDICARE

## 2022-05-19 VITALS
BODY MASS INDEX: 41.35 KG/M2 | DIASTOLIC BLOOD PRESSURE: 52 MMHG | WEIGHT: 219 LBS | HEART RATE: 74 BPM | SYSTOLIC BLOOD PRESSURE: 114 MMHG | OXYGEN SATURATION: 98 % | TEMPERATURE: 98 F | HEIGHT: 61 IN

## 2022-05-19 DIAGNOSIS — I21.A1 TYPE 2 MYOCARDIAL INFARCTION WITHOUT ST ELEVATION (HCC): ICD-10-CM

## 2022-05-19 DIAGNOSIS — N18.32 STAGE 3B CHRONIC KIDNEY DISEASE (HCC): ICD-10-CM

## 2022-05-19 DIAGNOSIS — I47.1 PSVT (PAROXYSMAL SUPRAVENTRICULAR TACHYCARDIA) (HCC): ICD-10-CM

## 2022-05-19 DIAGNOSIS — I48.0 PAROXYSMAL ATRIAL FIBRILLATION (HCC): Primary | ICD-10-CM

## 2022-05-19 DIAGNOSIS — E66.01 MORBID (SEVERE) OBESITY DUE TO EXCESS CALORIES (HCC): ICD-10-CM

## 2022-05-19 DIAGNOSIS — Z86.73 PERSONAL HISTORY OF TIA (TRANSIENT ISCHEMIC ATTACK): ICD-10-CM

## 2022-05-19 PROCEDURE — 99214 OFFICE O/P EST MOD 30 MIN: CPT | Performed by: INTERNAL MEDICINE

## 2022-05-19 PROCEDURE — 93000 ELECTROCARDIOGRAM COMPLETE: CPT | Performed by: INTERNAL MEDICINE

## 2022-05-19 NOTE — PROGRESS NOTES
Cardiology Follow Up    [facility]  1945  [id number]  SANDEEP IZAGUIRRE [facility] [address]  [facility] [place] [id number] [facility] [id number]    Interval History: 49-year-old woman with a history of liver transplant, multiple CVA, nstemi likely typ2, chronic anemia receiving Procrit injections with recent prolonged hospitalization secondary to recurrent CVA  She has been placed on novel oral anticoagulation and has not had a further event  Her loop recorder was interrogated in the hospital and did not show any evidence of atrial fibrillation  She reports note trouble with her gait or unilateral weakness  She denies having any chest tightness  She denies having significant bleeding or bruising current the  She denies having any falls  Her hospitalization records were reviewed with      February 5, 2018:  She denies having significant bleeding or bruising on anticoagulation  She denies having any recurrence of unilateral weakness or speech difficulty  She denies having any chest pain  Her blood pressures been well controlled  4/27: She has felt dizzy since starting keppra  Still taking clopidogrel against medical advice  No chest pain  No arrhythmia  NO edema    10/30/2018: She has been compliant with Eliquis therapy without significant bleeding or bruising  She denies having any falls  She reports having difficulty with obtaining the medication as it is very expensive on her medical plan  No unilateral weakness  BP controlled  05/01/2019:  Her loop recorder has been negative for evidence of atrial fibrillation  She denies having significant bleeding or bruising  She denies having any recurrence of CVA  She denies having any significant neurological deficits  08/13/2019:  Her last loop recorder interrogation shows no evidence of atrial fibrillation  She has been well controlled on metoprolol 37 5 mg twice a day  Denies having dizziness or lightheadedness  Her major complaint right now is polyuria  She is compliant with her medications  Denies having significant bleeding or bruising      02/12/2020:  Her blood pressures been controlled  She is not in atrial fibrillation  We reviewed her last blood work  It shows persistently elevated liver enzymes  She has been taking the pravastatin  10/27/2020:  2 minutes episode of atrial fibrillation in May by her loop recorder  No sustained episodes since  She is currently in sinus rhythm  Her heart rhythm is well controlled  Her blood pressures are controlled  She denies having chest pain  She denies having any bleeding  10/28/2021:  She is compliant with her medications  She has not been feeling well  She denies having chest heaviness  05/19/2022:  She denies having any major palpitations  She denies having any falls  She denies having any major bleeding  She has and currently in sinus rhythm on EKG  Her blood pressures are acceptable      Patient Active Problem List   Diagnosis    Liver transplanted ([facility] )    Hypothyroidism    Long-term use of immunosuppressant medication    Anemia    TIA (transient ischemic attack)    HLD (hyperlipidemia)    History of seizure    Paroxysmal SVT (supraventricular tachycardia) (HCC)    Altered mental status    Cerebrovascular accident (CVA) due to embolism ([facility] )    Non-ST elevation myocardial infarction (NSTEMI), type 2    Abnormal EEG    Ataxic gait    Benign essential hypertension    Chronic joint pain    Chronic kidney insufficiency    Cyst of diaphragm    Elevated homocysteine    Hypercalcemia    Lung nodule, solitary    Sciatica    Ulcerative colitis without complications ([facility] )    Asymptomatic stenosis of left carotid artery    Dementia ([facility] )    Left ovarian cyst    Chronic left-sided thoracic back pain    Other fatigue    Snoring    Osteopenia of multiple sites    Obesity, morbid ([facility] )    REZA (acute kidney injury) ([place] [address] )     Past Medical History:   Diagnosis Date    Anemia     Anxiety     Arthritis     Benign paroxysmal vertigo, unspecified ear     onset: 2010    Cirrhosis ([place] [address] )     onset: 2005    Crohn disease ([place] [address] )     Disease of thyroid gland     GERD (gastroesophageal reflux disease)     History of transfusion     Liver transplant candidate     onset: 2008    Osteoporosis     Pelvic fracture ([place] [address] )     onset: 10/14/2008     Social History     Socioeconomic History    Marital status:      Spouse name: Not on file    Number of children: Not on file    Years of education: Completed 12th grade    Highest education level: Not on file   Occupational History    Not on file   Tobacco Use    Smoking status: Former Smoker     Years: 5 00     Types: Cigarettes     Quit date: 1975     Years since quittin 3    Smokeless tobacco: Never Used    Tobacco comment: smoked for 5 years-1/2 to 1 ppd   Vaping Use    Vaping Use: Never used   Substance and Sexual Activity    Alcohol use: Never    Drug use: Never    Sexual activity: Not Currently   Other Topics Concern    Not on file   Social History Narrative    Daily coffee consumption (1 cup/day)     Social Determinants of Health     Financial Resource Strain: Not on file   Food Insecurity: Not on file   Transportation Needs: Not on file   Physical Activity: Not on file   Stress: Not on file   Social Connections: Not on file   Intimate Partner Violence: Not on file   Housing Stability: Not on file      Family History   Problem Relation Age of Onset    Cancer Mother         breast    Cancer Father     Crohn's disease Brother     Arthritis Family     Breast cancer Family      Past Surgical History:   Procedure Laterality Date    ABDOMINAL SURGERY      APPENDECTOMY      BLADDER AUGMENTATION N/A     CATARACT EXTRACTION      CHOLECYSTECTOMY N/A     COLONOSCOPY N/A 2017    Procedure: COLONOSCOPY; Surgeon: Dale Hernandez MD;  Location: [place] [address] GI LAB; Service:     ESOPHAGOGASTRODUODENOSCOPY N/A 3/31/2016    Procedure: ESOPHAGOGASTRODUODENOSCOPY (EGD); Surgeon: Dale Hernandez MD;  Location: [place] [address] GI LAB; Service:     ESOPHAGOGASTRODUODENOSCOPY N/A 4/20/2017    Procedure: ESOPHAGOGASTRODUODENOSCOPY (EGD); Surgeon: Dale Hernandez MD;  Location: [place] [address] GI LAB;   Service:     EYE SURGERY      cataracts removed both eyes lens implant    HERNIA REPAIR      JOINT REPLACEMENT      left knee replacement    LIVER TRANSPLANTATION N/A     TONSILECTOMY AND ADNOIDECTOMY N/A     TUBAL LIGATION         Current Outpatient Medications:     aspirin (ECOTRIN LOW STRENGTH) 81 mg EC tablet, Take 1 tablet (81 mg total) by mouth daily with breakfast, Disp: 90 tablet, Rfl: 3    b complex vitamins capsule, Take 1 capsule by mouth daily, Disp: 30 capsule, Rfl: 3    BUDESONIDE PO, Take 3 mg by mouth, Disp: , Rfl:     Calcium 500 MG tablet, Take 500 mg by mouth, Disp: , Rfl:     Cholecalciferol (Vitamin D) 50 MCG (2000 UT) tablet, Take 1,000 Units by mouth, Disp: , Rfl:     clopidogrel (PLAVIX) 75 mg tablet, Take 1 tablet (75 mg total) by mouth daily, Disp: 90 tablet, Rfl: 3    cycloSPORINE modified (NEORAL) 25 mg capsule, Take 50 mg by mouth 2 (two) times a day, Disp: , Rfl:     ezetimibe (ZETIA) 10 mg tablet, Take 1 tablet (10 mg total) by mouth daily, Disp: 90 tablet, Rfl: 3    levothyroxine 112 mcg tablet, TAKE 1 TABLET EVERY DAY AS DIRECTED, Disp: 90 tablet, Rfl: 3    metoprolol tartrate (LOPRESSOR) 25 mg tablet, Take 1 5 tablets (37 5 mg total) by mouth 2 (two) times a day, Disp: 270 tablet, Rfl: 3    mycophenolate (CELLCEPT) 250 mg capsule, Take 3 capsules (750 mg total) by mouth 2 (two) times a day (Patient taking differently: Take 750 mg by mouth 2 tabs twice daily), Disp: , Rfl: 0    omeprazole (PriLOSEC) 40 MG capsule, Take 40 mg by mouth daily, Disp: , Rfl:     pravastatin (PRAVACHOL) 20 mg tablet, TAKE 1 TABLET BY MOUTH ONCE DAILY AT BEDTIME, Disp: 90 tablet, Rfl: 3  Allergies   Allergen Reactions    Tetracyclines & Related Hives    Vancomycin Other (See Comments) and Itching     Reaction Date: [id number]; Pt unsure of reaction    Prograf [Tacrolimus] Anxiety     Reaction Date: 16Dec2008; Review of Systems:  Review of Systems   Constitutional: Negative  Negative for activity change, appetite change, chills, diaphoresis, fatigue, fever and unexpected weight change  HENT: Negative  Negative for congestion, dental problem, drooling, ear discharge, ear pain, facial swelling, hearing loss, mouth sores, nosebleeds, postnasal drip, rhinorrhea, sinus pressure, sinus pain, sneezing, sore throat, tinnitus, trouble swallowing and voice change  Eyes: Negative  Negative for photophobia, pain, redness, itching and visual disturbance  Respiratory: Negative  Negative for apnea, cough, choking, chest tightness, shortness of breath, wheezing and stridor  Cardiovascular: Negative  Negative for chest pain, palpitations and leg swelling  Gastrointestinal: Negative  Negative for abdominal distention, abdominal pain, anal bleeding, blood in stool, constipation, diarrhea, nausea, rectal pain and vomiting  Endocrine: Negative  Negative for cold intolerance, heat intolerance, polydipsia, polyphagia and polyuria  Genitourinary: Positive for frequency and urgency  Negative for decreased urine volume, difficulty urinating, dyspareunia, dysuria, enuresis, flank pain, genital sores, hematuria, menstrual problem, pelvic pain, vaginal bleeding, vaginal discharge and vaginal pain  Musculoskeletal: Positive for arthralgias and back pain  Negative for gait problem, joint swelling, myalgias, neck pain and neck stiffness  Skin: Negative  Negative for color change, pallor, rash and wound  Allergic/Immunologic: Negative  Negative for environmental allergies, food allergies and immunocompromised state  Neurological: Negative for dizziness, tremors, seizures, syncope, facial asymmetry, speech difficulty, weakness, light-headedness, numbness and headaches  Hematological: Negative  Negative for adenopathy  Does not bruise/bleed easily  Psychiatric/Behavioral: Negative  Negative for agitation, behavioral problems, confusion, decreased concentration, dysphoric mood, hallucinations, self-injury, sleep disturbance and suicidal ideas  The patient is not nervous/anxious and is not hyperactive  All other systems reviewed and are negative  Vitals:    05/19/22 1445   BP: 114/52   BP Location: Right arm   Patient Position: Sitting   Cuff Size: Standard   Pulse: 74   Temp: 98 °F (36 7 °C)   SpO2: 98%   Weight: 99 3 kg (219 lb)   Height: 5' 0 5" (1 537 m)     Physical Exam:  Physical Exam  Vitals and nursing note reviewed  Constitutional:       General: She is not in acute distress  Appearance: She is well-developed  She is ill-appearing  She is not diaphoretic  HENT:      Head: Normocephalic and atraumatic  Right Ear: External ear normal       Left Ear: External ear normal    Eyes:      General: No scleral icterus  Right eye: No discharge  Left eye: No discharge  Conjunctiva/sclera: Conjunctivae normal       Pupils: Pupils are equal, round, and reactive to light  Neck:      Thyroid: No thyromegaly  Vascular: No JVD  Trachea: No tracheal deviation  Cardiovascular:      Rate and Rhythm: Normal rate and regular rhythm  Heart sounds: Murmur heard  No friction rub  Gallop present  Pulmonary:      Effort: Pulmonary effort is normal  No respiratory distress  Breath sounds: Normal breath sounds  No stridor  No wheezing or rales  Chest:      Chest wall: No tenderness  Abdominal:      General: Bowel sounds are normal  There is distension  Palpations: Abdomen is soft  There is no mass  Tenderness: There is no abdominal tenderness   There is no guarding or rebound  Musculoskeletal:         General: Deformity present  No tenderness  Normal range of motion  Cervical back: Normal range of motion and neck supple  Skin:     General: Skin is warm and dry  Coloration: Skin is not pale  Findings: No erythema or rash  Neurological:      Mental Status: She is alert and oriented to person, place, and time  Cranial Nerves: No cranial nerve deficit  Motor: No abnormal muscle tone  Coordination: Coordination normal       Deep Tendon Reflexes: Reflexes are normal and symmetric  Psychiatric:         Behavior: Behavior normal          Thought Content: Thought content normal          Judgment: Judgment normal          1  Permanent atrial fibrillation (HCC)  POCT ECG   2  PSVT (paroxysmal supraventricular tachycardia) (HCC)  POCT ECG   3  Personal history of TIA (transient ischemic attack)  POCT ECG   4  Type 2 myocardial infarction without ST elevation (HCC)  POCT ECG      Recurrent CVA- brief episode of afib- 2 minutes  No major re-occurrnece  Plan to keep the patient on aspirin 81 mg plus Plavix 75 mg daily  Continue loop recorder monitoring  If with evidence of atrial fibrillation then the patient will need to be on Coumadin therapy  Continue metoprolol 37 5 mg twice a day    Acute on chronic kidney- creatinine up to 1  5? Denies dehydration/nsaid usage  Cyclosporine level okay  I have asked her to hydrate herself  Prior nstemi- aspirin + plavix + zetia 10mg daily + pravastatin    Hyperlipidemia-her liver enzymes are now elevated  Her ALT and alk-phos have been persistently elevated  She has had recent workup by liver transplant  Continue zetia + pravastatin  Monitor alt/ast/ final determination b    Liver transplant-continue immunosuppressants  Statin cleared by hepatology    Seizures- discussed with neurology about keppra side effects          Petros Bermudez  Please call with any questions or suggestions

## 2022-06-02 DIAGNOSIS — Z94.4 LIVER TRANSPLANTED (HCC): ICD-10-CM

## 2022-06-02 DIAGNOSIS — I47.1 PSVT (PAROXYSMAL SUPRAVENTRICULAR TACHYCARDIA) (HCC): ICD-10-CM

## 2022-06-02 DIAGNOSIS — I21.A1 TYPE 2 MYOCARDIAL INFARCTION WITHOUT ST ELEVATION (HCC): ICD-10-CM

## 2022-06-03 DIAGNOSIS — Z94.4 LIVER TRANSPLANTED (HCC): ICD-10-CM

## 2022-06-03 DIAGNOSIS — I21.A1 TYPE 2 MYOCARDIAL INFARCTION WITHOUT ST ELEVATION (HCC): ICD-10-CM

## 2022-06-03 DIAGNOSIS — I47.1 PSVT (PAROXYSMAL SUPRAVENTRICULAR TACHYCARDIA) (HCC): ICD-10-CM

## 2022-06-03 RX ORDER — EZETIMIBE 10 MG/1
TABLET ORAL
Qty: 90 TABLET | Refills: 3 | Status: SHIPPED | OUTPATIENT
Start: 2022-06-03 | End: 2022-06-03

## 2022-06-03 RX ORDER — EZETIMIBE 10 MG/1
TABLET ORAL
Qty: 90 TABLET | Refills: 3 | Status: SHIPPED | OUTPATIENT
Start: 2022-06-03

## 2022-06-07 ENCOUNTER — APPOINTMENT (EMERGENCY)
Dept: RADIOLOGY | Facility: HOSPITAL | Age: 77
End: 2022-06-07
Payer: MEDICARE

## 2022-06-07 ENCOUNTER — HOSPITAL ENCOUNTER (EMERGENCY)
Facility: HOSPITAL | Age: 77
End: 2022-06-07
Attending: EMERGENCY MEDICINE | Admitting: EMERGENCY MEDICINE
Payer: MEDICARE

## 2022-06-07 VITALS
OXYGEN SATURATION: 98 % | RESPIRATION RATE: 18 BRPM | SYSTOLIC BLOOD PRESSURE: 170 MMHG | WEIGHT: 222 LBS | HEART RATE: 74 BPM | TEMPERATURE: 98.8 F | BODY MASS INDEX: 42.64 KG/M2 | DIASTOLIC BLOOD PRESSURE: 70 MMHG

## 2022-06-07 DIAGNOSIS — R26.2 AMBULATORY DYSFUNCTION: ICD-10-CM

## 2022-06-07 DIAGNOSIS — M25.561 RIGHT KNEE PAIN: Primary | ICD-10-CM

## 2022-06-07 PROCEDURE — 73564 X-RAY EXAM KNEE 4 OR MORE: CPT

## 2022-06-07 PROCEDURE — 97163 PT EVAL HIGH COMPLEX 45 MIN: CPT

## 2022-06-07 PROCEDURE — 97167 OT EVAL HIGH COMPLEX 60 MIN: CPT

## 2022-06-07 PROCEDURE — 99284 EMERGENCY DEPT VISIT MOD MDM: CPT | Performed by: EMERGENCY MEDICINE

## 2022-06-07 PROCEDURE — 99284 EMERGENCY DEPT VISIT MOD MDM: CPT

## 2022-06-07 RX ORDER — HYDROCODONE BITARTRATE AND ACETAMINOPHEN 5; 325 MG/1; MG/1
1 TABLET ORAL ONCE
Status: COMPLETED | OUTPATIENT
Start: 2022-06-07 | End: 2022-06-07

## 2022-06-07 RX ADMIN — HYDROCODONE BITARTRATE AND ACETAMINOPHEN 1 TABLET: 5; 325 TABLET ORAL at 13:02

## 2022-06-07 NOTE — PHYSICAL THERAPY NOTE
PT EVALUATION     06/07/22 1415   Note Type   Note type Evaluation   Pain Assessment   Pain Assessment Tool 0-10   Pain Score 7   Pain Location/Orientation Orientation: Right;Location: Knee   Restrictions/Precautions   Other Precautions Pain; Fall Risk;Bed Alarm; Chair Alarm   Home Living   Type of [address] [place], [place] [address] Two level;Bed/bath upstairs;1/2 bath on main level;Stairs to enter with rails  (3 SPEEDY)   Home Equipment Cane;Walker  (rollator;RW)   Prior Function   Level of [place] Needs assistance with ADLs and functional mobility   Lives With Significant other   Receives Help From Other (Comment)  (significant other)   IADLs Needs assistance   Comments Pt normally amb w a cane or RW PTA inside;uses a cane outside  Pt also drives, "but trying to avoid it lately"   General   Additional Pertinent History Pt seen in the ED due to R knee pain  X-ray R knee is (-) for acute pathology  Pt with 2 week history of R knee pain, worsening  Knee pain limiting pain and mobility  Family/Caregiver Present No   Cognition   Overall Cognitive Status WFL   Arousal/Participation Cooperative   Orientation Level Oriented X4   Following Commands Follows multistep commands without difficulty   Subjective   Subjective Pt reprts R knee pain, especially with weight-bearing  RLE Assessment   RLE Assessment WFL  (hip and knee - 3-/5;ankle 3/5)   LLE Assessment   LLE Assessment WFL  (hip and knee 3-/5;ankle 3/5)   Bed Mobility   Supine to Sit 3  Moderate assistance   Additional items Assist x 1;Verbal cues; Increased time required   Sit to Supine 2  Maximal assistance   Additional items Assist x 2;Verbal cues; Increased time required   Transfers   Sit to Stand 3  Moderate assistance   Additional items Assist x 1;Verbal cues; Increased time required   Stand to Sit 3  Moderate assistance   Additional items Assist x 1;Verbal cues; Increased time required   Ambulation/Elevation   Gait pattern Forward Flexion; Antalgic   Gait Assistance 3 Moderate assist   Additional items Assist x 1;Verbal cues; Tactile cues   Assistive Device Rolling walker   Distance 4-5 steps to head of stretcher - pt able to tolerate any further ambulation due to increasing pain with weight bearing   Balance   Static Sitting Fair   Static Standing Fair -   Dynamic Standing Poor +   Ambulatory Poor +   Activity Tolerance   Activity Tolerance Patient limited by fatigue;Patient limited by pain;Treatment limited secondary to medical complications (Comment)  (weakness and deconditioning)   Assessment   Problem List Decreased strength;Decreased range of motion;Decreased endurance; Impaired balance;Decreased mobility; Decreased safety awareness; Obesity;Pain   Assessment Patient seen for Physical Therapy evaluation  Patient admitted with R knee pain  Comorbidities affecting patient's physical performance include: liver transplant, anemia, TIA, HLD, seizure history, AMS, CVA, ataxic gait, chronic joint pain, dementia, obesity, osteopenia, CKD3  Personal factors affecting patient at time of initial evaluation include: lives in two story house, ambulating with assistive device, stairs to enter home, inability to ambulate household distances, inability to navigate community distances, inability to navigate level surfaces without external assistance and inability to perform dynamic tasks in community  Prior to admission, patient was independent with functional mobility with RW or cane, requiring assist for ADLS, requiring assist for IADLS, living with significant other in a two level home with 3 steps to enter and ambulating household distance    Please find objective findings from Physical Therapy assessment regarding body systems outlined above with impairments and limitations including weakness, decreased ROM, impaired balance, decreased endurance, impaired coordination, gait deviations, pain, decreased activity tolerance, decreased functional mobility tolerance, decreased safety awareness and fall risk  The Barthel Index was used as a functional outcome tool presenting with a score of Barthel Index Score: 30 today indicating marked limitations of functional mobility and ADLS  Patient's clinical presentation is currently unstable/unpredictable as seen in patient's presentation of vital sign response, changing level of pain, varying levels of cognitive performance, increased fall risk, new onset of impairment of functional mobility, decreased endurance and new onset of weakness  Pt would benefit from continued Physical Therapy treatment to address deficits as defined above and maximize level of functional mobility  As demonstrated by objective findings, the assigned level of complexity for this evaluation is high  The patient's AM-PAC Basic Mobility Inpatient Short Form Raw Score is 11  A Raw score of less than or equal to 16 suggests the patient may benefit from discharge to post-acute rehabilitation services  Please also refer to the recommendation of the Physical Therapist for safe discharge planning  Goals   Patient Goals to go to rehab   STG Expiration Date 06/14/22   Short Term Goal #1 bed mob - min A; trans - min A   Short Term Goal #2 pt will amb with a RW short, functional distances - min A; balance with the RW - F/F+ for safe gait and mobility and to decrease fall risk   LTG Expiration Date 06/21/22   Long Term Goal #1 bed mob - I; trans - I   Long Term Goal #2 pt will amb with a RW functional household distances - S/I; balance with the RW - F+/G for safe gait and mobility and to decrease fall risk; up/down 3 steps with a rail and min A so pt can enter/exit her home;strength LEs - 3 to 3+/5   Plan   Treatment/Interventions ADL retraining;Functional transfer training;LE strengthening/ROM; Elevations; Therapeutic exercise; Endurance training;Patient/family training;Equipment eval/education; Bed mobility;Gait training; Compensatory technique education   PT Frequency Other (Comment)  (5x/wk) Recommendation   PT Discharge Recommendation Post acute rehabilitation services   Additional Comments Pt is not safe for dc home and will benefit from post acute rehab services to increase her strength, mobility, gait, balance and endurance  Due to amount of assist pt needs for all mobility, mod A of 1-2 persons, pt is appropriate for post acute rehab services  AM-PAC Basic Mobility Inpatient   Turning in Bed Without Bedrails 2   Lying on Back to Sitting on Edge of Flat Bed 2   Moving Bed to Chair 2   Standing Up From Chair 2   Walk in Room 2   Climb 3-5 Stairs 1   Basic Mobility Inpatient Raw Score 11   Basic Mobility Standardized Score 30 25   Highest Level Of Mobility   -[facility] Goal 4: Move to chair/commode   -[facility] Achieved 5: Stand (1 or more minutes)   Barthel Index   Feeding 5   Bathing 0   Grooming Score 0   Dressing Score 5   Bladder Score 5   Bowels Score 5   Toilet Use Score 5   Transfers (Bed/Chair) Score 5   Mobility (Level Surface) Score 0   Stairs Score 0   Barthel Index Score 30   End of Consult   Patient Position at End of Consult Supine; All needs within reach   [employer] [place] Insurance Number  Lynnette Fabry, [place] [id number]

## 2022-06-07 NOTE — ED PROVIDER NOTES
History  Chief Complaint   Patient presents with    Knee Pain     States pain and swelling right knee for several weeks, does not recall injury, started last night with severe pain      Patient here with complaint of right knee pain and swelling x2 weeks  She denies trauma, states that pain began suddenly when she attempted to walk  Pain is worse with weight-bearing  Patient has a history of arthritis and has had a previous left total knee replacement  She also has a history of anxiety, anemia, hypothyroidism, hypertension, hyperlipidemia, GERD  Patient is in the ER with her  who states that he is unable to care for her anymore, and she would like to be admitted or placed in a rehab  History provided by:  Patient   used: No    Knee Pain  Location:  Knee  Knee location:  R knee  Pain details:     Quality:  Aching    Radiates to:  Does not radiate    Severity:  Moderate    Onset quality:  Sudden    Timing:  Constant    Progression:  Worsening  Chronicity:  New  Dislocation: no    Foreign body present:  No foreign bodies  Tetanus status:  Unknown  Prior injury to area:  No  Relieved by:  Nothing  Worsened by:  Bearing weight  Ineffective treatments:  None tried  Associated symptoms: decreased ROM    Associated symptoms: no back pain, no fever and no neck pain        Prior to Admission Medications   Prescriptions Last Dose Informant Patient Reported? Taking?    BUDESONIDE PO  Self Yes No   Sig: Take 3 mg by mouth   Calcium 500 MG tablet  Self Yes No   Sig: Take 500 mg by mouth   Cholecalciferol (Vitamin D) 50 MCG (2000 UT) tablet  Self Yes No   Sig: Take 1,000 Units by mouth   aspirin (ECOTRIN LOW STRENGTH) 81 mg EC tablet  Self No No   Sig: Take 1 tablet (81 mg total) by mouth daily with breakfast   b complex vitamins capsule  Self No No   Sig: Take 1 capsule by mouth daily   clopidogrel (PLAVIX) 75 mg tablet  Self No No   Sig: Take 1 tablet (75 mg total) by mouth daily cycloSPORINE modified (NEORAL) 25 mg capsule  Self Yes No   Sig: Take 50 mg by mouth 2 (two) times a day   ezetimibe (ZETIA) 10 mg tablet   No No   Sig: Take 1 tablet by mouth once daily   levothyroxine 112 mcg tablet   No No   Sig: TAKE 1 TABLET EVERY DAY AS DIRECTED   metoprolol tartrate (LOPRESSOR) 25 mg tablet   No No   Sig: Take 1 5 tablets (37 5 mg total) by mouth 2 (two) times a day   mycophenolate (CELLCEPT) 250 mg capsule  Self No No   Sig: Take 3 capsules (750 mg total) by mouth 2 (two) times a day   Patient taking differently: Take 750 mg by mouth 2 tabs twice daily   omeprazole (PriLOSEC) 40 MG capsule  Self Yes No   Sig: Take 40 mg by mouth daily   pravastatin (PRAVACHOL) 20 mg tablet   No No   Sig: TAKE 1 TABLET BY MOUTH ONCE DAILY AT BEDTIME      Facility-Administered Medications: None       Past Medical History:   Diagnosis Date    Anemia     Anxiety     Arthritis     Benign paroxysmal vertigo, unspecified ear     onset: 05/06/2010    Cirrhosis ([facility] )     onset: 03/30/2005    Crohn disease ([facility] )     Disease of thyroid gland     GERD (gastroesophageal reflux disease)     History of transfusion     Liver transplant candidate     onset: 09/16/2008    Osteoporosis     Pelvic fracture ([facility] )     onset: 10/14/2008       Past Surgical History:   Procedure Laterality Date    ABDOMINAL SURGERY      APPENDECTOMY      BLADDER AUGMENTATION N/A     CATARACT EXTRACTION      CHOLECYSTECTOMY N/A     COLONOSCOPY N/A 4/20/2017    Procedure: COLONOSCOPY;  Surgeon: Lennox Iba, MD;  Location: [place] [address] GI LAB; Service:     ESOPHAGOGASTRODUODENOSCOPY N/A 3/31/2016    Procedure: ESOPHAGOGASTRODUODENOSCOPY (EGD); Surgeon: Lennox Iba, MD;  Location: [place] [address] GI LAB; Service:     ESOPHAGOGASTRODUODENOSCOPY N/A 4/20/2017    Procedure: ESOPHAGOGASTRODUODENOSCOPY (EGD); Surgeon: Lennox Iba, MD;  Location: [place] [address] GI LAB;   Service:     EYE SURGERY      cataracts removed both eyes lens implant    HERNIA REPAIR      JOINT REPLACEMENT      left knee replacement    LIVER TRANSPLANTATION N/A     TONSILECTOMY AND ADNOIDECTOMY N/A     TUBAL LIGATION         Family History   Problem Relation Age of Onset    Cancer Mother         breast    Cancer Father     Crohn's disease Brother     Arthritis Family     Breast cancer Family      I have reviewed and agree with the history as documented  E-Cigarette/Vaping    E-Cigarette Use Never User      E-Cigarette/Vaping Substances    Nicotine No     THC No     CBD No     Flavoring No     Other No     Unknown No      Social History     Tobacco Use    Smoking status: Former Smoker     Years: 5 00     Types: Cigarettes     Quit date: 1975     Years since quittin 4    Smokeless tobacco: Never Used    Tobacco comment: smoked for 5 years-1/2 to 1 ppd   Vaping Use    Vaping Use: Never used   Substance Use Topics    Alcohol use: Never    Drug use: Never       Review of Systems   Constitutional: Negative for chills and fever  Respiratory: Negative for cough, chest tightness and shortness of breath  Gastrointestinal: Negative for abdominal pain, diarrhea, nausea and vomiting  Genitourinary: Negative for dysuria, frequency, hematuria and urgency  Musculoskeletal: Positive for gait problem  Negative for back pain, neck pain and neck stiffness  All other systems reviewed and are negative  Physical Exam  Physical Exam  Vitals and nursing note reviewed  Constitutional:       General: She is not in acute distress  Appearance: She is well-developed  She is not diaphoretic  HENT:      Head: Normocephalic and atraumatic  Eyes:      Conjunctiva/sclera: Conjunctivae normal       Pupils: Pupils are equal, round, and reactive to light  Cardiovascular:      Rate and Rhythm: Normal rate and regular rhythm  Heart sounds: Normal heart sounds  No murmur heard  Pulmonary:      Effort: Pulmonary effort is normal  No respiratory distress  Breath sounds: Normal breath sounds  Abdominal:      General: Bowel sounds are normal  There is no distension  Palpations: Abdomen is soft  Tenderness: There is no abdominal tenderness  Musculoskeletal:         General: Tenderness and signs of injury present  No swelling or deformity  Cervical back: Normal range of motion and neck supple  Right knee: Bony tenderness present  Decreased range of motion  Tenderness present over the lateral joint line  Left knee: Normal    Skin:     General: Skin is warm and dry  Capillary Refill: Capillary refill takes less than 2 seconds  Coloration: Skin is not pale  Findings: No rash  Neurological:      General: No focal deficit present  Mental Status: She is alert and oriented to person, place, and time  Cranial Nerves: No cranial nerve deficit  Psychiatric:         Behavior: Behavior normal          Vital Signs  ED Triage Vitals [06/07/22 1221]   Temperature Pulse Respirations Blood Pressure SpO2   98 1 °F (36 7 °C) 84 18 152/69 97 %      Temp Source Heart Rate Source Patient Position - Orthostatic VS BP Location FiO2 (%)   Tympanic Monitor Sitting Left arm --      Pain Score       10 - Worst Possible Pain           Vitals:    06/07/22 1221 06/07/22 1516 06/07/22 1657 06/07/22 1753   BP: 152/69 139/75 168/72 170/70   Pulse: 84 75 72 74   Patient Position - Orthostatic VS: Sitting            Visual Acuity      ED Medications  Medications   HYDROcodone-acetaminophen (NORCO) 5-325 mg per tablet 1 tablet (1 tablet Oral Given 6/7/22 1302)       Diagnostic Studies  Results Reviewed     None                 XR knee 4+ vw right injury   ED Interpretation by Jose Tong DO (06/07 0739)   nad      Final Result by Page Severance, MD (06/07 5984)      No acute osseous abnormality     Arthrosis as described above            Workstation performed: [id number]                    Procedures  Procedures         ED Course MDM  Number of Diagnoses or Management Options  Ambulatory dysfunction: new and requires workup  Right knee pain: new and requires workup  Diagnosis management comments: X-ray negative for acute pathology  Patient reviewed with care management  PT OT matthew completed and patient is a candidate for rehab  Care manager is working on finding placement  Patient transferred to [facility]  Amount and/or Complexity of Data Reviewed  Tests in the radiology section of CPT®: ordered and reviewed    Risk of Complications, Morbidity, and/or Mortality  Presenting problems: high  Diagnostic procedures: high  Management options: high    Patient Progress  Patient progress: improved      Disposition  Final diagnoses:   Right knee pain   Ambulatory dysfunction     Time reflects when diagnosis was documented in both MDM as applicable and the Disposition within this note     Time User Action Codes Description Comment    6/7/2022  4:49 PM Frankey Lobstein Add [M25 561] Right knee pain     6/7/2022  4:50 PM Frankey Lobstein Add [R26 2] Ambulatory dysfunction       ED Disposition     ED Disposition   Discharge    Condition   Stable    Date/Time   Tue Jun 7, 2022  4:49 PM    Comment   Josette Soto [facility] discharge to home/self care                 MD Documentation    [employer] Most Recent Value   Patient Condition The patient has been stabilized such that within reasonable medical probability, no material deterioration of the patient condition or the condition of the unborn child(layla) is likely to result from the transfer   Reason for Transfer Level of Care needed not available at this facility   Accepting Facility Name, [facility] [address]   Provider Certification General risk, such as traffic hazards, adverse weather conditions, rough terrain or turbulence, possible failure of equipment (including vehicle or aircraft), or consequences of actions of persons outside the control of the transport personnel      RN Documentation    Flowsheet Row Most [facility] Name, [facility] @ [address]  [address], [address] [place] [address] Given to [facility]   Medications Reviewed with Next Provider of Service Yes   Transport Mode Ambulance   Level of Care Basic life support   Copies of Medical Records Sent History and Physical, Progress note, Transfer form, Nursing note   Patient Belongings Disposition Sent with patient   Transfer Date 06/07/22   Transfer Time 56      Follow-up Information    None         Discharge Medication List as of 6/7/2022  6:07 PM      CONTINUE these medications which have NOT CHANGED    Details   aspirin (ECOTRIN LOW STRENGTH) 81 mg EC tablet Take 1 tablet (81 mg total) by mouth daily with breakfast, Starting Thu 4/22/2021, Sample      b complex vitamins capsule Take 1 capsule by mouth daily, Starting Wed 7/1/2020, Normal      BUDESONIDE PO Take 3 mg by mouth, Historical Med      Calcium 500 MG tablet Take 500 mg by mouth, Starting Fri 11/6/2020, Historical Med      Cholecalciferol (Vitamin D) 50 MCG (2000 UT) tablet Take 1,000 Units by mouth, Starting Fri 11/6/2020, Historical Med      clopidogrel (PLAVIX) 75 mg tablet Take 1 tablet (75 mg total) by mouth daily, Starting Tue 10/12/2021, Normal      cycloSPORINE modified (NEORAL) 25 mg capsule Take 50 mg by mouth 2 (two) times a day, Historical Med      ezetimibe (ZETIA) 10 mg tablet Take 1 tablet by mouth once daily, Normal      levothyroxine 112 mcg tablet TAKE 1 TABLET EVERY DAY AS DIRECTED, Normal      metoprolol tartrate (LOPRESSOR) 25 mg tablet Take 1 5 tablets (37 5 mg total) by mouth 2 (two) times a day, Starting Thu 10/28/2021, Normal      mycophenolate (CELLCEPT) 250 mg capsule Take 3 capsules (750 mg total) by mouth 2 (two) times a day, Starting Fri 5/21/2021, No Print      omeprazole (PriLOSEC) 40 MG capsule Take 40 mg by mouth daily, Historical Med      pravastatin (PRAVACHOL) 20 mg tablet TAKE 1 TABLET BY MOUTH ONCE DAILY AT BEDTIME, Normal             No discharge procedures on file      PDMP Review     None          ED Provider  Electronically Signed by           Deedee Natarajan DO  06/07/22 7609

## 2022-06-07 NOTE — ED NOTES
As per John Ernst patient  accepted at [facility] [address]  [facility] called for transportation  Awaiting further information       Lashay Elizabeth RN  06/07/22 5985

## 2022-06-07 NOTE — OCCUPATIONAL THERAPY NOTE
Occupational Therapy Evaluation       06/07/22 8845   Note Type   Note type Evaluation   Restrictions/Precautions   Other Precautions Fall Risk;Pain; Chair Alarm; Bed Alarm   Pain Assessment   Pain Assessment Tool 0-10   Pain Score 7   Pain Location/Orientation Orientation: Right;Location: Knee   Home Living   Type of [address] [place] [address] Two level;Bed/bath upstairs;1/2 bath on main level;Stairs to enter with rails  (3 SPEEDY)   Bathroom Shower/Tub Walk-in shower   Bathroom Toilet Standard   Bathroom Equipment Grab bars in shower   Home Equipment Walker;Cane  (Standard walker and RW; rollator)   Additional Comments Patient presented to hospital with c/o R knee pain; reports knee gave out on her approx   2 weeks ago, has had progressive pain since and difficulty ambulating; xrays negative per ED MD   Prior Function   Level of [place] Needs assistance with ADLs and functional mobility   Lives With Significant other   Receives Help From Other (Comment)  (Significant other)   ADL Assistance Needs assistance   IADLs Needs assistance   Comments Patient reports prior to knee issues was ambulatory with cane mostly; since having knee pain using RW or SW; gets assist with bathing from significant other, reports is mostly independent in dressing   ADL   Eating Assistance 5  Supervision/Setup   Grooming Assistance 5  Supervision/Setup   UB Bathing Assistance 4  Minimal Assistance   LB Bathing Assistance 2  Maximal [address] 2  Maximal [address]  2  Maximal Assistance   Additional Comments Lower body ADLs limited by pain   Bed Mobility   Supine to Sit 3  Moderate assistance   Additional items Assist x 1   Sit to Supine 2  Maximal assistance   Additional items Assist x 2   Transfers   Sit to Stand 3  Moderate assistance   Additional items Assist x 1   Stand to Sit 3  Moderate assistance   Additional items Assist x 1   Functional Mobility   Functional Mobility 3  Moderate assistance   Additional Comments Few sidesteps along EOB with mod assist with RW; limited by increased pain to R knee, unable to tolerate further mobility   Balance   Static Sitting Fair   Dynamic Sitting Fair   Static Standing Fair -   Dynamic Standing Fair -   Activity Tolerance   Activity Tolerance Patient limited by pain   RUE Assessment   RUE Assessment WFL   LUE Assessment   LUE Assessment WFL   Cognition   Overall Cognitive Status WFL   Arousal/Participation Alert; Cooperative   Orientation Level Oriented X4   Following Commands Follows multistep commands with increased time or repetition   Assessment   Limitation Decreased ADL status; Decreased UE strength;Decreased Safe judgement during ADL;Decreased endurance;Decreased high-level ADLs; Decreased self-care trans   Prognosis Good   Assessment Patient evaluated by Occupational Therapy  Patient admitted with knee pain  The patients occupational profile, medical and therapy history includes a extensive additional review of physical, cognitive, or psychosocial history related to current functional performance  Comorbidities affecting functional mobility and ADLS include: arthritis, osteoporosis, anemia, anxiety, GERD, BPPV, pelvic fracture, Crohn's disease  Prior to admission, patient was independent with functional mobility with cane or standard walker, requiring assist for ADLS and requiring assist for IADLS  The evaluation identifies the following performance deficits: weakness, impaired balance, decreased endurance, increased fall risk, new onset of impairment of functional mobility, decreased ADLS, decreased IADLS, pain, decreased activity tolerance, decreased safety awareness, impaired judgement and decreased strength, that result in activity limitations and/or participation restrictions   This evaluation requires clinical decision making of high complexity, because the patient presents with comorbidites that affect occupational performance and required significant modification of tasks or assistance with consideration of multiple treatment options  The Barthel Index was used as a functional outcome tool presenting with a score of Barthel Index Score: 35, indicating marked limitations of functional mobility and ADLS  The patient's raw score on the -PAC Daily Activity inpatient short form is 17, standardized score is 37 26, less than 39 4  Patients at this level are likely to benefit from DC to post-acute rehabilitation services  Please refer to the recommendation of the Occupational Therapist for safe DC planning  Patient will benefit from skilled Occupational Therapy services to address above deficits and facilitate a safe return to prior level of function  Goals   Patient Goals Decrease pain   STG Time Frame   (1-7 days)   Short Term Goal  Goals established to promote Patient Goals: Decrease pain:  Patient will increase standing tolerance to 5 minutes during ADL task to decrease assistance level and decrease fall risk; Patient will increase bed mobility to mod assist in preparation for ADLS and transfers;  Patient will increase functional mobility to and from bedside commode with rolling walker with mod assist to increase performance with ADLS and to use a toilet; Patient will tolerate 5 minutes of UE ROM/strengthening to increase general activity tolerance and performance in ADLS/IADLS; Patient will improve functional activity tolerance to 5 minutes of sustained functional tasks to increase participation in basic self-care and decrease assistance level;  Patient will be able to to verbalize understanding and perform energy conservation/proper body mechanics during ADLS and functional mobility at least 75% of the time with minimal cueing to decrease signs of fatigue and increase stamina to return to prior level of function; Patient will increase static/dynamic sitting balance to fair+ to improve the ability to sit at edge of bed or on a chair for ADLS;  Patient will increase static/dynamic standing balance to fair to improve postural stability and decrease fall risk during standing ADLS and transfers  LTG Time Frame   (8-14 days)   Long Term Goal Patient will increase standing tolerance to 10 minutes during ADL task to decrease assistance level and decrease fall risk; Patient will increase bed mobility to min assist in preparation for ADLS and transfers; Patient will increase functional mobility to and from bathroom with rolling walker with min assist to increase performance with ADLS and to use a toilet; Patient will tolerate 10 minutes of UE ROM/strengthening to increase general activity tolerance and performance in ADLS/IADLS; Patient will improve functional activity tolerance to 10 minutes of sustained functional tasks to increase participation in basic self-care and decrease assistance level;  Patient will be able to to verbalize understanding and perform energy conservation/proper body mechanics during ADLS and functional mobility at least 90% of the time with no cueing to decrease signs of fatigue and increase stamina to return to prior level of function; Patient will increase static/dynamic sitting balance to good to improve the ability to sit at edge of bed or on a chair for ADLS;  Patient will increase static/dynamic standing balance to fair+ to improve postural stability and decrease fall risk during standing ADLS and transfers  Pt will score >/= 21/24 on AM-PAC Daily Activity Inpatient scale to promote safe independence with ADLs and functional mobility; Pt will score >/= 65/100 on Barthel Index in order to decrease caregiver assistance needed and increase ability to perform ADLs and functional mobility     Functional Transfer Goals   Pt Will Perform All Functional Transfers   (STG mod assist, LTG min assist)   ADL Goals   Pt Will Perform Eating   (LTG independent)   Pt Will Perform Grooming   (LTG independent) Pt Will Perform Bathing   (STG mod assist, LTG min assist)   Pt Will Perform UE Dressing   (STG supervision, LTG Independent)   Pt Will Perform LE Dressing   (STG mod assist, LTG min assist)   Pt Will Perform Toileting   (STG mod assist, LTG min assist)   Plan   Treatment Interventions ADL retraining;Functional transfer training;UE strengthening/ROM; Endurance training;Patient/family training;Equipment evaluation/education; Compensatory technique education;Continued evaluation; Energy conservation; Activityengagement   Goal Expiration Date 06/21/22   OT Frequency 3-5x/wk   Recommendation   OT Discharge Recommendation Post acute rehabilitation services   AM-PAC Daily Activity Inpatient   Lower Body Dressing 2   Bathing 2   Toileting 2   Upper Body Dressing 3   Grooming 4   Eating 4   Daily Activity Raw Score 17   Daily Activity Standardized Score (Calc for Raw Score >=11) 37 26   AM-PAC Applied Cognition Inpatient   Following a Speech/Presentation 3   Understanding Ordinary Conversation 4   Taking Medications 4   Remembering Where Things Are Placed or Put Away 4   Remembering List of 4-5 Errands 3   Taking Care of Complicated Tasks 3   Applied Cognition Raw Score 21   Applied Cognition Standardized Score 44 3   Barthel Index   Feeding 5   Bathing 0   Grooming Score 0   Dressing Score 5   Bladder Score 5   Bowels Score 10   Toilet Use Score 5   Transfers (Bed/Chair) Score 5   Mobility (Level Surface) Score 0   Stairs Score 0   Barthel Index Score 28   Licensure   [place] License Number  Angelia ChapinJUNE [email or web address] [id number]

## 2022-06-07 NOTE — CASE MANAGEMENT
Case Management ED Assessment    Patient name Ming Koroma 60  Location ED HW2/ED HW2 MRN [id number]  : 1945 Date 2022        OBJECTIVE:  Predictive Model Details         74% Factor Value    Risk of Hospital Admission or ED Visit Model Number of ED Visits 1     Is in Relationship No     Has Chronic Kidney Disease Yes     Has Atrial Fibrillation Yes     Has Anemia Yes     Has Medicare Yes     Has CVD Yes     Has Chronic Liver Disease Yes     Has PCP Yes            Chief Complaint: Knee pain     Patient Class: Emergency  Preferred Pharmacy:   [address], [address] [address]  Phone: [phone] Fax: [phone]    [facility] [address] TIMI CANCINO [facility] [address] [address]  Phone: [phone] Fax: [phone]    Primary Care Provider: Naheed Winter MD    Primary Insurance: MEDICARE  Secondary Insurance: COMMERCIAL MISCELLANEOUS    ED ASSESSMENT:  [address] - Daughter   Primary Phone: [phone] (Mobile)  Home Phone: [phone]                    Readmission Root Cause  30 Day Readmission: No    Outpatient Care Information  Have you seen a doctor in the last year?: Yes  Specify which physician group(s) you have seen in the past year : [facility], Other  [facility] services used within the past year : General Surgery, Orthopedics, PCP    Prescribed Medications Prior to Admission/ED Visit  Has patient been prescribed medications (prior to this ED visit/admission)?: Yes  Any difficulty obtaining medications?: No  Has the patient been taking all prescribed medication(s)?: Yes  Does the patient have difficulty affording medication(s)?: No    Patient Information  Admitted from[de-identified] Home  Mental Status: Alert  During Assessment patient was accompanied by: Not accompanied during assessment  Assessment information provided by[de-identified] Patient, Daughter  Primary Caregiver: Self  Support Systems: Self, Spouse/significant other, Daughter  [place] of Residence: [address] [place], [place] [address]lauren Castellanod do you live in?: [place]  In the last 12 months, was there a time when you were not able to pay the mortgage or rent on time?: No  In the last 12 months, how many places have you lived?: 1  In the last 12 months, was there a time when you did not have a steady place to sleep or slept in a shelter (including now)?: No  Homeless/housing insecurity resource given?: No  Living Arrangements: Lives w/ Spouse/significant other  Is patient a ?: No    Patient Information Continued  Income Source: SSI/SSD  Does patient have prescription coverage?: Yes  Within the past 12 months, you worried that your food would run out before you got the money to buy more : Never true  Within the past 12 months, the food you bought just didn't last and you didn't have money to get more : Never true  Food insecurity resource given?: No  Does patient receive dialysis treatments?: No  Does patient have a history of substance abuse?: No  Does patient have a history of Mental Health Diagnosis?: No    Food and Transportation  What is patient's usual means of transportation?: Family Transport  Ask patient:  How would you describe your eating habits?: Good

## 2022-06-07 NOTE — CASE MANAGEMENT
Case Management ED Discharge Planning Note    Patient name Ming Koroma 60  Location ED HW2/ED HW2 MRN [id number]  : 1945 Date 2022        OBJECTIVE:  Predictive Model Details         74% Factor Value    Risk of Hospital Admission or ED Visit Model Number of ED Visits 1     Is in Relationship No     Has Chronic Kidney Disease Yes     Has Atrial Fibrillation Yes     Has Anemia Yes     Has Medicare Yes     Has CVD Yes     Has Chronic Liver Disease Yes     Has PCP Yes            Chief Complaint: Knee pain   Patient Class: Emergency  Preferred Pharmacy:   [address] [facility], [address] [place] [address]  Phone: [phone] Fax: [phone]    [facility] [address] TIMI CANCINO [facility] [address]  [place] [address]  Phone: [phone] Fax: [phone]    Primary Care Provider: Davi Liriano MD    Primary Insurance: MEDICARE  Secondary Insurance: COMMERCIAL MISCELLANEOUS    ED Discharge Details:    Discharge planning discussed with[de-identified] Patient, SO, and Daughter  Freedom of Choice: Yes  Comments - Freedom of Choice: Choice is to D/C to [facility] for STR    CM contacted family/caregiver?: Yes  Were Treatment Team discharge recommendations reviewed with patient/caregiver?: Yes  Did patient/caregiver verbalize understanding of patient care needs?: N/A- going to facility  Were patient/caregiver advised of the risks associated with not following Treatment Team discharge recommendations?: Yes    Contacts  Patient Contacts: Riccardo Morgan  Relationship to Patient[de-identified] Family  Contact Method: Phone  Phone Number: Home: [phone] : Mobile: [phone]  Reason/Outcome: Continuity of Care, Emergency Contact, Discharge [address]         Is the patient interested in [address] at discharge?: No    DME Referral Provided  Referral made for DME?: No    Other Referral/Resources/Interventions Provided:  Interventions: Short Term Rehab  Referral Comments: CM s/w patient's daughter to confirm need for STR  Daughter agreeable to blanket STR referrals  CM s/w patient and daughter regarding available options  [facility], [facility]  (B) chosen as STR facility  CM s/w [facility] to confirm able to admit to [facility] today  [facility] confirmed able to accept from ED today  [facility] requesting LEVEL I PASRR completed  Negative PASRR completed and sent via [facility]  ED provider made aware of chosen facility and ability to D/C from ED      Would you like to participate in our [facility] [address] service program?  : No - Declined    Treatment Team Recommendation: Short Term Rehab  Discharge Destination Plan[de-identified] Short Term Rehab (COMPLETE CARE @ [facility] ([facility]))     Complete Care @ [facility] ([facility])  [address] [facility] [address]  PHONE: [phone]: [phone]

## 2022-06-25 ENCOUNTER — HOSPITAL ENCOUNTER (EMERGENCY)
Facility: HOSPITAL | Age: 77
Discharge: HOME | End: 2022-06-26
Attending: EMERGENCY MEDICINE
Payer: MEDICARE

## 2022-06-25 ENCOUNTER — APPOINTMENT (EMERGENCY)
Dept: RADIOLOGY | Facility: HOSPITAL | Age: 77
End: 2022-06-25
Payer: MEDICARE

## 2022-06-25 VITALS
BODY MASS INDEX: 42.64 KG/M2 | RESPIRATION RATE: 16 BRPM | HEART RATE: 80 BPM | SYSTOLIC BLOOD PRESSURE: 141 MMHG | DIASTOLIC BLOOD PRESSURE: 64 MMHG | WEIGHT: 222 LBS | OXYGEN SATURATION: 96 % | TEMPERATURE: 99 F

## 2022-06-25 DIAGNOSIS — N39.0 UTI (URINARY TRACT INFECTION): ICD-10-CM

## 2022-06-25 DIAGNOSIS — M54.9 BACK PAIN: Primary | ICD-10-CM

## 2022-06-25 LAB
BACTERIA UR QL AUTO: ABNORMAL /HPF
BILIRUB UR QL STRIP: NEGATIVE
CLARITY UR: ABNORMAL
COLOR UR: ABNORMAL
GLUCOSE UR STRIP-MCNC: NEGATIVE MG/DL
HGB UR QL STRIP.AUTO: ABNORMAL
KETONES UR STRIP-MCNC: NEGATIVE MG/DL
LEUKOCYTE ESTERASE UR QL STRIP: ABNORMAL
NITRITE UR QL STRIP: POSITIVE
NON-SQ EPI CELLS URNS QL MICRO: ABNORMAL /HPF
PH UR STRIP.AUTO: 5.5 [PH]
PROT UR STRIP-MCNC: NEGATIVE MG/DL
RBC #/AREA URNS AUTO: ABNORMAL /HPF
SP GR UR STRIP.AUTO: 1.01 (ref 1–1.03)
UROBILINOGEN UR QL STRIP.AUTO: 0.2 E.U./DL
WBC #/AREA URNS AUTO: ABNORMAL /HPF

## 2022-06-25 PROCEDURE — 74176 CT ABD & PELVIS W/O CONTRAST: CPT

## 2022-06-25 PROCEDURE — G1004 CDSM NDSC: HCPCS

## 2022-06-25 PROCEDURE — 99284 EMERGENCY DEPT VISIT MOD MDM: CPT | Performed by: EMERGENCY MEDICINE

## 2022-06-25 PROCEDURE — 81001 URINALYSIS AUTO W/SCOPE: CPT | Performed by: EMERGENCY MEDICINE

## 2022-06-25 PROCEDURE — 99284 EMERGENCY DEPT VISIT MOD MDM: CPT

## 2022-06-25 RX ORDER — ACETAMINOPHEN 325 MG/1
650 TABLET ORAL ONCE
Status: COMPLETED | OUTPATIENT
Start: 2022-06-25 | End: 2022-06-25

## 2022-06-25 RX ORDER — LIDOCAINE 50 MG/G
2 PATCH TOPICAL ONCE
Status: DISCONTINUED | OUTPATIENT
Start: 2022-06-25 | End: 2022-06-26 | Stop reason: HOSPADM

## 2022-06-25 RX ADMIN — LIDOCAINE 5% 2 PATCH: 700 PATCH TOPICAL at 23:37

## 2022-06-25 RX ADMIN — ACETAMINOPHEN 650 MG: 325 TABLET ORAL at 23:36

## 2022-06-26 RX ORDER — CEPHALEXIN 500 MG/1
500 CAPSULE ORAL EVERY 12 HOURS SCHEDULED
Qty: 10 CAPSULE | Refills: 0 | Status: SHIPPED | OUTPATIENT
Start: 2022-06-26 | End: 2022-06-26 | Stop reason: SDUPTHER

## 2022-06-26 RX ORDER — CEPHALEXIN 500 MG/1
500 CAPSULE ORAL ONCE
Status: COMPLETED | OUTPATIENT
Start: 2022-06-26 | End: 2022-06-26

## 2022-06-26 RX ORDER — CEPHALEXIN 500 MG/1
500 CAPSULE ORAL EVERY 12 HOURS SCHEDULED
Qty: 10 CAPSULE | Refills: 0 | Status: SHIPPED | OUTPATIENT
Start: 2022-06-26 | End: 2022-07-01 | Stop reason: ALTCHOICE

## 2022-06-26 RX ADMIN — CEPHALEXIN 500 MG: 500 CAPSULE ORAL at 00:58

## 2022-06-26 NOTE — ED NOTES
Patient was moved from the hallway to Room 1, due to no available transportation back to nursing home until morning       Renata Evans RN  06/26/22 0031

## 2022-06-26 NOTE — ED NOTES
Patient speaking with spouse, stating she wants him to come an d pick her up and bring her home  Patient asked if her spouse can come pick her up       Akua Andrade RN  06/26/22 9491

## 2022-06-26 NOTE — ED CARE HANDOFF
Emergency Department Sign Out Note        Sign out and transfer of care from previous provider  See Separate Emergency Department note  The patient, Angel Luis Whitt, was evaluated by the previous provider for back pain    Workup Completed:    ED Course / Workup Pending (followup):  UA, CT abdomen/pelvis                                  ED Course as of 06/26/22 0036   Sat Jun 25, 2022 2157 Patient is pending UA as well as CT abdomen/pelvis  Patient will be dispositioned after studies are resulted  Procedures  MDM  Number of Diagnoses or Management Options  Back pain: new and requires workup  UTI (urinary tract infection): new and requires workup     Amount and/or Complexity of Data Reviewed  Clinical lab tests: reviewed  Tests in the radiology section of CPT®: reviewed  Tests in the medicine section of CPT®: reviewed and ordered  Review and summarize past medical records: yes  Independent visualization of images, tracings, or specimens: yes    Risk of Complications, Morbidity, and/or Mortality  General comments: Patient's CT scan was unremarkable for any intra-abdominal pathology  UA showed concern for UTI  Based on previous urine culture sensitivity studies, patient was placed on Keflex  Patient discharged back to nursing home with Tylenol/ibuprofen p r n  Pain as well as Keflex antibiotic  Patient will follow up with PCP in 2-3 days  Close return instructions given to return to the ER for any worsening symptoms  Patient agrees with discharge plan  Patient well appearing at time of discharge  Please Note: Fluency Direct voice recognition software may have been used in the creation of this document  Wrong words or sound a like substitutions may have occurred due to the inherent limitations of the voice software         Patient Progress  Patient progress: stable          Disposition  Final diagnoses:   Back pain   UTI (urinary tract infection)     Time reflects when diagnosis was documented in both MDM as applicable and the Disposition within this note     Time User Action Codes Description Comment    6/25/2022  9:53 PM Anup Younger Add [M54 9] Back pain     6/26/2022 12:34 AM Tiffanie Rivera Add [N39 0] UTI (urinary tract infection)       ED Disposition     ED Disposition   Discharge    Condition   Stable    Date/Time   Sun Jun 26, 2022 12:35 AM    Comment   Kristin Kimbrough discharge to nursing home  Follow-up Information     Follow up With Specialties Details Why [address], MD Internal Medicine, Family Medicine In 3 days  [address] [address]  [phone]          Patient's Medications   Discharge Prescriptions    CEPHALEXIN (KEFLEX) 500 MG CAPSULE    Take 1 capsule (500 mg total) by mouth every 12 (twelve) hours for 5 days       Start Date: 6/26/2022 End Date: 7/1/2022       Order Dose: 500 mg       Quantity: 10 capsule    Refills: 0     No discharge procedures on file         ED Provider  Electronically Signed by     Ale Woods DO  06/26/22 0036

## 2022-06-26 NOTE — ED PROVIDER NOTES
History  Chief Complaint   Patient presents with    Back Pain     Patient c/o right side back pain after dinner, patient reports  has reminded her pain has been going on a couple of days, no difficulty urinating, bp  rt oyl281/64, left arm 15/58     75-year-old female presents with right-sided flank pain that started today however resolved by the time EMS got to her  According to the  she had left-sided flank pain a couple of days ago  Last time she had back pain she had a urinary tract infection currently denies any dysuria urgency or frequency  Denies any fevers chills no trauma noted no urine incontinence bowel incontinence urinary retention numbness weakness or any other symptoms  History provided by:  Patient   used: No        Prior to Admission Medications   Prescriptions Last Dose Informant Patient Reported? Taking?    BUDESONIDE PO  Self Yes No   Sig: Take 3 mg by mouth   Calcium 500 MG tablet  Self Yes No   Sig: Take 500 mg by mouth   Cholecalciferol (Vitamin D) 50 MCG (2000 UT) tablet  Self Yes No   Sig: Take 1,000 Units by mouth   aspirin (ECOTRIN LOW STRENGTH) 81 mg EC tablet  Self No No   Sig: Take 1 tablet (81 mg total) by mouth daily with breakfast   b complex vitamins capsule  Self No No   Sig: Take 1 capsule by mouth daily   clopidogrel (PLAVIX) 75 mg tablet  Self No No   Sig: Take 1 tablet (75 mg total) by mouth daily   cycloSPORINE modified (NEORAL) 25 mg capsule  Self Yes No   Sig: Take 50 mg by mouth 2 (two) times a day   ezetimibe (ZETIA) 10 mg tablet   No No   Sig: Take 1 tablet by mouth once daily   levothyroxine 112 mcg tablet   No No   Sig: TAKE 1 TABLET EVERY DAY AS DIRECTED   metoprolol tartrate (LOPRESSOR) 25 mg tablet   No No   Sig: Take 1 5 tablets (37 5 mg total) by mouth 2 (two) times a day   mycophenolate (CELLCEPT) 250 mg capsule  Self No No   Sig: Take 3 capsules (750 mg total) by mouth 2 (two) times a day   Patient taking differently: Take 750 mg by mouth 2 tabs twice daily   omeprazole (PriLOSEC) 40 MG capsule  Self Yes No   Sig: Take 40 mg by mouth daily   pravastatin (PRAVACHOL) 20 mg tablet   No No   Sig: TAKE 1 TABLET BY MOUTH ONCE DAILY AT BEDTIME      Facility-Administered Medications: None       Past Medical History:   Diagnosis Date    Anemia     Anxiety     Arthritis     Benign paroxysmal vertigo, unspecified ear     onset: 05/06/2010    Cirrhosis ([place] [address] )     onset: 03/30/2005    Crohn disease ([place] [address] )     Disease of thyroid gland     GERD (gastroesophageal reflux disease)     History of transfusion     Liver transplant candidate     onset: 09/16/2008    Osteoporosis     Pelvic fracture ([place] [address] )     onset: 10/14/2008       Past Surgical History:   Procedure Laterality Date    ABDOMINAL SURGERY      APPENDECTOMY      BLADDER AUGMENTATION N/A     CATARACT EXTRACTION      CHOLECYSTECTOMY N/A     COLONOSCOPY N/A 4/20/2017    Procedure: COLONOSCOPY;  Surgeon: Ina Jesus MD;  Location: [facility]; Service:     ESOPHAGOGASTRODUODENOSCOPY N/A 3/31/2016    Procedure: ESOPHAGOGASTRODUODENOSCOPY (EGD); Surgeon: Ina Jesus MD;  Location: [facility]; Service:     ESOPHAGOGASTRODUODENOSCOPY N/A 4/20/2017    Procedure: ESOPHAGOGASTRODUODENOSCOPY (EGD); Surgeon: Ina Jesus MD;  Location: [facility]; Service:     EYE SURGERY      cataracts removed both eyes lens implant    HERNIA REPAIR      JOINT REPLACEMENT      left knee replacement    LIVER TRANSPLANTATION N/A     TONSILECTOMY AND ADNOIDECTOMY N/A     TUBAL LIGATION         Family History   Problem Relation Age of Onset    Cancer Mother         breast    Cancer Father     Crohn's disease Brother     Arthritis Family     Breast cancer Family      I have reviewed and agree with the history as documented      E-Cigarette/Vaping    E-Cigarette Use Never User      E-Cigarette/Vaping Substances    Nicotine No     THC No     CBD No     Flavoring No     Other No     Unknown No      Social History     Tobacco Use    Smoking status: Former Smoker     Years: 5 00     Types: Cigarettes     Quit date: 1975     Years since quittin 4    Smokeless tobacco: Never Used    Tobacco comment: smoked for 5 years-1/2 to 1 ppd   Vaping Use    Vaping Use: Never used   Substance Use Topics    Alcohol use: Never    Drug use: Never       Review of Systems   Constitutional: Negative  HENT: Negative  Eyes: Negative  Respiratory: Negative  Cardiovascular: Negative  Gastrointestinal: Negative  Endocrine: Negative  Genitourinary: Positive for flank pain  Musculoskeletal: Positive for back pain  Skin: Negative  Allergic/Immunologic: Negative  Neurological: Negative  Hematological: Negative  Psychiatric/Behavioral: Negative  All other systems reviewed and are negative  Physical Exam  Physical Exam  Constitutional:       Appearance: Normal appearance  HENT:      Head: Normocephalic and atraumatic  Nose: Nose normal       Mouth/Throat:      Mouth: Mucous membranes are moist    Eyes:      Extraocular Movements: Extraocular movements intact  Pupils: Pupils are equal, round, and reactive to light  Cardiovascular:      Rate and Rhythm: Normal rate and regular rhythm  Pulmonary:      Effort: Pulmonary effort is normal       Breath sounds: Normal breath sounds  Abdominal:      General: Abdomen is flat  Bowel sounds are normal       Palpations: Abdomen is soft  Musculoskeletal:         General: Normal range of motion  Cervical back: Normal range of motion and neck supple  Comments: No tenderness noted bilateral flank or paravertebral lumbar spine tenderness no midline tenderness noted no rash noted  Skin:     General: Skin is warm  Capillary Refill: Capillary refill takes less than 2 seconds  Neurological:      General: No focal deficit present        Mental Status: She is alert and oriented to person, place, and time  Mental status is at baseline  Psychiatric:         Mood and Affect: Mood normal          Thought Content: Thought content normal          Vital Signs  ED Triage Vitals   Temperature Pulse Respirations Blood Pressure SpO2   06/25/22 2026 06/25/22 2026 06/25/22 2027 06/25/22 2027 06/25/22 2027   99 °F (37 2 °C) 80 16 141/64 96 %      Temp Source Heart Rate Source Patient Position - Orthostatic VS BP Location FiO2 (%)   06/25/22 2026 -- 06/25/22 2027 06/25/22 2027 --   Tympanic  Lying Right arm       Pain Score       --                  Vitals:    06/25/22 2026 06/25/22 2027   BP:  141/64   Pulse: 80    Patient Position - Orthostatic VS:  Lying         Visual Acuity      ED Medications  Medications - No data to display    Diagnostic Studies  Results Reviewed     Procedure Component Value Units Date/Time    UA (URINE) with reflex to Scope [[id number]]     Lab Status: No result Specimen: Urine                  CT abdomen pelvis wo contrast    (Results Pending)              Procedures  Procedures         ED Course                                             MDM  Number of Diagnoses or Management Options  Diagnosis management comments: Urinalysis and CT scan of the abdomen pelvis pending care transitioned to Dr Rivera       Amount and/or Complexity of Data Reviewed  Clinical lab tests: ordered  Tests in the radiology section of CPT®: ordered  Tests in the medicine section of CPT®: ordered    Patient Progress  Patient progress: stable      Disposition  Final diagnoses:   Back pain     Time reflects when diagnosis was documented in both MDM as applicable and the Disposition within this note     Time User Action Codes Description Comment    6/25/2022  9:53 PM Osmany Younger Add [M54 9] Back pain       ED Disposition     None      Follow-up Information    None         Patient's Medications   Discharge Prescriptions    No medications on file       No discharge procedures on file      PDMP Review     None ED Provider  Electronically Signed by           Marcelina Bruce,   06/25/22 7826

## 2022-07-01 ENCOUNTER — OFFICE VISIT (OUTPATIENT)
Dept: FAMILY MEDICINE CLINIC | Facility: CLINIC | Age: 77
End: 2022-07-01
Payer: MEDICARE

## 2022-07-01 VITALS
HEART RATE: 85 BPM | WEIGHT: 216 LBS | SYSTOLIC BLOOD PRESSURE: 122 MMHG | OXYGEN SATURATION: 96 % | TEMPERATURE: 99.2 F | BODY MASS INDEX: 40.78 KG/M2 | RESPIRATION RATE: 18 BRPM | DIASTOLIC BLOOD PRESSURE: 54 MMHG | HEIGHT: 61 IN

## 2022-07-01 DIAGNOSIS — Z94.4 LIVER TRANSPLANT RECIPIENT (HCC): ICD-10-CM

## 2022-07-01 DIAGNOSIS — K51.90 ULCERATIVE COLITIS WITHOUT COMPLICATIONS, UNSPECIFIED LOCATION (HCC): ICD-10-CM

## 2022-07-01 DIAGNOSIS — F03.90 DEMENTIA WITHOUT BEHAVIORAL DISTURBANCE, UNSPECIFIED DEMENTIA TYPE: ICD-10-CM

## 2022-07-01 DIAGNOSIS — H10.33 ACUTE CONJUNCTIVITIS OF BOTH EYES, UNSPECIFIED ACUTE CONJUNCTIVITIS TYPE: Primary | ICD-10-CM

## 2022-07-01 PROCEDURE — 99214 OFFICE O/P EST MOD 30 MIN: CPT | Performed by: FAMILY MEDICINE

## 2022-07-01 RX ORDER — MOXIFLOXACIN 5 MG/ML
1 SOLUTION/ DROPS OPHTHALMIC 3 TIMES DAILY
Qty: 3 ML | Refills: 0 | Status: SHIPPED | OUTPATIENT
Start: 2022-07-01 | End: 2022-07-08

## 2022-07-01 RX ORDER — MOXIFLOXACIN 5 MG/ML
1 SOLUTION/ DROPS OPHTHALMIC 3 TIMES DAILY
Qty: 3 ML | Refills: 0 | Status: SHIPPED | OUTPATIENT
Start: 2022-07-01 | End: 2022-07-01 | Stop reason: SDUPTHER

## 2022-07-01 NOTE — PROGRESS NOTES
Assessment/Plan:       Diagnoses and all orders for this visit:    Acute conjunctivitis of both eyes, unspecified acute conjunctivitis type  -     Discontinue: moxifloxacin (VIGAMOX) 0 5 % ophthalmic solution; Administer 1 drop to both eyes 3 (three) times a day for 7 days  -     moxifloxacin (VIGAMOX) 0 5 % ophthalmic solution; Administer 1 drop to both eyes 3 (three) times a day for 7 days    Liver transplant recipient [facility])  Comments:  14 years ago at [place]  Stable    Dementia without behavioral disturbance, unspecified dementia type ([facility] [address] )  Comments:  Stable    Ulcerative colitis without complications, unspecified location [facility])  Comments:  stable        Subjective:      Patient ID: Kristin Kimbrough is a 64 y o  female  HPI  [facility] presents today for routine follow up visit  She was at [address] [place] [address] for 1 day because she was unhappy during her time there  Recently had a UTI which has improved  She reports bilateral eye redness and discharge which started a couple of days ago  She has chronic right knee pain  The following portions of the patient's history were reviewed and updated as appropriate: allergies, current medications, past family history, past medical history, past social history, past surgical history and problem list     Review of Systems   Constitutional: Negative  HENT: Negative  Eyes: Positive for pain, discharge and redness  Respiratory: Negative  Cardiovascular: Negative  Gastrointestinal: Negative  Endocrine: Negative  Genitourinary: Negative  Musculoskeletal: Negative  Skin: Negative  Allergic/Immunologic: Negative  Neurological: Negative  Hematological: Negative  Psychiatric/Behavioral: Negative            Objective:      /54   Pulse 85   Temp 99 2 °F (37 3 °C)   Resp 18   Ht 5' 0 5" (1 537 m)   Wt 98 kg (216 lb)   SpO2 96%   BMI 41 49 kg/m²          Physical Exam  Constitutional:       General: She is not in acute distress  Appearance: She is well-developed  She is not diaphoretic  HENT:      Head: Normocephalic and atraumatic  Eyes:      General:         Right eye: Discharge present  Left eye: Discharge present  Conjunctiva/sclera:      Right eye: Right conjunctiva is injected  Left eye: Left conjunctiva is injected  Cardiovascular:      Rate and Rhythm: Normal rate and regular rhythm  Heart sounds: Normal heart sounds  No murmur heard  No friction rub  No gallop  Pulmonary:      Effort: Pulmonary effort is normal  No respiratory distress  Breath sounds: Normal breath sounds  No wheezing or rales  Chest:      Chest wall: No tenderness  Musculoskeletal:         General: No deformity  Normal range of motion  Cervical back: Normal range of motion and neck supple  Skin:     General: Skin is warm and dry  Neurological:      Mental Status: She is alert and oriented to person, place, and time  Psychiatric:         Behavior: Behavior normal          Thought Content:  Thought content normal          Judgment: Judgment normal

## 2022-07-15 ENCOUNTER — OFFICE VISIT (OUTPATIENT)
Dept: OBGYN CLINIC | Facility: CLINIC | Age: 77
End: 2022-07-15
Payer: MEDICARE

## 2022-07-15 VITALS — SYSTOLIC BLOOD PRESSURE: 116 MMHG | DIASTOLIC BLOOD PRESSURE: 62 MMHG | TEMPERATURE: 98.6 F | HEART RATE: 85 BPM

## 2022-07-15 DIAGNOSIS — M25.561 CHRONIC PAIN OF RIGHT KNEE: ICD-10-CM

## 2022-07-15 DIAGNOSIS — M17.11 PRIMARY OSTEOARTHRITIS OF RIGHT KNEE: Primary | ICD-10-CM

## 2022-07-15 DIAGNOSIS — G89.29 CHRONIC PAIN OF RIGHT KNEE: ICD-10-CM

## 2022-07-15 PROCEDURE — 99204 OFFICE O/P NEW MOD 45 MIN: CPT | Performed by: ORTHOPAEDIC SURGERY

## 2022-07-15 PROCEDURE — 20610 DRAIN/INJ JOINT/BURSA W/O US: CPT | Performed by: ORTHOPAEDIC SURGERY

## 2022-07-15 RX ORDER — TRIAMCINOLONE ACETONIDE 40 MG/ML
80 INJECTION, SUSPENSION INTRA-ARTICULAR; INTRAMUSCULAR
Status: COMPLETED | OUTPATIENT
Start: 2022-07-15 | End: 2022-07-15

## 2022-07-15 RX ORDER — BUPIVACAINE HYDROCHLORIDE 5 MG/ML
6 INJECTION, SOLUTION EPIDURAL; INTRACAUDAL
Status: COMPLETED | OUTPATIENT
Start: 2022-07-15 | End: 2022-07-15

## 2022-07-15 RX ADMIN — TRIAMCINOLONE ACETONIDE 80 MG: 40 INJECTION, SUSPENSION INTRA-ARTICULAR; INTRAMUSCULAR at 15:51

## 2022-07-15 RX ADMIN — BUPIVACAINE HYDROCHLORIDE 6 ML: 5 INJECTION, SOLUTION EPIDURAL; INTRACAUDAL at 15:51

## 2022-07-15 NOTE — PROGRESS NOTES
Assessment/Plan:  1  Primary osteoarthritis of right knee  Large joint arthrocentesis: R knee   2  Chronic pain of right knee       Scribe Attestation    I,:  Sarah Das MA am acting as a scribe while in the presence of the attending physician :       I,:  Adam Paulino DO personally performed the services described in this documentation    as scribed in my presence :           15-year-old female who presents to the office today for evaluation of right knee pain  After thorough history, clinical exam, and reviewing her imaging I discussed with Marlo Cid and her  that her signs and symptoms are consistent with right knee osteoarthritis  Patient has tried and failed OTC medication without relief  We did discuss a right knee steroid injection  The patient was agreeable to this  She consented and underwent a right knee steroid injection in the office today without any complications  Post injection instructions were provided  She is aware we can repeat these injections every 3 months as needed  She may follow up with me as needed  Large joint arthrocentesis: R knee  Universal Protocol:  Consent: Verbal consent obtained  Consent given by: patient  Patient identity confirmed: verbally with patient    Supporting Documentation  Indications: pain   Procedure Details  Location: knee - R knee  Preparation: Patient was prepped and draped in the usual sterile fashion  Needle size: 20 G  Ultrasound guidance: no  Approach: anterolateral  Medications administered: 6 mL bupivacaine (PF) 0 5 %; 80 mg triamcinolone acetonide 40 mg/mL    Patient tolerance: patient tolerated the procedure well with no immediate complications  Dressing:  Sterile dressing applied          Subjective: right knee pain     Patient ID: Tia Ward is a 64 y o  female  HPI  Marlo Cid is a 15-year-old female who presents to the office today who presents to the office today for evaluation of right knee pain   She states this has been ongoing for the past few months  She denies any known injury or trauma  She notes pain globally about the knee  She describes the pain as constant and achy  She notes increased pain with activity  She does ambulate with a walker  She has been taking Tylenol OTC for pain  Patient has a history of a liver transplant performed 14 years ago and is unable to take NSAIDS  Se does have a history of a left knee replacement  She denies prior treatment on her right knee  Review of Systems   Constitutional: Positive for activity change  Negative for chills and fever  HENT: Negative for drooling and sneezing  Eyes: Negative for redness  Respiratory: Negative for cough and wheezing  Gastrointestinal: Negative for nausea and vomiting  Musculoskeletal: Positive for arthralgias  Negative for joint swelling and myalgias  Neurological: Negative for weakness and numbness  Psychiatric/Behavioral: Negative for behavioral problems  The patient is not nervous/anxious  Past Medical History:   Diagnosis Date    Anemia     Anxiety     Arthritis     Benign paroxysmal vertigo, unspecified ear     onset: 05/06/2010    Cirrhosis ([place] [address] )     onset: 03/30/2005    Crohn disease ([place] [address] )     Disease of thyroid gland     GERD (gastroesophageal reflux disease)     History of transfusion     Liver transplant candidate     onset: 09/16/2008    Osteoporosis     Pelvic fracture ([place] [address] )     onset: 10/14/2008       Past Surgical History:   Procedure Laterality Date    ABDOMINAL SURGERY      APPENDECTOMY      BLADDER AUGMENTATION N/A     CATARACT EXTRACTION      CHOLECYSTECTOMY N/A     COLONOSCOPY N/A 4/20/2017    Procedure: COLONOSCOPY;  Surgeon: Fernanda Rubin MD;  Location: [facility]; Service:     ESOPHAGOGASTRODUODENOSCOPY N/A 3/31/2016    Procedure: ESOPHAGOGASTRODUODENOSCOPY (EGD); Surgeon: Fernanda Rubin MD;  Location: [facility];   Service:     ESOPHAGOGASTRODUODENOSCOPY N/A 4/20/2017    Procedure: ESOPHAGOGASTRODUODENOSCOPY (EGD); Surgeon: Harvey Meeks MD;  Location: [place] [address] GI LAB;   Service:     EYE SURGERY      cataracts removed both eyes lens implant    HERNIA REPAIR      JOINT REPLACEMENT      left knee replacement    LIVER TRANSPLANTATION N/A     TONSILECTOMY AND ADNOIDECTOMY N/A     TUBAL LIGATION         Family History   Problem Relation Age of Onset    Cancer Mother         breast    Cancer Father     Crohn's disease Brother     Arthritis Family     Breast cancer Family        Social History     Occupational History    Not on file   Tobacco Use    Smoking status: Former Smoker     Years: 5 00     Types: Cigarettes     Quit date: 1975     Years since quittin 5    Smokeless tobacco: Never Used    Tobacco comment: smoked for 5 years-1/2 to 1 ppd   Vaping Use    Vaping Use: Never used   Substance and Sexual Activity    Alcohol use: Never    Drug use: Never    Sexual activity: Not Currently         Current Outpatient Medications:     aspirin (ECOTRIN LOW STRENGTH) 81 mg EC tablet, Take 1 tablet (81 mg total) by mouth daily with breakfast, Disp: 90 tablet, Rfl: 3    b complex vitamins capsule, Take 1 capsule by mouth daily, Disp: 30 capsule, Rfl: 3    BUDESONIDE PO, Take 3 mg by mouth, Disp: , Rfl:     Calcium 500 MG tablet, Take 500 mg by mouth, Disp: , Rfl:     Cholecalciferol (Vitamin D) 50 MCG (2000 UT) tablet, Take 1,000 Units by mouth (Patient not taking: Reported on 2022), Disp: , Rfl:     clopidogrel (PLAVIX) 75 mg tablet, Take 1 tablet (75 mg total) by mouth daily, Disp: 90 tablet, Rfl: 3    cycloSPORINE modified (NEORAL) 25 mg capsule, Take 50 mg by mouth 2 (two) times a day, Disp: , Rfl:     ezetimibe (ZETIA) 10 mg tablet, Take 1 tablet by mouth once daily, Disp: 90 tablet, Rfl: 3    levothyroxine 112 mcg tablet, TAKE 1 TABLET EVERY DAY AS DIRECTED, Disp: 90 tablet, Rfl: 3    metoprolol tartrate (LOPRESSOR) 25 mg tablet, Take 1 5 tablets (37 5 mg total) by mouth 2 (two) times a day, Disp: 270 tablet, Rfl: 3    mycophenolate (CELLCEPT) 250 mg capsule, Take 3 capsules (750 mg total) by mouth 2 (two) times a day (Patient taking differently: Take 750 mg by mouth 2 tabs twice daily), Disp: , Rfl: 0    omeprazole (PriLOSEC) 40 MG capsule, Take 40 mg by mouth daily, Disp: , Rfl:     pravastatin (PRAVACHOL) 20 mg tablet, TAKE 1 TABLET BY MOUTH ONCE DAILY AT BEDTIME, Disp: 90 tablet, Rfl: 3    Allergies   Allergen Reactions    Tetracyclines & Related Hives    Vancomycin Other (See Comments) and Itching     Reaction Date: [id number]; Pt unsure of reaction    Prograf [Tacrolimus] Anxiety     Reaction Date: [id number]; Objective:  Vitals:    07/15/22 1527   BP: 116/62   Pulse: 85   Temp: 98 6 °F (37 °C)       There is no height or weight on file to calculate BMI  Right Ankle Exam     Other   Erythema: absent  Sensation: normal  Pulse: present     Comments:  Large adiposity medial aspect  Knee is stable 0, 30, and 90  + peripatellar crepitus  + patellofemoral grind  No erythema  No warmth             Physical Exam  Vitals and nursing note reviewed  Constitutional:       Appearance: She is well-developed  HENT:      Head: Normocephalic and atraumatic  Eyes:      General: No scleral icterus  Right eye: No discharge  Left eye: No discharge  Extraocular Movements: Extraocular movements intact  Conjunctiva/sclera: Conjunctivae normal    Cardiovascular:      Rate and Rhythm: Normal rate  Pulmonary:      Effort: Pulmonary effort is normal  No respiratory distress  Musculoskeletal:      Cervical back: Normal range of motion and neck supple  Comments: As noted in HPI   Skin:     General: Skin is warm and dry  Neurological:      Mental Status: She is alert and oriented to person, place, and time  Psychiatric:         Behavior: Behavior normal          Thought Content:  Thought content normal          Judgment: Judgment normal  I have personally reviewed pertinent films in PACS  X-rays right knee performed on 6/7/22 demonstrate severe right knee osteoarthritis

## 2022-08-10 ENCOUNTER — APPOINTMENT (OUTPATIENT)
Dept: LAB | Facility: CLINIC | Age: 77
End: 2022-08-10
Payer: MEDICARE

## 2022-10-13 DIAGNOSIS — Z86.73 PERSONAL HISTORY OF TIA (TRANSIENT ISCHEMIC ATTACK): ICD-10-CM

## 2022-10-13 DIAGNOSIS — I63.40 CEREBROVASCULAR ACCIDENT (CVA) DUE TO EMBOLISM OF CEREBRAL ARTERY (HCC): ICD-10-CM

## 2022-10-13 RX ORDER — CLOPIDOGREL BISULFATE 75 MG/1
TABLET ORAL
Qty: 90 TABLET | Refills: 3 | Status: SHIPPED | OUTPATIENT
Start: 2022-10-13

## 2022-11-14 ENCOUNTER — HOSPITAL ENCOUNTER (INPATIENT)
Facility: HOSPITAL | Age: 77
LOS: 6 days | Discharge: NURSING, REHAB OR LONG-TERM CARE | End: 2022-11-20
Attending: EMERGENCY MEDICINE | Admitting: INTERNAL MEDICINE

## 2022-11-14 ENCOUNTER — APPOINTMENT (EMERGENCY)
Dept: RADIOLOGY | Facility: HOSPITAL | Age: 77
End: 2022-11-14

## 2022-11-14 DIAGNOSIS — F03.90 DEMENTIA (HCC): ICD-10-CM

## 2022-11-14 DIAGNOSIS — K59.00 CONSTIPATION: ICD-10-CM

## 2022-11-14 DIAGNOSIS — Z01.818 PREOPERATIVE CLEARANCE: ICD-10-CM

## 2022-11-14 DIAGNOSIS — S82.401A CLOSED FRACTURE OF RIGHT TIBIA AND FIBULA: ICD-10-CM

## 2022-11-14 DIAGNOSIS — T14.8XXA FRACTURE: ICD-10-CM

## 2022-11-14 DIAGNOSIS — S82.831A CLOSED FRACTURE OF DISTAL END OF RIGHT FIBULA: ICD-10-CM

## 2022-11-14 DIAGNOSIS — S82.201A CLOSED FRACTURE OF RIGHT TIBIA AND FIBULA: ICD-10-CM

## 2022-11-14 DIAGNOSIS — I47.1 PSVT (PAROXYSMAL SUPRAVENTRICULAR TACHYCARDIA) (HCC): ICD-10-CM

## 2022-11-14 DIAGNOSIS — F03.918 DEMENTIA WITH BEHAVIORAL DISTURBANCE: ICD-10-CM
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
## 2022-11-14 DIAGNOSIS — Z94.4 LIVER TRANSPLANTED (HCC): ICD-10-CM

## 2022-11-14 DIAGNOSIS — N17.9 AKI (ACUTE KIDNEY INJURY) (HCC): ICD-10-CM

## 2022-11-14 DIAGNOSIS — E87.5 HYPERKALEMIA: ICD-10-CM

## 2022-11-14 DIAGNOSIS — S82.891A CLOSED FRACTURE OF RIGHT ANKLE, INITIAL ENCOUNTER: ICD-10-CM

## 2022-11-14 LAB
ANION GAP SERPL CALCULATED.3IONS-SCNC: 7 MMOL/L (ref 4–13)
ATRIAL RATE: 68 BPM
BASOPHILS # BLD AUTO: 0.03 THOUSANDS/ÂΜL (ref 0–0.1)
BASOPHILS NFR BLD AUTO: 0 % (ref 0–1)
BUN SERPL-MCNC: 33 MG/DL (ref 5–25)
CALCIUM SERPL-MCNC: 8.4 MG/DL (ref 8.3–10.1)
CHLORIDE SERPL-SCNC: 106 MMOL/L (ref 96–108)
CHOLEST SERPL-MCNC: 118 MG/DL
CK SERPL-CCNC: 36 U/L (ref 26–192)
CO2 SERPL-SCNC: 22 MMOL/L (ref 21–32)
CREAT SERPL-MCNC: 1.6 MG/DL (ref 0.6–1.3)
EOSINOPHIL # BLD AUTO: 0.18 THOUSAND/ÂΜL (ref 0–0.61)
EOSINOPHIL NFR BLD AUTO: 2 % (ref 0–6)
ERYTHROCYTE [DISTWIDTH] IN BLOOD BY AUTOMATED COUNT: 13.9 % (ref 11.6–15.1)
GFR SERPL CREATININE-BSD FRML MDRD: 30 ML/MIN/1.73SQ M
GLUCOSE SERPL-MCNC: 118 MG/DL (ref 65–140)
HCT VFR BLD AUTO: 35.4 % (ref 34.8–46.1)
HDLC SERPL-MCNC: 56 MG/DL
HGB BLD-MCNC: 10.9 G/DL (ref 11.5–15.4)
IMM GRANULOCYTES # BLD AUTO: 0.08 THOUSAND/UL (ref 0–0.2)
IMM GRANULOCYTES NFR BLD AUTO: 1 % (ref 0–2)
LDLC SERPL CALC-MCNC: 43 MG/DL (ref 0–100)
LYMPHOCYTES # BLD AUTO: 0.82 THOUSANDS/ÂΜL (ref 0.6–4.47)
LYMPHOCYTES NFR BLD AUTO: 9 % (ref 14–44)
MCH RBC QN AUTO: 28.9 PG (ref 26.8–34.3)
MCHC RBC AUTO-ENTMCNC: 30.8 G/DL (ref 31.4–37.4)
MCV RBC AUTO: 94 FL (ref 82–98)
MONOCYTES # BLD AUTO: 0.55 THOUSAND/ÂΜL (ref 0.17–1.22)
MONOCYTES NFR BLD AUTO: 6 % (ref 4–12)
NEUTROPHILS # BLD AUTO: 7.48 THOUSANDS/ÂΜL (ref 1.85–7.62)
NEUTS SEG NFR BLD AUTO: 82 % (ref 43–75)
NRBC BLD AUTO-RTO: 0 /100 WBCS
P AXIS: -7 DEGREES
PLATELET # BLD AUTO: 203 THOUSANDS/UL (ref 149–390)
PLATELET # BLD AUTO: 216 THOUSANDS/UL (ref 149–390)
PMV BLD AUTO: 9.4 FL (ref 8.9–12.7)
PMV BLD AUTO: 9.5 FL (ref 8.9–12.7)
POTASSIUM SERPL-SCNC: 5.7 MMOL/L (ref 3.5–5.3)
POTASSIUM SERPL-SCNC: 6.2 MMOL/L (ref 3.5–5.3)
POTASSIUM SERPL-SCNC: 6.3 MMOL/L (ref 3.5–5.3)
PR INTERVAL: 190 MS
QRS AXIS: -20 DEGREES
QRSD INTERVAL: 94 MS
QT INTERVAL: 416 MS
QTC INTERVAL: 442 MS
RBC # BLD AUTO: 3.77 MILLION/UL (ref 3.81–5.12)
SODIUM SERPL-SCNC: 135 MMOL/L (ref 135–147)
T WAVE AXIS: 35 DEGREES
TRIGL SERPL-MCNC: 97 MG/DL
TSH SERPL DL<=0.05 MIU/L-ACNC: 2.27 UIU/ML (ref 0.45–4.5)
VENTRICULAR RATE: 68 BPM
WBC # BLD AUTO: 9.14 THOUSAND/UL (ref 4.31–10.16)

## 2022-11-14 RX ORDER — HYDROCODONE BITARTRATE AND ACETAMINOPHEN 5; 325 MG/1; MG/1
1 TABLET ORAL EVERY 6 HOURS PRN
Status: DISCONTINUED | OUTPATIENT
Start: 2022-11-14 | End: 2022-11-19

## 2022-11-14 RX ORDER — HEPARIN SODIUM 5000 [USP'U]/ML
5000 INJECTION, SOLUTION INTRAVENOUS; SUBCUTANEOUS EVERY 8 HOURS SCHEDULED
Status: DISCONTINUED | OUTPATIENT
Start: 2022-11-14 | End: 2022-11-15

## 2022-11-14 RX ORDER — SODIUM CHLORIDE 9 MG/ML
75 INJECTION, SOLUTION INTRAVENOUS CONTINUOUS
Status: DISCONTINUED | OUTPATIENT
Start: 2022-11-14 | End: 2022-11-15

## 2022-11-14 RX ORDER — POLYETHYLENE GLYCOL 3350 17 G/17G
17 POWDER, FOR SOLUTION ORAL 2 TIMES DAILY PRN
Status: DISCONTINUED | OUTPATIENT
Start: 2022-11-14 | End: 2022-11-17

## 2022-11-14 RX ORDER — INSULIN LISPRO 100 [IU]/ML
2 INJECTION, SOLUTION INTRAVENOUS; SUBCUTANEOUS ONCE
Status: DISCONTINUED | OUTPATIENT
Start: 2022-11-14 | End: 2022-11-14

## 2022-11-14 RX ORDER — DOCUSATE SODIUM 100 MG/1
100 CAPSULE, LIQUID FILLED ORAL 2 TIMES DAILY PRN
Status: DISCONTINUED | OUTPATIENT
Start: 2022-11-14 | End: 2022-11-20 | Stop reason: HOSPADM

## 2022-11-14 RX ORDER — ONDANSETRON 2 MG/ML
4 INJECTION INTRAMUSCULAR; INTRAVENOUS EVERY 6 HOURS PRN
Status: DISCONTINUED | OUTPATIENT
Start: 2022-11-14 | End: 2022-11-20 | Stop reason: HOSPADM

## 2022-11-14 RX ORDER — HYDROCODONE BITARTRATE AND ACETAMINOPHEN 5; 325 MG/1; MG/1
1 TABLET ORAL ONCE
Status: COMPLETED | OUTPATIENT
Start: 2022-11-14 | End: 2022-11-14

## 2022-11-14 RX ORDER — ACETAMINOPHEN 325 MG/1
650 TABLET ORAL AS NEEDED
Status: DISCONTINUED | OUTPATIENT
Start: 2022-11-14 | End: 2022-11-19

## 2022-11-14 RX ORDER — DEXTROSE MONOHYDRATE 25 G/50ML
25 INJECTION, SOLUTION INTRAVENOUS ONCE
Status: COMPLETED | OUTPATIENT
Start: 2022-11-14 | End: 2022-11-14

## 2022-11-14 RX ORDER — HYDROCODONE BITARTRATE AND ACETAMINOPHEN 5; 325 MG/1; MG/1
1 TABLET ORAL EVERY 6 HOURS PRN
Qty: 15 TABLET | Refills: 0 | Status: SHIPPED | OUTPATIENT
Start: 2022-11-14 | End: 2022-11-16 | Stop reason: SDUPTHER

## 2022-11-14 RX ORDER — CALCITONIN SALMON 200 [IU]/.09ML
1 SPRAY, METERED NASAL DAILY
Status: DISCONTINUED | OUTPATIENT
Start: 2022-11-15 | End: 2022-11-20 | Stop reason: HOSPADM

## 2022-11-14 RX ORDER — CALCIUM GLUCONATE 20 MG/ML
1 INJECTION, SOLUTION INTRAVENOUS ONCE
Status: DISCONTINUED | OUTPATIENT
Start: 2022-11-14 | End: 2022-11-14

## 2022-11-14 RX ORDER — DOCUSATE SODIUM 100 MG/1
100 CAPSULE, LIQUID FILLED ORAL 2 TIMES DAILY PRN
Qty: 20 CAPSULE | Refills: 0 | Status: SHIPPED | OUTPATIENT
Start: 2022-11-14 | End: 2022-11-16 | Stop reason: SDUPTHER

## 2022-11-14 RX ADMIN — SODIUM CHLORIDE 500 ML: 0.9 INJECTION, SOLUTION INTRAVENOUS at 17:01

## 2022-11-14 RX ADMIN — DEXTROSE MONOHYDRATE 25 G: 25 INJECTION, SOLUTION INTRAVENOUS at 17:20

## 2022-11-14 RX ADMIN — HEPARIN SODIUM 5000 UNITS: 5000 INJECTION INTRAVENOUS; SUBCUTANEOUS at 22:26

## 2022-11-14 RX ADMIN — INSULIN HUMAN 8 UNITS: 100 INJECTION, SOLUTION PARENTERAL at 17:21

## 2022-11-14 RX ADMIN — SODIUM CHLORIDE 125 ML/HR: 0.9 INJECTION, SOLUTION INTRAVENOUS at 20:20

## 2022-11-14 RX ADMIN — HYDROCODONE BITARTRATE AND ACETAMINOPHEN 1 TABLET: 5; 325 TABLET ORAL at 13:32

## 2022-11-14 RX ADMIN — HYDROCODONE BITARTRATE AND ACETAMINOPHEN 1 TABLET: 5; 325 TABLET ORAL at 20:40

## 2022-11-14 RX ADMIN — INFLUENZA A VIRUS A/VICTORIA/2570/2019 IVR-215 (H1N1) ANTIGEN (FORMALDEHYDE INACTIVATED), INFLUENZA A VIRUS A/DARWIN/9/2021 SAN-010 (H3N2) ANTIGEN (FORMALDEHYDE INACTIVATED), INFLUENZA B VIRUS B/PHUKET/3073/2013 ANTIGEN (FORMALDEHYDE INACTIVATED), AND INFLUENZA B VIRUS B/MICHIGAN/01/2021 ANTIGEN (FORMALDEHYDE INACTIVATED) 0.7 ML: 60; 60; 60; 60 INJECTION, SUSPENSION INTRAMUSCULAR at 22:26

## 2022-11-14 NOTE — ASSESSMENT & PLAN NOTE
Acute,  · 6 3 after repeat non hemolyzed  · Calcium gluconate 1 g, in the ED- insulin 8 units, dextrose in IV, consider albuterol  · Trend potassium q 4  · EKG, tele  · CK-within normal limits, trend in a m   Rule out rhabdo due to trauma

## 2022-11-14 NOTE — ED PROVIDER NOTES
History  Chief Complaint   Patient presents with   • Fall     Slipped in bathroom, injured rt ankle  No LOC, neg for head neck and back pain,- thinners  Patient presents for evaluation of right ankle injury after a fall  Patient was in the bathroom when she reached down to  something off the floor  She lost her balance and fell injuring her right ankle  Denies any head injury or LOC  Denies any blood thinners  Denies any other injury  History provided by:  Patient   used: No        Prior to Admission Medications   Prescriptions Last Dose Informant Patient Reported? Taking?    BUDESONIDE PO  Self Yes No   Sig: Take 3 mg by mouth   Calcium 500 MG tablet  Self Yes No   Sig: Take 500 mg by mouth   Cholecalciferol (Vitamin D) 50 MCG (2000 UT) tablet  Self Yes No   Sig: Take 1,000 Units by mouth   Patient not taking: Reported on 7/1/2022   aspirin (ECOTRIN LOW STRENGTH) 81 mg EC tablet  Self No No   Sig: Take 1 tablet (81 mg total) by mouth daily with breakfast   b complex vitamins capsule  Self No No   Sig: Take 1 capsule by mouth daily   clopidogrel (PLAVIX) 75 mg tablet   No No   Sig: TAKE 1 TABLET EVERY DAY   cycloSPORINE modified (NEORAL) 25 mg capsule  Self Yes No   Sig: Take 50 mg by mouth 2 (two) times a day   ezetimibe (ZETIA) 10 mg tablet   No No   Sig: Take 1 tablet by mouth once daily   levothyroxine 112 mcg tablet   No No   Sig: TAKE 1 TABLET EVERY DAY AS DIRECTED   metoprolol tartrate (LOPRESSOR) 25 mg tablet   No No   Sig: Take 1 5 tablets (37 5 mg total) by mouth 2 (two) times a day   mycophenolate (CELLCEPT) 250 mg capsule  Self No No   Sig: Take 3 capsules (750 mg total) by mouth 2 (two) times a day   Patient taking differently: Take 750 mg by mouth 2 tabs twice daily   omeprazole (PriLOSEC) 40 MG capsule  Self Yes No   Sig: Take 40 mg by mouth daily   pravastatin (PRAVACHOL) 20 mg tablet   No No   Sig: TAKE 1 TABLET BY MOUTH ONCE DAILY AT BEDTIME Facility-Administered Medications: None       Past Medical History:   Diagnosis Date   • Anemia    • Anxiety    • Arthritis    • Benign paroxysmal vertigo, unspecified ear     onset: 2010   • Cirrhosis ([place] [address] )     onset: 2005   • Crohn disease ([place] [address] )    • Disease of thyroid gland    • GERD (gastroesophageal reflux disease)    • History of transfusion    • Liver transplant candidate     onset: 2008   • Osteoporosis    • Pelvic fracture ([place] [address] )     onset: 10/14/2008       Past Surgical History:   Procedure Laterality Date   • ABDOMINAL SURGERY     • APPENDECTOMY     • BLADDER AUGMENTATION N/A    • CATARACT EXTRACTION     • CHOLECYSTECTOMY N/A    • COLONOSCOPY N/A 2017    Procedure: COLONOSCOPY;  Surgeon: Albert Smith MD;  Location: [facility]; Service:    • ESOPHAGOGASTRODUODENOSCOPY N/A 3/31/2016    Procedure: ESOPHAGOGASTRODUODENOSCOPY (EGD); Surgeon: Albert Smith MD;  Location: [facility]; Service:    • ESOPHAGOGASTRODUODENOSCOPY N/A 2017    Procedure: ESOPHAGOGASTRODUODENOSCOPY (EGD); Surgeon: Albert Smith MD;  Location: [facility]; Service:    • EYE SURGERY      cataracts removed both eyes lens implant   • HERNIA REPAIR     • JOINT REPLACEMENT      left knee replacement   • LIVER TRANSPLANTATION N/A    • TONSILECTOMY AND ADNOIDECTOMY N/A    • TUBAL LIGATION         Family History   Problem Relation Age of Onset   • Cancer Mother         breast   • Cancer Father    • Crohn's disease Brother    • Arthritis Family    • Breast cancer Family      I have reviewed and agree with the history as documented      E-Cigarette/Vaping   • E-Cigarette Use Never User      E-Cigarette/Vaping Substances   • Nicotine No    • THC No    • CBD No    • Flavoring No    • Other No    • Unknown No      Social History     Tobacco Use   • Smoking status: Former Smoker     Years: 5 00     Types: Cigarettes     Quit date: 1975     Years since quittin 8   • Smokeless tobacco: Never Used   • Tobacco comment: smoked for 5 years-1/2 to 1 ppd   Vaping Use   • Vaping Use: Never used   Substance Use Topics   • Alcohol use: Never   • Drug use: Never       Review of Systems   Constitutional: Negative for chills and fever  HENT: Negative for ear pain and sore throat  Eyes: Negative for pain and visual disturbance  Respiratory: Negative for cough and shortness of breath  Cardiovascular: Negative for chest pain and palpitations  Gastrointestinal: Negative for abdominal pain and vomiting  Genitourinary: Negative for dysuria and hematuria  Musculoskeletal: Negative for arthralgias, back pain and neck pain  Skin: Negative for color change and rash  Neurological: Negative for seizures, syncope, weakness, numbness and headaches  All other systems reviewed and are negative  Physical Exam  Physical Exam  Vitals and nursing note reviewed  Constitutional:       General: She is not in acute distress  HENT:      Head: Atraumatic  Right Ear: External ear normal       Left Ear: External ear normal       Nose: Nose normal       Mouth/Throat:      Mouth: Mucous membranes are moist       Pharynx: Oropharynx is clear  Eyes:      General: No scleral icterus  Extraocular Movements: Extraocular movements intact  Conjunctiva/sclera: Conjunctivae normal       Pupils: Pupils are equal, round, and reactive to light  Cardiovascular:      Rate and Rhythm: Normal rate and regular rhythm  Pulses: Normal pulses  Pulmonary:      Effort: Pulmonary effort is normal  No respiratory distress  Breath sounds: Normal breath sounds  Abdominal:      General: Abdomen is flat  Bowel sounds are normal  There is no distension  Palpations: Abdomen is soft  Tenderness: There is no abdominal tenderness  There is no guarding or rebound  Musculoskeletal:         General: Swelling and tenderness present  No deformity  Cervical back: Normal range of motion  No tenderness  Legs:         Feet:    Skin:     Capillary Refill: Capillary refill takes less than 2 seconds  Findings: Bruising present  No rash  Neurological:      General: No focal deficit present  Mental Status: She is alert and oriented to person, place, and time  Cranial Nerves: No cranial nerve deficit  Sensory: No sensory deficit  Motor: No weakness           Vital Signs  ED Triage Vitals [11/14/22 1324]   Temperature Pulse Respirations Blood Pressure SpO2   97 8 °F (36 6 °C) 71 20 151/73 97 %      Temp Source Heart Rate Source Patient Position - Orthostatic VS BP Location FiO2 (%)   Oral Monitor Lying Right arm --      Pain Score       6           Vitals:    11/14/22 1845 11/14/22 1900 11/14/22 1915 11/14/22 2006   BP: (!) 169/122  161/71 168/61   Pulse: 74 76 76 79   Patient Position - Orthostatic VS:             Visual Acuity      ED Medications  Medications   sodium chloride 0 9 % infusion (125 mL/hr Intravenous New Bag 11/14/22 2020)   acetaminophen (TYLENOL) tablet 650 mg (has no administration in time range)   docusate sodium (COLACE) capsule 100 mg (has no administration in time range)   polyethylene glycol (MIRALAX) packet 17 g (has no administration in time range)   ondansetron (ZOFRAN) injection 4 mg (has no administration in time range)   heparin (porcine) subcutaneous injection 5,000 Units (has no administration in time range)   HYDROcodone-acetaminophen (NORCO) 5-325 mg per tablet 1 tablet (has no administration in time range)   morphine injection 2 mg (has no administration in time range)   calcitonin (salmon) (MIACALCIN) 200 units/act nasal spray 1 spray (has no administration in time range)   HYDROcodone-acetaminophen (NORCO) 5-325 mg per tablet 1 tablet (1 tablet Oral Given 11/14/22 1332)   sodium chloride 0 9 % bolus 500 mL (500 mL Intravenous New Bag 11/14/22 1701)   insulin regular (HumuLIN R,NovoLIN R) injection 8 Units (8 Units Subcutaneous Given 11/14/22 1721)   dextrose 50 % IV solution 25 g (25 g Intravenous Given 11/14/22 1720)       Diagnostic Studies  Results Reviewed     Procedure Component Value Units Date/Time    Potassium [[id number]]  (Abnormal) Collected: 11/14/22 1758    Lab Status: Final result Specimen: Blood from Arm, Right Updated: 11/14/22 1817     Potassium 5 7 mmol/L     Platelet count [[id number]]  (Normal) Collected: 11/14/22 1758    Lab Status: Final result Specimen: Blood from Arm, Right Updated: 11/14/22 1807     Platelets 519 Thousands/uL      MPV 9 5 fL     Lipid Panel with Direct LDL reflex [[id number]]  (Normal) Collected: 11/14/22 1511    Lab Status: Final result Specimen: Blood from Arm, Right Updated: 11/14/22 1805     Cholesterol 118 mg/dL      Triglycerides 97 mg/dL      HDL, Direct 56 mg/dL      LDL Calculated 43 mg/dL     TSH, 3rd generation [[id number]]  (Normal) Collected: 11/14/22 1601    Lab Status: Final result Specimen: Blood from Arm, Right Updated: 11/14/22 1757     TSH 3RD GENERATON 2 271 uIU/mL     Narrative:      Patients undergoing fluorescein dye angiography may retain small amounts of fluorescein in the body for 48-72 hours post procedure  Samples containing fluorescein can produce falsely depressed TSH values  If the patient had this procedure,a specimen should be resubmitted post fluorescein clearance        UA w Reflex to Microscopic w Reflex to Culture [[id number]]     Lab Status: No result Specimen: Urine     UA (URINE) with reflex to Scope [[id number]]     Lab Status: No result Specimen: Urine     Potassium [[id number]]  (Abnormal) Collected: 11/14/22 1601    Lab Status: Final result Specimen: Blood from Arm, Right Updated: 11/14/22 1649     Potassium 6 3 mmol/L     CK Total with Reflex CKMB [[id number]]  (Normal) Collected: 11/14/22 1511    Lab Status: Final result Specimen: Blood from Arm, Right Updated: 11/14/22 1559     Total CK 36 U/L     Basic metabolic panel [[id number]]  (Abnormal) Collected: 11/14/22 1511    Lab Status: Final result Specimen: Blood from Arm, Right Updated: 11/14/22 1529     Sodium 135 mmol/L      Potassium 6 2 mmol/L      Chloride 106 mmol/L      CO2 22 mmol/L      ANION GAP 7 mmol/L      BUN 33 mg/dL      Creatinine 1 60 mg/dL      Glucose 118 mg/dL      Calcium 8 4 mg/dL      eGFR 30 ml/min/1 73sq m     Narrative:      [place] guidelines for Chronic Kidney Disease (CKD):   •  Stage 1 with normal or high GFR (GFR > 90 mL/min/1 73 square meters)  •  Stage 2 Mild CKD (GFR = 60-89 mL/min/1 73 square meters)  •  Stage 3A Moderate CKD (GFR = 45-59 mL/min/1 73 square meters)  •  Stage 3B Moderate CKD (GFR = 30-44 mL/min/1 73 square meters)  •  Stage 4 Severe CKD (GFR = 15-29 mL/min/1 73 square meters)  •  Stage 5 End Stage CKD (GFR <15 mL/min/1 73 square meters)  Note: GFR calculation is accurate only with a steady state creatinine    CBC and differential [[id number]]  (Abnormal) Collected: 11/14/22 1511    Lab Status: Final result Specimen: Blood from Arm, Right Updated: 11/14/22 1516     WBC 9 14 Thousand/uL      RBC 3 77 Million/uL      Hemoglobin 10 9 g/dL      Hematocrit 35 4 %      MCV 94 fL      MCH 28 9 pg      MCHC 30 8 g/dL      RDW 13 9 %      MPV 9 4 fL      Platelets 143 Thousands/uL      nRBC 0 /100 WBCs      Neutrophils Relative 82 %      Immat GRANS % 1 %      Lymphocytes Relative 9 %      Monocytes Relative 6 %      Eosinophils Relative 2 %      Basophils Relative 0 %      Neutrophils Absolute 7 48 Thousands/µL      Immature Grans Absolute 0 08 Thousand/uL      Lymphocytes Absolute 0 82 Thousands/µL      Monocytes Absolute 0 55 Thousand/µL      Eosinophils Absolute 0 18 Thousand/µL      Basophils Absolute 0 03 Thousands/µL                  XR knee 4+ views Right injury   Final Result by Lala Benoit MD (11/14 [id number])      No acute osseous abnormality              Workstation performed: [id number]         XR ankle 3+ views RIGHT   Final Result by Lala Benoit MD (92/10 [id number]) Displaced fracture of the distal fibula with disruption of the ankle mortise  The study was marked in [facility] for immediate notification  Workstation performed: [id number]                    Procedures  Orthopedic injury treatment    Date/Time: 11/14/2022 8:21 PM  Performed by: Prince Walker DO  Authorized by: Prince Walker DO     Patient Location:  ED  [facility] Protocol:  Consent: Verbal consent obtained  Consent given by: patient  Patient identity confirmed: verbally with patient and arm band      Injury location:  Ankle  Location details:  Right ankle  Injury type:  Fracture  Fracture type: lateral malleolus    Fracture type: lateral malleolus    Neurovascular status: Neurovascularly intact    Distal perfusion: normal    Neurological function: normal    Range of motion: reduced    Manipulation performed?: No    Immobilization:  Splint  Splint type:  Short leg  Supplies used:  Ortho-Glass  Neurovascular status: Neurovascularly intact    Distal perfusion: normal    Neurological function: normal    Range of motion: unchanged    Patient tolerance:  Patient tolerated the procedure well with no immediate complications             ED Course                               SBIRT 22yo+    Flowsheet Row Most Recent Value   SBIRT (23 yo +)    In order to provide better care to our patients, we are screening all of our patients for alcohol and drug use  Would it be okay to ask you these screening questions? Yes Filed at: 11/14/2022 1511   Initial Alcohol Screen: US AUDIT-C     1  How often do you have a drink containing alcohol? 0 Filed at: 11/14/2022 1511   2  How many drinks containing alcohol do you have on a typical day you are drinking? 0 Filed at: 11/14/2022 1511   3a  Male UNDER 65: How often do you have five or more drinks on one occasion? 0 Filed at: 11/14/2022 1511   3b  FEMALE Any Age, or MALE 65+: How often do you have 4 or more drinks on one occassion?  0 Filed at: 11/14/2022 1511   Audit-C Score 0 Filed at: 11/14/2022 1511   NOA: How many times in the past year have you    Used an illegal drug or used a prescription medication for non-medical reasons? Never Filed at: 11/14/2022 1511                    MDM  Number of Diagnoses or Management Options  REZA (acute kidney injury) ([facility] )  Closed fracture of right ankle, initial encounter  Fall, initial encounter  Hyperkalemia  Diagnosis management comments: Pulse ox 96% on room air indicating adequate oxygenation  Xray R ankle: Fibula fx as read by me    Patient failed PT OT evaluation in the ER recommend short-term rehab  Contacted case management found a bed at [facility]  Did some medical clearance labs for the patient and found to be hyperkalemic with a REZA  Patient be admitted overnight for the hyperkalemia  Amount and/or Complexity of Data Reviewed  Clinical lab tests: ordered and reviewed  Tests in the radiology section of CPT®: ordered and reviewed  Decide to obtain previous medical records or to obtain history from someone other than the patient: yes  Review and summarize past medical records: yes  Discuss the patient with other providers: yes  Independent visualization of images, tracings, or specimens: yes    Patient Progress  Patient progress: stable      Disposition  Final diagnoses:   Fall, initial encounter   Closed fracture of right ankle, initial encounter   Hyperkalemia   REZA (acute kidney injury) ([facility] )     Time reflects when diagnosis was documented in both MDM as applicable and the Disposition within this note     Time User Action Codes Description Comment    11/14/2022  2:13 PM Wayne Fonda Add [Z50  ZNDV] Fall, initial encounter     11/14/2022  2:14 PM Wayne Fonda Add [Z28 208S] Closed fracture of right ankle, initial encounter     11/14/2022  4:56 PM Wayne Fonda Add [E87 5] Hyperkalemia     11/14/2022  4:56 PM [facility] Add [N17 9] REZA (acute kidney injury) ([facility] 75 )     11/14/2022  5:57 PM Nurys Diallo Add [S82 201A, S82 401A] Closed fracture of right tibia and fibula       ED Disposition     ED Disposition   Admit    Condition   Stable    Date/Time   Mon Nov 14, 2022  4:56 PM    Comment              Follow-up Information     Follow up With Specialties Details Why [facility] , DO Orthopedic Surgery In 3 days  [address]   [address]  [phone]            Current Discharge Medication List      START taking these medications    Details   docusate sodium (COLACE) 100 mg capsule Take 1 capsule (100 mg total) by mouth 2 (two) times a day as needed for constipation for up to 10 days  Qty: 20 capsule, Refills: 0    Associated Diagnoses: Closed fracture of right ankle, initial encounter      HYDROcodone-acetaminophen (Norco) 5-325 mg per tablet Take 1 tablet by mouth every 6 (six) hours as needed for pain for up to 15 days Max Daily Amount: 4 tablets  Qty: 15 tablet, Refills: 0    Associated Diagnoses: Closed fracture of right ankle, initial encounter         CONTINUE these medications which have NOT CHANGED    Details   aspirin (ECOTRIN LOW STRENGTH) 81 mg EC tablet Take 1 tablet (81 mg total) by mouth daily with breakfast  Qty: 90 tablet, Refills: 3    Associated Diagnoses: Presence of cardiac device; PSVT (paroxysmal supraventricular tachycardia) ([facility] 75 ); Type 2 myocardial infarction without ST elevation ([facility] ); Personal history of TIA (transient ischemic attack); Permanent atrial fibrillation ([facility] 75 );  Liver transplanted ([facility] )      b complex vitamins capsule Take 1 capsule by mouth daily  Qty: 30 capsule, Refills: 3    Associated Diagnoses: Other fatigue      BUDESONIDE PO Take 3 mg by mouth      Calcium 500 MG tablet Take 500 mg by mouth      Cholecalciferol (Vitamin D) 50 MCG (2000 UT) tablet Take 1,000 Units by mouth      clopidogrel (PLAVIX) 75 mg tablet TAKE 1 TABLET EVERY DAY  Qty: 90 tablet, Refills: 3    Associated Diagnoses: Personal history of TIA (transient ischemic attack); Cerebrovascular accident (CVA) due to embolism of cerebral artery (HCC)      cycloSPORINE modified (NEORAL) 25 mg capsule Take 50 mg by mouth 2 (two) times a day      ezetimibe (ZETIA) 10 mg tablet Take 1 tablet by mouth once daily  Qty: 90 tablet, Refills: 3    Associated Diagnoses: Type 2 myocardial infarction without ST elevation (HCC); PSVT (paroxysmal supraventricular tachycardia) ([facility] ); Liver transplanted ([facility] )      levothyroxine 112 mcg tablet TAKE 1 TABLET EVERY DAY AS DIRECTED  Qty: 90 tablet, Refills: 3    Associated Diagnoses: Hypothyroidism, unspecified type      metoprolol tartrate (LOPRESSOR) 25 mg tablet Take 1 5 tablets (37 5 mg total) by mouth 2 (two) times a day  Qty: 270 tablet, Refills: 3    Associated Diagnoses: PSVT (paroxysmal supraventricular tachycardia) (HCC)      mycophenolate (CELLCEPT) 250 mg capsule Take 3 capsules (750 mg total) by mouth 2 (two) times a day  Refills: 0    Associated Diagnoses: Liver transplanted ([email or web address] )      omeprazole (PriLOSEC) 40 MG capsule Take 40 mg by mouth daily      pravastatin (PRAVACHOL) 20 mg tablet TAKE 1 TABLET BY MOUTH ONCE DAILY AT BEDTIME  Qty: 90 tablet, Refills: 3    Associated Diagnoses: History of stroke             No discharge procedures on file      PDMP Review     None          ED Provider  Electronically Signed by           Ton Cline DO  11/14/22 [address] [place] [address],   11/14/22 2022

## 2022-11-14 NOTE — ASSESSMENT & PLAN NOTE
· CT-abdomen/pelvis-no acute intra-abdominal or pelvic  · Knee x-ray-unremarkable  · Foot x-ray-Displaced fracture of the distal fibula with disruption of the ankle mortise  · PT/OT-eval and treat  · Pain meds p r n   Breakthrough

## 2022-11-14 NOTE — ASSESSMENT & PLAN NOTE
· X-ray-Displaced fracture of the distal fibula with disruption of the ankle mortise    On right  · Knee x-ray-unremarkable  · Ortho consulted  · Pain management

## 2022-11-14 NOTE — ASSESSMENT & PLAN NOTE
Noted on Mar  · Held home Plavix and aspirin due to current fracture, heparin injection  · Ortho consulted

## 2022-11-14 NOTE — H&P
[address] 1945, 64 y o  female MRN: [id number]  Unit/Bed#: ED 14 Encounter: [id number]  Primary Care Provider: Jabier Damian MD   Date and time admitted to hospital: 11/14/2022  1:01 PM    Hyperkalemia  Assessment & Plan  Acute,  · 6 3 after repeat non hemolyzed  · Calcium gluconate 1 g, in the ED- insulin 8 units, dextrose in IV, consider albuterol  · Trend potassium q 4  · EKG, tele  · CK-within normal limits, trend in a m  Rule out rhabdo due to trauma    * Closed fracture of right tibia and fibula  Assessment & Plan  · X-ray-Displaced fracture of the distal fibula with disruption of the ankle mortise  On right  · Knee x-ray-unremarkable  · Ortho consulted  · Pain management    GERD (gastroesophageal reflux disease)  Assessment & Plan  Chronic,  · Continue home Prilosec 40 mg    Fall  Assessment & Plan  · CT-abdomen/pelvis-no acute intra-abdominal or pelvic  · Knee x-ray-unremarkable  · Foot x-ray-Displaced fracture of the distal fibula with disruption of the ankle mortise  · PT/OT-eval and treat  · Pain meds p r n   Breakthrough    Osteopenia of multiple sites  Assessment & Plan  Noted on medical record,  · Pending vitamin-D level  · Restarted home vitamin-D and calcium  · Will add calcitonin due to current fractures  · Outpatient DEXA scan recommended if within criteria    Dementia [facility])  Assessment & Plan  Chronic,  · Neuro checks, reoirentation  · No home meds on MAR    Chronic kidney insufficiency  Assessment & Plan  Lab Results   Component Value Date    EGFR 30 11/14/2022    EGFR 54 08/10/2022    EGFR 32 05/13/2022    CREATININE 1 60 (H) 11/14/2022    CREATININE 0 99 08/10/2022    CREATININE 1 51 (H) 05/13/2022     Elevated creatinine  · Likely multifactorial, gentle hydration, avoid nephrotoxics,  · monitor potassium and renal function trending down    Benign essential hypertension  Assessment & Plan  Chronic,  · Continue home Lopressor 25 mg b i d  · Reassess dosage due to areas a Mar    Cerebrovascular accident (CVA) due to embolism [facility])  Assessment & Plan  Noted on Mar  · Held home Plavix and aspirin due to current fracture, heparin injection  · Ortho consulted    HLD (hyperlipidemia)  Assessment & Plan  Chronic    · Lipid panel pending  · Continue home pravastatin 20 mg    Long-term use of immunosuppressant medication  Assessment & Plan  Reordered home CellCept  Monitor labs    Hypothyroidism  Assessment & Plan  Chronic,  · Continue home levothyroxine 112 mcg    VTE Pharmacologic Prophylaxis: VTE Score: 10 High Risk (Score >/= 5) - Pharmacological DVT Prophylaxis Contraindicated  Sequential Compression Devices Ordered  Tib fib fracture surgical evaluation  Code Status: Level 1 - Full Code   Discussion with family: Patient declined call to   Anticipated Length of Stay: Patient will be admitted on an inpatient basis with an anticipated length of stay of greater than 2 midnights secondary to Clinical course the care  Total Time for Visit, including Counseling / Coordination of Care: 60 minutes Greater than 50% of this total time spent on direct patient counseling and coordination of care  Chief Complaint: fracture leg     History of Present Illness: Ana Paula Hoover is a 64 y o  female with a PMH of GERD, hypertension, liver transplant recipient 2015, hyperlipidemia, hypothyroidism, CVA, who presents with fall and right lower extremity fracture  Patient was going to use the bathroom at home and while picking up item from floor slipped off toilet seat and wound up against sink for a few minutes  Patient called person to call EMS which arrived immediately  Patient denied any LOC, no bleeding, no chest pain  Patient stated that she takes ASA and Plavix when she needs it, uncertain of last time taking   Pt denied confusion or dementia "I dont know", stated that she has memory lost or forgetfulness "at times" Pt lives with caretaker Pino Rosas whole give her her medications  Pt stated that she does not want resuscitative efforts in case of emergency "I dont want to be started up again", pt stated paperwork at home  Pt has daughter on proxy but would want Arianna Peck to be MDM  I was recently discharged from NH and had PT for 3 weeks at [facility], not sure why she was there  Patietn reported no current pain  AOx3  Ate 2 pieces of toast this am, last BM yesterdaY  Review of Systems:  Review of Systems   Constitutional: Negative for chills and fever  HENT: Negative for ear pain and sore throat  Eyes: Negative for pain and visual disturbance  Respiratory: Negative for cough and shortness of breath  Cardiovascular: Negative for chest pain and palpitations  Gastrointestinal: Negative for abdominal pain and vomiting  Genitourinary: Negative for dysuria, hematuria and pelvic pain  Musculoskeletal: Positive for gait problem, joint swelling and myalgias  Negative for arthralgias and back pain  Skin: Negative for color change and rash  Neurological: Negative for dizziness, seizures, syncope and light-headedness  Psychiatric/Behavioral: Negative for agitation  All other systems reviewed and are negative        Past Medical and Surgical History:   Past Medical History:   Diagnosis Date   • Anemia    • Anxiety    • Arthritis    • Benign paroxysmal vertigo, unspecified ear     onset: 05/06/2010   • Cirrhosis ([facility] [address] )     onset: 03/30/2005   • Crohn disease ([facility] [address] )    • Disease of thyroid gland    • GERD (gastroesophageal reflux disease)    • History of transfusion    • Liver transplant candidate     onset: 09/16/2008   • Osteoporosis    • Pelvic fracture ([facility] [address] )     onset: 10/14/2008       Past Surgical History:   Procedure Laterality Date   • ABDOMINAL SURGERY     • APPENDECTOMY     • BLADDER AUGMENTATION N/A    • CATARACT EXTRACTION     • CHOLECYSTECTOMY N/A    • COLONOSCOPY N/A 4/20/2017    Procedure: COLONOSCOPY;  Surgeon: Lillie Vega MD;  Location: [place] [address] GI LAB; Service:    • ESOPHAGOGASTRODUODENOSCOPY N/A 3/31/2016    Procedure: ESOPHAGOGASTRODUODENOSCOPY (EGD); Surgeon: Felix Villatoro MD;  Location: [place] [address] GI LAB; Service:    • ESOPHAGOGASTRODUODENOSCOPY N/A 4/20/2017    Procedure: ESOPHAGOGASTRODUODENOSCOPY (EGD); Surgeon: Felix Villatoro MD;  Location: [place] [address] GI LAB; Service:    • EYE SURGERY      cataracts removed both eyes lens implant   • HERNIA REPAIR     • JOINT REPLACEMENT      left knee replacement   • LIVER TRANSPLANTATION N/A    • TONSILECTOMY AND ADNOIDECTOMY N/A    • TUBAL LIGATION         Meds/Allergies:  Prior to Admission medications    Medication Sig Start Date End Date Taking?  Authorizing Provider   docusate sodium (COLACE) 100 mg capsule Take 1 capsule (100 mg total) by mouth 2 (two) times a day as needed for constipation for up to 10 days 11/14/22 11/24/22 Yes Jamal Reynolds DO   HYDROcodone-acetaminophen (Norco) 5-325 mg per tablet Take 1 tablet by mouth every 6 (six) hours as needed for pain for up to 15 days Max Daily Amount: 4 tablets 11/14/22 11/29/22 Yes Jamal Reynolds DO   aspirin (ECOTRIN LOW STRENGTH) 81 mg EC tablet Take 1 tablet (81 mg total) by mouth daily with breakfast 4/22/21   Kavya Huntley MD   b complex vitamins capsule Take 1 capsule by mouth daily 7/1/20   Mike Hager MD   BUDESONIDE PO Take 3 mg by mouth    Historical Provider, MD   Calcium 500 MG tablet Take 500 mg by mouth 11/6/20   Historical Provider, MD   Cholecalciferol (Vitamin D) 50 MCG (2000 UT) tablet Take 1,000 Units by mouth  Patient not taking: Reported on 7/1/2022 11/6/20   Historical Provider, MD   clopidogrel (PLAVIX) 75 mg tablet TAKE 1 TABLET EVERY DAY 10/13/22   Mike Hager MD   cycloSPORINE modified (NEORAL) 25 mg capsule Take 50 mg by mouth 2 (two) times a day    Historical Provider, MD   ezetimibe (ZETIA) 10 mg tablet Take 1 tablet by mouth once daily 6/3/22   Kavya Huntley MD   levothyroxine 112 mcg tablet TAKE 1 TABLET EVERY DAY AS DIRECTED 3/16/22   Kristen Thao MD   metoprolol tartrate (LOPRESSOR) 25 mg tablet Take 1 5 tablets (37 5 mg total) by mouth 2 (two) times a day 10/28/21   Mary Ann Golden MD   mycophenolate (CELLCEPT) 250 mg capsule Take 3 capsules (750 mg total) by mouth 2 (two) times a day  Patient taking differently: Take 750 mg by mouth 2 tabs twice daily 21   Keerthi Quach DO   omeprazole (PriLOSEC) 40 MG capsule Take 40 mg by mouth daily    Historical Provider, MD   pravastatin (PRAVACHOL) 20 mg tablet TAKE 1 TABLET BY MOUTH ONCE DAILY AT BEDTIME 21   Pat Mack MD     I have reviewed home medications using recent Epic encounter  Allergies: Allergies   Allergen Reactions   • Tetracyclines & Related Hives   • Vancomycin Other (See Comments) and Itching     Reaction Date: ; Pt unsure of reaction   • Prograf [Tacrolimus] Anxiety     Reaction Date: 2008;         Social History:  Marital Status:    Occupation:   Patient Pre-hospital Living Situation: Home  Patient Pre-hospital Level of Mobility: walks with walker  Patient Pre-hospital Diet Restrictions:   Substance Use History:   Social History     Substance and Sexual Activity   Alcohol Use Never     Social History     Tobacco Use   Smoking Status Former Smoker   • Years: 5 00   • Types: Cigarettes   • Quit date: 1975   • Years since quittin 8   Smokeless Tobacco Never Used   Tobacco Comment    smoked for 5 years-1/2 to 1 ppd     Social History     Substance and Sexual Activity   Drug Use Never       Family History:  Family History   Problem Relation Age of Onset   • Cancer Mother         breast   • Cancer Father    • Crohn's disease Brother    • Arthritis Family    • Breast cancer Family        Physical Exam:     Vitals:   Blood Pressure: 151/73 (22 1330)  Pulse: 72 (22 1600)  Temperature: 97 8 °F (36 6 °C) (22 1324)  Temp Source: Oral (22 1324)  Respirations: 20 (22 1324)  SpO2: 97 % (11/14/22 1600)    Physical Exam  Vitals and nursing note reviewed  Constitutional:       General: She is not in acute distress  Appearance: She is well-developed  She is obese  She is not ill-appearing  HENT:      Head: Normocephalic and atraumatic  Eyes:      Conjunctiva/sclera: Conjunctivae normal    Cardiovascular:      Rate and Rhythm: Normal rate and regular rhythm  Heart sounds: No murmur heard  Pulmonary:      Effort: Pulmonary effort is normal  No respiratory distress  Breath sounds: Normal breath sounds  No stridor  No wheezing, rhonchi or rales  Abdominal:      General: There is no distension  Palpations: Abdomen is soft  Tenderness: There is no abdominal tenderness  There is no guarding  Musculoskeletal:         General: Swelling, tenderness, deformity (Right right lower extremity) and signs of injury present  Cervical back: Neck supple  Comments: RLE- bandage c/d/i   Skin:     General: Skin is warm and dry  Coloration: Skin is not pale  Neurological:      Mental Status: She is alert and oriented to person, place, and time  Sensory: No sensory deficit  Psychiatric:         Mood and Affect: Mood normal          Thought Content:  Thought content normal          Additional Data:     Lab Results:  Results from last 7 days   Lab Units 11/14/22  1758 11/14/22  1511   WBC Thousand/uL  --  9 14   HEMOGLOBIN g/dL  --  10 9*   HEMATOCRIT %  --  35 4   PLATELETS Thousands/uL 203 216   NEUTROS PCT %  --  82*   LYMPHS PCT %  --  9*   MONOS PCT %  --  6   EOS PCT %  --  2     Results from last 7 days   Lab Units 11/14/22  1758 11/14/22  1601 11/14/22  1511   SODIUM mmol/L  --   --  135   POTASSIUM mmol/L 5 7*   < > 6 2*   CHLORIDE mmol/L  --   --  106   CO2 mmol/L  --   --  22   BUN mg/dL  --   --  33*   CREATININE mg/dL  --   --  1 60*   ANION GAP mmol/L  --   --  7   CALCIUM mg/dL  --   --  8 4   GLUCOSE RANDOM mg/dL  --   --  118    < > = values in this interval not displayed  Imaging: Reviewed radiology reports from this admission including: abdominal/pelvic CT and xray(s)  XR knee 4+ views Right injury   Final Result by Tania Ceballos MD (11/14 1653)      No acute osseous abnormality  Workstation performed: [id number]         XR ankle 3+ views RIGHT   Final Result by Tania Ceballos MD (11/14 1652)      Displaced fracture of the distal fibula with disruption of the ankle mortise  The study was marked in [facility] for immediate notification  Workstation performed: [id number]             EKG and Other Studies Reviewed on Admission:   · EKG: pending read  ** Please Note: This note has been constructed using a voice recognition system   **

## 2022-11-14 NOTE — PLAN OF CARE
Problem: PHYSICAL THERAPY ADULT  Goal: Performs mobility at highest level of function for planned discharge setting  See evaluation for individualized goals  Description: Treatment/Interventions: LE strengthening/ROM, Functional transfer training, Therapeutic exercise, Gait training, Bed mobility, Equipment eval/education, Spoke to MD  Equipment Recommended:  (pt has a walker and a cane)       See flowsheet documentation for full assessment, interventions and recommendations  Note: Prognosis: Good  Problem List: Decreased strength, Impaired balance, Decreased mobility, Pain, Orthopedic restrictions  Assessment: Patient is an 64y o  year old female seen for Physical Therapy evaluation  Patient admitted with R fibular fx  Comorbidities affecting patient's physical performance include: falls, ataxic gait, TIA, joint pain, dementia  Personal factors affecting patient at time of initial evaluation include: inaccessible home environment, lives in 2 story house, ambulating with assistive device, stairs to enter home, inability to ambulate household distances, positive fall history and inability to perform ADLS  Prior to admission, patient was independent with functional mobility with cane or walker  Please find objective findings from Physical Therapy assessment regarding body systems outlined above with impairments and limitations including weakness, impaired balance, pain, decreased activity tolerance, decreased functional mobility tolerance, fall risk and orthopedic restrictions  The Barthel Index was used as a functional outcome tool presenting with a score of Barthel Index Score: 45 today indicating marked limitations of functional mobility and ADLS  Patient's clinical presentation is currently unstable/unpredictable as seen in patient's presentation of changing level of pain, increased fall risk, new onset of impairment of functional mobility and new onset of weakness   Pt would benefit from continued Physical Therapy treatment to address deficits as defined above and maximize level of functional mobility  As demonstrated by objective findings, the assigned level of complexity for this evaluation is high  The patient's AM-PAC Basic Mobility Inpatient Short Form Raw Score is 10  A Raw score of less than or equal to 16 suggests the patient may benefit from discharge to post-acute rehabilitation services  PT Discharge Recommendation: Post acute rehabilitation services    See flowsheet documentation for full assessment

## 2022-11-14 NOTE — ASSESSMENT & PLAN NOTE
Noted on medical record,  · Pending vitamin-D level  · Restarted home vitamin-D and calcium  · Will add calcitonin due to current fractures  · Outpatient DEXA scan recommended if within criteria

## 2022-11-14 NOTE — PHYSICAL THERAPY NOTE
PHYSICAL THERAPY EVALUATION/TREATMENT     11/14/22 8189   Note Type   Note type Evaluation   Pain Assessment   Pain Assessment Tool 0-10   Pain Score No Pain   Pain Location/Orientation   (R ankle, pt had pain meds prior to PT)   Restrictions/Precautions   Weight Bearing Precautions Per Order Yes   RLE Weight Bearing Per Order NWB   Braces or Orthoses   (posterior short leg splint)   Other Precautions Fall Risk;Pain   Home Living   Type of [address] [place] [address] Two level;1/2 bath on main level;Bed/bath upstairs  (3 SPEEDY)   Bathroom Shower/Tub Walk-in shower   Bathroom Equipment Grab bars in shower   Home Equipment Walker;Cane  (rollator)   Prior Function   Level of [place] Independent with ADLs/bathes with supervision in a shower ; Independent with functional mobility  (uses a cane or walker)   Lives With Significant other   Falls in the last 6 months 1 to 4   General   Additional Pertinent History Pt admitted s/p fall after bending over in the bathroom  Pt now with R fibular fx in a posterior splint with NWB status  Cognition   Overall Cognitive Status WFL   Subjective   Subjective "I just bent over, I don't know how I fell"   RUE Assessment   RUE Assessment   (ROM WFL, MMT 4-/5)   LUE Assessment   LUE Assessment   (ROM WFL, MMT 4-/5)   RLE Assessment   RLE Assessment   (ROM WFL, MMT hip/knee 4-/5, ankle NA due to fx/splint )   LLE Assessment   LLE Assessment   (ROM WFL, MMT 4/5)   Bed Mobility   Supine to Sit 3  Moderate assistance   Sit to Supine 4  Minimal assistance   Transfers   Sit to Stand 4  Minimal assistance   Additional items Verbal cues; Increased time required  (from stretcher NWB R LE with walker)   Stand to Sit 4  Minimal assistance   Stand pivot Unable to assess;  pt unable with max assist x 1 and walker   ADLs Feeding: independent  Grooming: supervision with set up  Dressing upper body: supervision  Dressing lower body: mod assist  Toileting mod assist with bedpan   Bathing: min assist upper body sitting in a chair  Bathing: mod assist lower body sitting in a chair   Ambulation/Elevation   Gait Assistance 1  Dependent   Balance   Static Sitting Good   Static Standing Fair  (with UE support on a walker)   Assessment   Prognosis Good   Problem List Decreased strength; Impaired balance;Decreased mobility;Pain;Orthopedic restrictions   Assessment Patient is an 64y o  year old female seen for Physical Therapy evaluation  Patient admitted with R fibular fx  Comorbidities affecting patient's physical performance include: falls, ataxic gait, TIA, joint pain, dementia  Personal factors affecting patient at time of initial evaluation include: inaccessible home environment, lives in 2 story house, ambulating with assistive device, stairs to enter home, inability to ambulate household distances, positive fall history and inability to perform ADLS  Prior to admission, patient was independent with functional mobility with cane or walker  Please find objective findings from Physical Therapy assessment regarding body systems outlined above with impairments and limitations including weakness, impaired balance, pain, decreased activity tolerance, decreased functional mobility tolerance, fall risk and orthopedic restrictions  The Barthel Index was used as a functional outcome tool presenting with a score of Barthel Index Score: 45 today indicating marked limitations of functional mobility and ADLS  Patient's clinical presentation is currently unstable/unpredictable as seen in patient's presentation of changing level of pain, increased fall risk, new onset of impairment of functional mobility and new onset of weakness  Pt would benefit from continued Physical Therapy treatment to address deficits as defined above and maximize level of functional mobility  As demonstrated by objective findings, the assigned level of complexity for this evaluation is high  The patient's -[facility] Basic Mobility Inpatient Short Form Raw Score is 10  A Raw score of less than or equal to 16 suggests the patient may benefit from discharge to post-acute rehabilitation services  Goals   Patient Goals "walk"   STG Expiration Date 11/21/22   Short Term Goal #1 Pt will transfer bed to chair with NWB R LE with walker or sliding board with min assist, improve bed mobility to min assist   LTG Expiration Date 11/28/22   Long Term Goal #1 independent bed mobility, supervision transfers bed to chair with a walker NWB R LE or a sliding board NWB R LE, pt will ambulate 3 feet with a walker NWB R LE   Plan   Treatment/Interventions LE strengthening/ROM; Functional transfer training; Therapeutic exercise;Gait training;Bed mobility; Equipment eval/education;Spoke to MD   PT Frequency 5-7x/wk   Recommendation   PT Discharge Recommendation Post acute rehabilitation services   Equipment Recommended   (pt has a walker and a cane)   AM-PAC Basic Mobility Inpatient   Turning in Bed Without Bedrails 2   Lying on Back to Sitting on Edge of Flat Bed 2   Moving Bed to Chair 1   Standing Up From Chair 3   Walk in Room 1   Climb 3-5 Stairs 1   Basic Mobility Inpatient Raw Score 10   Turning Head Towards Sound 4   Follow Simple Instructions 4   Low Function Basic Mobility Raw Score 18   Low Function Basic Mobility Standardized Score 29 25   Highest Level Of Mobility   -[facility] Goal 4: Move to chair/commode   -[facility] Achieved 3: Sit at edge of bed  (pt unable to transfer to a chair due to weakness/new NWB R LE status)   Barthel Index   Feeding 10   Bathing 0   Grooming Score 0   Dressing Score 5   Bladder Score 10   Bowels Score 10   Toilet Use Score 0   Transfers (Bed/Chair) Score 10   Mobility (Level Surface) Score 0   Stairs Score 0   Barthel Index Score 45   Additional Treatment Session   Start Time 1440   End Time 1450   Treatment Assessment S: 'The pain medicine is working' O: Sit to stand from the stretcher with min assist with cues for R LE NWB   Pt stood x 10 seconds x 2 trials with UE support on walker  Attempted to stand pivot to a chair but pt unable to with max assist x 1  A:  Pt is unable to transfer or ambulate with R LE NWB status  Recommend STR P:  Continue PT for transfer training with a walker vs sliding board NWB R LE  End of Consult   Patient Position at End of Consult All needs within reach; Supine   Licensure   [place] License Number  Sadaf Davis PT  [id number]

## 2022-11-14 NOTE — ASSESSMENT & PLAN NOTE
Lab Results   Component Value Date    EGFR 30 11/14/2022    EGFR 54 08/10/2022    EGFR 32 05/13/2022    CREATININE 1 60 (H) 11/14/2022    CREATININE 0 99 08/10/2022    CREATININE 1 51 (H) 05/13/2022     Elevated creatinine  · Likely multifactorial, gentle hydration, avoid nephrotoxics,  · monitor potassium and renal function trending down

## 2022-11-14 NOTE — CASE MANAGEMENT
Case Management Discharge Planning Note    Patient name Josh Yancey  Location ED 14/ED 14 MRN [id number]  : 1945 Date 2022       Current Admission Date: 2022  Current Admission Diagnosis:Ankle pain   Patient Active Problem List    Diagnosis Date Noted   • Stage 3b chronic kidney disease ([place] [address] ) 2022   • REZA (acute kidney injury) ([place] [address] ) 2021   • Obesity, morbid ([place] [address] ) 2021   • Osteopenia of multiple sites 2020   • Other fatigue 2020   • Snoring 2020   • Chronic left-sided thoracic back pain 2019   • Left ovarian cyst 2019   • Dementia ([place] [address] ) 2018   • Asymptomatic stenosis of left carotid artery 2018   • Abnormal EEG 2017   • Altered mental status 2017   • Cerebrovascular accident (CVA) due to embolism ([place] [address] ) 2017   • Non-ST elevation myocardial infarction (NSTEMI), type 2 2017   • History of seizure 10/31/2017   • Paroxysmal SVT (supraventricular tachycardia) ([place] [address] ) 10/31/2017   • TIA (transient ischemic attack) 10/29/2017   • HLD (hyperlipidemia) 10/29/2017   • Cyst of diaphragm 2017   • Long-term use of immunosuppressant medication 2016   • Anemia 2016   • Liver transplanted ([place] [address] ) 2016   • Hypothyroidism 2016   • Elevated homocysteine 2016   • Chronic joint pain 2016   • Lung nodule, solitary 2016   • Sciatica 2015   • Hypercalcemia 2015   • Ataxic gait 2013   • Chronic kidney insufficiency 2013   • Benign essential hypertension 2012   • Ulcerative colitis without complications ([place] [address] )       LOS (days): 0  Geometric Mean LOS (GMLOS) (days):   Days to GMLOS:     OBJECTIVE:            Current admission status: Emergency   Preferred Pharmacy:   [place] [address]  Phone: [phone] Fax: [phone] [address], [address] [place] [address]  Phone: [phone] Fax: [phone]    Primary Care Provider: Yisel Radford MD    Primary Insurance: MEDICARE  Secondary Insurance: COMMERCIAL MISCELLANEOUS    DISCHARGE DETAILS:    Discharge planning discussed with[de-identified] Patient and   Freedom of Choice: Yes  Comments - Freedom of Choice: CM met with patient and  at bedside  Discussed therapy and physician recs  Asked for STR preferences and both patient and  stated CC at [facility] as patient had been there recently  in June  CM contacted family/caregiver?: Yes  Were Treatment Team discharge recommendations reviewed with patient/caregiver?: Yes  Did patient/caregiver verbalize understanding of patient care needs?: Yes       Other Referral/Resources/Interventions Provided:  Interventions: Transportation, Short Term Rehab  Referral Comments: CM sent referral to [address] [place] [address] @ [facility] via [address]  Awaiting response      Treatment Team Recommendation: Short Term Rehab  Discharge Destination Plan[de-identified] Short Term Rehab

## 2022-11-14 NOTE — CASE MANAGEMENT
Case Management Discharge Planning Note    Patient name Edd Cabrera ED 14/ED 14 MRN [id number]  : 1945 Date 2022       Current Admission Date: 2022  Current Admission Diagnosis:Ankle pain   Patient Active Problem List    Diagnosis Date Noted   • Stage 3b chronic kidney disease ([place] [address] ) 2022   • REZA (acute kidney injury) ([place] [address] ) 2021   • Obesity, morbid ([place] [address] ) 2021   • Osteopenia of multiple sites 2020   • Other fatigue 2020   • Snoring 2020   • Chronic left-sided thoracic back pain 2019   • Left ovarian cyst 2019   • Dementia ([place] [address] ) 2018   • Asymptomatic stenosis of left carotid artery 2018   • Abnormal EEG 2017   • Altered mental status 2017   • Cerebrovascular accident (CVA) due to embolism ([place] [address] ) 2017   • Non-ST elevation myocardial infarction (NSTEMI), type 2 2017   • History of seizure 10/31/2017   • Paroxysmal SVT (supraventricular tachycardia) ([place] [address] ) 10/31/2017   • TIA (transient ischemic attack) 10/29/2017   • HLD (hyperlipidemia) 10/29/2017   • Cyst of diaphragm 2017   • Long-term use of immunosuppressant medication 2016   • Anemia 2016   • Liver transplanted ([place] [address] ) 2016   • Hypothyroidism 2016   • Elevated homocysteine 2016   • Chronic joint pain 2016   • Lung nodule, solitary 2016   • Sciatica 2015   • Hypercalcemia 2015   • Ataxic gait 2013   • Chronic kidney insufficiency 2013   • Benign essential hypertension 2012   • Ulcerative colitis without complications ([place] [address] )       LOS (days): 0  Geometric Mean LOS (GMLOS) (days):   Days to GMLOS:     OBJECTIVE:            Current admission status: Emergency   Preferred Pharmacy:   [place] [address]  Phone: [phone] Fax: [phone] [address], [address] [place] [address]  Phone: [phone] Fax: [phone]    Primary Care Provider: Elias Rosario MD    Primary Insurance: MEDICARE  Secondary Insurance: COMMERCIAL MISCELLANEOUS    DISCHARGE DETAILS:    Discharge planning discussed with[de-identified] Patient and   Freedom of Choice: Yes  Comments - Freedom of Choice: CM met with patient and  at bedside  Discussed therapy and physician recs  Asked for STR preferences and both patient and  stated CC at [facility] as patient had been there recently  in June  CM contacted family/caregiver?: Yes  Were Treatment Team discharge recommendations reviewed with patient/caregiver?: Yes  Did patient/caregiver verbalize understanding of patient care needs?: Yes        Other Referral/Resources/Interventions Provided:  Interventions: Transportation, Short Term Rehab  Referral Comments: CM sent referral to [facility] @ [facility] via [employer]  Awaiting response      Treatment Team Recommendation: Short Term Rehab  Discharge Destination Plan[de-identified] Short Term Rehab

## 2022-11-15 ENCOUNTER — APPOINTMENT (INPATIENT)
Dept: RADIOLOGY | Facility: HOSPITAL | Age: 77
End: 2022-11-15

## 2022-11-15 PROBLEM — S82.831A CLOSED FRACTURE OF DISTAL END OF RIGHT FIBULA: Status: ACTIVE | Noted: 2022-11-14

## 2022-11-15 LAB
25(OH)D3 SERPL-MCNC: 20.1 NG/ML (ref 30–100)
ALBUMIN SERPL BCP-MCNC: 2.9 G/DL (ref 3.5–5)
ALP SERPL-CCNC: 166 U/L (ref 46–116)
ALT SERPL W P-5'-P-CCNC: 28 U/L (ref 12–78)
ANION GAP SERPL CALCULATED.3IONS-SCNC: 9 MMOL/L (ref 4–13)
AST SERPL W P-5'-P-CCNC: 25 U/L (ref 5–45)
BASOPHILS # BLD AUTO: 0.04 THOUSANDS/ÂΜL (ref 0–0.1)
BASOPHILS NFR BLD AUTO: 1 % (ref 0–1)
BILIRUB SERPL-MCNC: 0.29 MG/DL (ref 0.2–1)
BUN SERPL-MCNC: 25 MG/DL (ref 5–25)
CALCIUM ALBUM COR SERPL-MCNC: 9 MG/DL (ref 8.3–10.1)
CALCIUM SERPL-MCNC: 8.1 MG/DL (ref 8.3–10.1)
CHLORIDE SERPL-SCNC: 108 MMOL/L (ref 96–108)
CK SERPL-CCNC: 54 U/L (ref 26–192)
CO2 SERPL-SCNC: 16 MMOL/L (ref 21–32)
CREAT SERPL-MCNC: 1.3 MG/DL (ref 0.6–1.3)
EOSINOPHIL # BLD AUTO: 0.13 THOUSAND/ÂΜL (ref 0–0.61)
EOSINOPHIL NFR BLD AUTO: 2 % (ref 0–6)
ERYTHROCYTE [DISTWIDTH] IN BLOOD BY AUTOMATED COUNT: 13.9 % (ref 11.6–15.1)
GFR SERPL CREATININE-BSD FRML MDRD: 39 ML/MIN/1.73SQ M
GLUCOSE SERPL-MCNC: 104 MG/DL (ref 65–140)
GLUCOSE SERPL-MCNC: 108 MG/DL (ref 65–140)
GLUCOSE SERPL-MCNC: 108 MG/DL (ref 65–140)
GLUCOSE SERPL-MCNC: 111 MG/DL (ref 65–140)
HCT VFR BLD AUTO: 31.7 % (ref 34.8–46.1)
HGB BLD-MCNC: 9.9 G/DL (ref 11.5–15.4)
IMM GRANULOCYTES # BLD AUTO: 0.03 THOUSAND/UL (ref 0–0.2)
IMM GRANULOCYTES NFR BLD AUTO: 1 % (ref 0–2)
LYMPHOCYTES # BLD AUTO: 1.05 THOUSANDS/ÂΜL (ref 0.6–4.47)
LYMPHOCYTES NFR BLD AUTO: 17 % (ref 14–44)
MCH RBC QN AUTO: 29.2 PG (ref 26.8–34.3)
MCHC RBC AUTO-ENTMCNC: 31.2 G/DL (ref 31.4–37.4)
MCV RBC AUTO: 94 FL (ref 82–98)
MONOCYTES # BLD AUTO: 0.57 THOUSAND/ÂΜL (ref 0.17–1.22)
MONOCYTES NFR BLD AUTO: 9 % (ref 4–12)
NEUTROPHILS # BLD AUTO: 4.43 THOUSANDS/ÂΜL (ref 1.85–7.62)
NEUTS SEG NFR BLD AUTO: 70 % (ref 43–75)
NRBC BLD AUTO-RTO: 0 /100 WBCS
PLATELET # BLD AUTO: 189 THOUSANDS/UL (ref 149–390)
PMV BLD AUTO: 9.4 FL (ref 8.9–12.7)
POTASSIUM SERPL-SCNC: 5.2 MMOL/L (ref 3.5–5.3)
PROT SERPL-MCNC: 6.8 G/DL (ref 6.4–8.4)
RBC # BLD AUTO: 3.39 MILLION/UL (ref 3.81–5.12)
SODIUM SERPL-SCNC: 133 MMOL/L (ref 135–147)
WBC # BLD AUTO: 6.25 THOUSAND/UL (ref 4.31–10.16)

## 2022-11-15 PROCEDURE — 0QSJXZZ REPOSITION RIGHT FIBULA, EXTERNAL APPROACH: ICD-10-PCS | Performed by: ORTHOPAEDIC SURGERY

## 2022-11-15 RX ORDER — LIDOCAINE HYDROCHLORIDE 10 MG/ML
30 INJECTION, SOLUTION EPIDURAL; INFILTRATION; INTRACAUDAL; PERINEURAL ONCE
Status: COMPLETED | OUTPATIENT
Start: 2022-11-15 | End: 2022-11-15

## 2022-11-15 RX ORDER — LANOLIN ALCOHOL/MO/W.PET/CERES
3 CREAM (GRAM) TOPICAL
Status: DISCONTINUED | OUTPATIENT
Start: 2022-11-15 | End: 2022-11-16

## 2022-11-15 RX ORDER — MELATONIN
1000 DAILY
Status: DISCONTINUED | OUTPATIENT
Start: 2022-11-15 | End: 2022-11-20 | Stop reason: HOSPADM

## 2022-11-15 RX ORDER — LEVOTHYROXINE SODIUM 112 UG/1
112 TABLET ORAL
Status: DISCONTINUED | OUTPATIENT
Start: 2022-11-15 | End: 2022-11-20 | Stop reason: HOSPADM

## 2022-11-15 RX ORDER — HYDROMORPHONE HCL/PF 1 MG/ML
1 SYRINGE (ML) INJECTION ONCE
Status: COMPLETED | OUTPATIENT
Start: 2022-11-15 | End: 2022-11-15

## 2022-11-15 RX ORDER — SODIUM CHLORIDE, SODIUM LACTATE, POTASSIUM CHLORIDE, CALCIUM CHLORIDE 600; 310; 30; 20 MG/100ML; MG/100ML; MG/100ML; MG/100ML
50 INJECTION, SOLUTION INTRAVENOUS CONTINUOUS
Status: DISCONTINUED | OUTPATIENT
Start: 2022-11-15 | End: 2022-11-16

## 2022-11-15 RX ORDER — CYCLOSPORINE 25 MG/1
50 CAPSULE, LIQUID FILLED ORAL 2 TIMES DAILY
Status: DISCONTINUED | OUTPATIENT
Start: 2022-11-15 | End: 2022-11-20 | Stop reason: HOSPADM

## 2022-11-15 RX ORDER — PRAVASTATIN SODIUM 20 MG
20 TABLET ORAL
Status: DISCONTINUED | OUTPATIENT
Start: 2022-11-15 | End: 2022-11-20 | Stop reason: HOSPADM

## 2022-11-15 RX ORDER — HEPARIN SODIUM 5000 [USP'U]/ML
5000 INJECTION, SOLUTION INTRAVENOUS; SUBCUTANEOUS EVERY 8 HOURS SCHEDULED
Status: DISCONTINUED | OUTPATIENT
Start: 2022-11-15 | End: 2022-11-17

## 2022-11-15 RX ORDER — MYCOPHENOLATE MOFETIL 250 MG/1
750 CAPSULE ORAL 2 TIMES DAILY
Status: DISCONTINUED | OUTPATIENT
Start: 2022-11-15 | End: 2022-11-20 | Stop reason: HOSPADM

## 2022-11-15 RX ORDER — LANOLIN ALCOHOL/MO/W.PET/CERES
1 CREAM (GRAM) TOPICAL
Status: DISCONTINUED | OUTPATIENT
Start: 2022-11-16 | End: 2022-11-20 | Stop reason: HOSPADM

## 2022-11-15 RX ORDER — PANTOPRAZOLE SODIUM 40 MG/1
40 TABLET, DELAYED RELEASE ORAL DAILY
Status: DISCONTINUED | OUTPATIENT
Start: 2022-11-15 | End: 2022-11-20 | Stop reason: HOSPADM

## 2022-11-15 RX ADMIN — MYCOPHENOLATE MOFETIL 750 MG: 250 CAPSULE ORAL at 13:44

## 2022-11-15 RX ADMIN — SODIUM CHLORIDE, POTASSIUM CHLORIDE, SODIUM LACTATE AND CALCIUM CHLORIDE 50 ML/HR: 600; 310; 30; 20 INJECTION, SOLUTION INTRAVENOUS at 19:57

## 2022-11-15 RX ADMIN — PANTOPRAZOLE SODIUM 40 MG: 40 TABLET, DELAYED RELEASE ORAL at 13:41

## 2022-11-15 RX ADMIN — MORPHINE SULFATE 2 MG: 2 INJECTION, SOLUTION INTRAMUSCULAR; INTRAVENOUS at 02:52

## 2022-11-15 RX ADMIN — HYDROCODONE BITARTRATE AND ACETAMINOPHEN 1 TABLET: 5; 325 TABLET ORAL at 22:29

## 2022-11-15 RX ADMIN — HYDROCODONE BITARTRATE AND ACETAMINOPHEN 1 TABLET: 5; 325 TABLET ORAL at 09:32

## 2022-11-15 RX ADMIN — SODIUM CHLORIDE 125 ML/HR: 0.9 INJECTION, SOLUTION INTRAVENOUS at 04:12

## 2022-11-15 RX ADMIN — CYCLOSPORINE 50 MG: 25 CAPSULE ORAL at 17:10

## 2022-11-15 RX ADMIN — ONDANSETRON 4 MG: 2 INJECTION INTRAMUSCULAR; INTRAVENOUS at 13:09

## 2022-11-15 RX ADMIN — HEPARIN SODIUM 5000 UNITS: 5000 INJECTION INTRAVENOUS; SUBCUTANEOUS at 13:41

## 2022-11-15 RX ADMIN — HYDROMORPHONE HYDROCHLORIDE 1 MG: 1 INJECTION, SOLUTION INTRAMUSCULAR; INTRAVENOUS; SUBCUTANEOUS at 10:31

## 2022-11-15 RX ADMIN — LEVOTHYROXINE SODIUM 112 MCG: 112 TABLET ORAL at 13:40

## 2022-11-15 RX ADMIN — SODIUM CHLORIDE 75 ML/HR: 0.9 INJECTION, SOLUTION INTRAVENOUS at 13:51

## 2022-11-15 RX ADMIN — Medication 1000 UNITS: at 13:41

## 2022-11-15 RX ADMIN — MYCOPHENOLATE MOFETIL 750 MG: 250 CAPSULE ORAL at 17:10

## 2022-11-15 RX ADMIN — CALCITONIN SALMON 1 SPRAY: 200 SPRAY, METERED NASAL at 09:32

## 2022-11-15 RX ADMIN — LIDOCAINE HYDROCHLORIDE 30 ML: 10 INJECTION, SOLUTION EPIDURAL; INFILTRATION; INTRACAUDAL; PERINEURAL at 10:44

## 2022-11-15 RX ADMIN — HEPARIN SODIUM 5000 UNITS: 5000 INJECTION INTRAVENOUS; SUBCUTANEOUS at 22:29

## 2022-11-15 RX ADMIN — CYCLOSPORINE 50 MG: 25 CAPSULE ORAL at 12:30

## 2022-11-15 RX ADMIN — Medication 3 MG: at 22:54

## 2022-11-15 RX ADMIN — METOPROLOL TARTRATE 37.5 MG: 25 TABLET, FILM COATED ORAL at 17:11

## 2022-11-15 RX ADMIN — METOPROLOL TARTRATE 37.5 MG: 25 TABLET, FILM COATED ORAL at 13:41

## 2022-11-15 RX ADMIN — PRAVASTATIN SODIUM 20 MG: 20 TABLET ORAL at 22:29

## 2022-11-15 NOTE — PROCEDURES
Procedure- Juany Hager 64 y o  female MRN: [id number]  Unit/Bed#: [address] [place] [address]    Procedure: Ankle reduction and splint application    A hematoma block was given with 30cc of 1% lidocaine without epi  Once adequate anesthesia was obtained a gentle closed reduction maneuver was performed to the right ankle and pt was placed in a well padded AO splint  Pt tolerated the procedure well and was neurovascularly intact both pre and post procedure  Post reduction orthogonal x rays showed appropriate reduction of the talus in the ankle mortise  Will discuss final operative plans with orthopedic team and discuss with family later today           Natalie Mckeon MD

## 2022-11-15 NOTE — PLAN OF CARE
Problem: Potential for Falls  Goal: Patient will remain free of falls  Description: INTERVENTIONS:  - Educate patient/family on patient safety including physical limitations  - Instruct patient to call for assistance with activity   - Consult OT/PT to assist with strengthening/mobility   - Keep Call bell within reach  - Keep bed low and locked with side rails adjusted as appropriate  - Keep care items and personal belongings within reach  - Initiate and maintain comfort rounds  - Make Fall Risk Sign visible to staff  - Offer Toileting every 2 Hours, in advance of need  - Initiate/Maintain bed alarm  - Apply yellow socks and bracelet for high fall risk patients  - Consider moving patient to room near nurses station  11/14/2022 2126 by Maty Maria RN  Outcome: Progressing  11/14/2022 2126 by Maty Maria RN  Outcome: Progressing     Problem: Prexisting or High Potential for Compromised Skin Integrity  Goal: Skin integrity is maintained or improved  Description: INTERVENTIONS:  - Identify patients at risk for skin breakdown  - Assess and monitor skin integrity  - Assess and monitor nutrition and hydration status  - Monitor labs   - Assess for incontinence   - Turn and reposition patient  - Assist with mobility/ambulation  - Relieve pressure over bony prominences  - Avoid friction and shearing  - Provide appropriate hygiene as needed including keeping skin clean and dry  - Evaluate need for skin moisturizer/barrier cream  - Collaborate with interdisciplinary team   - Patient/family teaching  - Consider wound care consult   11/14/2022 2126 by Maty Maria RN  Outcome: Progressing  11/14/2022 2126 by Maty Maria RN  Outcome: Progressing     Problem: MOBILITY - ADULT  Goal: Maintain or return to baseline ADL function  Description: INTERVENTIONS:  -  Assess patient's ability to carry out ADLs; assess patient's baseline for ADL function and identify physical deficits which impact ability to perform ADLs (bathing, care of mouth/teeth, toileting, grooming, dressing, etc )  - Assess/evaluate cause of self-care deficits   - Assess range of motion  - Assess patient's mobility; develop plan if impaired  - Assess patient's need for assistive devices and provide as appropriate  - Encourage maximum independence but intervene and supervise when necessary  - Involve family in performance of ADLs  - Assess for home care needs following discharge   - Consider OT consult to assist with ADL evaluation and planning for discharge  - Provide patient education as appropriate  11/14/2022 2126 by Maty Maria RN  Outcome: Progressing  11/14/2022 2126 by Maty Maria RN  Outcome: Progressing  Goal: Maintains/Returns to pre admission functional level  Description: INTERVENTIONS:  - Perform BMAT or MOVE assessment daily    - Set and communicate daily mobility goal to care team and patient/family/caregiver  - Collaborate with rehabilitation services on mobility goals if consulted  - Perform Range of Motion 3 times a day  - Reposition patient every 2 hours    - Dangle patient 2 times a day  - Stand patient 2 times a day  - Ambulate patient 2 times a day  - Out of bed to chair 2 times a day   - Out of bed for meals 3 times a day  - Out of bed for toileting  - Record patient progress and toleration of activity level   11/14/2022 2126 by Maty Maria RN  Outcome: Progressing  11/14/2022 2126 by Maty Maria RN  Outcome: Progressing

## 2022-11-15 NOTE — OCCUPATIONAL THERAPY NOTE
OT EVALUATION       11/15/22 0945   Note Type   Note type Evaluation   Pain Assessment   Pain Assessment Tool 0-10   Pain Score 10 - Worst Possible Pain   Pain Location/Orientation Orientation: Right;Location: Leg   Restrictions/Precautions   RLE Weight Bearing Per Order (S)  NWB   Other Precautions Fall Risk;Pain; Chair Alarm; Bed Alarm   Home Living   Type of [address] [place] [address] Av Two level;1/2 bath on main level;Bed/bath upstairs  (3 SPEEDY)   Bathroom Shower/Tub Walk-in shower   Bathroom Equipment Grab bars in [facility] Walker;Cane  (rollator)   Prior Function   Level of [place] Independent with functional mobility; Needs assistance with IADLS;Needs assistance with ADLs   Lives With Significant other   Receives Help From [facility] in the last 6 months 1 to 4   Comments Patient s/p fall with Displaced distal fibular fracture  Dislocated ankle joint posterior displacement of talus     ADL   Eating Assistance 7  Independent   Grooming Assistance 5  Supervision/Setup   UB Bathing Assistance 3  Moderate Assistance   LB Bathing Assistance 2  Maximal Assistance   UB Dressing Assistance 3  Moderate Assistance   LB Dressing Assistance 2  Maximal [address]  2  Maximal Assistance   Bed Mobility   Supine to Sit 3  Moderate assistance   Additional items   (to long sit)   Sit to Supine 4  Minimal assistance   Transfers   Sit to Stand Unable to assess  (refused due to pain, nurse present and aware)   Balance   Static Sitting Fair   Dynamic Sitting Poor +   Activity Tolerance   Activity Tolerance Patient limited by pain;Treatment limited secondary to medical complications (Comment)   Nurse Made Aware yes, [place] Reveal Assessment   RUE Assessment WFL   LUE Assessment   LUE Assessment WFL   Cognition   Overall Cognitive Status WFL   Arousal/Participation Cooperative   Attention Within functional limits   Orientation Level Oriented X4   Following Commands Follows all commands and directions without difficulty   Assessment   Limitation Decreased ADL status; Decreased UE strength;Decreased Safe judgement during ADL;Decreased endurance;Decreased high-level ADLs; Decreased self-care trans  (decreased balance and mobility)   Prognosis Good   Assessment Patient evaluated by Occupational Therapy  Patient admitted with Closed fracture of right tibia and fibula  The patients occupational profile, medical and therapy history includes a extensive additional review of physical, cognitive, or psychosocial history related to current functional performance  Comorbidities affecting functional mobility and ADLS include: anxiety, arthritis and TKA and liver transplant  Prior to admission, patient was independent with functional mobility with walker or cane, requiring assist for ADLS and requiring assist for IADLS  The evaluation identifies the following performance deficits: weakness, impaired balance, decreased endurance, increased fall risk, new onset of impairment of functional mobility, decreased ADLS, decreased IADLS, pain, decreased activity tolerance, decreased safety awareness, impaired judgement and decreased strength, that result in activity limitations and/or participation restrictions  This evaluation requires clinical decision making of high complexity, because the patient presents with comorbidites that affect occupational performance and required significant modification of tasks or assistance with consideration of multiple treatment options  The Barthel Index was used as a functional outcome tool presenting with a score of Barthel Index Score: 35, indicating marked limitations of functional mobility and ADLS  The patient's raw score on the -PAC Daily Activity inpatient short form is 12, standardized score is 30 6, less than 39 4  Patients at this level are likely to benefit from DC to post-acute rehabilitation services   Please refer to the recommendation of the Occupational Therapist for safe DC planning  Patient will benefit from skilled Occupational Therapy services to address above deficits and facilitate a safe return to prior level of function  Goals   Patient Goals less pain   STG Time Frame   (1-7 days)   Short Term Goal  Goals established to promote Patient Goals: less pain:   Bathing: mod assist; Upper Body Dressing min assist; Lower Body Dressing: mod assist; Toileting: mod assist; Patient will increase standing tolerance to 2 minutes during ADL task to decrease assistance level and decrease fall risk; Patient will increase bed mobility to min assist in preparation for ADLS and transfers; Patient will tolerate 10 minutes of UE ROM/strengthening to increase general activity tolerance and performance in ADLS/IADLS; Patient will improve functional activity tolerance to 10 minutes of sustained functional tasks to increase participation in basic self-care and decrease assistance level;  Patient will increase dynamic sitting balance to fair- to improve the ability to sit at edge of bed or on a chair for ADLS;  Patient will increase dynamic standing balance to poor+ to improve postural stability and decrease fall risk during standing ADLS and transfers  LTG Time Frame   (8-14 days)   Long Term Goal Bathing: min assist; Upper Body Dressing supervision; Lower Body Dressing: min assist; Toileting: min assist; Patient will increase standing tolerance to 4 minutes during ADL task to decrease assistance level and decrease fall risk; Patient will increase bed mobility to supervision in preparation for ADLS and transfers;   Patient will tolerate 20 minutes of UE ROM/strengthening to increase general activity tolerance and performance in ADLS/IADLS; Patient will improve functional activity tolerance to 20 minutes of sustained functional tasks to increase participation in basic self-care and decrease assistance level;  Patient will increase dynamic sitting balance to fair to improve the ability to sit at edge of bed or on a chair for ADLS;  Patient will increase dynamic standing balance to fair- to improve postural stability and decrease fall risk during standing ADLS and transfers  Pt will score >/= 16/24 on AM-PAC Daily Activity Inpatient scale to promote safe independence with ADLs and functional mobility; Pt will score >/= 65/100 on Barthel Index in order to decrease caregiver assistance needed and increase ability to perform ADLs and functional mobility  Functional Transfer Goals   Pt Will Perform All Functional Transfers   (STG mod assist LTG min assist)   Plan   Treatment Interventions ADL retraining;Functional transfer training; Endurance training;UE strengthening/ROM; Patient/family training;Equipment evaluation/education; Activityengagement; Compensatory technique education   Goal Expiration Date 11/29/22   OT Frequency   (5x/week)   Recommendation   OT Discharge Recommendation Post acute rehabilitation services   AM-PAC Daily Activity Inpatient   Lower Body Dressing 1   Bathing 1   Toileting 1   Upper Body Dressing 2   Grooming 3   Eating 4   Daily Activity Raw Score 12   Daily Activity Standardized Score (Calc for Raw Score >=11) 30 6   AM-PAC Applied Cognition Inpatient   Following a Speech/Presentation 4   Understanding Ordinary Conversation 4   Taking Medications 4   Remembering Where Things Are Placed or Put Away 4   Remembering List of 4-5 Errands 4   Taking Care of Complicated Tasks 4   Applied Cognition Raw Score 24   Applied Cognition Standardized Score 62 21   Barthel Index   Feeding 10   Bathing 0   Grooming Score 0   Dressing Score 0   Bladder Score 10   Bowels Score 10   Toilet Use Score 0   Transfers (Bed/Chair) Score 5   Mobility (Level Surface) Score 0   Stairs Score 0   Barthel Index Score 35   Licensure   [place] License Number  Ingrid Debbi [employer] OTR/L [id number]

## 2022-11-15 NOTE — CASE MANAGEMENT
Case Management Progress Note    Patient name Carson Lennox  Location [facility] 220/[address] [place] [address] 220 MRN [id number]  : 1945 Date 11/15/2022       LOS (days): 1  Geometric Mean LOS (GMLOS) (days): 2 70  Days to GMLOS:1 8        OBJECTIVE:     Current admission status: Inpatient  Preferred Pharmacy:   [address] [place] [address], [place] [place] [address]  Phone: [phone] Fax: [phone] [address] [facility]  [address]  [facility] [address]  Phone: [phone] Fax: [phone]    Primary Care Provider: Sonja Salas MD    Primary Insurance: MEDICARE  Secondary Insurance: COMMERCIAL MISCELLANEOUS    PROGRESS NOTE:    Pt was planned for STR placement to [address] [place], [place] [address] from ED yesterday; however, pt required IP admission due to hyperkalemia with REZA  SW updated [facility] liaison Albino Anu regarding  Anticipating discharge to facility in 24hrs

## 2022-11-15 NOTE — PROGRESS NOTES
Wendy 43  Progress Note Segundo Junior 1945, 64 y o  female MRN: [id number]  Unit/Bed#: [address] [place] [address] Encounter: [id number]  Primary Care Provider: Gonzalo Ling MD   Date and time admitted to hospital: 11/14/2022  1:01 PM    * Closed fracture of distal end of right fibula  Assessment & Plan  · X-ray- right displaced fracture of distal fibula with disruption of the ankle mortise  · Repeat x-ray today showed persistent right ankle fracture subluxation and patient underwent close reduction and splinting  Repeat imaging showed acceptable reduction of the ankle fracture  · Per Ortho patient will need surgical intervention of distal fibular fracture however due to significant swelling this will be done on an outpatient basis  · Patient to be nonweightbearing of right lower extremity with splint in place and right lower extremity elevation while at rest  · Knee x-ray-unremarkable  · Pain management    Hyperkalemia  Assessment & Plan  Acute,  · 6 3 after repeat, non hemolyzed  · Calcium gluconate 1 g, in the ED- insulin 8 units, dextrose in IV  · Potassium level now within normal limits  · CK-within normal limits    GERD (gastroesophageal reflux disease)  Assessment & Plan  · Continue home Prilosec 40 mg    Fall  Assessment & Plan  CT-abdomen/pelvis-no acute intra-abdominal or pelvic  · Knee x-ray-unremarkable  · Foot x-ray-Displaced fracture of the distal fibula with disruption of the ankle mortise  · Pain meds p r n   Breakthrough    Osteopenia of multiple sites  Assessment & Plan  Noted on medical record,  · vitamin-D level 20  · Restarted home vitamin-D and calcium  · Continue calcitonin due to current fractures  · Outpatient DEXA scan recommended if within criteria    Dementia [facility])  Assessment & Plan  Chronic,  · Neuro checks, reoirentation  · Not on any medications at home    Chronic kidney insufficiency  Assessment & Plan  Lab Results   Component Value Date    EGFR 39 11/15/2022 EGFR 30 2022    EGFR 54 08/10/2022    CREATININE 1 30 11/15/2022    CREATININE 1 60 (H) 2022    CREATININE 0 99 08/10/2022     Elevated creatinine initially of 1 6, trending down  · Discontinue IV fluids, avoid nephrotoxins  · Repeat lab work in a m  Benign essential hypertension  Assessment & Plan  · Continue home Lopressor 25 mg b i d  Cerebrovascular accident (CVA) due to embolism [facility])  Assessment & Plan  Noted on Mar  · Holding Plavix and aspirin due to current fracture, heparin injection  · Restart once okay by Ortho    HLD (hyperlipidemia)  Assessment & Plan  Lipid panel - LDL 43  · Continue home pravastatin 20 mg    Long-term use of immunosuppressant medication  Assessment & Plan  Reordered home CellCept, cyclosporine  Monitor labs    Hypothyroidism  Assessment & Plan  Chronic,  · Continue levothyroxine 112 mcg      VTE Pharmacologic Prophylaxis: VTE Score: 10 High Risk (Score >/= 5) - Pharmacological DVT Prophylaxis Ordered: heparin  Sequential Compression Devices Ordered  Patient Centered Rounds: I performed bedside rounds with nursing staff today  Discussions with Specialists or Other Care Team Provider: yes    Education and Discussions with Family / Patient: Updated  (significant other) at bedside  Time Spent for Care: 30 minutes  More than 50% of total time spent on counseling and coordination of care as described above  Current Length of Stay: 1 day(s)  Current Patient Status: Inpatient   Certification Statement: The patient will continue to require additional inpatient hospital stay due to Right ankle fracture requiring further management/rehab placement  Discharge Plan: Anticipate discharge in 24-48 hrs to rehab facility  Code Status: Level 1 - Full Code    Subjective:     Patient seen after reduction of right ankle fracture by Ortho    Reports some improvement in pain    Objective:     Vitals:   Temp (24hrs), Av 2 °F (36 8 °C), Min:98 °F (36 7 °C), Max:98 4 °F (36 9 °C)    Temp:  [98 °F (36 7 °C)-98 4 °F (36 9 °C)] 98 1 °F (36 7 °C)  HR:  [79-88] 87  Resp:  [16-20] 16  BP: (131-168)/(53-61) 136/53  SpO2:  [95 %-97 %] 97 %  Body mass index is 38 75 kg/m²  Input and Output Summary (last 24 hours): Intake/Output Summary (Last 24 hours) at 11/15/2022 1938  Last data filed at 11/15/2022 1820  Gross per 24 hour   Intake 1900 ml   Output 1400 ml   Net 500 ml       Physical Exam:   Physical Exam  Constitutional:       General: She is not in acute distress  Appearance: She is well-developed  HENT:      Head: Normocephalic and atraumatic  Eyes:      General: No scleral icterus  Cardiovascular:      Rate and Rhythm: Normal rate and regular rhythm  Pulmonary:      Effort: Pulmonary effort is normal  No respiratory distress  Breath sounds: Normal breath sounds  No wheezing or rales  Abdominal:      General: Bowel sounds are normal  There is no distension  Palpations: Abdomen is soft  Tenderness: There is no abdominal tenderness  Musculoskeletal:      Comments: Right lower extremity with dressing in place   Neurological:      Mental Status: She is alert           Additional Data:     Labs:  Results from last 7 days   Lab Units 11/15/22  0838   WBC Thousand/uL 6 25   HEMOGLOBIN g/dL 9 9*   HEMATOCRIT % 31 7*   PLATELETS Thousands/uL 189   NEUTROS PCT % 70   LYMPHS PCT % 17   MONOS PCT % 9   EOS PCT % 2     Results from last 7 days   Lab Units 11/15/22  0838   SODIUM mmol/L 133*   POTASSIUM mmol/L 5 2   CHLORIDE mmol/L 108   CO2 mmol/L 16*   BUN mg/dL 25   CREATININE mg/dL 1 30   ANION GAP mmol/L 9   CALCIUM mg/dL 8 1*   ALBUMIN g/dL 2 9*   TOTAL BILIRUBIN mg/dL 0 29   ALK PHOS U/L 166*   ALT U/L 28   AST U/L 25   GLUCOSE RANDOM mg/dL 108         Results from last 7 days   Lab Units 11/15/22  1102 11/15/22  0757   POC GLUCOSE mg/dl 108 111               Lines/Drains:  Invasive Devices  Report    Peripheral Intravenous Line  Duration Peripheral IV 11/14/22 Right Antecubital 1 day                Imaging: Reviewed radiology reports from this admission including: xray(s)    Recent Cultures (last 7 days):         Last 24 Hours Medication List:   Current Facility-Administered Medications   Medication Dose Route Frequency Provider Last Rate   • acetaminophen  650 mg Oral PRN Samie Harada, MD     • calcitonin (salmon)  1 spray Alternating Nares Daily Samie Harada, MD     • [START ON 11/16/2022] calcium carbonate-vitamin D  1 tablet Oral Daily With Breakfast Samie Harada, MD     • cholecalciferol  1,000 Units Oral Daily Samie Harada, MD     • cycloSPORINE modified  50 mg Oral BID Keerthi Quach DO     • docusate sodium  100 mg Oral BID PRN Samie Harada, MD     • heparin (porcine)  5,000 Units Subcutaneous [facility] Radha Haskins PA-C     • HYDROcodone-acetaminophen  1 tablet Oral Q6H PRN Samie Harada, MD     • levothyroxine  112 mcg Oral Early Morning Samie Harada, MD     • metoprolol tartrate  37 5 mg Oral BID Samie Harada, MD     • morphine injection  2 mg Intravenous Q6H PRN Samie Harada, MD     • mycophenolate  750 mg Oral BID Samie Harada, MD     • ondansetron  4 mg Intravenous Q6H PRN Samie Harada, MD     • pantoprazole  40 mg Oral Daily Samie Harada, MD     • polyethylene glycol  17 g Oral BID PRN Samie Harada, MD     • pravastatin  20 mg Oral HS Samie Harada, MD          Today, Patient Was Seen By: Jazz Espinoza    **Please Note: This note may have been constructed using a voice recognition system  **

## 2022-11-15 NOTE — CONSULTS
Orthopedics   Ketty Baeza 64 y o  female MRN: [id number]  Unit/Bed#: [address] [address]      Chief Complaint:   right ankle pain    HPI:  64 y  o female status post fall complaining of right ankle pain and inability to bear weight  Pain is sharp and severe in character, Located globally to the ankle, acute in onset after the fall, constant in duration  Exacerbating factors include motion or contact to the area, remitting factors include rest and oral pain medications  Denies radiation of pain, denies numbness, denies tingling, no open wounds noted  No other complaints at this time  PMH significant for liver transplant on suppressive mediations, CVA on plavix, HTN, CKD  Patient was initially evaluated for this injury in the ED yesterday where a reduction attempt was performed however no post reduction films were obtained to confirm ankle position  Today new films demonstrated grossly subluxed joint and patient in considerable pain to the right ankle  Review Of Systems:   · Skin: swelling and bruising to the right ankle   · Neuro: See HPI  · Musculoskeletal: See HPI  · 14 point review of systems negative except as stated above     Past Medical History:   Past Medical History:   Diagnosis Date   • Anemia    • Anxiety    • Arthritis    • Benign paroxysmal vertigo, unspecified ear     onset: 05/06/2010   • Cirrhosis ([facility] [address] )     onset: 03/30/2005   • Crohn disease ([facility] [address] )    • Disease of thyroid gland    • GERD (gastroesophageal reflux disease)    • History of transfusion    • Liver transplant candidate     onset: 09/16/2008   • Osteoporosis    • Pelvic fracture ([facility] [address] )     onset: 10/14/2008       Past Surgical History:   Past Surgical History:   Procedure Laterality Date   • ABDOMINAL SURGERY     • APPENDECTOMY     • BLADDER AUGMENTATION N/A    • CATARACT EXTRACTION     • CHOLECYSTECTOMY N/A    • COLONOSCOPY N/A 4/20/2017    Procedure: COLONOSCOPY;  Surgeon: Lillie Vega MD;  [facility];   Service:    • ESOPHAGOGASTRODUODENOSCOPY N/A 3/31/2016    Procedure: ESOPHAGOGASTRODUODENOSCOPY (EGD); Surgeon: Sonia Johnson MD;  Location: [facility] GI LAB; Service:    • ESOPHAGOGASTRODUODENOSCOPY N/A 2017    Procedure: ESOPHAGOGASTRODUODENOSCOPY (EGD); Surgeon: Sonia Johnson MD;  Location: [facility] GI LAB;   Service:    • EYE SURGERY      cataracts removed both eyes lens implant   • HERNIA REPAIR     • JOINT REPLACEMENT      left knee replacement   • LIVER TRANSPLANTATION N/A    • TONSILECTOMY AND ADNOIDECTOMY N/A    • TUBAL LIGATION         Family History:  Family history reviewed and non-contributory  Family History   Problem Relation Age of Onset   • Cancer Mother         breast   • Cancer Father    • Crohn's disease Brother    • Arthritis Family    • Breast cancer Family        Social History:  Social History     Socioeconomic History   • Marital status:      Spouse name: None   • Number of children: None   • Years of education: Completed 12th grade   • Highest education level: None   Occupational History   • None   Tobacco Use   • Smoking status: Former Smoker     Years: 5 00     Types: Cigarettes     Quit date: 1975     Years since quittin 8   • Smokeless tobacco: Never Used   • Tobacco comment: smoked for 5 years-1/2 to 1 ppd   Vaping Use   • Vaping Use: Never used   Substance and Sexual Activity   • Alcohol use: Never   • Drug use: Never   • Sexual activity: Not Currently   Other Topics Concern   • None   Social History Narrative    Daily coffee consumption (1 cup/day)     Social Determinants of Health     Financial Resource Strain: Not on file   Food Insecurity: No Food Insecurity   • Worried About Running Out of Food in the Last Year: Never true   • Ran Out of Food in the Last Year: Never true   Transportation Needs: Not on file   Physical Activity: Not on file   Stress: Not on file   Social Connections: Not on file   Intimate Partner Violence: Not on file   Housing Stability: Low Risk    • Unable to Pay for Housing in the Last Year: No   • Number of Places Lived in the Last Year: 1   • Unstable Housing in the Last Year: No       Allergies: Allergies   Allergen Reactions   • Tetracyclines & Related Hives   • Vancomycin Other (See Comments) and Itching     Reaction Date: [id number];    Pt unsure of reaction   • Prograf [Tacrolimus] Anxiety     Reaction Date: [id number];            Labs:  0   Lab Value Date/Time    HCT 31 7 (L) 11/15/2022 0838    HCT 35 4 11/14/2022 1511    HCT 38 3 08/10/2022 1116    HCT 39 0 02/25/2016 0925    HCT 38 7 01/07/2016 0910    HCT 36 8 (L) 12/17/2015 1031    HGB 9 9 (L) 11/15/2022 0838    HGB 10 9 (L) 11/14/2022 1511    HGB 11 8 08/10/2022 1116    HGB 12 1 02/25/2016 0925    HGB 12 2 01/07/2016 0910    HGB 11 6 (L) 12/17/2015 1031    INR 1 00 05/18/2021 2201    WBC 6 25 11/15/2022 0838    WBC 9 14 11/14/2022 1511    WBC 6 19 08/10/2022 1116    WBC 5 3 02/25/2016 0925    WBC 6 54 08/30/2015 0553       Meds:    Current Facility-Administered Medications:   •  acetaminophen (TYLENOL) tablet 650 mg, 650 mg, Oral, PRN, Megan Fenton MD  •  calcitonin (salmon) (MIACALCIN) 200 units/act nasal spray 1 spray, 1 spray, Alternating Nares, Daily, Megan Fenton MD, 1 spray at 11/15/22 0932  •  docusate sodium (COLACE) capsule 100 mg, 100 mg, Oral, BID PRN, Megan Fenton MD  •  HYDROcodone-acetaminophen [facility]) 5-325 mg per tablet 1 tablet, 1 tablet, Oral, Q6H PRN, Megan Fenton MD, 1 tablet at 11/15/22 0932  •  morphine injection 2 mg, 2 mg, Intravenous, Q6H PRN, Megan Fenton MD, 2 mg at 11/15/22 0252  •  ondansetron (ZOFRAN) injection 4 mg, 4 mg, Intravenous, Q6H PRN, Megan Fenton MD  •  polyethylene glycol (MIRALAX) packet 17 g, 17 g, Oral, BID PRN, Megan Fenton MD  •  sodium chloride 0 9 % infusion, 75 mL/hr, Intravenous, Continuous, Keerthi Quach DO, Last Rate: 75 mL/hr at 11/15/22 1019, 75 mL/hr at 11/15/22 1019    Blood Culture:   Lab Results   Component Value Date    BLOODCX Peptoniphilus species (A) 05/18/2021    BLOODCX No Growth After 5 Days  05/18/2021       Wound Culture:   No results found for: WOUNDCULT    Ins and Outs:  I/O last 24 hours: In: 2400 [I V :1900; IV Piggyback:500]  Out: 700 [Urine:700]          Physical Exam:   /60   Pulse 87   Temp 98 °F (36 7 °C)   Resp 17   Ht 5' 2" (1 575 m)   Wt 96 1 kg (211 lb 13 8 oz)   SpO2 95%   BMI 38 75 kg/m²   Gen: No acute distress, resting comfortably in bed  HEENT: Eyes clear, moist mucus membranes, hearing intact  Respiratory: No audible wheezing or stridor  Cardiovascular: Well Perfused peripherally  Abdomen: nondistended, no peritoneal signs  Musculoskeletal: right lower extremity  · Skin intact with moderate swelling to the ankle globally with medial and lateral ecchymosis present, unable to wrinkle the skin over the lateral malleolus   · Tender to palpation over medial and lateral malleoli  · Non tender to palpation to the knee and proximal fibula  · ROM not assessed 2/2 known fracture  · Sensation intact DP/SP/Tib/Tony/Saph  · Positive knee flexion/extension, EHL/FHL  · Brisk capillary refill to the foot and toes       Radiology:   I personally reviewed the films  X-rays AP/Lateral and oblique views right ankle shows trimalleolar equivalent fracture with significant subluxation of the tibiotalar joint and post reduction films from the ED attempt show worsening of her subluxation and fracture displacement  XR of the right knee shows no evidence of proximal fibular fracture        _*_*_*_*_*_*_*_*_*_*_*_*_*_*_*_*_*_*_*_*_*_*_*_*_*_*_*_*_*_*_*_*_*_*_*_*_*_*_*_*_*    Assessment:  64 y  o female status post fall with right ankle fracture sp closed reduction and splinting  This injury will ultimately require operative intervention once soft tissues are amenable       Plan:   · NWB right lower extremity in AO splint  · Ok for DC from the hospital to rehab with instructions to f/u with [facility] ortho trauma or foot and ankle early next week for skin check and operative planning  · Instructed to return to ED if pt experiences new numbness or tingling, pain that is uncontrollable with oral pain meds, or if toes become cold and pale      Lorraine Jim MD

## 2022-11-16 PROBLEM — G93.40 ENCEPHALOPATHY: Status: ACTIVE | Noted: 2022-11-16

## 2022-11-16 LAB
ALBUMIN SERPL BCP-MCNC: 2.7 G/DL (ref 3.5–5)
ALP SERPL-CCNC: 160 U/L (ref 46–116)
ALT SERPL W P-5'-P-CCNC: 24 U/L (ref 12–78)
ANION GAP SERPL CALCULATED.3IONS-SCNC: 6 MMOL/L (ref 4–13)
AST SERPL W P-5'-P-CCNC: 24 U/L (ref 5–45)
BACTERIA UR QL AUTO: NORMAL /HPF
BASOPHILS # BLD AUTO: 0.03 THOUSANDS/ÂΜL (ref 0–0.1)
BASOPHILS NFR BLD AUTO: 1 % (ref 0–1)
BILIRUB SERPL-MCNC: 0.33 MG/DL (ref 0.2–1)
BILIRUB UR QL STRIP: NEGATIVE
BUN SERPL-MCNC: 19 MG/DL (ref 5–25)
CALCIUM ALBUM COR SERPL-MCNC: 9.2 MG/DL (ref 8.3–10.1)
CALCIUM SERPL-MCNC: 8.2 MG/DL (ref 8.3–10.1)
CHLORIDE SERPL-SCNC: 107 MMOL/L (ref 96–108)
CLARITY UR: ABNORMAL
CO2 SERPL-SCNC: 21 MMOL/L (ref 21–32)
COLOR UR: YELLOW
CREAT SERPL-MCNC: 1.14 MG/DL (ref 0.6–1.3)
EOSINOPHIL # BLD AUTO: 0.11 THOUSAND/ÂΜL (ref 0–0.61)
EOSINOPHIL NFR BLD AUTO: 2 % (ref 0–6)
ERYTHROCYTE [DISTWIDTH] IN BLOOD BY AUTOMATED COUNT: 13.8 % (ref 11.6–15.1)
GFR SERPL CREATININE-BSD FRML MDRD: 46 ML/MIN/1.73SQ M
GLUCOSE SERPL-MCNC: 114 MG/DL (ref 65–140)
GLUCOSE UR STRIP-MCNC: NEGATIVE MG/DL
HCT VFR BLD AUTO: 29 % (ref 34.8–46.1)
HGB BLD-MCNC: 9.1 G/DL (ref 11.5–15.4)
HGB UR QL STRIP.AUTO: ABNORMAL
IMM GRANULOCYTES # BLD AUTO: 0.02 THOUSAND/UL (ref 0–0.2)
IMM GRANULOCYTES NFR BLD AUTO: 0 % (ref 0–2)
KETONES UR STRIP-MCNC: NEGATIVE MG/DL
LEUKOCYTE ESTERASE UR QL STRIP: ABNORMAL
LYMPHOCYTES # BLD AUTO: 0.64 THOUSANDS/ÂΜL (ref 0.6–4.47)
LYMPHOCYTES NFR BLD AUTO: 12 % (ref 14–44)
MCH RBC QN AUTO: 29.4 PG (ref 26.8–34.3)
MCHC RBC AUTO-ENTMCNC: 31.4 G/DL (ref 31.4–37.4)
MCV RBC AUTO: 94 FL (ref 82–98)
MONOCYTES # BLD AUTO: 0.51 THOUSAND/ÂΜL (ref 0.17–1.22)
MONOCYTES NFR BLD AUTO: 10 % (ref 4–12)
NEUTROPHILS # BLD AUTO: 4.03 THOUSANDS/ÂΜL (ref 1.85–7.62)
NEUTS SEG NFR BLD AUTO: 75 % (ref 43–75)
NITRITE UR QL STRIP: NEGATIVE
NON-SQ EPI CELLS URNS QL MICRO: NORMAL /HPF
NRBC BLD AUTO-RTO: 0 /100 WBCS
PH UR STRIP.AUTO: 6 [PH]
PLATELET # BLD AUTO: 174 THOUSANDS/UL (ref 149–390)
PMV BLD AUTO: 9.4 FL (ref 8.9–12.7)
POTASSIUM SERPL-SCNC: 5.5 MMOL/L (ref 3.5–5.3)
PROT SERPL-MCNC: 6.6 G/DL (ref 6.4–8.4)
PROT UR STRIP-MCNC: NEGATIVE MG/DL
RBC # BLD AUTO: 3.1 MILLION/UL (ref 3.81–5.12)
RBC #/AREA URNS AUTO: NORMAL /HPF
SODIUM SERPL-SCNC: 134 MMOL/L (ref 135–147)
SP GR UR STRIP.AUTO: 1.02 (ref 1–1.03)
URATE CRY URNS QL MICRO: NORMAL /HPF
UROBILINOGEN UR QL STRIP.AUTO: 0.2 E.U./DL
WBC # BLD AUTO: 5.34 THOUSAND/UL (ref 4.31–10.16)
WBC #/AREA URNS AUTO: NORMAL /HPF

## 2022-11-16 RX ORDER — HYDROCODONE BITARTRATE AND ACETAMINOPHEN 5; 325 MG/1; MG/1
1 TABLET ORAL EVERY 6 HOURS PRN
Refills: 0 | Status: SHIPPED | OUTPATIENT
Start: 2022-11-16

## 2022-11-16 RX ORDER — HALOPERIDOL 5 MG/ML
2 INJECTION INTRAMUSCULAR ONCE
Status: COMPLETED | OUTPATIENT
Start: 2022-11-16 | End: 2022-11-16

## 2022-11-16 RX ORDER — DEXTROSE MONOHYDRATE 25 G/50ML
25 INJECTION, SOLUTION INTRAVENOUS ONCE
Status: COMPLETED | OUTPATIENT
Start: 2022-11-16 | End: 2022-11-16

## 2022-11-16 RX ORDER — MYCOPHENOLATE MOFETIL 250 MG/1
750 CAPSULE ORAL EVERY 12 HOURS SCHEDULED
Start: 2022-11-16

## 2022-11-16 RX ORDER — ASPIRIN 81 MG/1
81 TABLET ORAL
Status: DISCONTINUED | OUTPATIENT
Start: 2022-11-16 | End: 2022-11-16

## 2022-11-16 RX ORDER — CLOPIDOGREL BISULFATE 75 MG/1
75 TABLET ORAL DAILY
Status: DISCONTINUED | OUTPATIENT
Start: 2022-11-16 | End: 2022-11-16

## 2022-11-16 RX ORDER — SODIUM POLYSTYRENE SULFONATE 4.1 MEQ/G
15 POWDER, FOR SUSPENSION ORAL; RECTAL ONCE
Status: DISCONTINUED | OUTPATIENT
Start: 2022-11-16 | End: 2022-11-16

## 2022-11-16 RX ORDER — LANOLIN ALCOHOL/MO/W.PET/CERES
3 CREAM (GRAM) TOPICAL
Refills: 0
Start: 2022-11-16

## 2022-11-16 RX ORDER — LORAZEPAM 0.5 MG/1
0.5 TABLET ORAL ONCE
Status: DISCONTINUED | OUTPATIENT
Start: 2022-11-16 | End: 2022-11-20 | Stop reason: HOSPADM

## 2022-11-16 RX ORDER — DOCUSATE SODIUM 100 MG/1
100 CAPSULE, LIQUID FILLED ORAL 2 TIMES DAILY
Qty: 20 CAPSULE | Refills: 0
Start: 2022-11-16 | End: 2022-11-26

## 2022-11-16 RX ORDER — CEFTRIAXONE 1 G/50ML
1000 INJECTION, SOLUTION INTRAVENOUS EVERY 24 HOURS
Status: DISCONTINUED | OUTPATIENT
Start: 2022-11-16 | End: 2022-11-19

## 2022-11-16 RX ORDER — HEPARIN SODIUM 5000 [USP'U]/ML
5000 INJECTION, SOLUTION INTRAVENOUS; SUBCUTANEOUS EVERY 8 HOURS SCHEDULED
Refills: 0
Start: 2022-11-16 | End: 2022-11-20

## 2022-11-16 RX ORDER — ALBUTEROL SULFATE 2.5 MG/3ML
10 SOLUTION RESPIRATORY (INHALATION) ONCE
Status: COMPLETED | OUTPATIENT
Start: 2022-11-16 | End: 2022-11-16

## 2022-11-16 RX ORDER — LANOLIN ALCOHOL/MO/W.PET/CERES
6 CREAM (GRAM) TOPICAL
Status: DISCONTINUED | OUTPATIENT
Start: 2022-11-16 | End: 2022-11-20 | Stop reason: HOSPADM

## 2022-11-16 RX ORDER — ACETAMINOPHEN 325 MG/1
650 TABLET ORAL EVERY 6 HOURS PRN
Refills: 0
Start: 2022-11-16

## 2022-11-16 RX ADMIN — METOPROLOL TARTRATE 37.5 MG: 25 TABLET, FILM COATED ORAL at 09:23

## 2022-11-16 RX ADMIN — CLOPIDOGREL BISULFATE 75 MG: 75 TABLET ORAL at 11:01

## 2022-11-16 RX ADMIN — DEXTROSE MONOHYDRATE 25 ML: 25 INJECTION, SOLUTION INTRAVENOUS at 13:50

## 2022-11-16 RX ADMIN — HALOPERIDOL LACTATE 2 MG: 5 INJECTION, SOLUTION INTRAMUSCULAR at 16:28

## 2022-11-16 RX ADMIN — PRAVASTATIN SODIUM 20 MG: 20 TABLET ORAL at 21:27

## 2022-11-16 RX ADMIN — MYCOPHENOLATE MOFETIL 750 MG: 250 CAPSULE ORAL at 09:23

## 2022-11-16 RX ADMIN — ALBUTEROL SULFATE 10 MG: 2.5 SOLUTION RESPIRATORY (INHALATION) at 13:58

## 2022-11-16 RX ADMIN — HEPARIN SODIUM 5000 UNITS: 5000 INJECTION INTRAVENOUS; SUBCUTANEOUS at 05:20

## 2022-11-16 RX ADMIN — MYCOPHENOLATE MOFETIL 750 MG: 250 CAPSULE ORAL at 17:55

## 2022-11-16 RX ADMIN — Medication 1 TABLET: at 09:23

## 2022-11-16 RX ADMIN — Medication 1000 UNITS: at 09:23

## 2022-11-16 RX ADMIN — LEVOTHYROXINE SODIUM 112 MCG: 112 TABLET ORAL at 05:20

## 2022-11-16 RX ADMIN — CYCLOSPORINE 50 MG: 25 CAPSULE ORAL at 09:28

## 2022-11-16 RX ADMIN — HEPARIN SODIUM 5000 UNITS: 5000 INJECTION INTRAVENOUS; SUBCUTANEOUS at 21:26

## 2022-11-16 RX ADMIN — CEFTRIAXONE 1000 MG: 1 INJECTION, SOLUTION INTRAVENOUS at 17:56

## 2022-11-16 RX ADMIN — ASPIRIN 81 MG: 81 TABLET, COATED ORAL at 11:01

## 2022-11-16 RX ADMIN — CYCLOSPORINE 50 MG: 25 CAPSULE ORAL at 17:57

## 2022-11-16 RX ADMIN — METOPROLOL TARTRATE 37.5 MG: 25 TABLET, FILM COATED ORAL at 17:55

## 2022-11-16 RX ADMIN — CALCITONIN SALMON 1 SPRAY: 200 SPRAY, METERED NASAL at 09:27

## 2022-11-16 RX ADMIN — PANTOPRAZOLE SODIUM 40 MG: 40 TABLET, DELAYED RELEASE ORAL at 09:23

## 2022-11-16 RX ADMIN — INSULIN HUMAN 10 UNITS: 100 INJECTION, SOLUTION PARENTERAL at 13:49

## 2022-11-16 RX ADMIN — Medication 6 MG: at 21:26

## 2022-11-16 RX ADMIN — HALOPERIDOL LACTATE 2 MG: 5 INJECTION, SOLUTION INTRAMUSCULAR at 21:23

## 2022-11-16 NOTE — DISCHARGE SUMMARY
[address]  Discharge- Ceballos Ranks 1945, 64 y o  female MRN: [id number]  Unit/Bed#: [address] [place] [address] Encounter: [id number]  Primary Care Provider: Kristen Thao MD   Date and time admitted to hospital: 11/14/2022  1:01 PM    * Closed fracture of distal end of right fibula  Assessment & Plan  X-ray- right displaced fracture of distal fibula with disruption of the ankle mortise  · Repeat x-ray 11/15 showed persistent right ankle fracture subluxation and patient underwent close reduction and splinting  Repeat imaging showed acceptable reduction of the ankle fracture  · She is status post ORIF right ankle on 11/18  · Patient to be nonweightbearing of right lower extremity with splint in place and right lower extremity elevation while at rest  · Knee x-ray-unremarkable  · Pain meds p r n  No DVT prophylaxis on discharge per Ortho  · Follow-up with orthopedics in 2 weeks    Dementia with behavioral disturbance  Assessment & Plan  Initially Plan was for discharge on 11/16 however just prior to getting picked up for transfer, pt was noted to be agitated and screaming in the room  Patient was upset that she has to bury her mother  · Per staff member, patient was confused and agitated overnight as well  She then ripped out her IV and not allowing staff members to place new IV line in and was also refusing medications  · Discharge cancelled and UA obtained which showed moderate blood, small leukocytes, occasional bacteria, completed 3 days of IV Rocephin  · Symptoms most likely delirium superimposed on dementia  · CT head - age indeterminate likely old right lacunar infarct  MRI negative for acute CVA  · Per Psychiatry, started on Zyprexa 2 5 mg at bedtime and p o  Zyprexa p r n   Agitation  · Per Psychiatry once underlying factors related to delirium has cleared, she may be able to come off of Zyprexa    · Previously Requiring Haldol/Ativan and placed on soft restraints due to her being agitated, not directable and trying to pull off things & trying to get out of bed when not in restraints  Restraints has since been discontinued    Hyperkalemia-resolved as of 11/20/2022  Assessment & Plan  Acute,  · 6 3 initially after repeat, non hemolyzed  · Calcium gluconate 1 g, in the ED- insulin 8 units, dextrose in IV  · Potassium level mildly elevated again on 11/16  Insulin, dextrose, albuterol was given  · Potassium level now within normal limits  Low-potassium diet  · Repeat BMP at rehab  · CK-within normal limits    GERD (gastroesophageal reflux disease)  Assessment & Plan  · Prilosec substituted with Protonix while here  · Restart Prilosec on discharge    [address]  CT-abdomen/pelvis-no acute intra-abdominal or pelvic  · Knee x-ray-unremarkable  · Foot x-ray- displaced fracture of the distal fibula with disruption of the ankle mortise  · Being discharged to rehab    Osteopenia of multiple sites  Assessment & Plan  Noted on medical record,  · vitamin-D level 20  · Continue home vitamin-D and calcium    · Received calcitonin while here due to current fracture  · Outpatient DEXA scan recommended if within criteria    Chronic kidney insufficiency  Assessment & Plan  Lab Results   Component Value Date    EGFR 33 11/20/2022    EGFR 35 11/19/2022    EGFR 51 11/18/2022    CREATININE 1 48 (H) 11/20/2022    CREATININE 1 41 (H) 11/19/2022    CREATININE 1 04 11/18/2022     Elevated creatinine initially of 1 6, trended down and now stable around 1 4 range which is at her baseline  Repeat labs at rehab    Benign essential hypertension  Assessment & Plan  · BPs noted to be high at times and Lopressor dose was increased  · Blood pressures have improved  · Blood pressures partly elevated due to patient being agitated as well    Cerebrovascular accident (CVA) due to embolism [facility])  Assessment & Plan  Noted on Mar  · Okay to restart Plavix and aspirin (held for surgery) per Ortho    HLD (hyperlipidemia)  Assessment & Plan  Lipid panel - LDL 44  · Continue statin    Long-term use of immunosuppressant medication  Assessment & Plan  Continue CellCept, cyclosporine    Hypothyroidism  Assessment & Plan  · Continue levothyroxine 112 mcg    Medical Problems     Resolved Problems  Date Reviewed: 11/19/2022   None       Discharging Physician / Practitioner: Keshav Jain  PCP: Amy Anderson MD  Admission Date:   Admission Orders (From admission, onward)     Ordered        11/14/22 [facility]  Once                      Discharge Date: 11/20/22    Consultations During Hospital Stay:  Orthopedics  Cardiology    Procedures Performed:   · Close reduction and splinting  · ORIF right ankle    Significant Findings / Test Results:   · None    Incidental Findings:   · none    Test Results Pending at Discharge (will require follow up):   · none     Outpatient Tests Requested:  · BMP, CBC    Complications:  none    Reason for Admission: Fall, left ankle pain    Hospital Course: Yair Arevalo is a 64 y o  female patient who originally presented to the hospital on 11/14/2022 due to Fall  Patient was on the toilet while picking up a call from the floor slipped off the toilet seat and fell to the floor  No loss of consciousness  On imaging patient noted to displaced distal fibular fracture with disruption of ankle mortise and on lab work was noted to be hyperkalemic  Patient was seen by Orthopedics while here and underwent close reduction and splinting  Patient was also noted to be agitated/confused, non directable at times during hospitalization  Psychiatry was consulted and started on Zyprexa  Patient also underwent ORIF right ankle while here  Patient cleared by Ortho for discharge  Patient to follow up with Dr Jordyn Garrido in next 2 weeks    Patient's medications were reviewed with significant other on day of discharge    Please see above list of diagnoses and related plan for additional information  Condition at Discharge: stable    Discharge Day Visit / Exam:   Subjective:  Patient reports right foot pain  Denies any shortness of breath, chest pain    Vitals: Blood Pressure: 127/51 (11/20/22 0743)  Pulse: 74 (11/20/22 0743)  Temperature: 97 6 °F (36 4 °C) (11/20/22 0743)  Temp Source: Oral (11/20/22 0743)  Respirations: 16 (11/20/22 0743)  Height: 5' 2" (157 5 cm) (11/14/22 2006)  Weight - Scale: 96 1 kg (211 lb 13 8 oz) (11/14/22 2006)  SpO2: 97 % (11/20/22 0743)  Exam:   Physical Exam  Constitutional:       General: She is not in acute distress  Appearance: She is well-developed  HENT:      Head: Normocephalic and atraumatic  Eyes:      General: No scleral icterus  Cardiovascular:      Rate and Rhythm: Normal rate and regular rhythm  Pulmonary:      Effort: Pulmonary effort is normal  No respiratory distress  Breath sounds: Normal breath sounds  No wheezing or rales  Abdominal:      General: Bowel sounds are normal  There is no distension  Palpations: Abdomen is soft  Tenderness: There is no abdominal tenderness  Musculoskeletal:      Comments: Right lower extremity with splint/Ace wrap in place   Neurological:      Mental Status: She is alert and oriented to person, place, and time  Discussion with Family: Updated  (significant other) at bedside  Updated daughter via phone    Discharge instructions/Information to patient and family:   See after visit summary for information provided to patient and family  Provisions for Follow-Up Care:  See after visit summary for information related to follow-up care and any pertinent home health orders  Disposition:   Other [facility] at [address] [place] [address]    Planned Readmission: no     Discharge Statement:  I spent > 30 minutes discharging the patient  This time was spent on the day of discharge  I had direct contact with the patient on the day of discharge   Greater than 50% of the total time was spent examining patient, answering all patient questions, arranging and discussing plan of care with patient as well as directly providing post-discharge instructions  Additional time then spent on discharge activities  Discharge Medications:  See after visit summary for reconciled discharge medications provided to patient and/or family        **Please Note: This note may have been constructed using a voice recognition system**

## 2022-11-16 NOTE — ASSESSMENT & PLAN NOTE
Noted on medical record,  · vitamin-D level 20  · Restarted home vitamin-D and calcium  · Continue calcitonin due to current fractures  · Outpatient DEXA scan recommended if within criteria

## 2022-11-16 NOTE — [place]  (ALL ITEMS MUST BE COMPLETED)    1  TRANSFER FROM: [address]: [facility]    2  DATE OF TRANSFER: 11/16/2022                        TIME OF TRANSFER: 1400    3  PATIENT NAME: JANETT Tiwari      YOB: 1945                             GENDER: female    4  LANGUAGE:   English    5  PHYSICIAN NAME:  Marsimi Barron DO                   PHONE: [phone]    6  CODE STATUS: Level 1 - Full Code        Out of Hospital DNR Attached: No    7  :                                      :  Extended Emergency Contact Information  Primary Emergency Contact: Rae Lakhani  Address: [address] [place] [address] [address] [facility] Phone: [phone]  Mobile Phone: [phone]  Relation: Daughter  Secondary Emergency Contact: Luis Borden  Address: [address] [place] [address] [address] [facility] Phone: [phone]  Mobile Phone: [phone]  Relation: Significant Other           Health Care Representative/Proxy:  No           Legal Guardian:  No             NAME OF:           HEALTH CARE REPRESENTATIVE/PROXY:                                         OR           LEGAL GUARDIAN, IF NOT :                                               PHONE:  (Day)           (Night)                        (Cell)    8  REASON FOR TRANSFER: (Must include brief medical history and recent changes in physical function or cognition ) unable to ambulate, confusion            V/S: /70   Pulse 98   Temp 98 5 °F (36 9 °C)   Resp 18   Ht 5' 2" (1 575 m)   Wt 96 1 kg (211 lb 13 8 oz)   SpO2 99%   BMI 38 75 kg/m²           PAIN: None    9  PRIMARY DIAGNOSIS: Closed fracture of distal end of right fibula      Secondary Diagnosis:         Pacemaker: No      Internal Defib: No          Mental Health Diagnosis (if Applicable):    10  RESTRAINTS: No     11  RESPIRATORY NEEDS: None    12  ISOLATION/PRECAUTION: None    13  ALLERGY: Tetracyclines & related, Vancomycin, and Prograf [tacrolimus]    14  SENSORY:       Vision Glasses    15  SKIN CONDITION: No Wounds    16  DIET: Regular    17  IV ACCESS: None    18  PERSONAL ITEMS SENT WITH PATIENT: Glasses    19  ATTACHED DOCUMENTS: MUST ATTACH CURRENT MEDICATION INFORMATION Face Sheet    20  AT RISK ALERTS:Falls        HARM TO: N/A    21  WEIGHT BEARING STATUS:         Left Leg: Full        Right Leg: None    22  MENTAL STATUS:Forgetful    23  FUNCTION:        Walk: Not Able        Transfer: With Help        Toilet: Not Able        Feed: Self    24  IMMUNIZATIONS/SCREENING:     Immunization History   Administered Date(s) Administered   • COVID-19 MODERNA VACC 0 5 ML IM 01/21/2021, 02/19/2021, 08/31/2021   • H1N1, All Formulations 10/31/2009   • Hep A, adult 11/19/1996, 05/21/1997   • INFLUENZA 11/01/2018, 11/01/2021   • Influenza Split High Dose Preservative Free IM 10/11/2012, 09/25/2013, 10/31/2014, 09/03/2015, 09/29/2016, 10/17/2017   • Influenza, high dose seasonal 0 7 mL 10/11/2019, 10/26/2020, 11/14/2022   • Influenza, seasonal, injectable 10/07/2005, 10/03/2006, 10/25/2007, 09/16/2008, 09/09/2009, 09/22/2010, 09/14/2011   • Pneumococcal Conjugate 13-Valent 06/30/2015   • Pneumococcal Polysaccharide PPV23 09/30/2006, 09/14/2011   • Tdap 03/13/2018       25  BOWEL: Continent and Date Last BM11 13    29  BLADDER: Incontinent    27   SENDING FACILITY CONTACT: Ever Oneill                  Title: RN        Unit: 2Sout        Phone: [phone]          [address] (if known):        Title:        Unit:         Phone:         FORM PREFILLED BY (if applicable)       Title:       Unit:        Phone:         FORM COMPLETED BY Ashlee Stewart RN      Title: RN      Phone: [phone]

## 2022-11-16 NOTE — ASSESSMENT & PLAN NOTE
· X-ray- right displaced fracture of distal fibula with disruption of the ankle mortise  · Repeat x-ray today showed persistent right ankle fracture subluxation and patient underwent close reduction and splinting  Repeat imaging showed acceptable reduction of the ankle fracture    · Per Ortho patient will need surgical intervention of distal fibular fracture however due to significant swelling this will be done on an outpatient basis  · Patient to be nonweightbearing of right lower extremity with splint in place and right lower extremity elevation while at rest  · Knee x-ray-unremarkable  · Pain management

## 2022-11-16 NOTE — ASSESSMENT & PLAN NOTE
Noted on Mar  · Holding Plavix and aspirin due to current fracture, heparin injection  · Restart once okay by Ortho

## 2022-11-16 NOTE — [place] UNIV[place]  (ALL ITEMS MUST BE COMPLETED)    1  [facility] [address] [facility]      TRANSFER TO: 1400    2  DATE OF TRANSFER: 11/16/2022                        TIME OF TRANSFER: janine    3  PATIENT NAME: JANETT Hillman      YOB: 1945                             GENDER: female    4  LANGUAGE:   English    5  PHYSICIAN NAME:  Geetha Frazier DO                   PHONE: [phone]    6  CODE STATUS: Level 1 - Full Code        Out of Hospital DNR Attached: No    7  :                                      :  Extended Emergency Contact Information  Primary Emergency Contact: Rae Lakhani  Address: [address] [place] [address] [address]  Phone: [phone]  Mobile Phone: [phone]  Relation: Daughter  Secondary Emergency Contact: Luis Borden  Address: [address] [place] [address] [address]  Phone: [phone]  Mobile Phone: [phone]  Relation: Significant Other           Health Care Representative/Proxy:  No           Legal Guardian:  No             NAME OF:           HEALTH CARE REPRESENTATIVE/PROXY:                                         OR           LEGAL GUARDIAN, IF NOT :                                               PHONE:  (Day)           (Night)                        (Cell)    8  REASON FOR TRANSFER: (Must include brief medical history and recent changes in physical function or cognition ) weakness            V/S: /70   Pulse 98   Temp 98 5 °F (36 9 °C)   Resp 18   Ht 5' 2" (1 575 m)   Wt 96 1 kg (211 lb 13 8 oz)   SpO2 99%   BMI 38 75 kg/m²           PAIN: None    9  PRIMARY DIAGNOSIS: Closed fracture of distal end of right fibula      Secondary Diagnosis:         Pacemaker: No      Internal Defib: No          Mental Health Diagnosis (if Applicable):    10  RESTRAINTS: No     11  RESPIRATORY NEEDS: None    12  ISOLATION/PRECAUTION: None    13  ALLERGY: Tetracyclines & related, Vancomycin, and Prograf [tacrolimus]    14  SENSORY:       Vision Good, Hearing Good  and Speech Clear    15  SKIN CONDITION: No Wounds    16  DIET: Regular    17  IV ACCESS: None    18  PERSONAL ITEMS SENT WITH PATIENT: None    19  ATTACHED DOCUMENTS: MUST ATTACH CURRENT MEDICATION INFORMATION Face Sheet, MAR, Medication Reconciliation and Discharge Summary    20  AT RISK ALERTS:Falls and Pressure Ulcer        HARM TO: N/A    21  WEIGHT BEARING STATUS:         Left Leg: None        Right Leg: None    22  MENTAL STATUS:Alert    23  FUNCTION:        Walk: With Help        Transfer: With Help        Toilet: With Help        Feed: Self    24  IMMUNIZATIONS/SCREENING:     Immunization History   Administered Date(s) Administered   • COVID-19 MODERNA VACC 0 5 ML IM 01/21/2021, 02/19/2021, 08/31/2021   • H1N1, All Formulations 10/31/2009   • Hep A, adult 11/19/1996, 05/21/1997   • INFLUENZA 11/01/2018, 11/01/2021   • Influenza Split High Dose Preservative Free IM 10/11/2012, 09/25/2013, 10/31/2014, 09/03/2015, 09/29/2016, 10/17/2017   • Influenza, high dose seasonal 0 7 mL 10/11/2019, 10/26/2020, 11/14/2022   • Influenza, seasonal, injectable 10/07/2005, 10/03/2006, 10/25/2007, 09/16/2008, 09/09/2009, 09/22/2010, 09/14/2011   • Pneumococcal Conjugate 13-Valent 06/30/2015   • Pneumococcal Polysaccharide PPV23 09/30/2006, 09/14/2011   • Tdap 03/13/2018       25  BOWEL: Continent    26  BLADDER: Incontinent    27   [facility]                  Title: rn        Unit: 2        Phone: [phone] [address] (if known):        Title:        Unit:         Phone:         FORM PREFILLED BY (if applicable)       Title:       Unit:        Phone:         Karlene Costa RN      Title: [id number]      Phone: [phone]

## 2022-11-16 NOTE — ASSESSMENT & PLAN NOTE
CT-abdomen/pelvis-no acute intra-abdominal or pelvic  · Knee x-ray-unremarkable  · Foot x-ray-Displaced fracture of the distal fibula with disruption of the ankle mortise  · Pain meds p r n   Breakthrough

## 2022-11-16 NOTE — ASSESSMENT & PLAN NOTE
Noted on Mar  · Restarted Plavix and aspirin per Ortho but will discontinue for OR on Friday per Ortho

## 2022-11-16 NOTE — ASSESSMENT & PLAN NOTE
Acute,  · 6 3 after repeat, non hemolyzed  · Calcium gluconate 1 g, in the ED- insulin 8 units, dextrose in IV  · Potassium level now within normal limits  · CK-within normal limits

## 2022-11-16 NOTE — PHYSICAL THERAPY NOTE
PT TREATMENT       11/16/22 1034   PT Last Visit   PT Visit Date 11/16/22   Note Type   Note Type Treatment   Pain Assessment   Pain Assessment Tool 0-10   Pain Score No Pain   Pain Onset/Description Onset: Ongoing   Effect of Pain on Daily Activities limits mobility   Patient's Stated Pain Goal No pain   Hospital Pain Intervention(s) Rest;Repositioned;Elevated   Multiple Pain Sites No   Restrictions/Precautions   Weight Bearing Precautions Per Order Yes   RLE Weight Bearing Per Order (S)  NWB   Braces or Orthoses Other (Comment)  (Splint)   Other Precautions Bed Alarm; Chair Alarm; Fall Risk;Pain;Cognitive   General   Chart Reviewed Yes   Family/Caregiver Present No   Cognition   Overall Cognitive Status WFL   Arousal/Participation Cooperative   Orientation Level Oriented X4   Following Commands Follows all commands and directions without difficulty   Subjective   Subjective "If i see her again i'll make sure to give her attitude "   Bed Mobility   Supine to Sit 4  Minimal assistance   Additional items Assist x 1;Verbal cues   Additional Comments Pt transferred to bedside chair   Transfers   Sit to Stand 4  Minimal assistance   Additional items Assist x 1;Verbal cues   Stand to Sit 4  Minimal assistance   Additional items Assist x 1;Verbal cues   Stand pivot 3  Moderate assistance   Additional items Assist x 1;Verbal cues   Additional Comments Pt able to perform sit <> stand x 3 attempts with shuffling step to get to bedside chair   Ambulation/Elevation   Gait pattern Shuffling; Improper Weight shift   Gait Assistance 3  Moderate assist   Additional items Assist x 1;Verbal cues   Assistive Device Rolling walker   Distance 1 foot to chair   Stair Management Assistance Not tested   Balance   Static Sitting Fair +   Dynamic Sitting Fair   Static Standing Fair -   Dynamic Standing Poor +   Ambulatory Poor   Activity Tolerance   Activity Tolerance Patient tolerated treatment well;Patient limited by pain   Nurse Made Aware yes   Exercises   Neuro re-ed Pt able to perform seated exercise including ankle pumps, LAQ, quad/glute sets x 10 reps bilat (AP L only)   Assessment   Prognosis Good   Problem List Decreased strength;Decreased range of motion;Decreased endurance; Impaired balance;Decreased mobility; Decreased cognition;Decreased safety awareness; Impaired judgement; Impaired sensation;Decreased skin integrity;Orthopedic restrictions;Pain   Assessment Pt agreeable to PT session this AM  Pt on phone with son upon arrival - upset about AM food tray - per discussion with son patient with intermittent confusion  Discussed with charge RN who was stated she would look into situation  Pt able to perform bed mobility to edge of bed with Min A, mostly for LE management  Pt able to perform sit <> stand from edge of bed x 3 times with Min/Mod A requires on 4th attempt to get to bedside chair  The patient's AM-PAC Basic Mobility Inpatient Short Form Raw Score is 13  A Raw score of less than or equal to 16 suggests the patient may benefit from discharge to post-acute rehabilitation services  Please also refer to the recommendation of the Physical Therapist for safe discharge planning  Goals   Patient Goals move better   Plan   Treatment/Interventions ADL retraining;Functional transfer training;LE strengthening/ROM; Therapeutic exercise; Endurance training;Bed mobility;Gait training;Spoke to nursing   Progress Progressing toward goals   PT Frequency 5-7x/wk   Recommendation   PT Discharge Recommendation Post acute rehabilitation services   AM-PAC Basic Mobility Inpatient   Turning in Bed Without Bedrails 3   Lying on Back to Sitting on Edge of Flat Bed 3   Moving Bed to Chair 2   Standing Up From Chair 2   Walk in Room 2   Climb 3-5 Stairs 1   Basic Mobility Inpatient Raw Score 13   Basic Mobility Standardized Score 33 99   Highest Level Of Mobility   -HLM Goal 4: Move to chair/commode   -HLM Achieved 4: Move to chair/commode   End of Consult   Patient Position at End of Consult Bedside chair;Bed/Chair alarm activated; All needs within reach   [employer] [place] Insurance Number  [place] [id number]

## 2022-11-16 NOTE — PROGRESS NOTES
Wendy 43  Progress Note Isabel Son 1945, 64 y o  female MRN: [id number]  Unit/Bed#: [address] [place] [address] Encounter: [id number]  Primary Care Provider: eSrina Faye MD   Date and time admitted to hospital: 11/14/2022  1:01 PM    * Closed fracture of distal end of right fibula  Assessment & Plan  X-ray- right displaced fracture of distal fibula with disruption of the ankle mortise  · Repeat x-ray 11/15 showed persistent right ankle fracture subluxation and patient underwent close reduction and splinting  Repeat imaging showed acceptable reduction of the ankle fracture  · Initially plan was surgical intervention of distal fibular fracture on outpatient basis due to significant swelling however per Ortho will be tentatively scheduled for Friday  · Patient to be nonweightbearing of right lower extremity with splint in place and right lower extremity elevation while at rest  · Knee x-ray-unremarkable  · Pain management  DVT prophylaxis with heparin subQ    Encephalopathy  Assessment & Plan  Plan was for discharge today however just prior to getting picked up by transfer for patient was noted to be agitated and screaming in the room  Patient upset that she has to bury her mother  · Per staff member, patient was confused and agitated overnight as well  · Patient then ripped out her IV and not allowing staff members to place new IV line in and was also refusing medications  · Discharge cancelled and UA obtained which shows moderate blood, small leukocytes, occasional bacteria  · Started on IV Rocephin pending urine culture  · Will obtain CT head  · Likely related to underlying dementia with delirium    Pain medications may be playing a role as well  · If no significant improvement may need Psychiatry involvement  · Required IM Haldol and placed on soft restraints    Hyperkalemia  Assessment & Plan  Acute,  · 6 3 initially after repeat, non hemolyzed  · Calcium gluconate 1 g, in the ED- insulin 8 units, dextrose in IV  · Potassium level mildly elevated today  Insulin, dextrose, albuterol ordered   · Repeat BMP  Low-potassium diet  · CK-within normal limits    GERD (gastroesophageal reflux disease)  Assessment & Plan  · Prilosec substituted with Protonix    Fall  Assessment & Plan  CT-abdomen/pelvis-no acute intra-abdominal or pelvic  · Knee x-ray-unremarkable  · Foot x-ray-Displaced fracture of the distal fibula with disruption of the ankle mortise  · Pain meds p r n    Osteopenia of multiple sites  Assessment & Plan  Noted on medical record,  · vitamin-D level 20  · Continue home vitamin-D and calcium  · Received calcitonin while here due to current fractures  · Outpatient DEXA scan recommended if within criteria    Dementia [facility])  Assessment & Plan  Chronic  · Not on any medications at home    Chronic kidney insufficiency  Assessment & Plan  Lab Results   Component Value Date    EGFR 46 11/16/2022    EGFR 39 11/15/2022    EGFR 30 11/14/2022    CREATININE 1 14 11/16/2022    CREATININE 1 30 11/15/2022    CREATININE 1 60 (H) 11/14/2022     Elevated creatinine initially of 1 6, trending down  · Creatinine trended down  · Repeat lab work     Benign essential hypertension  Assessment & Plan  · Continue Lopressor  Cerebrovascular accident (CVA) due to embolism [facility])  Assessment & Plan  Noted on Mar  · Restarted Plavix and aspirin per Ortho but will discontinue for OR on Friday per Ortho    HLD (hyperlipidemia)  Assessment & Plan  Lipid panel - LDL 43  · Continue home pravastatin    Long-term use of immunosuppressant medication  Assessment & Plan  continue home CellCept, cyclosporine    Hypothyroidism  Assessment & Plan  Chronic,  · Continue levothyroxine 112 mcg      VTE Pharmacologic Prophylaxis: VTE Score: 10 High Risk (Score >/= 5) - Pharmacological DVT Prophylaxis Ordered: heparin  Sequential Compression Devices Ordered      Patient Centered Rounds: I performed bedside rounds with nursing staff today   Discussions with Specialists or Other Care Team Provider: yes - ortho    Education and Discussions with Family / Patient: Updated  (significant other) at bedside  Also spoke with daughter over the phone    Time Spent for Care: 60 minutes  More than 50% of total time spent on counseling and coordination of care as described above  Current Length of Stay: 2 day(s)  Current Patient Status: Inpatient   Certification Statement: The patient will continue to require additional inpatient hospital stay due to Hyperkalemia, encephalopathy, right fibular fracture requiring OR intervention  Discharge Plan: Anticipate discharge in >72 hrs to home  Code Status: Level 3 - DNAR and DNI    Subjective:     Patient seen earlier in the day and she was alert, oriented and cooperative and answering questions appropriately  Just prior to getting picked up for discharge, patient noted to become extremely agitated, confused and screaming    Objective:     Vitals:   Temp (24hrs), Av 4 °F (36 9 °C), Min:97 4 °F (36 3 °C), Max:99 1 °F (37 3 °C)    Temp:  [97 4 °F (36 3 °C)-99 1 °F (37 3 °C)] 97 4 °F (36 3 °C)  HR:  [74-98] 81  Resp:  [15-20] 15  BP: (130-163)/(51-70) 151/67  SpO2:  [95 %-100 %] 99 %  Body mass index is 38 75 kg/m²  Input and Output Summary (last 24 hours): Intake/Output Summary (Last 24 hours) at 2022 2100  Last data filed at 2022 0801  Gross per 24 hour   Intake --   Output 500 ml   Net -500 ml       Physical Exam:   Physical Exam  Constitutional:       General: She is not in acute distress  Appearance: She is well-developed  HENT:      Head: Normocephalic and atraumatic  Eyes:      General: No scleral icterus  Cardiovascular:      Rate and Rhythm: Normal rate and regular rhythm  Pulmonary:      Effort: Pulmonary effort is normal  No respiratory distress  Breath sounds: Normal breath sounds  No wheezing or rales     Abdominal:      General: Bowel sounds are normal  There is no distension  Palpations: Abdomen is soft  Tenderness: There is no abdominal tenderness  Musculoskeletal:      Comments: Right lower extremity with Ace wrap in place   Neurological:      Mental Status: She is alert  Psychiatric:         Attention and Perception: She is inattentive  Mood and Affect: Affect is angry  Behavior: Behavior is agitated  Judgment: Judgment is impulsive  Additional Data:     Labs:  Results from last 7 days   Lab Units 11/16/22  0946   WBC Thousand/uL 5 34   HEMOGLOBIN g/dL 9 1*   HEMATOCRIT % 29 0*   PLATELETS Thousands/uL 174   NEUTROS PCT % 75   LYMPHS PCT % 12*   MONOS PCT % 10   EOS PCT % 2     Results from last 7 days   Lab Units 11/16/22  0946   SODIUM mmol/L 134*   POTASSIUM mmol/L 5 5*   CHLORIDE mmol/L 107   CO2 mmol/L 21   BUN mg/dL 19   CREATININE mg/dL 1 14   ANION GAP mmol/L 6   CALCIUM mg/dL 8 2*   ALBUMIN g/dL 2 7*   TOTAL BILIRUBIN mg/dL 0 33   ALK PHOS U/L 160*   ALT U/L 24   AST U/L 24   GLUCOSE RANDOM mg/dL 114         Results from last 7 days   Lab Units 11/15/22  2039 11/15/22  1102 11/15/22  0757   POC GLUCOSE mg/dl 104 108 111               Lines/Drains:  Invasive Devices     Peripheral Intravenous Line  Duration           Peripheral IV 11/16/22 Left Antecubital <1 day                Imaging: No pertinent imaging reviewed      Recent Cultures (last 7 days):         Last 24 Hours Medication List:   Current Facility-Administered Medications   Medication Dose Route Frequency Provider Last Rate   • acetaminophen  650 mg Oral PRN Margie Cesar MD     • calcitonin (salmon)  1 spray Alternating Nares Daily Margie Cesar MD     • calcium carbonate-vitamin D  1 tablet Oral Daily With Breakfast Margie Cesar MD     • cefTRIAXone  1,000 mg Intravenous Q24H Keerthi Krishnaar, DO 1,000 mg (11/16/22 7466)   • cholecalciferol  1,000 Units Oral Daily Margie Cesar MD     • cycloSPORINE modified  50 mg Oral BID Keerthi Quach DO     • docusate sodium  100 mg Oral BID PRN Kristy Zhu MD     • heparin (porcine)  5,000 Units Subcutaneous [place], [place]     • HYDROcodone-acetaminophen  1 tablet Oral Q6H PRN Kristy Zhu MD     • levothyroxine  112 mcg Oral Early Morning Kristy Zhu MD     • LORazepam  0 5 mg Oral Once Keerthi Quach DO     • melatonin  6 mg Oral HS Keerthi Quach DO     • metoprolol tartrate  37 5 mg Oral BID Kristy Zhu MD     • mycophenolate  750 mg Oral BID Kristy Zhu MD     • ondansetron  4 mg Intravenous Q6H PRN Kristy Zhu MD     • pantoprazole  40 mg Oral Daily Kristy Zhu MD     • polyethylene glycol  17 g Oral BID PRN Kristy Zhu MD     • pravastatin  20 mg Oral HS Kristy Zhu MD          Today, Patient Was Seen By: Fransisco Pompa DO    **Please Note: This note may have been constructed using a voice recognition system  **

## 2022-11-16 NOTE — ASSESSMENT & PLAN NOTE
Noted on medical record,  · vitamin-D level 20  · Continue home vitamin-D and calcium  · Received calcitonin while here due to current fractures  · Outpatient DEXA scan recommended if within criteria

## 2022-11-16 NOTE — ASSESSMENT & PLAN NOTE
X-ray- right displaced fracture of distal fibula with disruption of the ankle mortise  · Repeat x-ray 11/15 showed persistent right ankle fracture subluxation and patient underwent close reduction and splinting  Repeat imaging showed acceptable reduction of the ankle fracture  · Initially plan was surgical intervention of distal fibular fracture on outpatient basis due to significant swelling however per Ortho will be tentatively scheduled for Friday  · Patient to be nonweightbearing of right lower extremity with splint in place and right lower extremity elevation while at rest  · Knee x-ray-unremarkable  · Pain management    DVT prophylaxis with heparin subQ

## 2022-11-16 NOTE — ASSESSMENT & PLAN NOTE
CT-abdomen/pelvis-no acute intra-abdominal or pelvic  · Knee x-ray-unremarkable  · Foot x-ray-Displaced fracture of the distal fibula with disruption of the ankle mortise    · Pain meds p r n

## 2022-11-16 NOTE — ASSESSMENT & PLAN NOTE
Lab Results   Component Value Date    EGFR 46 11/16/2022    EGFR 39 11/15/2022    EGFR 30 11/14/2022    CREATININE 1 14 11/16/2022    CREATININE 1 30 11/15/2022    CREATININE 1 60 (H) 11/14/2022     Elevated creatinine initially of 1 6, trending down  · Creatinine trended down  · Repeat lab work

## 2022-11-16 NOTE — ASSESSMENT & PLAN NOTE
Acute,  · 6 3 initially after repeat, non hemolyzed  · Calcium gluconate 1 g, in the ED- insulin 8 units, dextrose in IV  · Potassium level mildly elevated today  Insulin, dextrose, albuterol ordered   · Repeat BMP    Low-potassium diet  · CK-within normal limits

## 2022-11-16 NOTE — CASE MANAGEMENT
Case Management Discharge Planning Note    Patient name Dearcleo Smyth  Location [address] 68 [address] MRN [id number]  : 1945 Date 2022       Current Admission Date: 2022  Current Admission Diagnosis:Closed fracture of distal end of right fibula   Patient Active Problem List    Diagnosis Date Noted   • Hyperkalemia 2022   • Fall 2022   • Closed fracture of distal end of right fibula 2022   • GERD (gastroesophageal reflux disease)    • Stage 3b chronic kidney disease ([facility] ) 2022   • REZA (acute kidney injury) ([place] [address] ) 2021   • Obesity, morbid ([place] [address] ) 2021   • Osteopenia of multiple sites 2020   • Other fatigue 2020   • Snoring 2020   • Chronic left-sided thoracic back pain 2019   • Left ovarian cyst 2019   • Dementia ([place] [address] ) 2018   • Asymptomatic stenosis of left carotid artery 2018   • Abnormal EEG 2017   • Altered mental status 2017   • Cerebrovascular accident (CVA) due to embolism ([place] [address] ) 2017   • Non-ST elevation myocardial infarction (NSTEMI), type 2 2017   • History of seizure 10/31/2017   • Paroxysmal SVT (supraventricular tachycardia) ([place] [address] ) 10/31/2017   • TIA (transient ischemic attack) 10/29/2017   • HLD (hyperlipidemia) 10/29/2017   • Cyst of diaphragm 2017   • Long-term use of immunosuppressant medication 2016   • Anemia 2016   • Liver transplanted ([facility] ) 2016   • Hypothyroidism 2016   • Elevated homocysteine 2016   • Chronic joint pain 2016   • Lung nodule, solitary 2016   • Sciatica 2015   • Hypercalcemia 2015   • Ataxic gait 2013   • Chronic kidney insufficiency 2013   • Benign essential hypertension 2012   • Ulcerative colitis without complications ([place] [address] )       LOS (days): 2  Geometric Mean LOS (GMLOS) (days): 2 70  Days to GMLOS:0 9     OBJECTIVE:  Risk of Unplanned Readmission Score: 23 4 Current admission status: Inpatient   Preferred Pharmacy:   [facility]  [address], [address] [address]  Phone: [phone] Fax: [phone]    [facility] [address]  [place] [address]  Phone: [phone] Fax: [phone]    Primary Care Provider: Misty Bowens MD    Primary Insurance: MEDICARE  Secondary Insurance: COMMERCIAL MISCELLANEOUS    DISCHARGE DETAILS:    Discharge planning discussed with[de-identified] patient and spouse  Freedom of Choice: Yes     CM contacted family/caregiver?: Yes  Were Treatment Team discharge recommendations reviewed with patient/caregiver?: Yes  Did patient/caregiver verbalize understanding of patient care needs?: N/A- going to facility  Were patient/caregiver advised of the risks associated with not following Treatment Team discharge recommendations?: Yes    Contacts  Patient Contacts: Patient's spouse  Relationship to Patient[de-identified] Family  Contact Method: In Person  Reason/Outcome: Continuity of Care, Emergency Contact, Discharge [address]         Is the patient interested in [address] at discharge?: No    DME Referral Provided  Referral made for DME?: No    Other Referral/Resources/Interventions Provided:  Referral Comments: [facility] able to accept for inpatient rehab    Treatment Team Recommendation: Short Term Rehab  Discharge Destination Plan[de-identified] Short Term Rehab  Transport at Discharge : [facility]  Dispatcher Contacted: Yes  Number/Name of Dispatcher: [facility]  Transported by [employer] and Unit #): [place]  ETA of Transport (Date): 11/16/22  ETA of Transport (Time): 1400     IMM Given (Date):: 11/16/22  IMM Given to[de-identified] Patient  Family notified[de-identified] Patient's spouse present at bedside     CM updated per SLIM, patient is medically stable for discharge today to inpatient rehab  CM sent update to [facility]   Facility is able to accept for inpatient rehab today      CM sent referral to [facility] for transport  Patient's transport time is 1400 with [facility]  CM updated SLIM, nursing and receiving facility with transport time  CM spoke with patient and spouse at the bedside  CM introduced self and role  Patient and spouse updated per SLIM, she is medically stable for discharge to [facility] today  Patient and spouse updated transport is set up for 1400 today to facility  All questions/concerns answered at this time  CM reviewed with patient and spouse at bedside re:IMM  Patient expressed understanding of her Medicare rights  IMM reviewed with patient and caregiver, patient agrees with discharge determination

## 2022-11-16 NOTE — PLAN OF CARE
Problem: Potential for Falls  Goal: Patient will remain free of falls  Description: INTERVENTIONS:  - Educate patient/family on patient safety including physical limitations  - Instruct patient to call for assistance with activity   - Consult OT/PT to assist with strengthening/mobility   - Keep Call bell within reach  - Keep bed low and locked with side rails adjusted as appropriate  - Keep care items and personal belongings within reach  - Initiate and maintain comfort rounds  - Make Fall Risk Sign visible to staff  - Offer Toileting every 2 Hours, in advance of need  - Initiate/Maintain bed alarm  - Obtain necessary fall risk management equipment:   Problem: MOBILITY - ADULT  Goal: Maintain or return to baseline ADL function  Description: INTERVENTIONS:  -  Assess patient's ability to carry out ADLs; assess patient's baseline for ADL function and identify physical deficits which impact ability to perform ADLs (bathing, care of mouth/teeth, toileting, grooming, dressing, etc )  - Assess/evaluate cause of self-care deficits   - Assess range of motion  - Assess patient's mobility; develop plan if impaired  - Assess patient's need for assistive devices and provide as appropriate  - Encourage maximum independence but intervene and supervise when necessary  - Involve family in performance of ADLs  - Assess for home care needs following discharge   - Consider OT consult to assist with ADL evaluation and planning for discharge  - Provide patient education as appropriate  Outcome: Progressing  Goal: Maintains/Returns to pre admission functional level  Description: INTERVENTIONS:  - Perform BMAT or MOVE assessment daily    - Set and communicate daily mobility goal to care team and patient/family/caregiver  - Collaborate with rehabilitation services on mobility goals if consulted  - Perform Range of Motion 3 times a day  - Reposition patient every 2 hours    - Dangle patient 3 times a day  - Stand patient 3 times a day  - Ambulate patient 3 times a day  - Out of bed to chair 3 times a day   - Out of bed for meals 3 times a day  - Out of bed for toileting  - Record patient progress and toleration of activity level   Outcome: Progressing     - Apply yellow socks and bracelet for high fall risk patients  - Consider moving patient to room near nurses station  Outcome: Progressing

## 2022-11-16 NOTE — ASSESSMENT & PLAN NOTE
Lab Results   Component Value Date    EGFR 39 11/15/2022    EGFR 30 11/14/2022    EGFR 54 08/10/2022    CREATININE 1 30 11/15/2022    CREATININE 1 60 (H) 11/14/2022    CREATININE 0 99 08/10/2022     Elevated creatinine initially of 1 6, trending down  · Discontinue IV fluids, avoid nephrotoxins  · Repeat lab work in a m

## 2022-11-16 NOTE — CASE MANAGEMENT
Case Management Discharge Planning Note    Patient name Kumar Almazan  Location [address] 26 MRN [id number]  : 1945 Date 2022       Current Admission Date: 2022  Current Admission Diagnosis:Closed fracture of distal end of right fibula   Patient Active Problem List    Diagnosis Date Noted   • Hyperkalemia 2022   • Fall 2022   • Closed fracture of distal end of right fibula 2022   • GERD (gastroesophageal reflux disease)    • Stage 3b chronic kidney disease ([facility] ) 2022   • REZA (acute kidney injury) ([place] [address] ) 2021   • Obesity, morbid ([place] [address] ) 2021   • Osteopenia of multiple sites 2020   • Other fatigue 2020   • Snoring 2020   • Chronic left-sided thoracic back pain 2019   • Left ovarian cyst 2019   • Dementia ([place] [address] ) 2018   • Asymptomatic stenosis of left carotid artery 2018   • Abnormal EEG 2017   • Altered mental status 2017   • Cerebrovascular accident (CVA) due to embolism ([place] [address] ) 2017   • Non-ST elevation myocardial infarction (NSTEMI), type 2 2017   • History of seizure 10/31/2017   • Paroxysmal SVT (supraventricular tachycardia) ([place] [address] ) 10/31/2017   • TIA (transient ischemic attack) 10/29/2017   • HLD (hyperlipidemia) 10/29/2017   • Cyst of diaphragm 2017   • Long-term use of immunosuppressant medication 2016   • Anemia 2016   • Liver transplanted ([place] [address] ) 2016   • Hypothyroidism 2016   • Elevated homocysteine 2016   • Chronic joint pain 2016   • Lung nodule, solitary 2016   • Sciatica 2015   • Hypercalcemia 2015   • Ataxic gait 2013   • Chronic kidney insufficiency 2013   • Benign essential hypertension 2012   • Ulcerative colitis without complications ([place] [address] )       LOS (days): 2  Geometric Mean LOS (GMLOS) (days): 2 70  Days to GMLOS:0 8     OBJECTIVE:  Risk of Unplanned Readmission Score: 23 4 Current admission status: Inpatient   Preferred Pharmacy:   [facility]  [address], [address] [place] [address]  Phone: [phone] Fax: [phone]    [facility] [address] [facility] [place] On license of [facility] [address]  Phone: [phone] Fax: [phone]    Primary Care Provider: Alexandra Barnes MD    Primary Insurance: MEDICARE  Secondary Insurance: COMMERCIAL MISCELLANEOUS    DISCHARGE DETAILS:  CM updated per SLIM, patient is not medically stable for discharge today  CM updated receiving facility with change in plans

## 2022-11-17 ENCOUNTER — ANESTHESIA EVENT (INPATIENT)
Dept: PERIOP | Facility: HOSPITAL | Age: 77
End: 2022-11-17

## 2022-11-17 ENCOUNTER — APPOINTMENT (INPATIENT)
Dept: RADIOLOGY | Facility: HOSPITAL | Age: 77
End: 2022-11-17

## 2022-11-17 ENCOUNTER — TELEPHONE (OUTPATIENT)
Dept: RADIOLOGY | Facility: HOSPITAL | Age: 77
End: 2022-11-17

## 2022-11-17 LAB
ALBUMIN SERPL BCP-MCNC: 2.9 G/DL (ref 3.5–5)
ALP SERPL-CCNC: 163 U/L (ref 46–116)
ALT SERPL W P-5'-P-CCNC: 25 U/L (ref 12–78)
AMMONIA PLAS-SCNC: 10 UMOL/L (ref 11–35)
ANION GAP SERPL CALCULATED.3IONS-SCNC: 8 MMOL/L (ref 4–13)
AST SERPL W P-5'-P-CCNC: 25 U/L (ref 5–45)
BACTERIA UR CULT: NORMAL
BACTERIA UR QL AUTO: ABNORMAL /HPF
BASOPHILS # BLD AUTO: 0.03 THOUSANDS/ÂΜL (ref 0–0.1)
BASOPHILS NFR BLD AUTO: 1 % (ref 0–1)
BILIRUB SERPL-MCNC: 0.32 MG/DL (ref 0.2–1)
BILIRUB UR QL STRIP: NEGATIVE
BUN SERPL-MCNC: 20 MG/DL (ref 5–25)
CALCIUM ALBUM COR SERPL-MCNC: 9.7 MG/DL (ref 8.3–10.1)
CALCIUM SERPL-MCNC: 8.8 MG/DL (ref 8.3–10.1)
CHLORIDE SERPL-SCNC: 106 MMOL/L (ref 96–108)
CHOLEST SERPL-MCNC: 125 MG/DL
CLARITY UR: ABNORMAL
CO2 SERPL-SCNC: 20 MMOL/L (ref 21–32)
COLOR UR: ABNORMAL
CREAT SERPL-MCNC: 1.02 MG/DL (ref 0.6–1.3)
EOSINOPHIL # BLD AUTO: 0.14 THOUSAND/ÂΜL (ref 0–0.61)
EOSINOPHIL NFR BLD AUTO: 2 % (ref 0–6)
ERYTHROCYTE [DISTWIDTH] IN BLOOD BY AUTOMATED COUNT: 13.6 % (ref 11.6–15.1)
EST. AVERAGE GLUCOSE BLD GHB EST-MCNC: 97 MG/DL
GFR SERPL CREATININE-BSD FRML MDRD: 52 ML/MIN/1.73SQ M
GLUCOSE SERPL-MCNC: 105 MG/DL (ref 65–140)
GLUCOSE SERPL-MCNC: 109 MG/DL (ref 65–140)
GLUCOSE SERPL-MCNC: 90 MG/DL (ref 65–140)
GLUCOSE SERPL-MCNC: 94 MG/DL (ref 65–140)
GLUCOSE SERPL-MCNC: 98 MG/DL (ref 65–140)
GLUCOSE UR STRIP-MCNC: NEGATIVE MG/DL
HBA1C MFR BLD: 5 %
HCT VFR BLD AUTO: 33.5 % (ref 34.8–46.1)
HDLC SERPL-MCNC: 65 MG/DL
HGB BLD-MCNC: 10.6 G/DL (ref 11.5–15.4)
HGB UR QL STRIP.AUTO: ABNORMAL
IMM GRANULOCYTES # BLD AUTO: 0.03 THOUSAND/UL (ref 0–0.2)
IMM GRANULOCYTES NFR BLD AUTO: 1 % (ref 0–2)
KETONES UR STRIP-MCNC: ABNORMAL MG/DL
LDLC SERPL CALC-MCNC: 44 MG/DL (ref 0–100)
LEUKOCYTE ESTERASE UR QL STRIP: ABNORMAL
LYMPHOCYTES # BLD AUTO: 0.86 THOUSANDS/ÂΜL (ref 0.6–4.47)
LYMPHOCYTES NFR BLD AUTO: 14 % (ref 14–44)
MCH RBC QN AUTO: 29.2 PG (ref 26.8–34.3)
MCHC RBC AUTO-ENTMCNC: 31.6 G/DL (ref 31.4–37.4)
MCV RBC AUTO: 92 FL (ref 82–98)
MONOCYTES # BLD AUTO: 0.57 THOUSAND/ÂΜL (ref 0.17–1.22)
MONOCYTES NFR BLD AUTO: 10 % (ref 4–12)
NEUTROPHILS # BLD AUTO: 4.37 THOUSANDS/ÂΜL (ref 1.85–7.62)
NEUTS SEG NFR BLD AUTO: 72 % (ref 43–75)
NITRITE UR QL STRIP: NEGATIVE
NON-SQ EPI CELLS URNS QL MICRO: ABNORMAL /HPF
NONHDLC SERPL-MCNC: 60 MG/DL
NRBC BLD AUTO-RTO: 0 /100 WBCS
PH UR STRIP.AUTO: 6 [PH]
PLATELET # BLD AUTO: 173 THOUSANDS/UL (ref 149–390)
PMV BLD AUTO: 9.7 FL (ref 8.9–12.7)
POTASSIUM SERPL-SCNC: 4.7 MMOL/L (ref 3.5–5.3)
PROT SERPL-MCNC: 7.2 G/DL (ref 6.4–8.4)
PROT UR STRIP-MCNC: NEGATIVE MG/DL
RBC # BLD AUTO: 3.63 MILLION/UL (ref 3.81–5.12)
RBC #/AREA URNS AUTO: ABNORMAL /HPF
SODIUM SERPL-SCNC: 134 MMOL/L (ref 135–147)
SP GR UR STRIP.AUTO: 1.01 (ref 1–1.03)
TRIGL SERPL-MCNC: 82 MG/DL
TSH SERPL DL<=0.05 MIU/L-ACNC: 6.02 UIU/ML (ref 0.45–4.5)
URATE CRY URNS QL MICRO: ABNORMAL /HPF
UROBILINOGEN UR QL STRIP.AUTO: 0.2 E.U./DL
WBC # BLD AUTO: 6 THOUSAND/UL (ref 4.31–10.16)
WBC #/AREA URNS AUTO: ABNORMAL /HPF

## 2022-11-17 RX ORDER — METOPROLOL TARTRATE 50 MG/1
50 TABLET, FILM COATED ORAL 2 TIMES DAILY
Status: DISCONTINUED | OUTPATIENT
Start: 2022-11-18 | End: 2022-11-20 | Stop reason: HOSPADM

## 2022-11-17 RX ORDER — HEPARIN SODIUM 5000 [USP'U]/ML
5000 INJECTION, SOLUTION INTRAVENOUS; SUBCUTANEOUS ONCE
Status: COMPLETED | OUTPATIENT
Start: 2022-11-17 | End: 2022-11-17

## 2022-11-17 RX ORDER — POLYETHYLENE GLYCOL 3350 17 G/17G
17 POWDER, FOR SOLUTION ORAL 2 TIMES DAILY PRN
Status: DISCONTINUED | OUTPATIENT
Start: 2022-11-17 | End: 2022-11-20 | Stop reason: HOSPADM

## 2022-11-17 RX ORDER — LORAZEPAM 2 MG/ML
0.5 INJECTION INTRAMUSCULAR ONCE
Status: DISCONTINUED | OUTPATIENT
Start: 2022-11-17 | End: 2022-11-17

## 2022-11-17 RX ORDER — OLANZAPINE 2.5 MG/1
5 TABLET ORAL EVERY 6 HOURS PRN
Status: DISCONTINUED | OUTPATIENT
Start: 2022-11-17 | End: 2022-11-18

## 2022-11-17 RX ORDER — OLANZAPINE 5 MG/1
2.5 TABLET, ORALLY DISINTEGRATING ORAL
Status: DISCONTINUED | OUTPATIENT
Start: 2022-11-17 | End: 2022-11-20 | Stop reason: HOSPADM

## 2022-11-17 RX ORDER — LORAZEPAM 2 MG/ML
0.5 INJECTION INTRAMUSCULAR ONCE
Status: COMPLETED | OUTPATIENT
Start: 2022-11-17 | End: 2022-11-17

## 2022-11-17 RX ORDER — HALOPERIDOL 5 MG/ML
2 INJECTION INTRAMUSCULAR ONCE
Status: COMPLETED | OUTPATIENT
Start: 2022-11-17 | End: 2022-11-17

## 2022-11-17 RX ORDER — LORAZEPAM 2 MG/ML
1 INJECTION INTRAMUSCULAR ONCE
Status: COMPLETED | OUTPATIENT
Start: 2022-11-17 | End: 2022-11-18

## 2022-11-17 RX ADMIN — Medication 12.5 MG: at 17:50

## 2022-11-17 RX ADMIN — CYCLOSPORINE 50 MG: 25 CAPSULE ORAL at 09:40

## 2022-11-17 RX ADMIN — CYCLOSPORINE 50 MG: 25 CAPSULE ORAL at 17:14

## 2022-11-17 RX ADMIN — Medication 1 TABLET: at 09:39

## 2022-11-17 RX ADMIN — HEPARIN SODIUM 5000 UNITS: 5000 INJECTION INTRAVENOUS; SUBCUTANEOUS at 05:14

## 2022-11-17 RX ADMIN — MYCOPHENOLATE MOFETIL 750 MG: 250 CAPSULE ORAL at 09:40

## 2022-11-17 RX ADMIN — Medication 1000 UNITS: at 09:40

## 2022-11-17 RX ADMIN — LORAZEPAM 0.5 MG: 2 INJECTION INTRAMUSCULAR; INTRAVENOUS at 20:48

## 2022-11-17 RX ADMIN — MYCOPHENOLATE MOFETIL 750 MG: 250 CAPSULE ORAL at 17:13

## 2022-11-17 RX ADMIN — HALOPERIDOL LACTATE 2 MG: 5 INJECTION, SOLUTION INTRAMUSCULAR at 16:13

## 2022-11-17 RX ADMIN — METOPROLOL TARTRATE 37.5 MG: 25 TABLET, FILM COATED ORAL at 09:40

## 2022-11-17 RX ADMIN — PRAVASTATIN SODIUM 20 MG: 20 TABLET ORAL at 21:13

## 2022-11-17 RX ADMIN — PANTOPRAZOLE SODIUM 40 MG: 40 TABLET, DELAYED RELEASE ORAL at 09:39

## 2022-11-17 RX ADMIN — HEPARIN SODIUM 5000 UNITS: 5000 INJECTION INTRAVENOUS; SUBCUTANEOUS at 14:31

## 2022-11-17 RX ADMIN — HEPARIN SODIUM 5000 UNITS: 5000 INJECTION INTRAVENOUS; SUBCUTANEOUS at 21:13

## 2022-11-17 RX ADMIN — METOPROLOL TARTRATE 37.5 MG: 25 TABLET, FILM COATED ORAL at 17:13

## 2022-11-17 RX ADMIN — CEFTRIAXONE 1000 MG: 1 INJECTION, SOLUTION INTRAVENOUS at 16:52

## 2022-11-17 RX ADMIN — LEVOTHYROXINE SODIUM 112 MCG: 112 TABLET ORAL at 05:14

## 2022-11-17 RX ADMIN — CALCITONIN SALMON 1 SPRAY: 200 SPRAY, METERED NASAL at 09:43

## 2022-11-17 RX ADMIN — Medication 6 MG: at 21:13

## 2022-11-17 NOTE — TELEPHONE ENCOUNTER
Spoke to LUIZ Lazo, pt had been combative and uncooperative earlier, RN to check on pt and call CT to schedule time when scan can be done

## 2022-11-17 NOTE — PLAN OF CARE
Problem: Potential for Falls  Goal: Patient will remain free of falls  Description: INTERVENTIONS:  - Educate patient/family on patient safety including physical limitations  - Instruct patient to call for assistance with activity   - Consult OT/PT to assist with strengthening/mobility   - Keep Call bell within reach  - Keep bed low and locked with side rails adjusted as appropriate  - Keep care items and personal belongings within reach  - Initiate and maintain comfort rounds  - Make Fall Risk Sign visible to staff  - Offer Toileting every 2 Hours, in advance of need  - Initiate/Maintain bed alarm  - Obtain necessary fall risk management equipment:   Problem: Prexisting or High Potential for Compromised Skin Integrity  Goal: Skin integrity is maintained or improved  Description: INTERVENTIONS:  - Identify patients at risk for skin breakdown  - Assess and monitor skin integrity  - Assess and monitor nutrition and hydration status  - Monitor labs   - Assess for incontinence   - Turn and reposition patient  - Assist with mobility/ambulation  - Relieve pressure over bony prominences  - Avoid friction and shearing  - Provide appropriate hygiene as needed including keeping skin clean and dry  - Evaluate need for skin moisturizer/barrier cream  - Collaborate with interdisciplinary team   - Patient/family teaching  - Consider wound care consult   Outcome: Progressing     Problem: MOBILITY - ADULT  Goal: Maintain or return to baseline ADL function  Description: INTERVENTIONS:  -  Assess patient's ability to carry out ADLs; assess patient's baseline for ADL function and identify physical deficits which impact ability to perform ADLs (bathing, care of mouth/teeth, toileting, grooming, dressing, etc )  - Assess/evaluate cause of self-care deficits   - Assess range of motion  - Assess patient's mobility; develop plan if impaired  - Assess patient's need for assistive devices and provide as appropriate  - Encourage maximum independence but intervene and supervise when necessary  - Involve family in performance of ADLs  - Assess for home care needs following discharge   - Consider OT consult to assist with ADL evaluation and planning for discharge  - Provide patient education as appropriate  Outcome: Progressing  Goal: Maintains/Returns to pre admission functional level  Description: INTERVENTIONS:  - Perform BMAT or MOVE assessment daily    - Set and communicate daily mobility goal to care team and patient/family/caregiver  - Collaborate with rehabilitation services on mobility goals if consulted  - Perform Range of Motion 3 times a day  - Reposition patient every 2 hours    - Dangle patient 3 times a day  - Stand patient 3 times a day  - Ambulate patient 3 times a day  - Out of bed to chair 3 times a day   - Out of bed for meals 3 times a day  - Out of bed for toileting  - Record patient progress and toleration of activity level   Outcome: Progressing     - Apply yellow socks and bracelet for high fall risk patients  - Consider moving patient to room near nurses station  Outcome: Progressing

## 2022-11-17 NOTE — CONSULTS
CARDIOLOGY CONSULTATION  Rehan Barton 64 y o  female MRN: [id number]  Unit/Bed#: [address] [place] [address] Encounter: [id number]      History of Present Illness   PCP: Daomn Caldera MD  Date of Admission:  11/14/2022  Date of Consultation: 11/17/22  Physician Requesting Consult: Aline Hunter DO    Reason for Consult / Principal Problem:  Preop    Assessment/Plan   Perioperative/closed fracture of distal right fibula- baseline EKG normal sinus rhythm with no acute ST changes  Compensated on examination  She does have a prior history of CVA and likely with peripheral vascular disease- she has been kept on dual anti-platelet therapy with no recurrence of symptoms  Unfortunately currently she is with delirium-   a CT head was unremarkable for new CVA  From a cardiac perspective there is no cardiac contraindication to anesthesia or surgical procedure  Long-term immunosuppressive medication/history of liver transplant    Crohn's disease    Hypothyroidism    Hyperlipidemia on pravastatin    HPI: Rehan Barton is a 64y o  year old female who presents with mechanical fall  She denies having major palpitations  She denied feeling chest heaviness  She denies having lower extremity swelling  Since she has been in the hospital she has been increasingly confused        Historical Information   Past Medical History:   Diagnosis Date   • Anemia    • Anxiety    • Arthritis    • Benign paroxysmal vertigo, unspecified ear     onset: 05/06/2010   • Cirrhosis ([address] )     onset: 03/30/2005   • Crohn disease ([address] )    • Disease of thyroid gland    • GERD (gastroesophageal reflux disease)    • History of transfusion    • Liver transplant candidate     onset: 09/16/2008   • Osteoporosis    • Pelvic fracture ([facility] [address] )     onset: 10/14/2008     Past Surgical History:   Procedure Laterality Date   • ABDOMINAL SURGERY     • APPENDECTOMY     • BLADDER AUGMENTATION N/A    • CATARACT EXTRACTION     • CHOLECYSTECTOMY N/A    • COLONOSCOPY N/A 4/20/2017 Procedure: COLONOSCOPY;  Surgeon: Jenny Marie MD;  Location: [place] [address] GI LAB; Service:    • ESOPHAGOGASTRODUODENOSCOPY N/A 3/31/2016    Procedure: ESOPHAGOGASTRODUODENOSCOPY (EGD); Surgeon: Jenny Marie MD;  Location: [place] [address] [facility] LAB; Service:    • ESOPHAGOGASTRODUODENOSCOPY N/A 2017    Procedure: ESOPHAGOGASTRODUODENOSCOPY (EGD); Surgeon: Jenny Marie MD;  Location: [place] [address] [facility]; Service:    • EYE SURGERY      cataracts removed both eyes lens implant   • HERNIA REPAIR     • JOINT REPLACEMENT      left knee replacement   • LIVER TRANSPLANTATION N/A    • TONSILECTOMY AND ADNOIDECTOMY N/A    • TUBAL LIGATION       Social History     Substance and Sexual Activity   Alcohol Use Never     Social History     Substance and Sexual Activity   Drug Use Never     Social History     Tobacco Use   Smoking Status Former   • Years: 5 00   • Types: Cigarettes   • Quit date: 1975   • Years since quittin 8   Smokeless Tobacco Never   Tobacco Comments    smoked for 5 years-1/2 to 1 ppd     Family History   Problem Relation Age of Onset   • Cancer Mother         breast   • Cancer Father    • Crohn's disease Brother    • Arthritis Family    • Breast cancer Family        Meds/Allergies   Prior to Admission medications    Medication Sig Start Date End Date Taking?  Authorizing Provider   acetaminophen (TYLENOL) 325 mg tablet Take 2 tablets (650 mg total) by mouth every 6 (six) hours as needed for mild pain or fever 22  Yes Keerthi Quach, DO   docusate sodium (COLACE) 100 mg capsule Take 1 capsule (100 mg total) by mouth 2 (two) times a day for 10 days 22 Yes Keerthi Quach DO   Heparin Sodium, Porcine, (heparin, porcine,) 5,000 units/mL Inject 1 mL (5,000 Units total) under the skin every 8 (eight) hours 22  Yes Keerthi Quach, DO   HYDROcodone-acetaminophen (Norco) 5-325 mg per tablet Take 1 tablet by mouth every 6 (six) hours as needed for pain (moderate, severe pain) Max Daily Amount: 4 tablets 11/16/22  Yes Yuliya Cartagena, DO   melatonin 3 mg Take 1 tablet (3 mg total) by mouth daily at bedtime 11/16/22  Yes Keerthi Quach, DO   mycophenolate (CELLCEPT) 250 mg capsule Take 3 capsules (750 mg total) by mouth every 12 (twelve) hours 11/16/22  Yes Yuliya Cartagena, DO   aspirin (ECOTRIN LOW STRENGTH) 81 mg EC tablet Take 1 tablet (81 mg total) by mouth daily with breakfast 4/22/21   Tommy White MD   b complex vitamins capsule Take 1 capsule by mouth daily 7/1/20   Mahamed Sanz MD   BUDESONIDE PO Take 3 mg by mouth    Historical Provider, MD   Calcium 500 MG tablet Take 500 mg by mouth 11/6/20   Historical Provider, MD   Cholecalciferol (Vitamin D) 50 MCG (2000 UT) tablet Take 1,000 Units by mouth 11/6/20   Historical Provider, MD   clopidogrel (PLAVIX) 75 mg tablet TAKE 1 TABLET EVERY DAY 10/13/22   Mahamed Sanz MD   cycloSPORINE modified (NEORAL) 25 mg capsule Take 50 mg by mouth 2 (two) times a day    Historical Provider, MD   ezetimibe (ZETIA) 10 mg tablet Take 1 tablet by mouth once daily 6/3/22   Tommy White MD   levothyroxine 112 mcg tablet TAKE 1 TABLET EVERY DAY AS DIRECTED 3/16/22   Williams Ortega MD   metoprolol tartrate (LOPRESSOR) 25 mg tablet Take 1 5 tablets (37 5 mg total) by mouth 2 (two) times a day 10/28/21   Tommy White MD   omeprazole (PriLOSEC) 40 MG capsule Take 40 mg by mouth daily    Historical Provider, MD   pravastatin (PRAVACHOL) 20 mg tablet TAKE 1 TABLET BY MOUTH ONCE DAILY AT BEDTIME 12/6/21   Renetta Valles MD     Current Facility-Administered Medications   Medication Dose Route Frequency Provider Last Rate Last Admin   • acetaminophen (TYLENOL) tablet 650 mg  650 mg Oral PRN Gilberto Tavarez MD       • calcitonin (salmon) (MIACALCIN) 200 units/act nasal spray 1 spray  1 spray Alternating Nares Daily Gilberto Tavarez MD   1 spray at 11/17/22 0943   • calcium carbonate-vitamin D 500 mg-5 mcg tablet 1 tablet  1 tablet Oral Daily With Breakfast Kaz Ruano MD   1 tablet at 11/17/22 7758   • cefTRIAXone (ROCEPHIN) IVPB (premix in dextrose) 1,000 mg 50 mL  1,000 mg Intravenous Q24H Keerthi Revankar,  mL/hr at 11/17/22 1652 1,000 mg at 11/17/22 1652   • cholecalciferol (VITAMIN D3) tablet 1,000 Units  1,000 Units Oral Daily Kaz Ruano MD   1,000 Units at 11/17/22 0940   • cycloSPORINE modified (NEORAL) capsule 50 mg  50 mg Oral BID Keerthi Revankar, DO   50 mg at 11/17/22 0940   • docusate sodium (COLACE) capsule 100 mg  100 mg Oral BID PRN Kaz Ruano MD       • heparin (porcine) subcutaneous injection 5,000 Units  5,000 Units Subcutaneous [facility] Lucila Man PA-C   5,000 Units at 11/17/22 1431   • HYDROcodone-acetaminophen ([facility]) 5-325 mg per tablet 1 tablet  1 tablet Oral Q6H PRN Kaz Ruano MD   1 tablet at 11/15/22 2229   • levothyroxine tablet 112 mcg  112 mcg Oral Early Morning Kaz Ruano MD   112 mcg at 11/17/22 0514   • LORazepam (ATIVAN) injection 1 mg  1 mg Intravenous Once Keerthi Revankar, DO       • LORazepam (ATIVAN) tablet 0 5 mg  0 5 mg Oral Once Keerthi Revankar, DO       • melatonin tablet 6 mg  6 mg Oral HS Manasi Revankar, DO   6 mg at 11/16/22 2126   • metoprolol tartrate (LOPRESSOR) partial tablet 37 5 mg  37 5 mg Oral BID Kaz Ruano MD   37 5 mg at 11/17/22 0940   • mycophenolate (CELLCEPT) capsule 750 mg  750 mg Oral BID Kaz Ruano MD   750 mg at 11/17/22 0940   • OLANZapine (ZyPREXA ZYDIS) dispersible tablet 2 5 mg  2 5 mg Oral HS Keerthi Revankar, DO       • OLANZapine (ZyPREXA) tablet 5 mg  5 mg Oral Q6H PRN Keerthi Revankar, DO       • ondansetron (ZOFRAN) injection 4 mg  4 mg Intravenous Q6H PRN Kaz Ruano MD   4 mg at 11/15/22 1309   • pantoprazole (PROTONIX) EC tablet 40 mg  40 mg Oral Daily Kaz Ruano MD   40 mg at 11/17/22 9742   • polyethylene glycol (MIRALAX) packet 17 g  17 g Oral BID PRN Kaz Ruano MD       • pravastatin (PRAVACHOL) tablet 20 mg  20 mg Oral HS Harriet Hem Juli Asher MD   20 mg at 11/16/22 2127     Allergies   Allergen Reactions   • Tetracyclines & Related Hives   • Vancomycin Other (See Comments) and Itching     Reaction Date: [id number]; Pt unsure of reaction   • Prograf [Tacrolimus] Anxiety     Reaction Date: 16Dec2008; Review of systems  Unable to obtain patient's neurological status    Objective   Vitals: Blood pressure (!) 175/79, pulse 97, temperature 98 °F (36 7 °C), temperature source Oral, resp  rate 16, height 5' 2" (1 575 m), weight 96 1 kg (211 lb 13 8 oz), SpO2 99 %, not currently breastfeeding  Blood pressure (!) 175/79, pulse 97, temperature 98 °F (36 7 °C), temperature source Oral, resp  rate 16, height 5' 2" (1 575 m), weight 96 1 kg (211 lb 13 8 oz), SpO2 99 %, not currently breastfeeding  Body mass index is 38 75 kg/m²  BP Readings from Last 3 Encounters:   11/17/22 (!) 175/79   07/15/22 116/62   07/01/22 122/54     Orthostatic Blood Pressures    Flowsheet Row Most Recent Value   Blood Pressure 175/79 filed at 11/17/2022 1524   Patient Position - Orthostatic VS Lying filed at 11/17/2022 0803          Intake/Output Summary (Last 24 hours) at 11/17/2022 1708  Last data filed at 11/17/2022 1101  Gross per 24 hour   Intake --   Output 600 ml   Net -600 ml     Invasive Devices     Peripheral Intravenous Line  Duration           Peripheral IV 11/16/22 Left Antecubital 1 day                Physical Exam  Constitutional:       General: She is not in acute distress  Appearance: She is well-developed and well-nourished  She is not diaphoretic  HENT:      Head: Normocephalic and atraumatic  Right Ear: External ear normal       Left Ear: External ear normal       Nose: Nose normal       Mouth/Throat:      Pharynx: No oropharyngeal exudate  Eyes:      General: No scleral icterus  Right eye: No discharge  Left eye: No discharge        Conjunctiva/sclera: Conjunctivae normal       Pupils: Pupils are equal, round, and reactive to light    Neck:      Thyroid: No thyromegaly  Vascular: No JVD  Trachea: No tracheal deviation  Cardiovascular:      Rate and Rhythm: Normal rate and regular rhythm  Pulses: Intact distal pulses  Heart sounds: No murmur heard  No friction rub  No gallop  Pulmonary:      Effort: Pulmonary effort is normal  No respiratory distress  Breath sounds: Normal breath sounds  No stridor  No wheezing or rales  Chest:      Chest wall: No tenderness  Abdominal:      General: Bowel sounds are normal  There is no distension  Palpations: Abdomen is soft  There is no mass  Tenderness: There is no abdominal tenderness  There is no guarding or rebound  Genitourinary:     Comments: No CVA tenderness  Musculoskeletal:         General: Deformity present  No tenderness or edema  Cervical back: Normal range of motion  Skin:     General: Skin is warm and dry  Findings: No erythema or rash  Neurological:      Mental Status: She is alert  Motor: No abnormal muscle tone  Deep Tendon Reflexes: Reflexes normal    Psychiatric:         Mood and Affect: Mood is anxious  Affect is labile  Behavior: Behavior is cooperative  Thought Content: Thought content is paranoid  Cognition and Memory: Cognition is impaired  Memory is impaired  Judgment: Judgment normal          Lab Results:  I Have Reviewed All Lab Data Below:  Results from last 7 days   Lab Units 11/17/22  0559   WBC Thousand/uL 6 00   HEMOGLOBIN g/dL 10 6*   HEMATOCRIT % 33 5*   PLATELETS Thousands/uL 173   NEUTROS PCT % 72   LYMPHS PCT % 14   MONOS PCT % 10   EOS PCT % 2     Results from last 7 days   Lab Units 11/17/22  0559   POTASSIUM mmol/L 4 7   CHLORIDE mmol/L 106   CO2 mmol/L 20*   BUN mg/dL 20   CREATININE mg/dL 1 02   CALCIUM mg/dL 8 8   ALK PHOS U/L 163*   ALT U/L 25   AST U/L 25     Troponins:   Results from last 7 days   Lab Units 11/15/22  0525 11/14/22  1511   CK TOTAL U/L 54 36       CBC with diff:   Results from last 7 days   Lab Units 11/17/22  0559 11/16/22  0946 11/15/22  0838 11/14/22  1758 11/14/22  1511   WBC Thousand/uL 6 00 5 34 6 25  --  9 14   HEMOGLOBIN g/dL 10 6* 9 1* 9 9*  --  10 9*   HEMATOCRIT % 33 5* 29 0* 31 7*  --  35 4   MCV fL 92 94 94  --  94   PLATELETS Thousands/uL 173 174 189 203 216   MCH pg 29 2 29 4 29 2  --  28 9   MCHC g/dL 31 6 31 4 31 2*  --  30 8*   RDW % 13 6 13 8 13 9  --  13 9   MPV fL 9 7 9 4 9 4 9 5 9 4   NRBC AUTO /100 WBCs 0 0 0  --  0       CMP:   Results from last 7 days   Lab Units 11/17/22  0559 11/16/22  0946 11/15/22  0838 11/14/22  1758 11/14/22  1601 11/14/22  1511   SODIUM mmol/L 134* 134* 133*  --   --  135   POTASSIUM mmol/L 4 7 5 5* 5 2 5 7* 6 3* 6 2*   CHLORIDE mmol/L 106 107 108  --   --  106   CO2 mmol/L 20* 21 16*  --   --  22   ANION GAP mmol/L 8 6 9  --   --  7   BUN mg/dL 20 19 25  --   --  33*   CREATININE mg/dL 1 02 1 14 1 30  --   --  1 60*   CALCIUM mg/dL 8 8 8 2* 8 1*  --   --  8 4   AST U/L 25 24 25  --   --   --    ALT U/L 25 24 28  --   --   --    ALK PHOS U/L 163* 160* 166*  --   --   --    TOTAL PROTEIN g/dL 7 2 6 6 6 8  --   --   --    ALBUMIN g/dL 2 9* 2 7* 2 9*  --   --   --    TOTAL BILIRUBIN mg/dL 0 32 0 33 0 29  --   --   --    EGFR ml/min/1 73sq m 52 46 39  --   --  30   GLUCOSE RANDOM mg/dL 98 114 108  --   --  118     Magnesium:       NT-proBNP: No results for input(s): NTBNP in the last 72 hours       Coags:       Troponins:   Results from last 7 days   Lab Units 11/15/22  0525 11/14/22  1511   CK TOTAL U/L 54 36       Lipid Profile:   Results from last 7 days   Lab Units 11/17/22  0559 11/14/22  1511   CHOLESTEROL mg/dL 125 118   TRIGLYCERIDES mg/dL 82 97   HDL mg/dL 65 56   LDL CALC mg/dL 44 43                              Lab Results   Component Value Date    CHOLESTEROL 125 11/17/2022    HDL 65 11/17/2022    LDLCALC 44 11/17/2022    TRIG 82 11/17/2022       TSH:    Results from last 7 days   Lab Units 22  0559 22  1601   TSH 3RD GENERATON uIU/mL 6 017* 2 271       Hgb A1c:       Imaging: I have personally reviewed pertinent films in PACS    Cardiac testing:   Results for orders placed during the hospital encounter of 10/29/17    Echo complete with contrast if indicated    Narrative  [address]  [address] [facility]  [address]  [phone]    Transthoracic Echocardiogram  2D, M-mode, Doppler, and Color Doppler    Study date:  30-Oct-2017    Patient: Jenna Grullon  MR number: [id number]  Account number: [de-identified]  : 1945  Age: 66 years  Gender: Female  Status: Routine  Location: Bedside  Height: 62 in  Weight: 202 6 lb  BP: 136/ 72 mmHg    Indications: TIA    Diagnoses: I67 9 - Cerebrovascular disease, unspecified    Sonographer:  Phil Brown  Primary Physician:  Zoe Torres MD  Referring Physician:  Emil Dao MD  Group:  Osmin Martinez  RN:  Karlie Jimenez RN  Interpreting Physician:  Michelet Headley MD    SUMMARY    LEFT VENTRICLE:  Systolic function was normal  Ejection fraction was estimated in the range of 55 % to 60 %  There were no regional wall motion abnormalities  Wall thickness was mildly increased  Doppler parameters were consistent with abnormal left ventricular relaxation (grade 1 diastolic dysfunction)  Doppler parameters were consistent with elevated mean left atrial filling pressure  LEFT ATRIUM:  The atrium was mildly dilated  ATRIAL SEPTUM:  No significant defect or patent foramen ovale was identified  There was a trace left-to-right shunt  This is within normal limits for the patient's age  TRICUSPID VALVE:  The tricuspid jet envelope definition was inadequate for estimation of RV systolic pressure   There are no indirect findings (abnormal RV volume or geometry, altered pulmonary flow velocity profile, or leftward septal displacement) which  would suggest moderate or severe pulmonary hypertension  HISTORY: PRIOR HISTORY: GERD, Hypothyroidism, Anxiety, Anemia    PROCEDURE: The procedure was performed at the bedside  This was a routine study  The transthoracic approach was used  The study included complete 2D imaging, M-mode, complete spectral Doppler, and color Doppler  The heart rate was 80 bpm,  at the start of the study  Intravenous contrast ( 10 ml) was administered to evaluate shunting  Image quality was adequate  LEFT VENTRICLE: Size was normal  Systolic function was normal  Ejection fraction was estimated in the range of 55 % to 60 %  There were no regional wall motion abnormalities  Wall thickness was mildly increased  No evidence of apical  thrombus  DOPPLER: Doppler parameters were consistent with abnormal left ventricular relaxation (grade 1 diastolic dysfunction)  Doppler parameters were consistent with elevated mean left atrial filling pressure  RIGHT VENTRICLE: The size was normal  Systolic function was normal  Wall thickness was normal     LEFT ATRIUM: The atrium was mildly dilated  ATRIAL SEPTUM: No significant defect or patent foramen ovale was identified  There was a trace left-to-right shunt  This is within normal limits for the patient's age  RIGHT ATRIUM: Size was normal     MITRAL VALVE: There was mild to moderate annular calcification  Valve structure was normal  There was normal leaflet separation  DOPPLER: The transmitral velocity was within the normal range  There was no evidence for stenosis  There was  no significant regurgitation  AORTIC VALVE: The valve was not visualized well enough to rule out a bicuspid morphology  Leaflets exhibited mildly increased thickness and normal cuspal separation  DOPPLER: Transaortic velocity was within the normal range  There was no  evidence for stenosis  There was no significant regurgitation  TRICUSPID VALVE: The valve structure was normal  There was normal leaflet separation   DOPPLER: The transtricuspid velocity was within the normal range  There was no evidence for stenosis  There was trace regurgitation  The tricuspid jet  envelope definition was inadequate for estimation of RV systolic pressure  There are no indirect findings (abnormal RV volume or geometry, altered pulmonary flow velocity profile, or leftward septal displacement) which would suggest  moderate or severe pulmonary hypertension  PULMONIC VALVE: Leaflets exhibited normal thickness, no calcification, and normal cuspal separation  DOPPLER: The transpulmonic velocity was within the normal range  There was no significant regurgitation  PERICARDIUM: There was no pericardial effusion  The pericardium was normal in appearance  AORTA: The root exhibited normal size  SYSTEMIC VEINS: IVC: The inferior vena cava was normal in size  SYSTEM MEASUREMENT TABLES    2D mode  AoR Diam 2D: 3 cm  LA Diam (2D): 4 1 cm  LA/Ao (2D): 1 37  FS (2D Teich): 25 1 %  IVSd (2D): 1 14 cm  LVDEV: 65 9 cm³  LVESV: 32 8 cm³  LVIDd(2D): 3 9 cm  LVISd (2D): 2 92 cm  LVPWd (2D): 1 09 cm  SV (Teich): 33 1 cm³    Apical four chamber  LVEF A4C: 56 %    Unspecified Scan Mode  MV Peak A Shiv: 1670 mm/s  MV Peak E Shiv  Mean: 1030 mm/s  MVA (PHT): 4 4 cm squared  PHT: 50 ms  Max P mm[Hg]  V Max: 2570 mm/s  Vmax: 2540 mm/s  RA Area: 10 6 cm squared  RA Volume: 20 9 cm³  TAPSE: 1 6 cm    [facility] Acc[facility]    Prepared and electronically signed by    Kavya Huntley MD  Signed 30-Oct-2017 14:13:31    No results found for this or any previous visit  No results found for this or any previous visit  No results found for this or any previous visit  EKG:  Normal sinus rhythm at 60 beats per minute no acute ST T wave changes    Counseling / Coordination of Care Time: 45 minutes  Greater than 50% of total time spent on patient counseling and coordination of care  Code Status: Level 3 - DNAR and DNI  Collaboration of Care:  Were Recommendations Directly Discussed with Primary Treatment Team? - Yes     Michelet Headley MD [facility]    "This note has been constructed using a voice recognition system  Therefore there may be syntax, spelling, and/or grammatical errors   Please call if you have any questions  "

## 2022-11-17 NOTE — PROGRESS NOTES
Progress Note - Orthopedics   [facility] [place] 64 y o  female MRN: [id number]  Unit/Bed#: [facility] SCAN      Subjective:    64 y  o female who sustained a right ankle fracture dislocation on 11/14/22  Patient states she has minimal pain in her ankle currently  Patient states she is been maintaining her splint as directed no complaints  Patient was to be discharged yesterday however developed acute delirium of an unknown etiology  Patient is aware of self and place but is unaware of time  Patient able to answer questions appropriately at this time  Patient denies any numbness or tingling in the leg  Patient offers no other complaints at this time  Patient's partner is at bedside with the patient       Labs:  0   Lab Value Date/Time    HCT 33 5 (L) 11/17/2022 0559    HCT 29 0 (L) 11/16/2022 0946    HCT 31 7 (L) 11/15/2022 0838    HCT 39 0 02/25/2016 0925    HCT 38 7 01/07/2016 0910    HCT 36 8 (L) 12/17/2015 1031    HGB 10 6 (L) 11/17/2022 0559    HGB 9 1 (L) 11/16/2022 0946    HGB 9 9 (L) 11/15/2022 0838    HGB 12 1 02/25/2016 0925    HGB 12 2 01/07/2016 0910    HGB 11 6 (L) 12/17/2015 1031    INR 1 00 05/18/2021 2201    WBC 6 00 11/17/2022 0559    WBC 5 34 11/16/2022 0946    WBC 6 25 11/15/2022 0838    WBC 5 3 02/25/2016 0925    WBC 6 54 08/30/2015 0553       Meds:    Current Facility-Administered Medications:   •  acetaminophen (TYLENOL) tablet 650 mg, 650 mg, Oral, PRN, Lida Villatoro MD  •  calcitonin (salmon) (MIACALCIN) 200 units/act nasal spray 1 spray, 1 spray, Alternating Nares, Daily, Lida Villatoro MD, 1 spray at 11/17/22 7543  •  calcium carbonate-vitamin D 500 mg-5 mcg tablet 1 tablet, 1 tablet, Oral, Daily With Breakfast, Lida Villatoro MD, 1 tablet at 11/17/22 1484  •  cefTRIAXone (ROCEPHIN) IVPB (premix in dextrose) 1,000 mg 50 mL, 1,000 mg, Intravenous, Q24H, Keerthi Quach DO, Last Rate: 100 mL/hr at 11/16/22 1756, 1,000 mg at 11/16/22 1756  •  cholecalciferol (VITAMIN D3) tablet 1,000 Units, 1,000 Units, Oral, Daily, Danny Kelly MD, 1,000 Units at 11/17/22 0940  •  cycloSPORINE modified (NEORAL) capsule 50 mg, 50 mg, Oral, BID, Keerthi Quach DO, 50 mg at 11/17/22 0940  •  docusate sodium (COLACE) capsule 100 mg, 100 mg, Oral, BID PRN, Danny Kelly MD  •  heparin (porcine) subcutaneous injection 5,000 Units, 5,000 Units, Subcutaneous, Q8H [address], Hernan Aguila PA-C, 5,000 Units at 11/17/22 7853  •  HYDROcodone-acetaminophen (NORCO) 5-325 mg per tablet 1 tablet, 1 tablet, Oral, Q6H PRN, Danny Kelly MD, 1 tablet at 11/15/22 2229  •  levothyroxine tablet 112 mcg, 112 mcg, Oral, Early Morning, Danny Kelly MD, 112 mcg at 11/17/22 0514  •  LORazepam (ATIVAN) injection 1 mg, 1 mg, Intravenous, Once, Keerthi Quach DO  •  LORazepam (ATIVAN) tablet 0 5 mg, 0 5 mg, Oral, Once, Keerthi Quach DO  •  melatonin tablet 6 mg, 6 mg, Oral, HS, Keerthi Quach DO, 6 mg at 11/16/22 2126  •  metoprolol tartrate (LOPRESSOR) partial tablet 37 5 mg, 37 5 mg, Oral, BID, Danny Kelly MD, 37 5 mg at 11/17/22 0940  •  mycophenolate (CELLCEPT) capsule 750 mg, 750 mg, Oral, BID, Danny Kelly MD, 750 mg at 11/17/22 0940  •  ondansetron [facility]) injection 4 mg, 4 mg, Intravenous, Q6H PRN, Danny Kelly MD, 4 mg at 11/15/22 1309  •  pantoprazole (PROTONIX) EC tablet 40 mg, 40 mg, Oral, Daily, Danny Kelly MD, 40 mg at 11/17/22 7273  •  polyethylene glycol (MIRALAX) packet 17 g, 17 g, Oral, BID PRN, Danny Kelly MD  •  pravastatin (PRAVACHOL) tablet 20 mg, 20 mg, Oral, HS, Danny Kelly MD, 20 mg at 11/16/22 2127    Blood Culture:   Lab Results   Component Value Date    BLOODCX Peptoniphilus species (A) 05/18/2021    BLOODCX No Growth After 5 Days  05/18/2021       Wound Culture:   No results found for: WOUNDCULT    Ins and Outs:  I/O last 24 hours:   In: -   Out: 1100 [Urine:1100]      Physical:  Vitals:    11/17/22 0803   BP: (!) 177/80   Pulse: 99   Resp: 15   Temp: 97 5 °F (36 4 °C)   SpO2: 99% Musculoskeletal: right Lower Extremity  · Patient resting comfortably in hospital bed in no acute distress with soft restraints present  · Skin   No erythema or ecchymosis  · Dressing  :  Posterior leg with stirrup splint present with no visible soilage present  · TTP :   Minimal tenderness to palpation noted throughout the ankle at this time  · Sensation intact to saphenous, sural, tibial, superficial peroneal nerve, and deep peroneal  · Motor intact to +FHL/EHL  · 2+ DP pulse, symmetric bilaterally  · Digits warm and well perfused  · Capillary refill < 2 seconds      Assessment:    64 y  o female with a right displaced distal fibula fracture s/p closed reduction and splint application  Plan:  · Nonweightbearing right lower extremity   · Plan for open reduction internal fixation right ankle pending medical clearances and soft tissue swelling  · PT/OT for ambulation assistance, gait training  · Pain control per primary team   · DVT ppx  :   Patient currently receiving heparin per primary team   Does take Plavix 75 mg in the outpatient setting  · Medical co-morbidities include long-term use of immunosuppressant medication, hypothyroidism, hyperlipidemia, CVA, dementia, GERD, liver transplant, paroxysmal SVT, which are being managed per primary team  · Dispo: Ortho will follow  Had discussion with patient, patient's partner and daughter via telephone  Patient's fracture is indicated for operative fixation at this time  Pending soft tissue and medical clearances, plan for ORIF tomorrow with Dr Gianluca Dash  Obtain consents will be obtained either tomorrow or later today  Helen Grossman PA-C            Portions of the record may have been created with voice recognition software  Occasional wrong word or "sound a like" substitutions may have occurred due to the inherent limitations of voice recognition software    Read the chart carefully and recognize, using context, where substitutions have occurred

## 2022-11-17 NOTE — PLAN OF CARE
Problem: Potential for Falls  Goal: Patient will remain free of falls  Description: INTERVENTIONS:  - Educate patient/family on patient safety including physical limitations  - Instruct patient to call for assistance with activity   - Consult OT/PT to assist with strengthening/mobility   - Keep Call bell within reach  - Keep bed low and locked with side rails adjusted as appropriate  - Keep care items and personal belongings within reach  - Initiate and maintain comfort rounds  - Make Fall Risk Sign visible to staff  - Initiate/Maintain bed alarm  - Apply yellow socks and bracelet for high fall risk patients  - Consider moving patient to room near nurses station  Outcome: Progressing     Problem: Prexisting or High Potential for Compromised Skin Integrity  Goal: Skin integrity is maintained or improved  Description: INTERVENTIONS:  - Identify patients at risk for skin breakdown  - Assess and monitor skin integrity  - Assess and monitor nutrition and hydration status  - Monitor labs   - Assess for incontinence   - Turn and reposition patient  - Assist with mobility/ambulation  - Relieve pressure over bony prominences  - Avoid friction and shearing  - Provide appropriate hygiene as needed including keeping skin clean and dry  - Evaluate need for skin moisturizer/barrier cream  - Collaborate with interdisciplinary team   - Patient/family teaching  - Consider wound care consult   Outcome: Progressing     Problem: MOBILITY - ADULT  Goal: Maintain or return to baseline ADL function  Description: INTERVENTIONS:  -  Assess patient's ability to carry out ADLs; assess patient's baseline for ADL function and identify physical deficits which impact ability to perform ADLs (bathing, care of mouth/teeth, toileting, grooming, dressing, etc )  - Assess/evaluate cause of self-care deficits   - Assess range of motion  - Assess patient's mobility; develop plan if impaired  - Assess patient's need for assistive devices and provide as appropriate  - Encourage maximum independence but intervene and supervise when necessary  - Involve family in performance of ADLs  - Assess for home care needs following discharge   - Consider OT consult to assist with ADL evaluation and planning for discharge  - Provide patient education as appropriate  Outcome: Progressing

## 2022-11-17 NOTE — PROGRESS NOTES
[facility]  Progress Note Jen Diggs 1945, 64 y o  female MRN: [id number]  Unit/Bed#: [address] [place] [address] Encounter: [id number]  Primary Care Provider: Delta Dunlap MD   Date and time admitted to hospital: 11/14/2022  1:01 PM    * Closed fracture of distal end of right fibula  Assessment & Plan  X-ray- right displaced fracture of distal fibula with disruption of the ankle mortise  · Repeat x-ray 11/15 showed persistent right ankle fracture subluxation and patient underwent close reduction and splinting  Repeat imaging showed acceptable reduction of the ankle fracture  · Initially plan was surgical intervention of distal fibular fracture on outpatient basis due to significant swelling however per Ortho will be tentatively scheduled for tomorrow  · Patient to be nonweightbearing of right lower extremity with splint in place and right lower extremity elevation while at rest  · Knee x-ray-unremarkable  · Pain management  DVT prophylaxis with heparin subQ (hold after tonight's dose)    Encephalopathy  Assessment & Plan  Plan was for discharge on 11/16 however just prior to getting picked up by transfer for patient was noted to be agitated and screaming in the room  Patient upset that she has to bury her mother  · Per staff member, patient was confused and agitated overnight as well  She then ripped out her IV and not allowing staff members to place new IV line in and was also refusing medications  · Discharge cancelled and UA obtained which shows moderate blood, small leukocytes, occasional bacteria, Started on IV Rocephin   · Symptoms most likely delirium superimposed on dementia  · CT head shows likely old right lacunar infarct  Will obtain MRI for further evaluation  MRI department made aware that patient is going for surgery tomorrow  · Appreciate Psychiatry input  Started on Zyprexa 2 5 mg at bedtime and p o  Zyprexa p r n   Agitation  · Required IM Haldol and placed on soft restraints  Continue with soft restraints as patient is still agitated and trying to pull off things trying to get out of bed when not in restraints    Hyperkalemia  Assessment & Plan  Acute,  · 6 3 initially after repeat, non hemolyzed  · Calcium gluconate 1 g, in the ED- insulin 8 units, dextrose in IV  · Potassium level mildly elevated again on 11/16  Insulin, dextrose, albuterol was given  · Potassium level improved on lab work today  Repeat BMP  Low-potassium diet  · CK-within normal limits    GERD (gastroesophageal reflux disease)  Assessment & Plan  · Prilosec substituted with Protonix while here    Fall  Assessment & Plan  CT-abdomen/pelvis-no acute intra-abdominal or pelvic  · Knee x-ray-unremarkable  · Foot x-ray-Displaced fracture of the distal fibula with disruption of the ankle mortise  · Pain meds p r n    Osteopenia of multiple sites  Assessment & Plan  Noted on medical record,  · vitamin-D level 20  · Continue home vitamin-D and calcium  · Received calcitonin while here due to current fractures  · Outpatient DEXA scan recommended if within criteria    Dementia [facility])  Assessment & Plan  Chronic  · Not on any medications at home  · As noted above started on Zyprexa    Per Psychiatry may be able to come off Zyprexa once delirium has resolved    Chronic kidney insufficiency  Assessment & Plan  Lab Results   Component Value Date    EGFR 52 11/17/2022    EGFR 46 11/16/2022    EGFR 39 11/15/2022    CREATININE 1 02 11/17/2022    CREATININE 1 14 11/16/2022    CREATININE 1 30 11/15/2022     Elevated creatinine initially of 1 6, trended down  · Repeat lab work     Benign essential hypertension  Assessment & Plan  · BPs noted to be high at times and Lopressor dose increased    Cerebrovascular accident (CVA) due to embolism [facility])  Assessment & Plan  Noted on Mar  · Was Restarted Plavix and aspirin per Ortho but again discontinued for OR on Friday per Ortho    HLD (hyperlipidemia)  Assessment & Plan  Lipid panel - LDL 44  · Continue home pravastatin    Long-term use of immunosuppressant medication  Assessment & Plan  continue cyclosporine, CellCept    Hypothyroidism  Assessment & Plan  Chronic,  · Continue levothyroxine 112 mcg      VTE Pharmacologic Prophylaxis: VTE Score: 10 High Risk (Score >/= 5) - Pharmacological DVT Prophylaxis Ordered: heparin  Sequential Compression Devices Ordered  Patient Centered Rounds: I performed bedside rounds with nursing staff today  Discussions with Specialists or Other Care Team Provider: yes - ortho, psychiatry    Education and Discussions with Family / Patient: Updated  (daughter) via phone  Time Spent for Care: 35 min  More than 50% of total time spent on counseling and coordination of care as described above  Current Length of Stay: 3 day(s)  Current Patient Status: Inpatient   Certification Statement: The patient will continue to require additional inpatient hospital stay due to Encephalopathy with abnormal CT head, ankle fracture requiring OR intervention  Discharge Plan: Anticipate discharge in >72 hrs to rehab facility  Code Status: Level 3 - DNAR and DNI    Subjective:     Patient still noted to be agitated and not cooperative at times  Requesting that her restraints be taken out    Objective:     Vitals:   Temp (24hrs), Av 4 °F (36 3 °C), Min:97 4 °F (36 3 °C), Max:97 4 °F (36 3 °C)    Temp:  [97 4 °F (36 3 °C)] 97 4 °F (36 3 °C)  HR:  [81-99] 99  Resp:  [15-20] 15  BP: (151-177)/(67-80) 177/80  SpO2:  [99 %-100 %] 99 %  Body mass index is 38 75 kg/m²  Input and Output Summary (last 24 hours):   No intake or output data in the 24 hours ending 22 0932    Physical Exam:   Physical Exam  Constitutional:       General: She is not in acute distress  Appearance: She is well-developed  HENT:      Head: Normocephalic and atraumatic  Eyes:      General: No scleral icterus    Cardiovascular:      Rate and Rhythm: Normal rate and regular rhythm  Pulmonary:      Effort: Pulmonary effort is normal  No respiratory distress  Breath sounds: Normal breath sounds  No wheezing or rales  Abdominal:      General: Bowel sounds are normal  There is no distension  Palpations: Abdomen is soft  Tenderness: There is no abdominal tenderness  Musculoskeletal:      Comments: Right foot with Ace wrap/splint in place   Neurological:      Mental Status: She is alert  Psychiatric:         Mood and Affect: Affect is angry  Behavior: Behavior is not agitated  Judgment: Judgment is impulsive            Additional Data:     Labs:  Results from last 7 days   Lab Units 11/17/22  0559   WBC Thousand/uL 6 00   HEMOGLOBIN g/dL 10 6*   HEMATOCRIT % 33 5*   PLATELETS Thousands/uL 173   NEUTROS PCT % 72   LYMPHS PCT % 14   MONOS PCT % 10   EOS PCT % 2     Results from last 7 days   Lab Units 11/17/22  0559   SODIUM mmol/L 134*   POTASSIUM mmol/L 4 7   CHLORIDE mmol/L 106   CO2 mmol/L 20*   BUN mg/dL 20   CREATININE mg/dL 1 02   ANION GAP mmol/L 8   CALCIUM mg/dL 8 8   ALBUMIN g/dL 2 9*   TOTAL BILIRUBIN mg/dL 0 32   ALK PHOS U/L 163*   ALT U/L 25   AST U/L 25   GLUCOSE RANDOM mg/dL 98         Results from last 7 days   Lab Units 11/17/22  0738 11/15/22  2039 11/15/22  1102 11/15/22  0757   POC GLUCOSE mg/dl 90 104 108 111               Lines/Drains:  Invasive Devices     Peripheral Intravenous Line  Duration           Peripheral IV 11/16/22 Left Antecubital <1 day                Imaging: Reviewed radiology reports from this admission including: CT head    Recent Cultures (last 7 days):         Last 24 Hours Medication List:   Current Facility-Administered Medications   Medication Dose Route Frequency Provider Last Rate   • acetaminophen  650 mg Oral PRN Adenike Oquendo MD     • calcitonin (salmon)  1 spray Alternating Nares Daily Adenike Oquendo MD     • calcium carbonate-vitamin D  1 tablet Oral Daily With Breakfast Adenike Oquendo MD • cefTRIAXone  1,000 mg Intravenous Q24H Keerthi Krishnaar, DO 1,000 mg (11/16/22 8351)   • cholecalciferol  1,000 Units Oral Daily Lilo Church MD     • cycloSPORINE modified  50 mg Oral BID Keerthi Quach, DO     • docusate sodium  100 mg Oral BID PRN Lilo Church MD     • heparin (porcine)  5,000 Units Subcutaneous [facility] Brie Perez PA-C     • HYDROcodone-acetaminophen  1 tablet Oral Q6H PRN Lilo Church MD     • levothyroxine  112 mcg Oral Early Morning Lilo Church MD     • LORazepam  1 mg Intravenous Once Keerthi Quach, DO     • LORazepam  0 5 mg Oral Once Keerthi Quach, DO     • melatonin  6 mg Oral HS Keerthi Quach, DO     • metoprolol tartrate  37 5 mg Oral BID Lilo Church MD     • mycophenolate  750 mg Oral BID Lilo Church MD     • ondansetron  4 mg Intravenous Q6H PRN Lilo Church MD     • pantoprazole  40 mg Oral Daily Lilo Church MD     • polyethylene glycol  17 g Oral BID PRN Lilo Church MD     • pravastatin  20 mg Oral HS Lilo Church MD          Today, Patient Was Seen By: Eugena Mcburney, DO    **Please Note: This note may have been constructed using a voice recognition system  **

## 2022-11-17 NOTE — PHYSICAL THERAPY NOTE
PT TREATMENT       11/17/22 1135   PT Last Visit   PT Visit Date 11/17/22   Note Type   Note Type Treatment   Pain Assessment   Pain Score No Pain   Multiple Pain Sites No   Restrictions/Precautions   Weight Bearing Precautions Per Order Yes   RLE Weight Bearing Per Order NWB   Other Precautions Cognitive; Bed Alarm; Chair Alarm; Fall Risk;Pain   General   Chart Reviewed Yes   Family/Caregiver Present Yes  (Pt's partner at bedside)   Cognition   Overall Cognitive Status WFL   Arousal/Participation Uncooperative   Orientation Level Oriented to person;Oriented to place; Disoriented to time;Disoriented to situation   Following Commands Follows one step commands inconsistently   Comments Pt more lethargic today, uncooperative  Pt reports she is unhappy with restraints   Subjective   Subjective "i'm not happy being tied up here"   Bed Mobility   Additional Comments Refusing mobility   Transfers   Sit to Stand Unable to assess   Stand to Sit Unable to assess   Stand pivot Unable to assess   Ambulation/Elevation   Gait Assistance Not tested   Activity Tolerance   Activity Tolerance Treatment limited secondary to agitation  (Pt refusing to participate)   Nurse Made Aware yes   Exercises   Neuro re-ed Performed P/AAROM L ankle pumps, slr flex/abd bilat, elbow/shoulder flex x 10 reps bilat   Assessment   Prognosis Good   Problem List Decreased strength;Decreased range of motion;Decreased endurance; Impaired balance;Decreased mobility; Decreased cognition;Decreased safety awareness;Decreased skin integrity;Orthopedic restrictions;Pain   Assessment Pt received laying supine in bed with soft 2 point wrist restraints  Pt uncooperative today - upset about restraints but refusing to participate once able to move supervised by PT  Able to perform AA/PROM exercise all 4 extremities - patient refusing to assist with exercise after first set of L ankle pumps   Refusing to perform shoulder flexion because "I don't want to " LUIZ Acosta assisted with repositioning R wrist restraint and repositioned for comfort  The patient's AM-PAC Basic Mobility Inpatient Short Form Raw Score is 13  A Raw score of less than or equal to 16 suggests the patient may benefit from discharge to post-acute rehabilitation services  Please also refer to the recommendation of the Physical Therapist for safe discharge planning  Goals   Patient Goals none stated   Plan   Treatment/Interventions Functional transfer training;ADL retraining;LE strengthening/ROM; Therapeutic exercise; Endurance training;Bed mobility;Gait training;Spoke to nursing;OT   PT Frequency 5-7x/wk   Recommendation   PT Discharge Recommendation Post acute rehabilitation services   AM-PAC Basic Mobility Inpatient   Turning in Bed Without Bedrails 3   Lying on Back to Sitting on Edge of Flat Bed 3   Moving Bed to Chair 2   Standing Up From Chair 2   Walk in Room 2   Climb 3-5 Stairs 1   Basic Mobility Inpatient Raw Score 13   Basic Mobility Standardized Score 33 99   Highest Level Of Mobility   -HLM Goal 4: Move to chair/commode   JH-HLM Achieved 2: Bed activities/Dependent transfer   End of Consult   Patient Position at End of Consult Supine;Bed/Chair alarm activated; All needs within reach   [facility] Number  Anna Valier [id number]

## 2022-11-17 NOTE — ASSESSMENT & PLAN NOTE
Plan was for discharge today however just prior to getting picked up by transfer for patient was noted to be agitated and screaming in the room  Patient upset that she has to bury her mother  · Per staff member, patient was confused and agitated overnight as well  · Patient then ripped out her IV and not allowing staff members to place new IV line in and was also refusing medications  · Discharge cancelled and UA obtained which shows moderate blood, small leukocytes, occasional bacteria  · Started on IV Rocephin pending urine culture  · Will obtain CT head  · Likely related to underlying dementia with delirium    Pain medications may be playing a role as well  · If no significant improvement may need Psychiatry involvement  · Required IM Haldol and placed on soft restraints

## 2022-11-17 NOTE — TELEMEDICINE
TeleConsultation - 2003 [facility]  64 y o  female MRN: [id number]  Unit/Bed#: [address]  Encounter: [id number]        REQUIRED DOCUMENTATION:     1  This service was provided via Telemedicine  2  Provider located at [place]  3  TeleMed provider: Zohra Osorio MD   4  Identify all parties in room with patient during tele consult:  Patient  5  Patient was then informed that this was a Telemedicine visit and that the exam was being conducted confidentially over secure lines  My office door was closed  No one else was in the room  Patient acknowledged consent and understanding of privacy and security of the Telemedicine visit, and gave us permission to have the assistant stay in the room in order to assist with the history and to conduct the exam   I informed the patient that I have reviewed their record in Epic and presented the opportunity for them to ask any questions regarding the visit today  The patient agreed to participate  Assessment/Plan     Principal Problem:    Closed fracture of distal end of right fibula  Active Problems:    Hypothyroidism    Long-term use of immunosuppressant medication    HLD (hyperlipidemia)    Cerebrovascular accident (CVA) due to embolism (HCC)    Benign essential hypertension    Chronic kidney insufficiency    Dementia (HCC)    Osteopenia of multiple sites    Hyperkalemia    Fall    GERD (gastroesophageal reflux disease)    Encephalopathy    Assessment:    Encephalopathy/delirium likely multifactorial in etiology superimposed on dementia    Treatment Plan:    Consider starting Zyprexa 2 5 mg orally disintegrating tablet p o  daily as mood stabilizer  This may be further titrated to effect if indicated as tolerated  Consider additional Zyprexa 2 5-5 mg p o  or IM q 6 hours p r n  agitation  Once underlying factors related to encephalopathy/delirium had cleared, she may be able to come off of the Zyprexa  No suicide precautions are indicated    If patient were to refuse any indicated medical care, would recommend neuropsychology consult to determine cognitive capacity for healthcare decision making  Re-consult Psychiatry as needed  Current Medications:     Current Facility-Administered Medications   Medication Dose Route Frequency Provider Last Rate   • acetaminophen  650 mg Oral PRN Lida Villatoro MD     • calcitonin (salmon)  1 spray Alternating Nares Daily Lida Villatoro MD     • calcium carbonate-vitamin D  1 tablet Oral Daily With Breakfast Lida Villatoro MD     • cefTRIAXone  1,000 mg Intravenous Q24H Keerthi Revsarahar, DO 1,000 mg (11/16/22 5456)   • cholecalciferol  1,000 Units Oral Daily Lida Villatoro MD     • cycloSPORINE modified  50 mg Oral BID Keerthi Revankar, DO     • docusate sodium  100 mg Oral BID PRN Lida Villatoro MD     • haloperidol lactate  2 mg Intramuscular Once Keerthi Revankar, DO     • heparin (porcine)  5,000 Units Subcutaneous [facility] Trejo PA-C     • HYDROcodone-acetaminophen  1 tablet Oral Q6H PRN Lida Villatoro MD     • levothyroxine  112 mcg Oral Early Morning Lida Villatoro MD     • LORazepam  1 mg Intravenous Once Keerthi Revankar, DO     • LORazepam  0 5 mg Oral Once Keerthi Revankar, DO     • melatonin  6 mg Oral HS Keerthi Krishnaar, DO     • metoprolol tartrate  37 5 mg Oral BID Lida Villatoro MD     • mycophenolate  750 mg Oral BID Lida Villatoro MD     • ondansetron  4 mg Intravenous Q6H PRN Lida Villatoro MD     • pantoprazole  40 mg Oral Daily Lida Villatoro MD     • polyethylene glycol  17 g Oral BID PRN Lida Villatoro MD     • pravastatin  20 mg Oral HS Lida Villatoro MD         Risks / Benefits of Treatment:    Risks, benefits, and possible side effects of medications explained to patient and patient verbalizes understanding        Inpatient consult to Psychiatry  Consult performed by: Addison Tabares MD  Consult ordered by: Andre Steward DO        Physician Requesting Consult: [facility] DO Main  Principal Problem:Closed fracture of distal end of right fibula    Reason for Consult:  Dementia with Behavioral disturbance      History of Present Illness      Patient is a 64 y o  female who presented to the emergency department where the provider documented the following: “  Slipped in bathroom, injured rt ankle  No LOC, neg for head neck and back pain,- thinners       Patient presents for evaluation of right ankle injury after a fall  Patient was in the bathroom when she reached down to  something off the floor  She lost her balance and fell injuring her right ankle  Denies any head injury or LOC  Denies any blood thinners  Denies any other injury          History provided by:  Patient   used: No          Prior to Admission Medications   Prescriptions Last Dose Informant Patient Reported? Taking?    BUDESONIDE PO   Self Yes No   Sig: Take 3 mg by mouth   Calcium 500 MG tablet   Self Yes No   Sig: Take 500 mg by mouth   Cholecalciferol (Vitamin D) 50 MCG (2000 UT) tablet   Self Yes No   Sig: Take 1,000 Units by mouth   Patient not taking: Reported on 7/1/2022   aspirin (ECOTRIN LOW STRENGTH) 81 mg EC tablet   Self No No   Sig: Take 1 tablet (81 mg total) by mouth daily with breakfast   b complex vitamins capsule   Self No No   Sig: Take 1 capsule by mouth daily   clopidogrel (PLAVIX) 75 mg tablet     No No   Sig: TAKE 1 TABLET EVERY DAY   cycloSPORINE modified (NEORAL) 25 mg capsule   Self Yes No   Sig: Take 50 mg by mouth 2 (two) times a day   ezetimibe (ZETIA) 10 mg tablet     No No   Sig: Take 1 tablet by mouth once daily   levothyroxine 112 mcg tablet     No No   Sig: TAKE 1 TABLET EVERY DAY AS DIRECTED   metoprolol tartrate (LOPRESSOR) 25 mg tablet     No No   Sig: Take 1 5 tablets (37 5 mg total) by mouth 2 (two) times a day   mycophenolate (CELLCEPT) 250 mg capsule   Self No No   Sig: Take 3 capsules (750 mg total) by mouth 2 (two) times a day   Patient taking differently: Take 750 mg by mouth 2 tabs twice daily   omeprazole (PriLOSEC) 40 MG capsule   Self Yes No   Sig: Take 40 mg by mouth daily   pravastatin (PRAVACHOL) 20 mg tablet     No No   Sig: TAKE 1 TABLET BY MOUTH ONCE DAILY AT BEDTIME      Facility-Administered Medications: None         Medical History[]Expand by Default        Past Medical History:   Diagnosis Date   • Anemia     • Anxiety     • Arthritis     • Benign paroxysmal vertigo, unspecified ear       onset: 05/06/2010   • Cirrhosis ([facility] )       onset: 03/30/2005   • Crohn disease ([facility] )     • Disease of thyroid gland     • GERD (gastroesophageal reflux disease)     • History of transfusion     • Liver transplant candidate       onset: 09/16/2008   • Osteoporosis     • Pelvic fracture (HCC)       onset: 10/14/2008            Surgical History[]Expand by Default         Past Surgical History:   Procedure Laterality Date   • ABDOMINAL SURGERY       • APPENDECTOMY       • BLADDER AUGMENTATION N/A     • CATARACT EXTRACTION       • CHOLECYSTECTOMY N/A     • COLONOSCOPY N/A 4/20/2017     Procedure: COLONOSCOPY;  Surgeon: Alondra Jiang MD;  Location: [facility] GI LAB; Service:    • ESOPHAGOGASTRODUODENOSCOPY N/A 3/31/2016     Procedure: ESOPHAGOGASTRODUODENOSCOPY (EGD); Surgeon: Alondra Jiang MD;  Location: [facility] GI LAB; Service:    • ESOPHAGOGASTRODUODENOSCOPY N/A 4/20/2017     Procedure: ESOPHAGOGASTRODUODENOSCOPY (EGD); Surgeon: Alondra Jiang MD;  Location: [facility] GI LAB;   Service:    • EYE SURGERY         cataracts removed both eyes lens implant   • HERNIA REPAIR       • JOINT REPLACEMENT         left knee replacement   • LIVER TRANSPLANTATION N/A     • TONSILECTOMY AND ADNOIDECTOMY N/A     • TUBAL LIGATION                Family History[]Expand by Default         Family History   Problem Relation Age of Onset   • Cancer Mother           breast   • Cancer Father     • Crohn's disease Brother     • Arthritis Family     • Breast cancer Family           I have reviewed and agree with the history as documented            E-Cigarette/Vaping   • E-Cigarette Use Never User              E-Cigarette/Vaping Substances   • Nicotine No     • THC No     • CBD No     • Flavoring No     • Other No     • Unknown No        Social History            Tobacco Use   • Smoking status: Former Smoker       Years: 5 00       Types: Cigarettes       Quit date: 1975       Years since quittin 8   • Smokeless tobacco: Never Used   • Tobacco comment: smoked for 5 years-1/2 to 1 ppd   Vaping Use   • Vaping Use: Never used   Substance Use Topics   • Alcohol use: Never   • Drug use: Never         Review of Systems   Constitutional: Negative for chills and fever  HENT: Negative for ear pain and sore throat  Eyes: Negative for pain and visual disturbance  Respiratory: Negative for cough and shortness of breath  Cardiovascular: Negative for chest pain and palpitations  Gastrointestinal: Negative for abdominal pain and vomiting  Genitourinary: Negative for dysuria and hematuria  Musculoskeletal: Negative for arthralgias, back pain and neck pain  Skin: Negative for color change and rash  Neurological: Negative for seizures, syncope, weakness, numbness and headaches  All other systems reviewed and are negative      Past psychiatric history:  Unremarkable per patient     Social history:  Patient reports she was single but later reported that she lives with her significant other  She reports she has 3 children  She says everything is fine at home  She reports no abuse  Family history:  Unremarkable per patient     Substance use history:  Patient denies use of alcohol and other substances  Mental status examination:  The patient is alert and oriented to person and birth date  She knows she is in the hospital but does not know would which 1  She gave the correct month and year  Affect was very irritable    She immediately made it clear that she did not want to be here   Initially she states she did not know why she was here but later said she was here for bone fracture requiring surgery tomorrow  She served is poor historian  Speech is unremarkable  Thought process was logical and linear  Thought content is reality based  Associations were tight  Memory is impaired recent and remote spheres  She was very angry about being in soft restraints currently  She says she did not need the restraints there when asked if she had experienced any agitation she said [place] make me agitated in depressed”  She denies suicidal homicidal ideation  She denied hallucinations and other psychotic features  Insight and judgment are impaired  Past Medical History:   Diagnosis Date   • Anemia    • Anxiety    • Arthritis    • Benign paroxysmal vertigo, unspecified ear     onset: 05/06/2010   • Cirrhosis ([facility] )     onset: 03/30/2005   • Crohn disease ([place] [address] )    • Disease of thyroid gland    • GERD (gastroesophageal reflux disease)    • History of transfusion    • Liver transplant candidate     onset: 09/16/2008   • Osteoporosis    • Pelvic fracture ([facility] )     onset: 10/14/2008       Medical Review Of Systems:    Review of Systems    Meds/Allergies     all current active meds have been reviewed  Allergies   Allergen Reactions   • Tetracyclines & Related Hives   • Vancomycin Other (See Comments) and Itching     Reaction Date: [id number]; Pt unsure of reaction   • Prograf [Tacrolimus] Anxiety     Reaction Date: 16Dec2008; Objective     Vital signs in last 24 hours:  Temp:  [97 5 °F (36 4 °C)-98 °F (36 7 °C)] 98 °F (36 7 °C)  HR:  [81-99] 97  Resp:  [15-16] 16  BP: (151-177)/(67-80) 175/79      Intake/Output Summary (Last 24 hours) at 11/17/2022 1607  Last data filed at 11/17/2022 1101  Gross per 24 hour   Intake --   Output 600 ml   Net -600 ml           Lab Results: I have personally reviewed all pertinent laboratory/tests results           Imaging Studies: XR knee 4+ views Right injury    Result Date: 11/14/2022  Narrative: RIGHT KNEE INDICATION:   fall  COMPARISON:  None VIEWS:  XR KNEE 4+ VW RIGHT INJURY FINDINGS: There is no acute fracture or dislocation  There is no joint effusion  Mild to moderate osteoarthritis with narrowing of the medial tibiofemoral joint and small osteophytes seen  No lytic or blastic osseous lesion  There are atherosclerotic calcifications  Generalized soft tissue swelling suggested medial to the knee  Calcified phleboliths medial to the knee  Impression: No acute osseous abnormality  Workstation performed: [id number]     XR ankle 2 vw right    Result Date: 11/15/2022  Narrative: RIGHT ANKLE INDICATION:   s/p reduction  COMPARISON:  Compared with 11/14/2022 VIEWS:  XR ANKLE 2 VW RIGHT FINDINGS: Displaced distal fibular fracture unchanged in alignment  Dislocated ankle joint posterior displacement of talus unchanged  Posterior fiberglass cast  Moderate size calcaneal spur and enthesopathy  No lytic or blastic osseous lesion  There are atherosclerotic calcifications  Soft tissues are otherwise unremarkable  Impression: No change in alignment post fiberglass placement of the ankle dislocation and fibular fracture  Workstation performed: [id number]     XR ankle 3+ vw right    Result Date: 11/15/2022  Narrative: RIGHT ANKLE INDICATION:   post reduction  COMPARISON:  Right ankle radiographs 11/15/2022, 7:59 AM  VIEWS:  XR ANKLE 3+ VW RIGHT Images: 6 FINDINGS: Again seen is an acute oblique transsyndesmotic fracture of the distal right fibula with mild lateral, posterior displacement of the distal fragment unchanged in alignment from prior study  Reduction of the previously noted ankle displacement, with near-anatomic alignment of the tibiotalar joint  Moderate-sized calcaneal spur  No lytic or blastic osseous lesion  Diffuse soft tissue swelling about the ankle  Atherosclerotic vascular calcifications   Final radiographs of series demonstrate placement of cast material over the ankle which obscures bony detail  Impression: Unchanged alignment of a distal fibular fracture, with interval reduction of ankle dislocation  Workstation performed: [id number]     XR ankle 3+ views RIGHT    Result Date: 11/14/2022  Narrative: RIGHT ANKLE INDICATION:   fall  COMPARISON:  None VIEWS:  XR ANKLE 3+ VW RIGHT FINDINGS: Oblique displaced fracture of the distal fibula  Ankle mortise is widened anteriorly and medially as well  Diffuse soft tissue swelling at the ankle  Prominent plantar and retrocalcaneal heel spurs  There are atherosclerotic calcifications  Impression: Displaced fracture of the distal fibula with disruption of the ankle mortise  The study was marked in [facility] for immediate notification  Workstation performed: [id number]     CT head wo contrast    Result Date: 11/17/2022  Narrative: CT BRAIN - WITHOUT CONTRAST INDICATION:   altered mental status  COMPARISON:  CT 3/13/18, MRI 2/1/19 TECHNIQUE:  CT examination of the brain was performed  In addition to axial images, sagittal and coronal 2D reformatted images were created and submitted for interpretation  Radiation dose length product (DLP) for this visit:  896 54 mGy-cm   This examination, like all CT scans performed in the [facility], was performed utilizing techniques to minimize radiation dose exposure, including the use of iterative  reconstruction and automated exposure control  IMAGE QUALITY:  Diagnostic  FINDINGS: PARENCHYMA: Decreased attenuation is noted in periventricular and subcortical white matter demonstrating an appearance that is statistically most likely to represent moderate microangiopathic change  There is a new infarct in the right centrum semiovale series 2/24 when compared to the prior CT Stable right occipital hypodense parenchymal No CT signs of acute infarction  No intracranial mass, mass effect or midline shift  No acute parenchymal hemorrhage   VENTRICLES AND EXTRA-AXIAL SPACES:  Normal for the patient's age  VISUALIZED ORBITS AND PARANASAL SINUSES:  Unremarkable  CALVARIUM AND EXTRACRANIAL SOFT TISSUES:  Normal      Impression: Right lacunar infarct, new when compared to the CT in 2018, favored to be chronic, however age indeterminate The study was marked in [employer] for immediate notification  Workstation performed: [id number]     EKG/Pathology/Other Studies:   Lab Results   Component Value Date    VENTRATE 68 11/14/2022    ATRIALRATE 68 11/14/2022    PRINT 190 11/14/2022    QRSDINT 94 11/14/2022    QTINT 416 11/14/2022    QTCINT 442 11/14/2022    PAXIS -7 11/14/2022    QRSAXIS -20 11/14/2022    TWAVEAXIS 35 11/14/2022        Code Status: Level 3 - DNAR and DNI  Advance Directive and Living Will:      Power of :    POLST:      Counseling / Coordination of Care: Total floor / unit time spent today 30 minutes  Greater than 50% of total time was spent with the patient and / or family counseling and / or coordination of care  A description of the counseling / coordination of care:  Chart review, patient evaluation, coordination communication with staff, nursing and provider

## 2022-11-18 ENCOUNTER — APPOINTMENT (INPATIENT)
Dept: RADIOLOGY | Facility: HOSPITAL | Age: 77
End: 2022-11-18

## 2022-11-18 ENCOUNTER — ANESTHESIA (INPATIENT)
Dept: PERIOP | Facility: HOSPITAL | Age: 77
End: 2022-11-18

## 2022-11-18 LAB
ANION GAP SERPL CALCULATED.3IONS-SCNC: 13 MMOL/L (ref 4–13)
BUN SERPL-MCNC: 18 MG/DL (ref 5–25)
CALCIUM SERPL-MCNC: 8.8 MG/DL (ref 8.3–10.1)
CHLORIDE SERPL-SCNC: 108 MMOL/L (ref 96–108)
CO2 SERPL-SCNC: 20 MMOL/L (ref 21–32)
CREAT SERPL-MCNC: 1.04 MG/DL (ref 0.6–1.3)
GFR SERPL CREATININE-BSD FRML MDRD: 51 ML/MIN/1.73SQ M
GLUCOSE SERPL-MCNC: 103 MG/DL (ref 65–140)
GLUCOSE SERPL-MCNC: 117 MG/DL (ref 65–140)
GLUCOSE SERPL-MCNC: 97 MG/DL (ref 65–140)
POTASSIUM SERPL-SCNC: 4.5 MMOL/L (ref 3.5–5.3)
SODIUM SERPL-SCNC: 141 MMOL/L (ref 135–147)

## 2022-11-18 PROCEDURE — 0QSJ04Z REPOSITION RIGHT FIBULA WITH INTERNAL FIXATION DEVICE, OPEN APPROACH: ICD-10-PCS | Performed by: ORTHOPAEDIC SURGERY

## 2022-11-18 DEVICE — 3.5MM CORTEX SCREW SELF-TAPPING 14MM: Type: IMPLANTABLE DEVICE | Site: ANKLE | Status: FUNCTIONAL

## 2022-11-18 DEVICE — 3.5MM CORTEX SCREW SELF-TAPPING 12MM: Type: IMPLANTABLE DEVICE | Site: ANKLE | Status: FUNCTIONAL

## 2022-11-18 DEVICE — 3.5MM CORTEX SCREW SELF-TAPPING 22MM: Type: IMPLANTABLE DEVICE | Site: ANKLE | Status: FUNCTIONAL

## 2022-11-18 DEVICE — 4.0MM CANCELLOUS BONE SCREW PARTIALLY THREADED/14MM: Type: IMPLANTABLE DEVICE | Site: ANKLE | Status: FUNCTIONAL

## 2022-11-18 DEVICE — 3.5MM LOCKING SCREW SLF-TPNG W/STARDRIVE(TM) RECESS 12MM: Type: IMPLANTABLE DEVICE | Site: ANKLE | Status: FUNCTIONAL

## 2022-11-18 DEVICE — 3.5MM LOCKING SCREW SLF-TPNG W/STARDRIVE(TM) RECESS 14MM: Type: IMPLANTABLE DEVICE | Site: ANKLE | Status: FUNCTIONAL

## 2022-11-18 RX ORDER — DEXAMETHASONE SODIUM PHOSPHATE 10 MG/ML
INJECTION, SOLUTION INTRAMUSCULAR; INTRAVENOUS AS NEEDED
Status: DISCONTINUED | OUTPATIENT
Start: 2022-11-18 | End: 2022-11-18

## 2022-11-18 RX ORDER — SODIUM CHLORIDE, SODIUM LACTATE, POTASSIUM CHLORIDE, CALCIUM CHLORIDE 600; 310; 30; 20 MG/100ML; MG/100ML; MG/100ML; MG/100ML
INJECTION, SOLUTION INTRAVENOUS CONTINUOUS PRN
Status: DISCONTINUED | OUTPATIENT
Start: 2022-11-18 | End: 2022-11-18

## 2022-11-18 RX ORDER — SODIUM CHLORIDE 9 MG/ML
INJECTION, SOLUTION INTRAVENOUS CONTINUOUS PRN
Status: DISCONTINUED | OUTPATIENT
Start: 2022-11-18 | End: 2022-11-18

## 2022-11-18 RX ORDER — CEFAZOLIN SODIUM 2 G/50ML
2000 SOLUTION INTRAVENOUS EVERY 8 HOURS
Status: DISCONTINUED | OUTPATIENT
Start: 2022-11-18 | End: 2022-11-18

## 2022-11-18 RX ORDER — ONDANSETRON 2 MG/ML
4 INJECTION INTRAMUSCULAR; INTRAVENOUS ONCE AS NEEDED
Status: DISCONTINUED | OUTPATIENT
Start: 2022-11-18 | End: 2022-11-18 | Stop reason: HOSPADM

## 2022-11-18 RX ORDER — FENTANYL CITRATE 50 UG/ML
INJECTION, SOLUTION INTRAMUSCULAR; INTRAVENOUS AS NEEDED
Status: DISCONTINUED | OUTPATIENT
Start: 2022-11-18 | End: 2022-11-18

## 2022-11-18 RX ORDER — OLANZAPINE 10 MG/1
5 INJECTION, POWDER, LYOPHILIZED, FOR SOLUTION INTRAMUSCULAR EVERY 6 HOURS PRN
Status: DISCONTINUED | OUTPATIENT
Start: 2022-11-18 | End: 2022-11-19

## 2022-11-18 RX ORDER — FENTANYL CITRATE/PF 50 MCG/ML
50 SYRINGE (ML) INJECTION
Status: DISCONTINUED | OUTPATIENT
Start: 2022-11-18 | End: 2022-11-18 | Stop reason: HOSPADM

## 2022-11-18 RX ORDER — CEFAZOLIN SODIUM 2 G/50ML
SOLUTION INTRAVENOUS AS NEEDED
Status: DISCONTINUED | OUTPATIENT
Start: 2022-11-18 | End: 2022-11-18

## 2022-11-18 RX ORDER — ONDANSETRON 2 MG/ML
INJECTION INTRAMUSCULAR; INTRAVENOUS AS NEEDED
Status: DISCONTINUED | OUTPATIENT
Start: 2022-11-18 | End: 2022-11-18

## 2022-11-18 RX ORDER — SODIUM CHLORIDE 9 MG/ML
75 INJECTION, SOLUTION INTRAVENOUS CONTINUOUS
Status: DISCONTINUED | OUTPATIENT
Start: 2022-11-18 | End: 2022-11-19

## 2022-11-18 RX ORDER — BUPIVACAINE HYDROCHLORIDE 2.5 MG/ML
INJECTION, SOLUTION EPIDURAL; INFILTRATION; INTRACAUDAL AS NEEDED
Status: DISCONTINUED | OUTPATIENT
Start: 2022-11-18 | End: 2022-11-18 | Stop reason: HOSPADM

## 2022-11-18 RX ADMIN — ONDANSETRON 4 MG: 2 INJECTION INTRAMUSCULAR; INTRAVENOUS at 10:56

## 2022-11-18 RX ADMIN — METOPROLOL TARTRATE 50 MG: 50 TABLET, FILM COATED ORAL at 17:09

## 2022-11-18 RX ADMIN — PRAVASTATIN SODIUM 20 MG: 20 TABLET ORAL at 22:16

## 2022-11-18 RX ADMIN — FENTANYL CITRATE 50 MCG: 50 INJECTION, SOLUTION INTRAMUSCULAR; INTRAVENOUS at 10:59

## 2022-11-18 RX ADMIN — SODIUM CHLORIDE: 9 INJECTION, SOLUTION INTRAVENOUS at 10:48

## 2022-11-18 RX ADMIN — FENTANYL CITRATE 50 MCG: 50 INJECTION, SOLUTION INTRAMUSCULAR; INTRAVENOUS at 11:05

## 2022-11-18 RX ADMIN — FENTANYL CITRATE 50 MCG: 50 INJECTION INTRAMUSCULAR; INTRAVENOUS at 12:25

## 2022-11-18 RX ADMIN — OLANZAPINE 2.5 MG: 5 TABLET, ORALLY DISINTEGRATING ORAL at 22:17

## 2022-11-18 RX ADMIN — CYCLOSPORINE 50 MG: 25 CAPSULE ORAL at 17:09

## 2022-11-18 RX ADMIN — FENTANYL CITRATE 50 MCG: 50 INJECTION, SOLUTION INTRAMUSCULAR; INTRAVENOUS at 11:13

## 2022-11-18 RX ADMIN — Medication 6 MG: at 22:16

## 2022-11-18 RX ADMIN — LORAZEPAM 1 MG: 2 INJECTION INTRAMUSCULAR; INTRAVENOUS at 09:02

## 2022-11-18 RX ADMIN — CALCITONIN SALMON 1 SPRAY: 200 SPRAY, METERED NASAL at 15:22

## 2022-11-18 RX ADMIN — SODIUM CHLORIDE, SODIUM LACTATE, POTASSIUM CHLORIDE, AND CALCIUM CHLORIDE: .6; .31; .03; .02 INJECTION, SOLUTION INTRAVENOUS at 10:31

## 2022-11-18 RX ADMIN — LEVOTHYROXINE SODIUM 112 MCG: 112 TABLET ORAL at 06:15

## 2022-11-18 RX ADMIN — HYDROCODONE BITARTRATE AND ACETAMINOPHEN 1 TABLET: 5; 325 TABLET ORAL at 22:29

## 2022-11-18 RX ADMIN — SODIUM CHLORIDE 100 ML/HR: 0.9 INJECTION, SOLUTION INTRAVENOUS at 13:44

## 2022-11-18 RX ADMIN — CEFTRIAXONE 1000 MG: 1 INJECTION, SOLUTION INTRAVENOUS at 17:10

## 2022-11-18 RX ADMIN — PHENYLEPHRINE HYDROCHLORIDE 15 MCG/MIN: 10 INJECTION INTRAVENOUS at 11:03

## 2022-11-18 RX ADMIN — CEFAZOLIN SODIUM 2000 MG: 2 SOLUTION INTRAVENOUS at 10:48

## 2022-11-18 RX ADMIN — MYCOPHENOLATE MOFETIL 750 MG: 250 CAPSULE ORAL at 17:10

## 2022-11-18 RX ADMIN — HYDROCODONE BITARTRATE AND ACETAMINOPHEN 1 TABLET: 5; 325 TABLET ORAL at 15:22

## 2022-11-18 RX ADMIN — FENTANYL CITRATE 50 MCG: 50 INJECTION, SOLUTION INTRAMUSCULAR; INTRAVENOUS at 10:37

## 2022-11-18 RX ADMIN — DEXAMETHASONE SODIUM PHOSPHATE 4 MG: 10 INJECTION, SOLUTION INTRAMUSCULAR; INTRAVENOUS at 10:57

## 2022-11-18 RX ADMIN — FENTANYL CITRATE 50 MCG: 50 INJECTION, SOLUTION INTRAMUSCULAR; INTRAVENOUS at 11:11

## 2022-11-18 RX ADMIN — FENTANYL CITRATE 50 MCG: 50 INJECTION, SOLUTION INTRAMUSCULAR; INTRAVENOUS at 10:38

## 2022-11-18 NOTE — DISCHARGE INSTRUCTIONS
Discharge Instructions - Orthopedics  Margo Sands 64 y o  female MRN: [id number]  Unit/Bed#: [address]    Weight Bearing Status:                                           Non-weight bearing right lower extremity in splint at all times  DVT prophylaxis  Continue outpatient regiment of anticoagulation utilizing aspirin and plavix    Pain:  Continue analgesics as directed    Dressing Instructions:   Please keep clean, dry and intact until follow up     Appt Instructions: If you do not have your appointment, please call the clinic at [phone] t  Otherwise followup as scheduled     Contact the office sooner if you experience any increased numbness/tingling in the extremities

## 2022-11-18 NOTE — ASSESSMENT & PLAN NOTE
· BPs noted to be high at times and Lopressor dose increased  · Blood pressures partly elevated due to patient being agitated as well

## 2022-11-18 NOTE — PROGRESS NOTES
[facility] [address]  Progress Note Afshan Sosa 1945, 64 y o  female MRN: [id number]  Unit/Bed#: OR [place] Encounter: [id number]  Primary Care Provider: Sneha Rodriguez MD   Date and time admitted to hospital: 11/14/2022  1:01 PM    * Closed fracture of distal end of right fibula  Assessment & Plan  X-ray- right displaced fracture of distal fibula with disruption of the ankle mortise  · Repeat x-ray 11/15 showed persistent right ankle fracture subluxation and patient underwent close reduction and splinting  Repeat imaging showed acceptable reduction of the ankle fracture  · Initially plan was surgical intervention of distal fibular fracture on outpatient basis due to significant swelling however going to OR today  · Patient to be nonweightbearing of right lower extremity with splint in place and right lower extremity elevation while at rest  · Knee x-ray-unremarkable  · Pain management  DVT prophylaxis with heparin subQ (on hold for OR today)    Encephalopathy  Assessment & Plan  Plan was for discharge on 11/16 however just prior to getting picked up for transfer, pt was noted to be agitated and screaming in the room  Patient upset that she has to bury her mother  · Per staff member, patient was confused and agitated overnight as well  She then ripped out her IV and not allowing staff members to place new IV line in and was also refusing medications  · Discharge cancelled and UA obtained which shows moderate blood, small leukocytes, occasional bacteria, Started on IV Rocephin   · Symptoms most likely delirium superimposed on dementia  · CT head shows age indeterminate likely old right lacunar infarct  MRI negative for acute CVA  · Appreciate Psychiatry input  Started on Zyprexa 2 5 mg at bedtime and IM Zyprexa p r n   Agitation  · Requiring Haldol/Ativan and placed on soft restraints due to her being agitated, not directable and trying to pull off things & trying to get out of bed when not in restraints    Hyperkalemia  Assessment & Plan  Acute,  · 6 3 initially after repeat, non hemolyzed  · Calcium gluconate 1 g, in the ED- insulin 8 units, dextrose in IV  · Potassium level mildly elevated again on 11/16  Insulin, dextrose, albuterol was given  · Potassium level within normal limits on lab work today  Low-potassium diet  · Repeat BMP in a m   · CK-within normal limits    GERD (gastroesophageal reflux disease)  Assessment & Plan  · Prilosec substituted with Protonix while here  Fall  Assessment & Plan  CT-abdomen/pelvis-no acute intra-abdominal or pelvic  · Knee x-ray-unremarkable  · Foot x-ray- displaced fracture of the distal fibula with disruption of the ankle mortise  · Pain meds p r n    Osteopenia of multiple sites  Assessment & Plan  Noted on medical record,  · vitamin-D level 20  · Continue home vitamin-D and calcium  · Receiving calcitonin while here due to current fractures  · Outpatient DEXA scan recommended if within criteria    Dementia [facility])  Assessment & Plan  Chronic  · Not on any medications at home  · As noted above started on Zyprexa    Per Psychiatry may be able to come off Zyprexa once delirium has resolved    Chronic kidney insufficiency  Assessment & Plan  Lab Results   Component Value Date    EGFR 51 11/18/2022    EGFR 52 11/17/2022    EGFR 46 11/16/2022    CREATININE 1 04 11/18/2022    CREATININE 1 02 11/17/2022    CREATININE 1 14 11/16/2022     Elevated creatinine initially of 1 6, has since trended down  · Repeat lab work     Benign essential hypertension  Assessment & Plan  · BPs noted to be high at times and Lopressor dose increased  · Blood pressures partly elevated due to patient being agitated as well    Cerebrovascular accident (CVA) due to embolism ([facility] [address] )  Assessment & Plan  Noted on Mar  · Plavix and aspirin was restarted per Ortho but again discontinued for OR today per Ortho  · Restart once okay by Ortho    HLD (hyperlipidemia)  Assessment & Plan  Lipid panel - LDL 44  · Continue home statin    Long-term use of immunosuppressant medication  Assessment & Plan  Continue cyclosporine, CellCept    Hypothyroidism  Assessment & Plan  · Continue levothyroxine 112 mcg      VTE Pharmacologic Prophylaxis: VTE Score: 10 High Risk (Score >/= 5) - Pharmacological DVT Prophylaxis Contraindicated  Sequential Compression Devices Ordered  Patient Centered Rounds: I performed bedside rounds with nursing staff today  Discussions with Specialists or Other Care Team Provider: yes - ortho    Education and Discussions with Family / Patient: Updated  (significant other) at bedside  Time Spent for Care: 35 min  More than 50% of total time spent on counseling and coordination of care as described above  Current Length of Stay: 4 day(s)  Current Patient Status: Inpatient   Certification Statement: The patient will continue to require additional inpatient hospital stay due to Distal right fibular fracture requiring OR intervention, dementia with delirium  Discharge Plan: Anticipate discharge in 48-72 hrs to rehab facility  Code Status: Level 3 - DNAR and DNI    Subjective:     Patient states she is doing okay  Denies any pain  Objective:     Vitals:   Temp (24hrs), Av °F (36 7 °C), Min:97 7 °F (36 5 °C), Max:98 3 °F (36 8 °C)    Temp:  [97 7 °F (36 5 °C)-98 3 °F (36 8 °C)] 98 3 °F (36 8 °C)  HR:  [] 105  Resp:  [16-20] 18  BP: (160-175)/(79-88) 160/83  SpO2:  [98 %-99 %] 99 %  Body mass index is 38 75 kg/m²  Input and Output Summary (last 24 hours): Intake/Output Summary (Last 24 hours) at 2022 1012  Last data filed at 2022 1101  Gross per 24 hour   Intake --   Output 600 ml   Net -600 ml       Physical Exam:   Physical Exam  Constitutional:       General: She is not in acute distress  Appearance: She is well-developed  HENT:      Head: Normocephalic and atraumatic  Eyes:      General: No scleral icterus    Cardiovascular: Rate and Rhythm: Regular rhythm  Tachycardia present  Pulmonary:      Effort: Pulmonary effort is normal  No respiratory distress  Breath sounds: Normal breath sounds  No wheezing or rales  Abdominal:      General: Bowel sounds are normal  There is no distension  Palpations: Abdomen is soft  Tenderness: There is no abdominal tenderness  Musculoskeletal:      Comments: Right lower extremity with splint/Ace wrap in place   Neurological:      Mental Status: She is alert        Comments: confused          Additional Data:     Labs:  Results from last 7 days   Lab Units 11/17/22  0559   WBC Thousand/uL 6 00   HEMOGLOBIN g/dL 10 6*   HEMATOCRIT % 33 5*   PLATELETS Thousands/uL 173   NEUTROS PCT % 72   LYMPHS PCT % 14   MONOS PCT % 10   EOS PCT % 2     Results from last 7 days   Lab Units 11/18/22  0633 11/17/22  0559   SODIUM mmol/L 141 134*   POTASSIUM mmol/L 4 5 4 7   CHLORIDE mmol/L 108 106   CO2 mmol/L 20* 20*   BUN mg/dL 18 20   CREATININE mg/dL 1 04 1 02   ANION GAP mmol/L 13 8   CALCIUM mg/dL 8 8 8 8   ALBUMIN g/dL  --  2 9*   TOTAL BILIRUBIN mg/dL  --  0 32   ALK PHOS U/L  --  163*   ALT U/L  --  25   AST U/L  --  25   GLUCOSE RANDOM mg/dL 103 98         Results from last 7 days   Lab Units 11/18/22  0730 11/17/22  2051 11/17/22  1622 11/17/22  1122 11/17/22  0738 11/15/22  2039 11/15/22  1102 11/15/22  0757   POC GLUCOSE mg/dl 97 105 109 94 90 104 108 111     Results from last 7 days   Lab Units 11/17/22  0559   HEMOGLOBIN A1C % 5 0           Lines/Drains:  Invasive Devices     Peripheral Intravenous Line  Duration           Peripheral IV 11/17/22 Left;Upper;Ventral (anterior) Arm <1 day                      Imaging: Reviewed radiology reports from this admission including: MRI brain    Recent Cultures (last 7 days):   Results from last 7 days   Lab Units 11/16/22  1439   URINE CULTURE  No Growth <1000 cfu/mL       Last 24 Hours Medication List:   Current Facility-Administered Medications Medication Dose Route Frequency Provider Last Rate   • [MAR Hold] acetaminophen  650 mg Oral PRN Meme Green MD     • [facility] Hold] calcitonin (salmon)  1 spray Alternating Nares Daily Meme Green MD     • [facility] Hold] calcium carbonate-vitamin D  1 tablet Oral Daily With Breakfast Meme Green MD     • [facility] Hold] cefTRIAXone  1,000 mg Intravenous Q24H Keerthi Revankar, DO 1,000 mg (11/17/22 1652)   • [MAR Hold] cholecalciferol  1,000 Units Oral Daily Meme Green MD     • [facility] Hold] cycloSPORINE modified  50 mg Oral BID Keerthi Revankar, DO     • [MAR Hold] docusate sodium  100 mg Oral BID PRN Meme Green MD     • [facility] Hold] HYDROcodone-acetaminophen  1 tablet Oral Q6H PRN Meme Green MD     • [facility] Hold] levothyroxine  112 mcg Oral Early Morning Meme Green MD     • [facility] Hold] LORazepam  0 5 mg Oral Once Keerthi Revankar, DO     • [MAR Hold] melatonin  6 mg Oral HS Keerthi Revankar, DO     • [MAR Hold] metoprolol tartrate  50 mg Oral BID Keerthi Revankar, DO     • [MAR Hold] mycophenolate  750 mg Oral BID Meme Green MD     • [facility] Hold] OLANZapine  2 5 mg Oral HS Keerthi Revankar, DO     • [MAR Hold] OLANZapine  5 mg Intramuscular Q6H PRN Keerthi Revankar, DO     • [MAR Hold] ondansetron  4 mg Intravenous Q6H PRN Meme Green MD     • [facility] Hold] pantoprazole  40 mg Oral Daily Meme Green MD     • [facility] Hold] polyethylene glycol  17 g Oral BID PRN Meme Green MD     • [facility] Hold] pravastatin  20 mg Oral HS Meme Green MD          Today, Patient Was Seen By: Johnny Dakin, DO    **Please Note: This note may have been constructed using a voice recognition system  **

## 2022-11-18 NOTE — OP NOTE
OPERATIVE REPORT  PATIENT NAME: Corin Dumas    :  1945  MRN: [id number]  Pt Location: [facility] OR ROOM 03    SURGERY DATE: 2022    Surgeon(s) and Role:     * Stanford Zhu MD - Primary     * Raad Lawrence PA-C - Assisting    Preop Diagnosis:  Right ankle bimalleolar equivalent fracture    Post-Op Diagnosis Codes:     * Closed fracture of right ankle, initial encounter [[id number]]    Same    Procedure(s) (LRB):  OPEN REDUCTION W/ INTERNAL FIXATION (ORIF) RIGHT ANKLE FRACTURE (Right)    Procedure:  ORIF R ankle bimalleolar equivalent fracture    Specimen(s):  * No specimens in log *    Estimated Blood Loss:   20 cc    Drains:  * No LDAs found *    Anesthesia Type:   General    Operative Indications:  Displaced/Unstable R ankle fracture in ambulatory patient    Operative Findings:  See below    Complications:   None    Implants: Synthes 3 5 mm cortical lag screw x1, Synthes 1/3 tubular plate    Procedure and Technique:  The patient is a 68-year-old female who sustained an injury involving a lateral malleolus fracture with wide medial clear space widening and talar instability/dislocation  This underwent closed reduction and placement of splint earlier in her hospital stay  Risks of surgery were agree to with patient and power of  and patient is at high risk for soft tissue complications given poor soft tissue envelope as well as significant ecchymosis and bruising as well as multiple medical comorbidities  Patient's operative site, laterality, procedure, consent were verified in the preoperative area the patient was transitioned to the operating room  General anesthesia was provided by the anesthesia team and a nonsterile tourniquet was applied to the operative extremity and used for less than 120 total minutes  The operative extremity was prepped and draped in the usual sterile fashion  After time-out for safety, lateral approach to the fibula took place without incident    Hematoma and soft tissue that was interposed were removed with scalpel and electrocautery  The fibula fracture was aligned held in place with reduction clamp  Drilling then took place for 3 5 mm cortical lag screw which was placed with lag by technique  The 1/3 tubular plate was then positioned and held in place with wires  Balance fixation then took place with a mix of cortical and locking screws  The ankle was found to be stable with a dorsiflexion and external rotation stress test   Final fixation was found to be satisfactory on biplanar fluoroscopic imaging  The wound was copiously irrigated with sterile saline  Deep layer closure took place with heavy Vicryl suture, subcutaneous layer closure took place Vicryl suture, skin layer closure took place with nylon suture  Sterile dressing was applied  The patient was placed in a well-padded posterior plaster splint with stirrup  All final counts were correct  Was present for the entire procedure  The patient was extubated and awakened and transitioned to the PACU in stable condition  There were no immediate complications  There was no qualified resident available for the procedure  Critical assistance from Elif Queen PA-C was required for all components of the procedure including but not limited to positioning, soft tissue retraction, assistance with passage of instrumentation, soft tissue closure and splinting  This was particularly important given the patient's BMI of 38 75  The patient will be nonweightbearing on the operative extremity for 6 weeks  She will require deep vein thrombosis prophylaxis for 6 weeks  We will consider suture removal in 2 weeks pending soft tissue healing      Patient Disposition:  PACU         SIGNATURE: Angelina Machado MD  DATE: November 18, 2022  TIME: 12:42 PM

## 2022-11-18 NOTE — PROGRESS NOTES
Progress Note - Orthopedics   Debra Common 64 y o  female MRN: [id number]  Unit/Bed#: 2 [place] [address]      Subjective:       64 y  o female who sustained a right ankle fracture dislocation on 11/14/22  Patient states she has minimal pain in her ankle currently  Patient states she is been maintaining her splint as directed no complaints  Patient was to be discharged yesterday however developed acute delirium of an unknown etiology  Patient is aware of self and place but is unaware of time  Patient able to answer questions appropriately at this time  Patient denies any numbness or tingling in the leg  Patient offers no other complaints at this time          Labs:  0   Lab Value Date/Time    HCT 33 5 (L) 11/17/2022 0559    HCT 29 0 (L) 11/16/2022 0946    HCT 31 7 (L) 11/15/2022 0838    HCT 39 0 02/25/2016 0925    HCT 38 7 01/07/2016 0910    HCT 36 8 (L) 12/17/2015 1031    HGB 10 6 (L) 11/17/2022 0559    HGB 9 1 (L) 11/16/2022 0946    HGB 9 9 (L) 11/15/2022 0838    HGB 12 1 02/25/2016 0925    HGB 12 2 01/07/2016 0910    HGB 11 6 (L) 12/17/2015 1031    INR 1 00 05/18/2021 2201    WBC 6 00 11/17/2022 0559    WBC 5 34 11/16/2022 0946    WBC 6 25 11/15/2022 0838    WBC 5 3 02/25/2016 0925    WBC 6 54 08/30/2015 0553       Meds:    Current Facility-Administered Medications:   •  acetaminophen (TYLENOL) tablet 650 mg, 650 mg, Oral, PRN, Kristy Zhu MD  •  calcitonin (salmon) (MIACALCIN) 200 units/act nasal spray 1 spray, 1 spray, Alternating Nares, Daily, Kristy Zhu MD, 1 spray at 11/17/22 9027  •  calcium carbonate-vitamin D 500 mg-5 mcg tablet 1 tablet, 1 tablet, Oral, Daily With Breakfast, Kristy Zhu MD, 1 tablet at 11/17/22 6800  •  cefTRIAXone (ROCEPHIN) IVPB (premix in dextrose) 1,000 mg 50 mL, 1,000 mg, Intravenous, Q24H, Keerthi Revankar, DO, Last Rate: 100 mL/hr at 11/17/22 1652, 1,000 mg at 11/17/22 1652  •  cholecalciferol (VITAMIN D3) tablet 1,000 Units, 1,000 Units, Oral, Daily, Kristy Zhu MD, 1,000 Units at 11/17/22 0940  •  cycloSPORINE modified (NEORAL) capsule 50 mg, 50 mg, Oral, BID, Keerthi Quach DO, 50 mg at 11/17/22 1714  •  docusate sodium (COLACE) capsule 100 mg, 100 mg, Oral, BID PRN, Ewa Palmer MD  •  HYDROcodone-acetaminophen [facility]) 5-325 mg per tablet 1 tablet, 1 tablet, Oral, Q6H PRN, Ewa Palmer MD, 1 tablet at 11/15/22 2229  •  levothyroxine tablet 112 mcg, 112 mcg, Oral, Early Morning, Ewa Palmer MD, 112 mcg at 11/18/22 0615  •  LORazepam (ATIVAN) injection 1 mg, 1 mg, Intravenous, Once, Keerthi Quach DO  •  LORazepam (ATIVAN) tablet 0 5 mg, 0 5 mg, Oral, Once, Keerthi Quach DO  •  melatonin tablet 6 mg, 6 mg, Oral, HS, Keerthi Quach DO, 6 mg at 11/17/22 2113  •  metoprolol tartrate (LOPRESSOR) tablet 50 mg, 50 mg, Oral, BID, Keerthi Qucah DO  •  mycophenolate (CELLCEPT) capsule 750 mg, 750 mg, Oral, BID, Ewa Palmer MD, 750 mg at 11/17/22 1713  •  OLANZapine (ZyPREXA ZYDIS) dispersible tablet 2 5 mg, 2 5 mg, Oral, HS, Keerthi Quach DO  •  OLANZapine (ZyPREXA) tablet 5 mg, 5 mg, Oral, Q6H PRN, Keerthi Quach DO  •  ondansetron (ZOFRAN) injection 4 mg, 4 mg, Intravenous, Q6H PRN, Ewa Palmer MD, 4 mg at 11/15/22 1309  •  pantoprazole (PROTONIX) EC tablet 40 mg, 40 mg, Oral, Daily, Ewa Palmer MD, 40 mg at 11/17/22 1512  •  polyethylene glycol (MIRALAX) packet 17 g, 17 g, Oral, BID PRN, Ewa Palmer MD  •  pravastatin (PRAVACHOL) tablet 20 mg, 20 mg, Oral, HS, Ewa Palmer MD, 20 mg at 11/17/22 2113    Blood Culture:   Lab Results   Component Value Date    BLOODCX Peptoniphilus species (A) 05/18/2021    BLOODCX No Growth After 5 Days  05/18/2021       Wound Culture:   No results found for: WOUNDCULT    Ins and Outs:  I/O last 24 hours:   In: -   Out: 600 [Urine:600]      Physical:  Vitals:    11/18/22 0717   BP: 160/83   Pulse: 105   Resp: 18   Temp: 98 3 °F (36 8 °C)   SpO2: 99%       Musculoskeletal: right Lower Extremity  • Patient resting comfortably in hospital bed in no acute distress with soft restraints present  • Skin   No erythema or ecchymosis  • Dressing  :  Posterior leg with stirrup splint present with no visible soilage present  • TTP :   Minimal tenderness to palpation noted throughout the ankle at this time  • Sensation intact to saphenous, sural, tibial, superficial peroneal nerve, and deep peroneal  • Motor intact to +FHL/EHL  • 2+ DP pulse, symmetric bilaterally  • Digits warm and well perfused  • Capillary refill < 2 seconds      Assessment:    64 y  o female with a right displaced distal fibula fracture s/p closed reduction and splint application  Plan:  • CT of the head was obtained yesterday secondary to altered mental status day prior  Showing  A Right lacunar infarct, new when compared to the CT in 2018, favored to be chronic, however age indeterminate  MRI of the brain ordered per primary team to evaluate for acute insult  MRI will likely be scheduled later this morning if able  If MRI performed today, patient will be taken the operating room soon after pending imaging and results  • Nonweightbearing right lower extremity   • Plan for open reduction internal fixation right ankle pending medical clearances and soft tissue swelling  • PT/OT for ambulation assistance, gait training  • Pain control per primary team   • DVT ppx  :   Patient currently receiving heparin per primary team   Does take Plavix 75 mg in the outpatient setting  • Medical co-morbidities include long-term use of immunosuppressant medication, hypothyroidism, hyperlipidemia, CVA, dementia, GERD, liver transplant, paroxysmal SVT, which are being managed per primary team  • Dispo: Ortho will follow  Obtained informed consent with patient and patient's daughter via telephone yesterday  Jamal Sanchez PA-C             Portions of the record may have been created with voice recognition software    Occasional wrong word or "sound a like" substitutions may have occurred due to the inherent limitations of voice recognition software  Read the chart carefully and recognize, using context, where substitutions have occurred

## 2022-11-18 NOTE — ANESTHESIA PREPROCEDURE EVALUATION
Procedure:  OPEN REDUCTION W/ INTERNAL FIXATION (ORIF) RIGHT ANKLE FRACTURE (Right: Ankle)    Relevant Problems   ANESTHESIA (within normal limits)      CARDIO   (+) Benign essential hypertension   (+) Chronic left-sided thoracic back pain   (+) HLD (hyperlipidemia)   (+) Non-ST elevation myocardial infarction (NSTEMI), type 2   (+) Paroxysmal SVT (supraventricular tachycardia) (HCC)      ENDO   (+) Hypothyroidism      GI/HEPATIC   (+) GERD (gastroesophageal reflux disease)      /RENAL   (+) REZA (acute kidney injury) (HCC)   (+) Chronic kidney insufficiency   (+) Stage 3b chronic kidney disease (HCC)      HEMATOLOGY   (+) Anemia      MUSCULOSKELETAL   (+) Chronic left-sided thoracic back pain   (+) Cyst of diaphragm   (+) Sciatica      NEURO/PSYCH   (+) Cerebrovascular accident (CVA) due to embolism (HCC)   (+) Chronic left-sided thoracic back pain   (+) Dementia (HCC)   (+) History of seizure   (+) TIA (transient ischemic attack)        Physical Exam    Airway    Mallampati score: II  TM Distance: >3 FB  Neck ROM: full     Dental   Comment: Denies loose,     Cardiovascular  Rhythm: irregular, Rate: normal,     Pulmonary  Breath sounds clear to auscultation,     Other Findings        Anesthesia Plan  ASA Score- 4     Anesthesia Type- general with ASA Monitors  Additional Monitors:   Airway Plan: LMA  Plan Factors-Exercise tolerance (METS): <4 METS  Chart reviewed  EKG reviewed  Existing labs reviewed  Patient summary reviewed  Patient is not a current smoker  Induction- intravenous  Postoperative Plan- Plan for postoperative opioid use  Planned trial extubation    Informed Consent- Anesthetic plan and risks discussed with patient and daughter  I personally reviewed this patient with the CRNA  Discussed and agreed on the Anesthesia Plan with the CRNA  Cee Soliz

## 2022-11-18 NOTE — ASSESSMENT & PLAN NOTE
Acute,  · 6 3 initially after repeat, non hemolyzed  · Calcium gluconate 1 g, in the ED- insulin 8 units, dextrose in IV  · Potassium level mildly elevated again on 11/16  Insulin, dextrose, albuterol was given  · Potassium level improved on lab work today  Repeat BMP    Low-potassium diet  · CK-within normal limits

## 2022-11-18 NOTE — ASSESSMENT & PLAN NOTE
Noted on Mar  · Plavix and aspirin was restarted per Ortho but again discontinued for OR today per Ortho  · Restart once okay by Ortho

## 2022-11-18 NOTE — ASSESSMENT & PLAN NOTE
Plan was for discharge on 11/16 however just prior to getting picked up for transfer, pt was noted to be agitated and screaming in the room  Patient upset that she has to bury her mother  · Per staff member, patient was confused and agitated overnight as well  She then ripped out her IV and not allowing staff members to place new IV line in and was also refusing medications  · Discharge cancelled and UA obtained which shows moderate blood, small leukocytes, occasional bacteria, Started on IV Rocephin   · Symptoms most likely delirium superimposed on dementia  · CT head shows age indeterminate likely old right lacunar infarct  MRI negative for acute CVA  · Appreciate Psychiatry input  Started on Zyprexa 2 5 mg at bedtime and IM Zyprexa p r n   Agitation  · Requiring Haldol/Ativan and placed on soft restraints due to her being agitated, not directable and trying to pull off things & trying to get out of bed when not in restraints

## 2022-11-18 NOTE — CASE MANAGEMENT
Case Management Discharge Planning Note    Patient name Becki Carbajal  Location [address] [place] [address] MRN [id number]  : 1945 Date 2022       Current Admission Date: 2022  Current Admission Diagnosis:Closed fracture of distal end of right fibula   Patient Active Problem List    Diagnosis Date Noted   • Encephalopathy 2022   • Hyperkalemia 2022   • Fall 2022   • Closed fracture of distal end of right fibula 2022   • GERD (gastroesophageal reflux disease)    • Stage 3b chronic kidney disease ([facility] ) 2022   • REZA (acute kidney injury) ([place] [address] ) 2021   • Obesity, morbid ([place] [address] ) 2021   • Osteopenia of multiple sites 2020   • Other fatigue 2020   • Snoring 2020   • Chronic left-sided thoracic back pain 2019   • Left ovarian cyst 2019   • Dementia ([facility] ) 2018   • Asymptomatic stenosis of left carotid artery 2018   • Abnormal EEG 2017   • Altered mental status 2017   • Cerebrovascular accident (CVA) due to embolism ([facility] ) 2017   • Non-ST elevation myocardial infarction (NSTEMI), type 2 2017   • History of seizure 10/31/2017   • Paroxysmal SVT (supraventricular tachycardia) ([facility] ) 10/31/2017   • TIA (transient ischemic attack) 10/29/2017   • HLD (hyperlipidemia) 10/29/2017   • Cyst of diaphragm 2017   • Long-term use of immunosuppressant medication 2016   • Anemia 2016   • Liver transplanted ([facility] ) 2016   • Hypothyroidism 2016   • Elevated homocysteine 2016   • Chronic joint pain 2016   • Lung nodule, solitary 2016   • Sciatica 2015   • Hypercalcemia 2015   • Ataxic gait 2013   • Chronic kidney insufficiency 2013   • Benign essential hypertension 2012   • Ulcerative colitis without complications ([facility] )       LOS (days): 4  Geometric Mean LOS (GMLOS) (days): 2 70  Days to GMLOS:-1 3     OBJECTIVE:  Risk of Unplanned Readmission Score: 27 41         Current admission status: Inpatient   Preferred Pharmacy:   [address] [facility] [address]  [address], [address] [place] [address]  Phone: [phone] Fax: [phone]    [facility] [address]  [place] [address]  Phone: [phone] Fax: [phone]    Primary Care Provider: Candy Mendenhall MD    Primary Insurance: MEDICARE  Secondary Insurance: COMMERCIAL MISCELLANEOUS    DISCHARGE DETAILS:    Discharge planning discussed with[de-identified] Reva Joel CM contacted family/caregiver?: Yes  Were Treatment Team discharge recommendations reviewed with patient/caregiver?: Yes  Did patient/caregiver verbalize understanding of patient care needs?: N/A- going to facility  Were patient/caregiver advised of the risks associated with not following Treatment Team discharge recommendations?: Yes    Contacts  Patient Contacts: Joanne Readtr)  Relationship to Patient[de-identified] Family  Contact Method: In Person  Reason/Outcome: Continuity of Care, Emergency Contact, Discharge Planning    Treatment Team Recommendation: Short Term Rehab  Discharge Destination Plan[de-identified] Short Term Rehab    SW spoke with patient's dtr on unit regarding current discharge plan  Dtr aware facility of choice, [address] [place] [address], continues to follow patient's hospitalization and will accept pt for admission once medically cleared  Dtr in agreement with plan and has no questions or concerns SW can assist with at this time

## 2022-11-18 NOTE — ASSESSMENT & PLAN NOTE
Lab Results   Component Value Date    EGFR 51 11/18/2022    EGFR 52 11/17/2022    EGFR 46 11/16/2022    CREATININE 1 04 11/18/2022    CREATININE 1 02 11/17/2022    CREATININE 1 14 11/16/2022     Elevated creatinine initially of 1 6, has since trended down  · Repeat lab work

## 2022-11-18 NOTE — PLAN OF CARE
Problem: Potential for Falls  Goal: Patient will remain free of falls  Description: INTERVENTIONS:  - Educate patient/family on patient safety including physical limitations  - Instruct patient to call for assistance with activity   - Consult OT/PT to assist with strengthening/mobility   - Keep Call bell within reach  - Keep bed low and locked with side rails adjusted as appropriate  - Keep care items and personal belongings within reach  - Initiate and maintain comfort rounds  - Make Fall Risk Sign visible to staff  - Offer Toileting every 2 Hours, in advance of need  - Initiate/Maintain bed alarm  - Obtain necessary fall risk management equipment:   Problem: Prexisting or High Potential for Compromised Skin Integrity  Goal: Skin integrity is maintained or improved  Description: INTERVENTIONS:  - Identify patients at risk for skin breakdown  - Assess and monitor skin integrity  - Assess and monitor nutrition and hydration status  - Monitor labs   - Assess for incontinence   - Turn and reposition patient  - Assist with mobility/ambulation  - Relieve pressure over bony prominences  - Avoid friction and shearing  - Provide appropriate hygiene as needed including keeping skin clean and dry  - Evaluate need for skin moisturizer/barrier cream  - Collaborate with interdisciplinary team   - Patient/family teaching  - Consider wound care consult   11/18/2022 0027 by Ana Maria Saldana RN  Outcome: Progressing  11/18/2022 0027 by Ana Maria Saldana RN  Outcome: Progressing     Problem: MOBILITY - ADULT  Goal: Maintain or return to baseline ADL function  Description: INTERVENTIONS:  -  Assess patient's ability to carry out ADLs; assess patient's baseline for ADL function and identify physical deficits which impact ability to perform ADLs (bathing, care of mouth/teeth, toileting, grooming, dressing, etc )  - Assess/evaluate cause of self-care deficits   - Assess range of motion  - Assess patient's mobility; develop plan if impaired  - Assess patient's need for assistive devices and provide as appropriate  - Encourage maximum independence but intervene and supervise when necessary  - Involve family in performance of ADLs  - Assess for home care needs following discharge   - Consider OT consult to assist with ADL evaluation and planning for discharge  - Provide patient education as appropriate  11/18/2022 0027 by Eddie Rosenbaum RN  Outcome: Progressing  11/18/2022 0027 by Eddie Rosenbaum RN  Outcome: Progressing  Goal: Maintains/Returns to pre admission functional level  Description: INTERVENTIONS:  - Perform BMAT or MOVE assessment daily    - Set and communicate daily mobility goal to care team and patient/family/caregiver  - Collaborate with rehabilitation services on mobility goals if consulted  - Perform Range of Motion 2 times a day  - Reposition patient every 2 hours    - Dangle patient 3 times a day  - Stand patient 3 times a day  - Ambulate patient 3 times a day  - Out of bed to chair 3 times a day   - Out of bed for meals 3  Problem: SAFETY,RESTRAINT: NV/NON-SELF DESTRUCTIVE BEHAVIOR  Goal: Remains free of harm/injury (restraint for non violent/non self-detsructive behavior)  Description: INTERVENTIONS:  - Instruct patient/family regarding restraint use   - Assess and monitor physiologic and psychological status   - Provide interventions and comfort measures to meet assessed patient needs   - Identify and implement measures to help patient regain control  - Assess readiness for release of restraint   11/18/2022 0027 by Eddie Rosenbaum RN  Outcome: Progressing  11/18/2022 0027 by Eddie Rosenbaum RN  Outcome: Progressing  Goal: Returns to optimal restraint-free functioning  Description: INTERVENTIONS:  - Assess the patient's behavior and symptoms that indicate continued need for restraint  - Identify and implement measures to help patient regain control  - Assess readiness for release of restraint   11/18/2022 0027 by David Antoine RN  Outcome: Progressing  11/18/2022 0027 by David Antoine RN  Outcome: Progressing     Problem: Nutrition/Hydration-ADULT  Goal: Nutrient/Hydration intake appropriate for improving, restoring or maintaining nutritional needs  Description: Monitor and assess patient's nutrition/hydration status for malnutrition  Collaborate with interdisciplinary team and initiate plan and interventions as ordered  Monitor patient's weight and dietary intake as ordered or per policy  Utilize nutrition screening tool and intervene as necessary  Determine patient's food preferences and provide high-protein, high-caloric foods as appropriate       INTERVENTIONS:  - Monitor oral intake, urinary output, labs, and treatment plans  - Assess nutrition and hydration status and recommend course of action  - Evaluate amount of meals eaten  - Assist patient with eating if necessary   - Allow adequate time for meals  - Recommend/ encourage appropriate diets, oral nutritional supplements, and vitamin/mineral supplements  - Order, calculate, and assess calorie counts as needed  - Recommend, monitor, and adjust tube feedings and TPN/PPN based on assessed needs  - Assess need for intravenous fluids  - Provide specific nutrition/hydration education as appropriate  - Include patient/family/caregiver in decisions related to nutrition  11/18/2022 0027 by David Antoine RN  Outcome: Progressing  11/18/2022 0027 by David Antoine RN  Outcome: Progressing    times a day  - Out of bed for toileting  - Record patient progress and toleration of activity level   11/18/2022 0027 by David Antoine RN  Outcome: Progressing  11/18/2022 0027 by David Antoine RN  Outcome: Progressing     - Apply yellow socks and bracelet for high fall risk patients  - Consider moving patient to room near nurses station  11/18/2022 0027 by David Antoine RN  Outcome: Progressing  11/18/2022 0027 by David Antoine RN  Outcome: Progressing

## 2022-11-18 NOTE — ASSESSMENT & PLAN NOTE
Chronic  · Not on any medications at home  · As noted above started on Zyprexa    Per Psychiatry may be able to come off Zyprexa once delirium has resolved

## 2022-11-18 NOTE — ASSESSMENT & PLAN NOTE
Lab Results   Component Value Date    EGFR 52 11/17/2022    EGFR 46 11/16/2022    EGFR 39 11/15/2022    CREATININE 1 02 11/17/2022    CREATININE 1 14 11/16/2022    CREATININE 1 30 11/15/2022     Elevated creatinine initially of 1 6, trended down  · Repeat lab work

## 2022-11-18 NOTE — PHYSICAL THERAPY NOTE
PT Cancellation Note       11/18/22 3763   PT Last Visit   PT Visit Date 11/18/22   Note Type   Note Type Cancelled Session   Cancel Reasons Patient to operating room  (Pt to OR for R ankle ORIF   Will follow-up postoperatively as appropriate)   Licensure   [place] License Number  Anna Monroy [id number]

## 2022-11-18 NOTE — ASSESSMENT & PLAN NOTE
X-ray- right displaced fracture of distal fibula with disruption of the ankle mortise  · Repeat x-ray 11/15 showed persistent right ankle fracture subluxation and patient underwent close reduction and splinting  Repeat imaging showed acceptable reduction of the ankle fracture  · Initially plan was surgical intervention of distal fibular fracture on outpatient basis due to significant swelling however going to OR today  · Patient to be nonweightbearing of right lower extremity with splint in place and right lower extremity elevation while at rest  · Knee x-ray-unremarkable  · Pain management    DVT prophylaxis with heparin subQ (on hold for OR today)

## 2022-11-18 NOTE — ANESTHESIA POSTPROCEDURE EVALUATION
Post-Op Assessment Note    CV Status:  Stable  Pain Score: 0    Pain management: adequate     Mental Status:  Sleepy and arousable   Hydration Status:  Stable   PONV Controlled:  None   Airway Patency:  Patent      Post Op Vitals Reviewed: Yes      Staff: CRNA   Comments: spontaneously breathing, protecting airway, vss, fully endorsed to recovery w/o AC        No notable events documented      BP   126/64   Temp      Pulse  101   Resp   15   SpO2   99   /

## 2022-11-18 NOTE — NURSING NOTE
Patient has an order of ativan IV 0 5 mg witch was not given because Patient removed the IV line  0 5 mg IV was wasted by me with another Nurse Saintclair Rise  New order given by hospitalist for IM ativan  Medication Ativan 0 5 mg given  1 5mg wasted by me and Mark DEE

## 2022-11-18 NOTE — ASSESSMENT & PLAN NOTE
Plan was for discharge on 11/16 however just prior to getting picked up by transfer for patient was noted to be agitated and screaming in the room  Patient upset that she has to bury her mother  · Per staff member, patient was confused and agitated overnight as well  She then ripped out her IV and not allowing staff members to place new IV line in and was also refusing medications  · Discharge cancelled and UA obtained which shows moderate blood, small leukocytes, occasional bacteria, Started on IV Rocephin   · Symptoms most likely delirium superimposed on dementia  · CT head shows likely old right lacunar infarct  Will obtain MRI for further evaluation  MRI department made aware that patient is going for surgery tomorrow  · Appreciate Psychiatry input  Started on Zyprexa 2 5 mg at bedtime and p o  Zyprexa p r n  Agitation  · Required IM Haldol and placed on soft restraints    Continue with soft restraints as patient is still agitated and trying to pull off things trying to get out of bed when not in restraints

## 2022-11-18 NOTE — PLAN OF CARE
Problem: Potential for Falls  Goal: Patient will remain free of falls  Description: INTERVENTIONS:  - Educate patient/family on patient safety including physical limitations  - Instruct patient to call for assistance with activity   - Consult OT/PT to assist with strengthening/mobility   - Keep Call bell within reach  - Keep bed low and locked with side rails adjusted as appropriate  - Keep care items and personal belongings within reach  - Initiate and maintain comfort rounds  - Make Fall Risk Sign visible to staff  - Offer Toileting every 2 Hours, in advance of need  - Initiate/Maintain bedalarm  - Obtain necessary fall risk management equipment: alarm  - Apply yellow socks and bracelet for high fall risk patients  - Consider moving patient to room near nurses station  Outcome: Progressing     Problem: Prexisting or High Potential for Compromised Skin Integrity  Goal: Skin integrity is maintained or improved  Description: INTERVENTIONS:  - Identify patients at risk for skin breakdown  - Assess and monitor skin integrity  - Assess and monitor nutrition and hydration status  - Monitor labs   - Assess for incontinence   - Turn and reposition patient  - Assist with mobility/ambulation  - Relieve pressure over bony prominences  - Avoid friction and shearing  - Provide appropriate hygiene as needed including keeping skin clean and dry  - Evaluate need for skin moisturizer/barrier cream  - Collaborate with interdisciplinary team   - Patient/family teaching  - Consider wound care consult   Outcome: Progressing     Problem: MOBILITY - ADULT  Goal: Maintain or return to baseline ADL function  Description: INTERVENTIONS:  -  Assess patient's ability to carry out ADLs; assess patient's baseline for ADL function and identify physical deficits which impact ability to perform ADLs (bathing, care of mouth/teeth, toileting, grooming, dressing, etc )  - Assess/evaluate cause of self-care deficits   - Assess range of motion  - Assess patient's mobility; develop plan if impaired  - Assess patient's need for assistive devices and provide as appropriate  - Encourage maximum independence but intervene and supervise when necessary  - Involve family in performance of ADLs  - Assess for home care needs following discharge   - Consider OT consult to assist with ADL evaluation and planning for discharge  - Provide patient education as appropriate  Outcome: Progressing  Goal: Maintains/Returns to pre admission functional level  Description: INTERVENTIONS:  - Perform BMAT or MOVE assessment daily    - Set and communicate daily mobility goal to care team and patient/family/caregiver  - Collaborate with rehabilitation services on mobility goals if consulted  - Perform Range of Motion 2 times a day  - Reposition patient every 2 hours    - Dangle patient 3 times a day  - Stand patient 3 times a day  - Ambulate patient 3 times a day  - Out of bed to chair 3 times a day   - Out of bed for meals 3 times a day  - Out of bed for toileting  - Record patient progress and toleration of activity level   Outcome: Progressing     Problem: SAFETY,RESTRAINT: NV/NON-SELF DESTRUCTIVE BEHAVIOR  Goal: Remains free of harm/injury (restraint for non violent/non self-detsructive behavior)  Description: INTERVENTIONS:  - Instruct patient/family regarding restraint use   - Assess and monitor physiologic and psychological status   - Provide interventions and comfort measures to meet assessed patient needs   - Identify and implement measures to help patient regain control  - Assess readiness for release of restraint   Outcome: Progressing  Goal: Returns to optimal restraint-free functioning  Description: INTERVENTIONS:  - Assess the patient's behavior and symptoms that indicate continued need for restraint  - Identify and implement measures to help patient regain control  - Assess readiness for release of restraint   Outcome: Progressing     Problem: Nutrition/Hydration-ADULT  Goal: Nutrient/Hydration intake appropriate for improving, restoring or maintaining nutritional needs  Description: Monitor and assess patient's nutrition/hydration status for malnutrition  Collaborate with interdisciplinary team and initiate plan and interventions as ordered  Monitor patient's weight and dietary intake as ordered or per policy  Utilize nutrition screening tool and intervene as necessary  Determine patient's food preferences and provide high-protein, high-caloric foods as appropriate       INTERVENTIONS:  - Monitor oral intake, urinary output, labs, and treatment plans  - Assess nutrition and hydration status and recommend course of action  - Evaluate amount of meals eaten  - Assist patient with eating if necessary   - Allow adequate time for meals  - Recommend/ encourage appropriate diets, oral nutritional supplements, and vitamin/mineral supplements  - Order, calculate, and assess calorie counts as needed  - Recommend, monitor, and adjust tube feedings and TPN/PPN based on assessed needs  - Assess need for intravenous fluids  - Provide specific nutrition/hydration education as appropriate  - Include patient/family/caregiver in decisions related to nutrition  Outcome: Progressing

## 2022-11-18 NOTE — ASSESSMENT & PLAN NOTE
Noted on medical record,  · vitamin-D level 20  · Continue home vitamin-D and calcium    · Receiving calcitonin while here due to current fractures  · Outpatient DEXA scan recommended if within criteria

## 2022-11-18 NOTE — ASSESSMENT & PLAN NOTE
Acute,  · 6 3 initially after repeat, non hemolyzed  · Calcium gluconate 1 g, in the ED- insulin 8 units, dextrose in IV  · Potassium level mildly elevated again on 11/16  Insulin, dextrose, albuterol was given  · Potassium level within normal limits on lab work today    Low-potassium diet  · Repeat BMP in a m   · CK-within normal limits

## 2022-11-18 NOTE — ASSESSMENT & PLAN NOTE
Noted on Mar  · Was Restarted Plavix and aspirin per Ortho but again discontinued for OR on Friday per Ortho

## 2022-11-18 NOTE — ASSESSMENT & PLAN NOTE
CT-abdomen/pelvis-no acute intra-abdominal or pelvic  · Knee x-ray-unremarkable  · Foot x-ray- displaced fracture of the distal fibula with disruption of the ankle mortise  · Pain meds p r n

## 2022-11-19 PROBLEM — F03.918 DEMENTIA WITH BEHAVIORAL DISTURBANCE: Status: ACTIVE | Noted: 2022-11-16

## 2022-11-19 LAB
ANION GAP SERPL CALCULATED.3IONS-SCNC: 12 MMOL/L (ref 4–13)
BUN SERPL-MCNC: 36 MG/DL (ref 5–25)
CALCIUM SERPL-MCNC: 7.3 MG/DL (ref 8.3–10.1)
CHLORIDE SERPL-SCNC: 112 MMOL/L (ref 96–108)
CO2 SERPL-SCNC: 17 MMOL/L (ref 21–32)
CREAT SERPL-MCNC: 1.41 MG/DL (ref 0.6–1.3)
ERYTHROCYTE [DISTWIDTH] IN BLOOD BY AUTOMATED COUNT: 13.8 % (ref 11.6–15.1)
GFR SERPL CREATININE-BSD FRML MDRD: 35 ML/MIN/1.73SQ M
GLUCOSE SERPL-MCNC: 133 MG/DL (ref 65–140)
GLUCOSE SERPL-MCNC: 138 MG/DL (ref 65–140)
GLUCOSE SERPL-MCNC: 144 MG/DL (ref 65–140)
GLUCOSE SERPL-MCNC: 147 MG/DL (ref 65–140)
HCT VFR BLD AUTO: 28.1 % (ref 34.8–46.1)
HGB BLD-MCNC: 8.6 G/DL (ref 11.5–15.4)
MCH RBC QN AUTO: 29.5 PG (ref 26.8–34.3)
MCHC RBC AUTO-ENTMCNC: 30.6 G/DL (ref 31.4–37.4)
MCV RBC AUTO: 96 FL (ref 82–98)
PLATELET # BLD AUTO: 192 THOUSANDS/UL (ref 149–390)
PMV BLD AUTO: 9.6 FL (ref 8.9–12.7)
POTASSIUM SERPL-SCNC: 4.7 MMOL/L (ref 3.5–5.3)
RBC # BLD AUTO: 2.92 MILLION/UL (ref 3.81–5.12)
SODIUM SERPL-SCNC: 141 MMOL/L (ref 135–147)
WBC # BLD AUTO: 6.85 THOUSAND/UL (ref 4.31–10.16)

## 2022-11-19 RX ORDER — ASPIRIN 81 MG/1
81 TABLET ORAL
Status: DISCONTINUED | OUTPATIENT
Start: 2022-11-20 | End: 2022-11-20 | Stop reason: HOSPADM

## 2022-11-19 RX ORDER — CLOPIDOGREL BISULFATE 75 MG/1
75 TABLET ORAL DAILY
Status: DISCONTINUED | OUTPATIENT
Start: 2022-11-20 | End: 2022-11-20 | Stop reason: HOSPADM

## 2022-11-19 RX ORDER — HEPARIN SODIUM 5000 [USP'U]/ML
5000 INJECTION, SOLUTION INTRAVENOUS; SUBCUTANEOUS EVERY 8 HOURS SCHEDULED
Status: DISCONTINUED | OUTPATIENT
Start: 2022-11-19 | End: 2022-11-20 | Stop reason: HOSPADM

## 2022-11-19 RX ORDER — OLANZAPINE 2.5 MG/1
5 TABLET ORAL EVERY 6 HOURS PRN
Status: DISCONTINUED | OUTPATIENT
Start: 2022-11-19 | End: 2022-11-20 | Stop reason: HOSPADM

## 2022-11-19 RX ORDER — SODIUM CHLORIDE, SODIUM LACTATE, POTASSIUM CHLORIDE, CALCIUM CHLORIDE 600; 310; 30; 20 MG/100ML; MG/100ML; MG/100ML; MG/100ML
50 INJECTION, SOLUTION INTRAVENOUS CONTINUOUS
Status: DISCONTINUED | OUTPATIENT
Start: 2022-11-19 | End: 2022-11-20

## 2022-11-19 RX ORDER — HYDROCODONE BITARTRATE AND ACETAMINOPHEN 5; 325 MG/1; MG/1
1 TABLET ORAL EVERY 6 HOURS PRN
Status: DISCONTINUED | OUTPATIENT
Start: 2022-11-19 | End: 2022-11-20 | Stop reason: HOSPADM

## 2022-11-19 RX ADMIN — HEPARIN SODIUM 5000 UNITS: 5000 INJECTION INTRAVENOUS; SUBCUTANEOUS at 15:29

## 2022-11-19 RX ADMIN — OLANZAPINE 2.5 MG: 5 TABLET, ORALLY DISINTEGRATING ORAL at 21:56

## 2022-11-19 RX ADMIN — METOPROLOL TARTRATE 50 MG: 50 TABLET, FILM COATED ORAL at 10:00

## 2022-11-19 RX ADMIN — CYCLOSPORINE 50 MG: 25 CAPSULE ORAL at 10:00

## 2022-11-19 RX ADMIN — HEPARIN SODIUM 5000 UNITS: 5000 INJECTION INTRAVENOUS; SUBCUTANEOUS at 21:56

## 2022-11-19 RX ADMIN — Medication 1 TABLET: at 10:00

## 2022-11-19 RX ADMIN — PRAVASTATIN SODIUM 20 MG: 20 TABLET ORAL at 21:56

## 2022-11-19 RX ADMIN — HEPARIN SODIUM 5000 UNITS: 5000 INJECTION INTRAVENOUS; SUBCUTANEOUS at 10:10

## 2022-11-19 RX ADMIN — SODIUM CHLORIDE 100 ML/HR: 0.9 INJECTION, SOLUTION INTRAVENOUS at 02:17

## 2022-11-19 RX ADMIN — Medication 6 MG: at 21:56

## 2022-11-19 RX ADMIN — SODIUM CHLORIDE, POTASSIUM CHLORIDE, SODIUM LACTATE AND CALCIUM CHLORIDE 50 ML/HR: 600; 310; 30; 20 INJECTION, SOLUTION INTRAVENOUS at 10:10

## 2022-11-19 RX ADMIN — HYDROCODONE BITARTRATE AND ACETAMINOPHEN 1 TABLET: 5; 325 TABLET ORAL at 10:11

## 2022-11-19 RX ADMIN — MYCOPHENOLATE MOFETIL 750 MG: 250 CAPSULE ORAL at 10:00

## 2022-11-19 RX ADMIN — METOPROLOL TARTRATE 50 MG: 50 TABLET, FILM COATED ORAL at 17:01

## 2022-11-19 RX ADMIN — MYCOPHENOLATE MOFETIL 750 MG: 250 CAPSULE ORAL at 17:01

## 2022-11-19 RX ADMIN — CYCLOSPORINE 50 MG: 25 CAPSULE ORAL at 17:01

## 2022-11-19 RX ADMIN — Medication 1000 UNITS: at 10:00

## 2022-11-19 RX ADMIN — CALCITONIN SALMON 1 SPRAY: 200 SPRAY, METERED NASAL at 10:06

## 2022-11-19 RX ADMIN — LEVOTHYROXINE SODIUM 112 MCG: 112 TABLET ORAL at 06:16

## 2022-11-19 RX ADMIN — PANTOPRAZOLE SODIUM 40 MG: 40 TABLET, DELAYED RELEASE ORAL at 10:00

## 2022-11-19 RX ADMIN — DOCUSATE SODIUM 100 MG: 100 CAPSULE, LIQUID FILLED ORAL at 17:04

## 2022-11-19 NOTE — PLAN OF CARE
Problem: PHYSICAL THERAPY ADULT  Goal: Performs mobility at highest level of function for planned discharge setting  See evaluation for individualized goals  11/19/2022 1123 by Jeferson Mancia PT  Note: Prognosis: Good  Problem List: Decreased strength, Decreased range of motion, Decreased endurance, Impaired balance, Decreased mobility, Decreased coordination, Decreased cognition, Impaired judgement, Decreased safety awareness, Orthopedic restrictions  Assessment: Pt agreeable to PT session this morning  Pt with limited tolerance to bed mobility, transfers/stand pivot transfer bed to commode and commode to chair  Pt 1st attempted with the RW but was unable due to poor upper extremity support  Pt then able to transfer with max assist of 2 handhold assist   Pt with good tolerance and participation in BLE exercises  Pt will continue to benefit from skilled physical therapy services to increase her strength, functional mobility and gait with a RW nonweightbearing RLE  Pt needs max assist at all times to maintain nonweightbearing RLE  Pt remains appropriate for post acute rehab services when medically stable for discharge  PT Discharge Recommendation: Post acute rehabilitation services    See flowsheet documentation for full assessment

## 2022-11-19 NOTE — ASSESSMENT & PLAN NOTE
X-ray- right displaced fracture of distal fibula with disruption of the ankle mortise  · Repeat x-ray 11/15 showed persistent right ankle fracture subluxation and patient underwent close reduction and splinting  Repeat imaging showed acceptable reduction of the ankle fracture  · Initially plan was surgical intervention of distal fibular fracture on outpatient basis due to significant swelling however when to OR yesterday  · She is status post ORIF right ankle on 11/18  · Patient to be nonweightbearing of right lower extremity with splint in place and right lower extremity elevation while at rest  · Knee x-ray-unremarkable  · Pain management    Restart DVT prophylaxis with heparin subQ

## 2022-11-19 NOTE — CASE MANAGEMENT
Case Management Discharge Planning Note    Patient name Viet Mahajan  Location [address] [address] MRN [id number]  : 1945 Date 2022       Current Admission Date: 2022  Current Admission Diagnosis:Closed fracture of distal end of right fibula   Patient Active Problem List    Diagnosis Date Noted   • Dementia with behavioral disturbance 2022   • Hyperkalemia 2022   • Fall 2022   • Closed fracture of distal end of right fibula 2022   • GERD (gastroesophageal reflux disease)    • Stage 3b chronic kidney disease ([facility] ) 2022   • REZA (acute kidney injury) ([place] [address] ) 2021   • Obesity, morbid ([place] [address] ) 2021   • Osteopenia of multiple sites 2020   • Other fatigue 2020   • Snoring 2020   • Chronic left-sided thoracic back pain 2019   • Left ovarian cyst 2019   • Asymptomatic stenosis of left carotid artery 2018   • Abnormal EEG 2017   • Altered mental status 2017   • Cerebrovascular accident (CVA) due to embolism ([facility] ) 2017   • Non-ST elevation myocardial infarction (NSTEMI), type 2 2017   • History of seizure 10/31/2017   • Paroxysmal SVT (supraventricular tachycardia) ([facility] ) 10/31/2017   • TIA (transient ischemic attack) 10/29/2017   • HLD (hyperlipidemia) 10/29/2017   • Cyst of diaphragm 2017   • Long-term use of immunosuppressant medication 2016   • Anemia 2016   • Liver transplanted ([facility] ) 2016   • Hypothyroidism 2016   • Elevated homocysteine 2016   • Chronic joint pain 2016   • Lung nodule, solitary 2016   • Sciatica 2015   • Hypercalcemia 2015   • Ataxic gait 2013   • Chronic kidney insufficiency 2013   • Benign essential hypertension 2012   • Ulcerative colitis without complications ([facility] )       LOS (days): 5  Geometric Mean LOS (GMLOS) (days): 2 70  Days to GMLOS:-2 3     OBJECTIVE:  Risk of Unplanned Readmission Score: 35 79      Current admission status: Inpatient   Preferred Pharmacy:   [address]  [address], [address] [place] [address]  Phone: [phone] Fax: [phone]    [address] [facility] [address]  [place] [address] [address]  Phone: [phone] Fax: [phone]    Primary Care Provider: Debra Jeronimo MD    Primary Insurance: MEDICARE  Secondary Insurance: COMMERCIAL MISCELLANEOUS    DISCHARGE DETAILS:    Discharge planning discussed with[de-identified] Calin Young CM contacted family/caregiver?: Yes  Were Treatment Team discharge recommendations reviewed with patient/caregiver?: Yes  Did patient/caregiver verbalize understanding of patient care needs?: N/A- going to facility  Were patient/caregiver advised of the risks associated with not following Treatment Team discharge recommendations?: Yes    Contacts  Patient Contacts: Boyd Florez (dtr)  Relationship to Patient[de-identified] Family  Contact Method: Phone  Phone Number: [phone]  Reason/Outcome: Continuity of Care, Emergency Contact, Discharge Planning    Treatment Team Recommendation: Short Term Rehab  Discharge Destination Plan[de-identified] Short Term Rehab  Transport at Discharge : BLS Ambulance     Number/Name of Dispatcher: Oval Ace by [employer] and Unit #): [facility]  ETA of Transport (Date): 11/20/22  ETA of Transport (Time): 1430    SLIM AP, unit nurse, and dtr Radha Bailey advised of pickup time reserved for tomorrow at 1430

## 2022-11-19 NOTE — ASSESSMENT & PLAN NOTE
Plan was for discharge on 11/16 however just prior to getting picked up for transfer, pt was noted to be agitated and screaming in the room  Patient was upset that she has to bury her mother  · Per staff member, patient was confused and agitated overnight as well  She then ripped out her IV and not allowing staff members to place new IV line in and was also refusing medications  · Discharge cancelled and UA obtained which shows moderate blood, small leukocytes, occasional bacteria, completed 3 days of IV Rocephin  · Symptoms most likely delirium superimposed on dementia  · CT head shows age indeterminate likely old right lacunar infarct  MRI negative for acute CVA  · Appreciate Psychiatry input  Started on Zyprexa 2 5 mg at bedtime and IM Zyprexa p r n  Agitation  · Previously Requiring Haldol/Ativan and placed on soft restraints due to her being agitated, not directable and trying to pull off things & trying to get out of bed when not in restraints    Restraints has since been discontinued

## 2022-11-19 NOTE — ASSESSMENT & PLAN NOTE
Noted on medical record,  · vitamin-D level 20  · Continue home vitamin-D and calcium    · Receiving calcitonin while here due to current fracture  · Outpatient DEXA scan recommended if within criteria

## 2022-11-19 NOTE — PROGRESS NOTES
Progress Note - Orthopedics   Dearl Haja 64 y o  female MRN: [id number]  Unit/Bed#: [address] [address] Encounter: [id number]    Assessment:  1) POD#1 s/o ORIF right ankle bimalleolar fracture  2) REZA - greater than 0 3 increase in Cr; management per SLIM    Plan:  - Ancef 2g Q8H x2 doses - completed  - May restart Plavix and aspirin per SLIM  - NWB RLE  - PT/OT  - split at all times, elevate RLE  - dispo: likely to STR when medically stable; will need to follow up with Dr Guillermo Mckenzie in 2 weeks as an outpatient for continued care    Weight bearing: NWB RLE    VTE Pharmacologic Prophylaxis: Plavix and aspirin  VTE Mechanical Prophylaxis: sequential compression device    Subjective:  Patient seen and examined this afternoon with  at bedside  No overnight events  Pain controlled  Denies paresthesias in the right lower extremity or chest pain  Events of surgery again discussed with the patient  Vitals: Blood pressure 123/55, pulse 81, temperature (!) 96 8 °F (36 °C), temperature source Oral, resp  rate 18, height 5' 2" (1 575 m), weight 96 1 kg (211 lb 13 8 oz), SpO2 96 %, not currently breastfeeding  ,Body mass index is 38 75 kg/m²  Intake/Output Summary (Last 24 hours) at 11/19/2022 1111  Last data filed at 11/19/2022 0601  Gross per 24 hour   Intake 1750 ml   Output 200 ml   Net 1550 ml       Invasive Devices     Peripheral Intravenous Line  Duration           Peripheral IV 11/17/22 Left;Upper;Ventral (anterior) Arm 1 day          Drain  Duration           External Urinary Catheter <1 day                Physical Exam: General: NAD, A&Ox3, resting comfortably in chair  Ortho Exam: RLE:  Splint in place right lower extremity, able to wiggle all toes, brisk capillary refill, reports normal sensation light touch in the right lower extremity, thigh soft and nontender    Lab, Imaging and other studies:   I have personally reviewed pertinent lab results    CBC:   Lab Results   Component Value Date    WBC 6 85 11/19/2022    HGB 8 6 (L) 11/19/2022    HCT 28 1 (L) 11/19/2022    MCV 96 11/19/2022     11/19/2022    MCH 29 5 11/19/2022    MCHC 30 6 (L) 11/19/2022    RDW 13 8 11/19/2022    MPV 9 6 11/19/2022     CMP:   Lab Results   Component Value Date    SODIUM 141 11/19/2022     (H) 11/19/2022    CO2 17 (L) 11/19/2022    BUN 36 (H) 11/19/2022    CREATININE 1 41 (H) 11/19/2022    CALCIUM 7 3 (L) 11/19/2022    EGFR 35 11/19/2022     Brina Mcmillan PA-C

## 2022-11-19 NOTE — PHYSICAL THERAPY NOTE
PT TREATMENT     11/19/22 0925   PT Last Visit   PT Visit Date 11/19/22   Note Type   Note Type Treatment   Pain Assessment   Pain Assessment Tool 0-10   Pain Score No Pain   Restrictions/Precautions   RLE Weight Bearing Per Order (S)  NWB   Other Precautions Fall Risk;Bed Alarm; Chair Alarm;Cognitive   General   Chart Reviewed Yes   Family/Caregiver Present No   Subjective   Subjective “I do not think I can do this”   Bed Mobility   Supine to Sit 3  Moderate assistance   Additional items Assist x 2;Verbal cues; Increased time required;HOB elevated;LE management   Transfers   Sit to Stand 2  Maximal assistance   Additional items Assist x 2;Verbal cues; Increased time required   Stand to Sit 2  Maximal assistance   Additional items Assist x 2;Verbal cues; Increased time required   Stand pivot 2  Maximal assistance   Additional items Assist x 2;Verbal cues; Increased time required  (Max assist to maintain nonweightbearing RLE)   Additional Comments Pt stand pivot transfer with max assist of 2 1st attempt with a RW but pt unable, 2nd attempt with max handhold assist of 2 bed to commode and then commode to chair   Balance   Static Sitting Good   Static Standing Poor   Dynamic Standing Poor -   Activity Tolerance   Activity Tolerance Patient limited by fatigue  (Weakness and deconditioning; impaired cognition)   Exercises   Quad Sets 10 reps;Bilateral;Supine   Heelslides AAROM;10 reps;Bilateral;Supine   Hip Abduction AAROM;10 reps;Bilateral;Supine   Knee AROM Short Arc Quad AAROM;10 reps;Bilateral;Supine   Ankle Pumps Left;10 reps; Supine   Assessment   Problem List Decreased strength;Decreased range of motion;Decreased endurance; Impaired balance;Decreased mobility; Decreased coordination;Decreased cognition; Impaired judgement;Decreased safety awareness;Orthopedic restrictions   Assessment Pt agreeable to PT session this morning    Pt with limited tolerance to bed mobility, transfers/stand pivot transfer bed to commode and commode to chair  Pt 1st attempted with the RW but was unable due to poor upper extremity support  Pt then able to transfer with max assist of 2 handhold assist   Pt with good tolerance and participation in BLE exercises  Pt will continue to benefit from skilled physical therapy services to increase her strength, functional mobility and gait with a RW nonweightbearing RLE  Pt needs max assist at all times to maintain nonweightbearing RLE  Pt remains appropriate for post acute rehab services when medically stable for discharge  The patient's AM-[facility] Basic Mobility Inpatient Short Form Raw Score is 7  A Raw score of less than or equal to 16 suggests the patient may benefit from discharge to post-acute rehabilitation services  Please also refer to the recommendation of the Physical Therapist for safe discharge planning  Plan   Treatment/Interventions ADL retraining;Functional transfer training;LE strengthening/ROM; Therapeutic exercise; Endurance training;Cognitive reorientation;Patient/family training;Equipment eval/education; Bed mobility;Gait training; Compensatory technique education   PT Frequency 5-7x/wk   Recommendation   PT Discharge Recommendation Post acute rehabilitation services   AM-[facility] Basic Mobility Inpatient   Turning in Bed Without Bedrails 2   Lying on Back to Sitting on Edge of Flat Bed 1   Moving Bed to Chair 1   Standing Up From Chair 1   Walk in Room 1   Climb 3-5 Stairs 1   Basic Mobility Inpatient Raw Score 7   Turning Head Towards Sound 3   Follow Simple Instructions 3   Low Function Basic Mobility Raw Score 13   Low Function Basic Mobility Standardized Score 20 14   Highest Level Of Mobility   JH-HLM Goal 2: Bed activities/Dependent transfer   JH-HLM Achieved 4: Move to chair/commode   Education   Education Provided Mobility training;Assistive device;Home exercise program   Patient Explanation/teachback used; Reinforcement needed   End of Consult   Patient Position at End of Consult Bed/Chair alarm activated; Bedside chair; All needs within reach   [employer] [place] Insurance Number  Wayne Manley  [id number]     Portions of the documentation may have been created using voice recognition software  Occasional wrong word or sound alike substitutions may have occurred due to the inherent limitation of the voice recognition software  Read the chart carefully and recognize, using context, where substitutions have occurred

## 2022-11-19 NOTE — PLAN OF CARE
Problem: Potential for Falls  Goal: Patient will remain free of falls  Description: INTERVENTIONS:  - Educate patient/family on patient safety including physical limitations  - Instruct patient to call for assistance with activity   - Consult OT/PT to assist with strengthening/mobility   - Keep Call bell within reach  - Keep bed low and locked with side rails adjusted as appropriate  - Keep care items and personal belongings within reach  - Initiate and maintain comfort rounds  - Make Fall Risk Sign visible to staff  - Offer Toileting every 2 Hours, in advance of need  - Initiate/Maintain bed alarm    - Apply yellow socks and bracelet for high fall risk patients  - Consider moving patient to room near nurses station  Outcome: Progressing     Problem: Prexisting or High Potential for Compromised Skin Integrity  Goal: Skin integrity is maintained or improved  Description: INTERVENTIONS:  - Identify patients at risk for skin breakdown  - Assess and monitor skin integrity  - Assess and monitor nutrition and hydration status  - Monitor labs   - Assess for incontinence   - Turn and reposition patient  - Assist with mobility/ambulation  - Relieve pressure over bony prominences  - Avoid friction and shearing  - Provide appropriate hygiene as needed including keeping skin clean and dry  - Evaluate need for skin moisturizer/barrier cream  - Collaborate with interdisciplinary team   - Patient/family teaching  - Consider wound care consult   Outcome: Progressing     Problem: MOBILITY - ADULT  Goal: Maintain or return to baseline ADL function  Description: INTERVENTIONS:  -  Assess patient's ability to carry out ADLs; assess patient's baseline for ADL function and identify physical deficits which impact ability to perform ADLs (bathing, care of mouth/teeth, toileting, grooming, dressing, etc )  - Assess/evaluate cause of self-care deficits   - Assess range of motion  - Assess patient's mobility; develop plan if impaired  - Assess patient's need for assistive devices and provide as appropriate  - Encourage maximum independence but intervene and supervise when necessary  - Involve family in performance of ADLs  - Assess for home care needs following discharge   - Consider OT consult to assist with ADL evaluation and planning for discharge  - Provide patient education as appropriate  Outcome: Progressing  Goal: Maintains/Returns to pre admission functional level  Description: INTERVENTIONS:  - Perform BMAT or MOVE assessment daily    - Set and communicate daily mobility goal to care team and patient/family/caregiver  - Collaborate with rehabilitation services on mobility goals if consulted  - Perform Range of Motion 2 times a day  - Reposition patient every 2 hours  - Out of bed to chair daily      - Record patient progress and toleration of activity level   Outcome: Progressing     Problem: Nutrition/Hydration-ADULT  Goal: Nutrient/Hydration intake appropriate for improving, restoring or maintaining nutritional needs  Description: Monitor and assess patient's nutrition/hydration status for malnutrition  Collaborate with interdisciplinary team and initiate plan and interventions as ordered  Monitor patient's weight and dietary intake as ordered or per policy  Utilize nutrition screening tool and intervene as necessary  Determine patient's food preferences and provide high-protein, high-caloric foods as appropriate       INTERVENTIONS:  - Monitor oral intake, urinary output, labs, and treatment plans  - Assess nutrition and hydration status and recommend course of action  - Evaluate amount of meals eaten  - Assist patient with eating if necessary   - Allow adequate time for meals  - Recommend/ encourage appropriate diets, oral nutritional supplements, and vitamin/mineral supplements  - Order, calculate, and assess calorie counts as needed  - Recommend, monitor, and adjust tube feedings and TPN/PPN based on assessed needs  - Assess need for intravenous fluids  - Provide specific nutrition/hydration education as appropriate  - Include patient/family/caregiver in decisions related to nutrition  Outcome: Progressing

## 2022-11-19 NOTE — OCCUPATIONAL THERAPY NOTE
OT TREATMENT       11/19/22 0950   Note Type   Note Type Treatment   Pain Assessment   Pain Assessment Tool 0-10   Pain Score No Pain   Restrictions/Precautions   RLE Weight Bearing Per Order (S)  NWB   Other Precautions Chair Alarm; Bed Alarm;Cognitive; Fall Risk   ADL   Eating Assistance 5  Supervision/Setup   Grooming Assistance 5  Supervision/Setup   Grooming Deficit Wash/dry face   Grooming Comments seated in chair   UB Bathing Assistance 4  Minimal Assistance   LB Bathing Assistance 2  Maximal Assistance   UB Dressing Assistance 4  Minimal Assistance   LB Dressing Assistance 2  Maximal [address]  2  Maximal Assistance   Bed Mobility   Supine to Sit 3  Moderate assistance   Additional items Assist x 2;Verbal cues   Transfers   Sit to Stand 2  Maximal assistance   Additional items Assist x 2;Verbal cues   Stand to Sit 2  Maximal assistance   Additional items Assist x 2;Verbal cues   Stand pivot 2  Maximal assistance   Additional items Assist x 2;Verbal cues  (NWB RLE)   Toilet transfer 2  Maximal assistance   Additional items Assist x 2;Commode   ROM- Right Upper Extremities   R Shoulder AROM; Flexion   R Hand AROM; Thumb; Index finger; Long finger;Ring finger;Little finger   R Weight/Reps/Sets 10 times each seated in chair   ROM - Left Upper Extremities    L Shoulder AROM; Flexion   L Hand AROM; Thumb; Index finger;Ring finger;Little finger; Long finger   L Weight/Reps/Sets 10 times each seated in chair   Cognition   Overall Cognitive Status Impaired   Arousal/Participation Cooperative   Attention Attends with cues to redirect   Orientation Level Oriented to person   Following Commands Follows one step commands with increased time or repetition   Assessment   Assessment Patient seen for OT treatment  Patient is s/p R ankle ORIF on 11/18/22  Patient requires max assist of 2 for transfers maintaining NWB and max assist for LB ADLS  Patient will benefit from STR   The patient's raw score on the AM-PAC Daily Activity inpatient short form is 13, standardized score is 32 03, less than 39 4  Patients at this level are likely to benefit from DC to post-acute rehabilitation services  Please refer to the recommendation of the Occupational Therapist for safe DC planning  Plan   Treatment Interventions ADL retraining;Functional transfer training;UE strengthening/ROM; Endurance training;Patient/family training;Equipment evaluation/education; Activityengagement; Compensatory technique education   OT Frequency   (5x/week)   Recommendation   OT Discharge Recommendation Post acute rehabilitation services   AM-PAC Daily Activity Inpatient   Lower Body Dressing 1   Bathing 1   Toileting 1   Upper Body Dressing 3   Grooming 3   Eating 4   Daily Activity Raw Score 13   Daily Activity Standardized Score (Calc for Raw Score >=11) 32 03   AM-PAC Applied Cognition Inpatient   Following a Speech/Presentation 3   Understanding Ordinary Conversation 4   Taking Medications 2   Remembering Where Things Are Placed or Put Away 2   Remembering List of 4-5 Errands 2   Taking Care of Complicated Tasks 1   Applied Cognition Raw Score 14   Applied Cognition Standardized Score 14 04   Licensure   [place] License Number  Miki Zuniga [address] OTR/L [id number]

## 2022-11-19 NOTE — PROGRESS NOTES
Wendy 43  Progress Note Henna Braswell 1945, 64 y o  female MRN: [id number]  Unit/Bed#: [address] [place] [address] Encounter: [id number]  Primary Care Provider: Lu Traylor MD   Date and time admitted to hospital: 11/14/2022  1:01 PM    * Closed fracture of distal end of right fibula  Assessment & Plan  X-ray- right displaced fracture of distal fibula with disruption of the ankle mortise  · Repeat x-ray 11/15 showed persistent right ankle fracture subluxation and patient underwent close reduction and splinting  Repeat imaging showed acceptable reduction of the ankle fracture  · Initially plan was surgical intervention of distal fibular fracture on outpatient basis due to significant swelling however when to OR yesterday  · She is status post ORIF right ankle on 11/18  · Patient to be nonweightbearing of right lower extremity with splint in place and right lower extremity elevation while at rest  · Knee x-ray-unremarkable  · Pain management  Restart DVT prophylaxis with heparin subQ     Dementia with behavioral disturbance  Assessment & Plan  Plan was for discharge on 11/16 however just prior to getting picked up for transfer, pt was noted to be agitated and screaming in the room  Patient was upset that she has to bury her mother  · Per staff member, patient was confused and agitated overnight as well  She then ripped out her IV and not allowing staff members to place new IV line in and was also refusing medications  · Discharge cancelled and UA obtained which shows moderate blood, small leukocytes, occasional bacteria, completed 3 days of IV Rocephin  · Symptoms most likely delirium superimposed on dementia  · CT head shows age indeterminate likely old right lacunar infarct  MRI negative for acute CVA  · Appreciate Psychiatry input  Started on Zyprexa 2 5 mg at bedtime and IM Zyprexa p r n   Agitation  · Previously Requiring Haldol/Ativan and placed on soft restraints due to her being agitated, not directable and trying to pull off things & trying to get out of bed when not in restraints  Restraints has since been discontinued    Hyperkalemia  Assessment & Plan  Acute,  · 6 3 initially after repeat, non hemolyzed  · Calcium gluconate 1 g, in the ED- insulin 8 units, dextrose in IV  · Potassium level mildly elevated again on 11/16  Insulin, dextrose, albuterol was given  · Potassium level now within normal limits  Low-potassium diet  · Repeat BMP in a m   · CK-within normal limits    GERD (gastroesophageal reflux disease)  Assessment & Plan  · Prilosec substituted with Protonix while here  Fall  Assessment & Plan  CT-abdomen/pelvis-no acute intra-abdominal or pelvic  · Knee x-ray-unremarkable  · Foot x-ray- displaced fracture of the distal fibula with disruption of the ankle mortise  · Pain meds p r n  Osteopenia of multiple sites  Assessment & Plan  Noted on medical record,  · vitamin-D level 20  · Continue home vitamin-D and calcium  · Receiving calcitonin while here due to current fracture  · Outpatient DEXA scan recommended if within criteria    Chronic kidney insufficiency  Assessment & Plan  Lab Results   Component Value Date    EGFR 35 11/19/2022    EGFR 51 11/18/2022    EGFR 52 11/17/2022    CREATININE 1 41 (H) 11/19/2022    CREATININE 1 04 11/18/2022    CREATININE 1 02 11/17/2022     Elevated creatinine initially of 1 6, trended down but now trending up again  IV fluids and get repeat lab work in a m      Benign essential hypertension  Assessment & Plan  · BPs noted to be high at times and Lopressor dose increased  · Blood pressures have improved  · Blood pressures partly elevated due to patient being agitated as well    Cerebrovascular accident (CVA) due to embolism [facility])  Assessment & Plan  Noted on Mar  · Restart Plavix and aspirin once okay by Ortho    HLD (hyperlipidemia)  Assessment & Plan  Lipid panel - LDL 44  · Continue statin    Long-term use of immunosuppressant medication  Assessment & Plan  Continue CellCept, cyclosporine    Hypothyroidism  Assessment & Plan  · Continue levothyroxine 112 mcg      VTE Pharmacologic Prophylaxis: VTE Score: 10 High Risk (Score >/= 5) - Pharmacological DVT Prophylaxis Ordered: heparin  Sequential Compression Devices Ordered  Patient Centered Rounds: I performed bedside rounds with nursing staff today  Discussions with Specialists or Other Care Team Provider: yes - ortho    Education and Discussions with Family / Patient: Updated  (significant other) at bedside  Time Spent for Care: 35 min  More than 50% of total time spent on counseling and coordination of care as described above  Current Length of Stay: 5 day(s)  Current Patient Status: Inpatient   Certification Statement: The patient will continue to require additional inpatient hospital stay due to Right ankle fracture, elevated creatinine  Discharge Plan: Anticipate discharge in 24-48 hrs to rehab facility  Code Status: Level 3 - DNAR and DNI    Subjective:     Patient states she is doing better  Reports some right foot pain but it is tolerable    Objective:     Vitals:   Temp (24hrs), Av 7 °F (36 5 °C), Min:96 8 °F (36 °C), Max:98 3 °F (36 8 °C)    Temp:  [96 8 °F (36 °C)-98 3 °F (36 8 °C)] 96 8 °F (36 °C)  HR:  [] 81  Resp:  [16-20] 18  BP: (122-160)/(55-76) 123/55  SpO2:  [94 %-100 %] 96 %  Body mass index is 38 75 kg/m²  Input and Output Summary (last 24 hours): Intake/Output Summary (Last 24 hours) at 2022 0943  Last data filed at 2022 1250  Gross per 24 hour   Intake 550 ml   Output --   Net 550 ml       Physical Exam:   Physical Exam  Constitutional:       General: She is not in acute distress  Appearance: She is well-developed  HENT:      Head: Normocephalic and atraumatic  Eyes:      General: No scleral icterus  Cardiovascular:      Rate and Rhythm: Normal rate and regular rhythm     Pulmonary:      Effort: Pulmonary effort is normal  No respiratory distress  Breath sounds: Normal breath sounds  No wheezing or rales  Abdominal:      General: Bowel sounds are normal  There is no distension  Palpations: Abdomen is soft  Tenderness: There is no abdominal tenderness  Musculoskeletal:      Comments: Right lower extremity with splint in place   Neurological:      Mental Status: She is alert and oriented to person, place, and time  Additional Data:     Labs:  Results from last 7 days   Lab Units 11/19/22  0635 11/17/22  0559   WBC Thousand/uL 6 85 6 00   HEMOGLOBIN g/dL 8 6* 10 6*   HEMATOCRIT % 28 1* 33 5*   PLATELETS Thousands/uL 192 173   NEUTROS PCT %  --  72   LYMPHS PCT %  --  14   MONOS PCT %  --  10   EOS PCT %  --  2     Results from last 7 days   Lab Units 11/19/22  0635 11/18/22  0633 11/17/22  0559   SODIUM mmol/L 141   < > 134*   POTASSIUM mmol/L 4 7   < > 4 7   CHLORIDE mmol/L 112*   < > 106   CO2 mmol/L 17*   < > 20*   BUN mg/dL 36*   < > 20   CREATININE mg/dL 1 41*   < > 1 02   ANION GAP mmol/L 12   < > 8   CALCIUM mg/dL 7 3*   < > 8 8   ALBUMIN g/dL  --   --  2 9*   TOTAL BILIRUBIN mg/dL  --   --  0 32   ALK PHOS U/L  --   --  163*   ALT U/L  --   --  25   AST U/L  --   --  25   GLUCOSE RANDOM mg/dL 144*   < > 98    < > = values in this interval not displayed           Results from last 7 days   Lab Units 11/19/22  0716 11/18/22  1245 11/18/22  0730 11/17/22  2051 11/17/22  1622 11/17/22  1122 11/17/22  0738 11/15/22  2039 11/15/22  1102 11/15/22  0757   POC GLUCOSE mg/dl 133 117 97 105 109 94 90 104 108 111     Results from last 7 days   Lab Units 11/17/22  0559   HEMOGLOBIN A1C % 5 0           Lines/Drains:  Invasive Devices     Peripheral Intravenous Line  Duration           Peripheral IV 11/17/22 Left;Upper;Ventral (anterior) Arm 1 day          Drain  Duration           External Urinary Catheter <1 day                Imaging: Reviewed radiology reports from this admission including: chest xray    Recent Cultures (last 7 days):   Results from last 7 days   Lab Units 11/16/22  1439   URINE CULTURE  No Growth <1000 cfu/mL       Last 24 Hours Medication List:   Current Facility-Administered Medications   Medication Dose Route Frequency Provider Last Rate   • calcitonin (salmon)  1 spray Alternating Nares Daily Basil Prince PA-C     • calcium carbonate-vitamin D  1 tablet Oral Daily With Breakfast Basil Prince PA-C     • cholecalciferol  1,000 Units Oral Daily Basil Prince PA-C     • cycloSPORINE modified  50 mg Oral BID Basil Prince PA-C     • docusate sodium  100 mg Oral BID PRN Basil Prince PA-C     • heparin (porcine)  5,000 Units Subcutaneous Q8H [address] Keerthi Revankar, DO     • HYDROcodone-acetaminophen  1 tablet Oral Q6H PRN Keerthi Revankar, DO     • lactated ringers  50 mL/hr Intravenous Continuous Keerthi Revankar, DO     • levothyroxine  112 mcg Oral Early Morning Juan Ramon Crawford PA-C     • LORazepam  0 5 mg Oral Once Basil Prince PA-C     • melatonin  6 mg Oral HS Basil Prince PA-C     • metoprolol tartrate  50 mg Oral BID Basil Prince PA-C     • mycophenolate  750 mg Oral BID Basil Prince PA-C     • OLANZapine  2 5 mg Oral HS Juan Ramon Crawford PA-C     • OLANZapine  5 mg Intramuscular Q6H PRN Basil Prince PA-C     • ondansetron  4 mg Intravenous Q6H PRN Basil Prince PA-C     • pantoprazole  40 mg Oral Daily Juan Ramon Crawford PA-C     • polyethylene glycol  17 g Oral BID PRN Basil Prince PA-C     • pravastatin  20 mg Oral HS Basil Prince PA-C          Today, Patient Was Seen By: Darian Ash DO    **Please Note: This note may have been constructed using a voice recognition system  **

## 2022-11-19 NOTE — ASSESSMENT & PLAN NOTE
Lab Results   Component Value Date    EGFR 35 11/19/2022    EGFR 51 11/18/2022    EGFR 52 11/17/2022    CREATININE 1 41 (H) 11/19/2022    CREATININE 1 04 11/18/2022    CREATININE 1 02 11/17/2022     Elevated creatinine initially of 1 6, trended down but now trending up again  IV fluids and get repeat lab work in a m

## 2022-11-19 NOTE — PLAN OF CARE
Problem: OCCUPATIONAL THERAPY ADULT  Goal: Performs self-care activities at highest level of function for planned discharge setting  See evaluation for individualized goals  Description: Treatment Interventions: ADL retraining, Functional transfer training, Endurance training, UE strengthening/ROM, Patient/family training, Equipment evaluation/education, Activityengagement, Compensatory technique education          See flowsheet documentation for full assessment, interventions and recommendations  Outcome: Progressing  Note: Limitation: Decreased ADL status, Decreased UE strength, Decreased Safe judgement during ADL, Decreased endurance, Decreased high-level ADLs, Decreased self-care trans (decreased balance and mobility)  Prognosis: Good  Assessment: Patient seen for OT treatment  Patient is s/p R ankle ORIF on 11/18/22  Patient requires max assist of 2 for transfers maintaining NWB and max assist for LB ADLS  Patient will benefit from STR       OT Discharge Recommendation: Post acute rehabilitation services

## 2022-11-19 NOTE — ASSESSMENT & PLAN NOTE
Acute,  · 6 3 initially after repeat, non hemolyzed  · Calcium gluconate 1 g, in the ED- insulin 8 units, dextrose in IV  · Potassium level mildly elevated again on 11/16  Insulin, dextrose, albuterol was given  · Potassium level now within normal limits    Low-potassium diet  · Repeat BMP in a m   · CK-within normal limits

## 2022-11-19 NOTE — ASSESSMENT & PLAN NOTE
· BPs noted to be high at times and Lopressor dose increased  · Blood pressures have improved  · Blood pressures partly elevated due to patient being agitated as well

## 2022-11-19 NOTE — CASE MANAGEMENT
Case Management Discharge Planning Note    Patient name Jessee Piper  Location [address] [address] MRN [id number]  : 1945 Date 2022       Current Admission Date: 2022  Current Admission Diagnosis:Closed fracture of distal end of right fibula   Patient Active Problem List    Diagnosis Date Noted   • Dementia with behavioral disturbance 2022   • Hyperkalemia 2022   • Fall 2022   • Closed fracture of distal end of right fibula 2022   • GERD (gastroesophageal reflux disease)    • Stage 3b chronic kidney disease ([facility] ) 2022   • REZA (acute kidney injury) ([facility] ) 2021   • Obesity, morbid ([place] [address] ) 2021   • Osteopenia of multiple sites 2020   • Other fatigue 2020   • Snoring 2020   • Chronic left-sided thoracic back pain 2019   • Left ovarian cyst 2019   • Asymptomatic stenosis of left carotid artery 2018   • Abnormal EEG 2017   • Altered mental status 2017   • Cerebrovascular accident (CVA) due to embolism ([facility] ) 2017   • Non-ST elevation myocardial infarction (NSTEMI), type 2 2017   • History of seizure 10/31/2017   • Paroxysmal SVT (supraventricular tachycardia) ([facility] ) 10/31/2017   • TIA (transient ischemic attack) 10/29/2017   • HLD (hyperlipidemia) 10/29/2017   • Cyst of diaphragm 2017   • Long-term use of immunosuppressant medication 2016   • Anemia 2016   • Liver transplanted ([facility] ) 2016   • Hypothyroidism 2016   • Elevated homocysteine 2016   • Chronic joint pain 2016   • Lung nodule, solitary 2016   • Sciatica 2015   • Hypercalcemia 2015   • Ataxic gait 2013   • Chronic kidney insufficiency 2013   • Benign essential hypertension 2012   • Ulcerative colitis without complications ([facility] )       LOS (days): 5  Geometric Mean LOS (GMLOS) (days): 2 70  Days to GMLOS:-2 1     OBJECTIVE:  Risk of Unplanned Readmission Score: 35 79      Current admission status: Inpatient   Preferred Pharmacy:   [facility]  [address], [address] [address]  Phone: [phone] Fax: [phone]    [facility] [address] [place]  [facility] [address]  Phone: [phone] Fax: [phone]    Primary Care Provider: Marisa Conklin MD    Primary Insurance: MEDICARE  Secondary Insurance: COMMERCIAL MISCELLANEOUS    DISCHARGE DETAILS:    Treatment Team Recommendation: Short Term Rehab  Discharge Destination Plan[de-identified] Short Term Rehab  Transport at Discharge : BLS Ambulance     Number/Name of Dispatcher: [facility]  Transported by [employer] and Unit #): TBD  ETA of Transport (Date): 11/20/22  ETA of Transport (Time):  (TBD)    Accepting Facility Name, [address] : [facility]  Receiving Facility/Agency Phone Number: [phone]    Per Attending, anticipating discharge to facility tomorrow  [facility] transport reservation submitted in [employer] and pending  PMN form completed and in label book  CCB on- Dena notified  She confirmed anticipated admission tomorrow

## 2022-11-19 NOTE — PLAN OF CARE
Problem: Potential for Falls  Goal: Patient will remain free of falls  Description: INTERVENTIONS:  - Educate patient/family on patient safety including physical limitations  - Instruct patient to call for assistance with activity   - Consult OT/PT to assist with strengthening/mobility   - Keep Call bell within reach  - Keep bed low and locked with side rails adjusted as appropriate  - Keep care items and personal belongings within reach  - Initiate and maintain comfort rounds  - Make Fall Risk Sign visible to staff  - Offer Toileting every 2 Hours, in advance of need  - Initiate/Maintain bed alarm    - Apply yellow socks and bracelet for high fall risk patients  - Consider moving patient to room near nurses station  Outcome: Progressing     Problem: Prexisting or High Potential for Compromised Skin Integrity  Goal: Skin integrity is maintained or improved  Description: INTERVENTIONS:  - Identify patients at risk for skin breakdown  - Assess and monitor skin integrity  - Assess and monitor nutrition and hydration status  - Monitor labs   - Assess for incontinence   - Turn and reposition patient  - Assist with mobility/ambulation  - Relieve pressure over bony prominences  - Avoid friction and shearing  - Provide appropriate hygiene as needed including keeping skin clean and dry  - Evaluate need for skin moisturizer/barrier cream  - Collaborate with interdisciplinary team   - Patient/family teaching  - Consider wound care consult   Outcome: Progressing     Problem: MOBILITY - ADULT  Goal: Maintain or return to baseline ADL function  Description: INTERVENTIONS:  -  Assess patient's ability to carry out ADLs; assess patient's baseline for ADL function and identify physical deficits which impact ability to perform ADLs (bathing, care of mouth/teeth, toileting, grooming, dressing, etc )  - Assess/evaluate cause of self-care deficits   - Assess range of motion  - Assess patient's mobility; develop plan if impaired  - Assess patient's need for assistive devices and provide as appropriate  - Encourage maximum independence but intervene and supervise when necessary  - Involve family in performance of ADLs  - Assess for home care needs following discharge   - Consider OT consult to assist with ADL evaluation and planning for discharge  - Provide patient education as appropriate  Outcome: Progressing  Goal: Maintains/Returns to pre admission functional level  Description: INTERVENTIONS:  - Perform BMAT or MOVE assessment daily    - Set and communicate daily mobility goal to care team and patient/family/caregiver  - Collaborate with rehabilitation services on mobility goals if consulted  - Perform Range of Motion 2 times a day  - Reposition patient every 2 hours  - Out of bed to chair daily      - Record patient progress and toleration of activity level   Outcome: Progressing     Problem: SAFETY,RESTRAINT: NV/NON-SELF DESTRUCTIVE BEHAVIOR  Goal: Remains free of harm/injury (restraint for non violent/non self-detsructive behavior)  Description: INTERVENTIONS:  - Instruct patient/family regarding restraint use   - Assess and monitor physiologic and psychological status   - Provide interventions and comfort measures to meet assessed patient needs   - Identify and implement measures to help patient regain control  - Assess readiness for release of restraint   Outcome: Progressing  Goal: Returns to optimal restraint-free functioning  Description: INTERVENTIONS:  - Assess the patient's behavior and symptoms that indicate continued need for restraint  - Identify and implement measures to help patient regain control  - Assess readiness for release of restraint   Outcome: Progressing     Problem: Nutrition/Hydration-ADULT  Goal: Nutrient/Hydration intake appropriate for improving, restoring or maintaining nutritional needs  Description: Monitor and assess patient's nutrition/hydration status for malnutrition   Collaborate with interdisciplinary team and initiate plan and interventions as ordered  Monitor patient's weight and dietary intake as ordered or per policy  Utilize nutrition screening tool and intervene as necessary  Determine patient's food preferences and provide high-protein, high-caloric foods as appropriate       INTERVENTIONS:  - Monitor oral intake, urinary output, labs, and treatment plans  - Assess nutrition and hydration status and recommend course of action  - Evaluate amount of meals eaten  - Assist patient with eating if necessary   - Allow adequate time for meals  - Recommend/ encourage appropriate diets, oral nutritional supplements, and vitamin/mineral supplements  - Order, calculate, and assess calorie counts as needed  - Recommend, monitor, and adjust tube feedings and TPN/PPN based on assessed needs  - Assess need for intravenous fluids  - Provide specific nutrition/hydration education as appropriate  - Include patient/family/caregiver in decisions related to nutrition  Outcome: Progressing

## 2022-11-20 VITALS
SYSTOLIC BLOOD PRESSURE: 127 MMHG | DIASTOLIC BLOOD PRESSURE: 51 MMHG | OXYGEN SATURATION: 97 % | TEMPERATURE: 97.6 F | HEIGHT: 62 IN | BODY MASS INDEX: 38.99 KG/M2 | WEIGHT: 211.86 LBS | RESPIRATION RATE: 16 BRPM | HEART RATE: 74 BPM

## 2022-11-20 PROBLEM — E87.5 HYPERKALEMIA: Status: RESOLVED | Noted: 2022-11-14 | Resolved: 2022-11-20

## 2022-11-20 LAB
ANION GAP SERPL CALCULATED.3IONS-SCNC: 9 MMOL/L (ref 4–13)
BUN SERPL-MCNC: 43 MG/DL (ref 5–25)
CALCIUM SERPL-MCNC: 7.8 MG/DL (ref 8.3–10.1)
CHLORIDE SERPL-SCNC: 111 MMOL/L (ref 96–108)
CO2 SERPL-SCNC: 21 MMOL/L (ref 21–32)
CREAT SERPL-MCNC: 1.48 MG/DL (ref 0.6–1.3)
ERYTHROCYTE [DISTWIDTH] IN BLOOD BY AUTOMATED COUNT: 13.8 % (ref 11.6–15.1)
GFR SERPL CREATININE-BSD FRML MDRD: 33 ML/MIN/1.73SQ M
GLUCOSE SERPL-MCNC: 101 MG/DL (ref 65–140)
GLUCOSE SERPL-MCNC: 148 MG/DL (ref 65–140)
GLUCOSE SERPL-MCNC: 88 MG/DL (ref 65–140)
HCT VFR BLD AUTO: 30.2 % (ref 34.8–46.1)
HGB BLD-MCNC: 9.1 G/DL (ref 11.5–15.4)
MCH RBC QN AUTO: 28.6 PG (ref 26.8–34.3)
MCHC RBC AUTO-ENTMCNC: 30.1 G/DL (ref 31.4–37.4)
MCV RBC AUTO: 95 FL (ref 82–98)
PLATELET # BLD AUTO: 217 THOUSANDS/UL (ref 149–390)
PMV BLD AUTO: 9.4 FL (ref 8.9–12.7)
POTASSIUM SERPL-SCNC: 4.6 MMOL/L (ref 3.5–5.3)
RBC # BLD AUTO: 3.18 MILLION/UL (ref 3.81–5.12)
SODIUM SERPL-SCNC: 141 MMOL/L (ref 135–147)
WBC # BLD AUTO: 6.64 THOUSAND/UL (ref 4.31–10.16)

## 2022-11-20 RX ORDER — POLYETHYLENE GLYCOL 3350 17 G/17G
17 POWDER, FOR SOLUTION ORAL DAILY PRN
Refills: 0
Start: 2022-11-20

## 2022-11-20 RX ORDER — OLANZAPINE 5 MG/1
2.5 TABLET, ORALLY DISINTEGRATING ORAL
Refills: 0
Start: 2022-11-20

## 2022-11-20 RX ORDER — OLANZAPINE 5 MG/1
5 TABLET ORAL EVERY 6 HOURS PRN
Refills: 0
Start: 2022-11-20

## 2022-11-20 RX ADMIN — SODIUM CHLORIDE, POTASSIUM CHLORIDE, SODIUM LACTATE AND CALCIUM CHLORIDE 50 ML/HR: 600; 310; 30; 20 INJECTION, SOLUTION INTRAVENOUS at 05:30

## 2022-11-20 RX ADMIN — DOCUSATE SODIUM 100 MG: 100 CAPSULE, LIQUID FILLED ORAL at 05:29

## 2022-11-20 RX ADMIN — METOPROLOL TARTRATE 50 MG: 50 TABLET, FILM COATED ORAL at 08:48

## 2022-11-20 RX ADMIN — PANTOPRAZOLE SODIUM 40 MG: 40 TABLET, DELAYED RELEASE ORAL at 08:48

## 2022-11-20 RX ADMIN — HYDROCODONE BITARTRATE AND ACETAMINOPHEN 1 TABLET: 5; 325 TABLET ORAL at 01:01

## 2022-11-20 RX ADMIN — Medication 1 TABLET: at 08:48

## 2022-11-20 RX ADMIN — LEVOTHYROXINE SODIUM 112 MCG: 112 TABLET ORAL at 05:29

## 2022-11-20 RX ADMIN — ASPIRIN 81 MG: 81 TABLET, COATED ORAL at 08:48

## 2022-11-20 RX ADMIN — CYCLOSPORINE 50 MG: 25 CAPSULE ORAL at 08:48

## 2022-11-20 RX ADMIN — MYCOPHENOLATE MOFETIL 750 MG: 250 CAPSULE ORAL at 08:48

## 2022-11-20 RX ADMIN — Medication 1000 UNITS: at 08:48

## 2022-11-20 RX ADMIN — CALCITONIN SALMON 1 SPRAY: 200 SPRAY, METERED NASAL at 08:48

## 2022-11-20 RX ADMIN — CLOPIDOGREL BISULFATE 75 MG: 75 TABLET ORAL at 08:48

## 2022-11-20 RX ADMIN — HEPARIN SODIUM 5000 UNITS: 5000 INJECTION INTRAVENOUS; SUBCUTANEOUS at 05:30

## 2022-11-20 NOTE — ASSESSMENT & PLAN NOTE
· BPs noted to be high at times and Lopressor dose was increased  · Blood pressures have improved  · Blood pressures partly elevated due to patient being agitated as well

## 2022-11-20 NOTE — NURSING NOTE
Pt discharged from [address] [place] [address]  IV removed prior to discharge  Pt left with all their belongings  Pt left via stretcher accompanied by transport team  Discharge instructions gone over with patient's receiving facility  No prescriptions written  All questions answered

## 2022-11-20 NOTE — PROGRESS NOTES
Progress Note - Orthopedics   Jessee Piper 64 y o  female MRN: [id number]  Unit/Bed#: [address] [address] Encounter: [id number]    Assessment:  1) POD#2 s/o ORIF right ankle bimalleolar fracture  2) REZA - greater than 0 3 increase in Cr; management per SLIM; stable from yesterday    Plan:  - Ancef 2g Q8H x2 doses - completed  - May restart Plavix and aspirin per SLIM  - NWB RLE  - PT/OT  - follow AM labs - gfr/Cr stable from yesterday, H/H stable, continue to monitor  - split at all times, elevate RLE  - dispo: likely to STR when medically stable; will need to follow up with Dr Delta Wick in 2 weeks as an outpatient for continued care    Weight bearing: NWB RLE    VTE Pharmacologic Prophylaxis: Plavix and aspirin  VTE Mechanical Prophylaxis: sequential compression device    Subjective:  Patient seen and examined this morning  No overnight events  Pain controlled  Worked with PT/OT yesterday  Denies paresthesias in the right lower extremity or chest pain  Vitals: Blood pressure 127/51, pulse 74, temperature 97 6 °F (36 4 °C), resp  rate 19, height 5' 2" (1 575 m), weight 96 1 kg (211 lb 13 8 oz), SpO2 97 %, not currently breastfeeding  ,Body mass index is 38 75 kg/m²  Intake/Output Summary (Last 24 hours) at 11/20/2022 0748  Last data filed at 11/20/2022 0600  Gross per 24 hour   Intake 840 ml   Output 300 ml   Net 540 ml       Invasive Devices     Peripheral Intravenous Line  Duration           Peripheral IV 11/20/22 Dorsal (posterior); Right Forearm <1 day          Drain  Duration           External Urinary Catheter 1 day                Physical Exam: General: NAD, A&Ox3, resting comfortably in bed  Ortho Exam: RLE:  Splint in place right lower extremity, able to wiggle all toes, brisk capillary refill, reports normal sensation light touch in the right lower extremity, thigh soft and nontender    Lab, Imaging and other studies:   I have personally reviewed pertinent lab results    CBC:   Lab Results   Component Value Date    WBC 6 64 11/20/2022    HGB 9 1 (L) 11/20/2022    HCT 30 2 (L) 11/20/2022    MCV 95 11/20/2022     11/20/2022    MCH 28 6 11/20/2022    MCHC 30 1 (L) 11/20/2022    RDW 13 8 11/20/2022    MPV 9 4 11/20/2022     CMP:   Lab Results   Component Value Date    SODIUM 141 11/20/2022     (H) 11/20/2022    CO2 21 11/20/2022    BUN 43 (H) 11/20/2022    CREATININE 1 48 (H) 11/20/2022    CALCIUM 7 8 (L) 11/20/2022    EGFR 33 11/20/2022     Brina Mcmillan PA-C

## 2022-11-20 NOTE — PLAN OF CARE
Problem: Potential for Falls  Goal: Patient will remain free of falls  Description: INTERVENTIONS:  - Educate patient/family on patient safety including physical limitations  - Instruct patient to call for assistance with activity   - Consult OT/PT to assist with strengthening/mobility   - Keep Call bell within reach  - Keep bed low and locked with side rails adjusted as appropriate  - Keep care items and personal belongings within reach  - Initiate and maintain comfort rounds  - Make Fall Risk Sign visible to staff  - Offer Toileting every 2 Hours, in advance of need  - Initiate/Maintain bed alarm    - Apply yellow socks and bracelet for high fall risk patients  - Consider moving patient to room near nurses station  11/20/2022 1222 by Mikayla Ribeiro RN  Outcome: Adequate for Discharge  11/20/2022 0954 by Mikayla Ribeiro RN  Outcome: Progressing     Problem: Prexisting or High Potential for Compromised Skin Integrity  Goal: Skin integrity is maintained or improved  Description: INTERVENTIONS:  - Identify patients at risk for skin breakdown  - Assess and monitor skin integrity  - Assess and monitor nutrition and hydration status  - Monitor labs   - Assess for incontinence   - Turn and reposition patient  - Assist with mobility/ambulation  - Relieve pressure over bony prominences  - Avoid friction and shearing  - Provide appropriate hygiene as needed including keeping skin clean and dry  - Evaluate need for skin moisturizer/barrier cream  - Collaborate with interdisciplinary team   - Patient/family teaching  - Consider wound care consult   11/20/2022 1222 by Mikayla Ribeiro RN  Outcome: Adequate for Discharge  11/20/2022 0954 by Mikayla Ribeiro RN  Outcome: Progressing     Problem: MOBILITY - ADULT  Goal: Maintain or return to baseline ADL function  Description: INTERVENTIONS:  -  Assess patient's ability to carry out ADLs; assess patient's baseline for ADL function and identify physical deficits which impact ability to perform ADLs (bathing, care of mouth/teeth, toileting, grooming, dressing, etc )  - Assess/evaluate cause of self-care deficits   - Assess range of motion  - Assess patient's mobility; develop plan if impaired  - Assess patient's need for assistive devices and provide as appropriate  - Encourage maximum independence but intervene and supervise when necessary  - Involve family in performance of ADLs  - Assess for home care needs following discharge   - Consider OT consult to assist with ADL evaluation and planning for discharge  - Provide patient education as appropriate  11/20/2022 1222 by Rae Moreno RN  Outcome: Adequate for Discharge  11/20/2022 0954 by Rae Moreno RN  Outcome: Progressing  Goal: Maintains/Returns to pre admission functional level  Description: INTERVENTIONS:  - Perform BMAT or MOVE assessment daily    - Set and communicate daily mobility goal to care team and patient/family/caregiver  - Collaborate with rehabilitation services on mobility goals if consulted  - Perform Range of Motion 2 times a day  - Reposition patient every 2 hours          - Out of bed to chair daily      - Record patient progress and toleration of activity level   11/20/2022 1222 by Rae Moreno RN  Outcome: Adequate for Discharge  11/20/2022 6172 by Rae Moreno RN  Outcome: Progressing     Problem: SAFETY,RESTRAINT: NV/NON-SELF DESTRUCTIVE BEHAVIOR  Goal: Remains free of harm/injury (restraint for non violent/non self-detsructive behavior)  Description: INTERVENTIONS:  - Instruct patient/family regarding restraint use   - Assess and monitor physiologic and psychological status   - Provide interventions and comfort measures to meet assessed patient needs   - Identify and implement measures to help patient regain control  - Assess readiness for release of restraint   11/20/2022 1222 by Rae Moreno RN  Outcome: Adequate for Discharge  11/20/2022 0954 by Dominic Mayer RN  Outcome: Progressing  Goal: Returns to optimal restraint-free functioning  Description: INTERVENTIONS:  - Assess the patient's behavior and symptoms that indicate continued need for restraint  - Identify and implement measures to help patient regain control  - Assess readiness for release of restraint   11/20/2022 1222 by Dominic Mayer RN  Outcome: Adequate for Discharge  11/20/2022 0954 by Dominic Mayer RN  Outcome: Progressing     Problem: Nutrition/Hydration-ADULT  Goal: Nutrient/Hydration intake appropriate for improving, restoring or maintaining nutritional needs  Description: Monitor and assess patient's nutrition/hydration status for malnutrition  Collaborate with interdisciplinary team and initiate plan and interventions as ordered  Monitor patient's weight and dietary intake as ordered or per policy  Utilize nutrition screening tool and intervene as necessary  Determine patient's food preferences and provide high-protein, high-caloric foods as appropriate       INTERVENTIONS:  - Monitor oral intake, urinary output, labs, and treatment plans  - Assess nutrition and hydration status and recommend course of action  - Evaluate amount of meals eaten  - Assist patient with eating if necessary   - Allow adequate time for meals  - Recommend/ encourage appropriate diets, oral nutritional supplements, and vitamin/mineral supplements  - Order, calculate, and assess calorie counts as needed  - Recommend, monitor, and adjust tube feedings and TPN/PPN based on assessed needs  - Assess need for intravenous fluids  - Provide specific nutrition/hydration education as appropriate  - Include patient/family/caregiver in decisions related to nutrition  11/20/2022 1222 by Dominic Mayer RN  Outcome: Adequate for Discharge  11/20/2022 0954 by Dominic Mayer RN  Outcome: Progressing

## 2022-11-20 NOTE — ASSESSMENT & PLAN NOTE
Noted on medical record,  · vitamin-D level 20  · Continue home vitamin-D and calcium    · Received calcitonin while here due to current fracture  · Outpatient DEXA scan recommended if within criteria

## 2022-11-20 NOTE — [place] UNIVERSAL TRANSFER FORM
[place] UNIVERSAL TRANSFER FORM  (ALL ITEMS MUST BE COMPLETED)    1  TRANSFER FROM: [facility] [address] [facility]      TRANSFER TO: [facility], [facility]      2  DATE OF TRANSFER: 11/20/2022                        TIME OF TRANSFER: 1430    3  PATIENT NAME: JANETT Feng      YOB: 1945                             GENDER: female    4  LANGUAGE:   English    5  PHYSICIAN NAME:  Carine Valverde DO                   PHONE: [phone]    6  CODE STATUS: Level 3 - DNAR and DNI        Out of Hospital DNR Attached: No    7  :                                      :  Extended Emergency Contact Information  Primary Emergency Contact: Rae Lakhani  Address: [address] [place] [address], [address] Phone: [phone]  Mobile Phone: [phone]  Relation: Daughter  Secondary Emergency Contact: Luis Borden  Address: [address] [place] [address] [facility] [address] Phone: [phone]  Mobile Phone: [phone]  Relation: Significant Other           Health Care Representative/Proxy:  No           Legal Guardian:  No             NAME OF:           HEALTH CARE REPRESENTATIVE/PROXY:                                         OR           LEGAL GUARDIAN, IF NOT :                                               PHONE:  (Day)           (Night)                        (Cell)    8  REASON FOR TRANSFER: (Must include brief medical history and recent changes in physical function or cognition ) STR            V/S: /51 (BP Location: Right arm)   Pulse 74   Temp 97 6 °F (36 4 °C) (Oral)   Resp 16   Ht 5' 2" (1 575 m)   Wt 96 1 kg (211 lb 13 8 oz)   SpO2 97%   BMI 38 75 kg/m²           PAIN: None    9   PRIMARY DIAGNOSIS: Closed fracture of distal end of right fibula      Secondary Diagnosis:         Pacemaker: No      Internal Defib: No          Mental Health Diagnosis (if Applicable):    10  RESTRAINTS: No     11  RESPIRATORY NEEDS: None    12  ISOLATION/PRECAUTION: None    13  ALLERGY: Tetracyclines & related, Vancomycin, and Prograf [tacrolimus]    14  SENSORY:       Vision Glasses, Hearing Good  and Speech Clear    15  SKIN CONDITION: No Wounds    16  DIET: Regular    17  IV ACCESS: None    18  PERSONAL ITEMS SENT WITH PATIENT: Glasses and OtherClothing brought by spouse    23  ATTACHED DOCUMENTS: MUST ATTACH CURRENT MEDICATION INFORMATION Face Sheet, MAR, Medication Reconciliation, Labs, Code Status, Discharge Summary, PT Note, OT Note and ST Note    20  AT RISK ALERTS:Falls and Pressure Ulcer        HARM TO: N/A    21  WEIGHT BEARING STATUS:         Left Leg: Full        Right Leg: Limited    22  MENTAL STATUS:Alert and Forgetful    23  FUNCTION:        Walk: Not Able        Transfer: With Help        Toilet: With Help        Feed: With Help    24  IMMUNIZATIONS/SCREENING:     Immunization History   Administered Date(s) Administered   • COVID-19 MODERNA VACC 0 5 ML IM 01/21/2021, 02/19/2021, 08/31/2021   • H1N1, All Formulations 10/31/2009   • Hep A, adult 11/19/1996, 05/21/1997   • INFLUENZA 11/01/2018, 11/01/2021   • Influenza Split High Dose Preservative Free IM 10/11/2012, 09/25/2013, 10/31/2014, 09/03/2015, 09/29/2016, 10/17/2017   • Influenza, high dose seasonal 0 7 mL 10/11/2019, 10/26/2020, 11/14/2022   • Influenza, seasonal, injectable 10/07/2005, 10/03/2006, 10/25/2007, 09/16/2008, 09/09/2009, 09/22/2010, 09/14/2011   • Pneumococcal Conjugate 13-Valent 06/30/2015   • Pneumococcal Polysaccharide PPV23 09/30/2006, 09/14/2011   • Tdap 03/13/2018       25  BOWEL: Continent and Date Last BM11/20/22    32  BLADDER: Incontinent    27   SENDING FACILITY CONTACT: [facility] Coral Mcdaniel        Unit: [address]        Phone: [phone] [address] (if known):        Title:        Unit:         Phone:         FORM PREFILLED BY (if applicable)       Title:       Unit:        Phone:         FORM COMPLETED BY Basilia Scheuermann, RN      Title: Registered Nurse      Phone: [phone]

## 2022-11-20 NOTE — PLAN OF CARE
Problem: Potential for Falls  Goal: Patient will remain free of falls  Description: INTERVENTIONS:  - Educate patient/family on patient safety including physical limitations  - Instruct patient to call for assistance with activity   - Consult OT/PT to assist with strengthening/mobility   - Keep Call bell within reach  - Keep bed low and locked with side rails adjusted as appropriate  - Keep care items and personal belongings within reach  - Initiate and maintain comfort rounds  - Make Fall Risk Sign visible to staff  - Offer Toileting every 2 Hours, in advance of need  - Initiate/Maintain bed alarm    - Apply yellow socks and bracelet for high fall risk patients  - Consider moving patient to room near nurses station  Outcome: Progressing     Problem: Prexisting or High Potential for Compromised Skin Integrity  Goal: Skin integrity is maintained or improved  Description: INTERVENTIONS:  - Identify patients at risk for skin breakdown  - Assess and monitor skin integrity  - Assess and monitor nutrition and hydration status  - Monitor labs   - Assess for incontinence   - Turn and reposition patient  - Assist with mobility/ambulation  - Relieve pressure over bony prominences  - Avoid friction and shearing  - Provide appropriate hygiene as needed including keeping skin clean and dry  - Evaluate need for skin moisturizer/barrier cream  - Collaborate with interdisciplinary team   - Patient/family teaching  - Consider wound care consult   Outcome: Progressing     Problem: MOBILITY - ADULT  Goal: Maintain or return to baseline ADL function  Description: INTERVENTIONS:  -  Assess patient's ability to carry out ADLs; assess patient's baseline for ADL function and identify physical deficits which impact ability to perform ADLs (bathing, care of mouth/teeth, toileting, grooming, dressing, etc )  - Assess/evaluate cause of self-care deficits   - Assess range of motion  - Assess patient's mobility; develop plan if impaired  - Assess patient's need for assistive devices and provide as appropriate  - Encourage maximum independence but intervene and supervise when necessary  - Involve family in performance of ADLs  - Assess for home care needs following discharge   - Consider OT consult to assist with ADL evaluation and planning for discharge  - Provide patient education as appropriate  Outcome: Progressing

## 2022-11-20 NOTE — ASSESSMENT & PLAN NOTE
Initially Plan was for discharge on 11/16 however just prior to getting picked up for transfer, pt was noted to be agitated and screaming in the room  Patient was upset that she has to bury her mother  · Per staff member, patient was confused and agitated overnight as well  She then ripped out her IV and not allowing staff members to place new IV line in and was also refusing medications  · Discharge cancelled and UA obtained which showed moderate blood, small leukocytes, occasional bacteria, completed 3 days of IV Rocephin  · Symptoms most likely delirium superimposed on dementia  · CT head - age indeterminate likely old right lacunar infarct  MRI negative for acute CVA  · Per Psychiatry, started on Zyprexa 2 5 mg at bedtime and p o  Zyprexa p r n  Agitation  Per Psychiatry Zyprexa bedtime dose can be further titrated up if needed and as tolerated   · Per Psychiatry once underlying factors related to delirium has cleared, she may be able to come off of Zyprexa    · Previously Requiring Haldol/Ativan and placed on soft restraints due to her being agitated, not directable and trying to pull off things & trying to get out of bed when not in restraints    Restraints has since been discontinued

## 2022-11-20 NOTE — ASSESSMENT & PLAN NOTE
CT-abdomen/pelvis-no acute intra-abdominal or pelvic  · Knee x-ray-unremarkable  · Foot x-ray- displaced fracture of the distal fibula with disruption of the ankle mortise  · Being discharged to rehab

## 2022-11-20 NOTE — ASSESSMENT & PLAN NOTE
Acute,  · 6 3 initially after repeat, non hemolyzed  · Calcium gluconate 1 g, in the ED- insulin 8 units, dextrose in IV  · Potassium level mildly elevated again on 11/16  Insulin, dextrose, albuterol was given  · Potassium level now within normal limits    Low-potassium diet  · Repeat BMP at rehab  · CK-within normal limits

## 2022-11-20 NOTE — ASSESSMENT & PLAN NOTE
X-ray- right displaced fracture of distal fibula with disruption of the ankle mortise  · Repeat x-ray 11/15 showed persistent right ankle fracture subluxation and patient underwent close reduction and splinting  Repeat imaging showed acceptable reduction of the ankle fracture  · She is status post ORIF right ankle on 11/18  · Patient to be nonweightbearing of right lower extremity with splint in place and right lower extremity elevation while at rest  · Knee x-ray-unremarkable  · Pain meds p r n    No DVT prophylaxis on discharge per Ortho  · Follow-up with orthopedics in 2 weeks

## 2022-11-20 NOTE — ASSESSMENT & PLAN NOTE
Lab Results   Component Value Date    EGFR 33 11/20/2022    EGFR 35 11/19/2022    EGFR 51 11/18/2022    CREATININE 1 48 (H) 11/20/2022    CREATININE 1 41 (H) 11/19/2022    CREATININE 1 04 11/18/2022     Elevated creatinine initially of 1 6, trended down and now stable around 1 4 range which is at her baseline  Repeat labs at rehab

## 2022-11-22 ENCOUNTER — PATIENT OUTREACH (OUTPATIENT)
Dept: CASE MANAGEMENT | Facility: OTHER | Age: 77
End: 2022-11-22

## 2022-11-22 DIAGNOSIS — S82.891A CLOSED FRACTURE OF RIGHT ANKLE, INITIAL ENCOUNTER: Primary | ICD-10-CM

## 2022-11-29 ENCOUNTER — PATIENT OUTREACH (OUTPATIENT)
Dept: CASE MANAGEMENT | Facility: OTHER | Age: 77
End: 2022-11-29

## 2022-11-29 NOTE — PROGRESS NOTES
Chart review completed  Email sent to facility requesting update on patient  This care manager assistant will continue to monitor via chart review throughout bundle episode  Update received the patient the patient has No clinical issues  Total lift for transfers OOB, continues NWB R LE, max assist for sit to stand with hallway railing, unable to maintain NWB R LE when in supported standing  Cont PT/OT/ST POC

## 2022-12-01 ENCOUNTER — APPOINTMENT (OUTPATIENT)
Dept: RADIOLOGY | Facility: CLINIC | Age: 77
End: 2022-12-01

## 2022-12-01 ENCOUNTER — OFFICE VISIT (OUTPATIENT)
Dept: OBGYN CLINIC | Facility: CLINIC | Age: 77
End: 2022-12-01

## 2022-12-01 VITALS
TEMPERATURE: 98.1 F | HEART RATE: 72 BPM | DIASTOLIC BLOOD PRESSURE: 78 MMHG | WEIGHT: 211 LBS | SYSTOLIC BLOOD PRESSURE: 135 MMHG | BODY MASS INDEX: 38.83 KG/M2 | HEIGHT: 62 IN

## 2022-12-01 DIAGNOSIS — S82.891A CLOSED FRACTURE OF RIGHT ANKLE, INITIAL ENCOUNTER: ICD-10-CM

## 2022-12-01 DIAGNOSIS — S82.831D OTHER CLOSED FRACTURE OF DISTAL END OF RIGHT FIBULA WITH ROUTINE HEALING, SUBSEQUENT ENCOUNTER: Primary | ICD-10-CM

## 2022-12-01 NOTE — PROGRESS NOTES
Orthopaedics Office Visit - Post-op Patient Visit    ASSESSMENT/PLAN:    Assessment:   13 days s/o ORIF right ankle bimalleolar fracture    DOS 22   Doing well overall  Minimal pain        Plan:   - continue non weightbearing  right lower extremity  - Sutures removed in the office  Patient tolerated well    - Okay To begin showering  Allow water to flow over the incision sites  Do not vigorously scrubbed or soak wounds until otherwise stated   - Steri-strip applied  Maintain 3-5 days in duration   - Podous boot applied to patient  Maintain while ambulatory  May remove for showers  - Cleared for range of motion activities for the right ankle  - Over the counter analgesics as needed / directed   - Ice / heat as directed   - Follow up 4 weeks with repeat XR       To Do Next Visit:  XR right ankle   _____________________________________________________  CHIEF COMPLAINT:  Chief Complaint   Patient presents with   • Right Ankle - Post-op         SUBJECTIVE:  Carson Lennox is a 64 y o  female who presents  13 days s/o ORIF right ankle bimalleolar fracture    DOS 22  Patient states that her ankle is doing well overall  Patient states that she has minimal pain in the ankle currently  Patient states she is been compliant with nonweightbearing of the right lower extremity  Patient denies any new or worsening symptoms in the ankle  Patient offers no other complaints at this time        SOCIAL HISTORY:  Social History     Tobacco Use   • Smoking status: Former     Years: 5 00     Types: Cigarettes     Quit date: 1975     Years since quittin 8   • Smokeless tobacco: Never   • Tobacco comments:     smoked for 5 years-1/2 to 1 ppd   Vaping Use   • Vaping Use: Never used   Substance Use Topics   • Alcohol use: Never   • Drug use: Never       MEDICATIONS:    Current Outpatient Medications:   •  acetaminophen (TYLENOL) 325 mg tablet, Take 2 tablets (650 mg total) by mouth every 6 (six) hours as needed for mild pain or fever, Disp: , Rfl: 0  •  aspirin (ECOTRIN LOW STRENGTH) 81 mg EC tablet, Take 1 tablet (81 mg total) by mouth daily with breakfast, Disp: 90 tablet, Rfl: 3  •  b complex vitamins capsule, Take 1 capsule by mouth daily, Disp: 30 capsule, Rfl: 3  •  Calcium 500 MG tablet, Take 500 mg by mouth, Disp: , Rfl:   •  Cholecalciferol (Vitamin D) 50 MCG (2000 UT) tablet, Take 1,000 Units by mouth, Disp: , Rfl:   •  clopidogrel (PLAVIX) 75 mg tablet, TAKE 1 TABLET EVERY DAY, Disp: 90 tablet, Rfl: 3  •  cycloSPORINE modified (NEORAL) 25 mg capsule, Take 50 mg by mouth 2 (two) times a day, Disp: , Rfl:   •  HYDROcodone-acetaminophen (Norco) 5-325 mg per tablet, Take 1 tablet by mouth every 6 (six) hours as needed for pain (moderate, severe pain) Max Daily Amount: 4 tablets, Disp: , Rfl: 0  •  levothyroxine 112 mcg tablet, TAKE 1 TABLET EVERY DAY AS DIRECTED, Disp: 90 tablet, Rfl: 3  •  melatonin 3 mg, Take 1 tablet (3 mg total) by mouth daily at bedtime, Disp: , Rfl: 0  •  metoprolol tartrate (LOPRESSOR) 25 mg tablet, Take 2 tablets (50 mg total) by mouth every 12 (twelve) hours, Disp: , Rfl: 0  •  mycophenolate (CELLCEPT) 250 mg capsule, Take 3 capsules (750 mg total) by mouth every 12 (twelve) hours, Disp: , Rfl:   •  OLANZapine (ZyPREXA ZYDIS) 5 mg dispersible tablet, Take 0 5 tablets (2 5 mg total) by mouth daily at bedtime, Disp: , Rfl: 0  •  OLANZapine (ZyPREXA) 5 mg tablet, Take 1 tablet (5 mg total) by mouth every 6 (six) hours as needed (Severe agitation), Disp: , Rfl: 0  •  omeprazole (PriLOSEC) 40 MG capsule, Take 40 mg by mouth daily, Disp: , Rfl:   •  polyethylene glycol (MIRALAX) 17 g packet, Take 17 g by mouth daily as needed (constipation), Disp: , Rfl: 0  •  pravastatin (PRAVACHOL) 20 mg tablet, TAKE 1 TABLET BY MOUTH ONCE DAILY AT BEDTIME, Disp: 90 tablet, Rfl: 3  •  docusate sodium (COLACE) 100 mg capsule, Take 1 capsule (100 mg total) by mouth 2 (two) times a day for 10 days, Disp: 20 capsule, Rfl: 0    REVIEW OF SYSTEMS:  MSK: right ankle pain   Neuro: WNL   Pertinent items are otherwise noted in HPI  A comprehensive review of systems was otherwise negative     _____________________________________________________  PHYSICAL EXAMINATION:  Vital signs: /78   Pulse 72   Temp 98 1 °F (36 7 °C)   Ht 5' 2" (1 575 m)   Wt 95 7 kg (211 lb)   BMI 38 59 kg/m²   General: No acute distress, awake and alert  Psychiatric: Mood and affect appear appropriate  HEENT: Trachea Midline, No torticollis, no apparent facial trauma  Cardiovascular: No audible murmurs; Extremities appear perfused  Pulmonary: No audible wheezing or stridor  Skin: No open lesions; see further details (if any) below      MUSCULOSKELETAL EXAMINATION:  Right ankle examination:  - Patient sitting comfortably in the office in no acute distress   - healed incision site noted over the lateral aspect of the ankle with no surrounding erythema or ecchymosis present  Sutures intact  - mild tenderness palpation noted over incision site  No other bony or soft tissue tenderness to palpation noted at this time  - mild tenderness to palpation noted over the lateral aspect of the ankle  - NV intact    _____________________________________________________  STUDIES REVIEWED:  I personally reviewed the images and interpretation is as follows:  Right ankle XR 3 views:  Healing bimalleolar ankle fracture in acceptable alignment with hardware intact        PROCEDURES PERFORMED:  Suture removal    Date/Time: 12/1/2022 1:55 PM  Performed by: Beverly Gatica PA-C  Authorized by: Demi Arreola MD   Universal Protocol:  Consent: Verbal consent obtained    Risks and benefits: risks, benefits and alternatives were discussed  Consent given by: patient  Patient understanding: patient states understanding of the procedure being performed  Site marked: the operative site was marked  Patient identity confirmed: verbally with patient        Patient location:  Bedside  Location:     Laterality:  Right    Location:  Lower extremity    Lower extremity location:  Ankle    Ankle location:  R ankle  Procedure details: Tools used:  Suture removal kit    Wound appearance:  No sign(s) of infection, good wound healing and clean  Post-procedure details:     Post-removal:  Steri-Strips applied    Patient tolerance of procedure: Tolerated well, no immediate complications             Joyce Herrmann PA-C - assisting  [facility]              Portions of the record may have been created with voice recognition software  Occasional wrong word or "sound a like" substitutions may have occurred due to the inherent limitations of voice recognition software  Read the chart carefully and recognize, using context, where substitutions have occurred

## 2022-12-01 NOTE — PATIENT INSTRUCTIONS
- continue non weightbearing  right lower extremity  - Sutures removed in the office  Patient tolerated well    - Okay To begin showering  Allow water to flow over the incision sites  Do not vigorously scrubbed or soak wounds until otherwise stated   - Steri-strip applied  Maintain 3-5 days in duration   - Podous boot applied to patient  Maintain while ambulatory  May remove for showers  - Cleared for range of motion activities for the right ankle     - Over the counter analgesics as needed / directed   - Ice / heat as directed   - Follow up 4 weeks with repeat XR

## 2022-12-05 DIAGNOSIS — I47.1 PSVT (PAROXYSMAL SUPRAVENTRICULAR TACHYCARDIA) (HCC): ICD-10-CM

## 2022-12-06 ENCOUNTER — PATIENT OUTREACH (OUTPATIENT)
Dept: CASE MANAGEMENT | Facility: OTHER | Age: 77
End: 2022-12-06

## 2022-12-06 NOTE — PROGRESS NOTES
Chart review completed  Email sent to facility requesting update on patient  This care manager assistant will continue to monitor via chart review throughout bundle episode  Update received the patient continues with PT and OT  The patient is a total lift as patient is unable to maintain NWB for transfers   3 day calorie count ordered,  Psych consult for depression

## 2022-12-13 ENCOUNTER — PATIENT OUTREACH (OUTPATIENT)
Dept: CASE MANAGEMENT | Facility: OTHER | Age: 77
End: 2022-12-13

## 2022-12-13 NOTE — PROGRESS NOTES
Chart review completed  Email sent to facility requesting update on patient  This care manager assistant will continue to monitor via chart review throughout bundle episode     Update received the patient is Positive for COVID- cough- Robitussin and Chest xray ordered  PT/OT continues, but Slow progress due to NWB status R LE continues

## 2022-12-20 ENCOUNTER — PATIENT OUTREACH (OUTPATIENT)
Dept: CASE MANAGEMENT | Facility: OTHER | Age: 77
End: 2022-12-20

## 2022-12-20 NOTE — PROGRESS NOTES
Chart review completed  Email sent to facility requesting update on patient  This care manager assistant will continue to monitor via chart review throughout bundle episode  Update received the patient has a Poor appetite- fluctuates, calorie count, psych consult  PT/OT d/c’ed 12/15, will reassess when pt’s WB status is upgraded  NWB cont on R LE  Total mechanical lift for transfers, unable to maintain supported standing with mod/max assist x 2  Recovered from covid, out of isolation 12/20  The patient is being skilled for nursing

## 2022-12-26 DIAGNOSIS — S82.831D OTHER CLOSED FRACTURE OF DISTAL END OF RIGHT FIBULA WITH ROUTINE HEALING, SUBSEQUENT ENCOUNTER: Primary | ICD-10-CM

## 2022-12-27 ENCOUNTER — PATIENT OUTREACH (OUTPATIENT)
Dept: CASE MANAGEMENT | Facility: OTHER | Age: 77
End: 2022-12-27

## 2022-12-27 NOTE — PROGRESS NOTES
Chart review completed  Email sent to facility requesting update on patient  This care manager assistant will continue to monitor via chart review throughout bundle episode  Update received the patient was started on Remeron-depressed and very poor appetite/intake  Not on PT/OT but will reassess when WB upgraded

## 2022-12-29 ENCOUNTER — TELEPHONE (OUTPATIENT)
Dept: OBGYN CLINIC | Facility: HOSPITAL | Age: 77
End: 2022-12-29

## 2022-12-29 NOTE — TELEPHONE ENCOUNTER
Caller:Desire Hull    Doctor/Office: Ariel STALEY#: [phone]    Escalation: appointment canceled

## 2023-01-01 ENCOUNTER — APPOINTMENT (EMERGENCY)
Dept: RADIOLOGY | Facility: HOSPITAL | Age: 78
End: 2023-01-01

## 2023-01-01 ENCOUNTER — HOSPITAL ENCOUNTER (INPATIENT)
Facility: HOSPITAL | Age: 78
LOS: 3 days | End: 2023-05-11
Attending: EMERGENCY MEDICINE | Admitting: FAMILY MEDICINE

## 2023-01-01 ENCOUNTER — HOSPITAL ENCOUNTER (EMERGENCY)
Facility: HOSPITAL | Age: 78
Discharge: HOME | End: 2023-05-06
Attending: EMERGENCY MEDICINE

## 2023-01-01 ENCOUNTER — PREP FOR PROCEDURE (OUTPATIENT)
Dept: OTHER | Facility: HOSPITAL | Age: 78
End: 2023-01-01

## 2023-01-01 ENCOUNTER — APPOINTMENT (OUTPATIENT)
Dept: RADIOLOGY | Facility: HOSPITAL | Age: 78
End: 2023-01-01

## 2023-01-01 ENCOUNTER — APPOINTMENT (OUTPATIENT)
Dept: NON INVASIVE DIAGNOSTICS | Facility: HOSPITAL | Age: 78
End: 2023-01-01

## 2023-01-01 ENCOUNTER — TELEPHONE (OUTPATIENT)
Dept: FAMILY MEDICINE CLINIC | Facility: CLINIC | Age: 78
End: 2023-01-01

## 2023-01-01 VITALS
BODY MASS INDEX: 35.9 KG/M2 | TEMPERATURE: 96.5 F | DIASTOLIC BLOOD PRESSURE: 45 MMHG | HEART RATE: 63 BPM | WEIGHT: 195.11 LBS | RESPIRATION RATE: 22 BRPM | SYSTOLIC BLOOD PRESSURE: 62 MMHG | OXYGEN SATURATION: 51 % | HEIGHT: 62 IN

## 2023-01-01 VITALS
SYSTOLIC BLOOD PRESSURE: 139 MMHG | HEART RATE: 86 BPM | RESPIRATION RATE: 18 BRPM | BODY MASS INDEX: 34.75 KG/M2 | TEMPERATURE: 97.5 F | OXYGEN SATURATION: 100 % | DIASTOLIC BLOOD PRESSURE: 65 MMHG | WEIGHT: 190 LBS

## 2023-01-01 DIAGNOSIS — R07.9 CHEST PAIN, UNSPECIFIED TYPE: Primary | ICD-10-CM

## 2023-01-01 DIAGNOSIS — I21.4 NSTEMI (NON-ST ELEVATED MYOCARDIAL INFARCTION) (HCC): Primary | ICD-10-CM

## 2023-01-01 DIAGNOSIS — D64.9 CHRONIC ANEMIA: ICD-10-CM

## 2023-01-01 DIAGNOSIS — R29.90 STROKE-LIKE SYMPTOMS: Primary | ICD-10-CM

## 2023-01-01 DIAGNOSIS — I21.4 NSTEMI (NON-ST ELEVATED MYOCARDIAL INFARCTION) (HCC): ICD-10-CM

## 2023-01-01 DIAGNOSIS — R07.9 CHEST PAIN: ICD-10-CM

## 2023-01-01 DIAGNOSIS — R53.1 LEFT-SIDED WEAKNESS: ICD-10-CM

## 2023-01-01 LAB
2HR DELTA HS TROPONIN: -1 NG/L
2HR DELTA HS TROPONIN: 0 NG/L
4HR DELTA HS TROPONIN: 1 NG/L
ABO GROUP BLD BPU: NORMAL
ABO GROUP BLD: NORMAL
ABO GROUP BLD: NORMAL
ALBUMIN SERPL BCP-MCNC: 2.8 G/DL (ref 3.5–5)
ALBUMIN SERPL BCP-MCNC: 2.9 G/DL (ref 3.5–5)
ALBUMIN SERPL BCP-MCNC: 3.4 G/DL (ref 3.5–5)
ALP SERPL-CCNC: 199 U/L (ref 34–104)
ALP SERPL-CCNC: 221 U/L (ref 34–104)
ALP SERPL-CCNC: 254 U/L (ref 34–104)
ALT SERPL W P-5'-P-CCNC: 11 U/L (ref 7–52)
ALT SERPL W P-5'-P-CCNC: 11 U/L (ref 7–52)
ALT SERPL W P-5'-P-CCNC: 9 U/L (ref 7–52)
ANION GAP SERPL CALCULATED.3IONS-SCNC: 5 MMOL/L (ref 4–13)
ANION GAP SERPL CALCULATED.3IONS-SCNC: 7 MMOL/L (ref 4–13)
ANION GAP SERPL CALCULATED.3IONS-SCNC: 7 MMOL/L (ref 4–13)
ANION GAP SERPL CALCULATED.3IONS-SCNC: 8 MMOL/L (ref 4–13)
ANION GAP SERPL CALCULATED.3IONS-SCNC: 9 MMOL/L (ref 4–13)
AORTIC ROOT: 2.7 CM
APICAL FOUR CHAMBER EJECTION FRACTION: 71 %
APTT PPP: 24 SECONDS (ref 23–37)
APTT PPP: 26 SECONDS (ref 23–37)
APTT PPP: 34 SECONDS (ref 23–37)
APTT PPP: 55 SECONDS (ref 23–37)
APTT PPP: 64 SECONDS (ref 23–37)
APTT PPP: 68 SECONDS (ref 23–37)
APTT PPP: 79 SECONDS (ref 23–37)
AST SERPL W P-5'-P-CCNC: 17 U/L (ref 13–39)
AST SERPL W P-5'-P-CCNC: 23 U/L (ref 13–39)
AST SERPL W P-5'-P-CCNC: 24 U/L (ref 13–39)
ATRIAL RATE: 101 BPM
ATRIAL RATE: 103 BPM
ATRIAL RATE: 103 BPM
ATRIAL RATE: 84 BPM
ATRIAL RATE: 89 BPM
ATRIAL RATE: 89 BPM
ATRIAL RATE: 94 BPM
ATRIAL RATE: 96 BPM
ATRIAL RATE: 97 BPM
ATRIAL RATE: 97 BPM
ATRIAL RATE: 99 BPM
BACTERIA UR CULT: ABNORMAL
BACTERIA UR QL AUTO: ABNORMAL /HPF
BASOPHILS # BLD AUTO: 0.03 THOUSANDS/ÂΜL (ref 0–0.1)
BASOPHILS # BLD AUTO: 0.04 THOUSANDS/ÂΜL (ref 0–0.1)
BASOPHILS NFR BLD AUTO: 1 % (ref 0–1)
BASOPHILS NFR BLD AUTO: 1 % (ref 0–1)
BILIRUB SERPL-MCNC: 0.34 MG/DL (ref 0.2–1)
BILIRUB SERPL-MCNC: 0.35 MG/DL (ref 0.2–1)
BILIRUB SERPL-MCNC: 0.36 MG/DL (ref 0.2–1)
BILIRUB UR QL STRIP: NEGATIVE
BLD GP AB SCN SERPL QL: NEGATIVE
BPU ID: NORMAL
BUN SERPL-MCNC: 23 MG/DL (ref 5–25)
BUN SERPL-MCNC: 29 MG/DL (ref 5–25)
BUN SERPL-MCNC: 37 MG/DL (ref 5–25)
CALCIUM ALBUM COR SERPL-MCNC: 8.9 MG/DL (ref 8.3–10.1)
CALCIUM ALBUM COR SERPL-MCNC: 9.2 MG/DL (ref 8.3–10.1)
CALCIUM ALBUM COR SERPL-MCNC: 9.2 MG/DL (ref 8.3–10.1)
CALCIUM SERPL-MCNC: 7.7 MG/DL (ref 8.4–10.2)
CALCIUM SERPL-MCNC: 7.9 MG/DL (ref 8.4–10.2)
CALCIUM SERPL-MCNC: 8 MG/DL (ref 8.4–10.2)
CALCIUM SERPL-MCNC: 8.2 MG/DL (ref 8.4–10.2)
CALCIUM SERPL-MCNC: 8.7 MG/DL (ref 8.4–10.2)
CARDIAC TROPONIN I PNL SERPL HS: 1307 NG/L (ref 8–18)
CARDIAC TROPONIN I PNL SERPL HS: 15 NG/L
CARDIAC TROPONIN I PNL SERPL HS: 15 NG/L
CARDIAC TROPONIN I PNL SERPL HS: 17 NG/L
CARDIAC TROPONIN I PNL SERPL HS: 18 NG/L
CARDIAC TROPONIN I PNL SERPL HS: 19 NG/L
CARDIAC TROPONIN I PNL SERPL HS: 2268 NG/L (ref 8–18)
CARDIAC TROPONIN I PNL SERPL HS: 26 NG/L (ref 8–18)
CARDIAC TROPONIN I PNL SERPL HS: 543 NG/L (ref 8–18)
CARDIAC TROPONIN I PNL SERPL HS: 898 NG/L (ref 8–18)
CHLORIDE SERPL-SCNC: 108 MMOL/L (ref 96–108)
CHLORIDE SERPL-SCNC: 111 MMOL/L (ref 96–108)
CHOLEST SERPL-MCNC: 114 MG/DL
CLARITY UR: ABNORMAL
CO2 SERPL-SCNC: 20 MMOL/L (ref 21–32)
CO2 SERPL-SCNC: 20 MMOL/L (ref 21–32)
CO2 SERPL-SCNC: 21 MMOL/L (ref 21–32)
CO2 SERPL-SCNC: 21 MMOL/L (ref 21–32)
CO2 SERPL-SCNC: 22 MMOL/L (ref 21–32)
COLOR UR: ABNORMAL
CREAT SERPL-MCNC: 1.02 MG/DL (ref 0.6–1.3)
CREAT SERPL-MCNC: 1.11 MG/DL (ref 0.6–1.3)
CREAT SERPL-MCNC: 1.2 MG/DL (ref 0.6–1.3)
CREAT SERPL-MCNC: 1.31 MG/DL (ref 0.6–1.3)
CREAT SERPL-MCNC: 1.54 MG/DL (ref 0.6–1.3)
CROSSMATCH: NORMAL
E WAVE DECELERATION TIME: 273 MS
EOSINOPHIL # BLD AUTO: 0.18 THOUSAND/ÂΜL (ref 0–0.61)
EOSINOPHIL # BLD AUTO: 0.22 THOUSAND/ÂΜL (ref 0–0.61)
EOSINOPHIL NFR BLD AUTO: 4 % (ref 0–6)
EOSINOPHIL NFR BLD AUTO: 4 % (ref 0–6)
ERYTHROCYTE [DISTWIDTH] IN BLOOD BY AUTOMATED COUNT: 14.2 % (ref 11.6–15.1)
ERYTHROCYTE [DISTWIDTH] IN BLOOD BY AUTOMATED COUNT: 14.2 % (ref 11.6–15.1)
ERYTHROCYTE [DISTWIDTH] IN BLOOD BY AUTOMATED COUNT: 14.4 % (ref 11.6–15.1)
ERYTHROCYTE [DISTWIDTH] IN BLOOD BY AUTOMATED COUNT: 14.4 % (ref 11.6–15.1)
ERYTHROCYTE [DISTWIDTH] IN BLOOD BY AUTOMATED COUNT: 14.6 % (ref 11.6–15.1)
EST. AVERAGE GLUCOSE BLD GHB EST-MCNC: 105 MG/DL
FERRITIN SERPL-MCNC: 73 NG/ML (ref 8–388)
FLUAV RNA RESP QL NAA+PROBE: NEGATIVE
FLUBV RNA RESP QL NAA+PROBE: NEGATIVE
FRACTIONAL SHORTENING: 25 % (ref 28–44)
GFR SERPL CREATININE-BSD FRML MDRD: 31 ML/MIN/1.73SQ M
GFR SERPL CREATININE-BSD FRML MDRD: 38 ML/MIN/1.73SQ M
GFR SERPL CREATININE-BSD FRML MDRD: 43 ML/MIN/1.73SQ M
GFR SERPL CREATININE-BSD FRML MDRD: 47 ML/MIN/1.73SQ M
GFR SERPL CREATININE-BSD FRML MDRD: 52 ML/MIN/1.73SQ M
GLUCOSE P FAST SERPL-MCNC: 109 MG/DL (ref 65–99)
GLUCOSE SERPL-MCNC: 103 MG/DL (ref 65–140)
GLUCOSE SERPL-MCNC: 103 MG/DL (ref 65–140)
GLUCOSE SERPL-MCNC: 109 MG/DL (ref 65–140)
GLUCOSE SERPL-MCNC: 125 MG/DL (ref 65–140)
GLUCOSE SERPL-MCNC: 204 MG/DL (ref 65–140)
GLUCOSE SERPL-MCNC: 93 MG/DL (ref 65–140)
GLUCOSE SERPL-MCNC: 96 MG/DL (ref 65–140)
GLUCOSE UR STRIP-MCNC: NEGATIVE MG/DL
HBA1C MFR BLD: 5.3 %
HCT VFR BLD AUTO: 22.8 % (ref 34.8–46.1)
HCT VFR BLD AUTO: 23.1 % (ref 34.8–46.1)
HCT VFR BLD AUTO: 24.5 % (ref 34.8–46.1)
HCT VFR BLD AUTO: 25.3 % (ref 34.8–46.1)
HCT VFR BLD AUTO: 25.8 % (ref 34.8–46.1)
HCT VFR BLD AUTO: 26.4 % (ref 34.8–46.1)
HCT VFR BLD AUTO: 28.8 % (ref 34.8–46.1)
HCT VFR BLD AUTO: 32.3 % (ref 34.8–46.1)
HDLC SERPL-MCNC: 36 MG/DL
HGB BLD-MCNC: 6.9 G/DL (ref 11.5–15.4)
HGB BLD-MCNC: 6.9 G/DL (ref 11.5–15.4)
HGB BLD-MCNC: 7.3 G/DL (ref 11.5–15.4)
HGB BLD-MCNC: 7.5 G/DL (ref 11.5–15.4)
HGB BLD-MCNC: 7.5 G/DL (ref 11.5–15.4)
HGB BLD-MCNC: 8 G/DL (ref 11.5–15.4)
HGB BLD-MCNC: 8.4 G/DL (ref 11.5–15.4)
HGB BLD-MCNC: 9.7 G/DL (ref 11.5–15.4)
HGB UR QL STRIP.AUTO: ABNORMAL
IMM GRANULOCYTES # BLD AUTO: 0.03 THOUSAND/UL (ref 0–0.2)
IMM GRANULOCYTES # BLD AUTO: 0.03 THOUSAND/UL (ref 0–0.2)
IMM GRANULOCYTES NFR BLD AUTO: 1 % (ref 0–2)
IMM GRANULOCYTES NFR BLD AUTO: 1 % (ref 0–2)
INR PPP: 1.01 (ref 0.84–1.19)
INR PPP: 1.06 (ref 0.84–1.19)
INTERVENTRICULAR SEPTUM IN DIASTOLE (PARASTERNAL SHORT AXIS VIEW): 1.4 CM
INTERVENTRICULAR SEPTUM: 1.4 CM (ref 0.6–1.1)
IRON SATN MFR SERPL: 17 % (ref 15–50)
IRON SERPL-MCNC: 33 UG/DL (ref 50–170)
KETONES UR STRIP-MCNC: NEGATIVE MG/DL
LAAS-AP2: 23.9 CM2
LAAS-AP4: 19.5 CM2
LDLC SERPL CALC-MCNC: 37 MG/DL (ref 0–100)
LEFT ATRIUM SIZE: 4 CM
LEFT INTERNAL DIMENSION IN SYSTOLE: 2.4 CM (ref 2.1–4)
LEFT VENTRICULAR INTERNAL DIMENSION IN DIASTOLE: 3.2 CM (ref 3.5–6)
LEFT VENTRICULAR POSTERIOR WALL IN END DIASTOLE: 1.5 CM
LEFT VENTRICULAR STROKE VOLUME: 20 ML
LEUKOCYTE ESTERASE UR QL STRIP: ABNORMAL
LVSV (TEICH): 20 ML
LYMPHOCYTES # BLD AUTO: 0.82 THOUSANDS/ÂΜL (ref 0.6–4.47)
LYMPHOCYTES # BLD AUTO: 0.97 THOUSANDS/ÂΜL (ref 0.6–4.47)
LYMPHOCYTES NFR BLD AUTO: 18 % (ref 14–44)
LYMPHOCYTES NFR BLD AUTO: 20 % (ref 14–44)
MAGNESIUM SERPL-MCNC: 1.9 MG/DL (ref 1.9–2.7)
MAGNESIUM SERPL-MCNC: 1.9 MG/DL (ref 1.9–2.7)
MAGNESIUM SERPL-MCNC: 2.3 MG/DL (ref 1.9–2.7)
MCH RBC QN AUTO: 27.4 PG (ref 26.8–34.3)
MCH RBC QN AUTO: 27.4 PG (ref 26.8–34.3)
MCH RBC QN AUTO: 27.8 PG (ref 26.8–34.3)
MCH RBC QN AUTO: 28 PG (ref 26.8–34.3)
MCH RBC QN AUTO: 28.6 PG (ref 26.8–34.3)
MCHC RBC AUTO-ENTMCNC: 29.1 G/DL (ref 31.4–37.4)
MCHC RBC AUTO-ENTMCNC: 29.2 G/DL (ref 31.4–37.4)
MCHC RBC AUTO-ENTMCNC: 30 G/DL (ref 31.4–37.4)
MCHC RBC AUTO-ENTMCNC: 30.3 G/DL (ref 31.4–37.4)
MCHC RBC AUTO-ENTMCNC: 30.3 G/DL (ref 31.4–37.4)
MCV RBC AUTO: 92 FL (ref 82–98)
MCV RBC AUTO: 93 FL (ref 82–98)
MCV RBC AUTO: 94 FL (ref 82–98)
MCV RBC AUTO: 94 FL (ref 82–98)
MCV RBC AUTO: 95 FL (ref 82–98)
MONOCYTES # BLD AUTO: 0.4 THOUSAND/ÂΜL (ref 0.17–1.22)
MONOCYTES # BLD AUTO: 0.52 THOUSAND/ÂΜL (ref 0.17–1.22)
MONOCYTES NFR BLD AUTO: 12 % (ref 4–12)
MONOCYTES NFR BLD AUTO: 8 % (ref 4–12)
MV E'TISSUE VEL-SEP: 5 CM/S
MV PEAK A VEL: 1.53 M/S
MV PEAK E VEL: 100 CM/S
MV STENOSIS PRESSURE HALF TIME: 79 MS
MV VALVE AREA P 1/2 METHOD: 2.78 CM2
NEUTROPHILS # BLD AUTO: 2.6 THOUSANDS/ÂΜL (ref 1.85–7.62)
NEUTROPHILS # BLD AUTO: 3.63 THOUSANDS/ÂΜL (ref 1.85–7.62)
NEUTS SEG NFR BLD AUTO: 62 % (ref 43–75)
NEUTS SEG NFR BLD AUTO: 68 % (ref 43–75)
NITRITE UR QL STRIP: POSITIVE
NON-SQ EPI CELLS URNS QL MICRO: ABNORMAL /HPF
NRBC BLD AUTO-RTO: 0 /100 WBCS
NRBC BLD AUTO-RTO: 0 /100 WBCS
OTHER STN SPEC: ABNORMAL
P AXIS: -8 DEGREES
P AXIS: 13 DEGREES
P AXIS: 23 DEGREES
P AXIS: 23 DEGREES
P AXIS: 40 DEGREES
P AXIS: 53 DEGREES
P AXIS: 54 DEGREES
P AXIS: 6 DEGREES
P AXIS: 60 DEGREES
P AXIS: 61 DEGREES
P AXIS: 63 DEGREES
PH UR STRIP.AUTO: 6.5 [PH]
PLATELET # BLD AUTO: 209 THOUSANDS/UL (ref 149–390)
PLATELET # BLD AUTO: 218 THOUSANDS/UL (ref 149–390)
PLATELET # BLD AUTO: 221 THOUSANDS/UL (ref 149–390)
PLATELET # BLD AUTO: 231 THOUSANDS/UL (ref 149–390)
PLATELET # BLD AUTO: 301 THOUSANDS/UL (ref 149–390)
PMV BLD AUTO: 10 FL (ref 8.9–12.7)
PMV BLD AUTO: 10.8 FL (ref 8.9–12.7)
PMV BLD AUTO: 9.3 FL (ref 8.9–12.7)
PMV BLD AUTO: 9.5 FL (ref 8.9–12.7)
PMV BLD AUTO: 9.7 FL (ref 8.9–12.7)
POTASSIUM SERPL-SCNC: 4.5 MMOL/L (ref 3.5–5.3)
POTASSIUM SERPL-SCNC: 4.7 MMOL/L (ref 3.5–5.3)
POTASSIUM SERPL-SCNC: 4.7 MMOL/L (ref 3.5–5.3)
POTASSIUM SERPL-SCNC: 4.9 MMOL/L (ref 3.5–5.3)
POTASSIUM SERPL-SCNC: 5 MMOL/L (ref 3.5–5.3)
PR INTERVAL: 154 MS
PR INTERVAL: 158 MS
PR INTERVAL: 160 MS
PR INTERVAL: 166 MS
PR INTERVAL: 168 MS
PR INTERVAL: 168 MS
PR INTERVAL: 182 MS
PROT SERPL-MCNC: 5.8 G/DL (ref 6.4–8.4)
PROT SERPL-MCNC: 6 G/DL (ref 6.4–8.4)
PROT SERPL-MCNC: 7.1 G/DL (ref 6.4–8.4)
PROT UR STRIP-MCNC: NEGATIVE MG/DL
PROTHROMBIN TIME: 13.4 SECONDS (ref 11.6–14.5)
PROTHROMBIN TIME: 13.9 SECONDS (ref 11.6–14.5)
QRS AXIS: -17 DEGREES
QRS AXIS: -21 DEGREES
QRS AXIS: -22 DEGREES
QRS AXIS: -31 DEGREES
QRS AXIS: -32 DEGREES
QRS AXIS: -32 DEGREES
QRS AXIS: -33 DEGREES
QRS AXIS: -35 DEGREES
QRS AXIS: -41 DEGREES
QRSD INTERVAL: 82 MS
QRSD INTERVAL: 88 MS
QRSD INTERVAL: 90 MS
QRSD INTERVAL: 90 MS
QRSD INTERVAL: 94 MS
QRSD INTERVAL: 96 MS
QT INTERVAL: 350 MS
QT INTERVAL: 352 MS
QT INTERVAL: 360 MS
QT INTERVAL: 368 MS
QT INTERVAL: 372 MS
QT INTERVAL: 374 MS
QT INTERVAL: 374 MS
QT INTERVAL: 384 MS
QT INTERVAL: 390 MS
QT INTERVAL: 396 MS
QT INTERVAL: 398 MS
QTC INTERVAL: 457 MS
QTC INTERVAL: 458 MS
QTC INTERVAL: 460 MS
QTC INTERVAL: 461 MS
QTC INTERVAL: 472 MS
QTC INTERVAL: 480 MS
QTC INTERVAL: 481 MS
QTC INTERVAL: 484 MS
QTC INTERVAL: 484 MS
RA PRESSURE ESTIMATED: 8 MMHG
RBC # BLD AUTO: 2.46 MILLION/UL (ref 3.81–5.12)
RBC # BLD AUTO: 2.74 MILLION/UL (ref 3.81–5.12)
RBC # BLD AUTO: 2.88 MILLION/UL (ref 3.81–5.12)
RBC # BLD AUTO: 3.07 MILLION/UL (ref 3.81–5.12)
RBC # BLD AUTO: 3.39 MILLION/UL (ref 3.81–5.12)
RBC #/AREA URNS AUTO: ABNORMAL /HPF
RH BLD: POSITIVE
RH BLD: POSITIVE
RIGHT ATRIUM AREA SYSTOLE A4C: 10.9 CM2
RIGHT VENTRICLE ID DIMENSION: 2.5 CM
RSV RNA RESP QL NAA+PROBE: NEGATIVE
RV PSP: 39 MMHG
SARS-COV-2 RNA RESP QL NAA+PROBE: NEGATIVE
SL CV LEFT ATRIUM LENGTH A2C: 5.9 CM
SL CV LV EF: 55
SL CV PED ECHO LEFT VENTRICLE DIASTOLIC VOLUME (MOD BIPLANE) 2D: 39 ML
SL CV PED ECHO LEFT VENTRICLE SYSTOLIC VOLUME (MOD BIPLANE) 2D: 20 ML
SODIUM SERPL-SCNC: 137 MMOL/L (ref 135–147)
SODIUM SERPL-SCNC: 138 MMOL/L (ref 135–147)
SODIUM SERPL-SCNC: 139 MMOL/L (ref 135–147)
SP GR UR STRIP.AUTO: 1.01 (ref 1–1.03)
SPECIMEN EXPIRATION DATE: NORMAL
T WAVE AXIS: 108 DEGREES
T WAVE AXIS: 111 DEGREES
T WAVE AXIS: 113 DEGREES
T WAVE AXIS: 126 DEGREES
T WAVE AXIS: 132 DEGREES
T WAVE AXIS: 132 DEGREES
T WAVE AXIS: 133 DEGREES
T WAVE AXIS: 51 DEGREES
T WAVE AXIS: 85 DEGREES
T WAVE AXIS: 95 DEGREES
T WAVE AXIS: 97 DEGREES
T4 FREE SERPL-MCNC: 0.79 NG/DL (ref 0.76–1.46)
TIBC SERPL-MCNC: 194 UG/DL (ref 250–450)
TR MAX PG: 31 MMHG
TR PEAK VELOCITY: 2.8 M/S
TRICUSPID ANNULAR PLANE SYSTOLIC EXCURSION: 1.8 CM
TRICUSPID VALVE PEAK REGURGITATION VELOCITY: 2.77 M/S
TRIGL SERPL-MCNC: 203 MG/DL
TSH SERPL DL<=0.05 MIU/L-ACNC: 9.04 UIU/ML (ref 0.45–4.5)
UNIT DISPENSE STATUS: NORMAL
UNIT PRODUCT CODE: NORMAL
UNIT PRODUCT VOLUME: 350 ML
UNIT RH: NORMAL
UROBILINOGEN UR QL STRIP.AUTO: 0.2 E.U./DL
VENTRICULAR RATE: 101 BPM
VENTRICULAR RATE: 103 BPM
VENTRICULAR RATE: 103 BPM
VENTRICULAR RATE: 84 BPM
VENTRICULAR RATE: 89 BPM
VENTRICULAR RATE: 89 BPM
VENTRICULAR RATE: 94 BPM
VENTRICULAR RATE: 96 BPM
VENTRICULAR RATE: 97 BPM
VENTRICULAR RATE: 97 BPM
VENTRICULAR RATE: 99 BPM
WBC # BLD AUTO: 4.18 THOUSAND/UL (ref 4.31–10.16)
WBC # BLD AUTO: 4.44 THOUSAND/UL (ref 4.31–10.16)
WBC # BLD AUTO: 5.23 THOUSAND/UL (ref 4.31–10.16)
WBC # BLD AUTO: 5.29 THOUSAND/UL (ref 4.31–10.16)
WBC # BLD AUTO: 5.38 THOUSAND/UL (ref 4.31–10.16)
WBC #/AREA URNS AUTO: ABNORMAL /HPF

## 2023-01-01 RX ORDER — EPINEPHRINE IN SOD CHLOR,ISO 1 MG/10 ML
SYRINGE (ML) INTRAVENOUS CODE/TRAUMA/SEDATION MEDICATION
Status: COMPLETED | OUTPATIENT
Start: 2023-01-01 | End: 2023-01-01

## 2023-01-01 RX ORDER — POLYETHYLENE GLYCOL 3350 17 G/17G
17 POWDER, FOR SOLUTION ORAL DAILY PRN
Status: DISCONTINUED | OUTPATIENT
Start: 2023-01-01 | End: 2023-01-01 | Stop reason: HOSPADM

## 2023-01-01 RX ORDER — ESCITALOPRAM OXALATE 5 MG/1
5 TABLET ORAL DAILY
Status: DISCONTINUED | OUTPATIENT
Start: 2023-01-01 | End: 2023-01-01 | Stop reason: HOSPADM

## 2023-01-01 RX ORDER — HEPARIN SODIUM 10000 [USP'U]/100ML
3-20 INJECTION, SOLUTION INTRAVENOUS
Status: DISCONTINUED | OUTPATIENT
Start: 2023-01-01 | End: 2023-01-01

## 2023-01-01 RX ORDER — MELATONIN
1000 EVERY 24 HOURS
Status: DISCONTINUED | OUTPATIENT
Start: 2023-01-01 | End: 2023-01-01 | Stop reason: HOSPADM

## 2023-01-01 RX ORDER — CLOPIDOGREL BISULFATE 75 MG/1
75 TABLET ORAL DAILY
Status: DISCONTINUED | OUTPATIENT
Start: 2023-01-01 | End: 2023-01-01

## 2023-01-01 RX ORDER — OLANZAPINE 2.5 MG/1
2.5 TABLET ORAL EVERY 6 HOURS PRN
Status: DISCONTINUED | OUTPATIENT
Start: 2023-01-01 | End: 2023-01-01 | Stop reason: HOSPADM

## 2023-01-01 RX ORDER — HEPARIN SODIUM 5000 [USP'U]/ML
5000 INJECTION, SOLUTION INTRAVENOUS; SUBCUTANEOUS EVERY 8 HOURS SCHEDULED
Status: DISCONTINUED | OUTPATIENT
Start: 2023-01-01 | End: 2023-01-01 | Stop reason: HOSPADM

## 2023-01-01 RX ORDER — FERROUS SULFATE 325(65) MG
325 TABLET ORAL 2 TIMES DAILY WITH MEALS
Status: DISCONTINUED | OUTPATIENT
Start: 2023-01-01 | End: 2023-01-01

## 2023-01-01 RX ORDER — NITROGLYCERIN 0.4 MG/1
0.4 TABLET SUBLINGUAL ONCE
Status: COMPLETED | OUTPATIENT
Start: 2023-01-01 | End: 2023-01-01

## 2023-01-01 RX ORDER — CLOPIDOGREL BISULFATE 75 MG/1
75 TABLET ORAL DAILY
Status: DISCONTINUED | OUTPATIENT
Start: 2023-01-01 | End: 2023-01-01 | Stop reason: HOSPADM

## 2023-01-01 RX ORDER — CLOPIDOGREL BISULFATE 75 MG/1
300 TABLET ORAL ONCE
Status: DISCONTINUED | OUTPATIENT
Start: 2023-01-01 | End: 2023-01-01

## 2023-01-01 RX ORDER — HEPARIN SODIUM 5000 [USP'U]/ML
5000 INJECTION, SOLUTION INTRAVENOUS; SUBCUTANEOUS EVERY 8 HOURS SCHEDULED
Status: DISCONTINUED | OUTPATIENT
Start: 2023-01-01 | End: 2023-01-01

## 2023-01-01 RX ORDER — MIRTAZAPINE 15 MG/1
45 TABLET, FILM COATED ORAL
Status: DISCONTINUED | OUTPATIENT
Start: 2023-01-01 | End: 2023-01-01 | Stop reason: HOSPADM

## 2023-01-01 RX ORDER — ACETAMINOPHEN 325 MG/1
650 TABLET ORAL EVERY 6 HOURS PRN
Status: DISCONTINUED | OUTPATIENT
Start: 2023-01-01 | End: 2023-01-01 | Stop reason: HOSPADM

## 2023-01-01 RX ORDER — PANTOPRAZOLE SODIUM 40 MG/1
40 TABLET, DELAYED RELEASE ORAL
Status: DISCONTINUED | OUTPATIENT
Start: 2023-01-01 | End: 2023-01-01 | Stop reason: HOSPADM

## 2023-01-01 RX ORDER — MEMANTINE HYDROCHLORIDE 5 MG/1
5 TABLET ORAL DAILY
Status: DISCONTINUED | OUTPATIENT
Start: 2023-01-01 | End: 2023-01-01 | Stop reason: HOSPADM

## 2023-01-01 RX ORDER — CYCLOSPORINE 25 MG/1
50 CAPSULE, LIQUID FILLED ORAL 2 TIMES DAILY
Status: DISCONTINUED | OUTPATIENT
Start: 2023-01-01 | End: 2023-01-01 | Stop reason: HOSPADM

## 2023-01-01 RX ORDER — LORAZEPAM 2 MG/ML
1 INJECTION INTRAMUSCULAR ONCE
Status: COMPLETED | OUTPATIENT
Start: 2023-01-01 | End: 2023-01-01

## 2023-01-01 RX ORDER — ISOSORBIDE MONONITRATE 30 MG/1
30 TABLET, EXTENDED RELEASE ORAL DAILY
Status: DISCONTINUED | OUTPATIENT
Start: 2023-01-01 | End: 2023-01-01 | Stop reason: HOSPADM

## 2023-01-01 RX ORDER — LORAZEPAM 2 MG/ML
0.5 INJECTION INTRAMUSCULAR ONCE
Status: COMPLETED | OUTPATIENT
Start: 2023-01-01 | End: 2023-01-01

## 2023-01-01 RX ORDER — ATORVASTATIN CALCIUM 40 MG/1
40 TABLET, FILM COATED ORAL
Status: DISCONTINUED | OUTPATIENT
Start: 2023-01-01 | End: 2023-01-01 | Stop reason: HOSPADM

## 2023-01-01 RX ORDER — MYCOPHENOLATE MOFETIL 250 MG/1
750 CAPSULE ORAL EVERY 12 HOURS SCHEDULED
Status: DISCONTINUED | OUTPATIENT
Start: 2023-01-01 | End: 2023-01-01 | Stop reason: HOSPADM

## 2023-01-01 RX ORDER — NITROGLYCERIN 0.4 MG/1
0.4 TABLET SUBLINGUAL
Status: DISCONTINUED | OUTPATIENT
Start: 2023-01-01 | End: 2023-01-01 | Stop reason: HOSPADM

## 2023-01-01 RX ORDER — LEVOTHYROXINE SODIUM 112 UG/1
112 TABLET ORAL
Status: DISCONTINUED | OUTPATIENT
Start: 2023-01-01 | End: 2023-01-01 | Stop reason: HOSPADM

## 2023-01-01 RX ORDER — LIDOCAINE HYDROCHLORIDE 10 MG/ML
10 INJECTION, SOLUTION EPIDURAL; INFILTRATION; INTRACAUDAL; PERINEURAL ONCE
Status: COMPLETED | OUTPATIENT
Start: 2023-01-01 | End: 2023-01-01

## 2023-01-01 RX ORDER — HEPARIN SODIUM 1000 [USP'U]/ML
4000 INJECTION, SOLUTION INTRAVENOUS; SUBCUTANEOUS EVERY 6 HOURS PRN
Status: DISCONTINUED | OUTPATIENT
Start: 2023-01-01 | End: 2023-01-01

## 2023-01-01 RX ORDER — LANOLIN ALCOHOL/MO/W.PET/CERES
3 CREAM (GRAM) TOPICAL
Status: DISCONTINUED | OUTPATIENT
Start: 2023-01-01 | End: 2023-01-01 | Stop reason: HOSPADM

## 2023-01-01 RX ORDER — HEPARIN SODIUM 1000 [USP'U]/ML
2000 INJECTION, SOLUTION INTRAVENOUS; SUBCUTANEOUS EVERY 6 HOURS PRN
Status: DISCONTINUED | OUTPATIENT
Start: 2023-01-01 | End: 2023-01-01

## 2023-01-01 RX ORDER — EZETIMIBE 10 MG/1
10 TABLET ORAL DAILY
Status: DISCONTINUED | OUTPATIENT
Start: 2023-01-01 | End: 2023-01-01 | Stop reason: HOSPADM

## 2023-01-01 RX ORDER — MAGNESIUM SULFATE 1 G/100ML
1 INJECTION INTRAVENOUS ONCE
Status: COMPLETED | OUTPATIENT
Start: 2023-01-01 | End: 2023-01-01

## 2023-01-01 RX ORDER — HEPARIN SODIUM 1000 [USP'U]/ML
4000 INJECTION, SOLUTION INTRAVENOUS; SUBCUTANEOUS ONCE
Status: COMPLETED | OUTPATIENT
Start: 2023-01-01 | End: 2023-01-01

## 2023-01-01 RX ORDER — SODIUM CHLORIDE 9 MG/ML
50 INJECTION, SOLUTION INTRAVENOUS CONTINUOUS
Status: DISCONTINUED | OUTPATIENT
Start: 2023-01-01 | End: 2023-01-01

## 2023-01-01 RX ORDER — ASCORBIC ACID, THIAMINE MONONITRATE,RIBOFLAVIN, NIACINAMIDE, PYRIDOXINE HYDROCHLORIDE, FOLIC ACID, CYANOCOBALAMIN, BIOTIN, CALCIUM PANTOTHENATE, 100; 1.5; 1.7; 20; 10; 1; 6000; 150000; 5 MG/1; MG/1; MG/1; MG/1; MG/1; MG/1; UG/1; UG/1; MG/1
1 CAPSULE, LIQUID FILLED ORAL DAILY
Status: DISCONTINUED | OUTPATIENT
Start: 2023-01-01 | End: 2023-01-01 | Stop reason: HOSPADM

## 2023-01-01 RX ADMIN — FERROUS SULFATE TAB 325 MG (65 MG ELEMENTAL FE) 325 MG: 325 (65 FE) TAB at 16:06

## 2023-01-01 RX ADMIN — Medication 3 MG: at 21:04

## 2023-01-01 RX ADMIN — MYCOPHENOLATE MOFETIL 750 MG: 250 CAPSULE ORAL at 20:05

## 2023-01-01 RX ADMIN — MYCOPHENOLATE MOFETIL 750 MG: 250 CAPSULE ORAL at 09:25

## 2023-01-01 RX ADMIN — EZETIMIBE 10 MG: 10 TABLET ORAL at 14:02

## 2023-01-01 RX ADMIN — SODIUM CHLORIDE 1000 ML: 0.9 INJECTION, SOLUTION INTRAVENOUS at 02:20

## 2023-01-01 RX ADMIN — Medication 1 CAPSULE: at 14:02

## 2023-01-01 RX ADMIN — ERTAPENEM SODIUM 1000 MG: 1 INJECTION, POWDER, LYOPHILIZED, FOR SOLUTION INTRAMUSCULAR; INTRAVENOUS at 21:33

## 2023-01-01 RX ADMIN — Medication 1000 UNITS: at 16:06

## 2023-01-01 RX ADMIN — CLOPIDOGREL BISULFATE 75 MG: 75 TABLET ORAL at 17:28

## 2023-01-01 RX ADMIN — NITROGLYCERIN 0.4 MG: 0.4 TABLET SUBLINGUAL at 00:55

## 2023-01-01 RX ADMIN — HEPARIN SODIUM 2000 UNITS: 1000 INJECTION INTRAVENOUS; SUBCUTANEOUS at 00:15

## 2023-01-01 RX ADMIN — OLANZAPINE 2.5 MG: 2.5 TABLET, FILM COATED ORAL at 20:12

## 2023-01-01 RX ADMIN — CYCLOSPORINE 50 MG: 25 CAPSULE ORAL at 20:05

## 2023-01-01 RX ADMIN — ISOSORBIDE MONONITRATE 30 MG: 30 TABLET, EXTENDED RELEASE ORAL at 13:58

## 2023-01-01 RX ADMIN — NITROGLYCERIN 0.4 MG: 0.4 TABLET SUBLINGUAL at 00:14

## 2023-01-01 RX ADMIN — FERROUS SULFATE TAB 325 MG (65 MG ELEMENTAL FE) 325 MG: 325 (65 FE) TAB at 17:49

## 2023-01-01 RX ADMIN — PANTOPRAZOLE SODIUM 40 MG: 40 TABLET, DELAYED RELEASE ORAL at 05:21

## 2023-01-01 RX ADMIN — HEPARIN SODIUM 5000 UNITS: 5000 INJECTION INTRAVENOUS; SUBCUTANEOUS at 17:28

## 2023-01-01 RX ADMIN — MIRTAZAPINE 45 MG: 15 TABLET, FILM COATED ORAL at 21:05

## 2023-01-01 RX ADMIN — MIRTAZAPINE 45 MG: 15 TABLET, FILM COATED ORAL at 21:20

## 2023-01-01 RX ADMIN — HEPARIN SODIUM 4000 UNITS: 1000 INJECTION INTRAVENOUS; SUBCUTANEOUS at 17:28

## 2023-01-01 RX ADMIN — Medication 3 MG: at 21:05

## 2023-01-01 RX ADMIN — OLANZAPINE 2.5 MG: 2.5 TABLET, FILM COATED ORAL at 22:33

## 2023-01-01 RX ADMIN — EZETIMIBE 10 MG: 10 TABLET ORAL at 14:10

## 2023-01-01 RX ADMIN — LEVOTHYROXINE SODIUM 112 MCG: 112 TABLET ORAL at 05:10

## 2023-01-01 RX ADMIN — CLOPIDOGREL BISULFATE 75 MG: 75 TABLET ORAL at 09:25

## 2023-01-01 RX ADMIN — ASPIRIN 81 MG: 81 TABLET, COATED ORAL at 14:10

## 2023-01-01 RX ADMIN — HEPARIN SODIUM 19.8 UNITS/KG/HR: 10000 INJECTION, SOLUTION INTRAVENOUS at 19:33

## 2023-01-01 RX ADMIN — LEVOTHYROXINE SODIUM 112 MCG: 112 TABLET ORAL at 06:09

## 2023-01-01 RX ADMIN — CYCLOSPORINE 50 MG: 25 CAPSULE ORAL at 17:53

## 2023-01-01 RX ADMIN — Medication 1 CAPSULE: at 09:25

## 2023-01-01 RX ADMIN — IRON SUCROSE 300 MG: 20 INJECTION, SOLUTION INTRAVENOUS at 16:46

## 2023-01-01 RX ADMIN — HEPARIN SODIUM 4000 UNITS: 1000 INJECTION INTRAVENOUS; SUBCUTANEOUS at 09:29

## 2023-01-01 RX ADMIN — NITROGLYCERIN 1 INCH: 20 OINTMENT TOPICAL at 01:24

## 2023-01-01 RX ADMIN — HEPARIN SODIUM 21.8 UNITS/KG/HR: 10000 INJECTION, SOLUTION INTRAVENOUS at 09:09

## 2023-01-01 RX ADMIN — METOPROLOL TARTRATE 25 MG: 25 TABLET, FILM COATED ORAL at 20:46

## 2023-01-01 RX ADMIN — CYCLOSPORINE 50 MG: 25 CAPSULE ORAL at 17:16

## 2023-01-01 RX ADMIN — EZETIMIBE 10 MG: 10 TABLET ORAL at 09:25

## 2023-01-01 RX ADMIN — FERROUS SULFATE TAB 325 MG (65 MG ELEMENTAL FE) 325 MG: 325 (65 FE) TAB at 09:31

## 2023-01-01 RX ADMIN — HEPARIN SODIUM 5000 UNITS: 5000 INJECTION INTRAVENOUS; SUBCUTANEOUS at 21:18

## 2023-01-01 RX ADMIN — LIDOCAINE HYDROCHLORIDE 10 ML: 10 INJECTION, SOLUTION EPIDURAL; INFILTRATION; INTRACAUDAL; PERINEURAL at 01:24

## 2023-01-01 RX ADMIN — LORAZEPAM 1 MG: 2 INJECTION INTRAMUSCULAR; INTRAVENOUS at 09:25

## 2023-01-01 RX ADMIN — LORAZEPAM 0.5 MG: 2 INJECTION INTRAMUSCULAR; INTRAVENOUS at 02:08

## 2023-01-01 RX ADMIN — PANTOPRAZOLE SODIUM 40 MG: 40 TABLET, DELAYED RELEASE ORAL at 06:09

## 2023-01-01 RX ADMIN — MEMANTINE 5 MG: 5 TABLET ORAL at 14:10

## 2023-01-01 RX ADMIN — CYCLOSPORINE 50 MG: 25 CAPSULE ORAL at 17:30

## 2023-01-01 RX ADMIN — EPINEPHRINE 1 MG: 0.1 INJECTION INTRACARDIAC; INTRAVENOUS at 02:25

## 2023-01-01 RX ADMIN — ISOSORBIDE MONONITRATE 30 MG: 30 TABLET, EXTENDED RELEASE ORAL at 14:10

## 2023-01-01 RX ADMIN — HEPARIN SODIUM 21.8 UNITS/KG/HR: 10000 INJECTION, SOLUTION INTRAVENOUS at 22:13

## 2023-01-01 RX ADMIN — MIRTAZAPINE 45 MG: 15 TABLET, FILM COATED ORAL at 21:18

## 2023-01-01 RX ADMIN — ERTAPENEM SODIUM 1000 MG: 1 INJECTION, POWDER, LYOPHILIZED, FOR SOLUTION INTRAMUSCULAR; INTRAVENOUS at 20:51

## 2023-01-01 RX ADMIN — NITROGLYCERIN 0.4 MG: 0.4 TABLET SUBLINGUAL at 19:30

## 2023-01-01 RX ADMIN — PANTOPRAZOLE SODIUM 40 MG: 40 TABLET, DELAYED RELEASE ORAL at 05:10

## 2023-01-01 RX ADMIN — Medication 3 MG: at 21:19

## 2023-01-01 RX ADMIN — OLANZAPINE 2.5 MG: 2.5 TABLET, FILM COATED ORAL at 14:11

## 2023-01-01 RX ADMIN — NITROGLYCERIN 0.4 MG: 0.4 TABLET SUBLINGUAL at 14:30

## 2023-01-01 RX ADMIN — ATORVASTATIN CALCIUM 40 MG: 40 TABLET, FILM COATED ORAL at 17:49

## 2023-01-01 RX ADMIN — NITROGLYCERIN 0.4 MG: 0.4 TABLET SUBLINGUAL at 23:37

## 2023-01-01 RX ADMIN — Medication 1 CAPSULE: at 14:10

## 2023-01-01 RX ADMIN — METOPROLOL TARTRATE 25 MG: 25 TABLET, FILM COATED ORAL at 20:12

## 2023-01-01 RX ADMIN — ESCITALOPRAM 5 MG: 5 TABLET, FILM COATED ORAL at 09:25

## 2023-01-01 RX ADMIN — CYCLOSPORINE 50 MG: 25 CAPSULE ORAL at 09:30

## 2023-01-01 RX ADMIN — Medication 1000 UNITS: at 17:28

## 2023-01-01 RX ADMIN — HEPARIN SODIUM 4000 UNITS: 1000 INJECTION INTRAVENOUS; SUBCUTANEOUS at 01:53

## 2023-01-01 RX ADMIN — CYCLOSPORINE 50 MG: 25 CAPSULE ORAL at 14:01

## 2023-01-01 RX ADMIN — MEMANTINE 5 MG: 5 TABLET ORAL at 14:03

## 2023-01-01 RX ADMIN — ACETAMINOPHEN 650 MG: 325 TABLET, FILM COATED ORAL at 17:50

## 2023-01-01 RX ADMIN — OLANZAPINE 2.5 MG: 2.5 TABLET, FILM COATED ORAL at 20:44

## 2023-01-01 RX ADMIN — SODIUM CHLORIDE 500 ML: 0.9 INJECTION, SOLUTION INTRAVENOUS at 12:25

## 2023-01-01 RX ADMIN — FERROUS SULFATE TAB 325 MG (65 MG ELEMENTAL FE) 325 MG: 325 (65 FE) TAB at 17:28

## 2023-01-01 RX ADMIN — LORAZEPAM 0.5 MG: 2 INJECTION INTRAMUSCULAR; INTRAVENOUS at 00:31

## 2023-01-01 RX ADMIN — OLANZAPINE 2.5 MG: 2.5 TABLET, FILM COATED ORAL at 20:05

## 2023-01-01 RX ADMIN — MYCOPHENOLATE MOFETIL 750 MG: 250 CAPSULE ORAL at 20:44

## 2023-01-01 RX ADMIN — MYCOPHENOLATE MOFETIL 750 MG: 250 CAPSULE ORAL at 20:12

## 2023-01-01 RX ADMIN — ERTAPENEM SODIUM 1000 MG: 1 INJECTION, POWDER, LYOPHILIZED, FOR SOLUTION INTRAMUSCULAR; INTRAVENOUS at 20:31

## 2023-01-01 RX ADMIN — ATORVASTATIN CALCIUM 40 MG: 40 TABLET, FILM COATED ORAL at 16:51

## 2023-01-01 RX ADMIN — MEMANTINE 5 MG: 5 TABLET ORAL at 09:25

## 2023-01-01 RX ADMIN — Medication 1000 UNITS: at 17:49

## 2023-01-01 RX ADMIN — HEPARIN SODIUM 11.8 UNITS/KG/HR: 10000 INJECTION, SOLUTION INTRAVENOUS at 01:53

## 2023-01-01 RX ADMIN — NITROGLYCERIN 0.4 MG: 0.4 TABLET SUBLINGUAL at 18:30

## 2023-01-01 RX ADMIN — ASPIRIN 81 MG: 81 TABLET, COATED ORAL at 17:28

## 2023-01-01 RX ADMIN — ESCITALOPRAM 5 MG: 5 TABLET, FILM COATED ORAL at 14:02

## 2023-01-01 RX ADMIN — ASPIRIN 81 MG: 81 TABLET, COATED ORAL at 14:03

## 2023-01-01 RX ADMIN — MYCOPHENOLATE MOFETIL 750 MG: 250 CAPSULE ORAL at 14:10

## 2023-01-01 RX ADMIN — MYCOPHENOLATE MOFETIL 750 MG: 250 CAPSULE ORAL at 21:20

## 2023-01-01 RX ADMIN — ATORVASTATIN CALCIUM 40 MG: 40 TABLET, FILM COATED ORAL at 17:28

## 2023-01-01 RX ADMIN — METOPROLOL TARTRATE 25 MG: 25 TABLET, FILM COATED ORAL at 14:01

## 2023-01-01 RX ADMIN — Medication 3 MG: at 21:18

## 2023-01-01 RX ADMIN — Medication 1000 UNITS: at 16:51

## 2023-01-01 RX ADMIN — EPINEPHRINE 1 MG: 0.1 INJECTION INTRACARDIAC; INTRAVENOUS at 02:30

## 2023-01-01 RX ADMIN — IOHEXOL 85 ML: 350 INJECTION, SOLUTION INTRAVENOUS at 12:17

## 2023-01-01 RX ADMIN — MAGNESIUM SULFATE HEPTAHYDRATE 1 G: 1 INJECTION, SOLUTION INTRAVENOUS at 07:27

## 2023-01-01 RX ADMIN — SODIUM CHLORIDE 50 ML/HR: 0.9 INJECTION, SOLUTION INTRAVENOUS at 01:03

## 2023-01-01 RX ADMIN — ATORVASTATIN CALCIUM 40 MG: 40 TABLET, FILM COATED ORAL at 16:06

## 2023-01-01 RX ADMIN — CLOPIDOGREL BISULFATE 75 MG: 75 TABLET ORAL at 14:03

## 2023-01-01 RX ADMIN — CLOPIDOGREL BISULFATE 75 MG: 75 TABLET ORAL at 14:10

## 2023-01-01 RX ADMIN — MYCOPHENOLATE MOFETIL 750 MG: 250 CAPSULE ORAL at 14:00

## 2023-01-01 RX ADMIN — MIRTAZAPINE 45 MG: 15 TABLET, FILM COATED ORAL at 21:04

## 2023-01-01 RX ADMIN — ASPIRIN 324 MG: 81 TABLET, COATED ORAL at 01:08

## 2023-01-01 RX ADMIN — LEVOTHYROXINE SODIUM 112 MCG: 112 TABLET ORAL at 05:22

## 2023-01-01 RX ADMIN — ESCITALOPRAM 5 MG: 5 TABLET, FILM COATED ORAL at 14:10

## 2023-01-03 ENCOUNTER — PATIENT OUTREACH (OUTPATIENT)
Dept: CASE MANAGEMENT | Facility: OTHER | Age: 78
End: 2023-01-03

## 2023-01-03 NOTE — PROGRESS NOTES
Chart review completed  Email sent to facility requesting update on patient  This care manager assistant will continue to monitor via chart review throughout bundle episode     Update received the patient has a Ortho f/u rescheduled for 1/5; PT/OT may re-assess if WB upgraded Poor po intake continues, started on [id number]/23

## 2023-01-04 ENCOUNTER — APPOINTMENT (EMERGENCY)
Dept: RADIOLOGY | Facility: HOSPITAL | Age: 78
End: 2023-01-04

## 2023-01-04 ENCOUNTER — TELEPHONE (OUTPATIENT)
Dept: OBGYN CLINIC | Facility: HOSPITAL | Age: 78
End: 2023-01-04

## 2023-01-04 ENCOUNTER — HOSPITAL ENCOUNTER (INPATIENT)
Facility: HOSPITAL | Age: 78
LOS: 4 days | Discharge: NURSING, REHAB OR LONG-TERM CARE | End: 2023-01-09
Attending: EMERGENCY MEDICINE | Admitting: INTERNAL MEDICINE

## 2023-01-04 DIAGNOSIS — Z22.322 MRSA (METHICILLIN RESISTANT STAPH AUREUS) CULTURE POSITIVE: ICD-10-CM

## 2023-01-04 DIAGNOSIS — F03.918 DEMENTIA WITH BEHAVIORAL DISTURBANCE: ICD-10-CM

## 2023-01-04 DIAGNOSIS — F32.A DEPRESSION: ICD-10-CM

## 2023-01-04 DIAGNOSIS — U07.1 COVID: ICD-10-CM

## 2023-01-04 DIAGNOSIS — N17.9 ACUTE KIDNEY INJURY (HCC): Primary | ICD-10-CM

## 2023-01-04 LAB
2HR DELTA HS TROPONIN: 0 NG/L
ALBUMIN SERPL BCP-MCNC: 2.9 G/DL (ref 3.5–5)
ALP SERPL-CCNC: 229 U/L (ref 46–116)
ALT SERPL W P-5'-P-CCNC: 14 U/L (ref 12–78)
ANION GAP SERPL CALCULATED.3IONS-SCNC: 16 MMOL/L (ref 4–13)
APTT PPP: 24 SECONDS (ref 23–37)
AST SERPL W P-5'-P-CCNC: 20 U/L (ref 5–45)
BACTERIA UR QL AUTO: ABNORMAL /HPF
BASOPHILS # BLD AUTO: 0.05 THOUSANDS/ÂΜL (ref 0–0.1)
BASOPHILS NFR BLD AUTO: 1 % (ref 0–1)
BILIRUB SERPL-MCNC: 0.32 MG/DL (ref 0.2–1)
BILIRUB UR QL STRIP: ABNORMAL
BUN SERPL-MCNC: 121 MG/DL (ref 5–25)
CALCIUM ALBUM COR SERPL-MCNC: 10.2 MG/DL (ref 8.3–10.1)
CALCIUM SERPL-MCNC: 9.3 MG/DL (ref 8.3–10.1)
CARDIAC TROPONIN I PNL SERPL HS: 17 NG/L
CARDIAC TROPONIN I PNL SERPL HS: 17 NG/L
CHLORIDE SERPL-SCNC: 102 MMOL/L (ref 96–108)
CLARITY UR: ABNORMAL
CO2 SERPL-SCNC: 16 MMOL/L (ref 21–32)
COLOR UR: ABNORMAL
CREAT SERPL-MCNC: 4.15 MG/DL (ref 0.6–1.3)
EOSINOPHIL # BLD AUTO: 0.1 THOUSAND/ÂΜL (ref 0–0.61)
EOSINOPHIL NFR BLD AUTO: 1 % (ref 0–6)
ERYTHROCYTE [DISTWIDTH] IN BLOOD BY AUTOMATED COUNT: 14.9 % (ref 11.6–15.1)
FLUAV RNA RESP QL NAA+PROBE: NEGATIVE
FLUBV RNA RESP QL NAA+PROBE: NEGATIVE
GFR SERPL CREATININE-BSD FRML MDRD: 9 ML/MIN/1.73SQ M
GLUCOSE SERPL-MCNC: 111 MG/DL (ref 65–140)
GLUCOSE UR STRIP-MCNC: NEGATIVE MG/DL
HCT VFR BLD AUTO: 32.6 % (ref 34.8–46.1)
HGB BLD-MCNC: 10.2 G/DL (ref 11.5–15.4)
HGB UR QL STRIP.AUTO: ABNORMAL
IMM GRANULOCYTES # BLD AUTO: 0.17 THOUSAND/UL (ref 0–0.2)
IMM GRANULOCYTES NFR BLD AUTO: 2 % (ref 0–2)
INR PPP: 1.07 (ref 0.84–1.19)
KETONES UR STRIP-MCNC: ABNORMAL MG/DL
LACTATE SERPL-SCNC: 0.6 MMOL/L (ref 0.5–2)
LEUKOCYTE ESTERASE UR QL STRIP: ABNORMAL
LIPASE SERPL-CCNC: 120 U/L (ref 73–393)
LYMPHOCYTES # BLD AUTO: 0.87 THOUSANDS/ÂΜL (ref 0.6–4.47)
LYMPHOCYTES NFR BLD AUTO: 10 % (ref 14–44)
MAGNESIUM SERPL-MCNC: 2.5 MG/DL (ref 1.6–2.6)
MCH RBC QN AUTO: 27 PG (ref 26.8–34.3)
MCHC RBC AUTO-ENTMCNC: 31.3 G/DL (ref 31.4–37.4)
MCV RBC AUTO: 86 FL (ref 82–98)
MONOCYTES # BLD AUTO: 0.56 THOUSAND/ÂΜL (ref 0.17–1.22)
MONOCYTES NFR BLD AUTO: 7 % (ref 4–12)
NEUTROPHILS # BLD AUTO: 6.88 THOUSANDS/ÂΜL (ref 1.85–7.62)
NEUTS SEG NFR BLD AUTO: 79 % (ref 43–75)
NITRITE UR QL STRIP: NEGATIVE
NON-SQ EPI CELLS URNS QL MICRO: ABNORMAL /HPF
NRBC BLD AUTO-RTO: 0 /100 WBCS
NT-PROBNP SERPL-MCNC: 3898 PG/ML
PH UR STRIP.AUTO: 6 [PH]
PLATELET # BLD AUTO: 259 THOUSANDS/UL (ref 149–390)
PMV BLD AUTO: 10.3 FL (ref 8.9–12.7)
POTASSIUM SERPL-SCNC: 5.2 MMOL/L (ref 3.5–5.3)
PROT SERPL-MCNC: 7.7 G/DL (ref 6.4–8.4)
PROT UR STRIP-MCNC: ABNORMAL MG/DL
PROTHROMBIN TIME: 14 SECONDS (ref 11.6–14.5)
RBC # BLD AUTO: 3.78 MILLION/UL (ref 3.81–5.12)
RBC #/AREA URNS AUTO: ABNORMAL /HPF
RSV RNA RESP QL NAA+PROBE: NEGATIVE
SARS-COV-2 RNA RESP QL NAA+PROBE: POSITIVE
SODIUM SERPL-SCNC: 134 MMOL/L (ref 135–147)
SP GR UR STRIP.AUTO: 1.02 (ref 1–1.03)
UROBILINOGEN UR QL STRIP.AUTO: 0.2 E.U./DL
WBC # BLD AUTO: 8.63 THOUSAND/UL (ref 4.31–10.16)
WBC #/AREA URNS AUTO: ABNORMAL /HPF

## 2023-01-04 RX ORDER — MIRTAZAPINE 15 MG/1
15 TABLET, FILM COATED ORAL
COMMUNITY
End: 2023-01-09

## 2023-01-04 RX ORDER — CEFTRIAXONE 1 G/50ML
1000 INJECTION, SOLUTION INTRAVENOUS EVERY 24 HOURS
Status: DISCONTINUED | OUTPATIENT
Start: 2023-01-04 | End: 2023-01-07

## 2023-01-04 RX ORDER — ONDANSETRON 2 MG/ML
4 INJECTION INTRAMUSCULAR; INTRAVENOUS ONCE
Status: COMPLETED | OUTPATIENT
Start: 2023-01-04 | End: 2023-01-04

## 2023-01-04 RX ORDER — ACETAMINOPHEN 325 MG/1
650 TABLET ORAL ONCE
Status: COMPLETED | OUTPATIENT
Start: 2023-01-04 | End: 2023-01-04

## 2023-01-04 RX ADMIN — SODIUM CHLORIDE 1000 ML: 0.9 INJECTION, SOLUTION INTRAVENOUS at 20:36

## 2023-01-04 RX ADMIN — ACETAMINOPHEN 650 MG: 325 TABLET, FILM COATED ORAL at 21:26

## 2023-01-04 RX ADMIN — ONDANSETRON 4 MG: 2 INJECTION INTRAMUSCULAR; INTRAVENOUS at 21:26

## 2023-01-04 NOTE — TELEPHONE ENCOUNTER
Spoke with Lindsay Stewart at length  Discussed that we were planning on performing x-rays at the visit tomorrow  Patient will be transported by wheelchair

## 2023-01-04 NOTE — TELEPHONE ENCOUNTER
Caller: [phone] [email or web address]    Doctor: Jarod Kim    Reason for call: She is wondering if patient is getting x rays done at [facility]      Call back#: [phone]

## 2023-01-05 LAB
ANION GAP SERPL CALCULATED.3IONS-SCNC: 13 MMOL/L (ref 4–13)
BASOPHILS # BLD MANUAL: 0 THOUSAND/UL (ref 0–0.1)
BASOPHILS NFR MAR MANUAL: 0 % (ref 0–1)
BUN SERPL-MCNC: 116 MG/DL (ref 5–25)
CALCIUM SERPL-MCNC: 8.5 MG/DL (ref 8.3–10.1)
CHLORIDE SERPL-SCNC: 104 MMOL/L (ref 96–108)
CO2 SERPL-SCNC: 19 MMOL/L (ref 21–32)
CREAT SERPL-MCNC: 4.02 MG/DL (ref 0.6–1.3)
EOSINOPHIL # BLD MANUAL: 0.32 THOUSAND/UL (ref 0–0.4)
EOSINOPHIL NFR BLD MANUAL: 5 % (ref 0–6)
ERYTHROCYTE [DISTWIDTH] IN BLOOD BY AUTOMATED COUNT: 15 % (ref 11.6–15.1)
GFR SERPL CREATININE-BSD FRML MDRD: 9 ML/MIN/1.73SQ M
GLUCOSE P FAST SERPL-MCNC: 134 MG/DL (ref 65–99)
GLUCOSE SERPL-MCNC: 134 MG/DL (ref 65–140)
HCT VFR BLD AUTO: 27.9 % (ref 34.8–46.1)
HGB BLD-MCNC: 8.8 G/DL (ref 11.5–15.4)
LG PLATELETS BLD QL SMEAR: PRESENT
LYMPHOCYTES # BLD AUTO: 0.96 THOUSAND/UL (ref 0.6–4.47)
LYMPHOCYTES # BLD AUTO: 15 % (ref 14–44)
MCH RBC QN AUTO: 27.6 PG (ref 26.8–34.3)
MCHC RBC AUTO-ENTMCNC: 31.5 G/DL (ref 31.4–37.4)
MCV RBC AUTO: 88 FL (ref 82–98)
MONOCYTES # BLD AUTO: 0.58 THOUSAND/UL (ref 0–1.22)
MONOCYTES NFR BLD: 9 % (ref 4–12)
NEUTROPHILS # BLD MANUAL: 4.54 THOUSAND/UL (ref 1.85–7.62)
NEUTS BAND NFR BLD MANUAL: 2 % (ref 0–8)
NEUTS SEG NFR BLD AUTO: 69 % (ref 43–75)
PLATELET # BLD AUTO: 222 THOUSANDS/UL (ref 149–390)
PLATELET BLD QL SMEAR: ADEQUATE
PMV BLD AUTO: 10.7 FL (ref 8.9–12.7)
POLYCHROMASIA BLD QL SMEAR: PRESENT
POTASSIUM SERPL-SCNC: 5.1 MMOL/L (ref 3.5–5.3)
RBC # BLD AUTO: 3.19 MILLION/UL (ref 3.81–5.12)
RBC MORPH BLD: PRESENT
SODIUM SERPL-SCNC: 136 MMOL/L (ref 135–147)
T4 FREE SERPL-MCNC: 0.87 NG/DL (ref 0.76–1.46)
TSH SERPL DL<=0.05 MIU/L-ACNC: 4.89 UIU/ML (ref 0.45–4.5)
WBC # BLD AUTO: 6.39 THOUSAND/UL (ref 4.31–10.16)

## 2023-01-05 RX ORDER — MYCOPHENOLATE MOFETIL 250 MG/1
750 CAPSULE ORAL EVERY 12 HOURS SCHEDULED
Status: DISCONTINUED | OUTPATIENT
Start: 2023-01-05 | End: 2023-01-09 | Stop reason: HOSPADM

## 2023-01-05 RX ORDER — MIRTAZAPINE 15 MG/1
15 TABLET, FILM COATED ORAL
Status: DISCONTINUED | OUTPATIENT
Start: 2023-01-05 | End: 2023-01-05

## 2023-01-05 RX ORDER — ESCITALOPRAM OXALATE 5 MG/1
5 TABLET ORAL DAILY
Status: DISCONTINUED | OUTPATIENT
Start: 2023-01-05 | End: 2023-01-09 | Stop reason: HOSPADM

## 2023-01-05 RX ORDER — SODIUM CHLORIDE 9 MG/ML
125 INJECTION, SOLUTION INTRAVENOUS CONTINUOUS
Status: DISCONTINUED | OUTPATIENT
Start: 2023-01-05 | End: 2023-01-07

## 2023-01-05 RX ORDER — ASPIRIN 81 MG/1
81 TABLET ORAL
Status: DISCONTINUED | OUTPATIENT
Start: 2023-01-05 | End: 2023-01-09 | Stop reason: HOSPADM

## 2023-01-05 RX ORDER — DOCUSATE SODIUM 100 MG/1
100 CAPSULE, LIQUID FILLED ORAL 2 TIMES DAILY
Status: DISCONTINUED | OUTPATIENT
Start: 2023-01-05 | End: 2023-01-09 | Stop reason: HOSPADM

## 2023-01-05 RX ORDER — PRAVASTATIN SODIUM 20 MG
20 TABLET ORAL
Status: DISCONTINUED | OUTPATIENT
Start: 2023-01-05 | End: 2023-01-09 | Stop reason: HOSPADM

## 2023-01-05 RX ORDER — ACETAMINOPHEN 325 MG/1
650 TABLET ORAL EVERY 6 HOURS PRN
Status: DISCONTINUED | OUTPATIENT
Start: 2023-01-05 | End: 2023-01-09 | Stop reason: HOSPADM

## 2023-01-05 RX ORDER — HEPARIN SODIUM 5000 [USP'U]/ML
5000 INJECTION, SOLUTION INTRAVENOUS; SUBCUTANEOUS EVERY 8 HOURS SCHEDULED
Status: DISCONTINUED | OUTPATIENT
Start: 2023-01-05 | End: 2023-01-09 | Stop reason: HOSPADM

## 2023-01-05 RX ORDER — LEVOTHYROXINE SODIUM 112 UG/1
112 TABLET ORAL
Status: DISCONTINUED | OUTPATIENT
Start: 2023-01-05 | End: 2023-01-09 | Stop reason: HOSPADM

## 2023-01-05 RX ORDER — CYCLOSPORINE 25 MG/1
50 CAPSULE, LIQUID FILLED ORAL 2 TIMES DAILY
Status: DISCONTINUED | OUTPATIENT
Start: 2023-01-05 | End: 2023-01-09 | Stop reason: HOSPADM

## 2023-01-05 RX ORDER — POLYETHYLENE GLYCOL 3350 17 G/17G
17 POWDER, FOR SOLUTION ORAL DAILY PRN
Status: DISCONTINUED | OUTPATIENT
Start: 2023-01-05 | End: 2023-01-09 | Stop reason: HOSPADM

## 2023-01-05 RX ORDER — CLOPIDOGREL BISULFATE 75 MG/1
75 TABLET ORAL DAILY
Status: DISCONTINUED | OUTPATIENT
Start: 2023-01-05 | End: 2023-01-09 | Stop reason: HOSPADM

## 2023-01-05 RX ORDER — LANOLIN ALCOHOL/MO/W.PET/CERES
3 CREAM (GRAM) TOPICAL
Status: DISCONTINUED | OUTPATIENT
Start: 2023-01-05 | End: 2023-01-09 | Stop reason: HOSPADM

## 2023-01-05 RX ORDER — METOPROLOL TARTRATE 50 MG/1
50 TABLET, FILM COATED ORAL EVERY 12 HOURS SCHEDULED
Status: DISCONTINUED | OUTPATIENT
Start: 2023-01-05 | End: 2023-01-09 | Stop reason: HOSPADM

## 2023-01-05 RX ORDER — PANTOPRAZOLE SODIUM 40 MG/1
40 TABLET, DELAYED RELEASE ORAL
Status: DISCONTINUED | OUTPATIENT
Start: 2023-01-05 | End: 2023-01-09 | Stop reason: HOSPADM

## 2023-01-05 RX ADMIN — DOCUSATE SODIUM 100 MG: 100 CAPSULE, LIQUID FILLED ORAL at 09:37

## 2023-01-05 RX ADMIN — MYCOPHENOLATE MOFETIL 750 MG: 250 CAPSULE ORAL at 09:37

## 2023-01-05 RX ADMIN — ASPIRIN 81 MG: 81 TABLET, COATED ORAL at 09:37

## 2023-01-05 RX ADMIN — PRAVASTATIN SODIUM 20 MG: 20 TABLET ORAL at 01:33

## 2023-01-05 RX ADMIN — DOCUSATE SODIUM 100 MG: 100 CAPSULE, LIQUID FILLED ORAL at 17:54

## 2023-01-05 RX ADMIN — SODIUM CHLORIDE 125 ML/HR: 0.9 INJECTION, SOLUTION INTRAVENOUS at 13:41

## 2023-01-05 RX ADMIN — CYCLOSPORINE 50 MG: 25 CAPSULE ORAL at 09:37

## 2023-01-05 RX ADMIN — CLOPIDOGREL BISULFATE 75 MG: 75 TABLET ORAL at 09:37

## 2023-01-05 RX ADMIN — HEPARIN SODIUM 5000 UNITS: 5000 INJECTION INTRAVENOUS; SUBCUTANEOUS at 01:33

## 2023-01-05 RX ADMIN — PRAVASTATIN SODIUM 20 MG: 20 TABLET ORAL at 22:24

## 2023-01-05 RX ADMIN — METOPROLOL TARTRATE 50 MG: 50 TABLET, FILM COATED ORAL at 21:07

## 2023-01-05 RX ADMIN — MIRTAZAPINE 15 MG: 15 TABLET, FILM COATED ORAL at 01:33

## 2023-01-05 RX ADMIN — MYCOPHENOLATE MOFETIL 750 MG: 250 CAPSULE ORAL at 21:08

## 2023-01-05 RX ADMIN — LEVOTHYROXINE SODIUM 112 MCG: 112 TABLET ORAL at 05:30

## 2023-01-05 RX ADMIN — Medication 3 MG: at 01:33

## 2023-01-05 RX ADMIN — HEPARIN SODIUM 5000 UNITS: 5000 INJECTION INTRAVENOUS; SUBCUTANEOUS at 09:37

## 2023-01-05 RX ADMIN — Medication 3 MG: at 22:21

## 2023-01-05 RX ADMIN — CEFTRIAXONE 1000 MG: 1 INJECTION, SOLUTION INTRAVENOUS at 00:05

## 2023-01-05 RX ADMIN — PANTOPRAZOLE SODIUM 40 MG: 40 TABLET, DELAYED RELEASE ORAL at 05:30

## 2023-01-05 RX ADMIN — SODIUM BICARBONATE 100 ML/HR: 84 INJECTION, SOLUTION INTRAVENOUS at 01:34

## 2023-01-05 RX ADMIN — CYCLOSPORINE 50 MG: 25 CAPSULE ORAL at 17:54

## 2023-01-05 RX ADMIN — CYCLOSPORINE 50 MG: 25 CAPSULE ORAL at 01:37

## 2023-01-05 RX ADMIN — METOPROLOL TARTRATE 50 MG: 50 TABLET, FILM COATED ORAL at 09:37

## 2023-01-05 RX ADMIN — HEPARIN SODIUM 5000 UNITS: 5000 INJECTION INTRAVENOUS; SUBCUTANEOUS at 17:54

## 2023-01-05 RX ADMIN — ESCITALOPRAM OXALATE 5 MG: 5 TABLET, FILM COATED ORAL at 13:41

## 2023-01-05 NOTE — PLAN OF CARE
Problem: Potential for Falls  Goal: Patient will remain free of falls  Description: INTERVENTIONS:  - Educate patient/family on patient safety including physical limitations  - Instruct patient to call for assistance with activity   - Consult OT/PT to assist with strengthening/mobility   - Keep Call bell within reach  - Keep bed low and locked with side rails adjusted as appropriate  - Keep care items and personal belongings within reach  - Initiate and maintain comfort rounds  - Make Fall Risk Sign visible to staff  - Offer Toileting every 2  Hours, in advance of need  - Initiate/Maintain bed alarm  - Obtain necessary fall risk management equipment: walker/ cane  - Apply yellow socks and bracelet for high fall risk patients  - Consider moving patient to room near nurses station  Outcome: Progressing     Problem: MOBILITY - ADULT  Goal: Maintain or return to baseline ADL function  Description: INTERVENTIONS:  -  Assess patient's ability to carry out ADLs; assess patient's baseline for ADL function and identify physical deficits which impact ability to perform ADLs (bathing, care of mouth/teeth, toileting, grooming, dressing, etc )  - Assess/evaluate cause of self-care deficits   - Assess range of motion  - Assess patient's mobility; develop plan if impaired  - Assess patient's need for assistive devices and provide as appropriate  - Encourage maximum independence but intervene and supervise when necessary  - Involve family in performance of ADLs  - Assess for home care needs following discharge   - Consider OT consult to assist with ADL evaluation and planning for discharge  - Provide patient education as appropriate  Outcome: Progressing  Goal: Maintains/Returns to pre admission functional level  Description: INTERVENTIONS:  - Perform BMAT or MOVE assessment daily    - Set and communicate daily mobility goal to care team and patient/family/caregiver     - Collaborate with rehabilitation services on mobility goals if consulted  - Perform Range of Motion 3  times a day  - Reposition patient every 2 hours  - Dangle patient 3 times a day  - Stand patient 3  times a day  - Out of bed to chair 3  times a day   - Out of bed for meals 3  times a day  - Out of bed for toileting  - Record patient progress and toleration of activity level   Outcome: Progressing     Problem: MOBILITY - ADULT  Goal: Maintains/Returns to pre admission functional level  Description: INTERVENTIONS:  - Perform BMAT or MOVE assessment daily    - Set and communicate daily mobility goal to care team and patient/family/caregiver  - Collaborate with rehabilitation services on mobility goals if consulted  - Perform Range of Motion 3  times a day  - Reposition patient every 2 hours    - Dangle patient 3 times a day  - Stand patient 3  times a day  - Out of bed to chair 3  times a day   - Out of bed for meals 3  times a day  - Out of bed for toileting  - Record patient progress and toleration of activity level   Outcome: Progressing     Problem: PAIN - ADULT  Goal: Verbalizes/displays adequate comfort level or baseline comfort level  Description: Interventions:  - Encourage patient to monitor pain and request assistance  - Assess pain using appropriate pain scale  - Administer analgesics based on type and severity of pain and evaluate response  - Implement non-pharmacological measures as appropriate and evaluate response  - Consider cultural and social influences on pain and pain management  - Notify physician/advanced practitioner if interventions unsuccessful or patient reports new pain  Outcome: Progressing     Problem: INFECTION - ADULT  Goal: Absence or prevention of progression during hospitalization  Description: INTERVENTIONS:  - Assess and monitor for signs and symptoms of infection  - Monitor lab/diagnostic results  - Monitor all insertion sites, i e  indwelling lines, tubes, and drains  - Monitor endotracheal if appropriate and nasal secretions for changes in amount and color  - [place] appropriate cooling/warming therapies per order  - Administer medications as ordered  - Instruct and encourage patient and family to use good hand hygiene technique  - Identify and instruct in appropriate isolation precautions for identified infection/condition  Outcome: Progressing  Goal: Absence of fever/infection during neutropenic period  Description: INTERVENTIONS:  - Monitor WBC    Outcome: Progressing     Problem: SAFETY ADULT  Goal: Patient will remain free of falls  Description: INTERVENTIONS:  - Educate patient/family on patient safety including physical limitations  - Instruct patient to call for assistance with activity   - Consult OT/PT to assist with strengthening/mobility   - Keep Call bell within reach  - Keep bed low and locked with side rails adjusted as appropriate  - Keep care items and personal belongings within reach  - Initiate and maintain comfort rounds  - Make Fall Risk Sign visible to staff  - Offer Toileting every 2  Hours, in advance of need  - Initiate/Maintain bed alarm  - Obtain necessary fall risk management equipment: walker/ cane  - Apply yellow socks and bracelet for high fall risk patients  - Consider moving patient to room near nurses station  Outcome: Progressing  Goal: Maintain or return to baseline ADL function  Description: INTERVENTIONS:  -  Assess patient's ability to carry out ADLs; assess patient's baseline for ADL function and identify physical deficits which impact ability to perform ADLs (bathing, care of mouth/teeth, toileting, grooming, dressing, etc )  - Assess/evaluate cause of self-care deficits   - Assess range of motion  - Assess patient's mobility; develop plan if impaired  - Assess patient's need for assistive devices and provide as appropriate  - Encourage maximum independence but intervene and supervise when necessary  - Involve family in performance of ADLs  - Assess for home care needs following discharge   - Consider OT consult to assist with ADL evaluation and planning for discharge  - Provide patient education as appropriate  Outcome: Progressing  Goal: Maintains/Returns to pre admission functional level  Description: INTERVENTIONS:  - Perform BMAT or MOVE assessment daily    - Set and communicate daily mobility goal to care team and patient/family/caregiver  - Collaborate with rehabilitation services on mobility goals if consulted  - Perform Range of Motion 3  times a day  - Reposition patient every 2 hours  - Dangle patient 3 times a day  - Stand patient 3  times a day  - Out of bed to chair 3  times a day   - Out of bed for meals 3  times a day  - Out of bed for toileting  - Record patient progress and toleration of activity level   Outcome: Progressing     Problem: Knowledge Deficit  Goal: Patient/family/caregiver demonstrates understanding of disease process, treatment plan, medications, and discharge instructions  Description: Complete learning assessment and assess knowledge base    Interventions:  - Provide teaching at level of understanding  - Provide teaching via preferred learning methods  Outcome: Progressing     Problem: NEUROSENSORY - ADULT  Goal: Achieves stable or improved neurological status  Description: INTERVENTIONS  - Monitor and report changes in neurological status  - Monitor vital signs such as temperature, blood pressure, glucose, and any other labs ordered   - Initiate measures to prevent increased intracranial pressure  - Monitor for seizure activity and implement precautions if appropriate      Outcome: Progressing     Problem: CARDIOVASCULAR - ADULT  Goal: Absence of cardiac dysrhythmias or at baseline rhythm  Description: INTERVENTIONS:  - Continuous cardiac monitoring, vital signs, obtain 12 lead EKG if ordered  - Administer antiarrhythmic and heart rate control medications as ordered  - Monitor electrolytes and administer replacement therapy as ordered  Outcome: Progressing Problem: METABOLIC, FLUID AND ELECTROLYTES - ADULT  Goal: Electrolytes maintained within normal limits  Description: INTERVENTIONS:  - Monitor labs and assess patient for signs and symptoms of electrolyte imbalances  - Administer electrolyte replacement as ordered  - Monitor response to electrolyte replacements, including repeat lab results as appropriate  - Instruct patient on fluid and nutrition as appropriate  Outcome: Progressing  Goal: Fluid balance maintained  Description: INTERVENTIONS:  - Monitor labs   - Monitor I/O and WT  - Instruct patient on fluid and nutrition as appropriate  - Assess for signs & symptoms of volume excess or deficit  Outcome: Progressing     Problem: SKIN/TISSUE INTEGRITY - ADULT  Goal: Skin Integrity remains intact(Skin Breakdown Prevention)  Description: Assess:  -Perform Rafa assessment every shift  -Clean and moisturize skin every shift and after incontinence  -Inspect skin when repositioning, toileting, and assisting with ADl  -Assess extremities for adequate circulation and sensation     Bed Management:  -Have minimal linens on bed & keep smooth, unwrinkled  -Change linens as needed when moist or perspiring  -Avoid sitting or lying in one position for more than 2 hours while in bed  -Keep HOB at 30 degrees     Toileting:  -Offer bedside commode  -Assess for incontinence every 2 hours  -Use incontinent care products after each incontinent episode such as protective barrier cream    Activity:  -Mobilize patient 3  times a day  -Encourage activity and walks on unit  -Encourage or provide ROM exercises   -Turn and reposition patient every 2 Hours  -Use appropriate equipment to lift or move patient in bed  -Instruct/ Assist with weight shifting every 15 when out of bed in chair  -Consider limitation of chair time 4  hour intervals    Skin Care:  -Avoid use of baby powder, tape, friction and shearing, hot water or constrictive clothing  -Relieve pressure over bony prominences using waffle cushion  -Do not massage red bony areas    Next Steps:  -Consider consults to  interdisciplinary teams such as wound care and PT/ OT    Outcome: Progressing  Goal: Pressure injury heals and does not worsen  Description: Interventions:  - Implement low air loss mattress or specialty surface (Criteria met)  - Apply silicone foam dressing  - Instruct/assist with weight shifting every 15 minutes when in chair   - Limit chair time to 4 hour intervals  - Use special pressure reducing interventions such as waffle cushion when in chair   - Apply fecal or urinary incontinence containment device   - Perform passive or active ROM every shift  - Turn and reposition patient & offload bony prominences every 2 hours   - Utilize friction reducing device or surface for transfers   - Consider consults to  interdisciplinary teams such as wound care and PT/OT  - Use incontinent care products after each incontinent episode such as protective barrier cream   - Consider nutrition services referral as needed  Outcome: Progressing

## 2023-01-05 NOTE — TELEMEDICINE
TeleConsultation - 2001 Doctors  76 y o  female MRN: [id number]  Unit/Bed#: [address] [facility] 408-01 Encounter: [id number]        REQUIRED DOCUMENTATION:     1  This service was provided via Telemedicine  2  Provider located at [place]  3  TeleMed provider: Klarissa Lou MD   4  Identify all parties in room with patient during tele consult:  Patient  5  Patient was then informed that this was a Telemedicine visit and that the exam was being conducted confidentially over secure lines  My office door was closed  No one else was in the room  Patient acknowledged consent and understanding of privacy and security of the Telemedicine visit, and gave us permission to have the assistant stay in the room in order to assist with the history and to conduct the exam   I informed the patient that I have reviewed their record in Epic and presented the opportunity for them to ask any questions regarding the visit today  The patient agreed to participate  Assessment/Plan     Principal Problem:    REZA (acute kidney injury) ([facility] [address] )  Active Problems:    Hypothyroidism    Long-term use of immunosuppressant medication    Cerebrovascular accident (CVA) due to embolism ([email or web address] )    Benign essential hypertension    Depression    UTI (urinary tract infection)    Dementia with behavioral disturbance    Assessment:    Unspecified mood disorder; dementia with behavioral disturbance by history; suspect delirium/encephalopathy likely multifactorial in etiology  Treatment Plan:    Recommend to continue to identify and treat potential underlying factors of probable encephalopathy/delirium likely multifactorial in nature  Given the report of severe depression reported to daughter by patient, consider increasing Remeron to 30 mg p o  nightly for depression  The higher dose will likely be less sedating for the patient which would be favorable given her current lethargy    Recommend to do discontinue melatonin as nursing reports the patient has been sleeping excessively this morning  No suicide precautions are indicated at this time  There is no psychiatric contraindication for return to nursing care upon medical clearance  Reconsult psychiatry as needed  Current Medications:     Current Facility-Administered Medications   Medication Dose Route Frequency Provider Last Rate   • acetaminophen  650 mg Oral Q6H PRN Jersey Lorenzo PA-C     • aspirin  81 mg Oral Daily With Breakfast Emelina SYLVIE Mckeon     • cefTRIAXone  1,000 mg Intravenous Q24H Jersey Lorenzo PA-C 1,000 mg (01/05/23 0005)   • clopidogrel  75 mg Oral Daily Emelina SYLVIE Mckeon     • cycloSPORINE modified  50 mg Oral BID Emelina SRIKANTH MckeonC     • docusate sodium  100 mg Oral BID Emelina SYLVIE Mckeon     • heparin (porcine)  5,000 Units Subcutaneous Q8H [address] Emelina SRIKANTH MckeonC     • levothyroxine  112 mcg Oral Early Morning Emelina SRIKANTH MckeonC     • melatonin  3 mg Oral HS Emelina SYLVIE Mckeon     • metoprolol tartrate  50 mg Oral Q12H [address] Emelina SYLVIE Mckeon     • mirtazapine  15 mg Oral HS Emelina SRIKANTH MckeonC     • mycophenolate  750 mg Oral Q12H [address] Emelina SRIKANTH MckeonC     • pantoprazole  40 mg Oral Early Morning Emelina SRIKANTH MckeonC     • polyethylene glycol  17 g Oral Daily PRN Emelinatamika Mckeon PA-C     • pravastatin  20 mg Oral HS Emelina SRIKANTH MckeonC     • sodium bicarbonate infusion  100 mL/hr Intravenous Continuous Emelina SRIKANTH MckeonC 100 mL/hr (01/05/23 0134)       Risks / Benefits of Treatment:    Risks, benefits, and possible side effects of medications explained to patient and patient verbalizes understanding        Inpatient consult to Psychiatry  Consult performed by: Kameron Gerard MD  Consult ordered by: Jersey Lorenzo PA-C        Physician Requesting Consult: Maegan Martinez MD  Principal Problem:REZA (acute kidney injury) [facility])    Reason for Consult: Depression      History of Present Illness      Patient is a 76 y o  female who presented to the emergency department where the physician documented the followin-year-old female was sent in from the nursing home due to BUN of 118  She is also here with a temp of 100 3  Blood pressure was 94/44 patient has no specific complaints she just states that she does not feel well  No other noticeable injuries        History provided by:  Patient   used: No          Prior to Admission Medications   Prescriptions Last Dose Informant Patient Reported? Taking?    Calcium 500 MG tablet     Yes Yes   Sig: Take 500 mg by mouth   Cholecalciferol (Vitamin D) 50 MCG (2000 UT) tablet     Yes Yes   Sig: Take 1,000 Units by mouth   HYDROcodone-acetaminophen (Norco) 5-325 mg per tablet     No Yes   Sig: Take 1 tablet by mouth every 6 (six) hours as needed for pain (moderate, severe pain) Max Daily Amount: 4 tablets   OLANZapine (ZyPREXA ZYDIS) 5 mg dispersible tablet     No Yes   Sig: Take 0 5 tablets (2 5 mg total) by mouth daily at bedtime   OLANZapine (ZyPREXA) 5 mg tablet     No Yes   Sig: Take 1 tablet (5 mg total) by mouth every 6 (six) hours as needed (Severe agitation)   acetaminophen (TYLENOL) 325 mg tablet     No Yes   Sig: Take 2 tablets (650 mg total) by mouth every 6 (six) hours as needed for mild pain or fever   aspirin (ECOTRIN LOW STRENGTH) 81 mg EC tablet     No Yes   Sig: Take 1 tablet (81 mg total) by mouth daily with breakfast   b complex vitamins capsule     No Yes   Sig: Take 1 capsule by mouth daily   clopidogrel (PLAVIX) 75 mg tablet     No Yes   Sig: TAKE 1 TABLET EVERY DAY   cycloSPORINE modified (NEORAL) 25 mg capsule     Yes Yes   Sig: Take 50 mg by mouth 2 (two) times a day   docusate sodium (COLACE) 100 mg capsule     No Yes   Sig: Take 1 capsule (100 mg total) by mouth 2 (two) times a day for 10 days   levothyroxine 112 mcg tablet     No Yes   Sig: TAKE 1 TABLET EVERY DAY AS DIRECTED   melatonin 3 mg     No No   Sig: Take 1 tablet (3 mg total) by mouth daily at bedtime   metoprolol tartrate (LOPRESSOR) 25 mg tablet     No Yes   Sig: Take 2 tablets (50 mg total) by mouth every 12 (twelve) hours   mycophenolate (CELLCEPT) 250 mg capsule     No Yes   Sig: Take 3 capsules (750 mg total) by mouth every 12 (twelve) hours   omeprazole (PriLOSEC) 40 MG capsule     Yes Yes   Sig: Take 40 mg by mouth daily   polyethylene glycol (MIRALAX) 17 g packet     No Yes   Sig: Take 17 g by mouth daily as needed (constipation)   pravastatin (PRAVACHOL) 20 mg tablet     No Yes   Sig: TAKE 1 TABLET BY MOUTH ONCE DAILY AT BEDTIME      Facility-Administered Medications: None         Medical History[]Expand by Default        Past Medical History:   Diagnosis Date   • Anemia     • Anxiety     • Arthritis     • Benign paroxysmal vertigo, unspecified ear       onset: 05/06/2010   • Cirrhosis (HCC)       onset: 03/30/2005   • Crohn disease ([facility] [address] )     • Disease of thyroid gland     • GERD (gastroesophageal reflux disease)     • History of transfusion     • Liver transplant candidate       onset: 09/16/2008   • Osteoporosis     • Pelvic fracture (HCC)       onset: 10/14/2008            Surgical History[]Expand by Default         Past Surgical History:   Procedure Laterality Date   • ABDOMINAL SURGERY       • APPENDECTOMY       • BLADDER AUGMENTATION N/A     • CATARACT EXTRACTION       • CHOLECYSTECTOMY N/A     • COLONOSCOPY N/A 4/20/2017     Procedure: COLONOSCOPY;  Surgeon: Tessie Cavazos MD;  Location: [place] [address] GI LAB; Service:    • ESOPHAGOGASTRODUODENOSCOPY N/A 3/31/2016     Procedure: ESOPHAGOGASTRODUODENOSCOPY (EGD); Surgeon: Tessie Cavazos MD;  Location: [place] [address] GI LAB; Service:    • ESOPHAGOGASTRODUODENOSCOPY N/A 4/20/2017     Procedure: ESOPHAGOGASTRODUODENOSCOPY (EGD); Surgeon: Tessie Cavazos MD;  Location: [place] [address] GI LAB;   Service:    • EYE SURGERY         cataracts removed both eyes lens implant   • HERNIA REPAIR       • JOINT REPLACEMENT         left knee replacement   • LIVER TRANSPLANTATION N/A     • ORIF TIBIA & FIBULA FRACTURES Right 2022     Procedure: OPEN REDUCTION W/ INTERNAL FIXATION (ORIF) RIGHT ANKLE FRACTURE;  Surgeon: Ana Javed MD;  Location: [facility];  Service: Orthopedics   • TONSILECTOMY AND ADNOIDECTOMY N/A     • TUBAL LIGATION                Family History[]Expand by Default         Family History   Problem Relation Age of Onset   • Cancer Mother           breast   • Cancer Father     • Crohn's disease Brother     • Arthritis Family     • Breast cancer Family           I have reviewed and agree with the history as documented            E-Cigarette/Vaping   • E-Cigarette Use Never User              E-Cigarette/Vaping Substances   • Nicotine No     • THC No     • CBD No     • Flavoring No     • Other No     • Unknown No        Social History            Tobacco Use   • Smoking status: Former       Years:        Types: Cigarettes       Quit date: 1975       Years since quittin 9   • Smokeless tobacco: Never   • Tobacco comments:       smoked for 5 years-1/2 to 1 ppd   Vaping Use   • Vaping Use: Never used   Substance Use Topics   • Alcohol use: Never   • Drug use: Never         Review of Systems   Constitutional: Positive for fever  Negative for activity change, chills and diaphoresis  HENT: Negative for congestion, ear pain, nosebleeds, sore throat, trouble swallowing and voice change  Eyes: Negative for pain, discharge and redness  Respiratory: Negative for apnea, cough, choking, shortness of breath, wheezing and stridor  Cardiovascular: Negative for chest pain and palpitations  Gastrointestinal: Positive for nausea  Negative for abdominal distention, abdominal pain, constipation, diarrhea and vomiting  Endocrine: Negative for polydipsia  Genitourinary: Negative for difficulty urinating, dysuria, flank pain, frequency, hematuria and urgency     Musculoskeletal: Negative for back pain, gait problem, joint swelling, myalgias, neck pain and neck stiffness  Skin: Negative for pallor and rash  Neurological: Positive for weakness  Negative for dizziness, tremors, syncope, speech difficulty, numbness and headaches  Hematological: Negative for adenopathy  Psychiatric/Behavioral: Negative for confusion, hallucinations, self-injury and suicidal ideas  The patient is not nervous/anxious  Nursing reports that the patient slept well through the night and has been sleeping quite a bit through the morning  There reports she has been oriented to person  They describe appetite as not good but adequate with help and encouragement  They report no behavioral disturbance  Mental status examination: The patient was sleeping in bed upon my arrival   She opened her eyes briefly then kept them closed  She appeared drowsy and somnolent  She was able to give me her full name and date of birth and was able to tell me that she was in [facility]   When asked for the date she told me she did not know and appeared to be apathetic about this as she did not attempt to gas or approximated  She frequently had to have questions repeated before she answered typically with yes or no or one-word answers  She appeared apathetic and depressed currently  Affect was flat  Due to the brevity of her responses it was difficult to thoroughly assess thought process but it appeared to be logical and linear  Likewise it was difficult to assess thought content but it appeared to be reality based  Also for the same reason I was not able to fully assess memory or level of intellectual function  Associations did not appear to be loose  The patient briefly responded no when asked if she was depressed but appeared to be unconvincing in her response  She denies suicidal homicidal ideation  She denies hallucinations and other psychotic features    I suspect there may be is some impairment of insight or judgment but due to the brevity of her responses it was difficult to fully assess this  Past Medical History:   Diagnosis Date   • Anemia    • Anxiety    • Arthritis    • Benign paroxysmal vertigo, unspecified ear     onset: 05/06/2010   • Cirrhosis ([facility] )     onset: 03/30/2005   • Crohn disease ([facility] )    • Disease of thyroid gland    • GERD (gastroesophageal reflux disease)    • History of transfusion    • Liver transplant candidate     onset: 09/16/2008   • Osteoporosis    • Pelvic fracture ([facility] )     onset: 10/14/2008       Medical Review Of Systems:    Review of Systems    Meds/Allergies     all current active meds have been reviewed  Allergies   Allergen Reactions   • Tetracyclines & Related Hives   • Vancomycin Other (See Comments) and Itching     Reaction Date: [id number]; Pt unsure of reaction   • Prograf [Tacrolimus] Anxiety     Reaction Date: [id number]; Objective     Vital signs in last 24 hours:  Temp:  [97 4 °F (36 3 °C)-100 3 °F (37 9 °C)] 97 5 °F (36 4 °C)  HR:  [74-85] 77  Resp:  [18-20] 18  BP: ()/(44-69) 128/64      Intake/Output Summary (Last 24 hours) at 1/5/2023 1148  Last data filed at 1/5/2023 0134  Gross per 24 hour   Intake 1050 ml   Output --   Net 1050 ml           Lab Results: I have personally reviewed all pertinent laboratory/tests results  Imaging Studies: CT abdomen pelvis wo contrast    Result Date: 1/4/2023  Narrative: CT ABDOMEN AND PELVIS WITHOUT IV CONTRAST INDICATION:   elevated bun/ not feeling well  COMPARISON:  Most recent prior exam for comparison is a CT scan dated June 25, 2022  TECHNIQUE:  CT examination of the abdomen and pelvis was performed without intravenous contrast  Axial, sagittal, and coronal 2D reformatted images were created from the source data and submitted for interpretation  Radiation dose length product (DLP) for this visit:  948  27 mGy-cm     This examination, like all CT scans performed in the [facility], was performed utilizing techniques to minimize radiation dose exposure, including the use of iterative  reconstruction and automated exposure control  Enteric contrast was administered  FINDINGS: ABDOMEN LOWER CHEST:  No clinically significant abnormality identified in the visualized lower chest  LIVER/BILIARY TREE:  Unremarkable  GALLBLADDER:  Surgically absent  SPLEEN:  Unremarkable  PANCREAS:  Unremarkable  ADRENAL GLANDS:  Unremarkable  KIDNEYS/URETERS:  No obstructing calculi  Mild cortical atrophy of the left kidney  lSTOMACH AND BOWEL:  Patent small bowel anastomosis in the right midabdomen  APPENDIX:  No findings to suggest appendicitis  ABDOMINOPELVIC CAVITY:  No ascites  No pneumoperitoneum  No lymphadenopathy  VESSELS:  Atherosclerotic changes are present  No evidence of aneurysm  PELVIS REPRODUCTIVE ORGANS:  1 6 cm left ovarian cyst stable since at least 2019  URINARY BLADDER:  Bladder wall thickening and infiltration of the adjacent fat concerning for infection  ABDOMINAL WALL/INGUINAL REGIONS:  Unremarkable  OSSEOUS STRUCTURES:  No destructive skeletal lesions  Impression: Bladder wall thickening and infiltration of the adjacent fat concerning for urinary tract infection  Recommend correlation with urinalysis  Otherwise, no acute findings  Workstation performed: [id number]     XR chest 1 view portable    Result Date: 1/5/2023  Narrative: CHEST INDICATION:   abnormal labs/ feeling ill  COMPARISON:  5/18/2021 EXAM PERFORMED/VIEWS:  XR CHEST PORTABLE FINDINGS:  There is a loop recorder  Cardiomediastinal silhouette appears unremarkable  There is stable severe mitral valve calcification  The lungs are clear  There is stable mild elevation of the right hemidiaphragm  No pneumothorax or pleural effusion  Osseous structures appear within normal limits for patient age  Impression: No acute cardiopulmonary disease   Workstation performed: [id number]     EKG/Pathology/Other Studies:   Lab Results   Component Value Date    VENTRATE 68 11/14/2022 ATRIALRATE 68 11/14/2022    PRINT 190 11/14/2022    QRSDINT 94 11/14/2022    QTINT 416 11/14/2022    QTCINT 442 11/14/2022    PAXIS -7 11/14/2022    QRSAXIS -20 11/14/2022    TWAVEAXIS 35 11/14/2022        Code Status: Level 1 - Full Code  Advance Directive and Living Will: Yes    Power of :    POLST:      Counseling / Coordination of Care: Total floor / unit time spent today 30 minutes  Greater than 50% of total time was spent with the patient and / or family counseling and / or coordination of care  A description of the counseling / coordination of care: Chart review, patient evaluation, coordination and communication with staff, nursing and provider

## 2023-01-05 NOTE — ED NOTES
Patient transported to [address] [facility], [address] [place] [address]  01/04/23 7569

## 2023-01-05 NOTE — ED PROVIDER NOTES
History  Chief Complaint   Patient presents with   • Weakness - Generalized   • Abnormal Lab     Patient comes from [facility] due to generalized weakness and abnormal BUN level     73-year-old female was sent in from the nursing home due to BUN of 118  She is also here with a temp of 100 3  Blood pressure was 94/44 patient has no specific complaints she just states that she does not feel well  No other noticeable injuries      History provided by:  Patient   used: No        Prior to Admission Medications   Prescriptions Last Dose Informant Patient Reported? Taking?    Calcium 500 MG tablet   Yes Yes   Sig: Take 500 mg by mouth   Cholecalciferol (Vitamin D) 50 MCG (2000 UT) tablet   Yes Yes   Sig: Take 1,000 Units by mouth   HYDROcodone-acetaminophen (Norco) 5-325 mg per tablet   No Yes   Sig: Take 1 tablet by mouth every 6 (six) hours as needed for pain (moderate, severe pain) Max Daily Amount: 4 tablets   OLANZapine (ZyPREXA ZYDIS) 5 mg dispersible tablet   No Yes   Sig: Take 0 5 tablets (2 5 mg total) by mouth daily at bedtime   OLANZapine (ZyPREXA) 5 mg tablet   No Yes   Sig: Take 1 tablet (5 mg total) by mouth every 6 (six) hours as needed (Severe agitation)   acetaminophen (TYLENOL) 325 mg tablet   No Yes   Sig: Take 2 tablets (650 mg total) by mouth every 6 (six) hours as needed for mild pain or fever   aspirin (ECOTRIN LOW STRENGTH) 81 mg EC tablet   No Yes   Sig: Take 1 tablet (81 mg total) by mouth daily with breakfast   b complex vitamins capsule   No Yes   Sig: Take 1 capsule by mouth daily   clopidogrel (PLAVIX) 75 mg tablet   No Yes   Sig: TAKE 1 TABLET EVERY DAY   cycloSPORINE modified (NEORAL) 25 mg capsule   Yes Yes   Sig: Take 50 mg by mouth 2 (two) times a day   docusate sodium (COLACE) 100 mg capsule   No Yes   Sig: Take 1 capsule (100 mg total) by mouth 2 (two) times a day for 10 days   levothyroxine 112 mcg tablet   No Yes   Sig: TAKE 1 TABLET EVERY DAY AS DIRECTED melatonin 3 mg   No No   Sig: Take 1 tablet (3 mg total) by mouth daily at bedtime   metoprolol tartrate (LOPRESSOR) 25 mg tablet   No Yes   Sig: Take 2 tablets (50 mg total) by mouth every 12 (twelve) hours   mycophenolate (CELLCEPT) 250 mg capsule   No Yes   Sig: Take 3 capsules (750 mg total) by mouth every 12 (twelve) hours   omeprazole (PriLOSEC) 40 MG capsule   Yes Yes   Sig: Take 40 mg by mouth daily   polyethylene glycol (MIRALAX) 17 g packet   No Yes   Sig: Take 17 g by mouth daily as needed (constipation)   pravastatin (PRAVACHOL) 20 mg tablet   No Yes   Sig: TAKE 1 TABLET BY MOUTH ONCE DAILY AT BEDTIME      Facility-Administered Medications: None       Past Medical History:   Diagnosis Date   • Anemia    • Anxiety    • Arthritis    • Benign paroxysmal vertigo, unspecified ear     onset: 05/06/2010   • Cirrhosis ([facility] )     onset: 03/30/2005   • Crohn disease ([place] [address] )    • Disease of thyroid gland    • GERD (gastroesophageal reflux disease)    • History of transfusion    • Liver transplant candidate     onset: 09/16/2008   • Osteoporosis    • Pelvic fracture ([facility] )     onset: 10/14/2008       Past Surgical History:   Procedure Laterality Date   • ABDOMINAL SURGERY     • APPENDECTOMY     • BLADDER AUGMENTATION N/A    • CATARACT EXTRACTION     • CHOLECYSTECTOMY N/A    • COLONOSCOPY N/A 4/20/2017    Procedure: COLONOSCOPY;  Surgeon: Lyudmila Delatorre MD;  Location: [place] [address] GI LAB; Service:    • ESOPHAGOGASTRODUODENOSCOPY N/A 3/31/2016    Procedure: ESOPHAGOGASTRODUODENOSCOPY (EGD); Surgeon: Lyudmila Delatorre MD;  Location: [place] [address] GI LAB; Service:    • ESOPHAGOGASTRODUODENOSCOPY N/A 4/20/2017    Procedure: ESOPHAGOGASTRODUODENOSCOPY (EGD); Surgeon: Lyudmila Delatorre MD;  Location: [place] [address] [facility] LAB;   Service:    • EYE SURGERY      cataracts removed both eyes lens implant   • HERNIA REPAIR     • JOINT REPLACEMENT      left knee replacement   • LIVER TRANSPLANTATION N/A    • ORIF TIBIA & FIBULA FRACTURES Right 11/18/2022 Procedure: OPEN REDUCTION W/ INTERNAL FIXATION (ORIF) RIGHT ANKLE FRACTURE;  Surgeon: Erick Rodriguez MD;  Location: [facility];  Service: Orthopedics   • TONSILECTOMY AND ADNOIDECTOMY N/A    • TUBAL LIGATION         Family History   Problem Relation Age of Onset   • Cancer Mother         breast   • Cancer Father    • Crohn's disease Brother    • Arthritis Family    • Breast cancer Family      I have reviewed and agree with the history as documented  E-Cigarette/Vaping   • E-Cigarette Use Never User      E-Cigarette/Vaping Substances   • Nicotine No    • THC No    • CBD No    • Flavoring No    • Other No    • Unknown No      Social History     Tobacco Use   • Smoking status: Former     Years:      Types: Cigarettes     Quit date: 1975     Years since quittin 9   • Smokeless tobacco: Never   • Tobacco comments:     smoked for 5 years-1/2 to 1 ppd   Vaping Use   • Vaping Use: Never used   Substance Use Topics   • Alcohol use: Never   • Drug use: Never       Review of Systems   Constitutional: Positive for fever  Negative for activity change, chills and diaphoresis  HENT: Negative for congestion, ear pain, nosebleeds, sore throat, trouble swallowing and voice change  Eyes: Negative for pain, discharge and redness  Respiratory: Negative for apnea, cough, choking, shortness of breath, wheezing and stridor  Cardiovascular: Negative for chest pain and palpitations  Gastrointestinal: Positive for nausea  Negative for abdominal distention, abdominal pain, constipation, diarrhea and vomiting  Endocrine: Negative for polydipsia  Genitourinary: Negative for difficulty urinating, dysuria, flank pain, frequency, hematuria and urgency  Musculoskeletal: Negative for back pain, gait problem, joint swelling, myalgias, neck pain and neck stiffness  Skin: Negative for pallor and rash  Neurological: Positive for weakness   Negative for dizziness, tremors, syncope, speech difficulty, numbness and headaches  Hematological: Negative for adenopathy  Psychiatric/Behavioral: Negative for confusion, hallucinations, self-injury and suicidal ideas  The patient is not nervous/anxious  Physical Exam  Physical Exam  Vitals and nursing note reviewed  Constitutional:       General: She is not in acute distress  Appearance: She is well-developed  She is not diaphoretic  HENT:      Head: Normocephalic and atraumatic  Right Ear: External ear normal       Left Ear: External ear normal       Nose: Nose normal    Eyes:      Conjunctiva/sclera: Conjunctivae normal       Pupils: Pupils are equal, round, and reactive to light  Cardiovascular:      Rate and Rhythm: Normal rate and regular rhythm  Heart sounds: Normal heart sounds  Pulmonary:      Effort: Pulmonary effort is normal       Breath sounds: Normal breath sounds  Abdominal:      General: Bowel sounds are normal       Palpations: Abdomen is soft  Musculoskeletal:         General: Normal range of motion  Cervical back: Normal range of motion and neck supple  Skin:     General: Skin is warm and dry  Neurological:      Mental Status: She is alert and oriented to person, place, and time  Deep Tendon Reflexes: Reflexes are normal and symmetric  Psychiatric:         Behavior: Behavior is cooperative           Vital Signs  ED Triage Vitals   Temperature Pulse Respirations Blood Pressure SpO2   01/04/23 2013 01/04/23 2013 01/04/23 2013 01/04/23 2013 01/04/23 2013   100 3 °F (37 9 °C) 82 20 (!) 94/44 96 %      Temp Source Heart Rate Source Patient Position - Orthostatic VS BP Location FiO2 (%)   01/04/23 2013 01/04/23 2013 01/04/23 2013 01/04/23 2013 --   Tympanic Monitor Lying Right arm       Pain Score       01/04/23 2126       Med Not Given for Pain - for MAR use only           Vitals:    01/04/23 2013 01/04/23 2130 01/04/23 2200   BP: (!) 94/44 113/56 118/58   Pulse: 82 78 74   Patient Position - Orthostatic VS: Lying Visual Acuity      ED Medications  Medications   sodium chloride 0 9 % bolus 1,000 mL (0 mL Intravenous Stopped 1/4/23 2247)   ondansetron (ZOFRAN) injection 4 mg (4 mg Intravenous Given 1/4/23 2126)   acetaminophen (TYLENOL) tablet 650 mg (650 mg Oral Given 1/4/23 2126)       Diagnostic Studies  Results Reviewed     Procedure Component Value Units Date/Time    Urine culture [[id number]] Collected: 01/04/23 2249    Lab Status: No result Specimen: Urine, Other     UA (URINE) with reflex to Scope [[id number]] Collected: 01/04/23 2248    Lab Status: No result Specimen: Urine, Straight Cath     HS Troponin I 2hr [[id number]] Collected: 01/04/23 2236    Lab Status: In process Specimen: Blood from Arm, Left Updated: 01/04/23 2240    HS Troponin I 4hr [[id number]]     Lab Status: No result Specimen: Blood     FLU/RSV/COVID - if FLU/RSV clinically relevant [[id number]]  (Abnormal) Collected: 01/04/23 2036    Lab Status: Final result Specimen: Nares from Nose Updated: 01/04/23 2127     SARS-CoV-2 Positive     INFLUENZA A PCR Negative     INFLUENZA B PCR Negative     RSV PCR Negative    Narrative:      FOR PEDIATRIC PATIENTS - copy/paste COVID Guidelines URL to browser: [email or web address]    SARS-CoV-2 assay is a Nucleic Acid Amplification assay intended for the  qualitative detection of nucleic acid from SARS-CoV-2 in nasopharyngeal  swabs  Results are for the presumptive identification of SARS-CoV-2 RNA  Positive results are indicative of infection with SARS-CoV-2, the virus  causing COVID-19, but do not rule out bacterial infection or co-infection  with other viruses  Laboratories within the [place] and its  territories are required to report all positive results to the appropriate  public health authorities  Negative results do not preclude SARS-CoV-2  infection and should not be used as the sole basis for treatment or other  patient management decisions  Negative results must be combined with  clinical observations, patient history, and epidemiological information  This test has not been FDA cleared or approved  This test has been authorized by FDA under an Emergency Use Authorization  (EUA)  This test is only authorized for the duration of time the  declaration that circumstances exist justifying the authorization of the  emergency use of an in vitro diagnostic tests for detection of SARS-CoV-2  virus and/or diagnosis of COVID-19 infection under section 564(b)(1) of  the Act, 21 U  S C  821AQI-1(A)(4), unless the authorization is terminated  or revoked sooner  The test has been validated but independent review by FDA  and CLIA is pending  Test performed using [employer] GeneXpert: This RT-PCR assay targets N2,  a region unique to SARS-CoV-2  A conserved region in the E-gene was chosen  for pan-Sarbecovirus detection which includes SARS-CoV-2  According to CMS-2020-01-R, this platform meets the definition of high-throughput technology      Magnesium [[id number]]  (Normal) Collected: 01/04/23 2032    Lab Status: Final result Specimen: Blood from Arm, Left Updated: 01/04/23 2112     Magnesium 2 5 mg/dL     Lipase [[id number]]  (Normal) Collected: 01/04/23 2032    Lab Status: Final result Specimen: Blood from Arm, Left Updated: 01/04/23 2112     Lipase 120 u/L     NT-BNP PRO [[id number]]  (Abnormal) Collected: 01/04/23 2032    Lab Status: Final result Specimen: Blood from Arm, Left Updated: 01/04/23 2112     NT-proBNP 3,898 pg/mL     HS Troponin 0hr (reflex protocol) [[id number]]  (Normal) Collected: 01/04/23 2032    Lab Status: Final result Specimen: Blood from Arm, Left Updated: 01/04/23 2112     hs TnI 0hr 17 ng/L     Lactic acid [[id number]]  (Normal) Collected: 01/04/23 2032    Lab Status: Final result Specimen: Blood from Arm, Left Updated: 01/04/23 2110     LACTIC ACID 0 6 mmol/L     Narrative:      Result may be elevated if tourniquet was used during collection      Comprehensive metabolic panel [[id number]]  (Abnormal) Collected: 01/04/23 2032    Lab Status: Final result Specimen: Blood from Arm, Left Updated: 01/04/23 2105     Sodium 134 mmol/L      Potassium 5 2 mmol/L      Chloride 102 mmol/L      CO2 16 mmol/L      ANION GAP 16 mmol/L       mg/dL      Creatinine 4 15 mg/dL      Glucose 111 mg/dL      Calcium 9 3 mg/dL      Corrected Calcium 10 2 mg/dL      AST 20 U/L      ALT 14 U/L      Alkaline Phosphatase 229 U/L      Total Protein 7 7 g/dL      Albumin 2 9 g/dL      Total Bilirubin 0 32 mg/dL      eGFR 9 ml/min/1 73sq m     Narrative:      [place] guidelines for Chronic Kidney Disease (CKD):   •  Stage 1 with normal or high GFR (GFR > 90 mL/min/1 73 square meters)  •  Stage 2 Mild CKD (GFR = 60-89 mL/min/1 73 square meters)  •  Stage 3A Moderate CKD (GFR = 45-59 mL/min/1 73 square meters)  •  Stage 3B Moderate CKD (GFR = 30-44 mL/min/1 73 square meters)  •  Stage 4 Severe CKD (GFR = 15-29 mL/min/1 73 square meters)  •  Stage 5 End Stage CKD (GFR <15 mL/min/1 73 square meters)  Note: GFR calculation is accurate only with a steady state creatinine    Protime-INR [[id number]]  (Normal) Collected: 01/04/23 2032    Lab Status: Final result Specimen: Blood from Arm, Left Updated: 01/04/23 2100     Protime 14 0 seconds      INR 1 07    APTT [[id number]]  (Normal) Collected: 01/04/23 2032    Lab Status: Final result Specimen: Blood from Arm, Left Updated: 01/04/23 2100     PTT 24 seconds     CBC and differential [[id number]]  (Abnormal) Collected: 01/04/23 2032    Lab Status: Final result Specimen: Blood from Arm, Left Updated: 01/04/23 2048     WBC 8 63 Thousand/uL      RBC 3 78 Million/uL      Hemoglobin 10 2 g/dL      Hematocrit 32 6 %      MCV 86 fL      MCH 27 0 pg      MCHC 31 3 g/dL      RDW 14 9 %      MPV 10 3 fL      Platelets 669 Thousands/uL      nRBC 0 /100 WBCs      Neutrophils Relative 79 %      Immat GRANS % 2 % Lymphocytes Relative 10 %      Monocytes Relative 7 %      Eosinophils Relative 1 %      Basophils Relative 1 %      Neutrophils Absolute 6 88 Thousands/µL      Immature Grans Absolute 0 17 Thousand/uL      Lymphocytes Absolute 0 87 Thousands/µL      Monocytes Absolute 0 56 Thousand/µL      Eosinophils Absolute 0 10 Thousand/µL      Basophils Absolute 0 05 Thousands/µL     Blood culture #1 [[id number]] Collected: 01/04/23 2032    Lab Status: In process Specimen: Blood from Arm, Left Updated: 01/04/23 2047    Blood culture #2 [[id number]] Collected: 01/04/23 2036    Lab Status: In process Specimen: Blood from Arm, Right Updated: 01/04/23 2047                 CT abdomen pelvis wo contrast   Final Result by Bety Gomez MD (01/04 2152)      Bladder wall thickening and infiltration of the adjacent fat concerning for urinary tract infection  Recommend correlation with urinalysis  Otherwise, no acute findings  Workstation performed: [id number]         XR chest 1 view portable    (Results Pending)              Procedures  Procedures         ED Course                               SBIRT 22yo+    Flowsheet Row Most Recent Value   SBIRT (23 yo +)    In order to provide better care to our patients, we are screening all of our patients for alcohol and drug use  Would it be okay to ask you these screening questions? Yes Filed at: 01/04/2023 2047   Initial Alcohol Screen: US AUDIT-C     1  How often do you have a drink containing alcohol? 0 Filed at: 01/04/2023 2047   2  How many drinks containing alcohol do you have on a typical day you are drinking? 0 Filed at: 01/04/2023 2047   3a  Male UNDER 65: How often do you have five or more drinks on one occasion? 0 Filed at: 01/04/2023 2047   3b  FEMALE Any Age, or MALE 65+: How often do you have 4 or more drinks on one occassion? 0 Filed at: 01/04/2023 2047   Audit-C Score 0 Filed at: 01/04/2023 2047   NOA: How many times in the past year have you        Used an illegal drug or used a prescription medication for non-medical reasons? Never Filed at: 01/04/2023 2047                    Medical Decision Making  Acute kidney injury most likely from decreased fluid intake with from COVID    Acute kidney injury [facility]): acute illness or injury with systemic symptoms that poses a threat to life or bodily functions  COVID: acute illness or injury with systemic symptoms  Amount and/or Complexity of Data Reviewed  Independent Historian: EMS  Labs: ordered  Decision-making details documented in ED Course  Radiology: ordered  Decision-making details documented in ED Course  Discussion of management or test interpretation with external provider(s): Patient will be admitted to the hospital to try to improve her kidney function    Risk  OTC drugs  Prescription drug management  Decision regarding hospitalization  Disposition  Final diagnoses:   Acute kidney injury ([address] )   COVID     Time reflects when diagnosis was documented in both MDM as applicable and the Disposition within this note     Time User Action Codes Description Comment    1/4/2023 10:47 PM John Abts Add [N17 9] Acute kidney injury ([address] )     1/4/2023 10:47 PM John Abts Add [U07 1] [facility]       ED Disposition     ED Disposition   Admit    Condition   Stable    Date/Time   Wed Jan 4, 2023 10:47 PM    Comment   Case was discussed with laura and the patient's admission status was agreed to be Admission Status: observation status to the service of Dr Mecca Montanez     Follow-up Information    None         Patient's Medications   Discharge Prescriptions    No medications on file       No discharge procedures on file      PDMP Review       Value Time User    PDMP Reviewed  Yes 11/16/2022  1:48 PM [facility] DO Zach          ED Provider  Electronically Signed by           Cynthia Gutierrez DO  01/04/23 7066

## 2023-01-05 NOTE — PROGRESS NOTES
-- Patient: Gosia English  -- MRN: [id number]  -- Aidin Request ID: [id number]  -- Level of care reserved: [address]  -- Partner Reserved: [facility], [phone]  -- Clinical needs requested:  -- Geography searched: [place] miles around [facility]  -- Start of Service:  -- Request sent: 10:56am EST on 1/5/2023 by [employer] Rick  -- Partner reserved: 11:04am EST on 1/5/2023 by [employer] Rick  -- Choice list shared: 11:04am EST on 1/5/2023 by [employer] Rick

## 2023-01-05 NOTE — CASE MANAGEMENT
Case Management Assessment & Discharge Planning Note    Patient name Camryn Page  Location [address]-* MRN [id number]  : 1945 Date 2023       Current Admission Date: 2023  Current Admission Diagnosis:REZA (acute kidney injury) [facility])   Patient Active Problem List    Diagnosis Date Noted   • Dementia with behavioral disturbance 2022   • Fall 2022   • Closed fracture of distal end of right fibula 2022   • GERD (gastroesophageal reflux disease)    • Stage 3b chronic kidney disease ([facility] ) 2022   • REZA (acute kidney injury) ([place] [address] ) 2021   • UTI (urinary tract infection) 2021   • Obesity, morbid ([place] [address] ) 2021   • Osteopenia of multiple sites 2020   • Other fatigue 2020   • Snoring 2020   • Chronic left-sided thoracic back pain 2019   • Left ovarian cyst 2019   • Asymptomatic stenosis of left carotid artery 2018   • Abnormal EEG 2017   • Altered mental status 2017   • Cerebrovascular accident (CVA) due to embolism ([facility] ) 2017   • Non-ST elevation myocardial infarction (NSTEMI), type 2 2017   • History of seizure 10/31/2017   • Paroxysmal SVT (supraventricular tachycardia) ([facility] ) 10/31/2017   • TIA (transient ischemic attack) 10/29/2017   • HLD (hyperlipidemia) 10/29/2017   • Cyst of diaphragm 2017   • Long-term use of immunosuppressant medication 2016   • Anemia 2016   • Liver transplanted ([facility] ) 2016   • Hypothyroidism 2016   • Elevated homocysteine 2016   • Chronic joint pain 2016   • Lung nodule, solitary 2016   • Sciatica 2015   • Hypercalcemia 2015   • Ataxic gait 2013   • Chronic kidney insufficiency 2013   • Benign essential hypertension 2012   • Ulcerative colitis without complications ([facility] )    • Depression 2012      LOS (days): 0  Geometric Mean LOS (GMLOS) (days):   Days to GMLOS: OBJECTIVE:        Current admission status: Observation  Referral Reason: Other (Discharge planning)    Preferred Pharmacy:   [facility] [place], [place], [address]  [address] [address]  Phone: [phone] Fax: [phone], [phone] [facility] [address] [facility] [address]  [place] [address]  Phone: [phone] Fax: [phone]    Primary Care Provider: Wayne Camacho MD    Primary Insurance: MEDICARE  Secondary Insurance: COMMERCIAL MISCELLANEOUS    ASSESSMENT:  Dequan aDwn, [address] - Daughter   Primary Phone: [phone] [facility]  Home Phone: [phone]                Obs Notice Signed: 01/04/23 (Notice given by registration per chart)    Readmission Root Cause  30 Day Readmission: No    Patient Information  Admitted from[de-identified] Facility ([facility])  Mental Status: Confused  During Assessment patient was accompanied by: Daughter  Assessment information provided by[de-identified] Daughter  Primary Caregiver: Other (Comment)  Caregiver's Name[de-identified] [facility] Number[de-identified] [phone]  Support Systems: Daughter, Family members  [place] of Residence: [address] [place] [address] do you live in?: [address] [place] [address] Road entry access options   Select all that apply : No steps to enter home  Type of Current Residence: Facility  In the last 12 months, was there a time when you were not able to pay the mortgage or rent on time?: No  In the last 12 months, how many places have you lived?: 2 (Lived at home with significant other Maurisio Silverio, is currently placed at [facility])  In the last 12 months, was there a time when you did not have a steady place to sleep or slept in a shelter (including now)?: No  Homeless/housing insecurity resource given?: N/A  Living Arrangements: Other (Comment)    Activities of Daily Living Prior to Admission  Functional Status: Assistance  Completes ADLs independently?: No  Level of ADL dependence: Assistance  Ambulates independently?: No  Level of ambulatory dependence: Assistance  Does the patient have a history of Short-Term Rehab?: Yes ([facility], [facility])  Does patient have a history of HHC?: Yes ([facility])  Does patient currently have Marino Doyle?: No    Patient Information Continued  Income Source: Pension/penitentiary  Does patient have prescription coverage?: Yes  Within the past 12 months, you worried that your food would run out before you got the money to buy more : Never true  Within the past 12 months, the food you bought just didn't last and you didn't have money to get more : Never true  Food insecurity resource given?: N/A  Does patient receive dialysis treatments?: No  Does patient have a history of substance abuse?: No  Does patient have a history of Mental Health Diagnosis?: No    Means of Transportation  In the past 12 months, has lack of transportation kept you from medical appointments or from getting medications?: No  In the past 12 months, has lack of transportation kept you from meetings, work, or from getting things needed for daily living?: No  Was application for public transport provided?: N/A    DISCHARGE DETAILS:    Discharge planning discussed with[de-identified] Daughter Maryam Beyer of Choice: Yes     Comments - Freedom of Choice: FOC reviewed by phone with daughter [employer]  Patient had been living at home with her significant other Hermilo Stockton prior to her placement at [address] at [facility] for [address] [place]  Patient was admitted from [facility] and Rae's preference is that she return to [facility] at discharge  Family hopes that patient might eventually be able to return home  SW placed referral to [facility] in  [address] and patient has been accepted  Bed was reserved in [address] and  will coordinate with facility when patient is ready for discharge       CM contacted family/caregiver?: Yes  Were Treatment Team discharge recommendations reviewed with patient/caregiver?: Yes  Did patient/caregiver verbalize understanding of patient care needs?: N/A- going to facility  Were patient/caregiver advised of the risks associated with not following Treatment Team discharge recommendations?: Yes    Contacts  Patient Contacts: Lucio Benítez (dtr)  Relationship to Patient[de-identified] Family  Contact Method: Phone  Phone Number: [phone]  Reason/Outcome: Continuity of Care, Emergency Contact, Discharge [address]         Is the patient interested in [facility] at discharge?: No    DME Referral Provided  Referral made for DME?: No    Other Referral/Resources/Interventions Provided:  Interventions: Short Term Rehab    Would you like to participate in our [address] service program?  : No - Declined    Treatment Team Recommendation: Short Term Rehab  Discharge Destination Plan[de-identified] Short Term Rehab  Transport at Discharge : BLS Ambulance

## 2023-01-05 NOTE — ED NOTES
Patient's daughter Bethany Pederson called and requested to be called for any updates on patient status        Cherylene Fish, RN  01/04/23 2045

## 2023-01-05 NOTE — PLAN OF CARE
Problem: Potential for Falls  Goal: Patient will remain free of falls  Description: INTERVENTIONS:  - Educate patient/family on patient safety including physical limitations  - Instruct patient to call for assistance with activity   - Consult OT/PT to assist with strengthening/mobility   - Keep Call bell within reach  - Keep bed low and locked with side rails adjusted as appropriate  - Keep care items and personal belongings within reach  - Initiate and maintain comfort rounds  - Make Fall Risk Sign visible to staff  - Offer Toileting every 2  Hours, in advance of need  - Initiate/Maintain bed alarm  - Obtain necessary fall risk management equipment: walker/ cane  - Apply yellow socks and bracelet for high fall risk patients  - Consider moving patient to room near nurses station  Outcome: Progressing     Problem: MOBILITY - ADULT  Goal: Maintain or return to baseline ADL function  Description: INTERVENTIONS:  -  Assess patient's ability to carry out ADLs; assess patient's baseline for ADL function and identify physical deficits which impact ability to perform ADLs (bathing, care of mouth/teeth, toileting, grooming, dressing, etc )  - Assess/evaluate cause of self-care deficits   - Assess range of motion  - Assess patient's mobility; develop plan if impaired  - Assess patient's need for assistive devices and provide as appropriate  - Encourage maximum independence but intervene and supervise when necessary  - Involve family in performance of ADLs  - Assess for home care needs following discharge   - Consider OT consult to assist with ADL evaluation and planning for discharge  - Provide patient education as appropriate  Outcome: Progressing  Goal: Maintains/Returns to pre admission functional level  Description: INTERVENTIONS:  - Perform BMAT or MOVE assessment daily    - Set and communicate daily mobility goal to care team and patient/family/caregiver     - Collaborate with rehabilitation services on mobility goals if consulted  - Perform Range of Motion 3  times a day  - Reposition patient every 2 hours    - Dangle patient 3 times a day  - Stand patient 3  times a day  - Out of bed to chair 3  times a day   - Out of bed for meals 3  times a day  - Out of bed for toileting  - Record patient progress and toleration of activity level   Outcome: Progressing     Problem: PAIN - ADULT  Goal: Verbalizes/displays adequate comfort level or baseline comfort level  Description: Interventions:  - Encourage patient to monitor pain and request assistance  - Assess pain using appropriate pain scale  - Administer analgesics based on type and severity of pain and evaluate response  - Implement non-pharmacological measures as appropriate and evaluate response  - Consider cultural and social influences on pain and pain management  - Notify physician/advanced practitioner if interventions unsuccessful or patient reports new pain  Outcome: Progressing     Problem: INFECTION - ADULT  Goal: Absence or prevention of progression during hospitalization  Description: INTERVENTIONS:  - Assess and monitor for signs and symptoms of infection  - Monitor lab/diagnostic results  - Monitor all insertion sites, i e  indwelling lines, tubes, and drains  - Monitor endotracheal if appropriate and nasal secretions for changes in amount and color  - Ramona appropriate cooling/warming therapies per order  - Administer medications as ordered  - Instruct and encourage patient and family to use good hand hygiene technique  - Identify and instruct in appropriate isolation precautions for identified infection/condition  Outcome: Progressing  Goal: Absence of fever/infection during neutropenic period  Description: INTERVENTIONS:  - Monitor WBC    Outcome: Progressing     Problem: SAFETY ADULT  Goal: Patient will remain free of falls  Description: INTERVENTIONS:  - Educate patient/family on patient safety including physical limitations  - Instruct patient to call for assistance with activity   - Consult OT/PT to assist with strengthening/mobility   - Keep Call bell within reach  - Keep bed low and locked with side rails adjusted as appropriate  - Keep care items and personal belongings within reach  - Initiate and maintain comfort rounds  - Make Fall Risk Sign visible to staff  - Offer Toileting every 2  Hours, in advance of need  - Initiate/Maintain bed alarm  - Obtain necessary fall risk management equipment: walker/ cane  - Apply yellow socks and bracelet for high fall risk patients  - Consider moving patient to room near nurses station  Outcome: Progressing  Goal: Maintain or return to baseline ADL function  Description: INTERVENTIONS:  -  Assess patient's ability to carry out ADLs; assess patient's baseline for ADL function and identify physical deficits which impact ability to perform ADLs (bathing, care of mouth/teeth, toileting, grooming, dressing, etc )  - Assess/evaluate cause of self-care deficits   - Assess range of motion  - Assess patient's mobility; develop plan if impaired  - Assess patient's need for assistive devices and provide as appropriate  - Encourage maximum independence but intervene and supervise when necessary  - Involve family in performance of ADLs  - Assess for home care needs following discharge   - Consider OT consult to assist with ADL evaluation and planning for discharge  - Provide patient education as appropriate  Outcome: Progressing  Goal: Maintains/Returns to pre admission functional level  Description: INTERVENTIONS:  - Perform BMAT or MOVE assessment daily    - Set and communicate daily mobility goal to care team and patient/family/caregiver  - Collaborate with rehabilitation services on mobility goals if consulted  - Perform Range of Motion 3  times a day  - Reposition patient every 2 hours    - Dangle patient 3 times a day  - Stand patient 3  times a day  - Out of bed to chair 3  times a day   - Out of bed for meals 3  times a day  - Out of bed for toileting  - Record patient progress and toleration of activity level   Outcome: Progressing     Problem: Knowledge Deficit  Goal: Patient/family/caregiver demonstrates understanding of disease process, treatment plan, medications, and discharge instructions  Description: Complete learning assessment and assess knowledge base    Interventions:  - Provide teaching at level of understanding  - Provide teaching via preferred learning methods  Outcome: Progressing     Problem: NEUROSENSORY - ADULT  Goal: Achieves stable or improved neurological status  Description: INTERVENTIONS  - Monitor and report changes in neurological status  - Monitor vital signs such as temperature, blood pressure, glucose, and any other labs ordered   - Initiate measures to prevent increased intracranial pressure  - Monitor for seizure activity and implement precautions if appropriate      Outcome: Progressing     Problem: CARDIOVASCULAR - ADULT  Goal: Absence of cardiac dysrhythmias or at baseline rhythm  Description: INTERVENTIONS:  - Continuous cardiac monitoring, vital signs, obtain 12 lead EKG if ordered  - Administer antiarrhythmic and heart rate control medications as ordered  - Monitor electrolytes and administer replacement therapy as ordered  Outcome: Progressing     Problem: METABOLIC, FLUID AND ELECTROLYTES - ADULT  Goal: Electrolytes maintained within normal limits  Description: INTERVENTIONS:  - Monitor labs and assess patient for signs and symptoms of electrolyte imbalances  - Administer electrolyte replacement as ordered  - Monitor response to electrolyte replacements, including repeat lab results as appropriate  - Instruct patient on fluid and nutrition as appropriate  Outcome: Progressing  Goal: Fluid balance maintained  Description: INTERVENTIONS:  - Monitor labs   - Monitor I/O and WT  - Instruct patient on fluid and nutrition as appropriate  - Assess for signs & symptoms of volume excess or deficit  Outcome: Progressing     Problem: SKIN/TISSUE INTEGRITY - ADULT  Goal: Skin Integrity remains intact(Skin Breakdown Prevention)  Description: Assess:  -Perform Rafa assessment every shift  -Clean and moisturize skin every shift and after incontinence  -Inspect skin when repositioning, toileting, and assisting with ADl  -Assess extremities for adequate circulation and sensation     Bed Management:  -Have minimal linens on bed & keep smooth, unwrinkled  -Change linens as needed when moist or perspiring  -Avoid sitting or lying in one position for more than 2 hours while in bed  -Keep HOB at 30 degrees     Toileting:  -Offer bedside commode  -Assess for incontinence every 2 hours  -Use incontinent care products after each incontinent episode such as protective barrier cream    Activity:  -Mobilize patient 3  times a day  -Encourage activity and walks on unit  -Encourage or provide ROM exercises   -Turn and reposition patient every 2 Hours  -Use appropriate equipment to lift or move patient in bed  -Instruct/ Assist with weight shifting every 15 when out of bed in chair  -Consider limitation of chair time 4  hour intervals    Skin Care:  -Avoid use of baby powder, tape, friction and shearing, hot water or constrictive clothing  -Relieve pressure over bony prominences using waffle cushion  -Do not massage red bony areas    Next Steps:  -Consider consults to  interdisciplinary teams such as wound care and PT/ OT    Outcome: Progressing  Goal: Pressure injury heals and does not worsen  Description: Interventions:  - Implement low air loss mattress or specialty surface (Criteria met)  - Apply silicone foam dressing  - Instruct/assist with weight shifting every 30 minutes when in chair   - Limit chair time to 2 hour intervals  - Use special pressure reducing interventions such as waffle when in chair   - Apply fecal or urinary incontinence containment device   - Perform passive or active ROM every q2 hrs  - Turn and reposition patient & offload bony prominences every 2 hours   - Utilize friction reducing device or surface for transfers   - Consider nutrition services referral as needed  Outcome: Progressing     Problem: Prexisting or High Potential for Compromised Skin Integrity  Goal: Skin integrity is maintained or improved  Description: INTERVENTIONS:  - Identify patients at risk for skin breakdown  - Assess and monitor skin integrity  - Assess and monitor nutrition and hydration status  - Monitor labs   - Assess for incontinence   - Turn and reposition patient  - Assist with mobility/ambulation  - Relieve pressure over bony prominences  - Avoid friction and shearing  - Provide appropriate hygiene as needed including keeping skin clean and dry  - Evaluate need for skin moisturizer/barrier cream  - Collaborate with interdisciplinary team   - Patient/family teaching  - Consider wound care consult   Outcome: Progressing

## 2023-01-05 NOTE — ASSESSMENT & PLAN NOTE
Presents from care facility with decreased oral intake, weakness  · Creatine 4 15, baseline 1 1-1 4  · Anion gap 16, co2 16  · UA positive for UTI  · CT a/p: Bladder wall thickening and infiltration of the adjacent fat concerning for urinary tract infection  Otherwise, no acute findings    • Suspect due to dehydration  • Given 1L bolus in ED  • Will start sodium bicarbonate infusion  • Avoid nephrotoxins, hold hypotension  • Monitor with BMP

## 2023-01-05 NOTE — CONSULTS
Consultation - Joel Powell 76 y o  female MRN: [id number]    Unit/Bed#: [address] [place] [address]- Encounter: [id number]      Identifying Data: 76years old white female is admitted at [place] [address] from local nursing home with abnormal blood work psychiatric consultation is asked for depression and anxiety  Patient is known to me from seen at the nursing home for the depression  Patient examined spoke with the nurse history physical medications labs reviewed and noted  Spoke with the boyfriend at bedside  Patient is very weak tired and cranky easily irritated she has a hard time in speaking but she was able to answer my simple questions the she is very poor historian some information obtained from the boyfriend  Patient is confused she responded by saying that she is'' at [facility]) she admits to being depressed  I reviewed her nursing home medication  Currently patient is on Remeron 15 mg at bedtime for depression patient is very sluggish weak and tired I will discontinue Remeron for now and I will consider less sedating antidepressant medication Lexapro 5 mg at bedtime at this time  Social history nursing home resident she used to smoke in the past patient denies smoking denies abusing alcohol or drugs she has no history of drugs and alcohol abuse patient has 1 son and 1 daughter 15 grade education and she was working as a       Allergy  Tetracycline  Vancomycin  Tacrolimus    Diagnosis  Dementia with depression  Anxiety  Insomnia  Anemia arthritis vertigo cirrhosis of liver Crohn's disease hypothyroidism GERD liver transplant candidate osteoporosis history of pelvic fracture in October 14, 2008  Abdominal cholecystectomy colonoscopy bladder augmentation appendectomy EGD eye surgery hernia repair left knee replacement liver transplantation ORIF tibia and fibula fracture tubal ligation tonsillectomy    Chief Complaints: Dementia with depression and anxiety    Family History: Patient denies  Family History   Problem Relation Age of Onset   • Cancer Mother         breast   • Cancer Father    • Crohn's disease Brother    • Arthritis Family    • Breast cancer Family        Legal History:     Mental Status Exam: 76years old white female is resting in the bed alert and awake but very sluggish weak and tired disoriented to the place very poor historian and confabulates with many questions easily irritated because she has a hard time in talking due to severe weakness  Memory impaired depressed and anxious at times poor sleep  No evidence of hallucination no psychosis  Patient is not suicidal   No paranoia no delusion elucidated poor concentration  Insight and judgment are limited due to dementia and delirium secondary to medical condition  History of Present Illness     HPI: Tejal Quiles is a 76y o  year old female who presents with abnormal blood work  Consults      Historical Information   Past Psychiatric History: Patient is suffering from dementia with depression and she is on Remeron 15 mg at bedtime at nursing home  Patient is a poor historian she denies seeing a psychiatrist she denies psychiatric admissions in the past she denies suicide attempts in the past she denies history of drugs and alcohol abuse currently she is taking Remeron for depression    Past Medical History:   Diagnosis Date   • Anemia    • Anxiety    • Arthritis    • Benign paroxysmal vertigo, unspecified ear     onset: 05/06/2010   • Cirrhosis ([facility] [address] )     onset: 03/30/2005   • Crohn disease ([facility] [address] )    • Disease of thyroid gland    • GERD (gastroesophageal reflux disease)    • History of transfusion    • Liver transplant candidate     onset: 09/16/2008   • Osteoporosis    • Pelvic fracture ([facility] [address] )     onset: 10/14/2008     Past Surgical History:   Procedure Laterality Date   • ABDOMINAL SURGERY     • APPENDECTOMY     • BLADDER AUGMENTATION N/A    • CATARACT EXTRACTION     • CHOLECYSTECTOMY N/A    • COLONOSCOPY N/A 2017    Procedure: COLONOSCOPY;  Surgeon: Meryl Quiroga MD;  Location: [facility]; Service:    • ESOPHAGOGASTRODUODENOSCOPY N/A 3/31/2016    Procedure: ESOPHAGOGASTRODUODENOSCOPY (EGD); Surgeon: Meryl Quiroga MD;  Location: [facility]; Service:    • ESOPHAGOGASTRODUODENOSCOPY N/A 2017    Procedure: ESOPHAGOGASTRODUODENOSCOPY (EGD); Surgeon: Meryl Quiroga MD;  Location: [facility];   Service:    • EYE SURGERY      cataracts removed both eyes lens implant   • HERNIA REPAIR     • JOINT REPLACEMENT      left knee replacement   • LIVER TRANSPLANTATION N/A    • ORIF TIBIA & FIBULA FRACTURES Right 2022    Procedure: OPEN REDUCTION W/ INTERNAL FIXATION (ORIF) RIGHT ANKLE FRACTURE;  Surgeon: Francisco Tinajero MD;  Location: [facility];  Service: Orthopedics   • TONSILECTOMY AND ADNOIDECTOMY N/A    • TUBAL LIGATION       Social History   Social History     Substance and Sexual Activity   Alcohol Use Never     Social History     Substance and Sexual Activity   Drug Use Never     Social History     Tobacco Use   Smoking Status Former   • Years: 5 00   • Types: Cigarettes   • Quit date: 1975   • Years since quittin 9   Smokeless Tobacco Never   Tobacco Comments    smoked for 5 years-1/2 to 1 ppd       Meds/Allergies   current meds:   Current Facility-Administered Medications   Medication Dose Route Frequency   • acetaminophen (TYLENOL) tablet 650 mg  650 mg Oral Q6H PRN   • aspirin (ECOTRIN LOW STRENGTH) EC tablet 81 mg  81 mg Oral Daily With Breakfast   • cefTRIAXone (ROCEPHIN) IVPB (premix in dextrose) 1,000 mg 50 mL  1,000 mg Intravenous Q24H   • clopidogrel (PLAVIX) tablet 75 mg  75 mg Oral Daily   • cycloSPORINE modified (NEORAL) capsule 50 mg  50 mg Oral BID   • docusate sodium (COLACE) capsule 100 mg  100 mg Oral BID   • heparin (porcine) subcutaneous injection 5,000 Units  5,000 Units Subcutaneous Q8H [facility]   • levothyroxine tablet 112 mcg  112 mcg Oral Early Morning • melatonin tablet 3 mg  3 mg Oral HS   • metoprolol tartrate (LOPRESSOR) tablet 50 mg  50 mg Oral Q12H [facility]   • mirtazapine (REMERON) tablet 15 mg  15 mg Oral HS   • mycophenolate (CELLCEPT) capsule 750 mg  750 mg Oral Q12H [facility]   • pantoprazole (PROTONIX) EC tablet 40 mg  40 mg Oral Early Morning   • polyethylene glycol (MIRALAX) packet 17 g  17 g Oral Daily PRN   • pravastatin (PRAVACHOL) tablet 20 mg  20 mg Oral HS   • sodium bicarbonate 150 mEq in dextrose 5 % 1,000 mL infusion  100 mL/hr Intravenous Continuous    and PTA meds:    Medications Prior to Admission   Medication   • acetaminophen (TYLENOL) 325 mg tablet   • aspirin (ECOTRIN LOW STRENGTH) 81 mg EC tablet   • b complex vitamins capsule   • Calcium 500 MG tablet   • Cholecalciferol (Vitamin D) 50 MCG (2000 UT) tablet   • clopidogrel (PLAVIX) 75 mg tablet   • cycloSPORINE modified (NEORAL) 25 mg capsule   • docusate sodium (COLACE) 100 mg capsule   • HYDROcodone-acetaminophen (Norco) 5-325 mg per tablet   • levothyroxine 112 mcg tablet   • metoprolol tartrate (LOPRESSOR) 25 mg tablet   • mirtazapine (REMERON) 15 mg tablet   • mycophenolate (CELLCEPT) 250 mg capsule   • omeprazole (PriLOSEC) 40 MG capsule   • polyethylene glycol (MIRALAX) 17 g packet   • pravastatin (PRAVACHOL) 20 mg tablet   • melatonin 3 mg     Allergies   Allergen Reactions   • Tetracyclines & Related Hives   • Vancomycin Other (See Comments) and Itching     Reaction Date: [id number]; Pt unsure of reaction   • Prograf [Tacrolimus] Anxiety     Reaction Date: 16Dec2008; Objective   Vitals: Blood pressure 128/64, pulse 77, temperature 97 5 °F (36 4 °C), resp  rate 18, height 5' 2" (1 575 m), weight 85 2 kg (187 lb 14 4 oz), SpO2 97 %, not currently breastfeeding        Routine Lab Results:   Admission on 01/04/2023   Component Date Value Ref Range Status   • WBC 01/04/2023 8 63  4 31 - 10 16 Thousand/uL Final   • RBC 01/04/2023 3 78 (L)  3 81 - 5 12 Million/uL Final   • Hemoglobin 01/04/2023 10 2 (L)  11 5 - 15 4 g/dL Final   • Hematocrit 01/04/2023 32 6 (L)  34 8 - 46 1 % Final   • MCV 01/04/2023 86  82 - 98 fL Final   • MCH 01/04/2023 27 0  26 8 - 34 3 pg Final   • MCHC 01/04/2023 31 3 (L)  31 4 - 37 4 g/dL Final   • RDW 01/04/2023 14 9  11 6 - 15 1 % Final   • MPV 01/04/2023 10 3  8 9 - 12 7 fL Final   • Platelets 05/06/1445 259  149 - 390 Thousands/uL Final   • nRBC 01/04/2023 0  /100 WBCs Final   • Neutrophils Relative 01/04/2023 79 (H)  43 - 75 % Final   • Immat GRANS % 01/04/2023 2  0 - 2 % Final   • Lymphocytes Relative 01/04/2023 10 (L)  14 - 44 % Final   • Monocytes Relative 01/04/2023 7  4 - 12 % Final   • Eosinophils Relative 01/04/2023 1  0 - 6 % Final   • Basophils Relative 01/04/2023 1  0 - 1 % Final   • Neutrophils Absolute 01/04/2023 6 88  1 85 - 7 62 Thousands/µL Final   • Immature Grans Absolute 01/04/2023 0 17  0 00 - 0 20 Thousand/uL Final   • Lymphocytes Absolute 01/04/2023 0 87  0 60 - 4 47 Thousands/µL Final   • Monocytes Absolute 01/04/2023 0 56  0 17 - 1 22 Thousand/µL Final   • Eosinophils Absolute 01/04/2023 0 10  0 00 - 0 61 Thousand/µL Final   • Basophils Absolute 01/04/2023 0 05  0 00 - 0 10 Thousands/µL Final   • Protime 01/04/2023 14 0  11 6 - 14 5 seconds Final   • INR 01/04/2023 1 07  0 84 - 1 19 Final   • PTT 01/04/2023 24  23 - 37 seconds Final    Therapeutic Heparin Range =  60-90 seconds   • SARS-CoV-2 01/04/2023 Positive (A)  Negative Final   • INFLUENZA A PCR 01/04/2023 Negative  Negative Final   • INFLUENZA B PCR 01/04/2023 Negative  Negative Final   • RSV PCR 01/04/2023 Negative  Negative Final   • Blood Culture 01/04/2023 Received in Microbiology Lab  Culture in Progress  Preliminary   • Blood Culture 01/04/2023 Received in Microbiology Lab  Culture in Progress     Preliminary   • Color,  01/04/2023 Light Yellow   Final   • Clarity,  01/04/2023 Cloudy   Final   • Specific Gravity,  01/04/2023 1 020  1 000 - 1 030 Final   • pH,  01/04/2023 6 0  5 0, 5 5, 6 0, 6 5, 7 0, 7 5, 8 0, 8 5, 9 0 Final   • Leukocytes, UA 01/04/2023 Large (A)  Negative Final   • Nitrite, UA 01/04/2023 Negative  Negative Final   • Protein, UA 01/04/2023 100 (2+) (A)  Negative mg/dl Final   • Glucose, UA 01/04/2023 Negative  Negative mg/dl Final   • Ketones, UA 01/04/2023 Trace (A)  Negative mg/dl Final   • Urobilinogen, UA 01/04/2023 0 2  0 2, 1 0 E U /dl E U /dl Final   • Bilirubin, UA 01/04/2023 Small (A)  Negative Final   • Occult Blood, UA 01/04/2023 Large (A)  Negative Final   • Sodium 01/04/2023 134 (L)  135 - 147 mmol/L Final   • Potassium 01/04/2023 5 2  3 5 - 5 3 mmol/L Final   • Chloride 01/04/2023 102  96 - 108 mmol/L Final   • CO2 01/04/2023 16 (L)  21 - 32 mmol/L Final   • ANION GAP 01/04/2023 16 (H)  4 - 13 mmol/L Final   • BUN 01/04/2023 121 (H)  5 - 25 mg/dL Final   • Creatinine 01/04/2023 4 15 (H)  0 60 - 1 30 mg/dL Final    Standardized to IDMS reference method   • Glucose 01/04/2023 111  65 - 140 mg/dL Final    If the patient is fasting, the ADA then defines impaired fasting glucose as > 100 mg/dL and diabetes as > or equal to 123 mg/dL  Specimen collection should occur prior to Sulfasalazine administration due to the potential for falsely depressed results  Specimen collection should occur prior to Sulfapyridine administration due to the potential for falsely elevated results  • Calcium 01/04/2023 9 3  8 3 - 10 1 mg/dL Final   • Corrected Calcium 01/04/2023 10 2 (H)  8 3 - 10 1 mg/dL Final   • AST 01/04/2023 20  5 - 45 U/L Final    Specimen collection should occur prior to Sulfasalazine administration due to the potential for falsely depressed results  • ALT 01/04/2023 14  12 - 78 U/L Final    Specimen collection should occur prior to Sulfasalazine administration due to the potential for falsely depressed results      • Alkaline Phosphatase 01/04/2023 229 (H)  46 - 116 U/L Final   • Total Protein 01/04/2023 7 7  6 4 - 8 4 g/dL Final   • Albumin 01/04/2023 2 9 (L)  3 5 - 5 0 g/dL Final   • Total Bilirubin 01/04/2023 0 32  0 20 - 1 00 mg/dL Final    Use of this assay is not recommended for patients undergoing treatment with eltrombopag due to the potential for falsely elevated results  • eGFR 01/04/2023 9  ml/min/1 73sq m Final   • Magnesium 01/04/2023 2 5  1 6 - 2 6 mg/dL Final   • Lipase 01/04/2023 120  73 - 393 u/L Final   • LACTIC ACID 01/04/2023 0 6  0 5 - 2 0 mmol/L Final   • hs TnI 0hr 01/04/2023 17  "Refer to ACS Flowchart"- see link ng/L Final    Comment:                                              Initial (time 0) result  If >=50 ng/L, Myocardial injury suggested ;  Type of myocardial injury and treatment strategy  to be determined  If 5-49 ng/L, a delta result at 2 hours and or 4 hours will be needed to further evaluate  If <4 ng/L, and chest pain has been >3 hours since onset, patient may qualify for discharge based on the HEART score in the ED  If <5 ng/L and <3hours since onset of chest pain, a delta result at 2 hours will be needed to further evaluate  HS Troponin 99th Percentile URL of a Health Population=12 ng/L with a 95% Confidence Interval of 8-18 ng/L  Second Troponin (time 2 hours)  If calculated delta >= 20 ng/L,  Myocardial injury suggested ; Type of myocardial injury and treatment strategy to be determined  If 5-49 ng/L and the calculated delta is 5-19 ng/L, consult medical service for evaluation  Continue evaluation for ischemia on ecg and other possible etiology and repeat hs troponin at 4 hours  If delta                            is <5 ng/L at 2 hours, consider discharge based on risk stratification via the HEART score (if in ED), or AGUSTIN risk score in IP/Observation      HS Troponin 99th Percentile URL of a Health Population=12 ng/L with a 95% Confidence Interval of 8-18 ng/L    • NT-proBNP 01/04/2023 3,898 (H)  <450 pg/mL Final   • hs TnI 2hr 01/04/2023 17  "Refer to ACS Flowchart"- see link ng/L Final    Comment: Initial (time 0) result  If >=50 ng/L, Myocardial injury suggested ;  Type of myocardial injury and treatment strategy  to be determined  If 5-49 ng/L, a delta result at 2 hours and or 4 hours will be needed to further evaluate  If <4 ng/L, and chest pain has been >3 hours since onset, patient may qualify for discharge based on the HEART score in the ED  If <5 ng/L and <3hours since onset of chest pain, a delta result at 2 hours will be needed to further evaluate  HS Troponin 99th Percentile URL of a Health Population=12 ng/L with a 95% Confidence Interval of 8-18 ng/L  Second Troponin (time 2 hours)  If calculated delta >= 20 ng/L,  Myocardial injury suggested ; Type of myocardial injury and treatment strategy to be determined  If 5-49 ng/L and the calculated delta is 5-19 ng/L, consult medical service for evaluation  Continue evaluation for ischemia on ecg and other possible etiology and repeat hs troponin at 4 hours  If delta                            is <5 ng/L at 2 hours, consider discharge based on risk stratification via the HEART score (if in ED), or AGUSTIN risk score in IP/Observation  HS Troponin 99th Percentile URL of a Health Population=12 ng/L with a 95% Confidence Interval of 8-18 ng/L     • Delta 2hr hsTnI 01/04/2023 0  <20 ng/L Final   • RBC, UA 01/04/2023 Field obscured, unable to enumerate (A)  None Seen, 0-1, 1-2, 2-4, 0-5 /hpf Final   • WBC, UA 01/04/2023 Innumerable (A)  None Seen, 0-1, 1-2, 0-5, 2-4 /hpf Final   • Epithelial Cells 01/04/2023 Field obscured, unable to enumerate (A)  None Seen, Occasional /hpf Final   • Bacteria, UA 01/04/2023 Field obscured, unable to enumerate (A)  None Seen, Occasional /hpf Final   • TSH 3RD GENERATON 01/05/2023 4 889 (H)  0 450 - 4 500 uIU/mL Final    The recommended reference ranges for TSH during pregnancy are as follows:   First trimester 0 1 to 2 5 uIU/mL   Second trimester  0 2 to 3 0 uIU/mL Third trimester 0 3 to 3 0 uIU/m    Note: Normal ranges may not apply to patients who are transgender, non-binary, or whose legal sex, sex at birth, and gender identity differ  Adult TSH (3rd generation) reference range follows the recommended guidelines of the American Thyroid Association, January, 2020  • Sodium 01/05/2023 136  135 - 147 mmol/L Final   • Potassium 01/05/2023 5 1  3 5 - 5 3 mmol/L Final    Slightly Hemolyzed; Results May be Affected   • Chloride 01/05/2023 104  96 - 108 mmol/L Final   • CO2 01/05/2023 19 (L)  21 - 32 mmol/L Final   • ANION GAP 01/05/2023 13  4 - 13 mmol/L Final   • BUN 01/05/2023 116 (H)  5 - 25 mg/dL Final   • Creatinine 01/05/2023 4 02 (H)  0 60 - 1 30 mg/dL Final    Standardized to IDMS reference method   • Glucose 01/05/2023 134  65 - 140 mg/dL Final    If the patient is fasting, the ADA then defines impaired fasting glucose as > 100 mg/dL and diabetes as > or equal to 123 mg/dL  Specimen collection should occur prior to Sulfasalazine administration due to the potential for falsely depressed results  Specimen collection should occur prior to Sulfapyridine administration due to the potential for falsely elevated results  • Glucose, Fasting 01/05/2023 134 (H)  65 - 99 mg/dL Final    Specimen collection should occur prior to Sulfasalazine administration due to the potential for falsely depressed results  Specimen collection should occur prior to Sulfapyridine administration due to the potential for falsely elevated results     • Calcium 01/05/2023 8 5  8 3 - 10 1 mg/dL Final   • eGFR 01/05/2023 9  ml/min/1 73sq m Final   • WBC 01/05/2023 6 39  4 31 - 10 16 Thousand/uL Final   • RBC 01/05/2023 3 19 (L)  3 81 - 5 12 Million/uL Final   • Hemoglobin 01/05/2023 8 8 (L)  11 5 - 15 4 g/dL Final   • Hematocrit 01/05/2023 27 9 (L)  34 8 - 46 1 % Final   • MCV 01/05/2023 88  82 - 98 fL Final   • MCH 01/05/2023 27 6  26 8 - 34 3 pg Final   • MCHC 01/05/2023 31 5  31 4 - 37 4 g/dL Final   • RDW 01/05/2023 15 0  11 6 - 15 1 % Final   • MPV 01/05/2023 10 7  8 9 - 12 7 fL Final   • Platelets 72/51/0634 222  149 - 390 Thousands/uL Final   • Free T4 01/05/2023 0 87  0 76 - 1 46 ng/dL Final    Specimen collection should occur prior to Sulfasalazine administration due to the potential for falsely elevated results  • Segmented % 01/05/2023 69  43 - 75 % Final   • Bands % 01/05/2023 2  0 - 8 % Final   • Lymphocytes % 01/05/2023 15  14 - 44 % Final   • Monocytes % 01/05/2023 9  4 - 12 % Final   • Eosinophils, % 01/05/2023 5  0 - 6 % Final   • Basophils % 01/05/2023 0  0 - 1 % Final   • Absolute Neutrophils 01/05/2023 4 54  1 85 - 7 62 Thousand/uL Final   • Lymphocytes Absolute 01/05/2023 0 96  0 60 - 4 47 Thousand/uL Final   • Monocytes Absolute 01/05/2023 0 58  0 00 - 1 22 Thousand/uL Final   • Eosinophils Absolute 01/05/2023 0 32  0 00 - 0 40 Thousand/uL Final   • Basophils Absolute 01/05/2023 0 00  0 00 - 0 10 Thousand/uL Final   • RBC Morphology 01/05/2023 Present   Final   • Polychromasia 01/05/2023 Present   Final   • Platelet Estimate 23/71/5576 Adequate  Adequate Final   • Large Platelet 31/25/8898 Present   Final         Diagnosis: Dementia with depression  Anxiety  Insomnia  Delirium secondary to medical condition  Plan: Discontinue Remeron 15 mg at bedtime to avoid the sedation and lethargy  Lexapro 5 mg daily  I may consider small dose of BuSpar 5 mg p o  twice daily for anxiety once patient improved with lethargy and sluggishness  Psychotherapy  Psychiatric follow-up recommended after the discharge  Supervised living  Spoke with the boyfriend at bedside  I will follow-up during the hospital stay      Mark Yun MD

## 2023-01-05 NOTE — H&P
[address] 1945, 76 y o  female MRN: [id number]  Unit/Bed#: ED 09 Encounter: [id number]  Primary Care Provider: Asif Duong MD   Date and time admitted to hospital: 1/4/2023  8:05 PM    * REZA (acute kidney injury) [facility])  Assessment & Plan  Presents from care facility with decreased oral intake, weakness  · Creatine 4 15, baseline 1 1-1 4  · Anion gap 16, co2 16  · UA positive for UTI  · CT a/p: Bladder wall thickening and infiltration of the adjacent fat concerning for urinary tract infection  Otherwise, no acute findings  • Suspect due to dehydration  • Given 1L bolus in ED  • Will start sodium bicarbonate infusion  • Avoid nephrotoxins, hold hypotension  • Monitor with BMP      UTI (urinary tract infection)  Assessment & Plan  UA shows innumerable bacteria, inn WBC, large leuks  · Start ceftriaxone  · Urine culture    Dementia with behavioral disturbance  Assessment & Plan  History of dementia, usually alert and oriented with intermittent confusion  · Supportive care    Depression  Assessment & Plan  Per daughter patient with increased depression, has been making comments to daughter about being severely depressed, has not been eating, drinking much  · Seen by psych OP, stopped zyprexa and started mirtazapine  · Psych consult    Benign essential hypertension  Assessment & Plan  Continue metoprolol    Cerebrovascular accident (CVA) due to embolism ([facility] [address] )  Assessment & Plan  History of CVA  · Continue aspirin, plavix, statin    Long-term use of immunosuppressant medication  Assessment & Plan  History of liver transplant, continue cellcept and cyclosporine    Hypothyroidism  Assessment & Plan  Continue levothyroxine 112 mcg  · Check TSH    Tested positive for COVID today - had covid 12/9/22    VTE Pharmacologic Prophylaxis: VTE Score: 4 Moderate Risk (Score 3-4) - Pharmacological DVT Prophylaxis Ordered: heparin    Code Status: Full code  Discussion with family: Updated  (daughter) via phone  Anticipated Length of Stay: Patient will be admitted on an observation basis with an anticipated length of stay of less than 2 midnights secondary to REZA, UTI  Total Time for Visit, including Counseling / Coordination of Care: 45 minutes Greater than 50% of this total time spent on direct patient counseling and coordination of care  Chief Complaint: abnormal lab    History of Present Illness: Josei Jeter is a 76 y o  female with a PMH of dementia, gerd, HTN, CVA, hypothyroidism who presents with dehydration and generalized weakness  Patient sent in from [facility] due to abnormal creatinine and BUN  Discussed with daughter on phone, states that patient had covid 12/9/22  She has decreased oral intake since and patient has been making remarks about being depressed  Notes she has generalized weakness  She has been seen by psych and her medications were adjusted 2 weeks ago  Daughter notes she has dementia and is normally alert but gets confused occasionally  Initially hypotensive, dehydrated, REZA, UTI on labs  Denies any chest pain, SOB, cough, abdominal pain, nausea/vomiting, constipation/diarrhea  Review of Systems:  Review of Systems   Constitutional: Positive for appetite change and fever  Negative for chills  Respiratory: Negative for cough and shortness of breath  Cardiovascular: Negative for chest pain and palpitations  Gastrointestinal: Negative for abdominal pain and vomiting  Genitourinary: Negative for dysuria and hematuria  Skin: Negative for color change and rash  Neurological: Positive for weakness         Past Medical and Surgical History:   Past Medical History:   Diagnosis Date   • Anemia    • Anxiety    • Arthritis    • Benign paroxysmal vertigo, unspecified ear     onset: 05/06/2010   • Cirrhosis ([facility] )     onset: 03/30/2005   • Crohn disease ([facility] )    • Disease of thyroid gland    • GERD (gastroesophageal reflux disease)    • History of transfusion    • Liver transplant candidate     onset: 09/16/2008   • Osteoporosis    • Pelvic fracture ([address] )     onset: 10/14/2008       Past Surgical History:   Procedure Laterality Date   • ABDOMINAL SURGERY     • APPENDECTOMY     • BLADDER AUGMENTATION N/A    • CATARACT EXTRACTION     • CHOLECYSTECTOMY N/A    • COLONOSCOPY N/A 4/20/2017    Procedure: COLONOSCOPY;  Surgeon: Janeth Moreira MD;  Location: [place] [address] GI LAB; Service:    • ESOPHAGOGASTRODUODENOSCOPY N/A 3/31/2016    Procedure: ESOPHAGOGASTRODUODENOSCOPY (EGD); Surgeon: Janeth Moreira MD;  Location: [place] [address] GI LAB; Service:    • ESOPHAGOGASTRODUODENOSCOPY N/A 4/20/2017    Procedure: ESOPHAGOGASTRODUODENOSCOPY (EGD); Surgeon: Janeth Moreira MD;  Location: [place] [address] GI LAB; Service:    • EYE SURGERY      cataracts removed both eyes lens implant   • HERNIA REPAIR     • JOINT REPLACEMENT      left knee replacement   • LIVER TRANSPLANTATION N/A    • ORIF TIBIA & FIBULA FRACTURES Right 11/18/2022    Procedure: OPEN REDUCTION W/ INTERNAL FIXATION (ORIF) RIGHT ANKLE FRACTURE;  Surgeon: Sina Laurent MD;  Location: [facility] OR;  Service: Orthopedics   • TONSILECTOMY AND ADNOIDECTOMY N/A    • TUBAL LIGATION         Meds/Allergies:  Prior to Admission medications    Medication Sig Start Date End Date Taking?  Authorizing Provider   acetaminophen (TYLENOL) 325 mg tablet Take 2 tablets (650 mg total) by mouth every 6 (six) hours as needed for mild pain or fever 11/16/22  Yes Keerthi Quach,    aspirin (ECOTRIN LOW STRENGTH) 81 mg EC tablet Take 1 tablet (81 mg total) by mouth daily with breakfast 4/22/21  Yes Odalis Lobo MD   b complex vitamins capsule Take 1 capsule by mouth daily 7/1/20  Yes Yulia Gonzalez MD   Calcium 500 MG tablet Take 500 mg by mouth 11/6/20  Yes Historical Provider, MD   Cholecalciferol (Vitamin D) 50 MCG (2000 UT) tablet Take 1,000 Units by mouth 11/6/20  Yes Historical Provider, MD   clopidogrel (PLAVIX) 75 mg tablet TAKE 1 TABLET EVERY DAY 10/13/22  Yes Bryn Bonner MD   cycloSPORINE modified (NEORAL) 25 mg capsule Take 50 mg by mouth 2 (two) times a day   Yes Historical Provider, MD   docusate sodium (COLACE) 100 mg capsule Take 1 capsule (100 mg total) by mouth 2 (two) times a day for 10 days 11/16/22 1/4/23 Yes Keerthi Quach DO   HYDROcodone-acetaminophen (Norco) 5-325 mg per tablet Take 1 tablet by mouth every 6 (six) hours as needed for pain (moderate, severe pain) Max Daily Amount: 4 tablets 11/16/22  Yes Nasir Vega DO   levothyroxine 112 mcg tablet TAKE 1 TABLET EVERY DAY AS DIRECTED 3/16/22  Yes Daphne Dee MD   metoprolol tartrate (LOPRESSOR) 25 mg tablet Take 2 tablets (50 mg total) by mouth every 12 (twelve) hours 11/20/22  Yes Keerthi Quach DO   mirtazapine (REMERON) 15 mg tablet Take 15 mg by mouth daily at bedtime   Yes Historical Provider, MD   mycophenolate (CELLCEPT) 250 mg capsule Take 3 capsules (750 mg total) by mouth every 12 (twelve) hours 11/16/22  Yes Nasir Vega DO   omeprazole (PriLOSEC) 40 MG capsule Take 40 mg by mouth daily   Yes Historical Provider, MD   polyethylene glycol (MIRALAX) 17 g packet Take 17 g by mouth daily as needed (constipation) 11/20/22  Yes Keerthi Quach DO   pravastatin (PRAVACHOL) 20 mg tablet TAKE 1 TABLET BY MOUTH ONCE DAILY AT BEDTIME 12/6/21  Yes Armando Caldera MD   melatonin 3 mg Take 1 tablet (3 mg total) by mouth daily at bedtime 11/16/22   Nasir Vega DO     I have reveiwed home medications using records provided by [facility]  Allergies: Allergies   Allergen Reactions   • Tetracyclines & Related Hives   • Vancomycin Other (See Comments) and Itching     Reaction Date: [id number]; Pt unsure of reaction   • Prograf [Tacrolimus] Anxiety     Reaction Date: [id number];         Social History:  Marital Status:    Occupation:   Patient Pre-hospital Living Situation: [facility]  Patient Pre-hospital Level of Mobility: walks with walker  Patient Pre-hospital Diet Restrictions:   Substance Use History:   Social History     Substance and Sexual Activity   Alcohol Use Never     Social History     Tobacco Use   Smoking Status Former   • Years: 5 00   • Types: Cigarettes   • Quit date: 1975   • Years since quittin 9   Smokeless Tobacco Never   Tobacco Comments    smoked for 5 years-1/2 to 1 ppd     Social History     Substance and Sexual Activity   Drug Use Never       Family History:  Family History   Problem Relation Age of Onset   • Cancer Mother         breast   • Cancer Father    • Crohn's disease Brother    • Arthritis Family    • Breast cancer Family        Physical Exam:     Vitals:   Blood Pressure: 114/54 (23 0015)  Pulse: 78 (23)  Temperature: 98 5 °F (36 9 °C) (23)  Temp Source: Tympanic (23)  Respirations: 18 (23)  Height: 5' 2" (157 5 cm) (23)  Weight - Scale: 95 7 kg (211 lb) (23)  SpO2: 96 % (23)    Physical Exam  Vitals reviewed  Constitutional:       General: She is not in acute distress  Appearance: She is well-developed  HENT:      Head: Normocephalic and atraumatic  Eyes:      Conjunctiva/sclera: Conjunctivae normal    Cardiovascular:      Rate and Rhythm: Normal rate and regular rhythm  Heart sounds: No murmur heard  Pulmonary:      Effort: Pulmonary effort is normal  No respiratory distress  Breath sounds: Normal breath sounds  Abdominal:      Palpations: Abdomen is soft  Tenderness: There is no abdominal tenderness  Musculoskeletal:         General: No swelling  Skin:     General: Skin is warm and dry  Capillary Refill: Capillary refill takes less than 2 seconds  Neurological:      Mental Status: She is alert  Mental status is at baseline     Psychiatric:         Mood and Affect: Mood normal           Additional Data:     Lab Results:  Results from last 7 days   Lab Units 23   WBC Thousand/uL 8 63   HEMOGLOBIN g/dL 10 2*   HEMATOCRIT % 32 6*   PLATELETS Thousands/uL 259   NEUTROS PCT % 79*   LYMPHS PCT % 10*   MONOS PCT % 7   EOS PCT % 1     Results from last 7 days   Lab Units 01/04/23 2032   SODIUM mmol/L 134*   POTASSIUM mmol/L 5 2   CHLORIDE mmol/L 102   CO2 mmol/L 16*   BUN mg/dL 121*   CREATININE mg/dL 4 15*   ANION GAP mmol/L 16*   CALCIUM mg/dL 9 3   ALBUMIN g/dL 2 9*   TOTAL BILIRUBIN mg/dL 0 32   ALK PHOS U/L 229*   ALT U/L 14   AST U/L 20   GLUCOSE RANDOM mg/dL 111     Results from last 7 days   Lab Units 01/04/23 2032   INR  1 07             Results from last 7 days   Lab Units 01/04/23 2032   LACTIC ACID mmol/L 0 6       Lines/Drains:  Invasive Devices     Peripheral Intravenous Line  Duration           Long-Dwell Peripheral IV (Midline) 01/05/23 Right Brachial <1 day    Peripheral IV 01/04/23 Dorsal (posterior); Right Hand <1 day          Drain  Duration           External Urinary Catheter 47 days                    Imaging: Reviewed radiology reports from this admission including: abdominal/pelvic CT  CT abdomen pelvis wo contrast   Final Result by Bj Mg MD (01/04 2152)      Bladder wall thickening and infiltration of the adjacent fat concerning for urinary tract infection  Recommend correlation with urinalysis  Otherwise, no acute findings  Workstation performed: [id number]         XR chest 1 view portable    (Results Pending)       EKG and Other Studies Reviewed on Admission:   · EKG: NSR  HR 80     ** Please Note: This note has been constructed using a voice recognition system   **

## 2023-01-05 NOTE — ASSESSMENT & PLAN NOTE
Per daughter patient with increased depression, has been making comments to daughter about being severely depressed, has not been eating, drinking much  · Seen by psych OP, stopped zyprexa and started mirtazapine  · Psych consult

## 2023-01-05 NOTE — ASSESSMENT & PLAN NOTE
Presents from care facility with decreased oral intake, weakness  Renal function improving  · Creatine 4 15, baseline 1 1-1 4  · Anion gap 16, co2 16  · UA positive for UTI  Urine culture positive for Klebsiella-Enterobacter  · CT a/p: Bladder wall thickening and infiltration of the adjacent fat concerning for urinary tract infection  Otherwise, no hydronephrosis  • Suspect due to dehydration and poor oral intake  • Given 1L bolus in ED  • Patient has been on bicarbonate infusion which will be changed to normal saline at 125 mill per hour  • Avoid nephrotoxic agents and hypotension  Creatinine continues to improve  Patient was changed to half-normal saline at 100 mL/h due to increased chloride level    Results from last 7 days   Lab Units 01/07/23  0837 01/06/23  0613 01/05/23  0531 01/04/23 2032   BUN mg/dL 49* 90* 116* 121*   CREATININE mg/dL 1 43* 2 77* 4 02* 4 15*

## 2023-01-05 NOTE — ED NOTES
Patient straight cathed for 100 mls of cloudy milky looking yellow urine,specimen obtained and sent to lab       Gee Coronado RN  01/04/23 1235

## 2023-01-05 NOTE — ASSESSMENT & PLAN NOTE
Per daughter patient with increased depression, has been making comments to daughter about being severely depressed, has not been eating, drinking much  · Seen by psych OP, stopped zyprexa and started mirtazapine  · Neck x-ray input appreciated    · Remeron was discontinued by psychiatry and patient was started on Lexapro 5 mg p o  daily

## 2023-01-06 ENCOUNTER — APPOINTMENT (INPATIENT)
Dept: RADIOLOGY | Facility: HOSPITAL | Age: 78
End: 2023-01-06

## 2023-01-06 PROBLEM — Z22.322 MRSA (METHICILLIN RESISTANT STAPH AUREUS) CULTURE POSITIVE: Status: ACTIVE | Noted: 2023-01-01

## 2023-01-06 LAB
ANION GAP SERPL CALCULATED.3IONS-SCNC: 12 MMOL/L (ref 4–13)
BUN SERPL-MCNC: 90 MG/DL (ref 5–25)
CALCIUM SERPL-MCNC: 7.7 MG/DL (ref 8.3–10.1)
CHLORIDE SERPL-SCNC: 108 MMOL/L (ref 96–108)
CO2 SERPL-SCNC: 22 MMOL/L (ref 21–32)
CREAT SERPL-MCNC: 2.77 MG/DL (ref 0.6–1.3)
GFR SERPL CREATININE-BSD FRML MDRD: 15 ML/MIN/1.73SQ M
GLUCOSE SERPL-MCNC: 119 MG/DL (ref 65–140)
MRSA NOSE QL CULT: ABNORMAL
MRSA NOSE QL CULT: ABNORMAL
POTASSIUM SERPL-SCNC: 4.1 MMOL/L (ref 3.5–5.3)
SODIUM SERPL-SCNC: 142 MMOL/L (ref 135–147)

## 2023-01-06 RX ORDER — CHLORHEXIDINE GLUCONATE 0.12 MG/ML
15 RINSE ORAL EVERY 12 HOURS SCHEDULED
Status: DISCONTINUED | OUTPATIENT
Start: 2023-01-06 | End: 2023-01-07

## 2023-01-06 RX ADMIN — CHLORHEXIDINE GLUCONATE 15 ML: 1.2 SOLUTION ORAL at 23:11

## 2023-01-06 RX ADMIN — MYCOPHENOLATE MOFETIL 750 MG: 250 CAPSULE ORAL at 23:10

## 2023-01-06 RX ADMIN — SODIUM CHLORIDE 125 ML/HR: 0.9 INJECTION, SOLUTION INTRAVENOUS at 00:06

## 2023-01-06 RX ADMIN — LEVOTHYROXINE SODIUM 112 MCG: 112 TABLET ORAL at 06:01

## 2023-01-06 RX ADMIN — SODIUM CHLORIDE 125 ML/HR: 0.9 INJECTION, SOLUTION INTRAVENOUS at 10:59

## 2023-01-06 RX ADMIN — HEPARIN SODIUM 5000 UNITS: 5000 INJECTION INTRAVENOUS; SUBCUTANEOUS at 18:51

## 2023-01-06 RX ADMIN — HEPARIN SODIUM 5000 UNITS: 5000 INJECTION INTRAVENOUS; SUBCUTANEOUS at 23:14

## 2023-01-06 RX ADMIN — MUPIROCIN 1 APPLICATION: 20 OINTMENT TOPICAL at 23:11

## 2023-01-06 RX ADMIN — CHLORHEXIDINE GLUCONATE 15 ML: 1.2 SOLUTION ORAL at 13:15

## 2023-01-06 RX ADMIN — CYCLOSPORINE 50 MG: 25 CAPSULE ORAL at 10:40

## 2023-01-06 RX ADMIN — CYCLOSPORINE 50 MG: 25 CAPSULE ORAL at 18:51

## 2023-01-06 RX ADMIN — METOPROLOL TARTRATE 50 MG: 50 TABLET, FILM COATED ORAL at 10:41

## 2023-01-06 RX ADMIN — ASPIRIN 81 MG: 81 TABLET, COATED ORAL at 10:41

## 2023-01-06 RX ADMIN — DOCUSATE SODIUM 100 MG: 100 CAPSULE, LIQUID FILLED ORAL at 18:51

## 2023-01-06 RX ADMIN — CLOPIDOGREL BISULFATE 75 MG: 75 TABLET ORAL at 10:40

## 2023-01-06 RX ADMIN — CEFTRIAXONE 1000 MG: 1 INJECTION, SOLUTION INTRAVENOUS at 00:05

## 2023-01-06 RX ADMIN — ESCITALOPRAM OXALATE 5 MG: 5 TABLET, FILM COATED ORAL at 10:41

## 2023-01-06 RX ADMIN — Medication 3 MG: at 23:10

## 2023-01-06 RX ADMIN — HEPARIN SODIUM 5000 UNITS: 5000 INJECTION INTRAVENOUS; SUBCUTANEOUS at 10:42

## 2023-01-06 RX ADMIN — METOPROLOL TARTRATE 50 MG: 50 TABLET, FILM COATED ORAL at 23:10

## 2023-01-06 RX ADMIN — PANTOPRAZOLE SODIUM 40 MG: 40 TABLET, DELAYED RELEASE ORAL at 06:01

## 2023-01-06 RX ADMIN — MYCOPHENOLATE MOFETIL 750 MG: 250 CAPSULE ORAL at 10:39

## 2023-01-06 RX ADMIN — MUPIROCIN 1 APPLICATION: 20 OINTMENT TOPICAL at 13:15

## 2023-01-06 RX ADMIN — DOCUSATE SODIUM 100 MG: 100 CAPSULE, LIQUID FILLED ORAL at 10:42

## 2023-01-06 RX ADMIN — SODIUM CHLORIDE 125 ML/HR: 0.9 INJECTION, SOLUTION INTRAVENOUS at 18:51

## 2023-01-06 RX ADMIN — PRAVASTATIN SODIUM 20 MG: 20 TABLET ORAL at 23:10

## 2023-01-06 RX ADMIN — CEFTRIAXONE 1000 MG: 1 INJECTION, SOLUTION INTRAVENOUS at 23:12

## 2023-01-06 NOTE — ASSESSMENT & PLAN NOTE
Continue levothyroxine 112 mcg  · Repeat TSH 4 89 - slightly elevated  Consider repeat TSH in 6 wks after infection resolves

## 2023-01-06 NOTE — PLAN OF CARE
Problem: Potential for Falls  Goal: Patient will remain free of falls  Description: INTERVENTIONS:  - Educate patient/family on patient safety including physical limitations  - Instruct patient to call for assistance with activity   - Consult OT/PT to assist with strengthening/mobility   - Keep Call bell within reach  - Keep bed low and locked with side rails adjusted as appropriate  - Keep care items and personal belongings within reach  - Initiate and maintain comfort rounds  - Make Fall Risk Sign visible to staff  - Offer Toileting every 2  Hours, in advance of need  - Initiate/Maintain bed alarm  - Obtain necessary fall risk management equipment: walker/ cane  - Apply yellow socks and bracelet for high fall risk patients  - Consider moving patient to room near nurses station  Outcome: Progressing     Problem: MOBILITY - ADULT  Goal: Maintain or return to baseline ADL function  Description: INTERVENTIONS:  -  Assess patient's ability to carry out ADLs; assess patient's baseline for ADL function and identify physical deficits which impact ability to perform ADLs (bathing, care of mouth/teeth, toileting, grooming, dressing, etc )  - Assess/evaluate cause of self-care deficits   - Assess range of motion  - Assess patient's mobility; develop plan if impaired  - Assess patient's need for assistive devices and provide as appropriate  - Encourage maximum independence but intervene and supervise when necessary  - Involve family in performance of ADLs  - Assess for home care needs following discharge   - Consider OT consult to assist with ADL evaluation and planning for discharge  - Provide patient education as appropriate  Outcome: Progressing  Goal: Maintains/Returns to pre admission functional level  Description: INTERVENTIONS:  - Perform BMAT or MOVE assessment daily    - Set and communicate daily mobility goal to care team and patient/family/caregiver     - Collaborate with rehabilitation services on mobility goals if consulted  - Perform Range of Motion 3  times a day  - Reposition patient every 2 hours    - Dangle patient 3 times a day  - Stand patient 3  times a day  - Out of bed to chair 3  times a day   - Out of bed for meals 3  times a day  - Out of bed for toileting  - Record patient progress and toleration of activity level   Outcome: Progressing     Problem: PAIN - ADULT  Goal: Verbalizes/displays adequate comfort level or baseline comfort level  Description: Interventions:  - Encourage patient to monitor pain and request assistance  - Assess pain using appropriate pain scale  - Administer analgesics based on type and severity of pain and evaluate response  - Implement non-pharmacological measures as appropriate and evaluate response  - Consider cultural and social influences on pain and pain management  - Notify physician/advanced practitioner if interventions unsuccessful or patient reports new pain  Outcome: Progressing     Problem: INFECTION - ADULT  Goal: Absence or prevention of progression during hospitalization  Description: INTERVENTIONS:  - Assess and monitor for signs and symptoms of infection  - Monitor lab/diagnostic results  - Monitor all insertion sites, i e  indwelling lines, tubes, and drains  - Monitor endotracheal if appropriate and nasal secretions for changes in amount and color  - [place] appropriate cooling/warming therapies per order  - Administer medications as ordered  - Instruct and encourage patient and family to use good hand hygiene technique  - Identify and instruct in appropriate isolation precautions for identified infection/condition  Outcome: Progressing  Goal: Absence of fever/infection during neutropenic period  Description: INTERVENTIONS:  - Monitor WBC    Outcome: Progressing     Problem: SAFETY ADULT  Goal: Patient will remain free of falls  Description: INTERVENTIONS:  - Educate patient/family on patient safety including physical limitations  - Instruct patient to call for assistance with activity   - Consult OT/PT to assist with strengthening/mobility   - Keep Call bell within reach  - Keep bed low and locked with side rails adjusted as appropriate  - Keep care items and personal belongings within reach  - Initiate and maintain comfort rounds  - Make Fall Risk Sign visible to staff  - Offer Toileting every 2  Hours, in advance of need  - Initiate/Maintain bed alarm  - Obtain necessary fall risk management equipment: walker/ cane  - Apply yellow socks and bracelet for high fall risk patients  - Consider moving patient to room near nurses station  Outcome: Progressing  Goal: Maintain or return to baseline ADL function  Description: INTERVENTIONS:  -  Assess patient's ability to carry out ADLs; assess patient's baseline for ADL function and identify physical deficits which impact ability to perform ADLs (bathing, care of mouth/teeth, toileting, grooming, dressing, etc )  - Assess/evaluate cause of self-care deficits   - Assess range of motion  - Assess patient's mobility; develop plan if impaired  - Assess patient's need for assistive devices and provide as appropriate  - Encourage maximum independence but intervene and supervise when necessary  - Involve family in performance of ADLs  - Assess for home care needs following discharge   - Consider OT consult to assist with ADL evaluation and planning for discharge  - Provide patient education as appropriate  Outcome: Progressing  Goal: Maintains/Returns to pre admission functional level  Description: INTERVENTIONS:  - Perform BMAT or MOVE assessment daily    - Set and communicate daily mobility goal to care team and patient/family/caregiver  - Collaborate with rehabilitation services on mobility goals if consulted  - Perform Range of Motion 3  times a day  - Reposition patient every 2 hours    - Dangle patient 3 times a day  - Stand patient 3  times a day  - Out of bed to chair 3  times a day   - Out of bed for meals 3  times a day  - Out of bed for toileting  - Record patient progress and toleration of activity level   Outcome: Progressing     Problem: Knowledge Deficit  Goal: Patient/family/caregiver demonstrates understanding of disease process, treatment plan, medications, and discharge instructions  Description: Complete learning assessment and assess knowledge base    Interventions:  - Provide teaching at level of understanding  - Provide teaching via preferred learning methods  Outcome: Progressing     Problem: NEUROSENSORY - ADULT  Goal: Achieves stable or improved neurological status  Description: INTERVENTIONS  - Monitor and report changes in neurological status  - Monitor vital signs such as temperature, blood pressure, glucose, and any other labs ordered   - Initiate measures to prevent increased intracranial pressure  - Monitor for seizure activity and implement precautions if appropriate      Outcome: Progressing     Problem: CARDIOVASCULAR - ADULT  Goal: Absence of cardiac dysrhythmias or at baseline rhythm  Description: INTERVENTIONS:  - Continuous cardiac monitoring, vital signs, obtain 12 lead EKG if ordered  - Administer antiarrhythmic and heart rate control medications as ordered  - Monitor electrolytes and administer replacement therapy as ordered  Outcome: Progressing     Problem: METABOLIC, FLUID AND ELECTROLYTES - ADULT  Goal: Electrolytes maintained within normal limits  Description: INTERVENTIONS:  - Monitor labs and assess patient for signs and symptoms of electrolyte imbalances  - Administer electrolyte replacement as ordered  - Monitor response to electrolyte replacements, including repeat lab results as appropriate  - Instruct patient on fluid and nutrition as appropriate  Outcome: Progressing  Goal: Fluid balance maintained  Description: INTERVENTIONS:  - Monitor labs   - Monitor I/O and WT  - Instruct patient on fluid and nutrition as appropriate  - Assess for signs & symptoms of volume excess or deficit  Outcome: Progressing     Problem: SKIN/TISSUE INTEGRITY - ADULT  Goal: Skin Integrity remains intact(Skin Breakdown Prevention)  Description: Assess:  -Perform Rafa assessment every shift  -Clean and moisturize skin every shift and after incontinence  -Inspect skin when repositioning, toileting, and assisting with ADl  -Assess extremities for adequate circulation and sensation     Bed Management:  -Have minimal linens on bed & keep smooth, unwrinkled  -Change linens as needed when moist or perspiring  -Avoid sitting or lying in one position for more than 2 hours while in bed  -Keep HOB at 30 degrees     Toileting:  -Offer bedside commode  -Assess for incontinence every 2 hours  -Use incontinent care products after each incontinent episode such as protective barrier cream    Activity:  -Mobilize patient 3  times a day  -Encourage activity and walks on unit  -Encourage or provide ROM exercises   -Turn and reposition patient every 2 Hours  -Use appropriate equipment to lift or move patient in bed  -Instruct/ Assist with weight shifting every 15 when out of bed in chair  -Consider limitation of chair time 4  hour intervals    Skin Care:  -Avoid use of baby powder, tape, friction and shearing, hot water or constrictive clothing  -Relieve pressure over bony prominences using waffle cushion  -Do not massage red bony areas    Next Steps:  -Consider consults to  interdisciplinary teams such as wound care and PT/ OT    Outcome: Progressing  Goal: Pressure injury heals and does not worsen  Description: Interventions:  - Implement low air loss mattress or specialty surface (if   Problem: Prexisting or High Potential for Compromised Skin Integrity  Goal: Skin integrity is maintained or improved  Description: INTERVENTIONS:  - Identify patients at risk for skin breakdown  - Assess and monitor skin integrity  - Assess and monitor nutrition and hydration status  - Monitor labs   - Assess for incontinence   - Turn and reposition patient  - Assist with mobility/ambulation  - Relieve pressure over bony prominences  - Avoid friction and shearing  - Provide appropriate hygiene as needed including keeping skin clean and dry  - Evaluate need for skin moisturizer/barrier cream  - Collaborate with interdisciplinary team   - Patient/family teaching  - Consider wound care consult   Outcome: Progressing     Problem: Nutrition/Hydration-ADULT  Goal: Nutrient/Hydration intake appropriate for improving, restoring or maintaining nutritional needs  Description: Monitor and assess patient's nutrition/hydration status for malnutrition  Collaborate with interdisciplinary team and initiate plan and interventions as ordered  Monitor patient's weight and dietary intake as ordered or per policy  Utilize nutrition screening tool and intervene as necessary  Determine patient's food preferences and provide high-protein, high-caloric foods as appropriate       INTERVENTIONS:  - Monitor oral intake, urinary output, labs, and treatment plans  - Assess nutrition and hydration status and recommend course of action  - Evaluate amount of meals eaten  - Assist patient with eating if necessary   - Allow adequate time for meals  - Recommend/ encourage appropriate diets, oral nutritional supplements, and vitamin/mineral supplements  - Order, calculate, and assess calorie counts as needed  - Recommend, monitor, and adjust tube feedings and TPN/PPN based on assessed needs  - Assess need for intravenous fluids  - Provide specific nutrition/hydration education as appropriate  - Include patient/family/caregiver in decisions related to nutrition  Outcome: Progressing   Criteria met)  - Apply silicone foam dressing  - Use special pressure reducing interventions such as cusion when in chair   - Apply fecal or urinary incontinence containment device   - Perform passive or active ROM every hour  - Turn and reposition patient & offload bony prominences every 2 hours   - Utilize friction reducing device or surface for transfers   - Use incontinent care products after each incontinent episode such as moisture barrier  - Consider nutrition services referral as needed  Outcome: Progressing

## 2023-01-06 NOTE — ASSESSMENT & PLAN NOTE
UA shows innumerable bacteria, inn WBC, large leuks  · And has been on IV ceftriaxone which was changed to ertapenem  · Urine cultures grew ESBL E  coli  · Blood cultures have been negative

## 2023-01-06 NOTE — PROGRESS NOTES
Daily Progress Note - [facility]  Duane 67 A [place] 76 y o  female MRN: [id number]  Unit/Bed#: [address] [address] Encounter: [id number]  Admitting Physician: González Zimmer MD   PCP: Celia Roy MD  Date of Admission:  1/4/2023  8:05 PM    Assessment and Plan    * REZA (acute kidney injury) [facility])  Assessment & Plan  Presents from care facility with decreased oral intake, weakness  Renal function improving  · Creatine 4 15, baseline 1 1-1 4  · Anion gap 16, co2 16  · UA positive for UTI  Urine culture positive for Klebsiella-Enterobacter  · CT a/p: Bladder wall thickening and infiltration of the adjacent fat concerning for urinary tract infection  Otherwise, no hydronephrosis  • Suspect due to dehydration and poor oral intake  • Given 1L bolus in ED  • Patient has been on bicarbonate infusion which will be changed to normal saline at 125 mill per hour  • Avoid nephrotoxic agents and hypotension  Monitor creatinine  We will consider nephrology consultation based on further course  Results from last 7 days   Lab Units 01/06/23  0613 01/05/23  0531 01/04/23 2032   BUN mg/dL 90* 116* 121*   CREATININE mg/dL 2 77* 4 02* 4 15*       UTI (urinary tract infection)  Assessment & Plan  UA shows innumerable bacteria, inn WBC, large leuks  · Continue ceftriaxone  · Urine culture positive for Klebsiella, Enterobacter group  · Pending blood culture-no growth 24 hours    MRSA (methicillin resistant staph aureus) culture positive  Assessment & Plan  · Positive MRSA nares  · Mupirocin ointment nares  · Chlorhexidine oral rinse    Dementia with behavioral disturbance  Assessment & Plan  History of dementia, usually alert and oriented with intermittent confusion  · Supportive care    Closed fracture of distal end of right fibula  Assessment & Plan  ORIF fracture of the distal fibula noted on prior XR    · Pending repeat XR of the right foot    Depression  Assessment & Plan  Per daughter patient with increased depression, has been making comments to daughter about being severely depressed, has not been eating, drinking much  · Seen by psych OP, stopped zyprexa and started mirtazapine  · Neck x-ray input appreciated  · Remeron was discontinued by psychiatry and patient was started on Lexapro 5 mg p o  daily    Benign essential hypertension  Assessment & Plan  Continue metoprolol with holding parameters  Avoid hypotension    Cerebrovascular accident (CVA) due to embolism ([address] )  Assessment & Plan  History of CVA  · Continue aspirin, plavix, statin    Long-term use of immunosuppressant medication  Assessment & Plan  History of liver transplant, continue cellcept and cyclosporine    Hypothyroidism  Assessment & Plan  Continue levothyroxine 112 mcg  · Repeat TSH 4 89 - slightly elevated  Consider repeat TSH in 6 wks after infection resolves   VTE Pharmacologic Prophylaxis:   Pharmacologic: Heparin  Mechanical VTE Prophylaxis in Place: Yes    Patient Centered Rounds: I have performed bedside rounds with nursing staff today  Discussions with Specialists or Other Care Team Provider: yes    Education and Discussions with Family / Patient: yes  Patient Information Sharing: With the consent of Yazmin Powers , their loved ones were notified today by inpatient team of the patient’s condition and current plan  All questions answered  Time Spent for Care: 45 minutes  More than 50% of total time spent on counseling and coordination of care as described above  Current Length of Stay: 1 day(s)    Current Patient Status: Inpatient   Certification Statement: The patient will continue to require additional inpatient hospital stay due to UTI, REZA, and pending blood cultures    Discharge Plan: 24- 48 hrs to [address]  [facility] rehab    Code Status: Level 1 - Full Code    Subjective:   Patient states she's doing better today  She's more alert and awake  Able to tolerate her breakfast well      Objective     Objective: Vitals:   Temp (24hrs), Av 5 °F (36 4 °C), Min:96 6 °F (35 9 °C), Max:98 1 °F (36 7 °C)    Temp:  [96 6 °F (35 9 °C)-98 1 °F (36 7 °C)] 98 1 °F (36 7 °C)  HR:  [69-90] 90  Resp:  [16-19] 18  BP: (123-138)/(54-59) 138/59  SpO2:  [97 %-98 %] 97 %  Body mass index is 34 37 kg/m²  Input and Output Summary (last 24 hours): Intake/Output Summary (Last 24 hours) at 2023 1218  Last data filed at 2023 0747  Gross per 24 hour   Intake 1302 08 ml   Output 800 ml   Net 502 08 ml       Physical Exam:   Physical Exam  Constitutional:       General: She is not in acute distress  Appearance: Normal appearance  HENT:      Head: Normocephalic and atraumatic  Eyes:      Pupils: Pupils are equal, round, and reactive to light  Cardiovascular:      Rate and Rhythm: Normal rate and regular rhythm  Pulses: Normal pulses  Heart sounds: Normal heart sounds  No murmur heard  Pulmonary:      Effort: Pulmonary effort is normal  No respiratory distress  Breath sounds: Normal breath sounds  No wheezing  Abdominal:      General: Bowel sounds are normal       Palpations: Abdomen is soft  Tenderness: There is no abdominal tenderness  Genitourinary:     Comments: Pure wick  Musculoskeletal:      Right lower leg: No edema  Left lower leg: No edema  Skin:     General: Skin is warm and dry  Neurological:      General: No focal deficit present  Mental Status: She is alert     Psychiatric:         Mood and Affect: Mood normal          Behavior: Behavior normal        Additional Data:     Labs:  Results from last 7 days   Lab Units 23  0531 232   WBC Thousand/uL 6 39 8 63   HEMOGLOBIN g/dL 8 8* 10 2*   HEMATOCRIT % 27 9* 32 6*   PLATELETS Thousands/uL 222 259   NEUTROS PCT %  --  79*   LYMPHS PCT %  --  10*   LYMPHO PCT % 15  --    MONOS PCT %  --  7   MONO PCT % 9  --    EOS PCT % 5 1     Results from last 7 days   Lab Units 23  0613 23  0531 01/04/23 2032   POTASSIUM mmol/L 4 1   < > 5 2   CHLORIDE mmol/L 108   < > 102   CO2 mmol/L 22   < > 16*   BUN mg/dL 90*   < > 121*   CREATININE mg/dL 2 77*   < > 4 15*   CALCIUM mg/dL 7 7*   < > 9 3   ALK PHOS U/L  --   --  229*   ALT U/L  --   --  14   AST U/L  --   --  20    < > = values in this interval not displayed  Results from last 7 days   Lab Units 01/04/23 2032   INR  1 07               * I Have Reviewed All Lab Data Listed Above  * Additional Pertinent Lab Tests Reviewed: [address] Admission Reviewed    Imaging:    Imaging Reports Reviewed Today Include: CXR, CT a/p    Recent Cultures (last 7 days):     Results from last 7 days   Lab Units 01/04/23 2249 01/04/23 2036 01/04/23 2032   BLOOD CULTURE   --  No Growth at 24 hrs  No Growth at 24 hrs     URINE CULTURE  >100,000 cfu/ml Klebsiella-Enterobacter  group*  --   --        Last 24 Hours Medication List:   Current Facility-Administered Medications   Medication Dose Route Frequency Provider Last Rate   • acetaminophen  650 mg Oral Q6H PRN Felicitas Perez PA-C     • aspirin  81 mg Oral Daily With Breakfast Emelina Mckeon PA-C     • cefTRIAXone  1,000 mg Intravenous Q24H Emelina Mckeon PA-C 1,000 mg (01/06/23 0005)   • chlorhexidine  15 mL Swish & Spit Q12H 1900 Tramaine Barbosa MD     • clopidogrel  75 mg Oral Daily Emelina Mckeon PA-C     • cycloSPORINE modified  50 mg Oral BID Emelina Mckeon PA-C     • docusate sodium  100 mg Oral BID Emelina Mckeon PA-C     • escitalopram  5 mg Oral Daily Krissy Vance MD     • heparin (porcine)  5,000 Units Subcutaneous Q8H [address] Emelina Mckeon PA-C     • levothyroxine  112 mcg Oral Early Morning Emelina Mckeon PA-C     • melatonin  3 mg Oral HS Emelina Mckeon PA-C     • metoprolol tartrate  50 mg Oral Q12H [address] Emelina Mckeon PA-C     • mupirocin   Nasal Q12H 1900 Tramaine Barbosa MD     • mycophenolate  750 mg Oral Q12H [address] Emelina Mckeon PA-C     • pantoprazole 40 mg Oral Early Morning Emelina SYLVIE Mckeon     • polyethylene glycol  17 g Oral Daily PRN Emelina SYLVIE Mckeon     • pravastatin  20 mg Oral HS Emelina Mckeon PA-C     • sodium chloride  125 mL/hr Intravenous Continuous Cliff Huddleston  mL/hr (01/06/23 7522)             ** Please Note: Dictation voice to text software may have been used in the creation of this document   **    [address] Emilia Sarabia MD  01/06/23  12:18 PM

## 2023-01-06 NOTE — ASSESSMENT & PLAN NOTE
ORIF fracture of the distal fibula noted on prior XR    · X-ray of the right foot shows satisfactory appearance of the distal fibular fracture s/p ORIF

## 2023-01-06 NOTE — PROGRESS NOTES
Patient examined spoke to the nurse  Patient is resting in the bed she looks alert awake and improved with lethargy but she still withdrawn she was able to respond to verbal command better than yesterday she was able to tell me that she is in the hospital but could not tell me why she is in the hospital when she came in here and what happened to her  When asked her age[de-identified] 43''(77) patient is still confused and confabulates she is delirious medical treatment is in progress she seems to be improving gradually patient's Remeron was discontinued and started her on small dose of Lexapro for the depression  Patient is suffering from dementia with depression and delirium  Patient needs help with ADL care  Nurse reported no agitation and no behavioral problem  Patient will benefit from psychiatric follow-up after the discharge  I will continue her Lexapro 5 mg daily at this time and not ordering any other anxiety medicine to avoid the sedation  I will follow-up

## 2023-01-07 LAB
ANION GAP SERPL CALCULATED.3IONS-SCNC: 9 MMOL/L (ref 4–13)
ATRIAL RATE: 77 BPM
BACTERIA UR CULT: ABNORMAL
BUN SERPL-MCNC: 49 MG/DL (ref 5–25)
CALCIUM SERPL-MCNC: 7.7 MG/DL (ref 8.3–10.1)
CHLORIDE SERPL-SCNC: 115 MMOL/L (ref 96–108)
CO2 SERPL-SCNC: 21 MMOL/L (ref 21–32)
CREAT SERPL-MCNC: 1.43 MG/DL (ref 0.6–1.3)
GFR SERPL CREATININE-BSD FRML MDRD: 34 ML/MIN/1.73SQ M
GLUCOSE SERPL-MCNC: 108 MG/DL (ref 65–140)
P AXIS: 3 DEGREES
POTASSIUM SERPL-SCNC: 3.9 MMOL/L (ref 3.5–5.3)
PR INTERVAL: 162 MS
QRS AXIS: -29 DEGREES
QRSD INTERVAL: 94 MS
QT INTERVAL: 402 MS
QTC INTERVAL: 454 MS
SODIUM SERPL-SCNC: 145 MMOL/L (ref 135–147)
T WAVE AXIS: 39 DEGREES
VENTRICULAR RATE: 77 BPM

## 2023-01-07 RX ORDER — SODIUM CHLORIDE 450 MG/100ML
100 INJECTION, SOLUTION INTRAVENOUS CONTINUOUS
Status: DISCONTINUED | OUTPATIENT
Start: 2023-01-07 | End: 2023-01-08

## 2023-01-07 RX ORDER — ERTAPENEM 1 G/1
1000 INJECTION, POWDER, LYOPHILIZED, FOR SOLUTION INTRAMUSCULAR; INTRAVENOUS EVERY 24 HOURS
Status: DISCONTINUED | OUTPATIENT
Start: 2023-01-07 | End: 2023-01-07

## 2023-01-07 RX ADMIN — CYCLOSPORINE 50 MG: 25 CAPSULE ORAL at 17:50

## 2023-01-07 RX ADMIN — MYCOPHENOLATE MOFETIL 750 MG: 250 CAPSULE ORAL at 10:14

## 2023-01-07 RX ADMIN — DOCUSATE SODIUM 100 MG: 100 CAPSULE, LIQUID FILLED ORAL at 10:14

## 2023-01-07 RX ADMIN — SODIUM CHLORIDE 100 ML/HR: 4.5 INJECTION, SOLUTION INTRAVENOUS at 20:38

## 2023-01-07 RX ADMIN — ESCITALOPRAM OXALATE 5 MG: 5 TABLET, FILM COATED ORAL at 10:14

## 2023-01-07 RX ADMIN — CHLORHEXIDINE GLUCONATE 15 ML: 1.2 SOLUTION ORAL at 10:13

## 2023-01-07 RX ADMIN — CYCLOSPORINE 50 MG: 25 CAPSULE ORAL at 10:19

## 2023-01-07 RX ADMIN — MUPIROCIN 1 APPLICATION: 20 OINTMENT TOPICAL at 21:18

## 2023-01-07 RX ADMIN — METOPROLOL TARTRATE 50 MG: 50 TABLET, FILM COATED ORAL at 10:14

## 2023-01-07 RX ADMIN — DOCUSATE SODIUM 100 MG: 100 CAPSULE, LIQUID FILLED ORAL at 17:50

## 2023-01-07 RX ADMIN — HEPARIN SODIUM 5000 UNITS: 5000 INJECTION INTRAVENOUS; SUBCUTANEOUS at 10:17

## 2023-01-07 RX ADMIN — MYCOPHENOLATE MOFETIL 750 MG: 250 CAPSULE ORAL at 21:18

## 2023-01-07 RX ADMIN — MUPIROCIN 1 APPLICATION: 20 OINTMENT TOPICAL at 10:19

## 2023-01-07 RX ADMIN — CLOPIDOGREL BISULFATE 75 MG: 75 TABLET ORAL at 10:13

## 2023-01-07 RX ADMIN — HEPARIN SODIUM 5000 UNITS: 5000 INJECTION INTRAVENOUS; SUBCUTANEOUS at 17:50

## 2023-01-07 RX ADMIN — PRAVASTATIN SODIUM 20 MG: 20 TABLET ORAL at 21:18

## 2023-01-07 RX ADMIN — ASPIRIN 81 MG: 81 TABLET, COATED ORAL at 10:14

## 2023-01-07 RX ADMIN — ERTAPENEM SODIUM 1000 MG: 1 INJECTION, POWDER, LYOPHILIZED, FOR SOLUTION INTRAMUSCULAR; INTRAVENOUS at 11:52

## 2023-01-07 RX ADMIN — Medication 3 MG: at 21:18

## 2023-01-07 RX ADMIN — METOPROLOL TARTRATE 50 MG: 50 TABLET, FILM COATED ORAL at 21:18

## 2023-01-07 NOTE — PROGRESS NOTES
[facility] [address]  Progress Note Sina Drown 1945, 76 y o  female MRN: [id number]  Unit/Bed#: [address] [facility] Encounter: [id number]  Primary Care Provider: Townsend Schlatter, MD   Date and time admitted to hospital: 1/4/2023  8:05 PM    * REZA (acute kidney injury) [facility])  Assessment & Plan  Presents from care facility with decreased oral intake, weakness  Renal function improving  · Creatine 4 15, baseline 1 1-1 4  · Anion gap 16, co2 16  · UA positive for UTI  Urine culture positive for Klebsiella-Enterobacter  · CT a/p: Bladder wall thickening and infiltration of the adjacent fat concerning for urinary tract infection  Otherwise, no hydronephrosis  • Suspect due to dehydration and poor oral intake  • Given 1L bolus in ED  • Patient has been on bicarbonate infusion which will be changed to normal saline at 125 mill per hour  • Avoid nephrotoxic agents and hypotension  Creatinine continues to improve  Patient was changed to half-normal saline at 100 mL/h due to increased chloride level  Results from last 7 days   Lab Units 01/07/23  0837 01/06/23  0613 01/05/23  0531 01/04/23 2032   BUN mg/dL 49* 90* 116* 121*   CREATININE mg/dL 1 43* 2 77* 4 02* 4 15*       UTI (urinary tract infection)  Assessment & Plan  UA shows innumerable bacteria, inn WBC, large leuks  · And has been on IV ceftriaxone which was changed to ertapenem  · Urine cultures grew ESBL E  coli  · Blood cultures have been negative    Depression  Assessment & Plan  Per daughter patient with increased depression, has been making comments to daughter about being severely depressed, has not been eating, drinking much  · Seen by psych OP, stopped zyprexa and started mirtazapine  · Neck x-ray input appreciated  · Remeron was discontinued by psychiatry and patient was started on Lexapro 5 mg p o  daily    Closed fracture of distal end of right fibula  Assessment & Plan  ORIF fracture of the distal fibula noted on prior XR    · X-ray of the right foot shows satisfactory appearance of the distal fibular fracture s/p ORIF    MRSA (methicillin resistant staph aureus) culture positive  Assessment & Plan  · Positive MRSA nares  · Mupirocin ointment nares  · Chlorhexidine wash    Dementia with behavioral disturbance  Assessment & Plan  History of dementia, usually alert and oriented with intermittent confusion  · Supportive care    Benign essential hypertension  Assessment & Plan  Continue metoprolol with holding parameters  Avoid hypotension    Cerebrovascular accident (CVA) due to embolism (HCC)  Assessment & Plan  History of CVA  · Continue aspirin, plavix, statin    Long-term use of immunosuppressant medication  Assessment & Plan  History of liver transplant, continue cellcept and cyclosporine    Hypothyroidism  Assessment & Plan  Continue levothyroxine 112 mcg  · Repeat TSH 4 89 - slightly elevated  Consider repeat TSH in 6 wks after infection resolves   VTE Pharmacologic Prophylaxis: VTE Score: 4 Moderate Risk (Score 3-4) - Pharmacological DVT Prophylaxis Ordered: heparin  Patient Centered Rounds: I performed bedside rounds with nursing staff today  Discussions with Specialists or Other Care Team Provider: No    Education and Discussions with Family / Patient: Attempted to update  (daughter) via phone  Left voicemail  Time Spent for Care: 45 minutes  More than 50% of total time spent on counseling and coordination of care as described above  Current Length of Stay: 2 day(s)  Current Patient Status: Inpatient   Certification Statement: The patient will continue to require additional inpatient hospital stay due to UTI, acute kidney injury  Discharge Plan: Anticipate discharge in 48-72 hrs to rehab facility  Code Status: Level 1 - Full Code    Subjective:   Patient is awake and alert  Patient did not eat breakfast this morning    Denies any chest pain, shortness of breath, abdominal pain, nausea or vomiting  Objective:     Vitals:   Temp (24hrs), Av 7 °F (36 5 °C), Min:96 8 °F (36 °C), Max:98 4 °F (36 9 °C)    Temp:  [96 8 °F (36 °C)-98 4 °F (36 9 °C)] 97 6 °F (36 4 °C)  HR:  [67-86] 71  Resp:  [18-20] 18  BP: (131-153)/(55-67) 146/67  SpO2:  [97 %-99 %] 99 %  Body mass index is 34 37 kg/m²  Input and Output Summary (last 24 hours): Intake/Output Summary (Last 24 hours) at 2023 1358  Last data filed at 2023 1112  Gross per 24 hour   Intake 2697 92 ml   Output 2000 ml   Net 697 92 ml       Physical Exam:   Physical Exam  Constitutional:       Appearance: Normal appearance  HENT:      Head: Normocephalic and atraumatic  Nose: Nose normal       Mouth/Throat:      Mouth: Mucous membranes are moist       Pharynx: Oropharynx is clear  Eyes:      Extraocular Movements: Extraocular movements intact  Pupils: Pupils are equal, round, and reactive to light  Cardiovascular:      Rate and Rhythm: Normal rate and regular rhythm  Pulmonary:      Effort: Pulmonary effort is normal       Breath sounds: Normal breath sounds  Abdominal:      General: Bowel sounds are normal  There is no distension  Palpations: Abdomen is soft  Tenderness: There is no abdominal tenderness  Musculoskeletal:         General: No swelling  Cervical back: Normal range of motion and neck supple  Skin:     General: Skin is warm and dry  Neurological:      General: No focal deficit present  Mental Status: She is alert            Additional Data:     Labs:  Results from last 7 days   Lab Units 2331 23   WBC Thousand/uL 6 39 8 63   HEMOGLOBIN g/dL 8 8* 10 2*   HEMATOCRIT % 27 9* 32 6*   PLATELETS Thousands/uL 222 259   BANDS PCT % 2  --    NEUTROS PCT %  --  79*   LYMPHS PCT %  --  10*   LYMPHO PCT % 15  --    MONOS PCT %  --  7   MONO PCT % 9  --    EOS PCT % 5 1     Results from last 7 days   Lab Units 23  0837 2331 23   SODIUM mmol/L 145 < > 134*   POTASSIUM mmol/L 3 9   < > 5 2   CHLORIDE mmol/L 115*   < > 102   CO2 mmol/L 21   < > 16*   BUN mg/dL 49*   < > 121*   CREATININE mg/dL 1 43*   < > 4 15*   ANION GAP mmol/L 9   < > 16*   CALCIUM mg/dL 7 7*   < > 9 3   ALBUMIN g/dL  --   --  2 9*   TOTAL BILIRUBIN mg/dL  --   --  0 32   ALK PHOS U/L  --   --  229*   ALT U/L  --   --  14   AST U/L  --   --  20   GLUCOSE RANDOM mg/dL 108   < > 111    < > = values in this interval not displayed  Results from last 7 days   Lab Units 23   INR  1 07             Results from last 7 days   Lab Units 23   LACTIC ACID mmol/L 0 6       Lines/Drains:  Invasive Devices     Peripheral Intravenous Line  Duration           Long-Dwell Peripheral IV (Midline) 23 Right Brachial 2 days          Drain  Duration           External Urinary Catheter -- days    External Urinary Catheter 50 days                  Telemetry:  Telemetry Orders (From admission, onward)             48 Hour Telemetry Monitoring  Continuous x 48 hours           References:    Telemetry Guidelines   Question:  Reason for 48 Hour Telemetry  Answer:  Arrhythmias Requiring Medical Therapy (eg  SVT, Vtach/fib, Bradycardia, Uncontrolled A-fib)                 Telemetry Reviewed: Normal Sinus Rhythm  Indication for Continued Telemetry Use: No indication for continued use  Will discontinue  Imaging: CT abdomen pelvis, chest x-ray and ankle x-ray    Recent Cultures (last 7 days):   Results from last 7 days   Lab Units 23  2249 23   BLOOD CULTURE   --  No Growth at 48 hrs  No Growth at 48 hrs     URINE CULTURE  >100,000 cfu/ml Klebsiella pneumoniae ESBL*  --   --        Last 24 Hours Medication List:   Current Facility-Administered Medications   Medication Dose Route Frequency Provider Last Rate   • acetaminophen  650 mg Oral Q6H PRN Santi Lara PA-C     • aspirin  81 mg Oral Daily With Breakfast Santi Lara PA-C • clopidogrel  75 mg Oral Daily Emelina Mckeon PA-C     • cycloSPORINE modified  50 mg Oral BID Sabino Mallory PA-C     • docusate sodium  100 mg Oral BID Sabino Mallory PA-C     • ertapenem  1,000 mg Intravenous Q24H Robert Gould MD 1,000 mg (01/07/23 1152)   • escitalopram  5 mg Oral Daily Andre Cassidy MD     • heparin (porcine)  5,000 Units Subcutaneous Q8H [address] Emelina Mckeon PA-C     • levothyroxine  112 mcg Oral Early Morning Emelina Mckeon PA-C     • melatonin  3 mg Oral HS Emelina Mckeon PA-C     • metoprolol tartrate  50 mg Oral Q12H [address] Emelina Mckeon PA-C     • mupirocin   Nasal Q12H 1900 Tramaine Barbosa MD     • mycophenolate  750 mg Oral Q12H [address] Emelina Mckeon PA-C     • pantoprazole  40 mg Oral Early Morning Emelina Mckeon PA-C     • polyethylene glycol  17 g Oral Daily PRN Emelina Mckeon PA-C     • pravastatin  20 mg Oral HS Emelina Mckeon PA-C     • sodium chloride  100 mL/hr Intravenous Continuous Evie Ybarra  mL/hr (01/07/23 1018)        Today, Patient Was Seen By: Evie Ybarra MD    **Please Note: This note may have been constructed using a voice recognition system  **

## 2023-01-07 NOTE — DISCHARGE INSTR - OTHER ORDERS
1  For irritant contact dermatitis due to fecal and urinary incontinence on inner buttocks/perianal area, cleanse with foam cleanser  Apply Calazime cream or similar moisture barrier such as Desitin or Calmosesptine three times a day and as needed after incontinence care  2  For right inner thigh area of superficial, partial thickness skin erosion, cleanse with foam cleanser and apply Calazime or similar moisture barrier cream such as Desitin or Calmoseptine three times a day and as needed  3  For areas of intertriginous dermatitis under breasts and under abdominal pannus: Cleanse with foam cleanser  Blot dry  Sprinkle stomahesive powder under breasts and underneath abdominal pannus  Rub in  Blot with alcohol free barrier film  Apply Maxorb rope or similar calcium alginate or hydrofiber rope on top of barrier film  Apply once a day and as needed  4  Pressure redistribution cushion to chair  5  Moisturize skin daily  6  Frequently turn and reposition as patient able to tolerate  7  Offload heels from mattress surface

## 2023-01-07 NOTE — PROGRESS NOTES
Patient examined spoke to the  at bedside and spoke to the nurse patient looks much better she is alert awake not lethargic and responding to simple verbal command though she remained confused she could not tell me her age[de-identified] 45''(77) could not tell me where she is and what happened to her unable to tell me her date of birth currently her current month she was able to tell me her name and she was able to spell her last name she was able to recognize her  and able to tell me his name  Spoke to the daughter on the phone at length and she is aware that mom has dementia and she is suffering from memory loss she reported that every time she gets sick physically then she gets confused and gets worse with her mental status  Patient becomes delirious and she becomes confused  Patient seems to be getting better from the delirium she is better since I saw her 2 days ago and adjusted her medication discontinued Remeron and started her on small dose of Lexapro  More information obtained from the daughter on the phone  Nurse reported no behavioral problem  Medical evaluation and treatment is in progress  Patient confabulates with most of the questions she was able to answer correctly only few questions most of the questions she did not know the answer or gave me the wrong answer  She does not look in distress she was able to smile when I asked her to smile  Patient will need outpatient psychiatric follow-up at the nursing home after the discharge  I will not increase her medicine at this time continue Lexapro 5 mg daily and follow-up

## 2023-01-07 NOTE — CONSULTS
Consult Note - Wound   Wayne Coker 76 y o  female MRN: [id number]  Unit/Bed#: [address] [place] [address] Encounter: [id number]      Assessment:   Wound care nurse consult for skin erosion  Full body assessment completed  Patient irritable; does not want staff to perform incontinence care of liquid stool  Incontinent of liquid stool and urine  Purewick in use  Intertriginous dermatitis under breasts and underneath abdominal pannus  No evidence of cutaneous fungal infection  There is a linear shaped, superficial partial thickness area of skin erosion on right upper inner thigh  Etiology unknown  Image taken  Heels unremarkable  Buttocks with irritant contact dermatitis due to fecal and urinary incontinence  Diffusely scattered partial thickness skin erosion on inner buttocks and perianal area  Wound/Skin Care Plan:   1  For irritant contact dermatitis due to fecal and urinary incontinence on inner buttocks/perianal area, cleanse with foam cleanser  Apply Calazime cream three times a day and as needed after incontinence care  2  For right inner thigh area of superficial, partial thickness skin erosion, cleanse with foam cleanser and apply Calazime three times a day and as needed  3  For areas of intertriginous dermatitis under breasts and under abdominal pannus: Cleanse with foam cleanser  Blot dry  Sprinkle stomahesive powder under breasts and underneath abdominal pannus  Rub in  Blot with Cavilon alcohol free barrier film  Apply Maxorb rope on top of barrier film  Apply once a day and as needed  4  Pressure redistribution cushion to chair  5  Moisturize skin daily  6  Frequently turn and reposition as patient able to tolerate  7  Offload heels from mattress surface  8  Wound care nurse follow up       Images taken 1/6/23

## 2023-01-08 LAB
ANION GAP SERPL CALCULATED.3IONS-SCNC: 8 MMOL/L (ref 4–13)
BUN SERPL-MCNC: 36 MG/DL (ref 5–25)
CALCIUM SERPL-MCNC: 6.8 MG/DL (ref 8.3–10.1)
CHLORIDE SERPL-SCNC: 110 MMOL/L (ref 96–108)
CO2 SERPL-SCNC: 22 MMOL/L (ref 21–32)
CREAT SERPL-MCNC: 1.1 MG/DL (ref 0.6–1.3)
GFR SERPL CREATININE-BSD FRML MDRD: 47 ML/MIN/1.73SQ M
GLUCOSE SERPL-MCNC: 96 MG/DL (ref 65–140)
POTASSIUM SERPL-SCNC: 4 MMOL/L (ref 3.5–5.3)
SODIUM SERPL-SCNC: 140 MMOL/L (ref 135–147)

## 2023-01-08 RX ADMIN — SODIUM CHLORIDE 100 ML/HR: 4.5 INJECTION, SOLUTION INTRAVENOUS at 05:59

## 2023-01-08 RX ADMIN — HEPARIN SODIUM 5000 UNITS: 5000 INJECTION INTRAVENOUS; SUBCUTANEOUS at 10:05

## 2023-01-08 RX ADMIN — ACETAMINOPHEN 650 MG: 325 TABLET, FILM COATED ORAL at 12:28

## 2023-01-08 RX ADMIN — CLOPIDOGREL BISULFATE 75 MG: 75 TABLET ORAL at 10:05

## 2023-01-08 RX ADMIN — MUPIROCIN 1 APPLICATION: 20 OINTMENT TOPICAL at 10:11

## 2023-01-08 RX ADMIN — Medication 3 MG: at 21:34

## 2023-01-08 RX ADMIN — PANTOPRAZOLE SODIUM 40 MG: 40 TABLET, DELAYED RELEASE ORAL at 06:00

## 2023-01-08 RX ADMIN — MYCOPHENOLATE MOFETIL 750 MG: 250 CAPSULE ORAL at 21:33

## 2023-01-08 RX ADMIN — ERTAPENEM SODIUM 1000 MG: 1 INJECTION, POWDER, LYOPHILIZED, FOR SOLUTION INTRAMUSCULAR; INTRAVENOUS at 12:01

## 2023-01-08 RX ADMIN — LEVOTHYROXINE SODIUM 112 MCG: 112 TABLET ORAL at 06:00

## 2023-01-08 RX ADMIN — CYCLOSPORINE 50 MG: 25 CAPSULE ORAL at 18:16

## 2023-01-08 RX ADMIN — MYCOPHENOLATE MOFETIL 750 MG: 250 CAPSULE ORAL at 10:05

## 2023-01-08 RX ADMIN — CYCLOSPORINE 50 MG: 25 CAPSULE ORAL at 10:11

## 2023-01-08 RX ADMIN — ASPIRIN 81 MG: 81 TABLET, COATED ORAL at 10:05

## 2023-01-08 RX ADMIN — HEPARIN SODIUM 5000 UNITS: 5000 INJECTION INTRAVENOUS; SUBCUTANEOUS at 00:02

## 2023-01-08 RX ADMIN — HEPARIN SODIUM 5000 UNITS: 5000 INJECTION INTRAVENOUS; SUBCUTANEOUS at 18:17

## 2023-01-08 RX ADMIN — METOPROLOL TARTRATE 50 MG: 50 TABLET, FILM COATED ORAL at 10:05

## 2023-01-08 RX ADMIN — ESCITALOPRAM OXALATE 5 MG: 5 TABLET, FILM COATED ORAL at 10:05

## 2023-01-08 RX ADMIN — PRAVASTATIN SODIUM 20 MG: 20 TABLET ORAL at 21:34

## 2023-01-08 RX ADMIN — MUPIROCIN 1 APPLICATION: 20 OINTMENT TOPICAL at 21:37

## 2023-01-08 NOTE — PLAN OF CARE
Problem: Potential for Falls  Goal: Patient will remain free of falls  Description: INTERVENTIONS:  - Educate patient/family on patient safety including physical limitations  - Instruct patient to call for assistance with activity   - Consult OT/PT to assist with strengthening/mobility   - Keep Call bell within reach  - Keep bed low and locked with side rails adjusted as appropriate  - Keep care items and personal belongings within reach  - Initiate and maintain comfort rounds  - Make Fall Risk Sign visible to staff  - Offer Toileting every 2  Hours, in advance of need  - Initiate/Maintain bed alarm  - Obtain necessary fall risk management equipment: walker/ cane  - Apply yellow socks and bracelet for high fall risk patients  - Consider moving patient to room near nurses station  Outcome: Progressing     Problem: PAIN - ADULT  Goal: Verbalizes/displays adequate comfort level or baseline comfort level  Description: Interventions:  - Encourage patient to monitor pain and request assistance  - Assess pain using appropriate pain scale  - Administer analgesics based on type and severity of pain and evaluate response  - Implement non-pharmacological measures as appropriate and evaluate response  - Consider cultural and social influences on pain and pain management  - Notify physician/advanced practitioner if interventions unsuccessful or patient reports new pain  Outcome: Progressing     Problem: INFECTION - ADULT  Goal: Absence or prevention of progression during hospitalization  Description: INTERVENTIONS:  - Assess and monitor for signs and symptoms of infection  - Monitor lab/diagnostic results  - Monitor all insertion sites, i e  indwelling lines, tubes, and drains  - Monitor endotracheal if appropriate and nasal secretions for changes in amount and color  - [place] appropriate cooling/warming therapies per order  - Administer medications as ordered  - Instruct and encourage patient and family to use good hand hygiene technique  - Identify and instruct in appropriate isolation precautions for identified infection/condition  Outcome: Progressing     Problem: INFECTION - ADULT  Goal: Absence of fever/infection during neutropenic period  Description: INTERVENTIONS:  - Monitor WBC    Outcome: Progressing     Problem: SAFETY ADULT  Goal: Patient will remain free of falls  Description: INTERVENTIONS:  - Educate patient/family on patient safety including physical limitations  - Instruct patient to call for assistance with activity   - Consult OT/PT to assist with strengthening/mobility   - Keep Call bell within reach  - Keep bed low and locked with side rails adjusted as appropriate  - Keep care items and personal belongings within reach  - Initiate and maintain comfort rounds  - Make Fall Risk Sign visible to staff  - Offer Toileting every 2  Hours, in advance of need  - Initiate/Maintain bed alarm  - Obtain necessary fall risk management equipment: walker/ cane  - Apply yellow socks and bracelet for high fall risk patients  - Consider moving patient to room near nurses station  Outcome: Progressing     Problem: CARDIOVASCULAR - ADULT  Goal: Absence of cardiac dysrhythmias or at baseline rhythm  Description: INTERVENTIONS:  - Continuous cardiac monitoring, vital signs, obtain 12 lead EKG if ordered  - Administer antiarrhythmic and heart rate control medications as ordered  - Monitor electrolytes and administer replacement therapy as ordered  Outcome: Progressing     Problem: METABOLIC, FLUID AND ELECTROLYTES - ADULT  Goal: Electrolytes maintained within normal limits  Description: INTERVENTIONS:  - Monitor labs and assess patient for signs and symptoms of electrolyte imbalances  - Administer electrolyte replacement as ordered  - Monitor response to electrolyte replacements, including repeat lab results as appropriate  - Instruct patient on fluid and nutrition as appropriate  Outcome: Progressing     Problem: SKIN/TISSUE INTEGRITY - ADULT  Goal: Skin Integrity remains intact(Skin Breakdown Prevention)  Description: Assess:  -Perform Rafa assessment every shift  -Clean and moisturize skin every shift and after incontinence  -Inspect skin when repositioning, toileting, and assisting with ADl  -Assess extremities for adequate circulation and sensation     Bed Management:  -Have minimal linens on bed & keep smooth, unwrinkled  -Change linens as needed when moist or perspiring  -Avoid sitting or lying in one position for more than 2 hours while in bed  -Keep HOB at 30 degrees     Toileting:  -Offer bedside commode  -Assess for incontinence every 2 hours  -Use incontinent care products after each incontinent episode such as protective barrier cream    Activity:  -Mobilize patient 3  times a day  -Encourage activity and walks on unit  -Encourage or provide ROM exercises   -Turn and reposition patient every 2 Hours  -Use appropriate equipment to lift or move patient in bed  -Instruct/ Assist with weight shifting every 15 when out of bed in chair  -Consider limitation of chair time 4  hour intervals    Skin Care:  -Avoid use of baby powder, tape, friction and shearing, hot water or constrictive clothing  -Relieve pressure over bony prominences using waffle cushion  -Do not massage red bony areas    Next Steps:  -Consider consults to  interdisciplinary teams such as wound care and PT/ OT  Outcome: Progressing     Problem: Nutrition/Hydration-ADULT  Goal: Nutrient/Hydration intake appropriate for improving, restoring or maintaining nutritional needs  Description: Monitor and assess patient's nutrition/hydration status for malnutrition  Collaborate with interdisciplinary team and initiate plan and interventions as ordered  Monitor patient's weight and dietary intake as ordered or per policy  Utilize nutrition screening tool and intervene as necessary   Determine patient's food preferences and provide high-protein, high-caloric foods as appropriate       INTERVENTIONS:  - Monitor oral intake, urinary output, labs, and treatment plans  - Assess nutrition and hydration status and recommend course of action  - Evaluate amount of meals eaten  - Assist patient with eating if necessary   - Allow adequate time for meals  - Recommend/ encourage appropriate diets, oral nutritional supplements, and vitamin/mineral supplements  - Order, calculate, and assess calorie counts as needed  - Recommend, monitor, and adjust tube feedings and TPN/PPN based on assessed needs  - Assess need for intravenous fluids  - Provide specific nutrition/hydration education as appropriate  - Include patient/family/caregiver in decisions related to nutrition  Outcome: Progressing

## 2023-01-08 NOTE — PROGRESS NOTES
[facility] [address]  Progress Note Juany Thomason 1945, 76 y o  female MRN: [id number]  Unit/Bed#: [address] [facility] Encounter: [id number]  Primary Care Provider: Marline Lacey MD   Date and time admitted to hospital: 1/4/2023  8:05 PM    * REZA (acute kidney injury) [facility])  Assessment & Plan  Presents from care facility with decreased oral intake, weakness  Renal function improving  · Creatine 4 15, baseline 1 1-1 4  · Anion gap 16, co2 16  · UA positive for UTI  Urine culture positive for Klebsiella-Enterobacter  · CT a/p: Bladder wall thickening and infiltration of the adjacent fat concerning for urinary tract infection  Otherwise, no hydronephrosis  • Suspect due to dehydration and poor oral intake  • Given 1L bolus in ED  • Patient has been on bicarbonate infusion which will be changed to normal saline at 125 mill per hour  • Avoid nephrotoxic agents and hypotension  Creatinine has improved significantly  IV fluids were discontinued  Encourage p o  hydration  Results from last 7 days   Lab Units 01/08/23  0524 01/07/23  0837 01/06/23  0613 01/05/23  0531 01/04/23 2032   BUN mg/dL 36* 49* 90* 116* 121*   CREATININE mg/dL 1 10 1 43* 2 77* 4 02* 4 15*       UTI (urinary tract infection)  Assessment & Plan  UA shows innumerable bacteria, inn WBC, large leuks  · And has been on IV ceftriaxone which was changed to ertapenem day#2  · Urine cultures grew ESBL E  coli  · Blood cultures have been negative    Depression  Assessment & Plan  Per daughter patient with increased depression, has been making comments to daughter about being severely depressed, has not been eating, drinking much  · Seen by psych OP, stopped zyprexa and started mirtazapine  · Neck x-ray input appreciated    · Remeron was discontinued by psychiatry and patient was started on Lexapro 5 mg p o  daily    Closed fracture of distal end of right fibula  Assessment & Plan  ORIF fracture of the distal fibula noted on prior XR   · X-ray of the right foot shows satisfactory appearance of the distal fibular fracture s/p ORIF    MRSA (methicillin resistant staph aureus) culture positive  Assessment & Plan  · Positive MRSA nares  · Mupirocin ointment nares  · Chlorhexidine wash    Dementia with behavioral disturbance  Assessment & Plan  History of dementia, usually alert and oriented with intermittent confusion  · Supportive care    Benign essential hypertension  Assessment & Plan  Continue metoprolol with holding parameters  Avoid hypotension    Cerebrovascular accident (CVA) due to embolism (HCC)  Assessment & Plan  History of CVA  · Continue aspirin, plavix, statin    Long-term use of immunosuppressant medication  Assessment & Plan  History of liver transplant, continue cellcept and cyclosporine    Hypothyroidism  Assessment & Plan  Continue levothyroxine 112 mcg  · Repeat TSH 4 89 - slightly elevated  Consider repeat TSH in 6 wks after infection resolves   VTE Pharmacologic Prophylaxis: VTE Score: 4 patient on heparin    Patient Centered Rounds: I performed bedside rounds with nursing staff today  Discussions with Specialists or Other Care Team Provider: No    Education and Discussions with Family / Patient: Updated  (daughter) via phone  Time Spent for Care: 45 minutes  More than 50% of total time spent on counseling and coordination of care as described above  Current Length of Stay: 3 day(s)  Current Patient Status: Inpatient   Certification Statement: The patient will continue to require additional inpatient hospital stay due to Acute kidney injury and UTI  Discharge Plan: Anticipate discharge tomorrow to rehab facility  Code Status: Level 1 - Full Code    Subjective:   Continues to have poor oral intake  Patient did only Jell-O for yesterday lunch at dinner and this morning  Patient is more awake and alert      Objective:     Vitals:   Temp (24hrs), Av 2 °F (36 8 °C), Min:97 2 °F (36 2 °C), Max:99 4 °F (37 4 °C)    Temp:  [97 2 °F (36 2 °C)-99 4 °F (37 4 °C)] 97 2 °F (36 2 °C)  HR:  [69-80] 78  Resp:  [18-19] 18  BP: (106-148)/(42-66) 113/45  SpO2:  [97 %-99 %] 99 %  Body mass index is 34 37 kg/m²  Input and Output Summary (last 24 hours): Intake/Output Summary (Last 24 hours) at 1/8/2023 1614  Last data filed at 1/8/2023 0800  Gross per 24 hour   Intake --   Output 1650 ml   Net -1650 ml       Physical Exam:   Physical Exam  Constitutional:       Appearance: Normal appearance  HENT:      Head: Normocephalic and atraumatic  Nose: Nose normal       Mouth/Throat:      Mouth: Mucous membranes are moist       Pharynx: Oropharynx is clear  Eyes:      Extraocular Movements: Extraocular movements intact  Pupils: Pupils are equal, round, and reactive to light  Cardiovascular:      Rate and Rhythm: Normal rate and regular rhythm  Pulmonary:      Effort: Pulmonary effort is normal       Breath sounds: Normal breath sounds  Abdominal:      General: Bowel sounds are normal  There is no distension  Palpations: Abdomen is soft  Tenderness: There is no abdominal tenderness  Musculoskeletal:         General: No swelling  Cervical back: Normal range of motion and neck supple  Skin:     General: Skin is warm and dry  Neurological:      General: No focal deficit present  Mental Status: She is alert            Additional Data:     Labs:  Results from last 7 days   Lab Units 01/05/23 0531 01/04/23 2032   WBC Thousand/uL 6 39 8 63   HEMOGLOBIN g/dL 8 8* 10 2*   HEMATOCRIT % 27 9* 32 6*   PLATELETS Thousands/uL 222 259   BANDS PCT % 2  --    NEUTROS PCT %  --  79*   LYMPHS PCT %  --  10*   LYMPHO PCT % 15  --    MONOS PCT %  --  7   MONO PCT % 9  --    EOS PCT % 5 1     Results from last 7 days   Lab Units 01/08/23 0524 01/05/23 0531 01/04/23 2032   SODIUM mmol/L 140   < > 134*   POTASSIUM mmol/L 4 0   < > 5 2   CHLORIDE mmol/L 110*   < > 102   CO2 mmol/L 22   < > 16* BUN mg/dL 36*   < > 121*   CREATININE mg/dL 1 10   < > 4 15*   ANION GAP mmol/L 8   < > 16*   CALCIUM mg/dL 6 8*   < > 9 3   ALBUMIN g/dL  --   --  2 9*   TOTAL BILIRUBIN mg/dL  --   --  0 32   ALK PHOS U/L  --   --  229*   ALT U/L  --   --  14   AST U/L  --   --  20   GLUCOSE RANDOM mg/dL 96   < > 111    < > = values in this interval not displayed  Results from last 7 days   Lab Units 01/04/23 2032   INR  1 07             Results from last 7 days   Lab Units 01/04/23 2032   LACTIC ACID mmol/L 0 6       Lines/Drains:  Invasive Devices     Peripheral Intravenous Line  Duration           Long-Dwell Peripheral IV (Midline) 01/05/23 Right Brachial 3 days          Drain  Duration           External Urinary Catheter -- days    External Urinary Catheter 51 days                      Imaging: Reviewed radiology reports from this admission including: abdominal/pelvic CT and xray(s)    Recent Cultures (last 7 days):   Results from last 7 days   Lab Units 01/04/23  2249 01/04/23 2036 01/04/23 2032   BLOOD CULTURE   --  No Growth at 72 hrs  No Growth at 72 hrs     URINE CULTURE  >100,000 cfu/ml Klebsiella pneumoniae ESBL*  --   --        Last 24 Hours Medication List:   Current Facility-Administered Medications   Medication Dose Route Frequency Provider Last Rate   • acetaminophen  650 mg Oral Q6H PRN Guerline Crews PA-C     • aspirin  81 mg Oral Daily With Breakfast Guerline Crews PA-C     • clopidogrel  75 mg Oral Daily Emelina Mckeon PA-C     • cycloSPORINE modified  50 mg Oral BID Emelina Mckeon PA-C     • docusate sodium  100 mg Oral BID Guerline Crews PA-C     • ertapenem  1,000 mg Intravenous Q24H Robert Gould MD 1,000 mg (01/08/23 1201)   • escitalopram  5 mg Oral Daily Mason Zhang MD     • heparin (porcine)  5,000 Units Subcutaneous Q8H [address] Emelina Mckeon PA-C     • levothyroxine  112 mcg Oral Early Morning Emelina Mckeon PA-C     • melatonin  3 mg Oral HS Guerline Crews SYLVIE     • metoprolol tartrate  50 mg Oral Q12H [address] Emelina Mckeon PA-C     • mupirocin   Nasal Q12H [address] Tramaine Barbosa MD     • mycophenolate  750 mg Oral Q12H [address] Emelina Mckeon PA-C     • pantoprazole  40 mg Oral Early Morning Emelina Mckeon PA-C     • polyethylene glycol  17 g Oral Daily PRN Emelina Mckeon PA-C     • pravastatin  20 mg Oral HS Brigitte Hebert PA-C          Today, Patient Was Seen By: Logan Todd MD    **Please Note: This note may have been constructed using a voice recognition system  **

## 2023-01-08 NOTE — ASSESSMENT & PLAN NOTE
UA shows innumerable bacteria, inn WBC, large leuks  · And has been on IV ceftriaxone which was changed to ertapenem day#2  · Urine cultures grew ESBL E  coli  · Blood cultures have been negative

## 2023-01-08 NOTE — ASSESSMENT & PLAN NOTE
Presents from care facility with decreased oral intake, weakness  Renal function improving  · Creatine 4 15, baseline 1 1-1 4  · Anion gap 16, co2 16  · UA positive for UTI  Urine culture positive for Klebsiella-Enterobacter  · CT a/p: Bladder wall thickening and infiltration of the adjacent fat concerning for urinary tract infection  Otherwise, no hydronephrosis  • Suspect due to dehydration and poor oral intake  • Given 1L bolus in ED  • Patient has been on bicarbonate infusion which will be changed to normal saline at 125 mill per hour  • Avoid nephrotoxic agents and hypotension  Creatinine has improved significantly  IV fluids were discontinued    Encourage p o  hydration  Results from last 7 days   Lab Units 01/08/23  0524 01/07/23  0837 01/06/23  0613 01/05/23  0531 01/04/23 2032   BUN mg/dL 36* 49* 90* 116* 121*   CREATININE mg/dL 1 10 1 43* 2 77* 4 02* 4 15*

## 2023-01-08 NOTE — PROGRESS NOTES
Patient examined spoke to the nurse medical progress note reviewed and noted spoke to the  and daughter at bedside  Patient is suffering from dementia she is getting better with confusion she was able to tell me that she is in the hospital but still she has trouble with most of the simple questions she could not tell me her age she does not know why she is here what happened to her she is forgetful could not tell me currently her current month she recognized her  and the daughter  Medical treatment is in progress  Patient is better with the lethargy and improving with the delirium  Patient needs help with ADL care  Currently patient is in a rehab at the nursing home  Patient went through the rough time during the last year and she ended up in the nursing home  Patient is deteriorating with her memory  Patient's Remeron was discontinued and started her on small dose of Lexapro for the depression to avoid the sedation I will continue Lexapro 5 mg daily at this time and ordering no other medications while medical treatment is in progress  Patient will benefit from psychiatric follow-up at the nursing home after the discharge  Discussed with the daughter and the  and answered all their questions during the session  I will follow-up

## 2023-01-09 VITALS
OXYGEN SATURATION: 98 % | RESPIRATION RATE: 18 BRPM | TEMPERATURE: 98.4 F | BODY MASS INDEX: 34.58 KG/M2 | DIASTOLIC BLOOD PRESSURE: 61 MMHG | WEIGHT: 187.9 LBS | HEART RATE: 84 BPM | SYSTOLIC BLOOD PRESSURE: 133 MMHG | HEIGHT: 62 IN

## 2023-01-09 LAB
ANION GAP SERPL CALCULATED.3IONS-SCNC: 9 MMOL/L (ref 4–13)
BUN SERPL-MCNC: 32 MG/DL (ref 5–25)
CALCIUM SERPL-MCNC: 7.4 MG/DL (ref 8.3–10.1)
CHLORIDE SERPL-SCNC: 111 MMOL/L (ref 96–108)
CO2 SERPL-SCNC: 22 MMOL/L (ref 21–32)
CREAT SERPL-MCNC: 1.4 MG/DL (ref 0.6–1.3)
GFR SERPL CREATININE-BSD FRML MDRD: 35 ML/MIN/1.73SQ M
GLUCOSE SERPL-MCNC: 96 MG/DL (ref 65–140)
POTASSIUM SERPL-SCNC: 3.6 MMOL/L (ref 3.5–5.3)
SODIUM SERPL-SCNC: 142 MMOL/L (ref 135–147)

## 2023-01-09 RX ORDER — SODIUM CHLORIDE 450 MG/100ML
100 INJECTION, SOLUTION INTRAVENOUS CONTINUOUS
Status: DISCONTINUED | OUTPATIENT
Start: 2023-01-09 | End: 2023-01-09 | Stop reason: HOSPADM

## 2023-01-09 RX ORDER — ESCITALOPRAM OXALATE 5 MG/1
5 TABLET ORAL DAILY
Refills: 0
Start: 2023-01-10

## 2023-01-09 RX ORDER — MEMANTINE HYDROCHLORIDE 5 MG/1
5 TABLET ORAL DAILY
Refills: 0
Start: 2023-01-10

## 2023-01-09 RX ORDER — MEMANTINE HYDROCHLORIDE 5 MG/1
5 TABLET ORAL DAILY
Status: DISCONTINUED | OUTPATIENT
Start: 2023-01-09 | End: 2023-01-09 | Stop reason: HOSPADM

## 2023-01-09 RX ORDER — SODIUM CHLORIDE 9 MG/ML
125 INJECTION, SOLUTION INTRAVENOUS CONTINUOUS
Status: DISCONTINUED | OUTPATIENT
Start: 2023-01-09 | End: 2023-01-09

## 2023-01-09 RX ADMIN — HEPARIN SODIUM 5000 UNITS: 5000 INJECTION INTRAVENOUS; SUBCUTANEOUS at 00:50

## 2023-01-09 RX ADMIN — LEVOTHYROXINE SODIUM 112 MCG: 112 TABLET ORAL at 05:31

## 2023-01-09 RX ADMIN — CYCLOSPORINE 50 MG: 25 CAPSULE ORAL at 09:58

## 2023-01-09 RX ADMIN — ASPIRIN 81 MG: 81 TABLET, COATED ORAL at 09:57

## 2023-01-09 RX ADMIN — PANTOPRAZOLE SODIUM 40 MG: 40 TABLET, DELAYED RELEASE ORAL at 05:30

## 2023-01-09 RX ADMIN — HEPARIN SODIUM 5000 UNITS: 5000 INJECTION INTRAVENOUS; SUBCUTANEOUS at 09:57

## 2023-01-09 RX ADMIN — DOCUSATE SODIUM 100 MG: 100 CAPSULE, LIQUID FILLED ORAL at 09:57

## 2023-01-09 RX ADMIN — METOPROLOL TARTRATE 50 MG: 50 TABLET, FILM COATED ORAL at 09:57

## 2023-01-09 RX ADMIN — ESCITALOPRAM OXALATE 5 MG: 5 TABLET, FILM COATED ORAL at 09:57

## 2023-01-09 RX ADMIN — CLOPIDOGREL BISULFATE 75 MG: 75 TABLET ORAL at 09:57

## 2023-01-09 RX ADMIN — MEMANTINE 5 MG: 5 TABLET ORAL at 12:29

## 2023-01-09 RX ADMIN — MUPIROCIN 1 APPLICATION: 20 OINTMENT TOPICAL at 09:57

## 2023-01-09 RX ADMIN — ERTAPENEM SODIUM 1000 MG: 1 INJECTION, POWDER, LYOPHILIZED, FOR SOLUTION INTRAMUSCULAR; INTRAVENOUS at 12:28

## 2023-01-09 RX ADMIN — SODIUM CHLORIDE 100 ML/HR: 4.5 INJECTION, SOLUTION INTRAVENOUS at 09:59

## 2023-01-09 RX ADMIN — MYCOPHENOLATE MOFETIL 750 MG: 250 CAPSULE ORAL at 09:57

## 2023-01-09 NOTE — PLAN OF CARE
Problem: Potential for Falls  Goal: Patient will remain free of falls  Description: INTERVENTIONS:  - Educate patient/family on patient safety including physical limitations  - Instruct patient to call for assistance with activity   - Consult OT/PT to assist with strengthening/mobility   - Keep Call bell within reach  - Keep bed low and locked with side rails adjusted as appropriate  - Keep care items and personal belongings within reach  - Initiate and maintain comfort rounds  - Make Fall Risk Sign visible to staff  - Offer Toileting every 2  Hours, in advance of need  - Initiate/Maintain bed alarm  - Obtain necessary fall risk management equipment: walker/ cane  - Apply yellow socks and bracelet for high fall risk patients  - Consider moving patient to room near nurses station  Outcome: Completed     Problem: MOBILITY - ADULT  Goal: Maintain or return to baseline ADL function  Description: INTERVENTIONS:  -  Assess patient's ability to carry out ADLs; assess patient's baseline for ADL function and identify physical deficits which impact ability to perform ADLs (bathing, care of mouth/teeth, toileting, grooming, dressing, etc )  - Assess/evaluate cause of self-care deficits   - Assess range of motion  - Assess patient's mobility; develop plan if impaired  - Assess patient's need for assistive devices and provide as appropriate  - Encourage maximum independence but intervene and supervise when necessary  - Involve family in performance of ADLs  - Assess for home care needs following discharge   - Consider OT consult to assist with ADL evaluation and planning for discharge  - Provide patient education as appropriate  Outcome: Completed  Goal: Maintains/Returns to pre admission functional level  Description: INTERVENTIONS:  - Perform BMAT or MOVE assessment daily    - Set and communicate daily mobility goal to care team and patient/family/caregiver     - Collaborate with rehabilitation services on mobility goals if consulted  - Perform Range of Motion 3  times a day  - Reposition patient every 2 hours    - Dangle patient 3 times a day  - Stand patient 3  times a day  - Out of bed to chair 3  times a day   - Out of bed for meals 3  times a day  - Out of bed for toileting  - Record patient progress and toleration of activity level   Outcome: Completed     Problem: PAIN - ADULT  Goal: Verbalizes/displays adequate comfort level or baseline comfort level  Description: Interventions:  - Encourage patient to monitor pain and request assistance  - Assess pain using appropriate pain scale  - Administer analgesics based on type and severity of pain and evaluate response  - Implement non-pharmacological measures as appropriate and evaluate response  - Consider cultural and social influences on pain and pain management  - Notify physician/advanced practitioner if interventions unsuccessful or patient reports new pain  Outcome: Completed     Problem: INFECTION - ADULT  Goal: Absence or prevention of progression during hospitalization  Description: INTERVENTIONS:  - Assess and monitor for signs and symptoms of infection  - Monitor lab/diagnostic results  - Monitor all insertion sites, i e  indwelling lines, tubes, and drains  - Monitor endotracheal if appropriate and nasal secretions for changes in amount and color  - [place] appropriate cooling/warming therapies per order  - Administer medications as ordered  - Instruct and encourage patient and family to use good hand hygiene technique  - Identify and instruct in appropriate isolation precautions for identified infection/condition  Outcome: Completed  Goal: Absence of fever/infection during neutropenic period  Description: INTERVENTIONS:  - Monitor WBC    Outcome: Completed     Problem: SAFETY ADULT  Goal: Patient will remain free of falls  Description: INTERVENTIONS:  - Educate patient/family on patient safety including physical limitations  - Instruct patient to call for assistance with activity   - Consult OT/PT to assist with strengthening/mobility   - Keep Call bell within reach  - Keep bed low and locked with side rails adjusted as appropriate  - Keep care items and personal belongings within reach  - Initiate and maintain comfort rounds  - Make Fall Risk Sign visible to staff  - Offer Toileting every 2  Hours, in advance of need  - Initiate/Maintain bed alarm  - Obtain necessary fall risk management equipment: walker/ cane  - Apply yellow socks and bracelet for high fall risk patients  - Consider moving patient to room near nurses station  Outcome: Completed  Goal: Maintain or return to baseline ADL function  Description: INTERVENTIONS:  -  Assess patient's ability to carry out ADLs; assess patient's baseline for ADL function and identify physical deficits which impact ability to perform ADLs (bathing, care of mouth/teeth, toileting, grooming, dressing, etc )  - Assess/evaluate cause of self-care deficits   - Assess range of motion  - Assess patient's mobility; develop plan if impaired  - Assess patient's need for assistive devices and provide as appropriate  - Encourage maximum independence but intervene and supervise when necessary  - Involve family in performance of ADLs  - Assess for home care needs following discharge   - Consider OT consult to assist with ADL evaluation and planning for discharge  - Provide patient education as appropriate  Outcome: Completed  Goal: Maintains/Returns to pre admission functional level  Description: INTERVENTIONS:  - Perform BMAT or MOVE assessment daily    - Set and communicate daily mobility goal to care team and patient/family/caregiver  - Collaborate with rehabilitation services on mobility goals if consulted  - Perform Range of Motion 3  times a day  - Reposition patient every 2 hours    - Dangle patient 3 times a day  - Stand patient 3  times a day  - Out of bed to chair 3  times a day   - Out of bed for meals 3  times a day  - Out of bed for toileting  - Record patient progress and toleration of activity level   Outcome: Completed     Problem: Knowledge Deficit  Goal: Patient/family/caregiver demonstrates understanding of disease process, treatment plan, medications, and discharge instructions  Description: Complete learning assessment and assess knowledge base    Interventions:  - Provide teaching at level of understanding  - Provide teaching via preferred learning methods  Outcome: Completed     Problem: NEUROSENSORY - ADULT  Goal: Achieves stable or improved neurological status  Description: INTERVENTIONS  - Monitor and report changes in neurological status  - Monitor vital signs such as temperature, blood pressure, glucose, and any other labs ordered   - Initiate measures to prevent increased intracranial pressure  - Monitor for seizure activity and implement precautions if appropriate      Outcome: Completed     Problem: CARDIOVASCULAR - ADULT  Goal: Absence of cardiac dysrhythmias or at baseline rhythm  Description: INTERVENTIONS:  - Continuous cardiac monitoring, vital signs, obtain 12 lead EKG if ordered  - Administer antiarrhythmic and heart rate control medications as ordered  - Monitor electrolytes and administer replacement therapy as ordered  Outcome: Completed     Problem: METABOLIC, FLUID AND ELECTROLYTES - ADULT  Goal: Electrolytes maintained within normal limits  Description: INTERVENTIONS:  - Monitor labs and assess patient for signs and symptoms of electrolyte imbalances  - Administer electrolyte replacement as ordered  - Monitor response to electrolyte replacements, including repeat lab results as appropriate  - Instruct patient on fluid and nutrition as appropriate  Outcome: Completed  Goal: Fluid balance maintained  Description: INTERVENTIONS:  - Monitor labs   - Monitor I/O and WT  - Instruct patient on fluid and nutrition as appropriate  - Assess for signs & symptoms of volume excess or deficit  Outcome: Completed     Problem: SKIN/TISSUE INTEGRITY - ADULT  Goal: Skin Integrity remains intact(Skin Breakdown Prevention)  Description: Assess:  -Perform Rafa assessment every shift  -Clean and moisturize skin every shift and after incontinence  -Inspect skin when repositioning, toileting, and assisting with ADl  -Assess extremities for adequate circulation and sensation     Bed Management:  -Have minimal linens on bed & keep smooth, unwrinkled  -Change linens as needed when moist or perspiring  -Avoid sitting or lying in one position for more than 2 hours while in bed  -Keep HOB at 30 degrees     Toileting:  -Offer bedside commode  -Assess for incontinence every 2 hours  -Use incontinent care products after each incontinent episode such as protective barrier cream    Activity:  -Mobilize patient 3  times a day  -Encourage activity and walks on unit  -Encourage or provide ROM exercises   -Turn and reposition patient every 2 Hours  -Use appropriate equipment to lift or move patient in bed  -Instruct/ Assist with weight shifting every 15 when out of bed in chair  -Consider limitation of chair time 4  hour intervals    Skin Care:  -Avoid use of baby powder, tape, friction and shearing, hot water or constrictive clothing  -Relieve pressure over bony prominences using waffle cushion  -Do not massage red bony areas    Next Steps:  -Consider consults to  interdisciplinary teams such as wound care and PT/ OT    Outcome: Completed  Goal: Pressure injury heals and does not worsen  Description: Interventions:  - Implement low air loss mattress or specialty surface (Criteria met)  - Apply silicone foam dressing  - Consider nutrition services referral as needed  Outcome: Completed     Problem: Prexisting or High Potential for Compromised Skin Integrity  Goal: Skin integrity is maintained or improved  Description: INTERVENTIONS:  - Identify patients at risk for skin breakdown  - Assess and monitor skin integrity  - Assess and monitor nutrition and hydration status  - Monitor labs   - Assess for incontinence   - Turn and reposition patient  - Assist with mobility/ambulation  - Relieve pressure over bony prominences  - Avoid friction and shearing  - Provide appropriate hygiene as needed including keeping skin clean and dry  - Evaluate need for skin moisturizer/barrier cream  - Collaborate with interdisciplinary team   - Patient/family teaching  - Consider wound care consult   Outcome: Completed     Problem: Nutrition/Hydration-ADULT  Goal: Nutrient/Hydration intake appropriate for improving, restoring or maintaining nutritional needs  Description: Monitor and assess patient's nutrition/hydration status for malnutrition  Collaborate with interdisciplinary team and initiate plan and interventions as ordered  Monitor patient's weight and dietary intake as ordered or per policy  Utilize nutrition screening tool and intervene as necessary  Determine patient's food preferences and provide high-protein, high-caloric foods as appropriate       INTERVENTIONS:  - Monitor oral intake, urinary output, labs, and treatment plans  - Assess nutrition and hydration status and recommend course of action  - Evaluate amount of meals eaten  - Assist patient with eating if necessary   - Allow adequate time for meals  - Recommend/ encourage appropriate diets, oral nutritional supplements, and vitamin/mineral supplements  - Order, calculate, and assess calorie counts as needed  - Recommend, monitor, and adjust tube feedings and TPN/PPN based on assessed needs  - Assess need for intravenous fluids  - Provide specific nutrition/hydration education as appropriate  - Include patient/family/caregiver in decisions related to nutrition  Outcome: Completed

## 2023-01-09 NOTE — DISCHARGE INSTR - AVS FIRST PAGE
Patient is eating poorly  Encourage p o  hydration    Can do BMP in 3 to 4 days and can be given IV fluids as needed for dehydration  Patient will also need follow-up with Dr Stephen Rose   Patient will need repeat TFTs in 6 weeks as TSH was slightly elevated

## 2023-01-09 NOTE — PROGRESS NOTES
Patient examined spoke to the nurse and discussed with the  at bedside patient is alert awake cooperative not lethargic and  is feeding her lunch  Patient response to verbal command appropriately but most of the questions she is not able to answer she was able to tell me that she is in the hospital she could not tell me her age[de-identified] 73''(77) could not tell currently her current month she was able to tell me her name and her 's name but she could not tell me why she is in the hospital and what is wrong with her  Patient needs help with ADL care  Patient is demented and depressed she improved with lethargy and getting back to her baseline with delirium  Patient will need psychiatric follow-up currently she is on a small dose of Lexapro 5 mg daily I will continue the same medicine and order Namenda 5 mg daily for dementia I will follow-up

## 2023-01-09 NOTE — [place]  (ALL ITEMS MUST BE COMPLETED)    1  TRANSFER FROM: [address]      TRANSFER TO: [facility] Kayla    2  DATE OF TRANSFER: 1/9/2023                        TIME OF TRANSFER: 1630    3  PATIENT NAME: JANETT Balderrama      YOB: 1945                             GENDER: female    4  LANGUAGE:   English    5  PHYSICIAN NAME:  Steff Salvador MD                   PHONE: [phone]    6  CODE STATUS: Level 1 - Full Code        Out of Hospital DNR Attached: No    7  :                                      :  Extended Emergency Contact Information  Primary Emergency Contact: Lakhani,Susan  Address: [address] [place] [address] [address] [facility] Phone: [phone]  Mobile Phone: [phone]  Relation: Daughter  Secondary Emergency Contact: CoryLuis bland  Address: [address] [place] [address], [facility] [address]  Phone: [phone]  Mobile Phone: [phone]  Relation: Significant Other           Health Care Representative/Proxy:  Yes           Legal Guardian:  Yes             NAME OF:           HEALTH CARE REPRESENTATIVE/PROXY:                                         OR           LEGAL GUARDIAN, IF NOT :                                               PHONE:  (Day)           (Night)                        (Cell)    8  REASON FOR TRANSFER: (Must include brief medical history and recent changes in physical function or cognition ) Altered Mental Status            V/S: /52   Pulse 81   Temp 98 1 °F (36 7 °C)   Resp 17   Ht 5' 2" (1 575 m)   Wt 85 2 kg (187 lb 14 4 oz)   SpO2 99%   BMI 34 37 kg/m²           PAIN: None    9  PRIMARY DIAGNOSIS: REZA (acute kidney injury) ([facility] [address] [facility] )      Secondary Diagnosis:         Pacemaker: No      Internal Defib: No          Mental Health Diagnosis (if Applicable):    10  RESTRAINTS: No     11  RESPIRATORY NEEDS: None    12  ISOLATION/PRECAUTION: MRSA, SiteNares, ESBL and SiteUrine    13  ALLERGY: Tetracyclines & related, Vancomycin, and Prograf [tacrolimus]    14  SENSORY:       Hearing Poor and Speech Difficult    15  SKIN CONDITION: Yes:  Commentexcoriation abdomen, generalized redness    16  DIET: Regular    17  IV ACCESS: None    18  PERSONAL ITEMS SENT WITH PATIENT: Glasses    19  ATTACHED DOCUMENTS: MUST ATTACH CURRENT MEDICATION INFORMATION MAR, Medication Reconciliation and Discharge Summary    20  AT RISK ALERTS:None        HARM TO: N/A    21  WEIGHT BEARING STATUS:         Left Leg: None        Right Leg: None    22  MENTAL STATUS:Disoriented    23  FUNCTION:        Walk: Not Able        Transfer: Not Able        Toilet: Not Able        Feed: With Help    24  IMMUNIZATIONS/SCREENING:     Immunization History   Administered Date(s) Administered   • COVID-19 MODERNA VACC 0 5 ML IM 01/21/2021, 02/19/2021, 08/31/2021   • H1N1, All Formulations 10/31/2009   • Hep A, adult 11/19/1996, 05/21/1997   • INFLUENZA 11/01/2018, 11/01/2021   • Influenza Split High Dose Preservative Free IM 10/11/2012, 09/25/2013, 10/31/2014, 09/03/2015, 09/29/2016, 10/17/2017   • Influenza, high dose seasonal 0 7 mL 10/11/2019, 10/26/2020, 11/14/2022   • Influenza, seasonal, injectable 10/07/2005, 10/03/2006, 10/25/2007, 09/16/2008, 09/09/2009, 09/22/2010, 09/14/2011   • Pneumococcal Conjugate 13-Valent 06/30/2015   • Pneumococcal Polysaccharide PPV23 09/30/2006, 09/14/2011   • Tdap 03/13/2018       25  BOWEL: Incontinent     26  BLADDER: Incontinent    27   SENDING FACILITY CONTACT: Tiana Ventura                  Title: RN        Unit: 4N        Phone: [phone] [address] (if known):        Title:        Unit:         Phone:         FORM PREFILLED BY (if applicable)       Title:       Unit:        Phone:         [address] [place] [address] St, RN      Title: RN      Phone: [phone]

## 2023-01-09 NOTE — NURSING NOTE
IV removed  Instructions gone over with receiving facility  Awaiting [place] for 1630 pickup

## 2023-01-09 NOTE — [place] FOR[place] UNIV[place] FORM  (ALL ITEMS MUST BE COMPLETED)    1  TRANSFER FROM: [address]      TRANSFER TO: [facility] Kayla    2  DATE OF TRANSFER: 1/9/2023                        TIME OF TRANSFER: 1630    3  PATIENT NAME: JANETT Dickerson      YOB: 1945                             GENDER: female    4  LANGUAGE:   English    5  PHYSICIAN NAME:  Linh Oropeza MD                   PHONE: [phone]    6  CODE STATUS: Level 1 - Full Code        Out of Hospital DNR Attached: No    7  :                                      :  Extended Emergency Contact Information  Primary Emergency Contact: Rae Lakhani  Address: [address] [place] [address] Phone: [phone]  Mobile Phone: [phone]  Relation: Daughter  Secondary Emergency Contact: Luis Borden  Address: [address] [place] [address] [facility] [address] [facility] Phone: [phone]  Mobile Phone: [phone]  Relation: Significant Other           Health Care Representative/Proxy:  No           Legal Guardian:  No             NAME OF:           HEALTH CARE REPRESENTATIVE/PROXY:                                         OR           LEGAL GUARDIAN, IF NOT :                                               PHONE:  (Day)           (Night)                        (Cell)    8  REASON FOR TRANSFER: (Must include brief medical history and recent changes in physical function or cognition ) AMS            V/S: /61   Pulse 84   Temp 98 4 °F (36 9 °C)   Resp 18   Ht 5' 2" (1 575 m)   Wt 85 2 kg (187 lb 14 4 oz)   SpO2 98%   BMI 34 37 kg/m²           PAIN: None    9  PRIMARY DIAGNOSIS: REZA (acute kidney injury) ([facility] [address] )      Secondary Diagnosis:         Pacemaker: No      Internal Defib: No          Mental Health Diagnosis (if Applicable):    10  RESTRAINTS: No     11  RESPIRATORY NEEDS: None    12  ISOLATION/PRECAUTION: MRSA and ESBL    13  ALLERGY: Tetracyclines & related, Vancomycin, and Prograf [tacrolimus]    14  SENSORY:       Hearing Poor and Speech Difficult    15  SKIN CONDITION: No Wounds    16  DIET: Regular    17  IV ACCESS: None    18  PERSONAL ITEMS SENT WITH PATIENT: Glasses    19  ATTACHED DOCUMENTS: MUST ATTACH CURRENT MEDICATION INFORMATION Face Sheet, MAR and Discharge Summary    20  AT RISK ALERTS:Falls        HARM TO: N/A    21  WEIGHT BEARING STATUS:         Left Leg: None        Right Leg: None    22  MENTAL STATUS:Disoriented    23  FUNCTION:        Walk: Not Able        Transfer: With Help        Toilet: With Help        Feed: With Help    24  IMMUNIZATIONS/SCREENING:     Immunization History   Administered Date(s) Administered   • COVID-19 MODERNA VACC 0 5 ML IM 01/21/2021, 02/19/2021, 08/31/2021   • H1N1, All Formulations 10/31/2009   • Hep A, adult 11/19/1996, 05/21/1997   • INFLUENZA 11/01/2018, 11/01/2021   • Influenza Split High Dose Preservative Free IM 10/11/2012, 09/25/2013, 10/31/2014, 09/03/2015, 09/29/2016, 10/17/2017   • Influenza, high dose seasonal 0 7 mL 10/11/2019, 10/26/2020, 11/14/2022   • Influenza, seasonal, injectable 10/07/2005, 10/03/2006, 10/25/2007, 09/16/2008, 09/09/2009, 09/22/2010, 09/14/2011   • Pneumococcal Conjugate 13-Valent 06/30/2015   • Pneumococcal Polysaccharide PPV23 09/30/2006, 09/14/2011   • Tdap 03/13/2018       25  BOWEL: Incontinent     26  BLADDER: Incontinent    27   SENDING FACILITY CONTACT: Neva Iqbal                  Title: RN        Unit: 4N        Phone: [phone] [address] (if known):        Title:        Unit:         Phone:         FORM PREFILLED BY (if applicable)       Title:       Unit:        Phone:         Freida Salazar RN      Title: LUIZ      Phone: [phone]

## 2023-01-09 NOTE — DISCHARGE SUMMARY
Wendy 43  Discharge- Yazmin Avers 1945, 76 y o  female MRN: [id number]  Unit/Bed#: [address] [place] [address] Encounter: [id number]  Primary Care Provider: Zoila Vinson MD   Date and time admitted to hospital: 1/4/2023  8:05 PM    * REZA (acute kidney injury) [facility])  Assessment & Plan  Presents from care facility with decreased oral intake, weakness  Renal function improving  · Creatine 4 15, baseline 1 1-1 4  · Anion gap 16, co2 16  · UA positive for UTI  Urine culture positive for Klebsiella-Enterobacter  · CT a/p: Bladder wall thickening and infiltration of the adjacent fat concerning for urinary tract infection  Otherwise, no hydronephrosis  • Suspect due to dehydration and poor oral intake  • Given 1L bolus in ED  • Patient has been on bicarbonate infusion which will be changed to normal saline at 125 mill per hour  • Avoid nephrotoxic agents and hypotension  Creatinine has improved significantly and again going up  Encourage p o  hydration with a follow-up BMP in 1 week  Patient did receive again IV hydration before discharge  Can do as needed IV hydration as needed if creatinine is going up at the nursing home  Results from last 7 days   Lab Units 01/09/23  0535 01/08/23  0524 01/07/23  0837 01/06/23  0613 01/05/23  0531 01/04/23 2032   BUN mg/dL 32* 36* 49* 90* 116* 121*   CREATININE mg/dL 1 40* 1 10 1 43* 2 77* 4 02* 4 15*       UTI (urinary tract infection)  Assessment & Plan  UA shows innumerable bacteria, inn WBC, large leuks  · Status post treatment with IV ertapenem 3 days  · That is post and cultures grew ESBL Klebsiella  · Blood cultures have been negative    Depression  Assessment & Plan  Per daughter patient with increased depression, has been making comments to daughter about being severely depressed, has not been eating, drinking much  · Seen by psych OP, stopped zyprexa and started mirtazapine  · Neck x-ray input appreciated    · Remeron was discontinued by psychiatry and patient was started on Lexapro 5 mg p o  daily  · Patient started on Namenda 5 mg p o  daily    Closed fracture of distal end of right fibula  Assessment & Plan  ORIF fracture of the distal fibula noted on prior XR  · X-ray of the right foot shows satisfactory appearance of the distal fibular fracture s/p ORIF  · Follow-up with Dr Varela    MRSA (methicillin resistant staph aureus) culture positive  Assessment & Plan  · Positive MRSA nares  · Mupirocin ointment nares for 5 days  · Chlorhexidine wash was given    Dementia with behavioral disturbance  Assessment & Plan  History of dementia, usually alert and oriented with intermittent confusion  · Supportive care    Benign essential hypertension  Assessment & Plan  Continue metoprolol with holding parameters      Cerebrovascular accident (CVA) due to embolism (HCC)  Assessment & Plan  History of CVA  · Continue aspirin, plavix, statin    Long-term use of immunosuppressant medication  Assessment & Plan  History of liver transplant, continue cellcept and cyclosporine    Hypothyroidism  Assessment & Plan  Continue levothyroxine 112 mcg  · Repeat TSH 4 89 - slightly elevated  Consider repeat TSH in 6 wks after infection resolves   Medical Problems     Resolved Problems  Date Reviewed: 1/9/2023   None       Discharging Physician / Practitioner: Kayley Biswas MD  PCP: Olga Alvarenga MD  Admission Date:   Admission Orders (From admission, onward)     Ordered        01/05/23 1537  Inpatient Admission  Once            01/04/23 2249  Place in Observation  Once                      Discharge Date: 1/9/2023         Outpatient Tests Requested:  · Follow-up with PCP, orthopedics    Complications: None    Reason for Admission: Abnormal lab    Hospital Course: Gulshan Teague is a 76 y o  female patient with past medical history of dementia, GERD, hypertension, CVA, hypothyroidism who originally presented to the hospital on 1/4/2023 due 2 weakness and abnormal labs  Patient is a resident of nursing home and was sent in for elevated BUN/creatinine  Patient has been having decreased oral intake from 12/9/2022 since she had her COVID  Patient noted to have a BUN/creatinine of 121 and 4 1 with anion gap of 16  Patient was admitted to hospital for acute kidney injury and UTI  Patient was started on IV Rocephin and received IV hydration  Patient continued to have very poor oral intake  Patient was seen by psychiatry and medications were adjusted  Patient's creatinine continued to improve and was close to baseline  Patient was treated with IV Rocephin initially which was changed to IV ertapenem based on cultures  Please see above list of diagnoses and related plan for additional information  Condition at Discharge: stable    Discharge Day Visit / Exam:   Subjective: Patient continues to have poor oral intake and needs a lot of encouragement to eat  Denies any chest pain, abdominal pain, shortness of breath  Vitals: Blood Pressure: 133/61 (01/09/23 1518)  Pulse: 84 (01/09/23 1518)  Temperature: 98 4 °F (36 9 °C) (01/09/23 1518)  Temp Source: Oral (01/08/23 2228)  Respirations: 18 (01/09/23 1518)  Height: 5' 2" (157 5 cm) (01/05/23 0103)  Weight - Scale: 85 2 kg (187 lb 14 4 oz) (01/05/23 0103)  SpO2: 98 % (01/09/23 1518)  Exam:   Physical Exam  Constitutional:       Appearance: Normal appearance  HENT:      Head: Normocephalic and atraumatic  Nose: Nose normal       Mouth/Throat:      Mouth: Mucous membranes are moist       Pharynx: Oropharynx is clear  Eyes:      Extraocular Movements: Extraocular movements intact  Pupils: Pupils are equal, round, and reactive to light  Cardiovascular:      Rate and Rhythm: Normal rate and regular rhythm  Pulmonary:      Effort: Pulmonary effort is normal       Breath sounds: Normal breath sounds  Abdominal:      General: Bowel sounds are normal  There is no distension  Palpations: Abdomen is soft  Tenderness: There is no abdominal tenderness  Musculoskeletal:         General: No swelling  Cervical back: Normal range of motion and neck supple  Skin:     General: Skin is warm and dry  Neurological:      General: No focal deficit present  Mental Status: She is alert  Discussion with Family: Updated  (daughter) via phone  Discharge instructions/Information to patient and family:   See after visit summary for information provided to patient and family  Provisions for Follow-Up Care:  See after visit summary for information related to follow-up care and any pertinent home health orders  Disposition:   Other [place] [facility] care at [facility] Readmission: No     Discharge Statement:  I spent 35 minutes discharging the patient  This time was spent on the day of discharge  I had direct contact with the patient on the day of discharge  Greater than 50% of the total time was spent examining patient, answering all patient questions, arranging and discussing plan of care with patient as well as directly providing post-discharge instructions  Additional time then spent on discharge activities  Discharge Medications:  See after visit summary for reconciled discharge medications provided to patient and/or family        **Please Note: This note may have been constructed using a voice recognition system**

## 2023-01-09 NOTE — ASSESSMENT & PLAN NOTE
ORIF fracture of the distal fibula noted on prior XR    · X-ray of the right foot shows satisfactory appearance of the distal fibular fracture s/p ORIF  · Follow-up with Vivi Tolentino moist unproductive

## 2023-01-09 NOTE — ASSESSMENT & PLAN NOTE
UA shows innumerable bacteria, inn WBC, large leuks  · Status post treatment with IV ertapenem 3 days  · That is post and cultures grew ESBL Klebsiella  · Blood cultures have been negative

## 2023-01-09 NOTE — CASE MANAGEMENT
Case Management Discharge Planning Note    Patient name Glenn Blevins  Location [address]-* MRN [id number]  : 1945 Date 2023       Current Admission Date: 2023  Current Admission Diagnosis:REZA (acute kidney injury) [facility])   Patient Active Problem List    Diagnosis Date Noted   • MRSA (methicillin resistant staph aureus) culture positive 2023   • Dementia with behavioral disturbance 2022   • Fall 2022   • Closed fracture of distal end of right fibula 2022   • GERD (gastroesophageal reflux disease)    • Stage 3b [facility] ([facility] ) 2022   • REZA (acute kidney injury) ([facility] ) 2021   • UTI (urinary tract infection) 2021   • Obesity, morbid ([place] [address] ) 2021   • Osteopenia of multiple sites 2020   • Other fatigue 2020   • Snoring 2020   • Chronic left-sided thoracic back pain 2019   • Left ovarian cyst 2019   • Asymptomatic stenosis of left carotid artery 2018   • Abnormal EEG 2017   • Altered mental status 2017   • Cerebrovascular accident (CVA) due to embolism ([facility] ) 2017   • Non-ST elevation myocardial infarction (NSTEMI), type 2 2017   • History of seizure 10/31/2017   • Paroxysmal SVT (supraventricular tachycardia) ([facility] ) 10/31/2017   • TIA (transient ischemic attack) 10/29/2017   • HLD (hyperlipidemia) 10/29/2017   • Cyst of diaphragm 2017   • Long-term use of immunosuppressant medication 2016   • Anemia 2016   • Liver transplanted ([facility] ) 2016   • Hypothyroidism 2016   • Elevated homocysteine 2016   • Chronic joint pain 2016   • Lung nodule, solitary 2016   • Sciatica 2015   • Hypercalcemia 2015   • Ataxic gait 2013   • Chronic kidney insufficiency 2013   • Benign essential hypertension 2012   • Ulcerative colitis without complications ([facility] )    • Depression 2012      LOS (days): 4  Geometric Mean LOS (GMLOS) (days): 4 30  Days to GMLOS:0 4     OBJECTIVE:  Risk of Unplanned Readmission Score: 23 4       Current admission status: Inpatient   Preferred Pharmacy:   [address] [facility] DENISE [address] [place], [address] [place] [address]  Phone: [phone] Fax: [phone]    [address] [facility], [address]  [place] [address]  Phone: [phone] Fax: [phone]    Primary Care Provider: Aaliyah Aldana MD    Primary Insurance: MEDICARE  Secondary Insurance: COMMERCIAL MISCELLANEOUS    DISCHARGE DETAILS:    Discharge planning discussed with[de-identified] Della Schultz of Choice: Yes     Comments - Freedom of Choice: [facility] has now confirmed that a bed is available for patient per family's preference  SW spoke with daughter Jorge Alberto Josue by phone to notify of same  Attending notified that bed is available and patient can be discharged  SW notified Chip Ceballos of patient's transport time in [facility] contacted family/caregiver?: Yes  Were Treatment Team discharge recommendations reviewed with patient/caregiver?: Yes  Did patient/caregiver verbalize understanding of patient care needs?: N/A- going to facility  Were patient/caregiver advised of the risks associated with not following Treatment Team discharge recommendations?: Yes    Contacts  Patient Contacts: Veronika Perales (dtr)  Relationship to Patient[de-identified] Family  Contact Method: Phone  Phone Number: [phone]  Reason/Outcome: Continuity of Care, Emergency Contact, Discharge [address]         Is the patient interested in [address] at discharge?: No    DME Referral Provided  Referral made for DME?: No    Other Referral/Resources/Interventions Provided:  Interventions: Short Term Rehab, Transportation    Would you like to participate in our [address] service program?  : No - Declined    Treatment Team Recommendation: Short Term Rehab  Discharge Destination Plan[de-identified] Short Term Rehab  Transport at Discharge : [facility]  Dispatcher Contacted: Yes  Number/Name of Dispatcher: [facility] via [employer]  Transported by [employer] and Unit #): [place]  ETA of Transport (Date): 01/09/23  ETA of Transport (Time): [address] [facility] Name, [address] : [facility]  Receiving Facility/Agency Phone Number: [phone]

## 2023-01-09 NOTE — ASSESSMENT & PLAN NOTE
Per daughter patient with increased depression, has been making comments to daughter about being severely depressed, has not been eating, drinking much  · Seen by psych OP, stopped zyprexa and started mirtazapine  · Neck x-ray input appreciated    · Remeron was discontinued by psychiatry and patient was started on Lexapro 5 mg p o  daily  · Patient started on Namenda 5 mg p o  daily

## 2023-01-09 NOTE — PLAN OF CARE
Problem: Potential for Falls  Goal: Patient will remain free of falls  Description: INTERVENTIONS:  - Educate patient/family on patient safety including physical limitations  - Instruct patient to call for assistance with activity   - Consult OT/PT to assist with strengthening/mobility   - Keep Call bell within reach  - Keep bed low and locked with side rails adjusted as appropriate  - Keep care items and personal belongings within reach  - Initiate and maintain comfort rounds  - Make Fall Risk Sign visible to staff  - Offer Toileting every 2  Hours, in advance of need  - Initiate/Maintain bed alarm  - Obtain necessary fall risk management equipment: walker/ cane  - Apply yellow socks and bracelet for high fall risk patients  - Consider moving patient to room near nurses station  Outcome: Progressing     Problem: MOBILITY - ADULT  Goal: Maintain or return to baseline ADL function  Description: INTERVENTIONS:  -  Assess patient's ability to carry out ADLs; assess patient's baseline for ADL function and identify physical deficits which impact ability to perform ADLs (bathing, care of mouth/teeth, toileting, grooming, dressing, etc )  - Assess/evaluate cause of self-care deficits   - Assess range of motion  - Assess patient's mobility; develop plan if impaired  - Assess patient's need for assistive devices and provide as appropriate  - Encourage maximum independence but intervene and supervise when necessary  - Involve family in performance of ADLs  - Assess for home care needs following discharge   - Consider OT consult to assist with ADL evaluation and planning for discharge  - Provide patient education as appropriate  Outcome: Progressing  Goal: Maintains/Returns to pre admission functional level  Description: INTERVENTIONS:  - Perform BMAT or MOVE assessment daily    - Set and communicate daily mobility goal to care team and patient/family/caregiver     - Collaborate with rehabilitation services on mobility goals if consulted  - Perform Range of Motion 3  times a day  - Reposition patient every 2 hours    - Dangle patient 3 times a day  - Stand patient 3  times a day  - Out of bed to chair 3  times a day   - Out of bed for meals 3  times a day  - Out of bed for toileting  - Record patient progress and toleration of activity level   Outcome: Progressing     Problem: PAIN - ADULT  Goal: Verbalizes/displays adequate comfort level or baseline comfort level  Description: Interventions:  - Encourage patient to monitor pain and request assistance  - Assess pain using appropriate pain scale  - Administer analgesics based on type and severity of pain and evaluate response  - Implement non-pharmacological measures as appropriate and evaluate response  - Consider cultural and social influences on pain and pain management  - Notify physician/advanced practitioner if interventions unsuccessful or patient reports new pain  Outcome: Progressing     Problem: INFECTION - ADULT  Goal: Absence or prevention of progression during hospitalization  Description: INTERVENTIONS:  - Assess and monitor for signs and symptoms of infection  - Monitor lab/diagnostic results  - Monitor all insertion sites, i e  indwelling lines, tubes, and drains  - Monitor endotracheal if appropriate and nasal secretions for changes in amount and color  - [place] appropriate cooling/warming therapies per order  - Administer medications as ordered  - Instruct and encourage patient and family to use good hand hygiene technique  - Identify and instruct in appropriate isolation precautions for identified infection/condition  Outcome: Progressing  Goal: Absence of fever/infection during neutropenic period  Description: INTERVENTIONS:  - Monitor WBC    Outcome: Progressing     Problem: SAFETY ADULT  Goal: Patient will remain free of falls  Description: INTERVENTIONS:  - Educate patient/family on patient safety including physical limitations  - Instruct patient to call for assistance with activity   - Consult OT/PT to assist with strengthening/mobility   - Keep Call bell within reach  - Keep bed low and locked with side rails adjusted as appropriate  - Keep care items and personal belongings within reach  - Initiate and maintain comfort rounds  - Make Fall Risk Sign visible to staff  - Offer Toileting every 2  Hours, in advance of need  - Initiate/Maintain bed alarm  - Obtain necessary fall risk management equipment: walker/ cane  - Apply yellow socks and bracelet for high fall risk patients  - Consider moving patient to room near nurses station  Outcome: Progressing  Goal: Maintain or return to baseline ADL function  Description: INTERVENTIONS:  -  Assess patient's ability to carry out ADLs; assess patient's baseline for ADL function and identify physical deficits which impact ability to perform ADLs (bathing, care of mouth/teeth, toileting, grooming, dressing, etc )  - Assess/evaluate cause of self-care deficits   - Assess range of motion  - Assess patient's mobility; develop plan if impaired  - Assess patient's need for assistive devices and provide as appropriate  - Encourage maximum independence but intervene and supervise when necessary  - Involve family in performance of ADLs  - Assess for home care needs following discharge   - Consider OT consult to assist with ADL evaluation and planning for discharge  - Provide patient education as appropriate  Outcome: Progressing  Goal: Maintains/Returns to pre admission functional level  Description: INTERVENTIONS:  - Perform BMAT or MOVE assessment daily    - Set and communicate daily mobility goal to care team and patient/family/caregiver  - Collaborate with rehabilitation services on mobility goals if consulted  - Perform Range of Motion 3  times a day  - Reposition patient every 2 hours    - Dangle patient 3 times a day  - Stand patient 3  times a day  - Out of bed to chair 3  times a day   - Out of bed for meals 3  times a day  - Out of bed for toileting  - Record patient progress and toleration of activity level   Outcome: Progressing     Problem: Knowledge Deficit  Goal: Patient/family/caregiver demonstrates understanding of disease process, treatment plan, medications, and discharge instructions  Description: Complete learning assessment and assess knowledge base    Interventions:  - Provide teaching at level of understanding  - Provide teaching via preferred learning methods  Outcome: Progressing     Problem: NEUROSENSORY - ADULT  Goal: Achieves stable or improved neurological status  Description: INTERVENTIONS  - Monitor and report changes in neurological status  - Monitor vital signs such as temperature, blood pressure, glucose, and any other labs ordered   - Initiate measures to prevent increased intracranial pressure  - Monitor for seizure activity and implement precautions if appropriate      Outcome: Progressing     Problem: CARDIOVASCULAR - ADULT  Goal: Absence of cardiac dysrhythmias or at baseline rhythm  Description: INTERVENTIONS:  - Continuous cardiac monitoring, vital signs, obtain 12 lead EKG if ordered  - Administer antiarrhythmic and heart rate control medications as ordered  - Monitor electrolytes and administer replacement therapy as ordered  Outcome: Progressing     Problem: METABOLIC, FLUID AND ELECTROLYTES - ADULT  Goal: Electrolytes maintained within normal limits  Description: INTERVENTIONS:  - Monitor labs and assess patient for signs and symptoms of electrolyte imbalances  - Administer electrolyte replacement as ordered  - Monitor response to electrolyte replacements, including repeat lab results as appropriate  - Instruct patient on fluid and nutrition as appropriate  Outcome: Progressing  Goal: Fluid balance maintained  Description: INTERVENTIONS:  - Monitor labs   - Monitor I/O and WT  - Instruct patient on fluid and nutrition as appropriate  - Assess for signs & symptoms of volume excess or deficit  Outcome: Progressing     Problem: SKIN/TISSUE INTEGRITY - ADULT  Goal: Skin Integrity remains intact(Skin Breakdown Prevention)  Description: Assess:  -Perform Rafa assessment every shift  -Clean and moisturize skin every shift and after incontinence  -Inspect skin when repositioning, toileting, and assisting with ADl  -Assess extremities for adequate circulation and sensation     Bed Management:  -Have minimal linens on bed & keep smooth, unwrinkled  -Change linens as needed when moist or perspiring  -Avoid sitting or lying in one position for more than 2 hours while in bed  -Keep HOB at 30 degrees     Toileting:  -Offer bedside commode  -Assess for incontinence every 2 hours  -Use incontinent care products after each incontinent episode such as protective barrier cream    Activity:  -Mobilize patient 3  times a day  -Encourage activity and walks on unit  -Encourage or provide ROM exercises   -Turn and reposition patient every 2 Hours  -Use appropriate equipment to lift or move patient in bed  -Instruct/ Assist with weight shifting every 15 when out of bed in chair  -Consider limitation of chair time 4  hour intervals    Skin Care:  -Avoid use of baby powder, tape, friction and shearing, hot water or constrictive clothing  -Relieve pressure over bony prominences using waffle cushion  -Do not massage red bony areas    Next Steps:  -Consider consults to  interdisciplinary teams such as wound care and PT/ OT    Outcome: Progressing  Goal: Pressure injury heals and does not worsen  Description: Interventions:  - Implement low air loss mattress or specialty surface (Criteria met)  - Apply silicone foam dressing  - Instruct/assist with weight shifting every 2 minutes when in chair   - Limit chair time to 2 hour intervals  - Use special pressure reducing interventions such as waffle cushion when in chair   - Apply fecal or urinary incontinence containment device   - Perform passive or active ROM every shift  - Turn and reposition patient & offload bony prominences every 2 hours   - Utilize friction reducing device or surface for transfers   - Consider consults to  interdisciplinary teams such as PT  - Use incontinent care products after each incontinent episode such as calazime paste  - Consider nutrition services referral as needed  Outcome: Progressing     Problem: Prexisting or High Potential for Compromised Skin Integrity  Goal: Skin integrity is maintained or improved  Description: INTERVENTIONS:  - Identify patients at risk for skin breakdown  - Assess and monitor skin integrity  - Assess and monitor nutrition and hydration status  - Monitor labs   - Assess for incontinence   - Turn and reposition patient  - Assist with mobility/ambulation  - Relieve pressure over bony prominences  - Avoid friction and shearing  - Provide appropriate hygiene as needed including keeping skin clean and dry  - Evaluate need for skin moisturizer/barrier cream  - Collaborate with interdisciplinary team   - Patient/family teaching  - Consider wound care consult   Outcome: Progressing     Problem: Nutrition/Hydration-ADULT  Goal: Nutrient/Hydration intake appropriate for improving, restoring or maintaining nutritional needs  Description: Monitor and assess patient's nutrition/hydration status for malnutrition  Collaborate with interdisciplinary team and initiate plan and interventions as ordered  Monitor patient's weight and dietary intake as ordered or per policy  Utilize nutrition screening tool and intervene as necessary  Determine patient's food preferences and provide high-protein, high-caloric foods as appropriate       INTERVENTIONS:  - Monitor oral intake, urinary output, labs, and treatment plans  - Assess nutrition and hydration status and recommend course of action  - Evaluate amount of meals eaten  - Assist patient with eating if necessary   - Allow adequate time for meals  - Recommend/ encourage appropriate diets, oral nutritional supplements, and vitamin/mineral supplements  - Order, calculate, and assess calorie counts as needed  - Recommend, monitor, and adjust tube feedings and TPN/PPN based on assessed needs  - Assess need for intravenous fluids  - Provide specific nutrition/hydration education as appropriate  - Include patient/family/caregiver in decisions related to nutrition  Outcome: Progressing

## 2023-01-09 NOTE — PHYSICAL THERAPY NOTE
PHYSICAL THERAPY EVALUATION/TREATMENT     01/09/23 0945   PT Last Visit   PT Visit Date 01/09/23   Note Type   Note type Evaluation   Pain Assessment   Pain Assessment Tool Hernandez-Baker FACES   Hernandez-Baker FACES Pain Rating 0   Restrictions/Precautions   Weight Bearing Precautions Per Order No   Other Precautions Chair Alarm; Bed Alarm; Fall Risk;Contact/isolation   Home Living   Type of [address] [place] [address] Av Two level;1/2 bath on main level;Bed/bath upstairs;Stairs to enter with rails  (3 SPEEDY)   Bathroom Shower/Tub Walk-in shower   Bathroom Equipment Grab bars in [facility] Walker;Cane  (rollator)   Additional Comments All information above from chart review from 11/14/22   Prior Function   Level of [place] Independent with ADLs; Independent with functional mobility   Lives With Significant other   [facility] 85 in the last 6 months 1 to 4   Comments Pt has been in [facility] since November due to a fall and subsequent right fibular fracture  Pt is NWB RLE per staff at [facility] (this PT spoke to Ernestina Camp and RN: Deshawn David  _)   General   Additional Pertinent History Pt is a 76year old female admitted from [facility] due to depression, weakness  Per staff at [facility], pt was not standing or walking; Dale lift for OOB  Family/Caregiver Present No   Cognition   Overall Cognitive Status Impaired   Arousal/Participation Alert   Orientation Level Oriented to person   Following Commands Follows one step commands inconsistently   Subjective   Subjective "I don't want to stand up"  " I don't want therapy"   RLE Assessment   RLE Assessment   (ROM: hip/knee WFLs with AAROM; Ankle to neurtral   Strength: hip/knee 2+/5 Ankle 3- /5)   LLE Assessment   LLE Assessment   (ROM WFLs with AAROM at hip and knee  Ankle to neutral  Strength: 2+/5 at hip/knee   Ankle 3-/5)   Bed Mobility   Rolling R 2  Maximal assistance   Additional items Assist x 2   Rolling L 2  Maximal assistance   Additional items Assist x 2   Supine to Sit 2  Maximal assistance   Additional items Assist x 2; Increased time required;Verbal cues;LE management   Sit to Supine 1  Dependent   Transfers   Sit to Stand Unable to assess   Additional Comments Pt declined; pt is also NWB on RLE per staff at [facility]  Was not standing or transferring ; used josé for OOB   Ambulation/Elevation   Gait Assistance Not tested   Balance   Static Sitting Fair   Dynamic Sitting Fair -   Static Standing Zero   Activity Tolerance   Activity Tolerance Patient limited by fatigue  (limited by weakness)   Nurse Made Aware yes: [facility]   Assessment   Prognosis Fair   Problem List Decreased strength;Decreased endurance; Impaired balance;Decreased mobility; Decreased cognition; Impaired judgement;Decreased safety awareness; Obesity;Orthopedic restrictions   Assessment Patient seen for Physical Therapy evaluation  Patient admitted with REZA (acute kidney injury) ([facility] [address] )  Comorbidities affecting patient's physical performance include: dementia, HTN, CVA, s/p right fibular fracture in November of 2022: NWB RLE  Personal factors affecting patient at time of initial evaluation include: inability to ambulate household distances, inability to navigate level surfaces without external assistance, decreased cognition, positive fall history, decreased initiation and engagement, depression, inability to perform ADLS and inability to perform IADLS   Prior to admission, patient was dependent for mobility, requiring assist for ADLS, requiring assist for IADLS and was at [address] [place]  Please find objective findings from Physical Therapy assessment regarding body systems outlined above with impairments and limitations including weakness, impaired balance, decreased endurance, gait deviations, pain, decreased activity tolerance, decreased functional mobility tolerance, decreased safety awareness, impaired judgement, fall risk, orthopedic restrictions and decreased cognition    The Barthel Index was used as a functional outcome tool presenting with a score of Barthel Index Score: 10 today indicating marked limitations of functional mobility and ADLS  Patient's clinical presentation is currently unstable/unpredictable as seen in patient's presentation of varying levels of cognitive performance, increased fall risk, new onset of impairment of functional mobility and decreased endurance  Pt would benefit from continued Physical Therapy treatment to address deficits as defined above and maximize level of functional mobility  As demonstrated by objective findings, the assigned level of complexity for this evaluation is high  The patient's AM-PAC Basic Mobility Inpatient Short Form Raw Score is 6  A Raw score of less than or equal to 16 suggests the patient may benefit from discharge to post-acute rehabilitation services  Please also refer to the recommendation of the Physical Therapist for safe discharge planning  Goals   Patient Goals "does not want therapy"   STG Expiration Date 01/16/23   Short Term Goal #1 Rolling right and left with Mod A of 1   Short Term Goal #2 Transfer supine <> short sit with Max A of 1;  Pt able to complete LE exercises with AAROM for at least 5 reps each of 3 different exercises   LTG Expiration Date 01/23/23   Long Term Goal #1 Rolling right and left with Min A of 1   Long Term Goal #2 Transfers supine <> short sit with Min/Mod A of 1; Transfers sit <> stand with RW and NWB RLE with Max A of 2 and tolerates standing for 20 seconds or longer  Participates with exercises for A/AAROM of at least 10 reps of 3 different exercises  Plan   Treatment/Interventions Functional transfer training;LE strengthening/ROM; Therapeutic exercise; Endurance training;Patient/family training;Bed mobility;Gait training;Spoke to nursing;Spoke to case management   PT Frequency 3-5x/wk   Recommendation   PT Discharge Recommendation Return to facility with rehabilitation services   Additional Comments Per staff at [facility], prior to admit to this hospital, pt has stopped drinkng and eating  Had not been receiving rehab due to decreased participation and reaching a plateau  AM-PAC Basic Mobility Inpatient   Turning in Flat Bed Without Bedrails 1   Lying on Back to Sitting on Edge of Flat Bed Without Bedrails 1   Moving Bed to Chair 1   Standing Up From Chair Using Arms 1   Walk in Room 1   Climb 3-5 Stairs With Railing 1   Basic Mobility Inpatient Raw Score 6   Turning Head Towards Sound 3   Follow Simple Instructions 2   Low Function Basic Mobility Raw Score 11   Low Function Basic Mobility Standardized Score 16 55   Highest Level Of Mobility   -HL Goal 2: Bed activities/Dependent transfer   -HL Achieved 3: Sit at edge of bed   Barthel Index   Feeding 5   Bathing 0   Grooming Score 0   Dressing Score 0   Bladder Score 0   Bowels Score 5   Toilet Use Score 0   Transfers (Bed/Chair) Score 0   Mobility (Level Surface) Score 0   Stairs Score 0   Barthel Index Score 10   Additional Treatment Session   Start Time 0935   End Time 0945   Treatment Assessment Pt sat at EOB to work on sitting balance  Pt able to progress to sitting static with close supervision  Able to complete a partial LAQ on each leg  A: Tolerated fair   Lack of motivation, flat affect  P: Cont  as per plan  Recommend return to [facility] with rehab services   End of Consult   Patient Position at End of Consult Supine;Bed/Chair alarm activated; All needs within reach   Licensure   [address] Number  Rose Mary Cranker Jonetta Columbus PT  [id number]

## 2023-01-09 NOTE — CASE MANAGEMENT
Case Management Progress Note    Patient name Gosia Mack  Location [facility] 36-* MRN [id number]  : 1945 Date 2023       LOS (days): 4  Geometric Mean LOS (GMLOS) (days): 4 30  Days to GMLOS:0 4        OBJECTIVE:      Current admission status: Inpatient  Preferred Pharmacy:   [address] [place] [address], [facility] [address] [place] [address]  Phone: [phone] Fax: [phone]    [facility] [address] [facility] [phone] [address]  Phone: [phone] Fax: [phone]    Primary Care Provider: Mark Walter MD    Primary Insurance: MEDICARE  Secondary Insurance: COMMERCIAL MISCELLANEOUS    PROGRESS NOTE:    SW notified [facility] at [facility] that patient has been medically cleared for discharge  Facility had requested an [facility] and TIMOTHY spoke with farideh Kaba by phone regarding patient's finances  Chon Kaba stated that she believes patient's income is approximately $2,500 and resources are likely in excess of the [facility] threshold  TIMOTHY notified Alejandra Funez in [address] that [facility] is likely not necessary  SW received response back from facility that they now do not have a bed available for patient  TIMOTHY left a voice mail for farideh Kaba and placed blanket [address]errals [address]  TIMOTHY will continue to follow

## 2023-01-09 NOTE — ASSESSMENT & PLAN NOTE
Presents from care facility with decreased oral intake, weakness  Renal function improving  · Creatine 4 15, baseline 1 1-1 4  · Anion gap 16, co2 16  · UA positive for UTI  Urine culture positive for Klebsiella-Enterobacter  · CT a/p: Bladder wall thickening and infiltration of the adjacent fat concerning for urinary tract infection  Otherwise, no hydronephrosis  • Suspect due to dehydration and poor oral intake  • Given 1L bolus in ED  • Patient has been on bicarbonate infusion which will be changed to normal saline at 125 mill per hour  • Avoid nephrotoxic agents and hypotension  Creatinine has improved significantly and again going up  Encourage p o  hydration with a follow-up BMP in 1 week  Patient did receive again IV hydration before discharge  Can do as needed IV hydration as needed if creatinine is going up at the nursing home    Results from last 7 days   Lab Units 01/09/23  0535 01/08/23  0524 01/07/23  0837 01/06/23  0613 01/05/23  0531 01/04/23  2032   BUN mg/dL 32* 36* 49* 90* 116* 121*   CREATININE mg/dL 1 40* 1 10 1 43* 2 77* 4 02* 4 15*

## 2023-01-10 ENCOUNTER — PATIENT OUTREACH (OUTPATIENT)
Dept: CASE MANAGEMENT | Facility: OTHER | Age: 78
End: 2023-01-10

## 2023-01-10 LAB
BACTERIA BLD CULT: NORMAL
BACTERIA BLD CULT: NORMAL

## 2023-01-10 NOTE — PROGRESS NOTES
Chart review completed  Email sent to facility requesting update on patient  This care manager assistant will continue to monitor via chart review throughout bundle episode  Update received the patient was readmitted 1/9/23     NWB until cleared by ortho  OT eval’ed today, PT to eval when allowed WB

## 2023-01-17 ENCOUNTER — PATIENT OUTREACH (OUTPATIENT)
Dept: CASE MANAGEMENT | Facility: OTHER | Age: 78
End: 2023-01-17

## 2023-01-17 NOTE — PROGRESS NOTES
Chart review completed  Email sent to facility requesting update on patient  This care manager assistant will continue to monitor via chart review throughout bundle episode  Update received the patient Continues with poor po intake  Pt on OT, progressing, ortho f/u appt on 1/19, if WB upgraded from NWB, PT will reassess

## 2023-01-18 DIAGNOSIS — E03.9 HYPOTHYROIDISM, UNSPECIFIED TYPE: ICD-10-CM

## 2023-01-18 RX ORDER — LEVOTHYROXINE SODIUM 112 UG/1
TABLET ORAL
Qty: 90 TABLET | Refills: 3 | Status: SHIPPED | OUTPATIENT
Start: 2023-01-18

## 2023-01-19 ENCOUNTER — OFFICE VISIT (OUTPATIENT)
Dept: OBGYN CLINIC | Facility: CLINIC | Age: 78
End: 2023-01-19

## 2023-01-19 ENCOUNTER — APPOINTMENT (OUTPATIENT)
Dept: RADIOLOGY | Facility: CLINIC | Age: 78
End: 2023-01-19

## 2023-01-19 VITALS
HEIGHT: 62 IN | BODY MASS INDEX: 34.32 KG/M2 | SYSTOLIC BLOOD PRESSURE: 104 MMHG | WEIGHT: 186.5 LBS | DIASTOLIC BLOOD PRESSURE: 66 MMHG | HEART RATE: 96 BPM

## 2023-01-19 DIAGNOSIS — S82.831D OTHER CLOSED FRACTURE OF DISTAL END OF RIGHT FIBULA WITH ROUTINE HEALING, SUBSEQUENT ENCOUNTER: Primary | ICD-10-CM

## 2023-01-19 DIAGNOSIS — S82.831D OTHER CLOSED FRACTURE OF DISTAL END OF RIGHT FIBULA WITH ROUTINE HEALING, SUBSEQUENT ENCOUNTER: ICD-10-CM

## 2023-01-19 NOTE — PROGRESS NOTES
Orthopaedics Office Visit - Post-op Patient Visit    ASSESSMENT/PLAN:    Assessment:   8 wks s/o ORIF right ankle bimalleolar fracture    DOS 20   Doing well overall  Minimal pain  Healed clinically and radiographically      Plan:   -Start WBAT RLE in CAM boot  WEan from CAM boot in 2 wks  Continue PT at facility - start gait training, strengthening, continue ROM  Followup 3 months      To Do Next Visit:  XR right ankle   _____________________________________________________  CHIEF COMPLAINT:  Chief Complaint   Patient presents with   • Right Ankle - Post-op         SUBJECTIVE:  Farbice Fay is a 76 y o  female who presents  8 wk sp ORIF R ankle fracture  Minimal complaints, does not have much pain  HAs been compliant with NWB in podous boot  Denies motor/sensory deficits today        SOCIAL HISTORY:  Social History     Tobacco Use   • Smoking status: Former     Years: 5 00     Types: Cigarettes     Quit date: 1975     Years since quittin 0   • Smokeless tobacco: Never   • Tobacco comments:     smoked for 5 years-1/2 to 1 ppd   Vaping Use   • Vaping Use: Never used   Substance Use Topics   • Alcohol use: Never   • Drug use: Never       MEDICATIONS:    Current Outpatient Medications:   •  acetaminophen (TYLENOL) 325 mg tablet, Take 2 tablets (650 mg total) by mouth every 6 (six) hours as needed for mild pain or fever, Disp: , Rfl: 0  •  aspirin (ECOTRIN LOW STRENGTH) 81 mg EC tablet, Take 1 tablet (81 mg total) by mouth daily with breakfast, Disp: 90 tablet, Rfl: 3  •  b complex vitamins capsule, Take 1 capsule by mouth daily, Disp: 30 capsule, Rfl: 3  •  Calcium 500 MG tablet, Take 500 mg by mouth, Disp: , Rfl:   •  Cholecalciferol (Vitamin D) 50 MCG (2000 UT) tablet, Take 1,000 Units by mouth, Disp: , Rfl:   •  clopidogrel (PLAVIX) 75 mg tablet, TAKE 1 TABLET EVERY DAY, Disp: 90 tablet, Rfl: 3  •  cycloSPORINE modified (NEORAL) 25 mg capsule, Take 50 mg by mouth 2 (two) times a day, Disp: , Rfl:   • escitalopram (LEXAPRO) 5 mg tablet, Take 1 tablet (5 mg total) by mouth daily Do not start before January 10, 2023 , Disp: , Rfl: 0  •  HYDROcodone-acetaminophen (Norco) 5-325 mg per tablet, Take 1 tablet by mouth every 6 (six) hours as needed for pain (moderate, severe pain) Max Daily Amount: 4 tablets, Disp: , Rfl: 0  •  levothyroxine 112 mcg tablet, TAKE 1 TABLET EVERY DAY AS DIRECTED, Disp: 90 tablet, Rfl: 3  •  melatonin 3 mg, Take 1 tablet (3 mg total) by mouth daily at bedtime, Disp: , Rfl: 0  •  memantine (NAMENDA) 5 mg tablet, Take 1 tablet (5 mg total) by mouth daily Do not start before January 10, 2023 , Disp: , Rfl: 0  •  metoprolol tartrate (LOPRESSOR) 25 mg tablet, Take 2 tablets (50 mg total) by mouth every 12 (twelve) hours, Disp: , Rfl: 0  •  mycophenolate (CELLCEPT) 250 mg capsule, Take 3 capsules (750 mg total) by mouth every 12 (twelve) hours, Disp: , Rfl:   •  omeprazole (PriLOSEC) 40 MG capsule, Take 40 mg by mouth daily, Disp: , Rfl:   •  polyethylene glycol (MIRALAX) 17 g packet, Take 17 g by mouth daily as needed (constipation), Disp: , Rfl: 0  •  pravastatin (PRAVACHOL) 20 mg tablet, TAKE 1 TABLET BY MOUTH ONCE DAILY AT BEDTIME, Disp: 90 tablet, Rfl: 3  •  docusate sodium (COLACE) 100 mg capsule, Take 1 capsule (100 mg total) by mouth 2 (two) times a day for 10 days, Disp: 20 capsule, Rfl: 0    REVIEW OF SYSTEMS:  MSK: right ankle pain   Neuro: WNL   Pertinent items are otherwise noted in HPI  A comprehensive review of systems was otherwise negative     _____________________________________________________  PHYSICAL EXAMINATION:  Vital signs: /66   Pulse 96   Ht 5' 2" (1 575 m)   Wt 84 6 kg (186 lb 8 oz)   BMI 34 11 kg/m²   General: No acute distress, awake and alert  Psychiatric: Mood and affect appear appropriate  HEENT: Trachea Midline, No torticollis, no apparent facial trauma  Cardiovascular: No audible murmurs;  Extremities appear perfused  Pulmonary: No audible wheezing or stridor  Skin: No open lesions; see further details (if any) below      MUSCULOSKELETAL EXAMINATION:  Right ankle examination:  - Patient sitting comfortably in the office in no acute distress   - healed incision site noted over the lateral aspect of the ankle with no surrounding erythema or ecchymosis present  No tenderness to palpation over ankle  - NV intact    _____________________________________________________  STUDIES REVIEWED:  I personally reviewed the images and interpretation is as follows:  Right ankle XR 3 views:  Healed bimalleolar ankle fracture in acceptable alignment with hardware intact        PROCEDURES PERFORMED:  Procedures         Pearl Arceo MD               Portions of the record may have been created with voice recognition software  Occasional wrong word or "sound a like" substitutions may have occurred due to the inherent limitations of voice recognition software  Read the chart carefully and recognize, using context, where substitutions have occurred

## 2023-01-24 ENCOUNTER — PATIENT OUTREACH (OUTPATIENT)
Dept: CASE MANAGEMENT | Facility: OTHER | Age: 78
End: 2023-01-24

## 2023-01-24 NOTE — PROGRESS NOTES
Chart review completed  Email sent to facility requesting update on patient  This care manager assistant will continue to monitor via chart review throughout bundle episode  Update received the patient has Flagyl ordered for bacterial vaginitis, appetite slowly improving  Cont OT, PT just re-evaled on 1/20 as WB upgraded to Jesse Pederson 40 1/19

## 2023-01-31 ENCOUNTER — PATIENT OUTREACH (OUTPATIENT)
Dept: CASE MANAGEMENT | Facility: OTHER | Age: 78
End: 2023-01-31

## 2023-02-07 ENCOUNTER — PATIENT OUTREACH (OUTPATIENT)
Dept: CASE MANAGEMENT | Facility: OTHER | Age: 78
End: 2023-02-07

## 2023-02-07 NOTE — PROGRESS NOTES
Chart review completed  Email sent to facility requesting update on patient  This care manager assistant will continue to monitor via chart review throughout bundle episode  [facility] the patient is functioning at the current levels:    She is continuing with PT and OT, and is allowed WBAT R LE  She currently is being treated for a UTI, and has demonstrated slower progress, but is able to transfer with min/mod assist and ambulate short distances with RW and min assist     Increased creatinine - improved w/ IVF  UTI - treated with Cipro due to increased confusion   Orthostatic hypotension somewhat improved with decreased metoprolol    Continue to monitor

## 2023-02-15 ENCOUNTER — PATIENT OUTREACH (OUTPATIENT)
Dept: CASE MANAGEMENT | Facility: OTHER | Age: 78
End: 2023-02-15

## 2023-02-15 NOTE — PROGRESS NOTES
Chart review completed  Email sent to facility requesting update on patient  This care manager assistant will continue to monitor via chart review throughout bundle episode  Update received the patient was prescribed Remeron for depression/ appetite, treated for UTI, on Protonix  Cont on PT and OT

## 2023-02-20 ENCOUNTER — PATIENT OUTREACH (OUTPATIENT)
Dept: CASE MANAGEMENT | Facility: OTHER | Age: 78
End: 2023-02-20

## 2023-02-20 NOTE — PROGRESS NOTES
Chart review complete Bundle program ended 2/18/23  The care coordination note removed and bundle episode resolved  CM removed self from care team and sent email to the facility where the patient is receiving care to inform of the bundle closure

## 2023-03-10 PROBLEM — N39.0 UTI (URINARY TRACT INFECTION): Status: RESOLVED | Noted: 2021-05-18 | Resolved: 2023-03-10

## 2023-03-16 ENCOUNTER — PATIENT OUTREACH (OUTPATIENT)
Dept: CASE MANAGEMENT | Facility: OTHER | Age: 78
End: 2023-03-16

## 2023-03-16 NOTE — PROGRESS NOTES
Email sent to facility to confirm the patient is receiving skilled services and if there is a LCD for the patient  This care manager assistant will continue to monitor via chart review  Update received the patient has a discharge of 3/21/23 to Home

## 2023-03-21 ENCOUNTER — PATIENT OUTREACH (OUTPATIENT)
Dept: CASE MANAGEMENT | Facility: OTHER | Age: 78
End: 2023-03-21

## 2023-03-21 NOTE — PROGRESS NOTES
Chart review complete the patient has a discharge of today 3/21/23 to Home  Email sent to \[facility]\"" to confirm the discharge   This care manager assistant will continue to monitor via chart review

## 2023-03-23 ENCOUNTER — PATIENT OUTREACH (OUTPATIENT)
Dept: CASE MANAGEMENT | Facility: OTHER | Age: 78
End: 2023-03-23

## 2023-03-23 NOTE — PROGRESS NOTES
Chart review complete the patient discharged 3/21/23 to Home  A email was sent to the facility requesting discharge instructions  When CM assistant has received the Discharge paperwork CM assistant will attach to this episode  I have removed myself off of the care team, added the CM to the care team who will follow the patient through the episode, sent the care manager an inbasket notifying them of the REZA/SNF Pathway  Ambulatory referral placed for complex care management

## 2023-03-27 ENCOUNTER — TELEPHONE (OUTPATIENT)
Dept: FAMILY MEDICINE CLINIC | Facility: CLINIC | Age: 78
End: 2023-03-27

## 2023-03-27 ENCOUNTER — PATIENT OUTREACH (OUTPATIENT)
Dept: FAMILY MEDICINE CLINIC | Facility: CLINIC | Age: 78
End: 2023-03-27

## 2023-03-27 NOTE — TELEPHONE ENCOUNTER
[facility] calling and requesting an order for social work community services   Please call Radha Obregon [phone] ext [phone]

## 2023-03-27 NOTE — PROGRESS NOTES
SNF/REZA referral for CCM    Chart was reviewed and this RNCM called patients  Margret Peterson and introduced myself and explained the role of the [facility] and [facility]  SF/REZA also discussed  He states patient is doing well since dc from SNF/ He states that they have Marino Doyle coming in (nursing and PT/OT) but he cannot remember the agencies name  He states he is patients FT care giver  He manages her medications and assists with her ADL's and IADL's  Patient has dementia  He states they have all of patients medications and she takes them as prescribed  He states he takes her to all of her appointments as well  He states patient is not on a fluid restriction  He states he was instructed to encouraging drinking due to her poor oral intake  He states that patients appetite is improving  He states he checks her BP and Temp every day but does not weigh her daily but they do have a scale  I requested he do so and record all of these daily  He states understanding and says he will do that  He does have a scale at the home  Patient has a f/u PCP apt on 3/29 and Ortho apt on 4/13  Patient has never seen a nephrologist in the past and was not seen by one while an IP at the hospital      Patients  declined the need for services of [facility]  He says he is agreeable to follow up for REZA  He is concerned with her kidney function  He states that he is unable to continue talking at this time but would like follow up calls  He is agreeable with a follow up call next week       RNCM to follow

## 2023-03-29 ENCOUNTER — TELEPHONE (OUTPATIENT)
Dept: FAMILY MEDICINE CLINIC | Facility: CLINIC | Age: 78
End: 2023-03-29

## 2023-03-29 ENCOUNTER — OFFICE VISIT (OUTPATIENT)
Dept: FAMILY MEDICINE CLINIC | Facility: CLINIC | Age: 78
End: 2023-03-29

## 2023-03-29 VITALS
DIASTOLIC BLOOD PRESSURE: 64 MMHG | WEIGHT: 189 LBS | SYSTOLIC BLOOD PRESSURE: 130 MMHG | HEART RATE: 88 BPM | BODY MASS INDEX: 34.57 KG/M2 | RESPIRATION RATE: 20 BRPM | TEMPERATURE: 97.9 F | OXYGEN SATURATION: 97 %

## 2023-03-29 DIAGNOSIS — S82.831D OTHER CLOSED FRACTURE OF DISTAL END OF RIGHT FIBULA WITH ROUTINE HEALING, SUBSEQUENT ENCOUNTER: ICD-10-CM

## 2023-03-29 DIAGNOSIS — I10 BENIGN ESSENTIAL HYPERTENSION: ICD-10-CM

## 2023-03-29 DIAGNOSIS — N18.9 CHRONIC KIDNEY DISEASE, UNSPECIFIED CKD STAGE: Primary | ICD-10-CM

## 2023-03-29 DIAGNOSIS — N17.9 AKI (ACUTE KIDNEY INJURY) (HCC): ICD-10-CM

## 2023-03-29 DIAGNOSIS — F32.A DEPRESSION, UNSPECIFIED DEPRESSION TYPE: ICD-10-CM

## 2023-03-29 DIAGNOSIS — E66.01 MORBID (SEVERE) OBESITY DUE TO EXCESS CALORIES (HCC): ICD-10-CM

## 2023-03-29 DIAGNOSIS — G45.9 TIA (TRANSIENT ISCHEMIC ATTACK): ICD-10-CM

## 2023-03-29 DIAGNOSIS — Z94.4 LIVER TRANSPLANT STATUS (HCC): ICD-10-CM

## 2023-03-29 DIAGNOSIS — I47.1 PSVT (PAROXYSMAL SUPRAVENTRICULAR TACHYCARDIA) (HCC): ICD-10-CM

## 2023-03-29 DIAGNOSIS — K51.90 ULCERATIVE COLITIS WITHOUT COMPLICATIONS, UNSPECIFIED LOCATION (HCC): ICD-10-CM

## 2023-03-29 RX ORDER — PANTOPRAZOLE SODIUM 40 MG/1
TABLET, DELAYED RELEASE ORAL
COMMUNITY
Start: 2023-03-08

## 2023-03-29 RX ORDER — MIDODRINE HYDROCHLORIDE 2.5 MG/1
TABLET ORAL
COMMUNITY
Start: 2023-03-16

## 2023-03-29 RX ORDER — MIRTAZAPINE 45 MG/1
TABLET, FILM COATED ORAL
COMMUNITY
Start: 2023-03-14

## 2023-03-29 NOTE — ASSESSMENT & PLAN NOTE
Concern for TIA and while in the NH, and not dehydration as was told to family  However, the episode is now 4 weeks old, and symptoms have resolved  She continues on ASA and plavix   is asking if he can give her an extra ASA for recurrent episode, and I explained that he can, but that she should be seen in the ER ASAP for any recurrent symptoms  At this point, the family defers CT or MRI imaging as the episode is remote and I agree

## 2023-03-29 NOTE — ASSESSMENT & PLAN NOTE
Well controlled and will continue current medications  The family brought in a few readings in the 90's, but these were done with an automated cuff by VNA and patient denied symptoms  She continues on midodrine as well  Due to history of PSVT, would not decrease metoprolol

## 2023-03-29 NOTE — TELEPHONE ENCOUNTER
Forms initiated and placed in Dr Cinthya Barrett folder to be completed  Please complete and place for tami Ness MA

## 2023-03-29 NOTE — PROGRESS NOTES
Name: Solo Mccrary      : 1945      MRN: [id number]  Encounter Provider: Sandee Walker MD  Encounter Date: 3/29/2023   Encounter department: [address]     1  Chronic kidney disease, unspecified CKD stage  -     Basic metabolic panel; Future    2  Liver transplant status ([facility] )    3  Ulcerative colitis without complications, unspecified location [facility])  Assessment & Plan:  Managed by GI  4  PSVT (paroxysmal supraventricular tachycardia) ([facility] )    5  Morbid (severe) obesity due to excess calories ([facility] )    6  TIA (transient ischemic attack)  Assessment & Plan:  Concern for TIA and while in the NH, and not dehydration as was told to family  However, the episode is now 4 weeks old, and symptoms have resolved  She continues on ASA and plavix   is asking if he can give her an extra ASA for recurrent episode, and I explained that he can, but that she should be seen in the ER ASAP for any recurrent symptoms  At this point, the family defers CT or MRI imaging as the episode is remote and I agree  7  Benign essential hypertension  Assessment & Plan:  Well controlled and will continue current medications  The family brought in a few readings in the 90's, but these were done with an automated cuff by VNA and patient denied symptoms  She continues on midodrine as well  Due to history of PSVT, would not decrease metoprolol  8  Other closed fracture of distal end of right fibula with routine healing, subsequent encounter  Assessment & Plan:  Healing seems routine per ortho notes and she is doing well at home with PT, now able to ambulate with her walker again  9  REZA (acute kidney injury) [facility])  Assessment & Plan: This is improving  Discussed nephrology consult with patient and her family, but she declines at this time  Will recheck BMP in 2-3 weeks to ensure creatinine continues to be close to baseline and will continue to follow    Continue to avoid "potentially nephrotoxic agents  10  Depression, unspecified depression type  Assessment & Plan:  She and her family feel this is improved and will continue remeron and escitalopram as ordered  Subjective      She is here with her son and  for f/u on ER stay  She was admitted to the hospital in February with a fibular fracture  Also had to be treated for REZA and was seen by psychiatry for depression  She is feeling better from this standpoint  While in rehab, had a couple of episodes of \"dehydration\" necessitating IV fluids  With the second episode, about 3 weeks ago, she had garbled speech and was incoherent  The family was concerned about possible TIA but per son they were told this was from dehydration and no imaging was done  Symptoms resolved after 3 days  Patient admits she was not eating or drinking well in the NH and is doing a bit better since she was home  Per our weights she has lost about 30 pounds since hospitalization and rehab stay  Last creatinine had come down from around 4 at highest to 1 4  Baseline is around 1 1  She no longer feels depressed, admits that food is not tasting good to her but she is trying  Is doing VNA and home PT and is now able to walk with her walker  No falls since home  Has not seen her rehab team but has been in contact and they have been reviewing her labwork  Per son they were not concerned with her liver, but with her kidney values  Review of Systems   Constitutional: Positive for fatigue, fever and unexpected weight change  HENT: Negative  Respiratory: Negative  Cardiovascular: Negative  Gastrointestinal: Negative  Musculoskeletal: Positive for gait problem         Current Outpatient Medications on File Prior to Visit   Medication Sig   • acetaminophen (TYLENOL) 325 mg tablet Take 2 tablets (650 mg total) by mouth every 6 (six) hours as needed for mild pain or fever   • aspirin (ECOTRIN LOW STRENGTH) 81 mg EC tablet " Take 1 tablet (81 mg total) by mouth daily with breakfast   • b complex vitamins capsule Take 1 capsule by mouth daily   • Calcium 500 MG tablet Take 500 mg by mouth   • clopidogrel (PLAVIX) 75 mg tablet TAKE 1 TABLET EVERY DAY   • cycloSPORINE modified (NEORAL) 25 mg capsule Take 50 mg by mouth 2 (two) times a day   • docusate sodium (COLACE) 100 mg capsule Take 1 capsule (100 mg total) by mouth 2 (two) times a day for 10 days   • escitalopram (LEXAPRO) 5 mg tablet Take 1 tablet (5 mg total) by mouth daily Do not start before January 10, 2023  • levothyroxine 112 mcg tablet TAKE 1 TABLET EVERY DAY AS DIRECTED   • melatonin 3 mg Take 1 tablet (3 mg total) by mouth daily at bedtime   • memantine (NAMENDA) 5 mg tablet Take 1 tablet (5 mg total) by mouth daily Do not start before January 10, 2023     • metoprolol tartrate (LOPRESSOR) 25 mg tablet Take 2 tablets (50 mg total) by mouth every 12 (twelve) hours   • midodrine (PROAMATINE) 2 5 mg tablet    • mirtazapine (REMERON) 45 MG tablet    • mycophenolate (CELLCEPT) 250 mg capsule Take 3 capsules (750 mg total) by mouth every 12 (twelve) hours   • omeprazole (PriLOSEC) 40 MG capsule Take 40 mg by mouth daily   • pantoprazole (PROTONIX) 40 mg tablet    • polyethylene glycol (MIRALAX) 17 g packet Take 17 g by mouth daily as needed (constipation)   • pravastatin (PRAVACHOL) 20 mg tablet TAKE 1 TABLET BY MOUTH ONCE DAILY AT BEDTIME   • [DISCONTINUED] Cholecalciferol (Vitamin D) 50 MCG (2000 UT) tablet Take 1,000 Units by mouth (Patient not taking: Reported on 3/29/2023)   • [DISCONTINUED] HYDROcodone-acetaminophen (Norco) 5-325 mg per tablet Take 1 tablet by mouth every 6 (six) hours as needed for pain (moderate, severe pain) Max Daily Amount: 4 tablets (Patient not taking: Reported on 3/29/2023)       Objective     /64   Pulse 88   Temp 97 9 °F (36 6 °C) (Tympanic)   Resp 20   Wt 85 7 kg (189 lb) Comment: unobtainable  SpO2 97%   BMI 34 57 kg/m²     Physical Exam  Constitutional:       Appearance: She is well-developed  Eyes:      Conjunctiva/sclera: Conjunctivae normal    Neck:      Thyroid: No thyromegaly  Vascular: No JVD  Cardiovascular:      Rate and Rhythm: Normal rate and regular rhythm  Heart sounds: Normal heart sounds  No murmur heard  No friction rub  No gallop  Pulmonary:      Effort: Pulmonary effort is normal       Breath sounds: Normal breath sounds  No wheezing or rales  Abdominal:      General: Bowel sounds are normal  There is no distension  Palpations: Abdomen is soft  Tenderness: There is no abdominal tenderness  Musculoskeletal:      Cervical back: Neck supple  Skin:     General: Skin is warm and dry  Findings: No rash  Neurological:      General: No focal deficit present  Mental Status: She is oriented to person, place, and time  Psychiatric:         Mood and Affect: Mood normal          Behavior: Behavior normal          Thought Content:  Thought content normal          Judgment: Judgment normal        Sandee Walker MD

## 2023-03-29 NOTE — TELEPHONE ENCOUNTER
I did not see that patient already has appointment scheduled, please let her know that we will keep this  No further action needed after that

## 2023-03-29 NOTE — ASSESSMENT & PLAN NOTE
Healing seems routine per ortho notes and she is doing well at home with PT, now able to ambulate with her walker again

## 2023-03-29 NOTE — ASSESSMENT & PLAN NOTE
This is improving  Discussed nephrology consult with patient and her family, but she declines at this time  Will recheck BMP in 2-3 weeks to ensure creatinine continues to be close to baseline and will continue to follow  Continue to avoid potentially nephrotoxic agents

## 2023-04-05 ENCOUNTER — PATIENT OUTREACH (OUTPATIENT)
Dept: FAMILY MEDICINE CLINIC | Facility: CLINIC | Age: 78
End: 2023-04-05

## 2023-04-05 NOTE — PROGRESS NOTES
CCM/REZA     Chart was reviewed  Patient had her f/u PCP apt on 3/29/23  REZA was discussed per notes and patient and family declined a consult to Nephrology  Patient had questionable TIA during STR and symptoms have resolved  Episode was 3 weeks out at PCP visit  Patient and family declined CT or MRI  Blood work (BMP) was ordered but patient has not had blood work yet  Patient has a f/u PCP apt on 5/4 and with Ortho on 4/13  This RNCM called patient and her son Magdy Beard for follow up  They say patient is doing great, so much better  Patient is now able to ambulate with a walker independently  She still needs assistance with her ADL's  Patient's son says they are not weighing patient daily and does not remember discussing it at last out reach  He states they have a scale and he will start doing so  They are still checking patient BP and Temp daily  He has not checked her BP yet today but yesterday's BP was 126/60  Today's temp is 97 5F  They states that patient's appetite has improved and they are encouraging her to drink because she has been dehydrated  Patient is not on a fluid restriction  Patient declines any CP, SOB, coughing or swelling  Patient's son Magdy Beard will take patient for her blood work (BMP) this week  Patient states MOW's is starting this Thursday  Patient has [facility] coming out to the home  She has Nursing and PT/OT  Patient and son Magdy Beard agree to a follow up call in 1 month  No goals were set so patient placed in CM Surveillance  RNCM to follow  Ernestina Choudhary

## 2023-04-21 ENCOUNTER — APPOINTMENT (OUTPATIENT)
Dept: LAB | Facility: CLINIC | Age: 78
End: 2023-04-21

## 2023-04-21 DIAGNOSIS — N18.9 CHRONIC KIDNEY DISEASE, UNSPECIFIED CKD STAGE: ICD-10-CM

## 2023-04-21 LAB
ANION GAP SERPL CALCULATED.3IONS-SCNC: 4 MMOL/L (ref 4–13)
BUN SERPL-MCNC: 25 MG/DL (ref 5–25)
CALCIUM SERPL-MCNC: 9 MG/DL (ref 8.3–10.1)
CHLORIDE SERPL-SCNC: 110 MMOL/L (ref 96–108)
CO2 SERPL-SCNC: 22 MMOL/L (ref 21–32)
CREAT SERPL-MCNC: 1.46 MG/DL (ref 0.6–1.3)
GFR SERPL CREATININE-BSD FRML MDRD: 33 ML/MIN/1.73SQ M
GLUCOSE P FAST SERPL-MCNC: 92 MG/DL (ref 65–99)
POTASSIUM SERPL-SCNC: 4.9 MMOL/L (ref 3.5–5.3)
SODIUM SERPL-SCNC: 136 MMOL/L (ref 135–147)

## 2023-04-22 ENCOUNTER — HOSPITAL ENCOUNTER (EMERGENCY)
Facility: HOSPITAL | Age: 78
Discharge: HOME | End: 2023-04-22
Attending: EMERGENCY MEDICINE

## 2023-04-22 VITALS
WEIGHT: 190.92 LBS | DIASTOLIC BLOOD PRESSURE: 54 MMHG | OXYGEN SATURATION: 98 % | TEMPERATURE: 97.6 F | RESPIRATION RATE: 20 BRPM | BODY MASS INDEX: 34.92 KG/M2 | SYSTOLIC BLOOD PRESSURE: 116 MMHG | HEART RATE: 93 BPM

## 2023-04-22 DIAGNOSIS — N39.0 UTI (URINARY TRACT INFECTION): Primary | ICD-10-CM

## 2023-04-22 LAB
ALBUMIN SERPL BCP-MCNC: 2.7 G/DL (ref 3.5–5)
ALP SERPL-CCNC: 216 U/L (ref 34–104)
ALT SERPL W P-5'-P-CCNC: 10 U/L (ref 7–52)
ANION GAP SERPL CALCULATED.3IONS-SCNC: 7 MMOL/L (ref 4–13)
AST SERPL W P-5'-P-CCNC: 16 U/L (ref 13–39)
BACTERIA UR QL AUTO: ABNORMAL /HPF
BASOPHILS # BLD AUTO: 0.04 THOUSANDS/ΜL (ref 0–0.1)
BASOPHILS NFR BLD AUTO: 1 % (ref 0–1)
BILIRUB SERPL-MCNC: 0.39 MG/DL (ref 0.2–1)
BILIRUB UR QL STRIP: NEGATIVE
BUN SERPL-MCNC: 25 MG/DL (ref 5–25)
CALCIUM ALBUM COR SERPL-MCNC: 8.8 MG/DL (ref 8.3–10.1)
CALCIUM SERPL-MCNC: 7.8 MG/DL (ref 8.4–10.2)
CHLORIDE SERPL-SCNC: 109 MMOL/L (ref 96–108)
CLARITY UR: ABNORMAL
CO2 SERPL-SCNC: 23 MMOL/L (ref 21–32)
COLOR UR: YELLOW
CREAT SERPL-MCNC: 1.58 MG/DL (ref 0.6–1.3)
EOSINOPHIL # BLD AUTO: 0.19 THOUSAND/ΜL (ref 0–0.61)
EOSINOPHIL NFR BLD AUTO: 4 % (ref 0–6)
ERYTHROCYTE [DISTWIDTH] IN BLOOD BY AUTOMATED COUNT: 14.5 % (ref 11.6–15.1)
GFR SERPL CREATININE-BSD FRML MDRD: 30 ML/MIN/1.73SQ M
GLUCOSE SERPL-MCNC: 112 MG/DL (ref 65–140)
GLUCOSE UR STRIP-MCNC: NEGATIVE MG/DL
HCT VFR BLD AUTO: 30 % (ref 34.8–46.1)
HGB BLD-MCNC: 9.1 G/DL (ref 11.5–15.4)
HGB UR QL STRIP.AUTO: ABNORMAL
IMM GRANULOCYTES # BLD AUTO: 0.06 THOUSAND/UL (ref 0–0.2)
IMM GRANULOCYTES NFR BLD AUTO: 1 % (ref 0–2)
KETONES UR STRIP-MCNC: ABNORMAL MG/DL
LEUKOCYTE ESTERASE UR QL STRIP: ABNORMAL
LYMPHOCYTES # BLD AUTO: 0.82 THOUSANDS/ΜL (ref 0.6–4.47)
LYMPHOCYTES NFR BLD AUTO: 17 % (ref 14–44)
MCH RBC QN AUTO: 27.8 PG (ref 26.8–34.3)
MCHC RBC AUTO-ENTMCNC: 30.3 G/DL (ref 31.4–37.4)
MCV RBC AUTO: 92 FL (ref 82–98)
MONOCYTES # BLD AUTO: 0.52 THOUSAND/ΜL (ref 0.17–1.22)
MONOCYTES NFR BLD AUTO: 11 % (ref 4–12)
NEUTROPHILS # BLD AUTO: 3.07 THOUSANDS/ΜL (ref 1.85–7.62)
NEUTS SEG NFR BLD AUTO: 66 % (ref 43–75)
NITRITE UR QL STRIP: POSITIVE
NON-SQ EPI CELLS URNS QL MICRO: ABNORMAL /HPF
NRBC BLD AUTO-RTO: 0 /100 WBCS
PH UR STRIP.AUTO: 6 [PH]
PLATELET # BLD AUTO: 328 THOUSANDS/UL (ref 149–390)
PMV BLD AUTO: 9.4 FL (ref 8.9–12.7)
POTASSIUM SERPL-SCNC: 4.6 MMOL/L (ref 3.5–5.3)
PROT SERPL-MCNC: 5.9 G/DL (ref 6.4–8.4)
PROT UR STRIP-MCNC: ABNORMAL MG/DL
RBC # BLD AUTO: 3.27 MILLION/UL (ref 3.81–5.12)
RBC #/AREA URNS AUTO: ABNORMAL /HPF
SODIUM SERPL-SCNC: 139 MMOL/L (ref 135–147)
SP GR UR STRIP.AUTO: 1.02 (ref 1–1.03)
UROBILINOGEN UR QL STRIP.AUTO: 0.2 E.U./DL
WBC # BLD AUTO: 4.7 THOUSAND/UL (ref 4.31–10.16)
WBC #/AREA URNS AUTO: ABNORMAL /HPF

## 2023-04-22 RX ORDER — CEPHALEXIN 500 MG/1
500 CAPSULE ORAL EVERY 8 HOURS SCHEDULED
Qty: 21 CAPSULE | Refills: 0 | Status: SHIPPED | OUTPATIENT
Start: 2023-04-22 | End: 2023-04-29

## 2023-04-22 RX ORDER — CEFTRIAXONE 1 G/50ML
1000 INJECTION, SOLUTION INTRAVENOUS ONCE
Status: COMPLETED | OUTPATIENT
Start: 2023-04-22 | End: 2023-04-22

## 2023-04-22 RX ADMIN — CEFTRIAXONE 1000 MG: 1 INJECTION, SOLUTION INTRAVENOUS at 13:39

## 2023-04-22 RX ADMIN — SODIUM CHLORIDE 500 ML: 0.9 INJECTION, SOLUTION INTRAVENOUS at 12:39

## 2023-04-22 NOTE — ED PROVIDER NOTES
"History  Chief Complaint   Patient presents with   • Altered Mental Status     Family noted this morning that patient was having \" cognitive issues \", they state when this happens patient usually has a UTI and is dehydrated      68-year-old female presents to the ED with some altered mental status family states she was having some cognitive issues she does have a history of mild dementia but they state when she gets UTI and gets little dehydrated she starts acting like this  They did bring a urine sample which is very cloudy  She denies any fevers or chills  No other complaints      History provided by:  Patient and relative   used: No        Prior to Admission Medications   Prescriptions Last Dose Informant Patient Reported? Taking? Calcium 500 MG tablet   Yes No   Sig: Take 500 mg by mouth   acetaminophen (TYLENOL) 325 mg tablet   No No   Sig: Take 2 tablets (650 mg total) by mouth every 6 (six) hours as needed for mild pain or fever   aspirin (ECOTRIN LOW STRENGTH) 81 mg EC tablet   No No   Sig: Take 1 tablet (81 mg total) by mouth daily with breakfast   b complex vitamins capsule   No No   Sig: Take 1 capsule by mouth daily   clopidogrel (PLAVIX) 75 mg tablet   No No   Sig: TAKE 1 TABLET EVERY DAY   cycloSPORINE modified (NEORAL) 25 mg capsule   Yes No   Sig: Take 50 mg by mouth 2 (two) times a day   docusate sodium (COLACE) 100 mg capsule   No No   Sig: Take 1 capsule (100 mg total) by mouth 2 (two) times a day for 10 days   escitalopram (LEXAPRO) 5 mg tablet   No No   Sig: Take 1 tablet (5 mg total) by mouth daily Do not start before January 10, 2023  levothyroxine 112 mcg tablet   No No   Sig: TAKE 1 TABLET EVERY DAY AS DIRECTED   melatonin 3 mg   No No   Sig: Take 1 tablet (3 mg total) by mouth daily at bedtime   memantine (NAMENDA) 5 mg tablet   No No   Sig: Take 1 tablet (5 mg total) by mouth daily Do not start before January 10, 2023     metoprolol tartrate (LOPRESSOR) 25 mg " tablet   No No   Sig: TAKE 1 AND 1/2 TABLETS TWICE DAILY   midodrine (PROAMATINE) 2 5 mg tablet   Yes No   mirtazapine (REMERON) 45 MG tablet   Yes No   mycophenolate (CELLCEPT) 250 mg capsule   No No   Sig: Take 3 capsules (750 mg total) by mouth every 12 (twelve) hours   omeprazole (PriLOSEC) 40 MG capsule   Yes No   Sig: Take 40 mg by mouth daily   pantoprazole (PROTONIX) 40 mg tablet   Yes No   polyethylene glycol (MIRALAX) 17 g packet   No No   Sig: Take 17 g by mouth daily as needed (constipation)   pravastatin (PRAVACHOL) 20 mg tablet   No No   Sig: TAKE 1 TABLET BY MOUTH ONCE DAILY AT BEDTIME      Facility-Administered Medications: None       Past Medical History:   Diagnosis Date   • Anemia    • Anxiety    • Arthritis    • Benign paroxysmal vertigo, unspecified ear     onset: 05/06/2010   • Cirrhosis ([facility] )     onset: 03/30/2005   • Crohn disease ([place] [address] )    • Disease of thyroid gland    • GERD (gastroesophageal reflux disease)    • History of transfusion    • Liver transplant candidate     onset: 09/16/2008   • Osteoporosis    • Pelvic fracture ([facility] )     onset: 10/14/2008       Past Surgical History:   Procedure Laterality Date   • ABDOMINAL SURGERY     • APPENDECTOMY     • BLADDER AUGMENTATION N/A    • CATARACT EXTRACTION     • CHOLECYSTECTOMY N/A    • COLONOSCOPY N/A 4/20/2017    Procedure: COLONOSCOPY;  Surgeon: Gaurav Cramer MD;  Location: [place] [address] GI LAB; Service:    • ESOPHAGOGASTRODUODENOSCOPY N/A 3/31/2016    Procedure: ESOPHAGOGASTRODUODENOSCOPY (EGD); Surgeon: Gaurav Cramer MD;  Location: [place] [address] GI LAB; Service:    • ESOPHAGOGASTRODUODENOSCOPY N/A 4/20/2017    Procedure: ESOPHAGOGASTRODUODENOSCOPY (EGD); Surgeon: Gaurav Cramer MD;  Location: [place] [address] GI LAB;   Service:    • EYE SURGERY      cataracts removed both eyes lens implant   • HERNIA REPAIR     • JOINT REPLACEMENT      left knee replacement   • LIVER TRANSPLANTATION N/A    • ORIF TIBIA & FIBULA FRACTURES Right 11/18/2022    Procedure: OPEN REDUCTION W/ INTERNAL FIXATION (ORIF) RIGHT ANKLE FRACTURE;  Surgeon: Pradeep Cheek MD;  Location: [facility];  Service: Orthopedics   • TONSILECTOMY AND ADNOIDECTOMY N/A    • TUBAL LIGATION         Family History   Problem Relation Age of Onset   • Cancer Mother         breast   • Cancer Father    • Crohn's disease Brother    • Arthritis Family    • Breast cancer Family      I have reviewed and agree with the history as documented  E-Cigarette/Vaping   • E-Cigarette Use Never User      E-Cigarette/Vaping Substances   • Nicotine No    • THC No    • CBD No    • Flavoring No    • Other No    • Unknown No      Social History     Tobacco Use   • Smoking status: Former     Years: 15 00     Types: Cigarettes     Start date: 6/15/1961     Quit date: 1975     Years since quittin 2   • Smokeless tobacco: Never   • Tobacco comments:     smoked for 5 years-1/2 to 1 ppd   Vaping Use   • Vaping Use: Never used   Substance Use Topics   • Alcohol use: Never   • Drug use: Never       Review of Systems   Constitutional: Negative for activity change, chills, diaphoresis and fever  HENT: Negative for congestion, ear pain, nosebleeds, sore throat, trouble swallowing and voice change  Eyes: Negative for pain, discharge and redness  Respiratory: Negative for apnea, cough, choking, shortness of breath, wheezing and stridor  Cardiovascular: Negative for chest pain and palpitations  Gastrointestinal: Negative for abdominal distention, abdominal pain, constipation, diarrhea, nausea and vomiting  Endocrine: Positive for polyuria  Negative for polydipsia  Genitourinary: Positive for dysuria and frequency  Negative for difficulty urinating, flank pain, hematuria and urgency  Musculoskeletal: Negative for back pain, gait problem, joint swelling, myalgias, neck pain and neck stiffness  Skin: Negative for pallor and rash  Neurological: Positive for weakness   Negative for dizziness, tremors, syncope, speech difficulty, numbness and headaches  Hematological: Negative for adenopathy  Psychiatric/Behavioral: Negative for confusion, hallucinations, self-injury and suicidal ideas  The patient is not nervous/anxious  Physical Exam  Physical Exam  Vitals and nursing note reviewed  Constitutional:       General: She is not in acute distress  Appearance: She is well-developed  She is not diaphoretic  HENT:      Head: Normocephalic and atraumatic  Right Ear: External ear normal       Left Ear: External ear normal       Nose: Nose normal    Eyes:      Conjunctiva/sclera: Conjunctivae normal       Pupils: Pupils are equal, round, and reactive to light  Cardiovascular:      Rate and Rhythm: Normal rate and regular rhythm  Heart sounds: Normal heart sounds  Pulmonary:      Effort: Pulmonary effort is normal       Breath sounds: Normal breath sounds  Abdominal:      General: Bowel sounds are normal       Palpations: Abdomen is soft  Musculoskeletal:         General: Normal range of motion  Cervical back: Normal range of motion and neck supple  Skin:     General: Skin is warm and dry  Neurological:      Mental Status: She is alert and oriented to person, place, and time  Deep Tendon Reflexes: Reflexes are normal and symmetric           Vital Signs  ED Triage Vitals [04/22/23 1212]   Temperature Pulse Respirations Blood Pressure SpO2   97 6 °F (36 4 °C) 93 20 116/54 98 %      Temp Source Heart Rate Source Patient Position - Orthostatic VS BP Location FiO2 (%)   Oral Monitor Sitting Left arm --      Pain Score       No Pain           Vitals:    04/22/23 1212   BP: 116/54   Pulse: 93   Patient Position - Orthostatic VS: Sitting         Visual Acuity      ED Medications  Medications   sodium chloride 0 9 % bolus 500 mL (0 mL Intravenous Stopped 4/22/23 1431)   cefTRIAXone (ROCEPHIN) IVPB (premix in dextrose) 1,000 mg 50 mL (0 mg Intravenous Stopped 4/22/23 1431)       Diagnostic Studies  Results Reviewed     Procedure Component Value Units Date/Time    Comprehensive metabolic panel [[id number]]  (Abnormal) Collected: 04/22/23 1239    Lab Status: Final result Specimen: Blood from Arm, Right Updated: 04/22/23 1309     Sodium 139 mmol/L      Potassium 4 6 mmol/L      Chloride 109 mmol/L      CO2 23 mmol/L      ANION GAP 7 mmol/L      BUN 25 mg/dL      Creatinine 1 58 mg/dL      Glucose 112 mg/dL      Calcium 7 8 mg/dL      Corrected Calcium 8 8 mg/dL      AST 16 U/L      ALT 10 U/L      Alkaline Phosphatase 216 U/L      Total Protein 5 9 g/dL      Albumin 2 7 g/dL      Total Bilirubin 0 39 mg/dL      eGFR 30 ml/min/1 73sq m     Narrative:      National Kidney Disease Foundation guidelines for Chronic Kidney Disease (CKD):   •  Stage 1 with normal or high GFR (GFR > 90 mL/min/1 73 square meters)  •  Stage 2 Mild CKD (GFR = 60-89 mL/min/1 73 square meters)  •  Stage 3A Moderate CKD (GFR = 45-59 mL/min/1 73 square meters)  •  Stage 3B Moderate CKD (GFR = 30-44 mL/min/1 73 square meters)  •  Stage 4 Severe CKD (GFR = 15-29 mL/min/1 73 square meters)  •  Stage 5 End Stage CKD (GFR <15 mL/min/1 73 square meters)  Note: GFR calculation is accurate only with a steady state creatinine    Urine Microscopic [[id number]]  (Abnormal) Collected: 04/22/23 1246    Lab Status: Final result Specimen: Urine, Clean Catch Updated: 04/22/23 1300     RBC, UA       Field obscured, unable to enumerate     /hpf     WBC, UA Innumerable /hpf      Epithelial Cells       Field obscured, unable to enumerate     /hpf     Bacteria, UA       Field obscured, unable to enumerate     /hpf    UA (URINE) with reflex to Scope [[id number]]  (Abnormal) Collected: 04/22/23 1246    Lab Status: Final result Specimen: Urine, Clean Catch Updated: 04/22/23 1254     Color, UA Yellow     Clarity, UA Cloudy     Specific Gravity, UA 1 025     pH, UA 6 0     Leukocytes, UA Large     Nitrite, UA Positive     Protein,  (2+) mg/dl      Glucose, UA Negative mg/dl      Ketones, UA Trace mg/dl      Urobilinogen, UA 0 2 E U /dl      Bilirubin, UA Negative     Occult Blood, UA Moderate    Urine culture [[id number]] Collected: 04/22/23 1246    Lab Status: In process Specimen: Urine, Clean Catch Updated: 04/22/23 1251    CBC and differential [[id number]]  (Abnormal) Collected: 04/22/23 1239    Lab Status: Final result Specimen: Blood from Arm, Right Updated: 04/22/23 1247     WBC 4 70 Thousand/uL      RBC 3 27 Million/uL      Hemoglobin 9 1 g/dL      Hematocrit 30 0 %      MCV 92 fL      MCH 27 8 pg      MCHC 30 3 g/dL      RDW 14 5 %      MPV 9 4 fL      Platelets 129 Thousands/uL      nRBC 0 /100 WBCs      Neutrophils Relative 66 %      Immat GRANS % 1 %      Lymphocytes Relative 17 %      Monocytes Relative 11 %      Eosinophils Relative 4 %      Basophils Relative 1 %      Neutrophils Absolute 3 07 Thousands/µL      Immature Grans Absolute 0 06 Thousand/uL      Lymphocytes Absolute 0 82 Thousands/µL      Monocytes Absolute 0 52 Thousand/µL      Eosinophils Absolute 0 19 Thousand/µL      Basophils Absolute 0 04 Thousands/µL                  No orders to display              Procedures  Procedures         ED Course                               SBIRT 22yo+    Flowsheet Row Most Recent Value   Initial Alcohol Screen: US AUDIT-C     1  How often do you have a drink containing alcohol? 0 Filed at: 04/22/2023 1309   2  How many drinks containing alcohol do you have on a typical day you are drinking? 0 Filed at: 04/22/2023 1309   3a  Male UNDER 65: How often do you have five or more drinks on one occasion? 0 Filed at: 04/22/2023 1309   3b  FEMALE Any Age, or MALE 65+: How often do you have 4 or more drinks on one occassion? 0 Filed at: 04/22/2023 1309   Audit-C Score 0 Filed at: 04/22/2023 1309   NOA: How many times in the past year have you    Used an illegal drug or used a prescription medication for non-medical reasons?  Never Filed at: 04/22/2023 1309 Medical Decision Making  77-year-old female with change in mental status UTI, son states this usually happens when she gets infection with her mild dementia    UTI (urinary tract infection): acute illness or injury  Amount and/or Complexity of Data Reviewed  Labs: ordered  Discussion of management or test interpretation with external provider(s): Patient will be treated with IV antibiotics here in the ED and follow-up with outpatient oral antibiotics for her UTI    Risk  Prescription drug management  Disposition  Final diagnoses:   UTI (urinary tract infection)     Time reflects when diagnosis was documented in both MDM as applicable and the Disposition within this note     Time User Action Codes Description Comment    4/22/2023  2:21 PM Argenis JAEGER Add [N39 0] UTI (urinary tract infection)       ED Disposition     ED Disposition   Discharge    Condition   Stable    Date/Time   Sat Apr 22, 2023  2:21 PM    Comment   [facility]/self care  Follow-up Information     Follow up With Specialties Details Why Contact Info    Any Costa MD Internal Medicine, Family Medicine Schedule an appointment as soon as possible for a visit  As needed [address] [place] [address]  [phone]            Discharge Medication List as of 4/22/2023  2:22 PM      START taking these medications    Details   cephalexin (KEFLEX) 500 mg capsule Take 1 capsule (500 mg total) by mouth every 8 (eight) hours for 7 days, Starting Sat 4/22/2023, Until Sat 4/29/2023, Normal         CONTINUE these medications which have NOT CHANGED    Details   acetaminophen (TYLENOL) 325 mg tablet Take 2 tablets (650 mg total) by mouth every 6 (six) hours as needed for mild pain or fever, Starting Wed 11/16/2022, No Print      aspirin (ECOTRIN LOW STRENGTH) 81 mg EC tablet Take 1 tablet (81 mg total) by mouth daily with breakfast, Starting Thu 4/22/2021, Sample      b complex vitamins capsule Take 1 capsule by mouth daily, Starting Wed 7/1/2020, Normal      Calcium 500 MG tablet Take 500 mg by mouth, Starting Fri 11/6/2020, Historical Med      clopidogrel (PLAVIX) 75 mg tablet TAKE 1 TABLET EVERY DAY, Normal      cycloSPORINE modified (NEORAL) 25 mg capsule Take 50 mg by mouth 2 (two) times a day, Historical Med      docusate sodium (COLACE) 100 mg capsule Take 1 capsule (100 mg total) by mouth 2 (two) times a day for 10 days, Starting Wed 11/16/2022, Until Wed 3/29/2023, No Print      escitalopram (LEXAPRO) 5 mg tablet Take 1 tablet (5 mg total) by mouth daily Do not start before January 10, 2023 , Starting Tue 1/10/2023, No Print      levothyroxine 112 mcg tablet TAKE 1 TABLET EVERY DAY AS DIRECTED, Normal      melatonin 3 mg Take 1 tablet (3 mg total) by mouth daily at bedtime, Starting Wed 11/16/2022, No Print      memantine (NAMENDA) 5 mg tablet Take 1 tablet (5 mg total) by mouth daily Do not start before January 10, 2023 , Starting Tue 1/10/2023, No Print      metoprolol tartrate (LOPRESSOR) 25 mg tablet TAKE 1 AND 1/2 TABLETS TWICE DAILY, Normal      midodrine (PROAMATINE) 2 5 mg tablet Starting Thu 3/16/2023, Historical Med      mirtazapine (REMERON) 45 MG tablet Starting Tue 3/14/2023, Historical Med      mycophenolate (CELLCEPT) 250 mg capsule Take 3 capsules (750 mg total) by mouth every 12 (twelve) hours, Starting Wed 11/16/2022, No Print      omeprazole (PriLOSEC) 40 MG capsule Take 40 mg by mouth daily, Historical Med      pantoprazole (PROTONIX) 40 mg tablet Starting Wed 3/8/2023, Historical Med      polyethylene glycol (MIRALAX) 17 g packet Take 17 g by mouth daily as needed (constipation), Starting Sun 11/20/2022, No Print      pravastatin (PRAVACHOL) 20 mg tablet TAKE 1 TABLET BY MOUTH ONCE DAILY AT BEDTIME, Normal             No discharge procedures on file      PDMP Review       Value Time User    PDMP Reviewed  Yes 11/16/2022  1:48 PM [address] [facility],           ED Provider  Electronically Signed by           Pili Mendes DO  04/22/23 0324

## 2023-04-24 ENCOUNTER — VBI (OUTPATIENT)
Dept: FAMILY MEDICINE CLINIC | Facility: CLINIC | Age: 78
End: 2023-04-24

## 2023-04-24 RX ORDER — NITROFURANTOIN 25; 75 MG/1; MG/1
100 CAPSULE ORAL 2 TIMES DAILY
Qty: 10 CAPSULE | Refills: 0 | Status: SHIPPED | OUTPATIENT
Start: 2023-04-24 | End: 2023-04-29

## 2023-04-24 NOTE — TELEPHONE ENCOUNTER
Renu Gen    ED Visit Information     Ed visit date: 4/22/23  Diagnosis Description: UTI  In Network? Yes [facility]  Discharge status: Home  Discharged with meds ? Yes  Number of ED visits to date: 2  ED Severity:3     Outreach Information    Outreach successful: No 2  Date letter mailed:NA  Date Finalized:4/25/23    Care Coordination    Follow up appointment with pcp: no na  Transportation issues ?  NA    Value Base Outreach    Outreach type: 7 Day Outreach  04/24/2023 02:49 PM EDT by Daphne Friedman 04/24/2023 02:49 PM EDT by [employer] (Self) [phone] ([email or web address])[phone] (Mobile)  [phone] (Mobile) Remove  - Left Message    04/25/2023 02:09 PM EDT by Daphne Friedman 04/25/2023 02:09 PM EDT by [employer] (Self) [phone] ([email or web address])[phone] (Mobile)  [phone] (Mobile) Remove  - Left Message

## 2023-04-25 LAB
BACTERIA UR CULT: ABNORMAL
BACTERIA UR CULT: ABNORMAL

## 2023-04-27 ENCOUNTER — RA CDI HCC (OUTPATIENT)
Dept: OTHER | Facility: HOSPITAL | Age: 78
End: 2023-04-27

## 2023-04-27 NOTE — PROGRESS NOTES
[address]  coding opportunities       Chart reviewed, no opportunity found: CHART REVIEWED, NO OPPORTUNITY FOUND        Patients Insurance     Medicare Insurance: Medicare

## 2023-04-28 ENCOUNTER — PATIENT OUTREACH (OUTPATIENT)
Dept: FAMILY MEDICINE CLINIC | Facility: CLINIC | Age: 78
End: 2023-04-28

## 2023-04-28 NOTE — PROGRESS NOTES
Chart was reviewed and this RNCM called patient's /significant other Marta Caceres, and spoke with both him and patients son Fernando Garcia both on phone  They state patient is doing well  Her appetite is good and she is able to ambulate with a walker  Patient was recently in the ER and has a UTI  She was started on Keflex and Macrobid and has almost finished the course  They state patient still has services with [facility] and has an RN and PT/OT visits weekly  Her nurse will be brining compression stockings with her at her next visit  They are not weighing patient and don't feel the need to at this time and nurse is aware  Patients significant other Marta Caceres manages her medications and states they have all meds and she takes them as directed  They decline any needs or follow up at this time  They state they have the nurse with [facility] who helps      CCM episode closed and this RNCM removed self from care team

## 2023-05-04 ENCOUNTER — OFFICE VISIT (OUTPATIENT)
Dept: FAMILY MEDICINE CLINIC | Facility: CLINIC | Age: 78
End: 2023-05-04

## 2023-05-04 VITALS
SYSTOLIC BLOOD PRESSURE: 110 MMHG | OXYGEN SATURATION: 100 % | TEMPERATURE: 97.3 F | RESPIRATION RATE: 18 BRPM | DIASTOLIC BLOOD PRESSURE: 58 MMHG | HEART RATE: 87 BPM

## 2023-05-04 DIAGNOSIS — F03.918 DEMENTIA WITH BEHAVIORAL DISTURBANCE (HCC): ICD-10-CM

## 2023-05-04 DIAGNOSIS — R60.9 EDEMA, UNSPECIFIED TYPE: ICD-10-CM

## 2023-05-04 DIAGNOSIS — Z79.899 IMMUNODEFICIENCY DUE TO DRUGS (HCC): ICD-10-CM

## 2023-05-04 DIAGNOSIS — Z00.00 MEDICARE ANNUAL WELLNESS VISIT, SUBSEQUENT: Primary | ICD-10-CM

## 2023-05-04 DIAGNOSIS — N39.0 RECURRENT UTI: ICD-10-CM

## 2023-05-04 DIAGNOSIS — R73.09 ELEVATED GLUCOSE: ICD-10-CM

## 2023-05-04 DIAGNOSIS — I10 BENIGN ESSENTIAL HYPERTENSION: ICD-10-CM

## 2023-05-04 DIAGNOSIS — D84.821 IMMUNODEFICIENCY DUE TO DRUGS (HCC): ICD-10-CM

## 2023-05-04 LAB — SL AMB POCT HEMOGLOBIN AIC: 5.1 (ref ?–6.5)

## 2023-05-04 RX ORDER — FERROUS SULFATE 325(65) MG
1 TABLET ORAL EVERY 12 HOURS
Status: ON HOLD | COMMUNITY
Start: 2023-03-19

## 2023-05-04 RX ORDER — MIRTAZAPINE 45 MG/1
45 TABLET, FILM COATED ORAL
Qty: 30 TABLET | Refills: 5 | Status: ON HOLD | OUTPATIENT
Start: 2023-05-04

## 2023-05-04 RX ORDER — EZETIMIBE 10 MG/1
10 TABLET ORAL DAILY
Status: ON HOLD | COMMUNITY
Start: 2023-04-29

## 2023-05-04 NOTE — ASSESSMENT & PLAN NOTE
LFT's are monitored by her transplant team and have been stable  She continues on immunosuppressants

## 2023-05-04 NOTE — ASSESSMENT & PLAN NOTE
Family is concerned she is not drinking enough  Encouraged po intake as well as hydrating fruits such as watermelon, which she loves

## 2023-05-04 NOTE — TELEPHONE ENCOUNTER
Please call [facility], they are already seeing patient but I need to add an order for OT/cognitive therapy

## 2023-05-04 NOTE — ASSESSMENT & PLAN NOTE
Well controlled on current medications and will continue  Midodrine was d/c'd as there has been no hypotension or orthostatic symptoms since she has been home

## 2023-05-04 NOTE — PROGRESS NOTES
Assessment and Plan:     Problem List Items Addressed This Visit        Cardiovascular and Mediastinum    Benign essential hypertension     Well controlled on current medications and will continue  Midodrine was d/c'd as there has been no hypotension or orthostatic symptoms since she has been home  Nervous and Auditory    Dementia with behavioral disturbance ([facility] [address] )     This appears to be worsening  Will call VNA and add cognitive therapy orders to her OT  Restarted patient's mirtazapine, as she has not been sleeping well and  was not aware she should stay on this  Continue namenda and escitalopram          Relevant Medications    mirtazapine (REMERON) 45 MG tablet       Genitourinary    Recurrent UTI     Family is concerned she is not drinking enough  Encouraged po intake as well as hydrating fruits such as watermelon, which she loves  Other    Immunodeficiency due to drugs [facility])     Managed by her transplant team         Other Visit Diagnoses     Medicare annual wellness visit, subsequent    -  Primary    Elevated glucose        Relevant Orders    POCT hemoglobin A1c (Completed)    Edema, unspecified type        No evidence of CHF, continue support hose and physical activity was encouraged as tolerated  Preventive health issues were discussed with patient, and age appropriate screening tests were ordered as noted in patient's After Visit Summary  Personalized health advice and appropriate referrals for health education or preventive services given if needed, as noted in patient's After Visit Summary  History of Present Illness:     Patient presents for a Medicare Wellness Visit    Here for AWV and follow up  She was recently back in the ER for recurrent UTI  She continues to be confused, VNA and PT/OT is coming to the home  Family feels she is not drinking enough  She is getting up and around more since home  PT has been good at working with her   Continues to follow with her transplant team as well  Not sleeping well   has not been giving her the mirtazapine, did not think she was supposed to continue with this  Patient Care Team:  Stefani Bush MD as PCP - General (Internal Medicine)  Zhang Centeno MD Marinell Epp, MD Barb Big, MD as Endoscopist  -[facility]     Review of Systems:     Review of Systems   Constitutional: Negative  HENT: Negative  Eyes: Negative  Respiratory: Negative  Cardiovascular: Positive for leg swelling  Negative for chest pain and palpitations  Gastrointestinal: Negative  Endocrine: Negative  Genitourinary: Negative  Musculoskeletal: Negative  Skin: Negative  Allergic/Immunologic: Negative  Neurological:        As per HPI  Hematological: Negative  Psychiatric/Behavioral: Negative           Problem List:     Patient Active Problem List   Diagnosis    Liver transplanted ([facility] [address] )    Hypothyroidism    Long-term use of immunosuppressant medication    Anemia    TIA (transient ischemic attack)    HLD (hyperlipidemia)    History of seizure    Paroxysmal SVT (supraventricular tachycardia) (HCC)    Altered mental status    Cerebrovascular accident (CVA) due to embolism ([facility] [address] )    Non-ST elevation myocardial infarction (NSTEMI), type 2    Abnormal EEG    Ataxic gait    Benign essential hypertension    Chronic joint pain    Chronic kidney insufficiency    Cyst of diaphragm    Depression    Elevated homocysteine    Hypercalcemia    Lung nodule, solitary    Sciatica    Ulcerative colitis without complications ([facility] [address] )    Asymptomatic stenosis of left carotid artery    Left ovarian cyst    Chronic left-sided thoracic back pain    Other fatigue    Snoring    Osteopenia of multiple sites    Obesity, morbid ([facility] [address] )    Recurrent UTI    REZA (acute kidney injury) ([facility] [address] )    Stage 3b chronic kidney disease ([facility] [address] )    Fall    GERD (gastroesophageal reflux disease)    Closed fracture of distal end of right fibula    Dementia with behavioral disturbance (HCC)    MRSA (methicillin resistant staph aureus) culture positive    Immunodeficiency due to drugs [facility])      Past Medical and Surgical History:     Past Medical History:   Diagnosis Date    Anemia     Anxiety     Arthritis     Benign paroxysmal vertigo, unspecified ear     onset: 05/06/2010    Cirrhosis ([facility] )     onset: 03/30/2005    Crohn disease ([place] [address] )     Disease of thyroid gland     GERD (gastroesophageal reflux disease)     History of transfusion     Liver transplant candidate     onset: 09/16/2008    Osteoporosis     Pelvic fracture ([facility] )     onset: 10/14/2008     Past Surgical History:   Procedure Laterality Date    ABDOMINAL SURGERY      APPENDECTOMY      BLADDER AUGMENTATION N/A     CATARACT EXTRACTION      CHOLECYSTECTOMY N/A     COLONOSCOPY N/A 4/20/2017    Procedure: COLONOSCOPY;  Surgeon: Vannesa Mercado MD;  Location: [place] [address] GI LAB; Service:     ESOPHAGOGASTRODUODENOSCOPY N/A 3/31/2016    Procedure: ESOPHAGOGASTRODUODENOSCOPY (EGD); Surgeon: Vannesa Mercado MD;  Location: [place] [address] GI LAB; Service:     ESOPHAGOGASTRODUODENOSCOPY N/A 4/20/2017    Procedure: ESOPHAGOGASTRODUODENOSCOPY (EGD); Surgeon: Vannesa Mercado MD;  Location: [place] [address] GI LAB;   Service:     EYE SURGERY      cataracts removed both eyes lens implant    HERNIA REPAIR      JOINT REPLACEMENT      left knee replacement    LIVER TRANSPLANTATION N/A     ORIF TIBIA & FIBULA FRACTURES Right 11/18/2022    Procedure: OPEN REDUCTION W/ INTERNAL FIXATION (ORIF) RIGHT ANKLE FRACTURE;  Surgeon: Nisa Valverde MD;  Location: [facility];  Service: Orthopedics    TONSILECTOMY AND ADNOIDECTOMY N/A     TUBAL LIGATION        Family History:     Family History   Problem Relation Age of Onset    Cancer Mother         breast    Cancer Father     Crohn's disease Brother     Arthritis Family     Breast cancer Family       Social History:     Social History     Socioeconomic History    Marital status:      Spouse name: None    Number of children: None    Years of education: Completed 12th grade    Highest education level: None   Occupational History    None   Tobacco Use    Smoking status: Former     Years: 15 00     Types: Cigarettes     Start date: 6/15/1961     Quit date: 1975     Years since quittin 3     Passive exposure: Past    Smokeless tobacco: Never    Tobacco comments:     smoked for 5 years-1/2 to 1 ppd   Vaping Use    Vaping Use: Never used   Substance and Sexual Activity    Alcohol use: Never    Drug use: Never    Sexual activity: Not Currently   Other Topics Concern    None   Social History Narrative    Daily coffee consumption (1 cup/day)     Social Determinants of Health     Financial Resource Strain: Low Risk     Difficulty of Paying Living Expenses: Not hard at all   Food Insecurity: No Food Insecurity    Worried About Running Out of Food in the Last Year: Never true    Pamela of Food in the Last Year: Never true   Transportation Needs: No Transportation Needs    Lack of Transportation (Medical): No    Lack of Transportation (Non-Medical):  No   Physical Activity: Not on file   Stress: Not on file   Social Connections: Not on file   Intimate Partner Violence: Not on file   Housing Stability: Low Risk     Unable to Pay for Housing in the Last Year: No    Number of Places Lived in the Last Year: 2    Unstable Housing in the Last Year: No      Medications and Allergies:     Current Outpatient Medications   Medication Sig Dispense Refill    acetaminophen (TYLENOL) 325 mg tablet Take 2 tablets (650 mg total) by mouth every 6 (six) hours as needed for mild pain or fever  0    aspirin (ECOTRIN LOW STRENGTH) 81 mg EC tablet Take 1 tablet (81 mg total) by mouth daily with breakfast 90 tablet 3    b complex vitamins capsule Take 1 capsule by mouth daily 30 capsule 3    Calcium 500 MG tablet Take 500 mg by mouth      Cholecalciferol 25 MCG (1000 UT) tablet Take 1 tablet by mouth every 24 hours      clopidogrel (PLAVIX) 75 mg tablet TAKE 1 TABLET EVERY DAY 90 tablet 3    cycloSPORINE modified (NEORAL) 25 mg capsule Take 50 mg by mouth 2 (two) times a day      docusate sodium (COLACE) 100 mg capsule Take 1 capsule (100 mg total) by mouth 2 (two) times a day for 10 days 20 capsule 0    escitalopram (LEXAPRO) 5 mg tablet Take 1 tablet (5 mg total) by mouth daily Do not start before January 10, 2023   0    ezetimibe (ZETIA) 10 mg tablet Take 10 mg by mouth daily      ferrous sulfate 325 (65 Fe) mg tablet Take 1 tablet by mouth Every 12 hours      levothyroxine 112 mcg tablet TAKE 1 TABLET EVERY DAY AS DIRECTED 90 tablet 3    melatonin 3 mg Take 1 tablet (3 mg total) by mouth daily at bedtime  0    memantine (NAMENDA) 5 mg tablet Take 1 tablet (5 mg total) by mouth daily Do not start before January 10, 2023   0    metoprolol tartrate (LOPRESSOR) 25 mg tablet TAKE 1 AND 1/2 TABLETS TWICE DAILY 270 tablet 1    mirtazapine (REMERON) 45 MG tablet Take 1 tablet (45 mg total) by mouth daily at bedtime 30 tablet 5    mycophenolate (CELLCEPT) 250 mg capsule Take 3 capsules (750 mg total) by mouth every 12 (twelve) hours      omeprazole (PriLOSEC) 40 MG capsule Take 40 mg by mouth daily      pantoprazole (PROTONIX) 40 mg tablet       polyethylene glycol (MIRALAX) 17 g packet Take 17 g by mouth daily as needed (constipation)  0    pravastatin (PRAVACHOL) 20 mg tablet TAKE 1 TABLET BY MOUTH ONCE DAILY AT BEDTIME 90 tablet 3     No current facility-administered medications for this visit  Allergies   Allergen Reactions    Tetracyclines & Related Hives    Vancomycin Other (See Comments) and Itching     Reaction Date: [id number]; Pt unsure of reaction    Prograf [Tacrolimus] Anxiety     Reaction Date: [id number];        Immunizations:     Immunization History Administered Date(s) Administered    COVID-19 MODERNA VACC 0 5 ML IM 01/21/2021, 02/19/2021, 08/31/2021    H1N1, All Formulations 10/31/2009    Hep A, adult 11/19/1996, 05/21/1997    INFLUENZA 11/01/2018, 11/01/2021    Influenza Split High Dose Preservative Free IM 10/11/2012, 09/25/2013, 10/31/2014, 09/03/2015, 09/29/2016, 10/17/2017    Influenza, high dose seasonal 0 7 mL 10/11/2019, 10/26/2020, 11/14/2022    Influenza, seasonal, injectable 10/07/2005, 10/03/2006, 10/25/2007, 09/16/2008, 09/09/2009, 09/22/2010, 09/14/2011    Pneumococcal Conjugate 13-Valent 06/30/2015    Pneumococcal Polysaccharide PPV23 09/30/2006, 09/14/2011    Tdap 03/13/2018      Health Maintenance:         Topic Date Due    Hepatitis C Screening  Never done    Colorectal Cancer Screening  04/20/2019         Topic Date Due    COVID-19 Vaccine (4 - Booster for Moderna series) 10/26/2021      Medicare Screening Tests and Risk Assessments: Fariba Lucas is here for her Subsequent Wellness visit  Health Risk Assessment:   Patient rates overall health as fair  Patient feels that their physical health rating is slightly worse  Patient is very satisfied with their life  Eyesight was rated as slightly worse  Hearing was rated as slightly worse  Patient feels that their emotional and mental health rating is same  Patients states they are never, rarely angry  Patient states they are often unusually tired/fatigued  Pain experienced in the last 7 days has been none  Patient states that she has experienced no weight loss or gain in last 6 months  Depression Screening:   PHQ-9 Score: 3      Fall Risk Screening: In the past year, patient has experienced: history of falling in past year    Number of falls: 1  Injured during fall?: Yes    Feels unsteady when standing or walking?: Yes    Worried about falling?: Yes      Urinary Incontinence Screening:   Patient has leaked urine accidently in the last six months       Home Safety:  Patient has trouble with stairs inside or outside of their home  Patient has working smoke alarms and has working carbon monoxide detector  Home safety hazards include: none  Nutrition:   Current diet is Regular and Limited junk food  Medications:   Patient is currently taking over-the-counter supplements  OTC medications include: see medication list  Patient is not able to manage medications   manages medications    Activities of Daily Living (ADLs)/Instrumental Activities of Daily Living (IADLs):   Walk and transfer into and out of bed and chair?: Yes  Dress and groom yourself?: Yes    Bathe or shower yourself?: No    Feed yourself?  Yes  Do your laundry/housekeeping?: No  Manage your money, pay your bills and track your expenses?: No  Make your own meals?: No    Do your own shopping?: No    ADL comments:  son daughter and care giver all help     Previous Hospitalizations:   Any hospitalizations or ED visits within the last 12 months?: Yes    How many hospitalizations have you had in the last year?: 1-2    Advance Care Planning:   Living will: Yes    Advanced directive: Yes    End of Life Decisions reviewed with patient: Yes      Cognitive Screening:   Provider or family/friend/caregiver concerned regarding cognition?: No    PREVENTIVE SCREENINGS      Cardiovascular Screening:    General: Screening Not Indicated and History Lipid Disorder      Diabetes Screening:     General: Screening Current      Colorectal Cancer Screening:     General: Screening Not Indicated      Breast Cancer Screening:     General: Screening Not Indicated      Cervical Cancer Screening:    General: Screening Not Indicated      Osteoporosis Screening:    General: Patient Declines      Abdominal Aortic Aneurysm (AAA) Screening:        General: Screening Not Indicated      Lung Cancer Screening:     General: Screening Not Indicated      Hepatitis C Screening:    General: Risks and Benefits Discussed    Screening, Brief Intervention, and Referral to Treatment (SBIRT)    Screening      AUDIT-C Screenin) How often did you have a drink containing alcohol in the past year? never  2) How many drinks did you have on a typical day when you were drinking in the past year? 0  3) How often did you have 6 or more drinks on one occasion in the past year? never    AUDIT-C Score: 0  Interpretation: Score 0-2 (female): Negative screen for alcohol misuse    Single Item Drug Screening:  How often have you used an illegal drug (including marijuana) or a prescription medication for non-medical reasons in the past year? never    Single Item Drug Screen Score: 0  Interpretation: Negative screen for possible drug use disorder    Brief Intervention  Alcohol & drug use screenings were reviewed  No concerns regarding substance use disorder identified  Other Counseling Topics:   Car/seat belt/driving safety, skin self-exam, sunscreen and calcium and vitamin D intake and regular weightbearing exercise  No results found  Physical Exam:     /58 (BP Location: Right arm, Patient Position: Sitting, Cuff Size: Standard)   Pulse 87   Temp (!) 97 3 °F (36 3 °C) (Temporal)   Resp 18   SpO2 100%     Physical Exam  Vitals and nursing note reviewed  Constitutional:       General: She is not in acute distress  Appearance: She is well-developed  HENT:      Head: Normocephalic and atraumatic  Eyes:      Conjunctiva/sclera: Conjunctivae normal    Cardiovascular:      Rate and Rhythm: Normal rate and regular rhythm  Heart sounds: No murmur heard  Pulmonary:      Effort: Pulmonary effort is normal  No respiratory distress  Breath sounds: Normal breath sounds  Abdominal:      Palpations: Abdomen is soft  Tenderness: There is no abdominal tenderness  Musculoskeletal:         General: No swelling  Cervical back: Neck supple  Right lower leg: Edema present  Left lower leg: Edema present     Skin:     General: Skin is warm and dry  Capillary Refill: Capillary refill takes less than 2 seconds  Neurological:      Mental Status: She is alert  She is disoriented  Comments: Pleasantly confused     Psychiatric:         Mood and Affect: Mood normal           Pau Bragg MD

## 2023-05-04 NOTE — PATIENT INSTRUCTIONS
Medicare Preventive Visit Patient Instructions  Thank you for completing your Welcome to Medicare Visit or Medicare Annual Wellness Visit today  Your next wellness visit will be due in one year (5/4/2024)  The screening/preventive services that you may require over the next 5-10 years are detailed below  Some tests may not apply to you based off risk factors and/or age  Screening tests ordered at today's visit but not completed yet may show as past due  Also, please note that scanned in results may not display below  Preventive Screenings:  Service Recommendations Previous Testing/Comments   Colorectal Cancer Screening  * Colonoscopy    * Fecal Occult Blood Test (FOBT)/Fecal Immunochemical Test (FIT)  * Fecal DNA/Cologuard Test  * Flexible Sigmoidoscopy Age: 39-70 years old   Colonoscopy: every 10 years (may be performed more frequently if at higher risk)  OR  FOBT/FIT: every 1 year  OR  Cologuard: every 3 years  OR  Sigmoidoscopy: every 5 years  Screening may be recommended earlier than age 39 if at higher risk for colorectal cancer  Also, an individualized decision between you and your healthcare provider will decide whether screening between the ages of 74-80 would be appropriate  Colonoscopy: 04/20/2017  FOBT/FIT: Not on file  Cologuard: Not on file  Sigmoidoscopy: Not on file          Breast Cancer Screening Age: 36 years old  Frequency: every 1-2 years  Not required if history of left and right mastectomy Mammogram: 10/22/2018        Cervical Cancer Screening Between the ages of 21-29, pap smear recommended once every 3 years  Between the ages of 33-67, can perform pap smear with HPV co-testing every 5 years     Recommendations may differ for women with a history of total hysterectomy, cervical cancer, or abnormal pap smears in past  Pap Smear: Not on file    Screening Not Indicated   Hepatitis C Screening Once for adults born between [facility]  More frequently in patients at high risk for Hepatitis C Hep C Antibody: Not on file        Diabetes Screening 1-2 times per year if you're at risk for diabetes or have pre-diabetes Fasting glucose: 92 mg/dL (4/21/2023)  A1C: 5 0 % (11/17/2022)  Screening Current   Cholesterol Screening Once every 5 years if you don't have a lipid disorder  May order more often based on risk factors  Lipid panel: 11/17/2022    Screening Not Indicated  History Lipid Disorder     Other Preventive Screenings Covered by Medicare:  1  Abdominal Aortic Aneurysm (AAA) Screening: covered once if your at risk  You're considered to be at risk if you have a family history of AAA  2  Lung Cancer Screening: covers low dose CT scan once per year if you meet all of the following conditions: (1) Age 50-69; (2) No signs or symptoms of lung cancer; (3) Current smoker or have quit smoking within the last 15 years; (4) You have a tobacco smoking history of at least 20 pack years (packs per day multiplied by number of years you smoked); (5) You get a written order from a healthcare provider  3  Glaucoma Screening: covered annually if you're considered high risk: (1) You have diabetes OR (2) Family history of glaucoma OR (3)  aged 48 and older OR (3)  American aged 72 and older  3  Osteoporosis Screening: covered every 2 years if you meet one of the following conditions: (1) You're estrogen deficient and at risk for osteoporosis based off medical history and other findings; (2) Have a vertebral abnormality; (3) On glucocorticoid therapy for more than 3 months; (4) Have primary hyperparathyroidism; (5) On osteoporosis medications and need to assess response to drug therapy  · Last bone density test (DXA Scan): 10/26/2020   5  HIV Screening: covered annually if you're between the age of 15-65  Also covered annually if you are younger than 13 and older than 72 with risk factors for HIV infection   For pregnant patients, it is covered up to 3 times per pregnancy  Immunizations:  Immunization Recommendations   Influenza Vaccine Annual influenza vaccination during flu season is recommended for all persons aged >= 6 months who do not have contraindications   Pneumococcal Vaccine   * Pneumococcal conjugate vaccine = PCV13 (Prevnar 13), PCV15 (Vaxneuvance), PCV20 (Prevnar 20)  * Pneumococcal polysaccharide vaccine = PPSV23 (Pneumovax) Adults 25-60 years old: 1-3 doses may be recommended based on certain risk factors  Adults 72 years old: 1-2 doses may be recommended based off what pneumonia vaccine you previously received   Hepatitis B Vaccine 3 dose series if at intermediate or high risk (ex: diabetes, end stage renal disease, liver disease)   Tetanus (Td) Vaccine - COST NOT COVERED BY MEDICARE PART B Following completion of primary series, a booster dose should be given every 10 years to maintain immunity against tetanus  Td may also be given as tetanus wound prophylaxis  Tdap Vaccine - COST NOT COVERED BY MEDICARE PART B Recommended at least once for all adults  For pregnant patients, recommended with each pregnancy  Shingles Vaccine (Shingrix) - COST NOT COVERED BY MEDICARE PART B  2 shot series recommended in those aged 48 and above     Health Maintenance Due:      Topic Date Due    Hepatitis C Screening  Never done    Colorectal Cancer Screening  04/20/2019     Immunizations Due:      Topic Date Due    COVID-19 Vaccine (4 - Booster for Moderna series) 10/26/2021     Advance Directives   What are advance directives? Advance directives are legal documents that state your wishes and plans for medical care  These plans are made ahead of time in case you lose your ability to make decisions for yourself  Advance directives can apply to any medical decision, such as the treatments you want, and if you want to donate organs  What are the types of advance directives? There are many types of advance directives, and each state has rules about how to use them  You may choose a combination of any of the following:  · Living will: This is a written record of the treatment you want  You can also choose which treatments you do not want, which to limit, and which to stop at a certain time  This includes surgery, medicine, IV fluid, and tube feedings  · Durable power of  for healthcare [facility]): This is a written record that states who you want to make healthcare choices for you when you are unable to make them for yourself  This person, called a proxy, is usually a family member or a friend  You may choose more than 1 proxy  · Do not resuscitate (DNR) order:  A DNR order is used in case your heart stops beating or you stop breathing  It is a request not to have certain forms of treatment, such as CPR  A DNR order may be included in other types of advance directives  · Medical directive: This covers the care that you want if you are in a coma, near death, or unable to make decisions for yourself  You can list the treatments you want for each condition  Treatment may include pain medicine, surgery, blood transfusions, dialysis, IV or tube feedings, and a ventilator (breathing machine)  · Values history: This document has questions about your views, beliefs, and how you feel and think about life  This information can help others choose the care that you would choose  Why are advance directives important? An advance directive helps you control your care  Although spoken wishes may be used, it is better to have your wishes written down  Spoken wishes can be misunderstood, or not followed  Treatments may be given even if you do not want them  An advance directive may make it easier for your family to make difficult choices about your care  Fall Prevention    Fall prevention  includes ways to make your home and other areas safer  It also includes ways you can move more carefully to prevent a fall   Health conditions that cause changes in your blood pressure, 73yo F w/PMHx Arthritis, Glaucoma, Aortic valve prosthesis(2011), CAD, HTN, HLD, PVD, Hypothyroid presents with worsening SOB, congestion and cough which began 1 week ago.  Pt saw her PCP 3 days ago and was prescribed Augmentin.  Pt states her condition has continued to worsen over the past 3 days.  Notes associated intermittent headaches, nausea and body aches.  Denies fever or chills.  No sick contacts.    In the ED, SPO2 94% on NC, others WNL.  Labs WNL.  CXR shows R basilar pneumonia with small right parapneumonic effusion.  Given 1 dose of Zithromax and Ceftriaxone and 1 NS bolus. vision, or muscle strength and coordination may increase your risk for falls  Medicines may also increase your risk for falls if they make you dizzy, weak, or sleepy  Fall prevention tips:   · Stand or sit up slowly  · Use assistive devices as directed  · Wear shoes that fit well and have soles that   · Wear a personal alarm  · Stay active  · Manage your medical conditions  Home Safety Tips:  · Add items to prevent falls in the bathroom  · Keep paths clear  · Install bright lights in your home  · Keep items you use often on shelves within reach  · Paint or place reflective tape on the edges of your stairs  Urinary Incontinence   Urinary incontinence (UI)  is when you lose control of your bladder  UI develops because your bladder cannot store or empty urine properly  The 3 most common types of UI are stress incontinence, urge incontinence, or both  Medicines:   · May be given to help strengthen your bladder control  Report any side effects of medication to your healthcare provider  Do pelvic muscle exercises often:  Your pelvic muscles help you stop urinating  Squeeze these muscles tight for 5 seconds, then relax for 5 seconds  Gradually work up to squeezing for 10 seconds  Do 3 sets of 15 repetitions a day, or as directed  This will help strengthen your pelvic muscles and improve bladder control  Train your bladder:  Go to the bathroom at set times, such as every 2 hours, even if you do not feel the urge to go  You can also try to hold your urine when you feel the urge to go  For example, hold your urine for 5 minutes when you feel the urge to go  As that becomes easier, hold your urine for 10 minutes  Self-care:   · Keep a UI record  Write down how often you leak urine and how much you leak  Make a note of what you were doing when you leaked urine  · Drink liquids as directed  You may need to limit the amount of liquid you drink to help control your urine leakage   Do not drink any liquid right before you go to bed  Limit or do not have drinks that contain caffeine or alcohol  · Prevent constipation  Eat a variety of high-fiber foods  Good examples are high-fiber cereals, beans, vegetables, and whole-grain breads  Walking is the best way to trigger your intestines to have a bowel movement  · Exercise regularly and maintain a healthy weight  Weight loss and exercise will decrease pressure on your bladder and help you control your leakage  · Use a catheter as directed  to help empty your bladder  A catheter is a tiny, plastic tube that is put into your bladder to drain your urine  · Go to behavior therapy as directed  Behavior therapy may be used to help you learn to control your urge to urinate  Weight Management   Why it is important to manage your weight:  Being overweight increases your risk of health conditions such as heart disease, high blood pressure, type 2 diabetes, and certain types of cancer  It can also increase your risk for osteoarthritis, sleep apnea, and other respiratory problems  Aim for a slow, steady weight loss  Even a small amount of weight loss can lower your risk of health problems  How to lose weight safely:  A safe and healthy way to lose weight is to eat fewer calories and get regular exercise  You can lose up about 1 pound a week by decreasing the number of calories you eat by 500 calories each day  Healthy meal plan for weight management:  A healthy meal plan includes a variety of foods, contains fewer calories, and helps you stay healthy  A healthy meal plan includes the following:  · Eat whole-grain foods more often  A healthy meal plan should contain fiber  Fiber is the part of grains, fruits, and vegetables that is not broken down by your body  Whole-grain foods are healthy and provide extra fiber in your diet  Some examples of whole-grain foods are whole-wheat breads and pastas, oatmeal, brown rice, and bulgur    · Eat a variety of vegetables every day  Include dark, leafy greens such as spinach, kale, tomer greens, and mustard greens  Eat yellow and orange vegetables such as carrots, sweet potatoes, and winter squash  · Eat a variety of fruits every day  Choose fresh or canned fruit (canned in its own juice or light syrup) instead of juice  Fruit juice has very little or no fiber  · Eat low-fat dairy foods  Drink fat-free (skim) milk or 1% milk  Eat fat-free yogurt and low-fat cottage cheese  Try low-fat cheeses such as mozzarella and other reduced-fat cheeses  · Choose meat and other protein foods that are low in fat  Choose beans or other legumes such as split peas or lentils  Choose fish, skinless poultry (chicken or turkey), or lean cuts of red meat (beef or pork)  Before you cook meat or poultry, cut off any visible fat  · Use less fat and oil  Try baking foods instead of frying them  Add less fat, such as margarine, sour cream, regular salad dressing and mayonnaise to foods  Eat fewer high-fat foods  Some examples of high-fat foods include french fries, doughnuts, ice cream, and cakes  · Eat fewer sweets  Limit foods and drinks that are high in sugar  This includes candy, cookies, regular soda, and sweetened drinks  Exercise:  Exercise at least 30 minutes per day on most days of the week  Some examples of exercise include walking, biking, dancing, and swimming  You can also fit in more physical activity by taking the stairs instead of the elevator or parking farther away from stores  Ask your healthcare provider about the best exercise plan for you  © Copyright [employer] 2018 Information is for End User's use only and may not be sold, redistributed or otherwise used for commercial purposes   All illustrations and images included in CareNotes® are the copyrighted property of [employer]  or [address] [place] [address] [facility]

## 2023-05-04 NOTE — ASSESSMENT & PLAN NOTE
This appears to be worsening  Will call [facility] and add cognitive therapy orders to her OT  Restarted patient's mirtazapine, as she has not been sleeping well and  was not aware she should stay on this    Continue namenda and escitalopram

## 2023-05-06 NOTE — ED PROVIDER NOTES
History  Chief Complaint   Patient presents with   • Chest Pain     Patient c/o severe stabbing chest pain waking her up, did take 324 of asa PTA, denies pain radiating to arms, back or jaw, denies sob or weakness     58-year-old female presents to the emergency department with a cute onset of left-sided chest pain  Pain began when patient acutely awoke from her sleep this morning  She said pain was like a a deep sensation that was nonradiating  Pain has since resolved without intervention prior to her being seen in the emergency department  She denies any associated systemic symptoms  History provided by:  Patient   used: No        Prior to Admission Medications   Prescriptions Last Dose Informant Patient Reported? Taking? Calcium 500 MG tablet   Yes No   Sig: Take 500 mg by mouth   Cholecalciferol 25 MCG (1000 UT) tablet   Yes No   Sig: Take 1 tablet by mouth every 24 hours   acetaminophen (TYLENOL) 325 mg tablet   No No   Sig: Take 2 tablets (650 mg total) by mouth every 6 (six) hours as needed for mild pain or fever   aspirin (ECOTRIN LOW STRENGTH) 81 mg EC tablet   No No   Sig: Take 1 tablet (81 mg total) by mouth daily with breakfast   b complex vitamins capsule   No No   Sig: Take 1 capsule by mouth daily   clopidogrel (PLAVIX) 75 mg tablet   No No   Sig: TAKE 1 TABLET EVERY DAY   cycloSPORINE modified (NEORAL) 25 mg capsule   Yes No   Sig: Take 50 mg by mouth 2 (two) times a day   docusate sodium (COLACE) 100 mg capsule   No No   Sig: Take 1 capsule (100 mg total) by mouth 2 (two) times a day for 10 days   escitalopram (LEXAPRO) 5 mg tablet   No No   Sig: Take 1 tablet (5 mg total) by mouth daily Do not start before January 10, 2023     ezetimibe (ZETIA) 10 mg tablet   Yes No   Sig: Take 10 mg by mouth daily   ferrous sulfate 325 (65 Fe) mg tablet   Yes No   Sig: Take 1 tablet by mouth Every 12 hours   levothyroxine 112 mcg tablet   No No   Sig: TAKE 1 TABLET EVERY DAY AS DIRECTED   melatonin 3 mg   No No   Sig: Take 1 tablet (3 mg total) by mouth daily at bedtime   memantine (NAMENDA) 5 mg tablet   No No   Sig: Take 1 tablet (5 mg total) by mouth daily Do not start before January 10, 2023  metoprolol tartrate (LOPRESSOR) 25 mg tablet   No No   Sig: TAKE 1 AND 1/2 TABLETS TWICE DAILY   mirtazapine (REMERON) 45 MG tablet   No No   Sig: Take 1 tablet (45 mg total) by mouth daily at bedtime   mycophenolate (CELLCEPT) 250 mg capsule   No No   Sig: Take 3 capsules (750 mg total) by mouth every 12 (twelve) hours   omeprazole (PriLOSEC) 40 MG capsule   Yes No   Sig: Take 40 mg by mouth daily   pantoprazole (PROTONIX) 40 mg tablet   Yes No   polyethylene glycol (MIRALAX) 17 g packet   No No   Sig: Take 17 g by mouth daily as needed (constipation)   pravastatin (PRAVACHOL) 20 mg tablet   No No   Sig: TAKE 1 TABLET BY MOUTH ONCE DAILY AT BEDTIME      Facility-Administered Medications: None       Past Medical History:   Diagnosis Date   • Anemia    • Anxiety    • Arthritis    • Benign paroxysmal vertigo, unspecified ear     onset: 05/06/2010   • Cirrhosis ([facility] [address] )     onset: 03/30/2005   • [facility] )    • Disease of thyroid gland    • GERD (gastroesophageal reflux disease)    • History of transfusion    • Liver transplant candidate     onset: 09/16/2008   • Osteoporosis    • Pelvic fracture (HCC)     onset: 10/14/2008       Past Surgical History:   Procedure Laterality Date   • ABDOMINAL SURGERY     • APPENDECTOMY     • BLADDER AUGMENTATION N/A    • CATARACT EXTRACTION     • CHOLECYSTECTOMY N/A    • COLONOSCOPY N/A 4/20/2017    Procedure: COLONOSCOPY;  Surgeon: Darion Cagle MD;  Location: [facility] GI LAB; Service:    • ESOPHAGOGASTRODUODENOSCOPY N/A 3/31/2016    Procedure: ESOPHAGOGASTRODUODENOSCOPY (EGD); Surgeon: Darion Cagle MD;  Location: [facility] GI LAB; Service:    • ESOPHAGOGASTRODUODENOSCOPY N/A 4/20/2017    Procedure: ESOPHAGOGASTRODUODENOSCOPY (EGD);   Surgeon: Darion Cagle MD;  Location: [facility] GI LAB; Service:    • EYE SURGERY      cataracts removed both eyes lens implant   • HERNIA REPAIR     • JOINT REPLACEMENT      left knee replacement   • LIVER TRANSPLANTATION N/A    • ORIF TIBIA & FIBULA FRACTURES Right 2022    Procedure: OPEN REDUCTION W/ INTERNAL FIXATION (ORIF) RIGHT ANKLE FRACTURE;  Surgeon: Jose Kramer MD;  Location: [facility];  Service: Orthopedics   • TONSILECTOMY AND ADNOIDECTOMY N/A    • TUBAL LIGATION         Family History   Problem Relation Age of Onset   • Cancer Mother         breast   • Cancer Father    • Crohn's disease Brother    • Arthritis Family    • Breast cancer Family      I have reviewed and agree with the history as documented  E-Cigarette/Vaping   • E-Cigarette Use Never User      E-Cigarette/Vaping Substances   • Nicotine No    • THC No    • CBD No    • Flavoring No    • Other No    • Unknown No      Social History     Tobacco Use   • Smoking status: Former     Years: 15 00     Types: Cigarettes     Start date: 6/15/1961     Quit date: 1975     Years since quittin 3     Passive exposure: Past   • Smokeless tobacco: Never   • Tobacco comments:     smoked for 5 years-1/2 to 1 ppd   Vaping Use   • Vaping Use: Never used   Substance Use Topics   • Alcohol use: Never   • Drug use: Never       Review of Systems   Constitutional: Negative for fever  Respiratory: Negative for cough, chest tightness and shortness of breath  Cardiovascular: Positive for chest pain  Gastrointestinal: Negative for abdominal pain and blood in stool  Genitourinary: Negative for flank pain  Musculoskeletal: Negative for back pain  Skin: Negative for color change  Neurological: Negative for dizziness and light-headedness  All other systems reviewed and are negative  Physical Exam  Physical Exam  Vitals and nursing note reviewed  Constitutional:       General: She is not in acute distress  Appearance: She is well-developed   She is obese  She is not ill-appearing or diaphoretic  HENT:      Head: Normocephalic and atraumatic  Eyes:      Conjunctiva/sclera: Conjunctivae normal       Pupils: Pupils are equal, round, and reactive to light  Cardiovascular:      Rate and Rhythm: Normal rate and regular rhythm  Heart sounds: Normal heart sounds  No murmur heard  Pulmonary:      Effort: Pulmonary effort is normal  No respiratory distress  Breath sounds: Normal breath sounds  Abdominal:      Palpations: Abdomen is soft  Tenderness: There is no abdominal tenderness  Musculoskeletal:         General: No swelling  Cervical back: Neck supple  Right lower leg: No tenderness  Edema present  Left lower leg: No tenderness  Edema present  Skin:     General: Skin is warm and dry  Capillary Refill: Capillary refill takes less than 2 seconds  Neurological:      General: No focal deficit present  Mental Status: She is alert and oriented to person, place, and time  Cranial Nerves: No cranial nerve deficit  Motor: No weakness     Psychiatric:         Mood and Affect: Mood normal          Vital Signs  ED Triage Vitals [05/06/23 0335]   Temperature Pulse Respirations Blood Pressure SpO2   97 5 °F (36 4 °C) 81 16 138/63 99 %      Temp Source Heart Rate Source Patient Position - Orthostatic VS BP Location FiO2 (%)   Oral Monitor Lying Right arm --      Pain Score       --           Vitals:    05/06/23 0335 05/06/23 0703   BP: 138/63 145/65   Pulse: 81 88   Patient Position - Orthostatic VS: Lying Lying         Visual Acuity      ED Medications  Medications - No data to display    Diagnostic Studies  Results Reviewed     Procedure Component Value Units Date/Time    HS Troponin 0hr (reflex protocol) [[id number]]  (Normal) Collected: 05/06/23 0526    Lab Status: Final result Specimen: Blood from Hand, Right Updated: 05/06/23 0634     hs TnI 0hr 15 ng/L     HS Troponin I 2hr [[id number]]     Lab Status: No result Specimen: Blood     Comprehensive metabolic panel [[id number]]  (Abnormal) Collected: 05/06/23 0526    Lab Status: Final result Specimen: Blood from Hand, Right Updated: 05/06/23 0630     Sodium 138 mmol/L      Potassium 4 5 mmol/L      Chloride 111 mmol/L      CO2 20 mmol/L      ANION GAP 7 mmol/L      BUN 37 mg/dL      Creatinine 1 54 mg/dL      Glucose 103 mg/dL      Calcium 8 0 mg/dL      Corrected Calcium 8 9 mg/dL      AST 17 U/L      ALT 9 U/L      Alkaline Phosphatase 199 U/L      Total Protein 6 0 g/dL      Albumin 2 9 g/dL      Total Bilirubin 0 36 mg/dL      eGFR 31 ml/min/1 73sq m     Narrative:      [place] guidelines for Chronic Kidney Disease (CKD):   •  Stage 1 with normal or high GFR (GFR > 90 mL/min/1 73 square meters)  •  Stage 2 Mild CKD (GFR = 60-89 mL/min/1 73 square meters)  •  Stage 3A Moderate CKD (GFR = 45-59 mL/min/1 73 square meters)  •  Stage 3B Moderate CKD (GFR = 30-44 mL/min/1 73 square meters)  •  Stage 4 Severe CKD (GFR = 15-29 mL/min/1 73 square meters)  •  Stage 5 End Stage CKD (GFR <15 mL/min/1 73 square meters)  Note: GFR calculation is accurate only with a steady state creatinine    CBC and differential [[id number]]  (Abnormal) Collected: 05/06/23 0526    Lab Status: Final result Specimen: Blood from Hand, Right Updated: 05/06/23 0605     WBC 4 18 Thousand/uL      RBC 3 07 Million/uL      Hemoglobin 8 4 g/dL      Hematocrit 28 8 %      MCV 94 fL      MCH 27 4 pg      MCHC 29 2 g/dL      RDW 14 4 %      MPV 9 5 fL      Platelets 449 Thousands/uL      nRBC 0 /100 WBCs      Neutrophils Relative 62 %      Immat GRANS % 1 %      Lymphocytes Relative 20 %      Monocytes Relative 12 %      Eosinophils Relative 4 %      Basophils Relative 1 %      Neutrophils Absolute 2 60 Thousands/µL      Immature Grans Absolute 0 03 Thousand/uL      Lymphocytes Absolute 0 82 Thousands/µL      Monocytes Absolute 0 52 Thousand/µL      Eosinophils Absolute 0 18 Thousand/µL      Basophils Absolute 0 03 Thousands/µL                  No orders to display              Procedures  Procedures         ED Course             HEART Risk Score    Flowsheet Row Most Recent Value   Heart Score Risk Calculator    History 0 Filed at: 05/06/2023 0715   ECG 0 Filed at: 05/06/2023 0715   Age 2 Filed at: 05/06/2023 0715   Risk Factors 2 Filed at: 05/06/2023 0715   Troponin 1 Filed at: 05/06/2023 0715   HEART Score 5 Filed at: 05/06/2023 0715                        SBIRT 22yo+    Flowsheet Row Most Recent Value   Initial Alcohol Screen: US AUDIT-C     1  How often do you have a drink containing alcohol? 0 Filed at: 05/06/2023 0339   2  How many drinks containing alcohol do you have on a typical day you are drinking? 0 Filed at: 05/06/2023 0339   3b  FEMALE Any Age, or MALE 65+: How often do you have 4 or more drinks on one occassion? 0 Filed at: 05/06/2023 0339   Audit-C Score 0 Filed at: 05/06/2023 5452   NOA: How many times in the past year have you    Used an illegal drug or used a prescription medication for non-medical reasons? Never Filed at: 05/06/2023 0316                    Medical Decision Making  69-year-old female with a complex medical history including heart disease presents to the emergency department with a sporadic episode of chest pain  Differential diagnoses include but is not limited to ACS/MI, PE, pneumonia, pneumothorax, pericardial disease, anemia, cardiac dysrhythmia, musculoskeletal pain  Labs and imaging thus far reviewed  Patient remains chest pain-free  She received aspirin already  EKG is completely normal at this time and her first troponin is 15  Awaiting repeat delta troponin  Case endorsed to oncoming ED physician pending delta troponin and reevaluation  Amount and/or Complexity of Data Reviewed  Labs: ordered  ECG/medicine tests: ordered and independent interpretation performed       Details: EKG is a normal sinus rhythm at 84 bpm   Normal axis and intervals with no acute ectopy  No signs of acute ST segment elevation MI  Disposition  Final diagnoses:   Chest pain, unspecified type     Time reflects when diagnosis was documented in both MDM as applicable and the Disposition within this note     Time User Action Codes Description Comment    5/6/2023  7:17 AM Nemo Henao Add [R07 9] Chest pain, unspecified type       ED Disposition     None      Follow-up Information    None         Patient's Medications   Discharge Prescriptions    No medications on file       No discharge procedures on file      PDMP Review       Value Time User    PDMP Reviewed  Yes 11/16/2022  1:48 PM Tima Das DO          ED Provider  Electronically Signed by           Jennifer Daniels MD  05/06/23 9903

## 2023-05-06 NOTE — ED NOTES
Pt's partner, Marta Griffith, requested to be notified of pt's status (admit or discharge) when known  Phone number [phone]  Okay to do so per pt       Carolyne Buchanan RN  05/06/23 [address] [place] [address] David Goldstein RN  05/06/23 6600

## 2023-05-06 NOTE — ED CARE HANDOFF
Emergency Department Sign Out Note        Sign out and transfer of care from his provider  See Separate Emergency Department note  The patient, Zulema Rebolledo, was evaluated by the previous provider for chest pain  Workup Completed:  Blood work    ED Course / Workup Pending (followup):  Delta troponin      HEART Risk Score    Flowsheet Row Most Recent Value   Heart Score Risk Calculator    History 0 Filed at: 05/06/2023 0715   ECG 0 Filed at: 05/06/2023 0715   Age 2 Filed at: 05/06/2023 0715   Risk Factors 2 Filed at: 05/06/2023 0715   Troponin 1 Filed at: 05/06/2023 0715   HEART Score 5 Filed at: 05/06/2023 0715                                  ED Course as of 05/06/23 1003   Sat May 06, 2023   0718 Patient will be discharged after second troponin  9158 Patient is very cantankerous using foul language throughout with ED staff  Patient remains chest pain-free in the ED  Patient is adamantly requesting to go home  Second troponin is 15 with a delta of 0  At this time patient will be discharged home with follow-up to PCP and her cardiologist      Procedures  Medical Decision Making  Patient is very cantankerous using foul language throughout with ED staff  Patient remains chest pain-free in the ED  Patient is adamantly requesting to go home  Second troponin is 15 with a delta of 0  At this time patient will be discharged home with follow-up to PCP and her cardiologist   Close return instructions given to return to the ER for any worsening symptoms  Patient agrees with discharge plan  Patient well appearing at time of discharge  Please Note: Fluency Direct voice recognition software may have been used in the creation of this document  Wrong words or sound a like substitutions may have occurred due to the inherent limitations of the voice software  Amount and/or Complexity of Data Reviewed  Labs: ordered                Disposition  Final diagnoses:   Chest pain, unspecified type     Time reflects when diagnosis was documented in both MDM as applicable and the Disposition within this note     Time User Action Codes Description Comment    5/6/2023  7:17 AM Radha Ramirez Add [R07 9] Chest pain, unspecified type       ED Disposition     ED Disposition   Discharge    Condition   Stable    Date/Time   Sat May 6, 2023  9:34 AM    Comment   Kelsey Saucedo discharge to home/self care  Follow-up Information     Follow up With Specialties Details Why Αμαλίας MD [facility] Family Medicine In 2 days  [address] [address] [phone]      Ron Beard MD Cardiology Schedule an appointment as soon as possible for a visit  For further evaluation of your chest pain [address] [facility]  [phone]          Patient's Medications   Discharge Prescriptions    No medications on file            ED Provider  Electronically Signed by     Jyoti Mendoza DO  05/06/23 1895

## 2023-05-07 PROBLEM — R47.81 SLURRED SPEECH: Status: ACTIVE | Noted: 2023-01-01

## 2023-05-07 NOTE — ASSESSMENT & PLAN NOTE
Levothyroxine  TSH level elevated  Unclear if patient takes it in the early morning without taking any of her other medications    Will Clarify with family

## 2023-05-07 NOTE — ASSESSMENT & PLAN NOTE
Hb stable  Continue ferrous sulfate  Rpt labs in a m      Results from last 7 days   Lab Units 05/07/23  1154 05/06/23  0526   HEMOGLOBIN g/dL 9 7* 8 4*   HEMATOCRIT % 32 3* 28 8*   MCV fL 95 94

## 2023-05-07 NOTE — ASSESSMENT & PLAN NOTE
Patient with history of dementia which is progressively worsening  Also with behavioral disturbances where patient tends to lash out at family members at times    Per ER note patient was screaming at everyone  · Patient appeared somewhat angry and annoyed when asked questions  · Continue Remeron, Namenda, Lexapro  · As Was recommended by psychiatry on one of her prior admissions will place patient on Zyprexa as needed  · Check UA as daughter states that sometimes when she has an UTI, she gets easily angry

## 2023-05-07 NOTE — ED PROVIDER NOTES
History  Chief Complaint   Patient presents with   • STROKE Alert     Left sided weakness and facial droop  70-year-old female with past history of chronic anemia, liver transplant, TIA, CVA, hypothyroidism, hyperlipidemia, paroxysmal SVT, ataxic gait, chronic pain to lower back and bilateral lower extremity, presents to the ED for evaluation of strokelike symptoms  According to EMS,  noted patient having difficulty with her speech and left-sided weakness about 2 hours prior to arrival    then called patient's daughter  Daughter spoke with patient and noted some slurring of the speech  Subsequently EMS was called and patient brought to the ED for further evaluation  In the ED patient is alert and oriented x3 and speaking in full sentences without any difficulty  There is pronator drift noted as well as  strength decreased in left upper extremity  Patient has chronic bilateral lower leg pain  No sensory deficits noted  Stroke alert was activated  Cannot give any history regarding her symptoms this morning  Patient has a very cantankerous affect and she is screaming at everybody  She was seen at her emergency department yesterday for chest pain  History provided by:  EMS personnel  History limited by:  Acuity of condition   used: Yes    STROKE Alert      Prior to Admission Medications   Prescriptions Last Dose Informant Patient Reported? Taking?    Calcium 500 MG tablet   Yes No   Sig: Take 500 mg by mouth   Cholecalciferol 25 MCG (1000 UT) tablet   Yes No   Sig: Take 1 tablet by mouth every 24 hours   acetaminophen (TYLENOL) 325 mg tablet   No No   Sig: Take 2 tablets (650 mg total) by mouth every 6 (six) hours as needed for mild pain or fever   aspirin (ECOTRIN LOW STRENGTH) 81 mg EC tablet   No No   Sig: Take 1 tablet (81 mg total) by mouth daily with breakfast   b complex vitamins capsule   No No   Sig: Take 1 capsule by mouth daily   clopidogrel (PLAVIX) 75 mg tablet   No No   Sig: TAKE 1 TABLET EVERY DAY   cycloSPORINE modified (NEORAL) 25 mg capsule   Yes No   Sig: Take 50 mg by mouth 2 (two) times a day   docusate sodium (COLACE) 100 mg capsule   No No   Sig: Take 1 capsule (100 mg total) by mouth 2 (two) times a day for 10 days   escitalopram (LEXAPRO) 5 mg tablet   No No   Sig: Take 1 tablet (5 mg total) by mouth daily Do not start before January 10, 2023  ezetimibe (ZETIA) 10 mg tablet   Yes No   Sig: Take 10 mg by mouth daily   ferrous sulfate 325 (65 Fe) mg tablet   Yes No   Sig: Take 1 tablet by mouth Every 12 hours   levothyroxine 112 mcg tablet   No No   Sig: TAKE 1 TABLET EVERY DAY AS DIRECTED   melatonin 3 mg   No No   Sig: Take 1 tablet (3 mg total) by mouth daily at bedtime   memantine (NAMENDA) 5 mg tablet   No No   Sig: Take 1 tablet (5 mg total) by mouth daily Do not start before January 10, 2023     metoprolol tartrate (LOPRESSOR) 25 mg tablet   No No   Sig: TAKE 1 AND 1/2 TABLETS TWICE DAILY   mirtazapine (REMERON) 45 MG tablet   No No   Sig: Take 1 tablet (45 mg total) by mouth daily at bedtime   mycophenolate (CELLCEPT) 250 mg capsule   No No   Sig: Take 3 capsules (750 mg total) by mouth every 12 (twelve) hours   omeprazole (PriLOSEC) 40 MG capsule   Yes No   Sig: Take 40 mg by mouth daily   pantoprazole (PROTONIX) 40 mg tablet   Yes No   polyethylene glycol (MIRALAX) 17 g packet   No No   Sig: Take 17 g by mouth daily as needed (constipation)   pravastatin (PRAVACHOL) 20 mg tablet   No No   Sig: TAKE 1 TABLET BY MOUTH ONCE DAILY AT BEDTIME      Facility-Administered Medications: None       Past Medical History:   Diagnosis Date   • Anemia    • Anxiety    • Arthritis    • Benign paroxysmal vertigo, unspecified ear     onset: 05/06/2010   • Cirrhosis ([facility] )     onset: 03/30/2005   • Crohn disease ([facility] )    • Disease of thyroid gland    • GERD (gastroesophageal reflux disease)    • History of transfusion    • Liver transplant candidate     onset: 2008   • Osteoporosis    • Pelvic fracture ([facility] )     onset: 10/14/2008       Past Surgical History:   Procedure Laterality Date   • ABDOMINAL SURGERY     • APPENDECTOMY     • BLADDER AUGMENTATION N/A    • CATARACT EXTRACTION     • CHOLECYSTECTOMY N/A    • COLONOSCOPY N/A 2017    Procedure: COLONOSCOPY;  Surgeon: Leti Hatfield MD;  Location: [place] [address] GI LAB; Service:    • ESOPHAGOGASTRODUODENOSCOPY N/A 3/31/2016    Procedure: ESOPHAGOGASTRODUODENOSCOPY (EGD); Surgeon: Leti Hatfield MD;  Location: [place] [address] GI LAB; Service:    • ESOPHAGOGASTRODUODENOSCOPY N/A 2017    Procedure: ESOPHAGOGASTRODUODENOSCOPY (EGD); Surgeon: Leti Hatfield MD;  Location: [place] [address] GI LAB; Service:    • EYE SURGERY      cataracts removed both eyes lens implant   • HERNIA REPAIR     • JOINT REPLACEMENT      left knee replacement   • LIVER TRANSPLANTATION N/A    • ORIF TIBIA & FIBULA FRACTURES Right 2022    Procedure: OPEN REDUCTION W/ INTERNAL FIXATION (ORIF) RIGHT ANKLE FRACTURE;  Surgeon: Ricky Hawkins MD;  Location: [facility];  Service: Orthopedics   • TONSILECTOMY AND ADNOIDECTOMY N/A    • TUBAL LIGATION         Family History   Problem Relation Age of Onset   • Cancer Mother         breast   • Cancer Father    • Crohn's disease Brother    • Arthritis Family    • Breast cancer Family      I have reviewed and agree with the history as documented      E-Cigarette/Vaping   • E-Cigarette Use Never User      E-Cigarette/Vaping Substances   • Nicotine No    • THC No    • CBD No    • Flavoring No    • Other No    • Unknown No      Social History     Tobacco Use   • Smoking status: Former     Years: 15 00     Types: Cigarettes     Start date: 6/15/1961     Quit date: 1975     Years since quittin 3     Passive exposure: Past   • Smokeless tobacco: Never   • Tobacco comments:     smoked for 5 years-1/2 to 1 ppd   Vaping Use   • Vaping Use: Never used   Substance Use Topics   • Alcohol use: Never   • Drug use: Never Review of Systems   Neurological: Positive for weakness  Physical Exam  Physical Exam  Vitals and nursing note reviewed  Constitutional:       General: She is not in acute distress  Appearance: She is well-developed  HENT:      Head: Normocephalic and atraumatic  Eyes:      Conjunctiva/sclera: Conjunctivae normal    Cardiovascular:      Rate and Rhythm: Normal rate and regular rhythm  Heart sounds: No murmur heard  Pulmonary:      Effort: Pulmonary effort is normal  No respiratory distress  Breath sounds: Normal breath sounds  Abdominal:      Palpations: Abdomen is soft  Tenderness: There is no abdominal tenderness  Musculoskeletal:         General: No swelling  Cervical back: Neck supple  Skin:     General: Skin is warm and dry  Capillary Refill: Capillary refill takes less than 2 seconds  Neurological:      Mental Status: She is alert and oriented to person, place, and time  Comments: Patient is alert and oriented x3  Visual field intact bilateral   Pronator drift noted on left upper extremity  Finger-to-nose intact bilateral   Heel-to-shin intact bilateral   Cancer intact to bilateral upper and lower extremities  Absent decrease in left upper extremity  Patient has chronic bilateral lower extremity pain and mobility deficit as a result  Current NIH stroke score is 2 based on left pronator drift and decreased sensation to the left upper extremity     Psychiatric:         Mood and Affect: Mood normal          Vital Signs  ED Triage Vitals [05/07/23 1144]   Temperature Pulse Respirations Blood Pressure SpO2   97 9 °F (36 6 °C) 100 (!) 24 159/72 100 %      Temp Source Heart Rate Source Patient Position - Orthostatic VS BP Location FiO2 (%)   Tympanic Monitor Lying Left arm --      Pain Score       --           Vitals:    05/07/23 1230 05/07/23 1234 05/07/23 1235 05/07/23 1236   BP: 155/70 153/68  153/68   Pulse: 88 91 88    Patient Position - Orthostatic VS:  Sitting           Visual Acuity  Visual Acuity    Flowsheet Row Most Recent Value   L Pupil Size (mm) 3   R Pupil Size (mm) 3          ED Medications  Medications   sodium chloride 0 9 % bolus 500 mL (500 mL Intravenous New Bag 5/7/23 1225)   iohexol (OMNIPAQUE) 350 MG/ML injection (SINGLE-DOSE) 85 mL (85 mL Intravenous Given 5/7/23 1217)       Diagnostic Studies  Results Reviewed     Procedure Component Value Units Date/Time    TSH, 3rd generation with Free T4 reflex [[id number]]  (Abnormal) Collected: 05/07/23 1154    Lab Status: Final result Specimen: Blood from Arm, Right Updated: 05/07/23 1245     TSH 3RD GENERATON 9 037 uIU/mL     T4, free [[id number]] Collected: 05/07/23 1154    Lab Status:  In process Specimen: Blood from Arm, Right Updated: 05/07/23 1245    HS Troponin I 2hr [[id number]]     Lab Status: No result Specimen: Blood     HS Troponin I 4hr [[id number]]     Lab Status: No result Specimen: Blood     HS Troponin 0hr (reflex protocol) [[id number]]  (Normal) Collected: 05/07/23 1154    Lab Status: Final result Specimen: Blood from Arm, Right Updated: 05/07/23 1236     hs TnI 0hr 18 ng/L     Comprehensive metabolic panel [[id number]]  (Abnormal) Collected: 05/07/23 1154    Lab Status: Final result Specimen: Blood from Arm, Right Updated: 05/07/23 1231     Sodium 139 mmol/L      Potassium 4 9 mmol/L      Chloride 108 mmol/L      CO2 22 mmol/L      ANION GAP 9 mmol/L      BUN 29 mg/dL      Creatinine 1 11 mg/dL      Glucose 96 mg/dL      Calcium 8 7 mg/dL      Corrected Calcium 9 2 mg/dL      AST 23 U/L      ALT 11 U/L      Alkaline Phosphatase 254 U/L      Total Protein 7 1 g/dL      Albumin 3 4 g/dL      Total Bilirubin 0 35 mg/dL      eGFR 47 ml/min/1 73sq m     Narrative:      Ceferino guidelines for Chronic Kidney Disease (CKD):   •  Stage 1 with normal or high GFR (GFR > 90 mL/min/1 73 square meters)  •  Stage 2 Mild CKD (GFR = 60-89 mL/min/1 73 square meters)  • Stage 3A Moderate CKD (GFR = 45-59 mL/min/1 73 square meters)  •  Stage 3B Moderate CKD (GFR = 30-44 mL/min/1 73 square meters)  •  Stage 4 Severe CKD (GFR = 15-29 mL/min/1 73 square meters)  •  Stage 5 End Stage CKD (GFR <15 mL/min/1 73 square meters)  Note: GFR calculation is accurate only with a steady state creatinine    Magnesium [[id number]]  (Normal) Collected: 05/07/23 1154    Lab Status: Final result Specimen: Blood from Arm, Right Updated: 05/07/23 1231     Magnesium 2 3 mg/dL     CBC and differential [[id number]]  (Abnormal) Collected: 05/07/23 1154    Lab Status: Final result Specimen: Blood from Arm, Right Updated: 05/07/23 1200     WBC 5 29 Thousand/uL      RBC 3 39 Million/uL      Hemoglobin 9 7 g/dL      Hematocrit 32 3 %      MCV 95 fL      MCH 28 6 pg      MCHC 30 0 g/dL      RDW 14 6 %      MPV 10 8 fL      Platelets 715 Thousands/uL      nRBC 0 /100 WBCs      Neutrophils Relative 68 %      Immat GRANS % 1 %      Lymphocytes Relative 18 %      Monocytes Relative 8 %      Eosinophils Relative 4 %      Basophils Relative 1 %      Neutrophils Absolute 3 63 Thousands/µL      Immature Grans Absolute 0 03 Thousand/uL      Lymphocytes Absolute 0 97 Thousands/µL      Monocytes Absolute 0 40 Thousand/µL      Eosinophils Absolute 0 22 Thousand/µL      Basophils Absolute 0 04 Thousands/µL     Fingerstick Glucose (POCT) [[id number]]  (Normal) Collected: 05/07/23 1141    Lab Status: Final result Updated: 05/07/23 1153     POC Glucose 93 mg/dl     UA w Reflex to Microscopic w Reflex to Culture [[id number]]     Lab Status: No result Specimen: Urine     FLU/RSV/COVID - if FLU/RSV clinically relevant [[id number]]     Lab Status: No result Specimen: Nares from Nose                  CTA stroke alert (head/neck)   Final Result by Rylan Butts MD (05/07 1222)      No cervical intracranial large vessel occlusion  No intracranial aneurysm or dissection  No hemodynamically significant cervical carotid stenosis  Mild to moderate right intradural vertebral artery atherosclerotic disease  Findings were directly discussed with Keron Hayden at 12:22 p m  Workstation performed: [id number]         CT stroke alert brain   Final Result by Debo Barkley MD (05/07 1223)      No acute intracranial abnormality  Stable chronic microangiopathic changes within the brain  Findings were directly discussed with Keron Hayden at 12:22 pm       Workstation performed: [id number]                    Procedures  ECG 12 Lead Documentation Only    Date/Time: 5/7/2023 11:45 AM  Performed by: Dequan Batista DO  Authorized by: Dequan Batista DO     Indications / Diagnosis:  Shocklike symptoms  ECG reviewed by me, the ED Provider: yes    Patient location:  ED  Previous ECG:     Previous ECG:  Compared to current    Similarity:  No change    Comparison to cardiac monitor: Yes    Interpretation:     Interpretation: non-specific    Comments:      Sinus rhythm, rate 97, normal axis, normal intervals, no acute ST/T wave abnormality noted, otherwise unremarkable EKG, unchanged from yesterday  ED Course  ED Course as of 05/07/23 1307   Sun May 07, 2023   1142 Patient discussed with neurologist on-call, Dr Peri Levy, who agrees on stroke work-up however he also agrees on no TNK as patient has not been able to provide any adequate history  It is unclear the last known normal for patient  Current NIH stroke score is 2 based on left pronator drift as well as decreased strength in left upper extremity  Neurologist will review radiology studies  If radiology studies are unremarkable then patient will be admitted under stroke pathway                    Stroke Assessment     Row Name 05/07/23 1140             NIH Stroke Scale    Interval --      Level of Consciousness (1a ) --      LOC Questions (1b ) --      LOC Commands (1c ) --      Best Gaze (2 ) --      Visual (3 ) --      Facial Palsy (4 ) --      Motor Arm, Left (5a ) 2      Motor Arm, Right (5b ) --      Motor Leg, Left (6a ) 0      Motor Leg, Right (6b ) 0      Limb Ataxia (7 ) --      Sensory (8 ) --      Best Language (9 ) --      Dysarthria (10 ) --      Extinction and Inattention (11 ) (Formerly Neglect) --      Total 2                                          Medical Decision Making  Obtain stroke work-up, CTA head/neck  Consult neurology  Plan for admission    Presented with strokelike symptoms with unknown last normal time   noted patient having some slurred speech about 2 hours prior to arrival   Patient is a poor historian  Daughter stated that when she was speaking on the phone after her  realized the speech difficulty daughter noted some slurring speech  At the time patient arrived to the ED her slurring speech resolved  Patient had pronator drift and decreased  strength on left upper extremity  Sensation was intact  Case discussed with neurologist who did not see anything acute on etiology study  No TNK was given due to questionable history  At this time patient will be admitted under stroke pathway for further evaluation and management  Patient and family agrees with admission plans  Amount and/or Complexity of Data Reviewed  Labs: ordered  Decision-making details documented in ED Course  Radiology: ordered  Decision-making details documented in ED Course  ECG/medicine tests: ordered and independent interpretation performed  Decision-making details documented in ED Course  Risk  Prescription drug management  Decision regarding hospitalization            Disposition  Final diagnoses:   Stroke-like symptoms   Left-sided weakness   Chronic anemia   Chest pain     Time reflects when diagnosis was documented in both MDM as applicable and the Disposition within this note     Time User Action Codes Description Comment    5/7/2023 11:52 AM Trent Rivera Add [R29 90] Stroke-like symptoms     5/7/2023  1:05 PM Ashwin Raymundo Add [R53 1] Left-sided weakness     5/7/2023  1:05 PM Ashwin Raymundo Add [D64 9] Chronic anemia     5/7/2023  1:05 PM Ashwin Raymundo Add [R07 9] Chest pain       ED Disposition     ED Disposition   Admit    Condition   Stable    Date/Time   Sun May 7, 2023  1:03 PM    Comment   Case was discussed with Dr Tracy Sherwood and the patient's admission status was agreed to be Admission Status: observation status to the service of Dr Tracy Sherwood  Follow-up Information    None         Patient's Medications   Discharge Prescriptions    No medications on file       No discharge procedures on file      PDMP Review       Value Time User    PDMP Reviewed  Yes 11/16/2022  1:48 PM Angeles Mascorro DO          ED Provider  Electronically Signed by           Michelle Blanc DO  05/07/23 1862

## 2023-05-07 NOTE — H&P
[facility]  H&P  Name: Alanna Gandara 76 y o  female I MRN: [id number]  Unit/Bed#: ED CT2 I Date of Admission: 5/7/2023   Date of Service: 5/7/2023 I Hospital Day: 0      Assessment/Plan   * Slurred speech  Assessment & Plan  Patient presented to the ER with slurring of speech that started when she woke up at around 9 AM   Last known normal was 9 PM before she went to sleep per significant other  Per ER note, some left-sided weakness was noted  Admit to telemetry  CT of head neg for acute pathology  Continue ASA, Plavix and 40 mg statin per neurology  Lipid panel, HgA1C in AM  MRI in AM  Will need ativan prior  TA head/neck negative for significant cervical carotid stenosis but did show mild to moderate right intradural vertebral arteries atherosclerotic disease  Echo with bubble study  Neuro eval  speech therapy/PT/OT    Dementia with behavioral disturbance [facility])  Assessment & Plan  Patient with history of dementia which is progressively worsening  Also with behavioral disturbances where patient tends to lash out at family members at times  Per ER note patient was screaming at everyone  · Patient appeared somewhat angry and annoyed when asked questions  · Continue Remeron, Namenda, Lexapro  · As Was recommended by psychiatry on one of her prior admissions will place patient on Zyprexa as needed  · Check UA as daughter states that sometimes when she has an UTI, she gets easily angry    Stage 3b chronic kidney disease [facility])  Assessment & Plan  Lab Results   Component Value Date    EGFR 47 05/07/2023    EGFR 31 05/06/2023    EGFR 30 04/22/2023    CREATININE 1 11 05/07/2023    CREATININE 1 54 (H) 05/06/2023    CREATININE 1 58 (H) 04/22/2023     Creatinine stable  Repeat labs in a m  Benign essential hypertension  Assessment & Plan  Hold Lopressor to allow for permissive hypertension  Monitor blood pressures    Chronic anemia  Assessment & Plan  Hb stable    Continue ferrous sulfate  Rpt labs in a m  Results from last 7 days   Lab Units 05/07/23  1154 05/06/23  0526   HEMOGLOBIN g/dL 9 7* 8 4*   HEMATOCRIT % 32 3* 28 8*   MCV fL 95 94       Long-term use of immunosuppressant medication  Assessment & Plan  With history of liver transplant  Continue CellCept, neOrol    Hypothyroidism  Assessment & Plan  Levothyroxine  TSH level elevated  Unclear if patient takes it in the early morning without taking any of her other medications  Will Clarify with family        VTE Pharmacologic Prophylaxis:   heparin  Code Status: Level 1 - Full Code   Discussion with family: Updated  (daughter and significant other) via phone  Anticipated Length of Stay: Patient will be admitted on an observation basis with an anticipated length of stay of less than 2 midnights secondary to stroke like symptoms  Total Time Spent on Date of Encounter in care of patient: 55 minutes This time was spent on one or more of the following: performing physical exam; counseling and coordination of care; obtaining or reviewing history; documenting in the medical record; reviewing/ordering tests, medications or procedures; communicating with other healthcare professionals and discussing with patient's family/caregivers  Chief Complaint: slurred speech    History of Present Illness: Falguni Joe is a 76 y o  female who presents with slurred speech when she woke up at 9 AM   Patient unable to provide any meaningful information and therefore most of the information was obtained by speaking with her significant other and daughter separately over the phone  last known normal 9 PM before patient went to sleep  Some left sided weakness per EMS  Daughter also spoke with patient over the phone and noted that her speech was slurred and advised to call EMS  Per significant other other while he was in the ER, still feels that her speech is slurred with no improvement  No known residual weakness from prior    Stroke alert was called in the ER  Per family she needs to be talking to someone for some time and her slurred speech becomes more obvious    Review of Systems:  Review of Systems   Unable to perform ROS: Dementia       Past Medical and Surgical History:   Past Medical History:   Diagnosis Date   • Anemia    • Anxiety    • Arthritis    • Benign paroxysmal vertigo, unspecified ear     onset: 05/06/2010   • Cirrhosis ([facility] )     onset: 03/30/2005   • Crohn disease ([place] [address] )    • Disease of thyroid gland    • GERD (gastroesophageal reflux disease)    • History of transfusion    • Liver transplant candidate     onset: 09/16/2008   • Osteoporosis    • Pelvic fracture ([facility] )     onset: 10/14/2008       Past Surgical History:   Procedure Laterality Date   • ABDOMINAL SURGERY     • APPENDECTOMY     • BLADDER AUGMENTATION N/A    • CATARACT EXTRACTION     • CHOLECYSTECTOMY N/A    • COLONOSCOPY N/A 4/20/2017    Procedure: COLONOSCOPY;  Surgeon: Dharmesh Real MD;  Location: [facility] GI LAB; Service:    • ESOPHAGOGASTRODUODENOSCOPY N/A 3/31/2016    Procedure: ESOPHAGOGASTRODUODENOSCOPY (EGD); Surgeon: Dharmesh Real MD;  Location: [facility] GI LAB; Service:    • ESOPHAGOGASTRODUODENOSCOPY N/A 4/20/2017    Procedure: ESOPHAGOGASTRODUODENOSCOPY (EGD); Surgeon: Dharmesh Real MD;  Location: [facility] GI LAB; Service:    • EYE SURGERY      cataracts removed both eyes lens implant   • HERNIA REPAIR     • JOINT REPLACEMENT      left knee replacement   • LIVER TRANSPLANTATION N/A    • ORIF TIBIA & FIBULA FRACTURES Right 11/18/2022    Procedure: OPEN REDUCTION W/ INTERNAL FIXATION (ORIF) RIGHT ANKLE FRACTURE;  Surgeon: Manjit Otto MD;  Location: [facility]IN OR;  Service: Orthopedics   • TONSILECTOMY AND ADNOIDECTOMY N/A    • TUBAL LIGATION         Meds/Allergies:  Prior to Admission medications    Medication Sig Start Date End Date Taking?  Authorizing Provider   acetaminophen (TYLENOL) 325 mg tablet Take 2 tablets (650 mg total) by mouth every 6 (six) hours as needed for mild pain or fever 11/16/22   Keerthi Quach DO   aspirin (ECOTRIN LOW STRENGTH) 81 mg EC tablet Take 1 tablet (81 mg total) by mouth daily with breakfast 4/22/21   David Stratton MD   b complex vitamins capsule Take 1 capsule by mouth daily 7/1/20   Elza Jenkins MD   Calcium 500 MG tablet Take 500 mg by mouth 11/6/20   Historical Provider, MD   Cholecalciferol 25 MCG (1000 UT) tablet Take 1 tablet by mouth every 24 hours 1/10/23   Historical Provider, MD   clopidogrel (PLAVIX) 75 mg tablet TAKE 1 TABLET EVERY DAY 10/13/22   Elza Jenkins MD   cycloSPORINE modified (NEORAL) 25 mg capsule Take 50 mg by mouth 2 (two) times a day    Historical Provider, MD   docusate sodium (COLACE) 100 mg capsule Take 1 capsule (100 mg total) by mouth 2 (two) times a day for 10 days 11/16/22 5/4/23  Keerthi Quach DO   escitalopram (LEXAPRO) 5 mg tablet Take 1 tablet (5 mg total) by mouth daily Do not start before January 10, 2023  1/10/23   Andrea Teague MD   ezetimibe (ZETIA) 10 mg tablet Take 10 mg by mouth daily 4/29/23   Historical Provider, MD   ferrous sulfate 325 (65 Fe) mg tablet Take 1 tablet by mouth Every 12 hours 3/19/23   Historical Provider, MD   levothyroxine 112 mcg tablet TAKE 1 TABLET EVERY DAY AS DIRECTED 1/18/23   Ze Jones MD   melatonin 3 mg Take 1 tablet (3 mg total) by mouth daily at bedtime 11/16/22   Keerthi Quach DO   memantine (NAMENDA) 5 mg tablet Take 1 tablet (5 mg total) by mouth daily Do not start before January 10, 2023  1/10/23   Andrea Teague MD   metoprolol tartrate (LOPRESSOR) 25 mg tablet TAKE 1 AND 1/2 TABLETS TWICE DAILY 4/7/23   David Stratton MD   mirtazapine (REMERON) 45 MG tablet Take 1 tablet (45 mg total) by mouth daily at bedtime 5/4/23   Ze Jones MD   mycophenolate (CELLCEPT) 250 mg capsule Take 3 capsules (750 mg total) by mouth every 12 (twelve) hours 11/16/22   Keerthi Quach    omeprazole (PriLOSEC) 40 MG capsule Take 40 mg by mouth daily Historical Provider, MD   pantoprazole (PROTONIX) 40 mg tablet  3/8/23   Historical Provider, MD   polyethylene glycol (MIRALAX) 17 g packet Take 17 g by mouth daily as needed (constipation) 22   Keerthi Quach DO   pravastatin (PRAVACHOL) 20 mg tablet TAKE 1 TABLET BY MOUTH ONCE DAILY AT BEDTIME 21   Arlene Wilkins MD     I have reviewed home medications using recent Epic encounter  Allergies: Allergies   Allergen Reactions   • Tetracyclines & Related Hives   • Vancomycin Other (See Comments) and Itching     Reaction Date: ; Pt unsure of reaction   • Prograf [Tacrolimus] Anxiety     Reaction Date: 2008;         Social History:  Marital Status:    Occupation: none  Patient Pre-hospital Living Situation: With other family member: significant other  Patient Pre-hospital Level of Mobility: walks with walker  Patient Pre-hospital Diet Restrictions: none  Substance Use History:   Social History     Substance and Sexual Activity   Alcohol Use Never     Social History     Tobacco Use   Smoking Status Former   • Years: 15 00   • Types: Cigarettes   • Start date: 6/15/1961   • Quit date: 1975   • Years since quittin 3   • Passive exposure: Past   Smokeless Tobacco Never   Tobacco Comments    smoked for 5 years-1/2 to 1 ppd     Social History     Substance and Sexual Activity   Drug Use Never       Family History:  Family History   Problem Relation Age of Onset   • Cancer Mother         breast   • Cancer Father    • Crohn's disease Brother    • Arthritis Family    • Breast cancer Family        Physical Exam:     Vitals:   Blood Pressure: 153/68 (23 1236)  Pulse: 88 (23 1235)  Temperature: 97 9 °F (36 6 °C) (23 1147)  Temp Source: Tympanic (23 1147)  Respirations: (!) 23 (23 1235)  Weight - Scale: 96 6 kg (212 lb 15 4 oz) (23 1147)  SpO2: 100 % (23 1235)    Physical Exam  Constitutional:       General: She is not in acute distress  Appearance: She is well-developed  She is not toxic-appearing  HENT:      Head: Normocephalic and atraumatic  Eyes:      General: No scleral icterus  Cardiovascular:      Rate and Rhythm: Normal rate and regular rhythm  Pulmonary:      Effort: Pulmonary effort is normal  No respiratory distress  Breath sounds: Normal breath sounds  No wheezing or rales  Abdominal:      General: Bowel sounds are normal  There is no distension  Palpations: Abdomen is soft  Tenderness: There is no abdominal tenderness  Neurological:      Mental Status: She is alert  Comments: Oriented to self, place  Cranial nerve testing grossly normal  Right lower extremity somewhat weaker compared to the left although patient did have surgery on that side in November 2022  No obvious difference noted in motor strength testing of upper extremity  When patient was willing to answer questions, it was easy to understand what she was saying without any obvious slurring  Neuro exam was limited/not always accurate as patient not always following commands or being cooperative   Psychiatric:         Attention and Perception: She is inattentive  Mood and Affect: Affect is angry            Additional Data:     Lab Results:  Results from last 7 days   Lab Units 05/07/23  1154   WBC Thousand/uL 5 29   HEMOGLOBIN g/dL 9 7*   HEMATOCRIT % 32 3*   PLATELETS Thousands/uL 301   NEUTROS PCT % 68   LYMPHS PCT % 18   MONOS PCT % 8   EOS PCT % 4     Results from last 7 days   Lab Units 05/07/23  1154   SODIUM mmol/L 139   POTASSIUM mmol/L 4 9   CHLORIDE mmol/L 108   CO2 mmol/L 22   BUN mg/dL 29*   CREATININE mg/dL 1 11   ANION GAP mmol/L 9   CALCIUM mg/dL 8 7   ALBUMIN g/dL 3 4*   TOTAL BILIRUBIN mg/dL 0 35   ALK PHOS U/L 254*   ALT U/L 11   AST U/L 23   GLUCOSE RANDOM mg/dL 96         Results from last 7 days   Lab Units 05/07/23  1141   POC GLUCOSE mg/dl 93     Results from last 7 days   Lab Units 05/04/23  1657 HEMOGLOBIN A1C  5 1           Lines/Drains:  Invasive Devices     Peripheral Intravenous Line  Duration           Peripheral IV 05/07/23 Proximal;Right;Ventral (anterior) Forearm <1 day                    Imaging: Reviewed radiology reports from this admission including: CT head and CTA head/neck  CTA stroke alert (head/neck)   Final Result by Sue Bello MD (05/07 1222)      No cervical intracranial large vessel occlusion  No intracranial aneurysm or dissection  No hemodynamically significant cervical carotid stenosis  Mild to moderate right intradural vertebral artery atherosclerotic disease  Findings were directly discussed with Mendoza Buenrostro at 12:22 p m  Workstation performed: [id number]         CT stroke alert brain   Final Result by Sue Bello MD (05/07 1223)      No acute intracranial abnormality  Stable chronic microangiopathic changes within the brain  Findings were directly discussed with Mendoza Buenrostro at 12:22 pm       Workstation performed: [id number]             EKG and Other Studies Reviewed on Admission:   · EKG: no ekg noted  ** Please Note: This note has been constructed using a voice recognition system   **

## 2023-05-07 NOTE — ASSESSMENT & PLAN NOTE
Patient presented to the ER with slurring of speech that started when she woke up at around 9 AM   Last known normal was 9 PM before she went to sleep per significant other    Per ER note, some left-sided weakness was noted  Admit to telemetry  CT of head neg for acute pathology  Continue ASA, Plavix and 40 mg statin per neurology  Lipid panel, HgA1C in AM  MRI in AM  Will need ativan prior  TA head/neck negative for significant cervical carotid stenosis but did show mild to moderate right intradural vertebral arteries atherosclerotic disease  Echo with bubble study  Neuro eval  speech therapy/PT/OT

## 2023-05-07 NOTE — QUICK NOTE
Stroke alert activated 1139  Neuro response immediate  Last known well-poorly defined, possibly 2 hours prior, possibly longer (?  Night prior)  NIHSS 2  CT head: Extensive periventricular microvascular ischemic change, focal chronic lacunar infarct right centrum semiovale  CTA: No LVO  TNK? No   Symptoms are mild, clearly nondisabling  Her reported left drift may be in fact chronic related to identified right hemispheric lacunar infarct  IR? No   No target lesion    Can admit the patient on stroke pathway for additional work-up and observation  She is already on DAPT, I would simply continue that for now, pending additional studies  Her alk phos was elevated on lab work yesterday, so would not escalate her statin dose, until that normalizes

## 2023-05-07 NOTE — ASSESSMENT & PLAN NOTE
Lab Results   Component Value Date    EGFR 47 05/07/2023    EGFR 31 05/06/2023    EGFR 30 04/22/2023    CREATININE 1 11 05/07/2023    CREATININE 1 54 (H) 05/06/2023    CREATININE 1 58 (H) 04/22/2023     Creatinine stable  Repeat labs in a m

## 2023-05-08 PROBLEM — R07.9 CHEST PAIN: Status: ACTIVE | Noted: 2023-01-01

## 2023-05-08 NOTE — PLAN OF CARE
Problem: NEUROSENSORY - ADULT  Goal: Achieves stable or improved neurological status  Description: INTERVENTIONS  - Monitor and report changes in neurological status  - Monitor vital signs such as temperature, blood pressure, glucose, and any other labs ordered   - Initiate measures to prevent increased intracranial pressure  - Monitor for seizure activity and implement precautions if appropriate      Outcome: Progressing    Problem: NEUROSENSORY - ADULT  Goal: Achieves maximal functionality and self care  Description: INTERVENTIONS  - Monitor swallowing and airway patency with patient fatigue and changes in neurological status  - Encourage and assist patient to increase activity and self care     - Encourage visually impaired, hearing impaired and aphasic patients to use assistive/communication devices  Outcome: Progressing     Problem: NEUROSENSORY - ADULT  Goal: Achieves stable or improved neurological status  Description: INTERVENTIONS  - Monitor and report changes in neurological status  - Monitor vital signs such as temperature, blood pressure, glucose, and any other labs ordered   - Initiate measures to prevent increased intracranial pressure  - Monitor for seizure activity and implement precautions if appropriate      Outcome: Progressing

## 2023-05-08 NOTE — ASSESSMENT & PLAN NOTE
Patient with history of dementia which is progressively worsening  Also with behavioral disturbances where patient tends to lash out at family members at times  Per ER note patient was screaming at everyone  · Patient still appears somewhat angry and annoyed when asked questions  · Continue Remeron, Namenda, Lexapro    · As was recommended by psychiatry on one of her prior admissions will place patient on Zyprexa as needed

## 2023-05-08 NOTE — PLAN OF CARE
Problem: PHYSICAL THERAPY ADULT  Goal: Performs mobility at highest level of function for planned discharge setting  See evaluation for individualized goals  Description: Treatment/Interventions: Functional transfer training, ADL retraining, LE strengthening/ROM, Therapeutic exercise, Endurance training, Bed mobility, Gait training, Spoke to nursing, OT          See flowsheet documentation for full assessment, interventions and recommendations  Note: Prognosis: Good  Problem List: Decreased strength, Decreased range of motion, Decreased endurance, Impaired balance, Decreased coordination, Decreased mobility, Decreased cognition, Decreased safety awareness, Impaired judgement  Assessment: Patient seen for Physical Therapy evaluation  Patient admitted with Slurred speech  Comorbidities affecting patient's physical performance include: anemia, cardiac disease, chronic pain, CKD, CVA, dementia, depression, falls, GERD, hypertension, hypothyroid and TIA  Personal factors affecting patient at time of initial evaluation include: lives in 2 story house, ambulating with assistive device, stairs to enter home, inability to ambulate household distances, inability to navigate community distances, decreased cognition, positive fall history, depression, limited insight into impairments, inability to perform ADLS, inability to perform IADLS  and inability to live alone  Prior to admission, patient was independent with functional mobility with walker/cane, independent with ADLS, requiring assist for IADLS, living with significant other in a 2 level home with 3 steps to enter and ambulating household distance    Please find objective findings from Physical Therapy assessment regarding body systems outlined above with impairments and limitations including weakness, decreased ROM, impaired balance, decreased endurance, gait deviations, pain, decreased activity tolerance, decreased functional mobility tolerance, decreased safety awareness, impaired judgement, fall risk and decreased cognition  The Barthel Index was used as a functional outcome tool presenting with a score of Barthel Index Score: 30 today indicating marked limitations of functional mobility and ADLS  Patient's clinical presentation is currently unstable/unpredictable as seen in patient's presentation of varying levels of cognitive performance, increased fall risk, new onset of impairment of functional mobility, decreased endurance and new onset of weakness  Pt would benefit from continued Physical Therapy treatment to address deficits as defined above and maximize level of functional mobility  As demonstrated by objective findings, the assigned level of complexity for this evaluation is high  The patient's -[facility] Basic Mobility Inpatient Short Form Raw Score is 12  A Raw score of less than or equal to 16 suggests the patient may benefit from discharge to post-acute rehabilitation services  Please also refer to the recommendation of the Physical Therapist for safe discharge planning  PT Discharge Recommendation: Post acute rehabilitation services    See flowsheet documentation for full assessment

## 2023-05-08 NOTE — QUICK NOTE
Patient with evidence of UTI and hx of ESBL sensitive to ertapenem  As such, ertapenem ordered  Patient with reported chest pain at 2300  Initial EKG then with noted st depressions an t wave inversions  Patient had previously had two EKGs without changes and three negative troponins as part of CVA workup  I went to evaluate patient and asked about her symptoms and she denied any chest pain when I saw her  We got a 4th EKG to further evaluate and on this EKG no st depressions evident and appears similar to first two  As such, will continue to monitor patient for symptoms  Patient c/o chest pain again at 0020 and patient with repeat EKG with pronounced ST depressions  Given nitroglycerin tablet as well as aspirin 325, heparin gtt started and pictures sent to Dr Pardeep Velazquez with CV of the EKG's  After being given nitroglycerin tablet patient reported improvement in chest pain and repeat ekg taken right after nitro with improvement in ST depressions  Sent repeat EKG with improved ST changes  CV team agrees with aspirin, heparin gtt, statin and to put on nitro paste and trend troponins as patient likely has CAD  CV team reports hold off plavix load  Will keep npo except for medications and consult CV team for formal eval in AM  Critical Care team also present to assist and joint discussion that patient will remain SD level 2 for now  Updated daughter Lubna Munoz with events and Lubna Munoz appreciative of call

## 2023-05-08 NOTE — ASSESSMENT & PLAN NOTE
Continue Levothyroxine  TSH level elevated  · Per Significant other, patient takes levothyroxine along with her other medications  Discussed with him and daughter to give it to her in the early morning without taking any of her other medications      · Repeat TSH as outpatient

## 2023-05-08 NOTE — ASSESSMENT & PLAN NOTE
Patient reported chest pain overnight with EKG changes  · Troponin trending up to 2268   rPt troponin tomorrow  · Patient currently on heparin drip  · Continue aspirin, Plavix, statin, Lopressor  · Echo showed EF of 55% with grade 2 diastolic dysfunction, no PFO no wall motion abnormality  · Per cardiology, possible cardiac catheterization if patient continues to have recurrent chest pain

## 2023-05-08 NOTE — PLAN OF CARE
Problem: MOBILITY - ADULT  Goal: Maintain or return to baseline ADL function  Description: INTERVENTIONS:  -  Assess patient's ability to carry out ADLs; assess patient's baseline for ADL function and identify physical deficits which impact ability to perform ADLs (bathing, care of mouth/teeth, toileting, grooming, dressing, etc )  - Assess/evaluate cause of self-care deficits   - Assess range of motion  - Assess patient's mobility; develop plan if impaired  - Assess patient's need for assistive devices and provide as appropriate  - Encourage maximum independence but intervene and supervise when necessary  - Involve family in performance of ADLs  - Assess for home care needs following discharge   - Consider OT consult to assist with ADL evaluation and planning for discharge  - Provide patient education as appropriate  Outcome: Progressing     Problem: INFECTION - ADULT  Goal: Absence or prevention of progression during hospitalization  Description: INTERVENTIONS:  - Assess and monitor for signs and symptoms of infection  - Monitor lab/diagnostic results  - Monitor all insertion sites, i e  indwelling lines, tubes, and drains  - Monitor endotracheal if appropriate and nasal secretions for changes in amount and color  - Landing appropriate cooling/warming therapies per order  - Administer medications as ordered  - Instruct and encourage patient and family to use good hand hygiene technique  - Identify and instruct in appropriate isolation precautions for identified infection/condition  Outcome: Progressing     Problem: SAFETY ADULT  Goal: Maintain or return to baseline ADL function  Description: INTERVENTIONS:  -  Assess patient's ability to carry out ADLs; assess patient's baseline for ADL function and identify physical deficits which impact ability to perform ADLs (bathing, care of mouth/teeth, toileting, grooming, dressing, etc )  - Assess/evaluate cause of self-care deficits   - Assess range of motion  - Assess patient's mobility; develop plan if impaired  - Assess patient's need for assistive devices and provide as appropriate  - Encourage maximum independence but intervene and supervise when necessary  - Involve family in performance of ADLs  - Assess for home care needs following discharge   - Consider OT consult to assist with ADL evaluation and planning for discharge  - Provide patient education as appropriate  Outcome: Progressing     Problem: CARDIOVASCULAR - ADULT  Goal: Maintains optimal cardiac output and hemodynamic stability  Description: INTERVENTIONS:  - Monitor I/O, vital signs and rhythm  - Monitor for S/S and trends of decreased cardiac output  - Administer and titrate ordered vasoactive medications to optimize hemodynamic stability  - Assess quality of pulses, skin color and temperature  - Assess for signs of decreased coronary artery perfusion  - Instruct patient to report change in severity of symptoms  Outcome: Progressing  Goal: Absence of cardiac dysrhythmias or at baseline rhythm  Description: INTERVENTIONS:  - Continuous cardiac monitoring, vital signs, obtain 12 lead EKG if ordered  - Administer antiarrhythmic and heart rate control medications as ordered  - Monitor electrolytes and administer replacement therapy as ordered  Outcome: Progressing

## 2023-05-08 NOTE — SPEECH THERAPY NOTE
Speech-Language Pathology Bedside Swallow Evaluation      Patient Name: Uzma Rider    UTGJC'C Date: 5/8/2023     Problem List  Principal Problem:    Slurred speech  Active Problems:    Hypothyroidism    Long-term use of immunosuppressant medication    Chronic anemia    Benign essential hypertension    Stage 3b chronic kidney disease (HCC)    Dementia with behavioral disturbance [facility])      Past Medical History  Past Medical History:   Diagnosis Date   • Anemia    • Anxiety    • Arthritis    • Benign paroxysmal vertigo, unspecified ear     onset: 05/06/2010   • Cirrhosis ([facility] )     onset: 03/30/2005   • Crohn disease ([place] [address] )    • Disease of thyroid gland    • GERD (gastroesophageal reflux disease)    • History of transfusion    • Liver transplant candidate     onset: 09/16/2008   • Osteoporosis    • Pelvic fracture ([place] [address] )     onset: 10/14/2008       Past Surgical History  Past Surgical History:   Procedure Laterality Date   • ABDOMINAL SURGERY     • APPENDECTOMY     • BLADDER AUGMENTATION N/A    • CATARACT EXTRACTION     • CHOLECYSTECTOMY N/A    • COLONOSCOPY N/A 4/20/2017    Procedure: COLONOSCOPY;  Surgeon: Luis Pichardo MD;  Location: [facility] GI LAB; Service:    • ESOPHAGOGASTRODUODENOSCOPY N/A 3/31/2016    Procedure: ESOPHAGOGASTRODUODENOSCOPY (EGD); Surgeon: Luis Pichardo MD;  Location: [facility] GI LAB; Service:    • ESOPHAGOGASTRODUODENOSCOPY N/A 4/20/2017    Procedure: ESOPHAGOGASTRODUODENOSCOPY (EGD); Surgeon: Luis Pichardo MD;  Location: [facility] GI LAB;   Service:    • EYE SURGERY      cataracts removed both eyes lens implant   • HERNIA REPAIR     • JOINT REPLACEMENT      left knee replacement   • LIVER TRANSPLANTATION N/A    • ORIF TIBIA & FIBULA FRACTURES Right 11/18/2022    Procedure: OPEN REDUCTION W/ INTERNAL FIXATION (ORIF) RIGHT ANKLE FRACTURE;  Surgeon: Justo Durán MD;  Location: [facility] OR;  Service: Orthopedics   • TONSILECTOMY AND ADNOIDECTOMY N/A    • TUBAL LIGATION         Summary   Pt "presented with functional to minimally appearing oral and pharyngeal stage swallowing skills with materials administered today (cough with rapid rate of intake with chicken noodle soup but not with other food/liquid)  Recommended Diet: regular diet and thin liquids   Recommended Form of Meds: whole with liquid or as best tolerated  Aspiration precautions: cue for slowed rate, minimize distractions  Other Recommendations: Continue frequent oral care        Current Medical Status  Flaquita Castanon is a 75 yo F with PMH of anemia, liver transplant, hypothyroidism, HTN, HLD, SVT, anxiety, vertigo, depression and chronic right lacunar infarct with loop recorder implantation and CP (on 5/6)  She was admitted 5/7 with L side weakness and speech disturbance  DX: stroke-like sxs, dementia, stage 3b CKD, htn, chronic anemia, long term use of immunosuppressant medication  Pt is NPO pending SLP Swallowing Evaluation (Speech Evaluation also ordered)  Current Precautions:  Fall, NPO     Allergies:  No known food allergies    Past medical history:  Please see H&P for details    Special Studies:  MRI of brain: No acute intracranial abnormality  Advanced chronic microangiopathic changes  Chronic infarcts are noted as described above    1 6 x 0 9 x 1 8 cm extra-axial mass along the cribriform plate is retrospectively stable for several years compatible with a meningioma without adjacent parenchymal edema    Social/Education/Vocational Hx:  Pt lives with significant other    Swallow Information   Current Risks for Dysphagia & Aspiration: recent change in MS & speech  Current Symptoms: family reported coughign with meals and recent c/o \"can't swallow\"  Current Diet: NPO   Baseline Diet: regular diet and thin liquids      Baseline Assessment   Behavior/Cognition: alert  Speech/Language Status: able to follow commands with come cues needed, limited verbal output but no s/s dysarthria  Patient Positioning: upright in bed  Pain " "Status/Interventions/Response to Interventions:  No report of or nonverbal indications of pain  Swallow Mechanism Exam  Facial: symmetrical  Labial: WFL  Lingual: WFL  Velum: unable to visualize  Mandible: adequate ROM  Dentition: adequate  Vocal quality:clear/adequate   Reflexive Cough: strong/productive   Respiratory Status: on RA      Consistencies Assessed and Performance   Materials administered included 1 bite applesauce, scrambled eggs, chicken noodle soup, water (from a bottle)    Oral Stage: Suspect WFL to min impaired  Mastication was adequate with the materials administered today  Bolus formation and transfer were functional/piecemeal with no significant persistent oral residue noted  No overt s/s reduced oral control but can't r/o occasion with soup (mixed food/liquid)    Pharyngeal Stage: Suspect WFL to min impaired  Swallow Mechanics:  Swallowing initiation appeared prompt  Laryngeal rise was palpated and judged to be within functional limits  Prolonged strong cough x1 c soup (and 1 after po intake)    Esophageal Concerns: none reported    Strategies and Efficacy: was cues for slowed rate and regular sized bites (ate v quickly and took large bites    Summary and Recommendations (see above)    Results Reviewed with: patient and family (SO and dtr)    Treatment Recommended: will f/u briefly given family report of coughing w/meals and pt's recent c/o \"can't swallow\" to son  Frequency of treatment: x1-3 sessions    Patient Stated Goal: did not state    Dysphagia LTG  -Patient will demonstrate safe and effective oral intake (without overt s/s significant oral/pharyngeal dysphagia including s/s penetration or aspiration) for the highest appropriate diet level  Short Term Goals:  --Pt will tolerate soft and solid food as well as mixed consistency boluses and thin liquid with no significant s/s oral or pharyngeal dysphagia across 1-3 diagnostic session/s      Adolfo [facility]   Speech " Pathologist  [address] [phone]  [phone]  Available via [facility] Text

## 2023-05-08 NOTE — ASSESSMENT & PLAN NOTE
Initially held Lopressor to allow for permissive hypertension  · Lopressor restarted at a lower dose of 25 mg every 12 hours  · Monitor blood pressures

## 2023-05-08 NOTE — CONSULTS
[address]   Neurology Initial Consult    Cheryl Laws is a 76 y o  female  [address] [facility]/4 hospitals-*          Information obtained from:   Chief Complaint   Patient presents with   • STROKE Alert     Left sided weakness and facial droop  Assessment/Plan:    1  Chest pain with abnormal troponin, possible NSTEMI  2  Recrudescence of chronic right-sided lacunar infarcts  3  UTI  4  Anemia  5  Dementia  6  Mild to moderate cervical and intracranial atherosclerotic disease    -Monitored on telemetry  -Neuro assessments  -IV heparin drip per cardiology management  -Baby aspirin 81 mg daily  -Plavix 75 mg daily  -Atorvastatin 40 mg daily, LDL 37  -MRI of the brain completed without any acute ischemic findings  Notes significant chronic right sided lacunar infarcts  -Echocardiogram with shunt study EF of 55%, mildly dilated right atrium with no PFO seen  Severe diffuse calcification of the mitral valve  -Cardiac recommendations for ischemic work-up  -Lipid profile and A1c  -PT/OT/ST  Patient is a 68-year-old female with multiple medical comorbidities including chronic right lacunar infarcts, anemia, hypothyroidism, hyperlipidemia, hypertension, dementia and liver transplant was brought to the ER with onset of left-sided weakness and facial drooping  Patient went to bed normal, awoke with these changes  Her  felt as though she was having slurred speech, she was speaking to her daughter on the phone who also noted slurring in her speech patterns  EMS was called, by the time they arrived her speech issues had resolved  Patient was brought to the ER for further evaluation and work-up  Upon arrival to the ER, her blood pressure was 159/72, she had a left upper extremity drift on pronator drift exam with an NIH score of 2 secondary to left upper extremity weakness    Patient went for CTh noting remote right occipital infarct, CTA of the head and neck showing mild atherosclerotic disease about the aortic arch, cervical ICA, intracranial ICA and basilar artery  Moderate atherosclerotic disease noted to the distal vertebral artery left greater than right  Patient was already on dual antiplatelet therapies therefore no changes were made to that regimen  She is on Zetia and Pravachol at home, she was switched to atorvastatin 40 mg upon her admission for further work-up of possible CVA  On 5/7/2023 patient reported overnight having chest pain, noted EKG changes and elevation in her troponin levels  She was started on IV heparin drip and continues on IV heparin at this time  Her troponin levels have increased to 2268 and cardiology is on consult for further work-up of ischemia  Patient also noted to have positive ESBL urinary tract infection for which she is not treating therefore she was started on IV ertapenem per her medical team   Patient's symptoms were not convincing for acute ischemic event however could not completely rule this out therefore she was admitted for MRI  Patient's MRI was negative for any acute findings, does continue to show multiple areas of right chronic lacunar infarcts  With metabolic changes such as ESBL urinary tract infection as well as possible NSTEMI, could create a recrudescence of patient's prior strokelike symptoms in the light of no new ischemic event  At this point in time we would recommend continuing patient on dual antiplatelet therapy from a neurology perspective  Patient is currently being worked up by cardiology for ischemia, on heparin drip further management  We will continue to follow along with cardiology recommendations to continue to adjust neurology recommendations based on cardiac findings  Flaquita Castanon will need follow up in 8-10 weeks with general attending or advance practitioner  She will not require outpatient neurological testing        HPI:  Flaquita Castanon is a 69yo female with PMH of anemia, liver transplant, hypothyroidism, HTN, HLD, SVT, anxiety, vertigo, depression and chronic right lacunar infarct with loop recorder implantation who is on dual antiplatelet therapies, Zetia 10 and Pravachol 20 mg at home for treatment  5/6/2023 patient came to the ER with sudden onset of chest pain that woke her from her sleep  She had no abnormal findings on her EKG, was given full dose aspirin prior to coming to the ER and noted resolution of her symptoms  She was deemed stable and discharged home  Last known normal was 5/5/2023 at approximately 9 PM before she went to bed, awoke the next morning appeared to have some increased left-sided weakness with speech difficulties  This was noticed initially by her , she then spoke to her daughter on the telephone who also noted some changes in her speech pattern and grew concerned  Patient was picked up and brought to the ER by EMS  Upon arrival to the ER, patient's blood pressure was 159/72  Her speech deficits had resolved however her left upper extremity did drift upon testing and she had a weaker  to the left upper extremity  Stroke alert was called and patient was sent for CAT scans  CT of the head showing some chronic/possible remote right occipital ischemia  CTA of the head and neck showing mild atherosclerotic disease at the aortic arch, intracranial ICA and basilar artery  Mild to moderate disease noted to the bilateral vertebral arteries left greater than right, noted to have no significant signs of high-grade stenosis or occlusive disease  Per on-call neurology, suspect patient's weakness related to prior CVAs, patient already on dual antiplatelet therapies therefore no additional changes were made to this therapy  Patient started on atorvastatin 40 mg daily with recommendations to maintain statin therapies  Patient was admitted under the stroke pathway for further evaluation of possible CVA    To note overnight, patient's UA returned with ESBL for which "she was started on IV antibiotics  She also had onset of chest pain overnight with significant EKG changes  Patient's cardiac markers started to increase to 543, these were trended and this a m  2268  Patient was started on IV heparin drip, her oral medications on hold at this time  Met with patient at bedside today, she reports no recollection of having any issues with her face or speaking  Patient only indicating \"I do not know\"  Patient agitated and showing limited willingness to participate  Patient states she would like people to leave her alone  Discussed neurological exam, patient able to decline if she should not want to participate  Patient did participate in the majority of her evaluation with the exception of her lower extremity  Patient has weakness and some pain to the lower extremities, sensitivities to touch and activity  Patient has equal strength in the upper extremities bilaterally with the exception of some weakness was noted to the left triceps  She has equal lower extremity weakness  She has equal sensation bilaterally in the upper and lower extremity  No ataxia noted on finger-to-nose, lower extremities too weak and uncomfortable for patient to participate in heel-to-shin  Gait testing deferred, Romberg deferred  Patient was sent for MRI showing no acute abnormality, no ischemic change  Has advanced chronic microangiopathic changes with chronic right lacunar infarcts including posterior frontal subcortical white matter, centrum semiovale all and chronic cortical infarct of the right occipital lobe  Patient also has extra-axial mass significant for likely meningioma  To note patient also with foci of susceptibility in the supratentorial and infratentorial brain bilaterally compatible with chronic microhemorrhage with differentials of possible chronic hypertension versus amyloid angiopathy  Currently patient remains on her DAPT therapy with high-dose statin    She is also on " IV heparin drip  We will need to proceed with caution given patient's history of anemia  Her platelet count is 073  Patient is awake alert and oriented to person and place, she has no orientation to time  She has a history of agitation with slight dementia for which she is on Namenda 5 mg twice daily  Echocardiogram completed with EF of 55%  Noted diastolic function moderately abnormal   Left atrium is mildly dilated right atrium normal in size  No PFO or shunt seen on exam   Patient does have severe diffuse calcification of the mitral valve  Cardiology following noting EKG showing sinus rhythm, left axis deviation, septal infarct of undetermined age  Possible lateral subendocardial injury with ST depression in the anterior lateral leads and T wave inversion in the anterior lateral leads  Ischemic work-up in progress  Patient remains on her IV heparin drip, DAPT for prior stroke management  Patient also found to have positive UTI for ESBL for which she was started on IV ertapenem through the medical team   Continue management through medicine  No new stroke seen on MRI, suspect recrudescence of her prior ischemic stroke in the setting of NSTEMI, UTI etc   Continue with medical management through cardiology and the medical team   Will remain on her DAPT from neurological perspective and continue her current statin dosing  Cardiology continues their ischemic work-up, will follow up with their recommendations in order to provide additional neurology recommendations related to chronic CVA              Past Medical History:   Diagnosis Date   • Anemia    • Anxiety    • Arthritis    • Benign paroxysmal vertigo, unspecified ear     onset: 05/06/2010   • Cirrhosis ([facility] )     onset: 03/30/2005   • Crohn disease ([facility] )    • Disease of thyroid gland    • GERD (gastroesophageal reflux disease)    • History of transfusion    • Liver transplant candidate     onset: 09/16/2008   • Osteoporosis    • Pelvic fracture [facility])     onset: 10/14/2008       Past Surgical History:   Procedure Laterality Date   • ABDOMINAL SURGERY     • APPENDECTOMY     • BLADDER AUGMENTATION N/A    • CATARACT EXTRACTION     • CHOLECYSTECTOMY N/A    • COLONOSCOPY N/A 4/20/2017    Procedure: COLONOSCOPY;  Surgeon: Adeola Taylor MD;  Location: [place] [address] GI LAB; Service:    • ESOPHAGOGASTRODUODENOSCOPY N/A 3/31/2016    Procedure: ESOPHAGOGASTRODUODENOSCOPY (EGD); Surgeon: Adeola Taylor MD;  Location: [place] [address] GI LAB; Service:    • ESOPHAGOGASTRODUODENOSCOPY N/A 4/20/2017    Procedure: ESOPHAGOGASTRODUODENOSCOPY (EGD); Surgeon: Adeola Taylor MD;  Location: [place] [address] GI LAB; Service:    • EYE SURGERY      cataracts removed both eyes lens implant   • HERNIA REPAIR     • JOINT REPLACEMENT      left knee replacement   • LIVER TRANSPLANTATION N/A    • ORIF TIBIA & FIBULA FRACTURES Right 11/18/2022    Procedure: OPEN REDUCTION W/ INTERNAL FIXATION (ORIF) RIGHT ANKLE FRACTURE;  Surgeon: Varun Baker MD;  Location: [facility];  Service: Orthopedics   • TONSILECTOMY AND ADNOIDECTOMY N/A    • TUBAL LIGATION         Allergies   Allergen Reactions   • Tetracyclines & Related Hives   • Vancomycin Other (See Comments) and Itching     Reaction Date: [id number];    Pt unsure of reaction   • Prograf [Tacrolimus] Anxiety     Reaction Date: 16Dec2008;          Current Facility-Administered Medications:   •  acetaminophen (TYLENOL) tablet 650 mg, 650 mg, Oral, Q6H PRN, Keerthi Quach DO  •  [START ON 5/9/2023] aspirin (ECOTRIN LOW STRENGTH) EC tablet 81 mg, 81 mg, Oral, Daily With Breakfast, Anish Ruano DO  •  atorvastatin (LIPITOR) tablet 40 mg, 40 mg, Oral, Daily With Dinner, Keerthi Quach DO, 40 mg at 05/07/23 1728  •  b complex-vitamin C-folic acid (RENAL) capsule 1 capsule, 1 capsule, Oral, Daily, Keerthi Quach DO, 1 capsule at 05/08/23 9426  •  cholecalciferol (VITAMIN D3) tablet 1,000 Units, 1,000 Units, Oral, Q24H, Keerthi Quach DO, 1,000 Units at 05/07/23 1728  •  clopidogrel (PLAVIX) tablet 75 mg, 75 mg, Oral, Daily, Keerthi Krishnaar, DO, 75 mg at 05/08/23 0215  •  cycloSPORINE modified (NEORAL) capsule 50 mg, 50 mg, Oral, BID, Keerthi Revsarahar, DO, 50 mg at 05/08/23 0930  •  ertapenem (INVanz) 1,000 mg in sodium chloride 0 9 % 50 mL IVPB, 1,000 mg, Intravenous, Q24H, Selam Solomon DO, Last Rate: 100 mL/hr at 05/07/23 2133, 1,000 mg at 05/07/23 2133  •  escitalopram (LEXAPRO) tablet 5 mg, 5 mg, Oral, Daily, Keerthi Krishnaar, DO, 5 mg at 05/08/23 2976  •  ezetimibe (ZETIA) tablet 10 mg, 10 mg, Oral, Daily, Keerthi Krishnaar, DO, 10 mg at 05/08/23 1861  •  ferrous sulfate tablet 325 mg, 325 mg, Oral, BID With Meals, Keerthi Krishnaar, DO, 325 mg at 05/08/23 0931  •  heparin (porcine) 25,000 units in 0 45% NaCl 250 mL infusion (premix), 3-20 Units/kg/hr (Order-Specific), Intravenous, Titrated, Selam Solomon DO, Last Rate: 13 4 mL/hr at 05/08/23 0932, 15 8 Units/kg/hr at 05/08/23 0932  •  heparin (porcine) injection 2,000 Units, 2,000 Units, Intravenous, Q6H PRN, Selam Solomon DO  •  heparin (porcine) injection 4,000 Units, 4,000 Units, Intravenous, Q6H PRN, Selam Solomon DO, 4,000 Units at 05/08/23 0929  •  levothyroxine tablet 112 mcg, 112 mcg, Oral, Early Morning, Keerthi Revankar, DO, 112 mcg at 05/08/23 0609  •  melatonin tablet 3 mg, 3 mg, Oral, HS, Ekerthi Revankar, DO, 3 mg at 05/07/23 2119  •  memantine (NAMENDA) tablet 5 mg, 5 mg, Oral, Daily, Keerthi Revankar, DO, 5 mg at 05/08/23 1703  •  mirtazapine (REMERON) tablet 45 mg, 45 mg, Oral, HS, Keerthi Quach DO, 45 mg at 05/07/23 2120  •  morphine injection 2 mg, 2 mg, Intravenous, Once, Selam Solomon DO  •  mycophenolate (CELLCEPT) capsule 750 mg, 750 mg, Oral, Q12H [address], Keerthi Quach DO, 750 mg at 05/08/23 0925  •  OLANZapine (ZyPREXA) tablet 2 5 mg, 2 5 mg, Oral, Q6H PRN, Keerthi Quach DO, 2 5 mg at 05/07/23 2233  •  pantoprazole (PROTONIX) EC tablet 40 mg, 40 mg, Oral, Early Morning, Keerthi DO Main, 40 mg at 23 0609  •  polyethylene glycol (MIRALAX) packet 17 g, 17 g, Oral, Daily PRN, Keerthi DO Main    Social History     Socioeconomic History   • Marital status:      Spouse name: Not on file   • Number of children: Not on file   • Years of education: Completed 12th grade   • Highest education level: Not on file   Occupational History   • Not on file   Tobacco Use   • Smoking status: Former     Years: 15 00     Types: Cigarettes     Start date: 6/15/1961     Quit date: 1975     Years since quittin 3     Passive exposure: Past   • Smokeless tobacco: Never   • Tobacco comments:     smoked for 5 years-1/2 to 1 ppd   Vaping Use   • Vaping Use: Never used   Substance and Sexual Activity   • Alcohol use: Never   • Drug use: Never   • Sexual activity: Not Currently   Other Topics Concern   • Not on file   Social History Narrative    Daily coffee consumption (1 cup/day)     Social Determinants of Health     Financial Resource Strain: Low Risk    • Difficulty of Paying Living Expenses: Not hard at all   Food Insecurity: No Food Insecurity   • Worried About Running Out of Food in the Last Year: Never true   • Ran Out of Food in the Last Year: Never true   Transportation Needs: No Transportation Needs   • Lack of Transportation (Medical): No   • Lack of Transportation (Non-Medical): No   Physical Activity: Not on file   Stress: Not on file   Social Connections: Not on file   Intimate Partner Violence: Not on file   Housing Stability: Low Risk    • Unable to Pay for Housing in the Last Year: No   • Number of Places Lived in the Last Year: 2   • Unstable Housing in the Last Year: No       Family History   Problem Relation Age of Onset   • Cancer Mother         breast   • Cancer Father    • Crohn's disease Brother    • Arthritis Family    • Breast cancer Family          Review of systems:  Please see HPI for positive symptoms     Constitutional: No fever, no chills, no weight "change   + Fatigue  Ocular: No diplopia, no blurred vision, spots/zigzag lines  HEENT:  No sore throat, headache or congestion  COR:  No chest pain  No palpitations  Lungs:  no sob  GI:  no  nausea, no vomiting, no diarrhea, no constipation, no anorexia  :  No dysuria, frequency, or urgency  No hematuria  Musculoskeletal:  No joint pain or swelling   Skin:  No rash or itching  Psychiatric:  no anxiety, no depression    + Agitation/frustration  Endocrine:  No polyuria or polydipsia  Physical examination:  /63   Pulse 89   Temp 98 8 °F (37 1 °C)   Resp 18   Ht 5' 2\" (1 575 m)   Wt 88 5 kg (195 lb 3 2 oz)   SpO2 99%   BMI 35 70 kg/m²     GENERAL APPEARANCE:  The patient is alert, oriented  HEENT:  Head is normocephalic  Pupils are equal and reactive  NECK:  Supple without lymphadenopathy  HEART:  Regular rate and rhythm  LUNGS: No audible wheezing or stridor heard   ABDOMEN:  Soft, nontender, nondistended   EXTREMITIES:  Without cyanosis, clubbing or edema  Mental status: The patient is alert, attentive, and oriented to self and place only  Speech is clear and fluent, patient speaking only in short sentences  Limited willingness to participate in subjective feedback  Cranial nerves:  CN II: Visual fields are full to confrontation  Fundoscopic exam is normal with sharp discs and no vascular changes  Pupils are 3 mm and briskly reactive to light  CN III, IV, VI: At primary gaze, there is no eye deviation  CN V: Facial sensation is intact to pinprick in all 3 divisions bilaterally  Corneal responses are intact  CN VII: Face is asymmetric with left mouth weakness, normal eye closure and smile  CN VIII: Hearing is normal to rubbing fingers  CN IX, X: Palate elevates symmetrically  Phonation is normal   CN XI: Head turning and shoulder shrug are intact  CN XII: Tongue is midline with normal movements and no atrophy  Motor:   There is no pronator drift of " out-stretched arms  Muscle bulk and tone are normal    Motor exam is limited in the lower extremity secondary to pain and weakness  Patient unable to participate in some of the exam   Muscle exam  Arm Right Left Leg Right Left   Deltoid 5/5 5/5 Iliopsoas 3/5 3/5   Biceps 5/5 5/5 Quads 3/5 3/5   Triceps 5/5 4/5 Hamstrings 3/5 3/5   Wrist Extension 5/5 5/5 Ankle Dorsi Flexion 0 0   Wrist Flexion 5/5 5/5 Ankle Plantar Flexion 5/5 5/5        Reflexes    RJ BJ TJ KJ AJ Plantars Valderrama's   Right 2+ 2+ 2+ * * * Not present   Left 2+ 2+ 2+ * * * Not present      Patient unwilling to participate in lower extremity reflex testing due to reports of pain and sensitivities    Sensory:  Light touch, Temperature, position sense, and vibration sense are intact in fingers and toes  Coordination:  Rapid alternating movements and fine finger movements are deferred due to patient's willingness to participate  There is no dysmetria on finger-to-nose and patient unable to perform heel-knee-shin  There are no abnormal or extraneous movements  Romberg deferred due to patient's willingness to participate  Gait/Stance:  Deferred due to patient's willingness to participate    Lab Results   Component Value Date    WBC 5 38 05/08/2023    HGB 7 5 (L) 05/08/2023    HCT 25 8 (L) 05/08/2023    MCV 94 05/08/2023     05/08/2023     Lab Results   Component Value Date    HGBA1C 5 1 05/04/2023     Lab Results   Component Value Date    ALT 11 05/07/2023    AST 23 05/07/2023     (H) 08/10/2022    ALKPHOS 254 (H) 05/07/2023    BILITOT 0 4 02/25/2016     Lab Results   Component Value Date    GLUCOSE 106 (H) 02/25/2016    CALCIUM 7 9 (L) 05/08/2023     (H) 02/25/2016    K 5 0 05/08/2023    CO2 21 05/08/2023     (H) 05/08/2023    BUN 23 05/08/2023    CREATININE 1 02 05/08/2023     UA cloudy with positive leukocyte/nitrite/WBCs and occasional bacteria    Recent urine findings with ESBL-medical team initiating antibiotic therapy  CK 2268  Cholesterol 114  LDL 37      Review of reports and notes reveal:  Independent Interpretation of images or specimens:  CT stroke alert brain    Result Date: 5/7/2023  No acute intracranial abnormality  Stable chronic microangiopathic changes within the brain  Findings were directly discussed with Zelia Cranker at 12:22 pm  Workstation performed: [id number]     CTA stroke alert (head/neck)    Result Date: 5/7/2023  No cervical intracranial large vessel occlusion  No intracranial aneurysm or dissection  No hemodynamically significant cervical carotid stenosis  Mild to moderate right intradural vertebral artery atherosclerotic disease  Findings were directly discussed with Zelia Cranker at 12:22 p m  Workstation performed: [id number]     MRI brain 5/8/2023 with no acute intracranial abnormalities  Advanced chronic microangiopathic changes with chronic infarcts right posterior frontal subcortical white matter, right centrum semiovale all, right occipital lobe with an extra-axial mass along the cribriform plate measuring 1 6 x 0 9 x 1 8 cm likely meningioma  Foci of susceptibility artifact in the supratentorial and infratentorial brain bilaterally compatible with chronic microhemorrhage with differential considerations to include chronic hypertension versus amyloid angiopathy  I      Thank you for this consult  Total time of encounter: 70 Minutes  More than 50% of time was spent in counseling and coordination of care of patient  And symptoms, diagnostic studies, treatment recommendations and follow-up care    Discussed with the medical team and attending neurology MD

## 2023-05-08 NOTE — PROGRESS NOTES
[facility]  Progress Note  Name: Fede García I  MRN: [id number]  Unit/Bed#: 12760 [address] I Date of Admission: 5/7/2023   Date of Service: 5/8/2023 I Hospital Day: 0    Assessment/Plan   * Slurred speech  Assessment & Plan  Patient presented to the ER with slurring of speech that started when she woke up at around 9 AM   Last known normal was 9 PM before she went to sleep per significant other  Per ER note, some left-sided weakness was noted    CT of head neg for acute pathology  Continue ASA, Plavix and 40 mg statin per neurology  Lipid panel - LDL 37, HgA1C 5 3  MRI due to for acute CVA  Chronic infarcts were noted  Stable meningioma  CTA head/neck negative for significant cervical carotid stenosis but did show mild to moderate right intradural vertebral arteries atherosclerotic disease  Echo with bubble study as noted below  Neuro eval  speech therapy/PT/OT    Chest pain  Assessment & Plan  Patient reported chest pain overnight with EKG changes  · Troponin trending up to 2268   rPt troponin tomorrow  · Patient currently on heparin drip  · Continue aspirin, Plavix, statin, Lopressor  · Echo showed EF of 55% with grade 2 diastolic dysfunction, no PFO no wall motion abnormality  · Per cardiology, possible cardiac catheterization if patient continues to have recurrent chest pain      Recurrent UTI  Assessment & Plan  Based on UA  Started on IV ertapenem given history of ESBL E  Coli  Follow-up on pending culture    Chronic anemia  Assessment & Plan  Hb lower today  Continue ferrous sulfate  Check Iron panel, FOBT  Rpt labs in a m    No obvious signs of bleeding    Results from last 7 days   Lab Units 05/08/23  1222 05/08/23  0425 05/07/23  1154 05/06/23  0526   HEMOGLOBIN g/dL 7 5* 7 5* 9 7* 8 4*   HEMATOCRIT % 25 3* 25 8* 32 3* 28 8*   MCV fL  --  94 95 94       Dementia with behavioral disturbance ([facility], [place] [address] )  Assessment & Plan  Patient with history of dementia which is progressively worsening  Also with behavioral disturbances where patient tends to lash out at family members at times  Per ER note patient was screaming at everyone  · Patient still appears somewhat angry and annoyed when asked questions  · Continue Remeron, Namenda, Lexapro  · As was recommended by psychiatry on one of her prior admissions will place patient on Zyprexa as needed    Stage 3b chronic kidney disease [facility])  Assessment & Plan  Lab Results   Component Value Date    EGFR 52 05/08/2023    EGFR 47 05/07/2023    EGFR 31 05/06/2023    CREATININE 1 02 05/08/2023    CREATININE 1 11 05/07/2023    CREATININE 1 54 (H) 05/06/2023     Creatinine stable  Repeat labs in a m  Benign essential hypertension  Assessment & Plan  Initially held Lopressor to allow for permissive hypertension  · Lopressor restarted at a lower dose of 25 mg every 12 hours  · Monitor blood pressures    Long-term use of immunosuppressant medication  Assessment & Plan  With history of liver transplant > 10 years ago  Continue CellCept, neOrol    Hypothyroidism  Assessment & Plan  Continue Levothyroxine  TSH level elevated  · Per Significant other, patient takes levothyroxine along with her other medications  Discussed with him and daughter to give it to her in the early morning without taking any of her other medications  · Repeat TSH as outpatient         VTE Pharmacologic Prophylaxis:   heparin drip    Patient Centered Rounds: I performed bedside rounds with nursing staff today  Discussions with Specialists or Other Care Team Provider: yes - cardio    Education and Discussions with Family / Patient: Updated  (daughter and significant other) at bedside      Total Time Spent on Date of Encounter in care of patient: 40 min This time was spent on one or more of the following: performing physical exam; counseling and coordination of care; obtaining or reviewing history; documenting in the medical record; reviewing/ordering tests, medications or procedures; communicating with other healthcare professionals and discussing with patient's family/caregivers  Current Length of Stay: 0 day(s)  Current Patient Status: Inpatient   Certification Statement: The patient will continue to require additional inpatient hospital stay due to Chest pain with elevated troponins, anemia  Discharge Plan: Anticipate discharge in 48-72 hrs to rehab facility  Code Status: Level 1 - Full Code    Subjective:     Patient reported chest pain overnight but none currently  Not very keen on answering questions and states that she wants to be left alone    Objective:     Vitals:   Temp (24hrs), Av 2 °F (36 8 °C), Min:97 4 °F (36 3 °C), Max:98 8 °F (37 1 °C)    Temp:  [97 4 °F (36 3 °C)-98 8 °F (37 1 °C)] 98 8 °F (37 1 °C)  HR:  [] 89  Resp:  [18-35] 18  BP: (104-164)/(54-84) 110/63  SpO2:  [94 %-100 %] 99 %  Body mass index is 35 7 kg/m²  Input and Output Summary (last 24 hours): Intake/Output Summary (Last 24 hours) at 2023 0942  Last data filed at 2023 1801  Gross per 24 hour   Intake 300 ml   Output --   Net 300 ml       Physical Exam:   Physical Exam  Constitutional:       General: She is not in acute distress  Appearance: She is well-developed  HENT:      Head: Normocephalic and atraumatic  Eyes:      General: No scleral icterus  Cardiovascular:      Rate and Rhythm: Normal rate and regular rhythm  Pulmonary:      Effort: Pulmonary effort is normal  No respiratory distress  Breath sounds: Normal breath sounds  No wheezing or rales  Abdominal:      General: Bowel sounds are normal  There is no distension  Palpations: Abdomen is soft  Tenderness: There is no abdominal tenderness  Neurological:      Mental Status: She is alert  Comments: Oriented to self and place  Speech is clear and fluent but only giving 1-2 word answers    Thorough neuro exam could not be done this patient stated she wanted to be left alone   Psychiatric:         Cognition and Memory: Cognition is impaired  Memory is impaired           Additional Data:     Labs:  Results from last 7 days   Lab Units 05/08/23  0425 05/07/23  1154   WBC Thousand/uL 5 38 5 29   HEMOGLOBIN g/dL 7 5* 9 7*   HEMATOCRIT % 25 8* 32 3*   PLATELETS Thousands/uL 231 301   NEUTROS PCT %  --  68   LYMPHS PCT %  --  18   MONOS PCT %  --  8   EOS PCT %  --  4     Results from last 7 days   Lab Units 05/08/23  0425 05/07/23  1154   SODIUM mmol/L 137 139   POTASSIUM mmol/L 5 0 4 9   CHLORIDE mmol/L 111* 108   CO2 mmol/L 21 22   BUN mg/dL 23 29*   CREATININE mg/dL 1 02 1 11   ANION GAP mmol/L 5 9   CALCIUM mg/dL 7 9* 8 7   ALBUMIN g/dL  --  3 4*   TOTAL BILIRUBIN mg/dL  --  0 35   ALK PHOS U/L  --  254*   ALT U/L  --  11   AST U/L  --  23   GLUCOSE RANDOM mg/dL 109 96     Results from last 7 days   Lab Units 05/08/23  0220   INR  1 01     Results from last 7 days   Lab Units 05/07/23  1141   POC GLUCOSE mg/dl 93     Results from last 7 days   Lab Units 05/04/23  1657   HEMOGLOBIN A1C  5 1           Lines/Drains:  Invasive Devices     Peripheral Intravenous Line  Duration           Peripheral IV 05/07/23 Proximal;Right;Ventral (anterior) Forearm 1 day    Long-Dwell Peripheral IV (Midline) 96/62/56 Left Basilic <1 day                Imaging: Reviewed radiology reports from this admission including: MRI brain and ECHO    Recent Cultures (last 7 days):         Last 24 Hours Medication List:   Current Facility-Administered Medications   Medication Dose Route Frequency Provider Last Rate   • acetaminophen  650 mg Oral Q6H PRN Keerthi Quach, DO     • [START ON 5/9/2023] aspirin  81 mg Oral Daily With Breakfast Ilana Gomez DO     • atorvastatin  40 mg Oral Daily With [place] Revsarahar DO     • b complex-vitamin C-folic acid  1 capsule Oral Daily Keerthi Quach, DO     • cholecalciferol  1,000 Units Oral Q24H Kerethi Krishnaar, DO     • clopidogrel  75 mg Oral Daily Keerthi Revankar, DO     • cycloSPORINE modified  50 mg Oral BID Keerthi Revankar, DO     • ertapenem  1,000 mg Intravenous Q24H Kristen Pellet, DO 1,000 mg (05/07/23 2133)   • escitalopram  5 mg Oral Daily Keerthi Revankar, DO     • ezetimibe  10 mg Oral Daily Keerthi Revankar, DO     • ferrous sulfate  325 mg Oral BID With Meals Keerthi Revankar, DO     • heparin (porcine)  3-20 Units/kg/hr (Order-Specific) Intravenous Titrated Kristen Pellet, DO 15 8 Units/kg/hr (05/08/23 0932)   • heparin (porcine)  2,000 Units Intravenous Q6H PRN Kristen Pellet, DO     • heparin (porcine)  4,000 Units Intravenous Q6H PRN Kristen Pellet, DO     • levothyroxine  112 mcg Oral Early Morning Keerthi Revankar, DO     • melatonin  3 mg Oral HS Keerthi Revankar, DO     • memantine  5 mg Oral Daily Keerthi Revankar, DO     • mirtazapine  45 mg Oral HS Keerthi Revankar, DO     • morphine injection  2 mg Intravenous Once Kristen Pellet, DO     • mycophenolate  750 mg Oral Q12H [facility] Keerthi Revankar, DO     • OLANZapine  2 5 mg Oral Q6H PRN Keerthi Revankar, DO     • pantoprazole  40 mg Oral Early Morning Keerthi Revankar, DO     • polyethylene glycol  17 g Oral Daily PRN Keerthi Revankar, DO          Today, Patient Was Seen By: Riki Houston DO    **Please Note: This note may have been constructed using a voice recognition system  **

## 2023-05-08 NOTE — PHYSICAL THERAPY NOTE
"       PT EVALUATION       05/08/23 0950   PT Last Visit   PT Visit Date 05/08/23   Note Type   Note type Evaluation   Pain Assessment   Pain Assessment Tool 0-10   Pain Score No Pain   Patient's Stated Pain Goal No pain   Hospital Pain Intervention(s) Repositioned   Multiple Pain Sites No   Restrictions/Precautions   Weight Bearing Precautions Per Order No   Other Precautions Bed Alarm; Chair Alarm; Fall Risk;Pain;Cognitive   Home Living   Type of [address] Two level;Stairs to enter with rails;1/2 bath on main level  (3 SPEEDY)   Bathroom Equipment Grab bars in [address]  (rollator)   Prior Function   Level of [place] Independent with ADLs; Independent with functional mobility; Needs assistance with IADLS   Lives With Significant other   Receives Help From Friend(s); Home health   IADLs Family/Friend/Other provides transportation   Vocational Retired   Comments Pt reports she is able to dress/bathe herself   General   Additional Pertinent History Pt is a 76year-old female who was admitted to the hospital on 5/7/23 due to chest pain, L weakness   Family/Caregiver Present No   Cognition   Overall Cognitive Status Impaired   Arousal/Participation Responsive   Orientation Level Oriented to person;Oriented to place   Memory Decreased recall of recent events   Following Commands Follows one step commands with increased time or repetition   Subjective   Subjective \"i'm tired\"   RLE Assessment   RLE Assessment WFL  (Grossly 3+ to 4/5)   LLE Assessment   LLE Assessment WFL  (Grossly 3+ to 4/5)   Coordination   Movements are Fluid and Coordinated   (Pt intermittently agitated, does not want to participate   Not following directions for coordination testing)   Bed Mobility   Supine to Sit 3  Moderate assistance   Additional items Assist x 1;Verbal cues   Sit to Supine 3  Moderate assistance   Additional items Assist x 1;Verbal cues   Transfers   Sit to Stand 3  Moderate assistance   Additional " items Assist x 1;Verbal cues   Stand to Sit 3  Moderate assistance   Additional items Assist x 1;Verbal cues   Stand pivot 3  Moderate assistance   Additional items Assist x 1;Verbal cues   Ambulation/Elevation   Gait pattern Wide [place]; Short stride; Step to  (Side steps to [facility], refusing further ambulation)   Gait Assistance 3  Moderate assist   Additional items Assist x 1;Verbal cues   Assistive Device Rolling walker   Distance 2 feet to [facility]   Stair Management Assistance Not tested   Balance   Static Sitting Fair   Dynamic Sitting Fair -   Static Standing Fair -   Dynamic Standing Poor +   Ambulatory Poor +   Activity Tolerance   Activity Tolerance Patient tolerated treatment well;Patient limited by fatigue   Nurse Made Aware yes   Assessment   Prognosis Good   Problem List Decreased strength;Decreased range of motion;Decreased endurance; Impaired balance;Decreased coordination;Decreased mobility; Decreased cognition;Decreased safety awareness; Impaired judgement   Assessment Patient seen for Physical Therapy evaluation  Patient admitted with Slurred speech  Comorbidities affecting patient's physical performance include: anemia, cardiac disease, chronic pain, CKD, CVA, dementia, depression, falls, GERD, hypertension, hypothyroid and TIA  Personal factors affecting patient at time of initial evaluation include: lives in 2 story house, ambulating with assistive device, stairs to enter home, inability to ambulate household distances, inability to navigate community distances, decreased cognition, positive fall history, depression, limited insight into impairments, inability to perform ADLS, inability to perform IADLS  and inability to live alone  Prior to admission, patient was independent with functional mobility with walker/cane, independent with ADLS, requiring assist for IADLS, living with significant other in a 2 level home with 3 steps to enter and ambulating household distance    Please find objective findings from Physical Therapy assessment regarding body systems outlined above with impairments and limitations including weakness, decreased ROM, impaired balance, decreased endurance, gait deviations, pain, decreased activity tolerance, decreased functional mobility tolerance, decreased safety awareness, impaired judgement, fall risk and decreased cognition  The Barthel Index was used as a functional outcome tool presenting with a score of Barthel Index Score: 30 today indicating marked limitations of functional mobility and ADLS  Patient's clinical presentation is currently unstable/unpredictable as seen in patient's presentation of varying levels of cognitive performance, increased fall risk, new onset of impairment of functional mobility, decreased endurance and new onset of weakness  Pt would benefit from continued Physical Therapy treatment to address deficits as defined above and maximize level of functional mobility  As demonstrated by objective findings, the assigned level of complexity for this evaluation is high  The patient's AM-[facility] Basic Mobility Inpatient Short Form Raw Score is 12  A Raw score of less than or equal to 16 suggests the patient may benefit from discharge to post-acute rehabilitation services  Please also refer to the recommendation of the Physical Therapist for safe discharge planning  Goals   Patient Goals none stated due to cognition   STG Expiration Date 05/15/23   Short Term Goal #1 pt will perform bed mobility with Min A ; Pt will perform bed <> chair transfer with Min A + RW ; Standing balance will improve to Fair to decrease risk of falls   Short Term Goal #2 Pt will ambulate x 150 feet with Min A + RW ; Pt will negotiate x 1 step/curb with Min A ; AMPAC score will improve >16/24 to demonstrate improved functional independence   LTG Expiration Date 05/22/23   Long Term Goal #1 Pt will perform bed mobility with supervision ;  Pt will perform bed <> chair transfer with supervision ; Standing balance will improve to Fair+ to decrease risk of falls   Long Term Goal #2 Pt will ambulate x 150 feet with supervision + RW ; Pt will negotiat x 3 steps with supervision ; AMPAC score will improve >18/24 to demonstrate improved functional independence   Plan   Treatment/Interventions Functional transfer training;ADL retraining;LE strengthening/ROM; Therapeutic exercise; Endurance training;Bed mobility;Gait training;Spoke to nursing;OT   PT Frequency 5-7x/wk   Recommendation   PT Discharge Recommendation Post acute rehabilitation services   AM-PAC Basic Mobility Inpatient   Turning in Flat Bed Without Bedrails 3   Lying on Back to Sitting on Edge of Flat Bed Without Bedrails 2   Moving Bed to Chair 2   Standing Up From Chair Using Arms 2   Walk in Room 2   Climb 3-5 Stairs With Railing 1   Basic Mobility Inpatient Raw Score 12   Basic Mobility Standardized Score 32 23   Highest Level Of Mobility   -HLM Goal 4: Move to chair/commode   JH-HLM Achieved 4: Move to chair/commode   Barthel Index   Feeding 5   Bathing 0   Grooming Score 0   Dressing Score 0   Bladder Score 5   Bowels Score 10   Toilet Use Score 5   Transfers (Bed/Chair) Score 5   Mobility (Level Surface) Score 0   Stairs Score 0   Barthel Index Score 30   End of Consult   Patient Position at End of Consult Supine;Bed/Chair alarm activated; All needs within reach   [facility] Insurance Number  Anna Cheswick [id number]

## 2023-05-08 NOTE — CONSULTS
Consultation - Cardiology   [facility]     Alanna Gandara 76 y o  female MRN: [id number]  : 1945  Unit/Bed#: [address] [place] [address] Encounter: [id number]      Assessment & Plan   1  Chest pain  - Cardiology consulted for concern for chest pain and elevated troponin  Internal Medicine note reports patient reported chest pain at 2300 hrs on 23 and 0200 hrs on 23    - 23 2321 hrs EKG showed normal sinus rhythm; left axis deviation; septal infarct , age undetermined; marked ST abnormality, possible lateral subendocardial injury; ST depressed in Anterolateral leads; T wave inversion evident in Anterolateral leads   - Repeat EKG on at 0034 hrs noted sinus tachycardia; eft axis deviation; septal infarct; marked ST abnormality, possible anterolateral subendocardial injury; ST depressed in Anterior leads  T wave inversion more evident in Lateral leads  - 23 0026hrs hs troponin 26, 0220 hrs 543, 0427hrs 1,307, 0836 hrs 2,268  - Patient was started on heparin gtt  Reported chest pain relief with nitro  - Currently on aspirin 81 mg daily, plavix 75 mg daily, and Lipitor 40 mg daily    - Home medications of Lopressor 37 5 mg twice daily held on presentation  Confirmed with Dr Kristi Borrego ok to restart  Will start Lopressor 25 mg twice daily  2  Elevated troponin      - Likely NSTEMI due to significant ST abnormalities in anterior and lateral leads  - Cardiology consulted for concern for chest pain and elevated troponin   Internal Medicine note reports patient reported chest pain at 2300 hrs on 23 and 0200 hrs on 23    - 23 2321 hrs EKG showed normal sinus rhythm; left axis deviation; septal infarct , age undetermined; marked ST abnormality, possible lateral subendocardial injury; ST depressed in Anterolateral leads; T wave inversion evident in Anterolateral leads   - Repeat EKG on at 0034 hrs noted sinus tachycardia; eft axis deviation; septal infarct; marked ST abnormality, possible anterolateral subendocardial injury; ST depressed in Anterior leads  T wave inversion more evident in Lateral leads  - 5/08/23 0026hrs hs troponin 26, 0220 hrs 543, 0427hrs 1,307, 0836 hrs 2,268  - Patient was started on heparin gtt  Reported chest pain relief with nitro  - Currently on aspirin 81 mg daily, plavix 75 mg daily, and Lipitor 40 mg daily    - Follow up 5/08/23 TTE    - Patient will require ischemic workup prior to discharge  3  Stroke-like symptoms      - Notes indicate family noted patient had difficulty speaking and left sided weakness prior to presentation  - 5/07/23 CT brain demonstrated no acute intracranial abnormality, stable chronic microangiopathic changes within the brain  - 5/07/23 CTA head/neck showed no acute pathology  - 5/08/23 MRI brain notes no acute intracranial abnormality, advanced chronic microangiopathic changes, chronic lacunar infarct right posterior frontal subcortical white matter  - Neurology following    - Currently on aspirin 81 mg daily, plavix 75 mg daily, and Lipitor 40 mg daily  4  Chronic anemia      - H/H down trending    - 5/06/23 H/H 8 4/28  8    - 5/08/23 H/H 7 5/25  3    - Continue to monitor    - Currently on heparin gtt, may need to hold if concern for GI bleed  5  UTI      - Currently on ertapenem  - Patient has history of ESBL UTI sensitive to ertapenem  - Care per primary team      6  Hypertension     -  BP controlled  - Home medications of Lopressor 37 5 mg twice daily held on presentation  Confirmed with Dr Armando Dunlap ok to restart  Will start Lopressor 25 mg twice daily  7  Hyperlipidemia      - 5/08/23 lipid panel: cholesterol 114, triglycerides 203, HDL 36, LDL 37    - Currently on Lipitor 40 mg daily and zetia 10 mg daily  8  Hypothyroidism      - Currently on levothyroxine 112 mcg daily  - 5/07/23 TSH: 9 037, elevated     - 5/07/23 Free T4: 0 79, normal      9  Liver transplant      - Care per primary team    - On immunosuppressants  10  CVA  - Prior lacunar infarct  - Currently on aspirin 81 mg daily, plavix 75 mg daily, and Lipitor 40 mg daily    - Neurology following  11  CKDIII  - Baseline creatinine 1 1-1 4    - Care per primary team      12  Dementia  - Supportive care  Summary of Recommendations: Thank you for your consultation  Physician Requesting Consult: Liberty Tellez DO    Reason for Consult / Principal Problem: chest pain, elevated troponin  Consults    HPI: Petros aLrios is a 76y o  year old female with PMHx of dementia, HTN, HLD, hypothyroidism, chronic lacunar infarct, s/p liver transplant, CKDIII, chronic anemia, who presented on 5/07/23 for concern for stroke like symptoms  Unable to obtain history due to patient's history of dementia  Notes indicate family noted patient had difficulty speaking and left sided weakness prior to presentation  5/07/23 CT brain demonstrated no acute intracranial abnormality, stable chronic microangiopathic changes within the brain  5/07/23 CTA head/neck showed no acute pathology  5/08/23 MRI brain notes no acute intracranial abnormality, advanced chronic microangiopathic changes, chronic lacunar infarct right posterior frontal subcortical white matter  Cardiology consulted for concern for chest pain and elevated troponin  Internal Medicine note reports patient reported chest pain at 2300 hrs on 5/07/23 and 0200 hrs on 5/08/23 05/07/23 2321 hrs EKG showed normal sinus rhythm; left axis deviation; septal infarct , age undetermined; marked ST abnormality, possible lateral subendocardial injury; ST depressed in Anterolateral leads; T wave inversion evident in Anterolateral leads  Repeat EKG on at 0034 hrs noted sinus tachycardia; eft axis deviation; septal infarct; marked ST abnormality, possible anterolateral subendocardial injury; ST depressed in Anterior leads   T wave inversion more evident in Lateral leads  5/08/23 0026hrs hs troponin 26, 0220 hrs 543, 0427hrs 1,307, 0836 hrs 2,268  Patient was started on heparin gtt  Reported chest pain relief with nitro  Of note, on presentation 5/07/23 hs troponin: 18 (0hrs), 17 (2hrs), 19 (4hrs)  5/07/23 EKG 1145 hrs showed normal sinus rhythm , Pac's  This morning patient denies chest pain or shortness of breath  Review of Systems   Unable to perform ROS: Dementia       Historical Information   Past Medical History:   Diagnosis Date   • Anemia    • Anxiety    • Arthritis    • Benign paroxysmal vertigo, unspecified ear     onset: 05/06/2010   • Cirrhosis ([facility] )     onset: 03/30/2005   • Crohn disease ([place] [address] )    • Disease of thyroid gland    • GERD (gastroesophageal reflux disease)    • History of transfusion    • Liver transplant candidate     onset: 09/16/2008   • Osteoporosis    • Pelvic fracture ([facility] )     onset: 10/14/2008     Past Surgical History:   Procedure Laterality Date   • ABDOMINAL SURGERY     • APPENDECTOMY     • BLADDER AUGMENTATION N/A    • CATARACT EXTRACTION     • CHOLECYSTECTOMY N/A    • COLONOSCOPY N/A 4/20/2017    Procedure: COLONOSCOPY;  Surgeon: Avelina Shields MD;  Location: [facility] GI LAB; Service:    • ESOPHAGOGASTRODUODENOSCOPY N/A 3/31/2016    Procedure: ESOPHAGOGASTRODUODENOSCOPY (EGD); Surgeon: Avelina Shields MD;  Location: [facility] GI LAB; Service:    • ESOPHAGOGASTRODUODENOSCOPY N/A 4/20/2017    Procedure: ESOPHAGOGASTRODUODENOSCOPY (EGD); Surgeon: Avelina Shields MD;  Location: [facility] GI LAB;   Service:    • EYE SURGERY      cataracts removed both eyes lens implant   • HERNIA REPAIR     • JOINT REPLACEMENT      left knee replacement   • LIVER TRANSPLANTATION N/A    • ORIF TIBIA & FIBULA FRACTURES Right 11/18/2022    Procedure: OPEN REDUCTION W/ INTERNAL FIXATION (ORIF) RIGHT ANKLE FRACTURE;  Surgeon: Angelique Rosenthal MD;  Location: [facility] OR;  Service: Orthopedics   • TONSILECTOMY AND ADNOIDECTOMY N/A    • TUBAL LIGATION Social History     Substance and Sexual Activity   Alcohol Use Never     Social History     Substance and Sexual Activity   Drug Use Never     Social History     Tobacco Use   Smoking Status Former   • Years: 15 00   • Types: Cigarettes   • Start date: 6/15/1961   • Quit date: 1975   • Years since quittin 3   • Passive exposure: Past   Smokeless Tobacco Never   Tobacco Comments    smoked for 5 years-1/2 to 1 ppd     Family History:   Family History   Problem Relation Age of Onset   • Cancer Mother         breast   • Cancer Father    • Crohn's disease Brother    • Arthritis Family    • Breast cancer Family        Meds/Allergies    PTA meds:    Medications Prior to Admission   Medication   • acetaminophen (TYLENOL) 325 mg tablet   • aspirin (ECOTRIN LOW STRENGTH) 81 mg EC tablet   • b complex vitamins capsule   • clopidogrel (PLAVIX) 75 mg tablet   • cycloSPORINE modified (NEORAL) 25 mg capsule   • escitalopram (LEXAPRO) 5 mg tablet   • ezetimibe (ZETIA) 10 mg tablet   • levothyroxine 112 mcg tablet   • metoprolol tartrate (LOPRESSOR) 25 mg tablet   • mirtazapine (REMERON) 45 MG tablet   • mycophenolate (CELLCEPT) 250 mg capsule   • omeprazole (PriLOSEC) 40 MG capsule   • pantoprazole (PROTONIX) 40 mg tablet   • pravastatin (PRAVACHOL) 20 mg tablet   • Calcium 500 MG tablet   • Cholecalciferol 25 MCG (1000 UT) tablet   • ferrous sulfate 325 (65 Fe) mg tablet   • memantine (NAMENDA) 5 mg tablet   • polyethylene glycol (MIRALAX) 17 g packet      Allergies   Allergen Reactions   • Tetracyclines & Related Hives   • Vancomycin Other (See Comments) and Itching     Reaction Date: ;    Pt unsure of reaction   • Prograf [Tacrolimus] Anxiety     Reaction Date: 2008;        Current Facility-Administered Medications:   •  acetaminophen (TYLENOL) tablet 650 mg, 650 mg, Oral, Q6H PRN, Keerthi Quach DO  •  [START ON 2023] aspirin (ECOTRIN LOW STRENGTH) EC tablet 81 mg, 81 mg, Oral, Daily With Breakfast, Poncho Pereira DO  •  atorvastatin (LIPITOR) tablet 40 mg, 40 mg, Oral, Daily With Dinner, Keerthi Quach DO, 40 mg at 05/07/23 1728  •  b complex-vitamin C-folic acid (RENAL) capsule 1 capsule, 1 capsule, Oral, Daily, Keerthi Quach DO, 1 capsule at 05/08/23 0465  •  cholecalciferol (VITAMIN D3) tablet 1,000 Units, 1,000 Units, Oral, Q24H, Keerthi Quach DO, 1,000 Units at 05/07/23 1728  •  clopidogrel (PLAVIX) tablet 75 mg, 75 mg, Oral, Daily, Keerthi Quach DO, 75 mg at 05/08/23 3040  •  cycloSPORINE modified (NEORAL) capsule 50 mg, 50 mg, Oral, BID, Keerthi Quach DO, 50 mg at 05/08/23 0930  •  ertapenem (INVanz) 1,000 mg in sodium chloride 0 9 % 50 mL IVPB, 1,000 mg, Intravenous, Q24H, Poncho Pereira DO, Last Rate: 100 mL/hr at 05/07/23 2133, 1,000 mg at 05/07/23 2133  •  escitalopram (LEXAPRO) tablet 5 mg, 5 mg, Oral, Daily, Keerthi Quach DO, 5 mg at 05/08/23 0237  •  ezetimibe (ZETIA) tablet 10 mg, 10 mg, Oral, Daily, Keerthi Quach DO, 10 mg at 05/08/23 6749  •  ferrous sulfate tablet 325 mg, 325 mg, Oral, BID With Meals, Keerthi Quach DO, 325 mg at 05/08/23 0931  •  heparin (porcine) 25,000 units in 0 45% NaCl 250 mL infusion (premix), 3-20 Units/kg/hr (Order-Specific), Intravenous, Titrated, Poncho Pereira DO, Last Rate: 13 4 mL/hr at 05/08/23 0932, 15 8 Units/kg/hr at 05/08/23 0932  •  heparin (porcine) injection 2,000 Units, 2,000 Units, Intravenous, Q6H PRN, Poncho Pereira DO  •  heparin (porcine) injection 4,000 Units, 4,000 Units, Intravenous, Q6H PRN, Poncho Pereira, DO, 4,000 Units at 05/08/23 4611  •  levothyroxine tablet 112 mcg, 112 mcg, Oral, Early Morning, Keerthi Revankar, DO, 112 mcg at 05/08/23 0609  •  melatonin tablet 3 mg, 3 mg, Oral, HS, Keerthi Revankar, DO, 3 mg at 05/07/23 2119  •  memantine (NAMENDA) tablet 5 mg, 5 mg, Oral, Daily, Keerthi Revankar, DO, 5 mg at 05/08/23 0925  •  mirtazapine (REMERON) tablet 45 mg, 45 mg, Oral, HS, Keerthi Revankar, DO, 45 mg "at 05/07/23 2120  •  morphine injection 2 mg, 2 mg, Intravenous, Once, Dorene Anne DO  •  mycophenolate (CELLCEPT) capsule 750 mg, 750 mg, Oral, Q12H OLYA, Keerthi Revsarahar, , 750 mg at 05/08/23 0925  •  OLANZapine (ZyPREXA) tablet 2 5 mg, 2 5 mg, Oral, Q6H PRN, Keerthi Quach DO, 2 5 mg at 05/07/23 2233  •  pantoprazole (PROTONIX) EC tablet 40 mg, 40 mg, Oral, Early Morning, Keerthi Quach DO, 40 mg at 05/08/23 0609  •  polyethylene glycol (MIRALAX) packet 17 g, 17 g, Oral, Daily PRN, Keerthi Quach DO    VTE Pharmacologic Prophylaxis:   Heparin    Objective:   Vitals: Blood pressure 110/63, pulse 85, temperature 98 8 °F (37 1 °C), resp  rate 18, height 5' 2\" (1 575 m), weight 88 5 kg (195 lb 1 7 oz), SpO2 99 %, not currently breastfeeding  Body mass index is 35 69 kg/m²  Wt Readings from Last 3 Encounters:   05/08/23 88 5 kg (195 lb 1 7 oz)   05/06/23 86 2 kg (190 lb)   04/22/23 86 6 kg (190 lb 14 7 oz)     BP Readings from Last 3 Encounters:   05/08/23 110/63   05/06/23 139/65   05/04/23 110/58     Orthostatic Blood Pressures    Flowsheet Row Most Recent Value   Blood Pressure 110/63 filed at 05/08/2023 1116   Patient Position - Orthostatic VS Lying filed at 05/07/2023 1903          Intake/Output Summary (Last 24 hours) at 5/8/2023 1258  Last data filed at 5/7/2023 1801  Gross per 24 hour   Intake 300 ml   Output --   Net 300 ml       Invasive Devices     Peripheral Intravenous Line  Duration           Peripheral IV 05/07/23 Proximal;Right;Ventral (anterior) Forearm 1 day    Long-Dwell Peripheral IV (Midline) 18/60/21 Left Basilic <1 day                  Physical Exam:   Physical Exam  Vitals reviewed  Constitutional:       General: She is not in acute distress  Cardiovascular:      Rate and Rhythm: Normal rate and regular rhythm  Pulses: Normal pulses  Heart sounds: Murmur heard  Pulmonary:      Effort: Pulmonary effort is normal  No respiratory distress        Breath sounds: Decreased " breath sounds present  Abdominal:      General: Abdomen is flat  There is no distension  Palpations: Abdomen is soft  Tenderness: There is no abdominal tenderness  Musculoskeletal:      Right lower leg: No edema  Left lower leg: No edema  Skin:     General: Skin is warm and dry  Neurological:      Mental Status: She is alert  She is disoriented  Psychiatric:         Behavior: Behavior is agitated  Labs:   Troponins:  Results from last 7 days   Lab Units 05/08/23  0836 05/08/23  0427 05/08/23  0220 05/08/23  0026 05/07/23  1749 05/07/23  1345   HS TNI RAND ng/L 2,268* 1,307* 543*   < >  --   --    HSTNI D2 ng/L  --   --   --   --   --  -1   HSTNI D4 ng/L  --   --   --   --  1  --     < > = values in this interval not displayed         CBC with diff:   Results from last 7 days   Lab Units 05/08/23  1222 05/08/23  0425 05/07/23  1154 05/06/23  0526   WBC Thousand/uL  --  5 38 5 29 4 18*   HEMOGLOBIN g/dL 7 5* 7 5* 9 7* 8 4*   HEMATOCRIT % 25 3* 25 8* 32 3* 28 8*   MCV fL  --  94 95 94   PLATELETS Thousands/uL  --  231 301 218   MCH pg  --  27 4 28 6 27 4   MCHC g/dL  --  29 1* 30 0* 29 2*   RDW %  --  14 4 14 6 14 4   MPV fL  --  9 7 10 8 9 5   NRBC AUTO /100 WBCs  --   --  0 0       CMP:   Results from last 7 days   Lab Units 05/08/23  0425 05/07/23  1154 05/06/23  0526   SODIUM mmol/L 137 139 138   POTASSIUM mmol/L 5 0 4 9 4 5   CHLORIDE mmol/L 111* 108 111*   CO2 mmol/L 21 22 20*   ANION GAP mmol/L 5 9 7   BUN mg/dL 23 29* 37*   CREATININE mg/dL 1 02 1 11 1 54*   GLUCOSE FASTING mg/dL 109*  --   --    CALCIUM mg/dL 7 9* 8 7 8 0*   AST U/L  --  23 17   ALT U/L  --  11 9   ALK PHOS U/L  --  254* 199*   TOTAL PROTEIN g/dL  --  7 1 6 0*   ALBUMIN g/dL  --  3 4* 2 9*   TOTAL BILIRUBIN mg/dL  --  0 35 0 36   EGFR ml/min/1 73sq m 52 47 31   GLUCOSE RANDOM mg/dL 109 96 103       Magnesium:   Results from last 7 days   Lab Units 05/08/23  0425 05/07/23  1154   MAGNESIUM mg/dL 1 9 2 3     Coags: Results from last 7 days   Lab Units 23  0832 23  0220   PTT seconds 26 24   INR   --  1 01     TSH:    Results from last 7 days   Lab Units 23  1154   TSH 3RD GENERATON uIU/mL 9 037*     No components found for: TSH3  Lipid Profile:   Results from last 7 days   Lab Units 23  0425   CHOLESTEROL mg/dL 114   TRIGLYCERIDES mg/dL 203*   HDL mg/dL 36*   LDL CALC mg/dL 37     Lipid Profile:   Lab Results   Component Value Date    CHOLESTEROL 114 2023    HDL 36 (L) 2023    LDLCALC 37 2023    TRIG 203 (H) 2023     Hgb A1c:   Results from last 7 days   Lab Units 23  0425 23  1657   HEMOGLOBIN A1C % 5 3 5 1     NT-proBNP: No results for input(s): NTBNP in the last 72 hours  Imaging & Testing     Cardiac testing:   Results for orders placed during the hospital encounter of 10/29/17    Echo complete with contrast if indicated    Narrative  [facility]  [address] [facility]  [address] [facility], [place] [address]  [phone]    Transthoracic Echocardiogram  2D, M-mode, Doppler, and Color Doppler    Study date:  30-Oct-2017    Patient: Sacha Wellington  MR number: [id number]  Account number: [de-identified]  : 1945  Age: 66 years  Gender: Female  Status: Routine  Location: Bedside  Height: 62 in  Weight: 202 6 lb  BP: 136/ 72 mmHg    Indications: TIA    Diagnoses: I67 9 - Cerebrovascular disease, unspecified    Sonographer:  Phil Rai  Primary Physician:  Darrell Muller MD  Referring Physician:  Prince Jason MD  Group:  Lorel Eisenmenger  RN:  Claudette Almanza RN  Interpreting Physician:  Rayray Canales MD    SUMMARY    LEFT VENTRICLE:  Systolic function was normal  Ejection fraction was estimated in the range of 55 % to 60 %  There were no regional wall motion abnormalities  Wall thickness was mildly increased  Doppler parameters were consistent with abnormal left ventricular relaxation (grade 1 diastolic dysfunction)    Doppler parameters were consistent with elevated mean left atrial filling pressure  LEFT ATRIUM:  The atrium was mildly dilated  ATRIAL SEPTUM:  No significant defect or patent foramen ovale was identified  There was a trace left-to-right shunt  This is within normal limits for the patient's age  TRICUSPID VALVE:  The tricuspid jet envelope definition was inadequate for estimation of RV systolic pressure  There are no indirect findings (abnormal RV volume or geometry, altered pulmonary flow velocity profile, or leftward septal displacement) which  would suggest moderate or severe pulmonary hypertension  HISTORY: PRIOR HISTORY: GERD, Hypothyroidism, Anxiety, Anemia    PROCEDURE: The procedure was performed at the bedside  This was a routine study  The transthoracic approach was used  The study included complete 2D imaging, M-mode, complete spectral Doppler, and color Doppler  The heart rate was 80 bpm,  at the start of the study  Intravenous contrast ( 10 ml) was administered to evaluate shunting  Image quality was adequate  LEFT VENTRICLE: Size was normal  Systolic function was normal  Ejection fraction was estimated in the range of 55 % to 60 %  There were no regional wall motion abnormalities  Wall thickness was mildly increased  No evidence of apical  thrombus  DOPPLER: Doppler parameters were consistent with abnormal left ventricular relaxation (grade 1 diastolic dysfunction)  Doppler parameters were consistent with elevated mean left atrial filling pressure  RIGHT VENTRICLE: The size was normal  Systolic function was normal  Wall thickness was normal     LEFT ATRIUM: The atrium was mildly dilated  ATRIAL SEPTUM: No significant defect or patent foramen ovale was identified  There was a trace left-to-right shunt  This is within normal limits for the patient's age  RIGHT ATRIUM: Size was normal     MITRAL VALVE: There was mild to moderate annular calcification   Valve structure was normal  There was normal leaflet separation  DOPPLER: The transmitral velocity was within the normal range  There was no evidence for stenosis  There was  no significant regurgitation  AORTIC VALVE: The valve was not visualized well enough to rule out a bicuspid morphology  Leaflets exhibited mildly increased thickness and normal cuspal separation  DOPPLER: Transaortic velocity was within the normal range  There was no  evidence for stenosis  There was no significant regurgitation  TRICUSPID VALVE: The valve structure was normal  There was normal leaflet separation  DOPPLER: The transtricuspid velocity was within the normal range  There was no evidence for stenosis  There was trace regurgitation  The tricuspid jet  envelope definition was inadequate for estimation of RV systolic pressure  There are no indirect findings (abnormal RV volume or geometry, altered pulmonary flow velocity profile, or leftward septal displacement) which would suggest  moderate or severe pulmonary hypertension  PULMONIC VALVE: Leaflets exhibited normal thickness, no calcification, and normal cuspal separation  DOPPLER: The transpulmonic velocity was within the normal range  There was no significant regurgitation  PERICARDIUM: There was no pericardial effusion  The pericardium was normal in appearance  AORTA: The root exhibited normal size  SYSTEMIC VEINS: IVC: The inferior vena cava was normal in size  SYSTEM MEASUREMENT TABLES    2D mode  AoR Diam 2D: 3 cm  LA Diam (2D): 4 1 cm  LA/Ao (2D): 1 37  FS (2D Teich): 25 1 %  IVSd (2D): 1 14 cm  LVDEV: 65 9 cm³  LVESV: 32 8 cm³  LVIDd(2D): 3 9 cm  LVISd (2D): 2 92 cm  LVPWd (2D): 1 09 cm  SV (Teich): 33 1 cm³    Apical four chamber  LVEF A4C: 56 %    Unspecified Scan Mode  MV Peak A Shiv: 1670 mm/s  MV Peak E Shiv   Mean: 1030 mm/s  MVA (PHT): 4 4 cm squared  PHT: 50 ms  Max P mm[Hg]  V Max: 2570 mm/s  Vmax: 2540 mm/s  RA Area: 10 6 cm squared  RA Volume: 20 9 cm³  TAPSE: 1 6 cm    [employer] Accredited Echocardiography Laboratory    Prepared and electronically signed by    Danielle Mccartney MD  Signed 30-Oct-2017 14:13:31    Results for orders placed during the hospital encounter of 17    NM myocardial perfusion spect (rx stress and/or rest)    [address]  Shahab Huang  [phone]    Rest/Stress Gated SPECT Myocardial Perfusion Imaging After Regadenoson    Patient: Meg Hernandez  MR number: [id number]  Account number: [de-identified]  : 1945  Age: 66 years  Gender: Female  Status: Inpatient  Location: Stress lab  Height: 62 in  Weight: 205 lb  BP: 161/ 79 mmHg    Allergies: TETRACYCLINES RELATED, VANCOMYCIN, TACROLIMUS    Diagnosis: I21 4 - Non-ST elevation (NSTEMI) myocardial infarction    Primary Physician:  Yang Stout MD  RN:  TRUDI Reed  Group:  Brittany Mortensen  Report Prepared By[de-identified]  TRUDI Reed  Interpreting Physician:  Renetta Pro MD    INDICATIONS: Detection of coronary artery disease  HISTORY: The patient is a 66year old  female  Chest pain status: no chest pain  Coronary artery disease risk factors: dyslipidemia  Cardiovascular history: arrhythmia, stroke, and transient ischemic attack  Co-morbidity: obesity,  Liver transplant  Medications: clopidogrel, a lipid lowering agent, thyroid medications, and transplant medications  PHYSICAL EXAM: Baseline physical exam screening: no wheezes audible  REST ECG: Normal sinus rhythm  PROCEDURE: The study was performed in the stress lab  A regadenoson infusion pharmacologic stress test was performed  Gated SPECT myocardial perfusion imaging was performed after stress and at rest  Systolic blood pressure was 161 mmHg, at  the start of the study  Diastolic blood pressure was 79 mmHg, at the start of the study  The heart rate was 76 bpm, at the start of the study  IV double checked    Regadenoson protocol:  Time HR bpm SBP mmHg DBP mmHg Symptoms Rhythm/conduct  Baseline 11:55 76 161 79 none NSR  Immediate 11:57 100 137 63 mild dyspnea --  1 min 11:58 102 168 72 same as above --  2 min 11:59 100 170 81 subsiding --  3 min 12:00 98 163 69 subsiding --  No medications or fluids given  STRESS SUMMARY: Duration of pharmacologic stress was 3 min  Maximal heart rate during stress was 103 bpm  The heart rate response to stress was normal  There was resting hypertension with an appropriate blood pressure response to stress  The rate-pressure product for the peak heart rate and blood pressure was 54597  There was no chest pain during stress  The stress test was terminated due to protocol completion  Pre oxygen saturation: 98 %  Peak oxygen saturation: 100 %  The stress ECG was negative for ischemia and normal  There were no stress arrhythmias or conduction abnormalities  ISOTOPE ADMINISTRATION:  Resting isotope administration Stress isotope administration  Agent Tetrofosmin Tetrofosmin  Dose 11 mCi 32 mCi  Date 11/06/2017 11/07/2017    The radiopharmaceutical was injected at the peak effect of pharmacologic stress  MYOCARDIAL PERFUSION IMAGING:  The image quality was fair  Left ventricular size was normal  The left ventricle dilated transiently during stress  The TID ratio was 1 26  PERFUSION DEFECTS:  -  There was a small, mildly severe, partially reversible myocardial perfusion defect of the apical lateral wall possibly due to artifact  GATED SPECT:  The calculated left ventricular ejection fraction was 70 %  Left ventricular ejection fraction was within normal limits by visual estimate  There was no left ventricular regional abnormality  SUMMARY:  -  Stress results: There was resting hypertension with an appropriate blood pressure response to stress  There was no chest pain during stress  -  ECG conclusions: The stress ECG was negative for ischemia and normal   -  Perfusion imaging:  The left ventricle dilated transiently during stress  There was a small, mildly severe, partially reversible myocardial perfusion defect of the apical lateral wall possibly due to artifact   -  Gated SPECT: The calculated left ventricular ejection fraction was 70 %  Left ventricular ejection fraction was within normal limits by visual estimate  There was no left ventricular regional abnormality  IMPRESSIONS: Mildly abnormal study after pharmacologic vasodilation  There is evidence of cavity dilation with normal LV function and focal apico- lateral wall ischemia  Prepared and signed by    Shannan Cruz MD  Signed 11/07/2017 17:18:41      Imaging: I have personally reviewed pertinent reports  XR ankle 3+ vw right    Result Date: 4/14/2023  Narrative: RIGHT ANKLE INDICATION:   S82 831D: Other fracture of upper and lower end of right fibula, subsequent encounter for closed fracture with routine healing  COMPARISON:  01/19/2023 VIEWS:  XR ANKLE 3+ VW RIGHT Images: 3 FINDINGS: There is no acute fracture or dislocation  Orthopedic plate with multiple screws in the distal fibula  Calcaneal spur(s) noted  No lytic or blastic osseous lesion  Soft tissue swelling about the ankle  Diffuse atherosclerotic calcifications  Impression: No acute osseous abnormality  Workstation performed: [id number]     MRI brain wo contrast    Result Date: 5/8/2023  Narrative: MRI BRAIN WITHOUT CONTRAST INDICATION: stroke  COMPARISON:   None  TECHNIQUE:  Multiplanar, multisequence imaging of the brain was performed  IMAGE QUALITY:  Diagnostic  FINDINGS: BRAIN PARENCHYMA:  There is no discrete mass, mass effect or midline shift  There is no intracranial hemorrhage  There is no evidence of acute infarction and diffusion imaging is unremarkable  Small scattered hyperintensities on T2/FLAIR imaging are noted in the periventricular and subcortical white matter demonstrating an appearance that is statistically most likely to represent advanced microangiopathic change   Chronic lacunar infarct right posterior frontal subcortical white matter, right centrum  semiovale  Chronic cortical infarct right occipital lobe  Extra-axial mass along the cribriform plate identified retrospectively stable for many years measures approximately 1 6 x 0 9 x 1 8 cm likely meningioma  No adjacent parenchymal edema  Foci of susceptibility artifact are seen in the supratentorial and infratentorial brain bilaterally compatible with chronic microhemorrhage with differential considerations to include chronic hypertension versus amyloid angiopathy  VENTRICLES:  Normal for the patient's age  SELLA AND PITUITARY GLAND:  Normal  ORBITS: Bilateral cataract surgery  PARANASAL SINUSES:  Normal  VASCULATURE:  Evaluation of the major intracranial vasculature demonstrates appropriate flow voids  CALVARIUM AND SKULL BASE:  Normal  EXTRACRANIAL SOFT TISSUES:  Normal      Impression: No acute intracranial abnormality  Advanced chronic microangiopathic changes  Chronic infarcts are noted as described above  1 6 x 0 9 x 1 8 cm extra-axial mass along the cribriform plate is retrospectively stable for several years compatible with a meningioma without adjacent parenchymal edema  Workstation performed: [id number]     CT stroke alert brain    Result Date: 5/7/2023  Narrative: CT BRAIN - STROKE ALERT PROTOCOL INDICATION:   Stroke Alert  COMPARISON: Head CT from November 17, 2020  MRI of the brain from February 1, 2019  TECHNIQUE:  CT examination of the brain was performed  In addition to axial images, coronal reformatted images were created and submitted for interpretation  Radiation dose length product (DLP) for this visit:  935 07 mGy-cm   This examination, like all CT scans performed in the [facility], was performed utilizing techniques to minimize radiation dose exposure, including the use of iterative  reconstruction and automated exposure control  IMAGE QUALITY:  Diagnostic   FINDINGS: PARENCHYMA: Decreased attenuation is noted in periventricular and subcortical white matter demonstrating an appearance that is statistically most likely to represent advanced microangiopathic change  There is stable asymmetric hypoattenuation in the right occipital white matter, likely representing additional site of remote prior ischemic insult  No CT signs of acute infarction  Chronic lacunar infarction in the right corona radiata (2:24) no intracranial mass, mass effect or midline shift  No acute parenchymal hemorrhage  Heavy intracranial atherosclerotic calcification of the carotid and vertebral arteries  VENTRICLES AND EXTRA-AXIAL SPACES: Stable lateral more than third and fourth ventriculomegaly  Generalized prominence of the cerebral sulci  Stable asymmetric left lateral frontal sulcal dilatation (2:25)  VISUALIZED ORBITS: Normal visualized orbits  PARANASAL SINUSES: Normal visualized paranasal sinuses  CALVARIUM AND EXTRACRANIAL SOFT TISSUES:   Normal      Impression: No acute intracranial abnormality  Stable chronic microangiopathic changes within the brain  Findings were directly discussed with Ángela Kaiser at 12:22 pm  Workstation performed: [id number]     CTA stroke alert (head/neck)    Result Date: 5/7/2023  Narrative: CTA NECK AND BRAIN WITH CONTRAST INDICATION: Stroke Alert COMPARISON:   None  TECHNIQUE:   Post contrast imaging was performed after administration of iodinated contrast through the neck and brain  Post contrast axial 0 625 mm images timed to opacify the arterial system  3D rendering was performed on an independent workstation  MIP reconstructions performed  Coronal reconstructions were performed of the noncontrast portion of the brain  Radiation dose length product (DLP) for this visit:  451 mGy-cm     This examination, like all CT scans performed in the [facility], was performed utilizing techniques to minimize radiation dose exposure, including the use of iterative reconstruction and automated exposure control  IV Contrast:  85 mL of iohexol (OMNIPAQUE) IMAGE QUALITY:   Diagnostic FINDINGS: CERVICAL VASCULATURE AORTIC ARCH AND GREAT VESSELS: Mild atherosclerotic disease of the arch, proximal great vessels and visualized subclavian vessels  No significant stenosis  RIGHT VERTEBRAL ARTERY CERVICAL SEGMENT:  Normal origin  The vessel is normal in caliber throughout the neck  LEFT VERTEBRAL ARTERY CERVICAL SEGMENT:  Normal origin  The vessel is normal in caliber throughout the neck  RIGHT EXTRACRANIAL CAROTID SEGMENT: Mild atherosclerotic disease of the distal common carotid artery and proximal cervical internal carotid artery without significant stenosis compared to the more distal ICA  There is mild tandem atherosclerotic stenosis  in the distal cervical ICA  LEFT EXTRACRANIAL CAROTID SEGMENT: Mild atherosclerotic disease of the distal common carotid artery and proximal cervical internal carotid artery without significant stenosis compared to the more distal ICA  There is additional mild tandem stenosis in the distal cervical ICA  NASCET criteria was used to determine the degree of internal carotid artery diameter stenosis  INTRACRANIAL VASCULATURE INTERNAL CAROTID ARTERIES: Atherosclerotic calcification of the carotid siphons without significant luminal narrowing  Ophthalmic artery origins and carotid termini are unremarkable  ANTERIOR CIRCULATION:  Symmetric A1 segments and anterior cerebral arteries with normal enhancement  Normal anterior communicating artery  MIDDLE CEREBRAL ARTERY CIRCULATION:  M1 segment and middle cerebral artery branches demonstrate normal enhancement bilaterally  DISTAL VERTEBRAL ARTERIES: Atherosclerotic calcifications and plaque within the intradural segments of both vertebral arteries, more pronounced on the right than the left with mild to moderate stenosis in the right pre-PICA segment   Posterior inferior cerebellar artery branches are patent bilaterally  Mild stenosis in the left intradural vertebral artery  Vertebrobasilar junction is unremarkable  BASILAR ARTERY: Mild atherosclerotic ectasia of the basilar artery  Atherosclerotic wall calcifications do not result in significant stenosis of the basilar artery  Basilar terminus is unremarkable  POSTERIOR CEREBRAL ARTERIES: Normal proximal PCAs normal left PCA  Patent left posterior communicating artery  Right posterior communicating artery is not well seen  VENOUS STRUCTURES:  Normal  NON VASCULAR ANATOMY BONY STRUCTURES:  No acute osseous abnormality  SOFT TISSUES OF THE NECK: 1 2 cm hyperdense nodule in the right thyroid lobe  Incidental identified on this CT  According to guidelines published in the February 2015 white paper on incidental thyroid nodules in the Journal of the [employer] of Radiology [facility]), because the nodule(s) are less than 1 5 cm in size and without suspicious feature, no further evaluation is recommended  THORACIC INLET: No consolidation  Left-sided loop recorder device noted in the anterior chest wall  Impression: No cervical intracranial large vessel occlusion  No intracranial aneurysm or dissection  No hemodynamically significant cervical carotid stenosis  Mild to moderate right intradural vertebral artery atherosclerotic disease  Findings were directly discussed with Slick Rosas at 12:22 p m  Workstation performed: [id number]       EKG/ Monitor: Personally reviewed  5/08/23 EKG 0034 hrs: Sinus tachycardia, rate 103 bpm  Left axis deviation  Septal infarct (cited on or before 07-MAY-2023)  Marked ST abnormality, possible anterolateral subendocardial injury  ST now depressed in Anterior leads  T wave inversion more evident in Lateral leads    Telemetry reviewed: currently sinus rhythm, rate 93 bpm  No events on telemetry        Code Status: Level 1 - Full Code  Advance Directive and Living Will: Yes    POLST:        Shaunna Welch PA-C

## 2023-05-08 NOTE — OCCUPATIONAL THERAPY NOTE
Occupational Therapy Evaluation       05/08/23 0940   Note Type   Note type Evaluation   Pain Assessment   Pain Assessment Tool 0-10   Pain Score No Pain   Restrictions/Precautions   Other Precautions Cognitive; Chair Alarm; Bed Alarm; Fall Risk;Pain   Home Living   Type of [address] [facility] [address] Two [address];Stairs to enter with rails;1/2 bath on main level  (3 SPEEDY)   Bathroom Shower/Tub Walk-in shower   Bathroom Toilet Standard   Bathroom Equipment Grab bars in [address]; Other (Comment)  (Rollator)   Prior Function   Level of [place] Independent with ADLs; Independent with functional mobility; Needs assistance with IADLS   Lives With Significant other   Receives Help From Friend(s); Home health   IADLs Family/Friend/Other provides transportation   Comments Patient offering limited verbalizations upon interview, appears agitated with increased questioning, home set up obtained from previous documentation; Patient reports PTA ambulatory with a cane and independent in ADLs   General   Additional Pertinent History Patient presented to hospital with c/o L sided weakness and facial droop, admit to r/o CVA, MRI pending at time of OT evaluation   ADL   Eating Assistance 5  Supervision/Setup   Grooming Assistance 4  Minimal Assistance   UB Bathing Assistance 2  Maximal Assistance   LB Bathing Assistance 2  Maximal Assistance   UB Dressing Assistance 2  Maximal Assistance   LB Dressing Assistance 2  Maximal [address]  2  Maximal Assistance   Bed Mobility   Supine to Sit 3  Moderate assistance   Additional items Assist x 1; Increased time required   Sit to Supine 3  Moderate assistance   Additional items Assist x 1;Verbal cues; Increased time required   Transfers   Sit to Stand 3  Moderate assistance   Additional items Assist x 1;Verbal cues   Stand to Sit 3  Moderate assistance   Additional items Assist x 1;Verbal cues   Additional Comments STS from EOB, RW for support   Functional Mobility   Functional Mobility 3  Moderate assistance   Additional Comments Few sidesteps along EOB with RW; further activity limited as transport arriving to pick patient up for MRI   Balance   Static Sitting Fair   Dynamic Sitting Fair -   Static Standing Fair -   Dynamic Standing Poor +   Activity Tolerance   Activity Tolerance Patient limited by fatigue;Treatment limited secondary to agitation   RUE Assessment   RUE Assessment WFL  (Patient is R hand dominant)   LUE Assessment   LUE Assessment X  (Patient with poor participation in ROM/MMT assessment, LUE appears weaker than right, +pronator drift; pt giving minimal effort upon testing, will continue to assess)   LUE Strength   LUE Overall Strength Deficits  (Grossly 3-/5 throughout)   Hand Function   Gross Motor Coordination Functional   Sensation   Light Touch No apparent deficits   Vision-Basic Assessment   Current Vision Wears glasses all the time   Vision - Complex Assessment   Ocular Range of Motion Intact   Tracking Intact   Acuity Able to read employee name badge without difficulty   Cognition   Overall Cognitive Status Impaired   Arousal/Participation Alert  (Agitated at times, minimally verbal)   Attention Attends with cues to redirect   Orientation Level Oriented to person;Oriented to place; Disoriented to time   Following Commands Follows one step commands with increased time or repetition   Comments Patient fatigued but awake during evaluation; Patient offering little verbalization, appears agitated with increased questioning, only giving few word answers when questioned; Able to follow one step commands, required increased cueing for initiation of task, poorly motivated for participation in activity today   Assessment   Limitation Decreased ADL status; Decreased UE strength;Decreased Safe judgement during ADL;Decreased cognition;Decreased endurance;Decreased self-care trans;Decreased high-level ADLs   Prognosis Good   Assessment Patient evaluated by Occupational Therapy  Patient admitted with Slurred speech  The patients occupational profile, medical and therapy history includes a extensive additional review of physical, cognitive, or psychosocial history related to current functional performance  Comorbidities affecting functional mobility and ADLS include: arthritis, osteoporosis, anemia, anxiety, GERD, BPH, cirrhosis, Crohn's disease, pelvic fracture  Prior to admission, patient was independent with functional mobility with cane, independent with ADLS and requiring assist for IADLS  The evaluation identifies the following performance deficits: weakness, impaired balance, decreased endurance, increased fall risk, new onset of impairment of functional mobility, decreased ADLS, decreased IADLS, decreased activity tolerance, decreased safety awareness, impaired judgement and decreased strength, that result in activity limitations and/or participation restrictions  This evaluation requires clinical decision making of high complexity, because the patient presents with comorbidites that affect occupational performance and required significant modification of tasks or assistance with consideration of multiple treatment options  The Barthel Index was used as a functional outcome tool presenting with a score of Barthel Index Score: 30, indicating marked limitations of functional mobility and ADLS  The patient's raw score on the AM-PAC Daily Activity Inpatient Short Form is 15  A raw score of less than 19 suggests the patient may benefit from discharge to post-acute rehabilitation services  Please refer to the recommendation of the Occupational Therapist for safe discharge planning  Please refer to the recommendation of the Occupational Therapist for safe DC planning  Patient will benefit from skilled Occupational Therapy services to address above deficits and facilitate a safe return to prior level of function     Goals   Patient Goals None stated at time of evaluation   STG Time Frame   (1-7 days)   Short Term Goal  Goals established to promote Patient Goals: none stated due to cognition:  Eating: independent; Grooming: supervision seated; Bathing: mod assist; Upper Body Dressing mod assist; Lower Body Dressing: mod assist; Toileting: mod assist; Patient will increase ambulatory commode transfer to mod assist with rolling walker to increase performance and safety with ADLS and functional mobility; Patient will increase standing tolerance to 5 minutes during ADL task to decrease assistance level and decrease fall risk; Patient will increase bed mobility to min assist in preparation for ADLS and transfers; Patient will increase functional mobility to and from bathroom with rolling walker with mod assist to increase performance with ADLS and to use a toilet; Patient will tolerate 5 minutes of UE ROM/strengthening to increase general activity tolerance and performance in ADLS/IADLS; Patient will improve functional activity tolerance to 5 minutes of sustained functional tasks to increase participation in basic self-care and decrease assistance level;  Patient will be able to to verbalize understanding and perform energy conservation/proper body mechanics during ADLS and functional mobility at least 50% of the time with moderate cueing to decrease signs of fatigue and increase stamina to return to prior level of function; Patient will increase dynamic sitting balance to fair to improve the ability to sit at edge of bed or on a chair for ADLS;  Patient will increase dynamic standing balance to fair- to improve postural stability and decrease fall risk during standing ADLS and transfers  LTG Time Frame   (8-14 days)   Long Term Goal Grooming: independent seated;  Bathing: min assist; Upper Body Dressing min assist; Lower Body Dressing: min assist; Toileting: min assist; Patient will increase ambulatory standard toilet transfer to min assist with rolling walker to increase performance and safety with ADLS and functional mobility; Patient will increase standing tolerance to 10 minutes during ADL task to decrease assistance level and decrease fall risk; Patient will increase bed mobility to supervision in preparation for ADLS and transfers; Patient will increase functional mobility to and from bathroom with rolling walker with min assist to increase performance with ADLS and to use a toilet; Patient will tolerate 10 minutes of UE ROM/strengthening to increase general activity tolerance and performance in ADLS/IADLS; Patient will improve functional activity tolerance to 10 minutes of sustained functional tasks to increase participation in basic self-care and decrease assistance level;  Patient will be able to to verbalize understanding and perform energy conservation/proper body mechanics during ADLS and functional mobility at least 75% of the time with minimal cueing to decrease signs of fatigue and increase stamina to return to prior level of function; Patient will increase static/dynamic sitting balance to fair+ to improve the ability to sit at edge of bed or on a chair for ADLS;  Patient will increase static/dynamic standing balance to fair to improve postural stability and decrease fall risk during standing ADLS and transfers  Pt will score >/= 19/24 on AM-PAC Daily Activity Inpatient scale to promote safe independence with ADLs and functional mobility; Pt will score >/= 55/100 on Barthel Index in order to decrease caregiver assistance needed and increase ability to perform ADLs and functional mobility  Plan   Treatment Interventions ADL retraining;Functional transfer training;UE strengthening/ROM; Endurance training;Patient/family training;Equipment evaluation/education; Compensatory technique education;Continued evaluation; Energy conservation; Activityengagement   Goal Expiration Date 05/22/23   OT Frequency 3-5x/wk   Recommendation   OT Discharge Recommendation Post acute rehabilitation services   AM-PAC Daily Activity Inpatient   Lower Body Dressing 2   Bathing 2   Toileting 2   Upper Body Dressing 2   Grooming 3   Eating 4   Daily Activity Raw Score 15   Daily Activity Standardized Score (Calc for Raw Score >=11) 34 69   AM-PAC Applied Cognition Inpatient   Following a Speech/Presentation 2   Understanding Ordinary Conversation 3   Taking Medications 1   Remembering Where Things Are Placed or Put Away 2   Remembering List of 4-5 Errands 1   Taking Care of Complicated Tasks 1   Applied Cognition Raw Score 10   Applied Cognition Standardized Score 24 98   Barthel Index   Feeding 5   Bathing 0   Grooming Score 0   Dressing Score 0   Bladder Score 5   Bowels Score 5   Toilet Use Score 5   Transfers (Bed/Chair) Score 5   Mobility (Level Surface) Score 0   Stairs Score 0   Barthel Index Score 22   Licensure   [place] License Number  Cherry [place], OTR/L [id number]

## 2023-05-08 NOTE — ASSESSMENT & PLAN NOTE
Patient presented to the ER with slurring of speech that started when she woke up at around 9 AM   Last known normal was 9 PM before she went to sleep per significant other  Per ER note, some left-sided weakness was noted    CT of head neg for acute pathology  Continue ASA, Plavix and 40 mg statin per neurology  Lipid panel - LDL 37, HgA1C 5 3  MRI due to for acute CVA  Chronic infarcts were noted    Stable meningioma  CTA head/neck negative for significant cervical carotid stenosis but did show mild to moderate right intradural vertebral arteries atherosclerotic disease  Echo with bubble study as noted below  Neuro eval  speech therapy/PT/OT

## 2023-05-08 NOTE — ASSESSMENT & PLAN NOTE
Lab Results   Component Value Date    EGFR 52 05/08/2023    EGFR 47 05/07/2023    EGFR 31 05/06/2023    CREATININE 1 02 05/08/2023    CREATININE 1 11 05/07/2023    CREATININE 1 54 (H) 05/06/2023     Creatinine stable  Repeat labs in a m

## 2023-05-08 NOTE — ASSESSMENT & PLAN NOTE
Hb lower today  Continue ferrous sulfate  Check Iron panel, FOBT  Rpt labs in a m    No obvious signs of bleeding    Results from last 7 days   Lab Units 05/08/23  1222 05/08/23  0425 05/07/23  1154 05/06/23  0526   HEMOGLOBIN g/dL 7 5* 7 5* 9 7* 8 4*   HEMATOCRIT % 25 3* 25 8* 32 3* 28 8*   MCV fL  --  94 95 94

## 2023-05-09 PROBLEM — I21.4 NSTEMI (NON-ST ELEVATED MYOCARDIAL INFARCTION) (HCC): Status: ACTIVE | Noted: 2023-01-01

## 2023-05-09 NOTE — PROGRESS NOTES
Wendy 43  Progress Note  Name: Ijeoma Bonds I  MRN: [id number]  Unit/Bed#: 02195 [address] I Date of Admission: 5/7/2023   Date of Service: 5/9/2023 I Hospital Day: 1    Assessment/Plan   * Slurred speech  Assessment & Plan  Patient presented to the ER with slurring of speech that started when she woke up at around 9 AM   Last known normal was 9 PM before she went to sleep per significant other  Per ER note, some left-sided weakness was noted    CT of head neg for acute pathology  Continue ASA, Plavix and 40 mg statin per neurology  Lipid panel - LDL 37, HgA1C 5 3  MRI due to for acute CVA  Chronic infarcts were noted  Stable meningioma  CTA head/neck negative for significant cervical carotid stenosis but did show mild to moderate right intradural vertebral arteries atherosclerotic disease  Echo with bubble study as noted below  Neuro eval  speech therapy/PT/OT    NSTEMI (non-ST elevated myocardial infarction) [facility])  Assessment & Plan  Patient reported chest pain overnight with EKG changes  · Peak troponin was 2268  Troponin has trended down this morning  · Continue heparin drip for 48 hours  · Continue aspirin, Plavix, statin, Lopressor  · Echo showed EF of 55% with grade 2 diastolic dysfunction, no PFO no wall motion abnormality  · Per cardiology medical management  · Nitropaste was discontinued and patient started on isosorbide 30 mg p o  daily      Recurrent UTI  Assessment & Plan  Based on UA  Started on IV ertapenem given history of ESBL E  Coli  Current urine cultures growing Klebsiella    Dementia with behavioral disturbance [facility])  Assessment & Plan  Patient with history of dementia which is progressively worsening  Also with behavioral disturbances where patient tends to lash out at family members at times  Per ER note patient was screaming at everyone  · Patient still appears somewhat angry and annoyed when asked questions  · Continue Remeron, Namenda, Lexapro    · As was recommended by psychiatry on one of her prior admissions will place patient on Zyprexa as needed    Stage 3b chronic kidney disease [facility])  Assessment & Plan  Lab Results   Component Value Date    EGFR 43 05/09/2023    EGFR 52 05/08/2023    EGFR 47 05/07/2023    CREATININE 1 20 05/09/2023    CREATININE 1 02 05/08/2023    CREATININE 1 11 05/07/2023     Creatinine stable  Repeat labs in a m  Benign essential hypertension  Assessment & Plan  Initially held Lopressor to allow for permissive hypertension  · Lopressor restarted at a lower dose of 25 mg every 12 hours  · Titrate up metoprolol as needed    Chronic anemia  Assessment & Plan  Hb lower today  Continue ferrous sulfate  Iron panel showed percent saturation 17, TIBC 194, iron level of 33  We will give some Venofer  No obvious signs of bleeding    Results from last 7 days   Lab Units 05/09/23  0550 05/08/23  1222 05/08/23  0425 05/07/23  1154 05/06/23  0526   HEMOGLOBIN g/dL 8 0* 7 5* 7 5* 9 7* 8 4*   HEMATOCRIT % 26 4* 25 3* 25 8* 32 3* 28 8*   MCV fL 92  --  94 95 94       Long-term use of immunosuppressant medication  Assessment & Plan  With history of liver transplant > 10 years ago  Continue CellCept, neorol    Hypothyroidism  Assessment & Plan  Continue Levothyroxine  TSH level elevated  · Per Significant other, patient takes levothyroxine along with her other medications  Dr Gautam Raya- Discussed with him and daughter to give it to her in the early morning without taking any of her other medications  · Repeat TSH as outpatient             VTE Pharmacologic Prophylaxis:   High Risk (Score >/= 5) - Pharmacological DVT Prophylaxis Ordered: heparin drip  Sequential Compression Devices Ordered  Patient Centered Rounds: I performed bedside rounds with nursing staff today  Discussions with Specialists or Other Care Team Provider: Cardiology    Education and Discussions with Family / Patient: Updated  (daughter) via phone      Total Time Spent on Date of Encounter in care of patient: 55 minutes This time was spent on one or more of the following: performing physical exam; counseling and coordination of care; obtaining or reviewing history; documenting in the medical record; reviewing/ordering tests, medications or procedures; communicating with other healthcare professionals and discussing with patient's family/caregivers  Current Length of Stay: 1 day(s)  Current Patient Status: Inpatient   Certification Statement: The patient will continue to require additional inpatient hospital stay due to NSTEMI, UTI  Discharge Plan: Anticipate discharge in 48-72 hrs to home with home services  Code Status: Level 1 - Full Code    Subjective:     Patient was not cooperative I did not take any of her medications this morning and did not eat her breakfast   Objective:     Vitals:   Temp (24hrs), Av °F (36 7 °C), Min:97 5 °F (36 4 °C), Max:98 2 °F (36 8 °C)    Temp:  [97 5 °F (36 4 °C)-98 2 °F (36 8 °C)] 97 5 °F (36 4 °C)  HR:  [83-96] 84  Resp:  [16-20] 19  BP: (104-138)/(59-76) 138/71  SpO2:  [97 %-100 %] 100 %  Body mass index is 35 69 kg/m²  Input and Output Summary (last 24 hours): Intake/Output Summary (Last 24 hours) at 2023 1309  Last data filed at 2023 0550  Gross per 24 hour   Intake --   Output 1200 ml   Net -1200 ml       Physical Exam:   Physical Exam  Constitutional:       Appearance: Normal appearance  HENT:      Head: Normocephalic and atraumatic  Nose: Nose normal       Mouth/Throat:      Mouth: Mucous membranes are moist       Pharynx: Oropharynx is clear  Eyes:      Extraocular Movements: Extraocular movements intact  Pupils: Pupils are equal, round, and reactive to light  Cardiovascular:      Rate and Rhythm: Normal rate and regular rhythm  Pulmonary:      Effort: Pulmonary effort is normal       Breath sounds: Normal breath sounds     Abdominal:      General: Bowel sounds are normal  There is no distension  Palpations: Abdomen is soft  Tenderness: There is no abdominal tenderness  Musculoskeletal:         General: No swelling  Cervical back: Normal range of motion and neck supple  Skin:     General: Skin is warm and dry  Neurological:      General: No focal deficit present  Mental Status: She is alert  Additional Data:     Labs:  Results from last 7 days   Lab Units 23  0550 23  0425 23  1154   WBC Thousand/uL 4 44   < > 5 29   HEMOGLOBIN g/dL 8 0*   < > 9 7*   HEMATOCRIT % 26 4*   < > 32 3*   PLATELETS Thousands/uL 209   < > 301   NEUTROS PCT %  --   --  68   LYMPHS PCT %  --   --  18   MONOS PCT %  --   --  8   EOS PCT %  --   --  4    < > = values in this interval not displayed       Results from last 7 days   Lab Units 23  0550   SODIUM mmol/L 139   POTASSIUM mmol/L 4 7   CHLORIDE mmol/L 111*   CO2 mmol/L 21   BUN mg/dL 23   CREATININE mg/dL 1 20   ANION GAP mmol/L 7   CALCIUM mg/dL 8 2*   ALBUMIN g/dL 2 8*   TOTAL BILIRUBIN mg/dL 0 34   ALK PHOS U/L 221*   ALT U/L 11   AST U/L 24   GLUCOSE RANDOM mg/dL 103     Results from last 7 days   Lab Units 23  0220   INR  1 01     Results from last 7 days   Lab Units 23  1141   POC GLUCOSE mg/dl 93     Results from last 7 days   Lab Units 23  0425 23  1657   HEMOGLOBIN A1C % 5 3 5 1           Lines/Drains:  Invasive Devices     Peripheral Intravenous Line  Duration           Peripheral IV 23 Proximal;Right;Ventral (anterior) Antecubital 2 days    Long-Dwell Peripheral IV (Midline)  Left Basilic 1 day          Drain  Duration           External Urinary Catheter <1 day                  Telemetry:  Telemetry Orders (From admission, onward)             24 Hour Telemetry Monitoring  Continuous x 24 Hours (Telem)           Question:  Reason for 24 Hour Telemetry  Answer:  Metabolic/Electrolyte Disturbance with High Probability of Dysrhythmia (K level <3 or >6, or KCL infusion >10mEq/hr)                 Telemetry Reviewed: Normal Sinus Rhythm  Indication for Continued Telemetry Use: Acute MI/Unstable Angina/Rule out ACS             Imaging: Reviewed radiology reports from this admission including: CT head and MRI brain    Recent Cultures (last 7 days):   Results from last 7 days   Lab Units 05/07/23  1642   URINE CULTURE  >100,000 cfu/ml Klebsiella pneumoniae*       Last 24 Hours Medication List:   Current Facility-Administered Medications   Medication Dose Route Frequency Provider Last Rate   • acetaminophen  650 mg Oral Q6H PRN Keerthi Revankar, DO     • aspirin  81 mg Oral Daily With Breakfast Kam Srikanth, DO     • atorvastatin  40 mg Oral Daily With SiteWit-Tucker Revankar, DO     • b complex-vitamin C-folic acid  1 capsule Oral Daily Keerthi Revankar, DO     • cholecalciferol  1,000 Units Oral Q24H Keerthi Revankar, DO     • clopidogrel  75 mg Oral Daily Keerthi Revankar, DO     • cycloSPORINE modified  50 mg Oral BID Keerthi Revankar, DO     • ertapenem  1,000 mg Intravenous Q24H Kam Srikanth, DO 1,000 mg (05/08/23 2051)   • escitalopram  5 mg Oral Daily Keerthi Revankar, DO     • ezetimibe  10 mg Oral Daily Keerthi Revankar, DO     • ferrous sulfate  325 mg Oral BID With Meals Keerthi Revankar, DO     • heparin (porcine)  3-20 Units/kg/hr (Order-Specific) Intravenous Titrated MATTHEW Schreiber 21 8 Units/kg/hr (05/09/23 0909)   • heparin (porcine)  2,000 Units Intravenous Q6H PRN Kam Srikanth, DO     • heparin (porcine)  4,000 Units Intravenous Q6H PRN Oral Srikanth, DO     • isosorbide mononitrate  30 mg Oral Daily Navtej Aidan, DO     • levothyroxine  112 mcg Oral Early Morning Keerthi Revankar, DO     • melatonin  3 mg Oral HS Keerthi Revankar, DO     • memantine  5 mg Oral Daily Keerthi Revankar, DO     • metoprolol tartrate  25 mg Oral Q12H [address] Rebekah Francis PA-C     • mirtazapine  45 mg Oral HS Keerthi Revankar, DO     • morphine injection  2 mg Intravenous Once Oral Srikanth, DO     • mycophenolate  750 mg Oral Q12H [address] Keerthi Revankar, DO     • nitroglycerin  0 4 mg Sublingual Q5 Min PRN Keerthi Revankar, DO     • OLANZapine  2 5 mg Oral Q6H PRN Keerthi Revankar, DO     • pantoprazole  40 mg Oral Early Morning Keerthi Revankar, DO     • polyethylene glycol  17 g Oral Daily PRN Keerthi Revankar, DO          Today, Patient Was Seen By: Rodri Vanessa MD    **Please Note: This note may have been constructed using a voice recognition system  **

## 2023-05-09 NOTE — ASSESSMENT & PLAN NOTE
Continue Levothyroxine  TSH level elevated  · Per Significant other, patient takes levothyroxine along with her other medications  Dr Kristi Borrego- Discussed with him and daughter to give it to her in the early morning without taking any of her other medications      · Repeat TSH as outpatient

## 2023-05-09 NOTE — ASSESSMENT & PLAN NOTE
Based on UA    Started on IV ertapenem given history of ESBL E  Coli  Current urine cultures growing Klebsiella

## 2023-05-09 NOTE — PLAN OF CARE
Problem: MOBILITY - ADULT  Goal: Maintain or return to baseline ADL function  Description: INTERVENTIONS:  -  Assess patient's ability to carry out ADLs; assess patient's baseline for ADL function and identify physical deficits which impact ability to perform ADLs (bathing, care of mouth/teeth, toileting, grooming, dressing, etc )  - Assess/evaluate cause of self-care deficits   - Assess range of motion  - Assess patient's mobility; develop plan if impaired  - Assess patient's need for assistive devices and provide as appropriate  - Encourage maximum independence but intervene and supervise when necessary  - Involve family in performance of ADLs  - Assess for home care needs following discharge   - Consider OT consult to assist with ADL evaluation and planning for discharge  - Provide patient education as appropriate  Outcome: Progressing  Goal: Maintains/Returns to pre admission functional level  Description: INTERVENTIONS:  - Perform BMAT or MOVE assessment daily    - Set and communicate daily mobility goal to care team and patient/family/caregiver  - Collaborate with rehabilitation services on mobility goals if consulted  - Perform Range of Motion 4 times a day  - Reposition patient every 2 hours    - Dangle patient 3 times a day  - Stand patient 3 times a day  - Ambulate patient 3 times a day  - Out of bed to chair 3 times a day   - Out of bed for meals 3 times a day  - Out of bed for toileting  - Record patient progress and toleration of activity level   Outcome: Progressing     Problem: PAIN - ADULT  Goal: Verbalizes/displays adequate comfort level or baseline comfort level  Description: Interventions:  - Encourage patient to monitor pain and request assistance  - Assess pain using appropriate pain scale  - Administer analgesics based on type and severity of pain and evaluate response  - Implement non-pharmacological measures as appropriate and evaluate response  - Consider cultural and social influences on pain and pain management  - Notify physician/advanced practitioner if interventions unsuccessful or patient reports new pain  Outcome: Progressing     Problem: INFECTION - ADULT  Goal: Absence or prevention of progression during hospitalization  Description: INTERVENTIONS:  - Assess and monitor for signs and symptoms of infection  - Monitor lab/diagnostic results  - Monitor all insertion sites, i e  indwelling lines, tubes, and drains  - Monitor endotracheal if appropriate and nasal secretions for changes in amount and color  - [place] appropriate cooling/warming therapies per order  - Administer medications as ordered  - Instruct and encourage patient and family to use good hand hygiene technique  - Identify and instruct in appropriate isolation precautions for identified infection/condition  Outcome: Progressing  Goal: Absence of fever/infection during neutropenic period  Description: INTERVENTIONS:  - Monitor WBC    Outcome: Progressing     Problem: SAFETY ADULT  Goal: Maintain or return to baseline ADL function  Description: INTERVENTIONS:  -  Assess patient's ability to carry out ADLs; assess patient's baseline for ADL function and identify physical deficits which impact ability to perform ADLs (bathing, care of mouth/teeth, toileting, grooming, dressing, etc )  - Assess/evaluate cause of self-care deficits   - Assess range of motion  - Assess patient's mobility; develop plan if impaired  - Assess patient's need for assistive devices and provide as appropriate  - Encourage maximum independence but intervene and supervise when necessary  - Involve family in performance of ADLs  - Assess for home care needs following discharge   - Consider OT consult to assist with ADL evaluation and planning for discharge  - Provide patient education as appropriate  Outcome: Progressing  Goal: Maintains/Returns to pre admission functional level  Description: INTERVENTIONS:  - Perform BMAT or MOVE assessment daily    - Set and communicate daily mobility goal to care team and patient/family/caregiver  - Collaborate with rehabilitation services on mobility goals if consulted  - Perform Range of Motion 4 times a day  - Reposition patient every 2 hours    - Dangle patient 3 times a day  - Stand patient 3 times a day  - Ambulate patient 3 times a day  - Out of bed to chair 3 times a day   - Out of bed for meals 3 times a day  - Out of bed for toileting  - Record patient progress and toleration of activity level   Outcome: Progressing  Goal: Patient will remain free of falls  Description: INTERVENTIONS:  - Educate patient/family on patient safety including physical limitations  - Instruct patient to call for assistance with activity   - Consult OT/PT to assist with strengthening/mobility   - Keep Call bell within reach  - Keep bed low and locked with side rails adjusted as appropriate  - Keep care items and personal belongings within reach  - Initiate and maintain comfort rounds  - Make Fall Risk Sign visible to staff  - Offer Toileting every 2 Hours, in advance of need  - Initiate/Maintain bed alarm  - Obtain necessary fall risk management equipment: yellow socks  - Apply yellow socks and bracelet for high fall risk patients  - Consider moving patient to room near nurses station  Outcome: Progressing     Problem: CARDIOVASCULAR - ADULT  Goal: Maintains optimal cardiac output and hemodynamic stability  Description: INTERVENTIONS:  - Monitor I/O, vital signs and rhythm  - Monitor for S/S and trends of decreased cardiac output  - Administer and titrate ordered vasoactive medications to optimize hemodynamic stability  - Assess quality of pulses, skin color and temperature  - Assess for signs of decreased coronary artery perfusion  - Instruct patient to report change in severity of symptoms  Outcome: Progressing  Goal: Absence of cardiac dysrhythmias or at baseline rhythm  Description: INTERVENTIONS:  - Continuous cardiac monitoring, vital signs, obtain 12 lead EKG if ordered  - Administer antiarrhythmic and heart rate control medications as ordered  - Monitor electrolytes and administer replacement therapy as ordered  Outcome: Progressing     Problem: NEUROSENSORY - ADULT  Goal: Achieves stable or improved neurological status  Description: INTERVENTIONS  - Monitor and report changes in neurological status  - Monitor vital signs such as temperature, blood pressure, glucose, and any other labs ordered   - Initiate measures to prevent increased intracranial pressure  - Monitor for seizure activity and implement precautions if appropriate      Outcome: Progressing  Goal: Achieves maximal functionality and self care  Description: INTERVENTIONS  - Monitor swallowing and airway patency with patient fatigue and changes in neurological status  - Encourage and assist patient to increase activity and self care     - Encourage visually impaired, hearing impaired and aphasic patients to use assistive/communication devices  Outcome: Progressing     Problem: Prexisting or High Potential for Compromised Skin Integrity  Goal: Skin integrity is maintained or improved  Description: INTERVENTIONS:  - Identify patients at risk for skin breakdown  - Assess and monitor skin integrity  - Assess and monitor nutrition and hydration status  - Monitor labs   - Assess for incontinence   - Turn and reposition patient  - Assist with mobility/ambulation  - Relieve pressure over bony prominences  - Avoid friction and shearing  - Provide appropriate hygiene as needed including keeping skin clean and dry  - Evaluate need for skin moisturizer/barrier cream  - Collaborate with interdisciplinary team   - Patient/family teaching  - Consider wound care consult   Outcome: Progressing     Problem: Nutrition/Hydration-ADULT  Goal: Nutrient/Hydration intake appropriate for improving, restoring or maintaining nutritional needs  Description: Monitor and assess patient's nutrition/hydration status for malnutrition  Collaborate with interdisciplinary team and initiate plan and interventions as ordered  Monitor patient's weight and dietary intake as ordered or per policy  Utilize nutrition screening tool and intervene as necessary  Determine patient's food preferences and provide high-protein, high-caloric foods as appropriate       INTERVENTIONS:  - Monitor oral intake, urinary output, labs, and treatment plans  - Assess nutrition and hydration status and recommend course of action  - Evaluate amount of meals eaten  - Assist patient with eating if necessary   - Allow adequate time for meals  - Recommend/ encourage appropriate diets, oral nutritional supplements, and vitamin/mineral supplements  - Order, calculate, and assess calorie counts as needed  - Recommend, monitor, and adjust tube feedings and TPN/PPN based on assessed needs  - Assess need for intravenous fluids  - Provide specific nutrition/hydration education as appropriate  - Include patient/family/caregiver in decisions related to nutrition  Outcome: Progressing

## 2023-05-09 NOTE — ASSESSMENT & PLAN NOTE
Initially held Lopressor to allow for permissive hypertension  · Lopressor restarted at a lower dose of 25 mg every 12 hours  · Titrate up metoprolol as needed

## 2023-05-09 NOTE — QUICK NOTE
Patient with recurrent episode of chest pain  Discussed with patient, her partner and her 2 children    She is agreeable for cardiac catheterization - will plan to do in AM Detail Level: Simple Add 21108 Cpt? (Important Note: In 2017 The Use Of 35507 Is Being Tracked By Cms To Determine Future Global Period Reimbursement For Global Periods): yes Wound Evaluated By (Optional): Dr. Villarreal Wound Diameter In Cm(Optional): 0 Wound Discharge?: minimal discharge Sutures?: intact Wound Edema?: mild Wound Drainage?: purulent drainage Wound Color?: red

## 2023-05-09 NOTE — SPEECH THERAPY NOTE
"Speech/Language Pathology Progress Note    Patient Name: Ange Cadena  [id number] Date: 5/9/2023     Subjective:  \"I said I don't want any food  \"    Objective:  Pt was seen for f/u to swallowing evaluation to ensure safe oral intake over time  She was assessed with thin liquid (she adamantly refused all food)  Adequate retrieval  Prompt appearing bolus transfer and swallow initiation  No coughing, throat clearing, change in vocal quality or BS  I spoke with RN re: rare cough with po intake when eating soup very rapidly (and benefit of slowed rate) as I had with dtr & SO     Plan/Recommendations:  Continue current diet, upright positioning and slow rate of intake  Plan f/u x1i/when pt desires food       Martita Arango [address] [id number]     "

## 2023-05-09 NOTE — PHYSICAL THERAPY NOTE
"   PT TREATMENT       05/09/23 1430   PT Last Visit   PT Visit Date 05/09/23   Note Type   Note Type Treatment   Pain Assessment   Pain Assessment Tool 0-10   Pain Score No Pain  (no pain at rest ; 5/10 chest pain following ambulation short distance within room)   Pain Onset/Description Onset: Sudden;Descriptor: Discomfort   Effect of Pain on Daily Activities limits mobility   Patient's Stated Pain Goal No pain   Hospital Pain Intervention(s) Repositioned  (RN notified)   Multiple Pain Sites No   Restrictions/Precautions   Weight Bearing Precautions Per Order No   Other Precautions Bed Alarm; Chair Alarm; Fall Risk;Pain;Cognitive   General   Chart Reviewed Yes   Family/Caregiver Present Yes  ( at bedside)   Cognition   Overall Cognitive Status Impaired   Arousal/Participation Cooperative   Orientation Level Oriented to person;Oriented to place; Disoriented to time   Following Commands Follows one step commands with increased time or repetition   Subjective   Subjective \"No I want to get in bed\"   Bed Mobility   Sit to Supine 3  Moderate assistance   Additional items Assist x 1;Verbal cues;LE management   Additional Comments received sitting in bedside chair   Transfers   Sit to Stand 4  Minimal assistance   Additional items Assist x 1;Verbal cues   Stand to Sit 4  Minimal assistance   Additional items Assist x 1;Verbal cues   Stand pivot 4  Minimal assistance   Additional items Assist x 1;Verbal cues   Ambulation/Elevation   Gait pattern Wide LOUIS; Short stride; Foward flexed; Step through pattern  (short stride length, decreased gait speed)   Gait Assistance 4  Minimal assist   Additional items Assist x 1;Verbal cues   Assistive Device Rolling walker   Distance 15 feet with change of direction within room   Stair Management Assistance Not tested   Balance   Static Sitting Fair   Dynamic Sitting Fair -   Static Standing Fair -   Dynamic Standing Poor +   Ambulatory Poor +   Activity Tolerance   Activity Tolerance " Patient limited by fatigue;Patient limited by pain;Treatment limited secondary to medical complications (Comment)  (Pt reports nausea + requesting to return to bed  Once supine pt reports chest pain - RN notified, EKG performed at bedside at [facility])   Nurse Made Aware yes   Assessment   Prognosis Good   Problem List Decreased strength;Decreased endurance; Impaired balance;Decreased mobility; Decreased cognition; Impaired judgement;Decreased safety awareness;Pain   Assessment Pt agreeable to PT session this afternoon - requesting to return to bed but agreeable to ambulation with verbal encouragement  Able to ambulate approx 5-6 feet when pt reports increased nausea, requesting to return to bed  Attempted to engage pt in deep breathing but pt easily agitated, wanting to get in bed  Continues to require Mod A to return to bed - once supine pt reports chest pain - RN notified and EKG performed at bedside at [facility]  The patient's AM-PAC Basic Mobility Inpatient Short Form Raw Score is 15  A Raw score of less than or equal to 16 suggests the patient may benefit from discharge to post-acute rehabilitation services  Please also refer to the recommendation of the Physical Therapist for safe discharge planning  Goals   Patient Goals to relax   Plan   Treatment/Interventions ADL retraining;Functional transfer training;LE strengthening/ROM; Therapeutic exercise; Endurance training;Bed mobility;Gait training;Spoke to nursing   PT Frequency Other (Comment)  (5x/week ; adjusted POC as pt is negative for stroke at this time)   Recommendation   PT Discharge Recommendation Post acute rehabilitation services   AM-PAC Basic Mobility Inpatient   Turning in Flat Bed Without Bedrails 3   Lying on Back to Sitting on Edge of Flat Bed Without Bedrails 2   Moving Bed to Chair 3   Standing Up From Chair Using Arms 3   Walk in Room 3   Climb 3-5 Stairs With Railing 1   Basic Mobility Inpatient Raw Score 15   Basic Mobility Standardized Score 36 97   Highest Level Of Mobility   -HLM Goal 4: Move to chair/commode   -HLM Achieved 6: Walk 10 steps or more   Education   Education Provided Mobility training;Home exercise program   Patient Reinforcement needed   End of Consult   Patient Position at End of Consult Supine;Bed/Chair alarm activated; All needs within reach   [facility] Insurance Number  Anna Crenshaw [id number]

## 2023-05-09 NOTE — NURSING NOTE
"Patient refused all medications this morning after numerous attempts  Pt  states \"I am not hungry, thirsty, and don't want anything, but to be left alone  \"   "

## 2023-05-09 NOTE — ACP (ADVANCE CARE PLANNING)
Serious Illness Conversation    1  What is your understanding now of where you are with your illness? Daughter has appropriate understanding of prognosis     2  How much information about what is likely to be ahead with your illness would you like to have? Daughter wants to be fully informed     3  What did you (clinician) communicate to the patient? Daughter stated that patient seems happier and more motivated to do things while at home as opposed to rehab facility  Discussed with daughter that her dementia will most likely continue to worsen and given her acute medical issues she may decline requiring being readmitted to the hospital and then going to rehab     4  If your health situation worsens, what are your most important goals? Daughter wants patient to be comfortable and pain free if she were to decline in the future     5  What are the biggest fears and worries about the future and your health? 6  What abilities are so critical to your life that you cannot imagine living without them? Daughter does not want her mother to be in pain or uncomfortable     7  What gives you strength as you think about the future with your illness? 8  If you become sicker, how much are you willing to go through for the possibility of gaining more time? Be in the hospital: Yes Have a feeding tube: No   Be in the ICU: Yes Live in a nursing home: Yes   Be on a ventilator: No Be uncomfortable: No   Be on dialysis: No Undergo aggressive test and/or procedures: No   9  How much does your proxy and family know about your priorities and wishes? I’ve heard you say that ideally keeping your mother at home where she is more comfortable and happier is really important to you  Keeping that in mind, and what we know about her illness, I recommend that we continue current medical treatment and get her better and discharged    If she were to decline during this hospitalization or in the future, you can consider hospice at that time   Daughter would also like to discuss with her brother before making any major medical decisions  This will help us make sure that your treatment plans reflect what’s important to you  How does this plan sound to you? I will do everything I can to help you through this    Patient verbalized understanding of the plan     I have spent 30 minutes speaking with my patient on advanced care planning today or during this visit    Discussed above with daughter Niall Anastasiya at bedside      Advanced directives

## 2023-05-09 NOTE — ASSESSMENT & PLAN NOTE
Lab Results   Component Value Date    EGFR 43 05/09/2023    EGFR 52 05/08/2023    EGFR 47 05/07/2023    CREATININE 1 20 05/09/2023    CREATININE 1 02 05/08/2023    CREATININE 1 11 05/07/2023     Creatinine stable  Repeat labs in a m

## 2023-05-09 NOTE — PLAN OF CARE
Problem: MOBILITY - ADULT  Goal: Maintain or return to baseline ADL function  Description: INTERVENTIONS:  -  Assess patient's ability to carry out ADLs; assess patient's baseline for ADL function and identify physical deficits which impact ability to perform ADLs (bathing, care of mouth/teeth, toileting, grooming, dressing, etc )  - Assess/evaluate cause of self-care deficits   - Assess range of motion  - Assess patient's mobility; develop plan if impaired  - Assess patient's need for assistive devices and provide as appropriate  - Encourage maximum independence but intervene and supervise when necessary  - Involve family in performance of ADLs  - Assess for home care needs following discharge   - Consider OT consult to assist with ADL evaluation and planning for discharge  - Provide patient education as appropriate  Outcome: Progressing     Problem: MOBILITY - ADULT  Goal: Maintains/Returns to pre admission functional level  Description: INTERVENTIONS:  - Perform BMAT or MOVE assessment daily    - Set and communicate daily mobility goal to care team and patient/family/caregiver  - Collaborate with rehabilitation services on mobility goals if consulted  - Perform Range of Motion 3 times a day  - Reposition patient every 2 hours    - Dangle patient 3 times a day  - Stand patient 3 times a day  - Ambulate patient 3 times a day  - Out of bed to chair 3 times a day   - Out of bed for meals 3 times a day  - Out of bed for toileting  - Record patient progress and toleration of activity level   Outcome: Progressing     Problem: PAIN - ADULT  Goal: Verbalizes/displays adequate comfort level or baseline comfort level  Description: Interventions:  - Encourage patient to monitor pain and request assistance  - Assess pain using appropriate pain scale  - Administer analgesics based on type and severity of pain and evaluate response  - Implement non-pharmacological measures as appropriate and evaluate response  - Consider cultural and social influences on pain and pain management  - Notify physician/advanced practitioner if interventions unsuccessful or patient reports new pain  Outcome: Progressing     Problem: INFECTION - ADULT  Goal: Absence or prevention of progression during hospitalization  Description: INTERVENTIONS:  - Assess and monitor for signs and symptoms of infection  - Monitor lab/diagnostic results  - Monitor all insertion sites, i e  indwelling lines, tubes, and drains  - [place] appropriate cooling/warming therapies per order  - Administer medications as ordered  - Instruct and encourage patient and family to use good hand hygiene technique  - Identify and instruct in appropriate isolation precautions for identified infection/condition  Outcome: Progressing     Problem: INFECTION - ADULT  Goal: Absence of fever/infection during neutropenic period  Description: INTERVENTIONS:  - Monitor WBC    Outcome: Progressing     Problem: SAFETY ADULT  Goal: Patient will remain free of falls  Description: INTERVENTIONS:  - Educate patient/family on patient safety including physical limitations  - Instruct patient to call for assistance with activity   - Consult OT/PT to assist with strengthening/mobility   - Keep Call bell within reach  - Keep bed low and locked with side rails adjusted as appropriate  - Keep care items and personal belongings within reach  - Initiate and maintain comfort rounds  - Make Fall Risk Sign visible to staff  - Offer Toileting every 2 Hours, in advance of need  - Initiate/Maintain bed alarm  - Obtain necessary fall risk management equipment: walker  - Apply yellow socks and bracelet for high fall risk patients  - Consider moving patient to room near nurses station  Outcome: Progressing     Problem: CARDIOVASCULAR - ADULT  Goal: Maintains optimal cardiac output and hemodynamic stability  Description: INTERVENTIONS:  - Monitor I/O, vital signs and rhythm  - Monitor for S/S and trends of decreased cardiac output  - Administer and titrate ordered vasoactive medications to optimize hemodynamic stability  - Assess quality of pulses, skin color and temperature  - Assess for signs of decreased coronary artery perfusion  - Instruct patient to report change in severity of symptoms  Outcome: Progressing     Problem: Prexisting or High Potential for Compromised Skin Integrity  Goal: Skin integrity is maintained or improved  Description: INTERVENTIONS:  - Identify patients at risk for skin breakdown  - Assess and monitor skin integrity  - Assess and monitor nutrition and hydration status  - Monitor labs   - Assess for incontinence   - Turn and reposition patient  - Assist with mobility/ambulation  - Relieve pressure over bony prominences  - Avoid friction and shearing  - Provide appropriate hygiene as needed including keeping skin clean and dry  - Evaluate need for skin moisturizer/barrier cream  - Collaborate with interdisciplinary team   - Patient/family teaching  - Consider wound care consult   Outcome: Progressing     Problem: NEUROSENSORY - ADULT  Goal: Achieves stable or improved neurological status  Description: INTERVENTIONS  - Monitor and report changes in neurological status  - Monitor vital signs such as temperature, blood pressure, glucose, and any other labs ordered   - Initiate measures to prevent increased intracranial pressure    Outcome: Progressing     Problem: NEUROSENSORY - ADULT  Goal: Achieves maximal functionality and self care  Description: INTERVENTIONS  - Monitor swallowing and airway patency with patient fatigue and changes in neurological status  - Encourage and assist patient to increase activity and self care     - Encourage visually impaired, hearing impaired and aphasic patients to use assistive/communication devices  Outcome: Progressing     Problem: Nutrition/Hydration-ADULT  Goal: Nutrient/Hydration intake appropriate for improving, restoring or maintaining nutritional needs  Description: Monitor and assess patient's nutrition/hydration status for malnutrition  Collaborate with interdisciplinary team and initiate plan and interventions as ordered  Monitor patient's weight and dietary intake as ordered or per policy  Utilize nutrition screening tool and intervene as necessary  Determine patient's food preferences and provide high-protein, high-caloric foods as appropriate       INTERVENTIONS:  - Monitor oral intake, urinary output, labs, and treatment plans  - Assess nutrition and hydration status and recommend course of action  - Evaluate amount of meals eaten  - Assist patient with eating if necessary   - Allow adequate time for meals  - Recommend/ encourage appropriate diets, oral nutritional supplements, and vitamin/mineral supplements  - Order, calculate, and assess calorie counts as needed  - Assess need for intravenous fluids  - Provide specific nutrition/hydration education as appropriate  - Include patient/family/caregiver in decisions related to nutrition  Outcome: Progressing

## 2023-05-09 NOTE — PLAN OF CARE
Problem: MOBILITY - ADULT  Goal: Maintain or return to baseline ADL function  Description: INTERVENTIONS:  -  Assess patient's ability to carry out ADLs; assess patient's baseline for ADL function and identify physical deficits which impact ability to perform ADLs (bathing, care of mouth/teeth, toileting, grooming, dressing, etc )  - Assess/evaluate cause of self-care deficits   - Assess range of motion  - Assess patient's mobility; develop plan if impaired  - Assess patient's need for assistive devices and provide as appropriate  - Encourage maximum independence but intervene and supervise when necessary  - Involve family in performance of ADLs  - Assess for home care needs following discharge   - Consider OT consult to assist with ADL evaluation and planning for discharge  - Provide patient education as appropriate  Outcome: Progressing  Goal: Maintains/Returns to pre admission functional level  Description: INTERVENTIONS:  - Perform BMAT or MOVE assessment daily    - Set and communicate daily mobility goal to care team and patient/family/caregiver  - Collaborate with rehabilitation services on mobility goals if consulted  - Perform Range of Motion 3 times a day  - Reposition patient every 2 hours    - Dangle patient 3 times a day  - Stand patient 3 times a day  - Ambulate patient 3 times a day  - Out of bed to chair 3 times a day   - Out of bed for meals 3 times a day  - Out of bed for toileting  - Record patient progress and toleration of activity level   Outcome: Progressing     Problem: PAIN - ADULT  Goal: Verbalizes/displays adequate comfort level or baseline comfort level  Description: Interventions:  - Encourage patient to monitor pain and request assistance  - Assess pain using appropriate pain scale  - Administer analgesics based on type and severity of pain and evaluate response  - Implement non-pharmacological measures as appropriate and evaluate response  - Consider cultural and social influences on pain and pain management  - Notify physician/advanced practitioner if interventions unsuccessful or patient reports new pain  Outcome: Progressing     Problem: INFECTION - ADULT  Goal: Absence or prevention of progression during hospitalization  Description: INTERVENTIONS:  - Assess and monitor for signs and symptoms of infection  - Monitor lab/diagnostic results  - Monitor all insertion sites, i e  indwelling lines, tubes, and drains  - [place] appropriate cooling/warming therapies per order  - Administer medications as ordered  - Instruct and encourage patient and family to use good hand hygiene technique  - Identify and instruct in appropriate isolation precautions for identified infection/condition  Outcome: Progressing  Goal: Absence of fever/infection during neutropenic period  Description: INTERVENTIONS:  - Monitor WBC    Outcome: Progressing     Problem: SAFETY ADULT  Goal: Maintain or return to baseline ADL function  Description: INTERVENTIONS:  -  Assess patient's ability to carry out ADLs; assess patient's baseline for ADL function and identify physical deficits which impact ability to perform ADLs (bathing, care of mouth/teeth, toileting, grooming, dressing, etc )  - Assess/evaluate cause of self-care deficits   - Assess range of motion  - Assess patient's mobility; develop plan if impaired  - Assess patient's need for assistive devices and provide as appropriate  - Encourage maximum independence but intervene and supervise when necessary  - Involve family in performance of ADLs  - Assess for home care needs following discharge   - Consider OT consult to assist with ADL evaluation and planning for discharge  - Provide patient education as appropriate  Outcome: Progressing  Goal: Maintains/Returns to pre admission functional level  Description: INTERVENTIONS:  - Perform BMAT or MOVE assessment daily    - Set and communicate daily mobility goal to care team and patient/family/caregiver     - Collaborate with rehabilitation services on mobility goals if consulted  - Perform Range of Motion 3 times a day  - Reposition patient every 2 hours    - Dangle patient 3 times a day  - Stand patient 3 times a day  - Ambulate patient 3 times a day  - Out of bed to chair 3 times a day   - Out of bed for meals 3 times a day  - Out of bed for toileting  - Record patient progress and toleration of activity level   Outcome: Progressing  Goal: Patient will remain free of falls  Description: INTERVENTIONS:  - Educate patient/family on patient safety including physical limitations  - Instruct patient to call for assistance with activity   - Consult OT/PT to assist with strengthening/mobility   - Keep Call bell within reach  - Keep bed low and locked with side rails adjusted as appropriate  - Keep care items and personal belongings within reach  - Initiate and maintain comfort rounds  - Make Fall Risk Sign visible to staff  - Offer Toileting every 2 Hours, in advance of need  - Initiate/Maintain bed alarm  - Obtain necessary fall risk management equipment: yellow socks  - Apply yellow socks and bracelet for high fall risk patients  - Consider moving patient to room near nurses station  Outcome: Progressing     Problem: CARDIOVASCULAR - ADULT  Goal: Maintains optimal cardiac output and hemodynamic stability  Description: INTERVENTIONS:  - Monitor I/O, vital signs and rhythm  - Monitor for S/S and trends of decreased cardiac output  - Administer and titrate ordered vasoactive medications to optimize hemodynamic stability  - Assess quality of pulses, skin color and temperature  - Assess for signs of decreased coronary artery perfusion  - Instruct patient to report change in severity of symptoms  Outcome: Progressing  Goal: Absence of cardiac dysrhythmias or at baseline rhythm  Description: INTERVENTIONS:  - Continuous cardiac monitoring, vital signs, obtain 12 lead EKG if ordered  - Administer antiarrhythmic and heart rate control medications as ordered  - Monitor electrolytes and administer replacement therapy as ordered  Outcome: Progressing     Problem: NEUROSENSORY - ADULT  Goal: Achieves stable or improved neurological status  Description: INTERVENTIONS  - Monitor and report changes in neurological status  - Monitor vital signs such as temperature, blood pressure, glucose, and any other labs ordered   - Initiate measures to prevent increased intracranial pressure    Outcome: Progressing  Goal: Achieves maximal functionality and self care  Description: INTERVENTIONS  - Monitor swallowing and airway patency with patient fatigue and changes in neurological status  - Encourage and assist patient to increase activity and self care  - Encourage visually impaired, hearing impaired and aphasic patients to use assistive/communication devices  Outcome: Progressing     Problem: Prexisting or High Potential for Compromised Skin Integrity  Goal: Skin integrity is maintained or improved  Description: INTERVENTIONS:  - Identify patients at risk for skin breakdown  - Assess and monitor skin integrity  - Assess and monitor nutrition and hydration status  - Monitor labs   - Assess for incontinence   - Turn and reposition patient  - Assist with mobility/ambulation  - Relieve pressure over bony prominences  - Avoid friction and shearing  - Provide appropriate hygiene as needed including keeping skin clean and dry  - Evaluate need for skin moisturizer/barrier cream  - Collaborate with interdisciplinary team   - Patient/family teaching  - Consider wound care consult   Outcome: Progressing     Problem: Nutrition/Hydration-ADULT  Goal: Nutrient/Hydration intake appropriate for improving, restoring or maintaining nutritional needs  Description: Monitor and assess patient's nutrition/hydration status for malnutrition  Collaborate with interdisciplinary team and initiate plan and interventions as ordered    Monitor patient's weight and dietary intake as ordered or per policy  Utilize nutrition screening tool and intervene as necessary  Determine patient's food preferences and provide high-protein, high-caloric foods as appropriate       INTERVENTIONS:  - Monitor oral intake, urinary output, labs, and treatment plans  - Assess nutrition and hydration status and recommend course of action  - Evaluate amount of meals eaten  - Assist patient with eating if necessary   - Allow adequate time for meals  - Recommend/ encourage appropriate diets, oral nutritional supplements, and vitamin/mineral supplements  - Order, calculate, and assess calorie counts as needed  - Assess need for intravenous fluids  - Provide specific nutrition/hydration education as appropriate  - Include patient/family/caregiver in decisions related to nutrition  Outcome: Progressing

## 2023-05-09 NOTE — ASSESSMENT & PLAN NOTE
Hb lower today  Continue ferrous sulfate  Iron panel showed percent saturation 17, TIBC 194, iron level of 33    We will give some Venofer  No obvious signs of bleeding    Results from last 7 days   Lab Units 05/09/23  0550 05/08/23  1222 05/08/23  0425 05/07/23  1154 05/06/23  0526   HEMOGLOBIN g/dL 8 0* 7 5* 7 5* 9 7* 8 4*   HEMATOCRIT % 26 4* 25 3* 25 8* 32 3* 28 8*   MCV fL 92  --  94 95 94

## 2023-05-09 NOTE — PROGRESS NOTES
Progress Note - Cardiology   [facility]     Lorin Gayle 76 y o  female MRN: [id number]  : 1945  Unit/Bed#: [address] [place] [address] Encounter: [id number]    Assessment and Plan:   1  NSTEMI     - Cardiology consulted for concern for chest pain and elevated troponin  Internal Medicine note reports patient reported chest pain at 2300 hrs on 23 and 0200 hrs on 23    - 23 2321 hrs EKG showed normal sinus rhythm; left axis deviation; septal infarct , age undetermined; marked ST abnormality, possible lateral subendocardial injury; ST depressed in Anterolateral leads; T wave inversion evident in Anterolateral leads   - Repeat EKG on at 0034 hrs noted sinus tachycardia; eft axis deviation; septal infarct; marked ST abnormality, possible anterolateral subendocardial injury; ST depressed in Anterior leads  T wave inversion more evident in Lateral leads  - 23 0026hrs hs troponin 26, 0220 hrs 543, 0427hrs 1,307, 0836 hrs 2,268    - 23 hs troponin: 898  - 23: Patient denies experiencing chest pain overnight or this morning but note patient was given nitro at 1830 hrs and 1930 hr on 23 and 0014 hrs on 23  When asked again if she had experienced any chest pain last night she denies any symptoms of chest pain  - Continue heparin drip  - Continue nitro as needed  - Currently on aspirin 81 mg daily, plavix 75 mg daily, and Lipitor 40 mg daily  - 23 TTE: LVEF 55%  Systolic function is normal  Diastolic function is moderately abnormal, consistent with grade II (pseudonormal) relaxation  Left atrial filling pressure is elevated  - Patient will require ischemic workup prior to discharge       2  Stroke-like symptoms       - Notes indicate family noted patient had difficulty speaking and left sided weakness prior to presentation  - 23 CT brain demonstrated no acute intracranial abnormality, stable chronic microangiopathic changes within the brain  "  - 5/07/23 CTA head/neck showed no acute pathology  - 5/08/23 MRI brain notes no acute intracranial abnormality, advanced chronic microangiopathic changes, chronic lacunar infarct right posterior frontal subcortical white matter  - Neurology following    - Currently on aspirin 81 mg daily, plavix 75 mg daily, and Lipitor 40 mg daily       3  Chronic anemia      - Continue to monitor    - 5/09/23 H/H: 8 0/26  4       4  UTI       - Currently on ertapenem  - Patient has history of ESBL UTI sensitive to ertapenem  - Care per primary team       5  Hypertension      -  BP controlled  - Continue Lopressor 25 mg twice daily      6  Hyperlipidemia       - 5/08/23 lipid panel: cholesterol 114, triglycerides 203, HDL 36, LDL 37    - Currently on Lipitor 40 mg daily and zetia 10 mg daily       7  Hypothyroidism       - Currently on levothyroxine 112 mcg daily  - 5/07/23 TSH: 9 037, elevated  - 5/07/23 Free T4: 0 79, normal       8  Liver transplant       - Care per primary team    - On immunosuppressants       9  CVA     - Prior lacunar infarct  - Currently on aspirin 81 mg daily, plavix 75 mg daily, and Lipitor 40 mg daily    - Neurology following       10  CKDIII     - Baseline creatinine 1 1-1 4    - Care per primary team       11  Dementia       - Supportive care  Subjective / Objective:        Patient denies experiencing chest pain overnight or this morning but note patient was given nitro at 1830 hrs and 1930 hr on 5/08/23 and 0014 hrs on 5/09/23  When asked again if she had experienced any chest pain last night she denies any symptoms of chest pain  She currently denies chest pain, shortness of breath, lightheadedness, dizziness, nausea, or vomiting  Hs troponin down this morning to 898 from 2,268 yesterday  Vitals: Blood pressure 138/71, pulse 84, temperature 97 5 °F (36 4 °C), resp   rate 19, height 5' 2\" (1 575 m), weight 88 5 kg (195 lb 1 7 oz), SpO2 100 %, not currently " breastfeeding  Vitals:    05/07/23 1553 05/08/23 1116   Weight: 88 5 kg (195 lb 3 2 oz) 88 5 kg (195 lb 1 7 oz)     Body mass index is 35 69 kg/m²  BP Readings from Last 3 Encounters:   05/09/23 138/71   05/06/23 139/65   05/04/23 110/58     Orthostatic Blood Pressures    Flowsheet Row Most Recent Value   Blood Pressure 138/71 filed at 05/09/2023 0719   Patient Position - Orthostatic VS Lying filed at 05/07/2023 1903        I/O       05/07 0701  05/08 0700 05/08 0701  05/09 0700 05/09 0701  05/10 0700    P  O  300 0     Total Intake(mL/kg) 300 (3 4) 0 (0)     Urine (mL/kg/hr)  1200 (0 6)     Stool  0     Total Output  1200     Net +300 -1200            Unmeasured Stool Occurrence  1 x         Invasive Devices     Peripheral Intravenous Line  Duration           Peripheral IV 05/07/23 Proximal;Right;Ventral (anterior) Antecubital 2 days    Long-Dwell Peripheral IV (Midline) 36/26/49 Left Basilic 1 day          Drain  Duration           External Urinary Catheter <1 day                  Intake/Output Summary (Last 24 hours) at 5/9/2023 0842  Last data filed at 5/9/2023 0550  Gross per 24 hour   Intake --   Output 1200 ml   Net -1200 ml         Physical Exam:   Physical Exam  Vitals reviewed  Constitutional:       General: She is not in acute distress  Cardiovascular:      Rate and Rhythm: Normal rate and regular rhythm  Pulses: Normal pulses  Heart sounds: Murmur heard  Pulmonary:      Effort: Pulmonary effort is normal  No respiratory distress  Breath sounds: Decreased breath sounds present  Musculoskeletal:      Right lower leg: No edema  Left lower leg: No edema  Skin:     General: Skin is warm and dry  Neurological:      Mental Status: She is alert  She is confused  Psychiatric:         Behavior: Behavior is agitated         Medications/ Allergies:     Current Facility-Administered Medications   Medication Dose Route Frequency Provider Last Rate   • acetaminophen  650 mg Oral Q6H PRN Keerthi Revankar, DO     • aspirin  81 mg Oral Daily With Breakfast Vernard Sour, DO     • atorvastatin  40 mg Oral Daily With [place] Revankar, DO     • b complex-vitamin C-folic acid  1 capsule Oral Daily Keerthi Revankar, DO     • cholecalciferol  1,000 Units Oral Q24H Keerthi Revankar, DO     • clopidogrel  75 mg Oral Daily Keerthi Revankar, DO     • cycloSPORINE modified  50 mg Oral BID Keerthi Revankar, DO     • ertapenem  1,000 mg Intravenous Q24H Vernard Sour, DO 1,000 mg (05/08/23 2051)   • escitalopram  5 mg Oral Daily Keerthi Revankar, DO     • ezetimibe  10 mg Oral Daily Keerthi Revankar, DO     • ferrous sulfate  325 mg Oral BID With Meals Keerthi Revankar, DO     • heparin (porcine)  3-20 Units/kg/hr (Order-Specific) Intravenous Titrated MATTHEW Schreiber 21 8 Units/kg/hr (05/09/23 0015)   • heparin (porcine)  2,000 Units Intravenous Q6H PRN Vernard Sour, DO     • heparin (porcine)  4,000 Units Intravenous Q6H PRN Vernard Sour, DO     • levothyroxine  112 mcg Oral Early Morning Keerthi Revankar, DO     • melatonin  3 mg Oral HS Keerthi Revankar, DO     • memantine  5 mg Oral Daily Keerthi Revankar, DO     • metoprolol tartrate  25 mg Oral Q12H [facility] Ramon Hollins PA-C     • mirtazapine  45 mg Oral HS Keerthi Revankar, DO     • morphine injection  2 mg Intravenous Once Vernard Sour, DO     • mycophenolate  750 mg Oral Q12H [facility] Revankar, DO     • nitroglycerin  0 4 mg Sublingual Q5 Min PRN Keerthi Revankar, DO     • OLANZapine  2 5 mg Oral Q6H PRN Keerthi Revankar, DO     • pantoprazole  40 mg Oral Early Morning Keerthi Revankar, DO     • polyethylene glycol  17 g Oral Daily PRN Keerthi Revankar, DO       acetaminophen, 650 mg, Q6H PRN  heparin (porcine), 2,000 Units, Q6H PRN  heparin (porcine), 4,000 Units, Q6H PRN  nitroglycerin, 0 4 mg, Q5 Min PRN  OLANZapine, 2 5 mg, Q6H PRN  polyethylene glycol, 17 g, Daily PRN      Allergies   Allergen Reactions   • Tetracyclines & Related Hives   • Vancomycin Other (See Comments) and Itching     Reaction Date: [id number]; Pt unsure of reaction   • Prograf [Tacrolimus] Anxiety     Reaction Date: [id number]; VTE Pharmacologic Prophylaxis:   Heparin    Labs:   Troponins:  Results from last 7 days   Lab Units 05/09/23  0550 05/08/23  0836 05/08/23  0427 05/08/23  0026 05/07/23  1749 05/07/23  1345   HS TNI RAND ng/L 898* 2,268* 1,307*   < >  --   --    HSTNI D2 ng/L  --   --   --   --   --  -1   HSTNI D4 ng/L  --   --   --   --  1  --     < > = values in this interval not displayed           CBC with diff:  Results from last 7 days   Lab Units 05/09/23  0550 05/08/23  1222 05/08/23  0425 05/07/23  1154 05/06/23  0526   WBC Thousand/uL 4 44  --  5 38 5 29 4 18*   HEMOGLOBIN g/dL 8 0* 7 5* 7 5* 9 7* 8 4*   HEMATOCRIT % 26 4* 25 3* 25 8* 32 3* 28 8*   MCV fL 92  --  94 95 94   PLATELETS Thousands/uL 209  --  231 301 218   MCH pg 27 8  --  27 4 28 6 27 4   MCHC g/dL 30 3*  --  29 1* 30 0* 29 2*   RDW % 14 2  --  14 4 14 6 14 4   MPV fL 9 3  --  9 7 10 8 9 5   NRBC AUTO /100 WBCs  --   --   --  0 0       CMP:  Results from last 7 days   Lab Units 05/09/23  0550 05/08/23  0425 05/07/23  1154 05/06/23  0526   SODIUM mmol/L 139 137 139 138   POTASSIUM mmol/L 4 7 5 0 4 9 4 5   CHLORIDE mmol/L 111* 111* 108 111*   CO2 mmol/L 21 21 22 20*   ANION GAP mmol/L 7 5 9 7   BUN mg/dL 23 23 29* 37*   CREATININE mg/dL 1 20 1 02 1 11 1 54*   GLUCOSE FASTING mg/dL  --  109*  --   --    CALCIUM mg/dL 8 2* 7 9* 8 7 8 0*   AST U/L 24  --  23 17   ALT U/L 11  --  11 9   ALK PHOS U/L 221*  --  254* 199*   TOTAL PROTEIN g/dL 5 8*  --  7 1 6 0*   ALBUMIN g/dL 2 8*  --  3 4* 2 9*   TOTAL BILIRUBIN mg/dL 0 34  --  0 35 0 36   EGFR ml/min/1 73sq m 43 52 47 31       Magnesium:  Results from last 7 days   Lab Units 05/08/23  0425 05/07/23  1154   MAGNESIUM mg/dL 1 9 2 3     Coags:  Results from last 7 days   Lab Units 05/09/23  0550 05/08/23  2318 05/08/23  1523 05/08/23  0832 05/08/23  0220   PTT seconds 68* 55* 34 26 24   INR   --   --   --   --  1 01     TSH:  Results from last 7 days   Lab Units 05/07/23  1154   TSH 3RD GENERATON uIU/mL 9 037*     No components found for: TSH3  Lipid Profile:  Results from last 7 days   Lab Units 05/08/23  0425   CHOLESTEROL mg/dL 114   TRIGLYCERIDES mg/dL 203*   HDL mg/dL 36*   LDL CALC mg/dL 37     Hgb A1c:  Results from last 7 days   Lab Units 05/08/23  0425 05/04/23  1657   HEMOGLOBIN A1C % 5 3 5 1     NT-proBNP: No results for input(s): NTBNP in the last 72 hours  Imaging & Testing   I have personally reviewed pertinent reports  XR ankle 3+ vw right    Result Date: 4/14/2023  Narrative: RIGHT ANKLE INDICATION:   S82 831D: Other fracture of upper and lower end of right fibula, subsequent encounter for closed fracture with routine healing  COMPARISON:  01/19/2023 VIEWS:  XR ANKLE 3+ VW RIGHT Images: 3 FINDINGS: There is no acute fracture or dislocation  Orthopedic plate with multiple screws in the distal fibula  Calcaneal spur(s) noted  No lytic or blastic osseous lesion  Soft tissue swelling about the ankle  Diffuse atherosclerotic calcifications  Impression: No acute osseous abnormality  Workstation performed: [id number]     MRI brain wo contrast    Result Date: 5/8/2023  Narrative: MRI BRAIN WITHOUT CONTRAST INDICATION: stroke  COMPARISON:   None  TECHNIQUE:  Multiplanar, multisequence imaging of the brain was performed  IMAGE QUALITY:  Diagnostic  FINDINGS: BRAIN PARENCHYMA:  There is no discrete mass, mass effect or midline shift  There is no intracranial hemorrhage  There is no evidence of acute infarction and diffusion imaging is unremarkable  Small scattered hyperintensities on T2/FLAIR imaging are noted in the periventricular and subcortical white matter demonstrating an appearance that is statistically most likely to represent advanced microangiopathic change   Chronic lacunar infarct right posterior frontal subcortical white matter, right centrum semiovale  Chronic cortical infarct right occipital lobe  Extra-axial mass along the cribriform plate identified retrospectively stable for many years measures approximately 1 6 x 0 9 x 1 8 cm likely meningioma  No adjacent parenchymal edema  Foci of susceptibility artifact are seen in the supratentorial and infratentorial brain bilaterally compatible with chronic microhemorrhage with differential considerations to include chronic hypertension versus amyloid angiopathy  VENTRICLES:  Normal for the patient's age  SELLA AND PITUITARY GLAND:  Normal  ORBITS: Bilateral cataract surgery  PARANASAL SINUSES:  Normal  VASCULATURE:  Evaluation of the major intracranial vasculature demonstrates appropriate flow voids  CALVARIUM AND SKULL BASE:  Normal  EXTRACRANIAL SOFT TISSUES:  Normal      Impression: No acute intracranial abnormality  Advanced chronic microangiopathic changes  Chronic infarcts are noted as described above  1 6 x 0 9 x 1 8 cm extra-axial mass along the cribriform plate is retrospectively stable for several years compatible with a meningioma without adjacent parenchymal edema  Workstation performed: [id number]     CT stroke alert brain    Result Date: 5/7/2023  Narrative: CT BRAIN - STROKE ALERT PROTOCOL INDICATION:   Stroke Alert  COMPARISON: Head CT from November 17, 2020  MRI of the brain from February 1, 2019  TECHNIQUE:  CT examination of the brain was performed  In addition to axial images, coronal reformatted images were created and submitted for interpretation  Radiation dose length product (DLP) for this visit:  935 07 mGy-cm   This examination, like all CT scans performed in the [facility], was performed utilizing techniques to minimize radiation dose exposure, including the use of iterative  reconstruction and automated exposure control  IMAGE QUALITY:  Diagnostic   FINDINGS: PARENCHYMA: Decreased attenuation is noted in periventricular and subcortical white matter demonstrating an appearance that is statistically most likely to represent advanced microangiopathic change  There is stable asymmetric hypoattenuation in the right occipital white matter, likely representing additional site of remote prior ischemic insult  No CT signs of acute infarction  Chronic lacunar infarction in the right corona radiata (2:24) no intracranial mass, mass effect or midline shift  No acute parenchymal hemorrhage  Heavy intracranial atherosclerotic calcification of the carotid and vertebral arteries  VENTRICLES AND EXTRA-AXIAL SPACES: Stable lateral more than third and fourth ventriculomegaly  Generalized prominence of the cerebral sulci  Stable asymmetric left lateral frontal sulcal dilatation (2:25)  VISUALIZED ORBITS: Normal visualized orbits  PARANASAL SINUSES: Normal visualized paranasal sinuses  CALVARIUM AND EXTRACRANIAL SOFT TISSUES:   Normal      Impression: No acute intracranial abnormality  Stable chronic microangiopathic changes within the brain  Findings were directly discussed with Joann Lomax at 12:22 pm  Workstation performed: [id number]     CTA stroke alert (head/neck)    Result Date: 5/7/2023  Narrative: CTA NECK AND BRAIN WITH CONTRAST INDICATION: Stroke Alert COMPARISON:   None  TECHNIQUE:   Post contrast imaging was performed after administration of iodinated contrast through the neck and brain  Post contrast axial 0 625 mm images timed to opacify the arterial system  3D rendering was performed on an independent workstation  MIP reconstructions performed  Coronal reconstructions were performed of the noncontrast portion of the brain  Radiation dose length product (DLP) for this visit:  451 mGy-cm   This examination, like all CT scans performed in the [facility], was performed utilizing techniques to minimize radiation dose exposure, including the use of iterative reconstruction and automated exposure control   IV Contrast:  85 mL of iohexol (OMNIPAQUE) IMAGE QUALITY:   Diagnostic FINDINGS: CERVICAL VASCULATURE AORTIC ARCH AND GREAT VESSELS: Mild atherosclerotic disease of the arch, proximal great vessels and visualized subclavian vessels  No significant stenosis  RIGHT VERTEBRAL ARTERY CERVICAL SEGMENT:  Normal origin  The vessel is normal in caliber throughout the neck  LEFT VERTEBRAL ARTERY CERVICAL SEGMENT:  Normal origin  The vessel is normal in caliber throughout the neck  RIGHT EXTRACRANIAL CAROTID SEGMENT: Mild atherosclerotic disease of the distal common carotid artery and proximal cervical internal carotid artery without significant stenosis compared to the more distal ICA  There is mild tandem atherosclerotic stenosis  in the distal cervical ICA  LEFT EXTRACRANIAL CAROTID SEGMENT: Mild atherosclerotic disease of the distal common carotid artery and proximal cervical internal carotid artery without significant stenosis compared to the more distal ICA  There is additional mild tandem stenosis in the distal cervical ICA  NASCET criteria was used to determine the degree of internal carotid artery diameter stenosis  INTRACRANIAL VASCULATURE INTERNAL CAROTID ARTERIES: Atherosclerotic calcification of the carotid siphons without significant luminal narrowing  Ophthalmic artery origins and carotid termini are unremarkable  ANTERIOR CIRCULATION:  Symmetric A1 segments and anterior cerebral arteries with normal enhancement  Normal anterior communicating artery  MIDDLE CEREBRAL ARTERY CIRCULATION:  M1 segment and middle cerebral artery branches demonstrate normal enhancement bilaterally  DISTAL VERTEBRAL ARTERIES: Atherosclerotic calcifications and plaque within the intradural segments of both vertebral arteries, more pronounced on the right than the left with mild to moderate stenosis in the right pre-PICA segment  Posterior inferior cerebellar artery branches are patent bilaterally  Mild stenosis in the left intradural vertebral artery  Vertebrobasilar junction is unremarkable  BASILAR ARTERY: Mild atherosclerotic ectasia of the basilar artery  Atherosclerotic wall calcifications do not result in significant stenosis of the basilar artery  Basilar terminus is unremarkable  POSTERIOR CEREBRAL ARTERIES: Normal proximal PCAs normal left PCA  Patent left posterior communicating artery  Right posterior communicating artery is not well seen  VENOUS STRUCTURES:  Normal  NON VASCULAR ANATOMY BONY STRUCTURES:  No acute osseous abnormality  SOFT TISSUES OF THE NECK: 1 2 cm hyperdense nodule in the right thyroid lobe  Incidental identified on this CT  According to guidelines published in the February 2015 white paper on incidental thyroid nodules in the Journal of the [employer] of Radiology [facility]), because the nodule(s) are less than 1 5 cm in size and without suspicious feature, no further evaluation is recommended  THORACIC INLET: No consolidation  Left-sided loop recorder device noted in the anterior chest wall  Impression: No cervical intracranial large vessel occlusion  No intracranial aneurysm or dissection  No hemodynamically significant cervical carotid stenosis  Mild to moderate right intradural vertebral artery atherosclerotic disease  Findings were directly discussed with Sadaf Candelaria at 12:22 p m  Workstation performed: [id number]         EKG / Monitor: Personally reviewed  Telemetry reviewed: currently sinus rhythm, rate 84 bpm  PVCs  Cardiac testing:     Echo complete w/ contrast if indicated    Result Date: 5/8/2023  Narrative: •  Left Ventricle: Left ventricular cavity size is normal  Wall thickness is severely increased  The left ventricular ejection fraction is 55% by visual estimation  Systolic function is normal  Although no diagnostic regional wall motion abnormality was identified, this possibility cannot be completely excluded on the basis of this study   Diastolic function is moderately abnormal, consistent with grade II (pseudonormal) relaxation  Left atrial filling pressure is elevated  •  IVS: There is sigmoid appearance of the septum  •  Left Atrium: The atrium is mildly dilated (35-41 mL/m2)  •  Atrial Septum: No patent foramen ovale detected using saline contrast injection at rest  •  Mitral Valve: There is severe diffuse calcification  There is moderately reduced mobility  There is severe annular calcification  There is mild to moderate regurgitation  •  Tricuspid Valve: There is mild regurgitation  The right ventricular systolic pressure is normal  The estimated right ventricular systolic pressure is 88 63 mmHg  Results for orders placed during the hospital encounter of 10/29/17    Echo complete with contrast if indicated    Narrative  [address]  [address] [facility] [address]  [phone]    Transthoracic Echocardiogram  2D, M-mode, Doppler, and Color Doppler    Study date:  30-Oct-2017    Patient: Roz Leonard  MR number: [id number]  Account number: [de-identified]  : 1945  Age: 66 years  Gender: Female  Status: Routine  Location: Bedside  Height: 62 in  Weight: 202 6 lb  BP: 136/ 72 mmHg    Indications: TIA    Diagnoses: I67 9 - Cerebrovascular disease, unspecified    Sonographer:  Phil Bush  Primary Physician:  Elda Addison MD  Referring Physician:  Bertha Cooper MD  Group:  Viktor Levine  RN:  Yana Carrera RN  Interpreting Physician:  Sofia Hawley MD    SUMMARY    LEFT VENTRICLE:  Systolic function was normal  Ejection fraction was estimated in the range of 55 % to 60 %  There were no regional wall motion abnormalities  Wall thickness was mildly increased  Doppler parameters were consistent with abnormal left ventricular relaxation (grade 1 diastolic dysfunction)  Doppler parameters were consistent with elevated mean left atrial filling pressure  LEFT ATRIUM:  The atrium was mildly dilated      ATRIAL SEPTUM:  No significant defect or patent foramen ovale was identified  There was a trace left-to-right shunt  This is within normal limits for the patient's age  TRICUSPID VALVE:  The tricuspid jet envelope definition was inadequate for estimation of RV systolic pressure  There are no indirect findings (abnormal RV volume or geometry, altered pulmonary flow velocity profile, or leftward septal displacement) which  would suggest moderate or severe pulmonary hypertension  HISTORY: PRIOR HISTORY: GERD, Hypothyroidism, Anxiety, Anemia    PROCEDURE: The procedure was performed at the bedside  This was a routine study  The transthoracic approach was used  The study included complete 2D imaging, M-mode, complete spectral Doppler, and color Doppler  The heart rate was 80 bpm,  at the start of the study  Intravenous contrast ( 10 ml) was administered to evaluate shunting  Image quality was adequate  LEFT VENTRICLE: Size was normal  Systolic function was normal  Ejection fraction was estimated in the range of 55 % to 60 %  There were no regional wall motion abnormalities  Wall thickness was mildly increased  No evidence of apical  thrombus  DOPPLER: Doppler parameters were consistent with abnormal left ventricular relaxation (grade 1 diastolic dysfunction)  Doppler parameters were consistent with elevated mean left atrial filling pressure  RIGHT VENTRICLE: The size was normal  Systolic function was normal  Wall thickness was normal     LEFT ATRIUM: The atrium was mildly dilated  ATRIAL SEPTUM: No significant defect or patent foramen ovale was identified  There was a trace left-to-right shunt  This is within normal limits for the patient's age  RIGHT ATRIUM: Size was normal     MITRAL VALVE: There was mild to moderate annular calcification  Valve structure was normal  There was normal leaflet separation  DOPPLER: The transmitral velocity was within the normal range  There was no evidence for stenosis   There was  no significant regurgitation  AORTIC VALVE: The valve was not visualized well enough to rule out a bicuspid morphology  Leaflets exhibited mildly increased thickness and normal cuspal separation  DOPPLER: Transaortic velocity was within the normal range  There was no  evidence for stenosis  There was no significant regurgitation  TRICUSPID VALVE: The valve structure was normal  There was normal leaflet separation  DOPPLER: The transtricuspid velocity was within the normal range  There was no evidence for stenosis  There was trace regurgitation  The tricuspid jet  envelope definition was inadequate for estimation of RV systolic pressure  There are no indirect findings (abnormal RV volume or geometry, altered pulmonary flow velocity profile, or leftward septal displacement) which would suggest  moderate or severe pulmonary hypertension  PULMONIC VALVE: Leaflets exhibited normal thickness, no calcification, and normal cuspal separation  DOPPLER: The transpulmonic velocity was within the normal range  There was no significant regurgitation  PERICARDIUM: There was no pericardial effusion  The pericardium was normal in appearance  AORTA: The root exhibited normal size  SYSTEMIC VEINS: IVC: The inferior vena cava was normal in size  SYSTEM MEASUREMENT TABLES    2D mode  AoR Diam 2D: 3 cm  LA Diam (2D): 4 1 cm  LA/Ao (2D): 1 37  FS (2D Teich): 25 1 %  IVSd (2D): 1 14 cm  LVDEV: 65 9 cm³  LVESV: 32 8 cm³  LVIDd(2D): 3 9 cm  LVISd (2D): 2 92 cm  LVPWd (2D): 1 09 cm  SV (Teich): 33 1 cm³    Apical four chamber  LVEF A4C: 56 %    Unspecified Scan Mode  MV Peak A Shiv: 1670 mm/s  MV Peak E Shiv   Mean: 1030 mm/s  MVA (PHT): 4 4 cm squared  PHT: 50 ms  Max P mm[Hg]  V Max: 2570 mm/s  Vmax: 2540 mm/s  RA Area: 10 6 cm squared  RA Volume: 20 9 cm³  TAPSE: 1 6 cm    [facility] Accredited Echocardiography Laboratory    Prepared and electronically signed by    Farhat Black MD  Signed 30-Oct-2017 14:13:31    Results for orders placed during the hospital encounter of 17    NM myocardial perfusion spect (rx stress and/or rest)    Narrative  [address]  [address] [facility] [phone]    Rest/Stress Gated SPECT Myocardial Perfusion Imaging After Regadenoson    Patient: Radha Mehta  MR number: [id number]  Account number: [de-identified]  : 1945  Age: 66 years  Gender: Female  Status: Inpatient  Location: Stress lab  Height: 62 in  Weight: 205 lb  BP: 161/ 79 mmHg    Allergies: TETRACYCLINES RELATED, VANCOMYCIN, TACROLIMUS    Diagnosis: I21 4 - Non-ST elevation (NSTEMI) myocardial infarction    Primary Physician:  Tommy Joyce MD  RN:  TRUDI Arce  Group:  Beata Braden  Report Prepared By[de-identified]  TRUDI Arce  Interpreting Physician:  Yudith Madison MD    INDICATIONS: Detection of coronary artery disease  HISTORY: The patient is a 66year old  female  Chest pain status: no chest pain  Coronary artery disease risk factors: dyslipidemia  Cardiovascular history: arrhythmia, stroke, and transient ischemic attack  Co-morbidity: obesity,  Liver transplant  Medications: clopidogrel, a lipid lowering agent, thyroid medications, and transplant medications  PHYSICAL EXAM: Baseline physical exam screening: no wheezes audible  REST ECG: Normal sinus rhythm  PROCEDURE: The study was performed in the stress lab  A regadenoson infusion pharmacologic stress test was performed  Gated SPECT myocardial perfusion imaging was performed after stress and at rest  Systolic blood pressure was 161 mmHg, at  the start of the study  Diastolic blood pressure was 79 mmHg, at the start of the study  The heart rate was 76 bpm, at the start of the study  IV double checked    Regadenoson protocol:  Time HR bpm SBP mmHg DBP mmHg Symptoms Rhythm/conduct  Baseline 11:55 76 161 79 none NSR  Immediate 11:57 100 137 63 mild dyspnea --  1 min 11:58 102 168 72 same as above --  2 min 11:59 100 170 81 subsiding --  3 min 12:00 98 163 69 subsiding --  No medications or fluids given  STRESS SUMMARY: Duration of pharmacologic stress was 3 min  Maximal heart rate during stress was 103 bpm  The heart rate response to stress was normal  There was resting hypertension with an appropriate blood pressure response to stress  The rate-pressure product for the peak heart rate and blood pressure was 23565  There was no chest pain during stress  The stress test was terminated due to protocol completion  Pre oxygen saturation: 98 %  Peak oxygen saturation: 100 %  The stress ECG was negative for ischemia and normal  There were no stress arrhythmias or conduction abnormalities  ISOTOPE ADMINISTRATION:  Resting isotope administration Stress isotope administration  Agent Tetrofosmin Tetrofosmin  Dose 11 mCi 32 mCi  Date 11/06/2017 11/07/2017    The radiopharmaceutical was injected at the peak effect of pharmacologic stress  MYOCARDIAL PERFUSION IMAGING:  The image quality was fair  Left ventricular size was normal  The left ventricle dilated transiently during stress  The TID ratio was 1 26  PERFUSION DEFECTS:  -  There was a small, mildly severe, partially reversible myocardial perfusion defect of the apical lateral wall possibly due to artifact  GATED SPECT:  The calculated left ventricular ejection fraction was 70 %  Left ventricular ejection fraction was within normal limits by visual estimate  There was no left ventricular regional abnormality  SUMMARY:  -  Stress results: There was resting hypertension with an appropriate blood pressure response to stress  There was no chest pain during stress  -  ECG conclusions: The stress ECG was negative for ischemia and normal   -  Perfusion imaging: The left ventricle dilated transiently during stress   There was a small, mildly severe, partially reversible myocardial perfusion defect of the apical lateral wall possibly due to artifact   -  Gated SPECT: The calculated left ventricular ejection fraction was 70 %  Left ventricular ejection fraction was within normal limits by visual estimate  There was no left ventricular regional abnormality  IMPRESSIONS: Mildly abnormal study after pharmacologic vasodilation  There is evidence of cavity dilation with normal LV function and focal apico- lateral wall ischemia      Prepared and signed by    Eric Alva MD  Signed 11/07/2017 17:18:41            Rebekah Francis PA-C

## 2023-05-09 NOTE — CASE MANAGEMENT
Case Management Assessment & Discharge Planning Note    Patient name Claudette Melissa  Location [address] Geoff Caba 53-* MRN [id number]  : 1945 Date 2023       Current Admission Date: 2023  Current Admission Diagnosis:Slurred speech   Patient Active Problem List    Diagnosis Date Noted   • NSTEMI (non-ST elevated myocardial infarction) ([place] [address] ) 2023   • Slurred speech 2023   • Immunodeficiency due to drugs ([place] [address] ) 2023   • MRSA (methicillin resistant staph aureus) culture positive 2023   • Dementia with behavioral disturbance ([place] [address] ) 2022   • Fall 2022   • Closed fracture of distal end of right fibula 2022   • GERD (gastroesophageal reflux disease)    • Stage 3b chronic kidney disease ([place] [address] ) 2022   • REZA (acute kidney injury) ([place] [address] ) 2021   • Recurrent UTI 2021   • Obesity, morbid ([place] [address] ) 2021   • Osteopenia of multiple sites 2020   • Other fatigue 2020   • Snoring 2020   • Chronic left-sided thoracic back pain 2019   • Left ovarian cyst 2019   • Asymptomatic stenosis of left carotid artery 2018   • Abnormal EEG 2017   • Altered mental status 2017   • Cerebrovascular accident (CVA) due to embolism ([place] [address] ) 2017   • Non-ST elevation myocardial infarction (NSTEMI), type 2 2017   • History of seizure 10/31/2017   • Paroxysmal SVT (supraventricular tachycardia) ([place] [address] ) 10/31/2017   • TIA (transient ischemic attack) 10/29/2017   • HLD (hyperlipidemia) 10/29/2017   • Cyst of diaphragm 2017   • Long-term use of immunosuppressant medication 2016   • Chronic anemia 2016   • Liver transplanted ([place] [address] ) 2016   • Hypothyroidism 2016   • Elevated homocysteine 2016   • Chronic joint pain 2016   • Lung nodule, solitary 2016   • Sciatica 2015   • Hypercalcemia 2015   • Ataxic gait 2013   • Chronic kidney insufficiency 2013   • Benign essential hypertension 09/04/2012   • Ulcerative colitis without complications ([facility] [address] ) 86/82/7161   • Depression 05/07/2012      LOS (days): 1  Geometric Mean LOS (GMLOS) (days):   Days to GMLOS:     OBJECTIVE:    Risk of Unplanned Readmission Score: 33 46      Current admission status: Inpatient  Referral Reason: Stroke    Preferred Pharmacy:   [address] [facility]  [facility] [address], [address]  [place] [address]  Phone: [phone] Fax: [phone]    [id number] [address]  [address] [facility] [address]  Phone: [phone] Fax: [phone]    Primary Care Provider: Derick Akbar MD    Primary Insurance: MEDICARE  Secondary Insurance: COMMERCIAL MISCELLANEOUS    ASSESSMENT:  Dequan Nereyda, [address] - Daughter   Primary Phone: [phone] (Mobile)  Home Phone: [phone]               Readmission Root Cause  30 Day Readmission: No    Patient Information  Admitted from[de-identified] Home  Mental Status: Confused (Hx of dementia)  During Assessment patient was accompanied by: Spouse  Assessment information provided by[de-identified] Spouse, Daughter  Primary Caregiver: Spouse  Caregiver's Name[de-identified] Johnie Hernandez (sig other)  Caregiver's Relationship to Patient[de-identified] Significant Other  Caregiver's Telephone Number[de-identified] [phone]  Support Systems: Daughter, Spouse/significant other  [place] of Residence: [address] [place] [address] do you live in?: Wendy Huang 45 entry access options   Select all that apply : Stairs  Number of steps to enter home : 3  Type of Current Residence: 2 story home  In the last 12 months, was there a time when you were not able to pay the mortgage or rent on time?: No  In the last 12 months, how many places have you lived?: 1  In the last 12 months, was there a time when you did not have a steady place to sleep or slept in a shelter (including now)?: No  Homeless/housing insecurity resource given?: N/A  Living Arrangements: Lives w/ Spouse/significant other    Activities of Daily Living Prior to Admission  Functional Status: Assistance  Completes ADLs independently?: No  Level of ADL dependence: Assistance  Ambulates independently?: Yes  Does patient use assisted devices?: Yes  Assisted Devices (DME) used: Rollator  Does patient currently own DME?: Yes  What DME does the patient currently own?: Raman Wu  Does the patient have a history of Short-Term Rehab?: Yes (Hx at [address])  Does patient have a history of HHC?: Yes  Does patient currently have [address]?: Yes    Current [address] [employer]  Type of Current Home Care Services: Home PT, [address][de-identified] Cleve Dennis [address] Provider[de-identified] PCP    Patient Information Continued  Income Source: Pension/residential  Does patient have prescription coverage?: Yes  Within the past 12 months, you worried that your food would run out before you got the money to buy more : Never true  Within the past 12 months, the food you bought just didn't last and you didn't have money to get more : Never true  Food insecurity resource given?: N/A  Does patient receive dialysis treatments?: No     Means of Transportation  Means of Transport to Appts[de-identified] Family transport  In the past 12 months, has lack of transportation kept you from medical appointments or from getting medications?: No  In the past 12 months, has lack of transportation kept you from meetings, work, or from getting things needed for daily living?: No  Was application for public transport provided?: N/A    DISCHARGE DETAILS:    Discharge planning discussed with[de-identified] Dtr Peri Bee  Freedom of Choice: Yes  Comments - Freedom of Choice: TIMOTHY contacted dtr via phone and spoke bedside with Mini Chavez to introduce role, complete assessment, and discuss discharge plan  TIMOTHY reviewed recommendation for STR   Dtr notes that pt did not thrive in STR her last admission and ended up having a happier mood and was more active/participative with therapy when she was discharged home w/ HHC  Dtr also notes that pt has used all 100 Medicare days and has not been out long enough for them to renew  Choice at this time is for patient to return home with sig other and [facility] with [facility]  CM contacted family/caregiver?: Yes  Were Treatment Team discharge recommendations reviewed with patient/caregiver?: Yes  Did patient/caregiver verbalize understanding of patient care needs?: Yes  Were patient/caregiver advised of the risks associated with not following Treatment Team discharge recommendations?: Yes    Contacts  Patient Contacts: Nicole Gonzalez (dtr)  Relationship to Patient[de-identified] Family  Contact Method: Phone  Phone Number: [phone]  Reason/Outcome: Continuity of Care, Emergency Contact, Discharge [address]         Is the patient interested in [facility] at discharge?: Yes  [address] requested[de-identified] Occupational Therapy, Nursing, Physical [address] Name[de-identified] Other  [address] [place] [address] Provider[de-identified] PCP  Home Health Services Needed[de-identified] Gait/ADL Training, Evaluate Functional Status and Safety, Strengthening/Theraputic Exercises to Improve Function  Homebound Criteria Met[de-identified] Requires the Assistance of Another Person for Safe Ambulation or to Leave the Home, Uses an Assist Device (i e  cane, walker, etc)  Supporting Clincal Findings[de-identified] Cognitive Deficit Requiring the Assistance of Others, Limited Endurance, Fatigues Easliy in [employer]     Other Referral/Resources/Interventions Provided:  Interventions: [facility]  Referral Comments: RAD[facility] referral sent in [place] to [facility]  Response pending       Treatment Team Recommendation: Short Term Rehab  Discharge Destination Plan[de-identified] Home with [place] at Discharge : Family

## 2023-05-09 NOTE — ASSESSMENT & PLAN NOTE
Patient reported chest pain overnight with EKG changes  · Peak troponin was 2268  Troponin has trended down this morning  · Continue heparin drip for 48 hours    · Continue aspirin, Plavix, statin, Lopressor  · Echo showed EF of 55% with grade 2 diastolic dysfunction, no PFO no wall motion abnormality  · Per cardiology medical management  · Nitropaste was discontinued and patient started on isosorbide 30 mg p o  daily

## 2023-05-10 PROBLEM — M79.89 LEFT ARM SWELLING: Status: ACTIVE | Noted: 2023-01-01

## 2023-05-10 NOTE — PROGRESS NOTES
Wendy 43  Progress Note  Name: Florentin Campos I  MRN: [id number]  Unit/Bed#: 00906 [address] I Date of Admission: 5/7/2023   Date of Service: 5/10/2023 I Hospital Day: 2    Assessment/Plan   * Slurred speech  Assessment & Plan  Patient presented to the ER with slurring of speech that started when she woke up at around 9 AM   Last known normal was 9 PM before she went to sleep per significant other  Per ER note, some left-sided weakness was noted    CT of head neg for acute pathology  Continue ASA, Plavix and 40 mg statin per neurology  Lipid panel - LDL 37, HgA1C 5 3  MRI due to for acute CVA  Chronic infarcts were noted  Stable meningioma  CTA head/neck negative for significant cervical carotid stenosis but did show mild to moderate right intradural vertebral arteries atherosclerotic disease  Echo with bubble study as noted below  Neuro eval  speech therapy/PT/OT    NSTEMI (non-ST elevated myocardial infarction) [facility])  Assessment & Plan  Patient reported chest pain overnight with EKG changes  · Peak troponin was 2268  Troponin has trended down this morning  · Patient has been on heparin drip for 48 hours which will be discontinued today  · Continue aspirin, Plavix, statin, Lopressor  · Echo showed EF of 55% with grade 2 diastolic dysfunction, no PFO no wall motion abnormality  · Initially medical management was decided but later after discussion with family patient was scheduled for cardiac catheterization today which was canceled due to anemia  Patient scheduled for cardiac catheterization tomorrow after receiving transfusion today  · Nitropaste was discontinued and patient started on isosorbide 30 mg p o  daily      Recurrent UTI  Assessment & Plan  Based on UA  Started on IV ertapenem given history of ESBL E  Coli  Current urine cultures growing ESBL Klebsiella  Completed 3-day course of IV ertapenem on 5/9/2023 which will be discontinued      Left arm swelling  Assessment & Plan  Patient has a midline catheter in the left arm and patient also noted to have some left arm swelling  Check left upper extremity Dopplers rule out DVT    Dementia with behavioral disturbance [facility])  Assessment & Plan  Patient with history of dementia which is progressively worsening  Also with behavioral disturbances where patient tends to lash out at family members at times  Per ER note patient was screaming at everyone  · Patient still appears somewhat angry and annoyed when asked questions  · Continue Remeron, Namenda, Lexapro  · As was recommended by psychiatry on one of her prior admissions will place patient on Zyprexa as needed    Stage 3b chronic kidney disease [facility])  Assessment & Plan  Lab Results   Component Value Date    EGFR 38 05/10/2023    EGFR 43 05/09/2023    EGFR 52 05/08/2023    CREATININE 1 31 (H) 05/10/2023    CREATININE 1 20 05/09/2023    CREATININE 1 02 05/08/2023     Creatinine increased slightly  Check PVR  Repeat labs in a m  Benign essential hypertension  Assessment & Plan  Initially held Lopressor to allow for permissive hypertension  · Lopressor restarted at a lower dose of 25 mg every 12 hours  · Titrate up metoprolol as needed    Acute on chronic anemia  Assessment & Plan   Hemoglobin dropped to 6 9  No evidence of active bleeding  Patient has been on iron supplementation  Iron panel showed percent saturation 17, TIBC 194, iron level of 33     Check stool for occult blood  Patient was given 1 unit of PBC transfusion and was also started on Venofer 100 mg IV daily  Follow-up posttransfusion H&H    Results from last 7 days   Lab Units 05/10/23  0801 05/10/23  0641 05/09/23  0550 05/08/23  1222 05/08/23  0425 05/07/23  1154 05/06/23  0526   HEMOGLOBIN g/dL 6 9* 6 9* 8 0* 7 5* 7 5* 9 7* 8 4*   HEMATOCRIT % 23 1* 22 8* 26 4* 25 3* 25 8* 32 3* 28 8*   MCV fL  --  93 92  --  94 95 94       Long-term use of immunosuppressant medication  Assessment & Plan  With history of liver transplant > 10 years ago  Continue CellCept, neorol    Hypothyroidism  Assessment & Plan  Continue Levothyroxine  TSH level elevated  · Per Significant other, patient takes levothyroxine along with her other medications  Dr Jaida Walker- Discussed with him and daughter to give it to her in the early morning without taking any of her other medications  · Repeat TSH as outpatient             VTE Pharmacologic Prophylaxis:   High Risk (Score >/= 5) - Pharmacological DVT Prophylaxis Ordered: heparin  Sequential Compression Devices Ordered  Patient Centered Rounds: I performed bedside rounds with nursing staff today  Discussions with Specialists or Other Care Team Provider: Cardiology    Education and Discussions with Family / Patient: Updated  (daughter) via phone  Total Time Spent on Date of Encounter in care of patient: 55 minutes This time was spent on one or more of the following: performing physical exam; counseling and coordination of care; obtaining or reviewing history; documenting in the medical record; reviewing/ordering tests, medications or procedures; communicating with other healthcare professionals and discussing with patient's family/caregivers  Current Length of Stay: 2 day(s)  Current Patient Status: Inpatient   Certification Statement: The patient will continue to require additional inpatient hospital stay due to NSTEMI, anemia  Discharge Plan: Anticipate discharge in 48-72 hrs to home with home services  Code Status: Level 1 - Full Code    Subjective:   Patient had some chest pain and needed NTG overnight  Objective:     Vitals:   Temp (24hrs), Av 5 °F (36 4 °C), Min:97 2 °F (36 2 °C), Max:97 9 °F (36 6 °C)    Temp:  [97 2 °F (36 2 °C)-97 9 °F (36 6 °C)] 97 2 °F (36 2 °C)  HR:  [80-99] 80  Resp:  [18-20] 20  BP: ()/(46-74) 113/57  SpO2:  [98 %-100 %] 98 %  Body mass index is 35 69 kg/m²  Input and Output Summary (last 24 hours):      Intake/Output Summary (Last 24 hours) at 5/10/2023 1657  Last data filed at 5/10/2023 1220  Gross per 24 hour   Intake 320 83 ml   Output 400 ml   Net -79 17 ml       Physical Exam:   Physical Exam  Constitutional:       Appearance: Normal appearance  HENT:      Head: Normocephalic and atraumatic  Nose: Nose normal       Mouth/Throat:      Mouth: Mucous membranes are moist       Pharynx: Oropharynx is clear  Eyes:      Extraocular Movements: Extraocular movements intact  Pupils: Pupils are equal, round, and reactive to light  Cardiovascular:      Rate and Rhythm: Normal rate and regular rhythm  Pulmonary:      Effort: Pulmonary effort is normal       Breath sounds: Normal breath sounds  Abdominal:      General: Bowel sounds are normal  There is no distension  Palpations: Abdomen is soft  Tenderness: There is no abdominal tenderness  Musculoskeletal:         General: No swelling  Cervical back: Normal range of motion and neck supple  Comments: Left arm swelling   Skin:     General: Skin is warm and dry  Neurological:      General: No focal deficit present  Mental Status: She is alert  Additional Data:     Labs:  Results from last 7 days   Lab Units 05/10/23  0801 05/10/23  0641 05/08/23  0425 05/07/23  1154   WBC Thousand/uL  --  5 23   < > 5 29   HEMOGLOBIN g/dL 6 9* 6 9*   < > 9 7*   HEMATOCRIT % 23 1* 22 8*   < > 32 3*   PLATELETS Thousands/uL  --  221   < > 301   NEUTROS PCT %  --   --   --  68   LYMPHS PCT %  --   --   --  18   MONOS PCT %  --   --   --  8   EOS PCT %  --   --   --  4    < > = values in this interval not displayed       Results from last 7 days   Lab Units 05/10/23  0522 05/09/23  0550   SODIUM mmol/L 139 139   POTASSIUM mmol/L 4 7 4 7   CHLORIDE mmol/L 111* 111*   CO2 mmol/L 20* 21   BUN mg/dL 23 23   CREATININE mg/dL 1 31* 1 20   ANION GAP mmol/L 8 7   CALCIUM mg/dL 7 7* 8 2*   ALBUMIN g/dL  --  2 8*   TOTAL BILIRUBIN mg/dL  --  0 34   ALK PHOS U/L  --  221* ALT U/L  --  11   AST U/L  --  24   GLUCOSE RANDOM mg/dL 125 103     Results from last 7 days   Lab Units 05/10/23  0522   INR  1 06     Results from last 7 days   Lab Units 05/07/23  1141   POC GLUCOSE mg/dl 93     Results from last 7 days   Lab Units 05/08/23  0425 05/04/23  1657   HEMOGLOBIN A1C % 5 3 5 1           Lines/Drains:  Invasive Devices     Peripheral Intravenous Line  Duration           Peripheral IV 05/07/23 Proximal;Right;Ventral (anterior) Antecubital 3 days    Long-Dwell Peripheral IV (Midline) 07/99/06 Left Basilic 2 days          Drain  Duration           External Urinary Catheter 1 day                  Telemetry:  Telemetry Orders (From admission, onward)             24 Hour Telemetry Monitoring  Continuous x 24 Hours (Telem)        Question:  Reason for 24 Hour Telemetry  Answer:  PCI/EP study (including pacer and ICD implementation), Cardiac surgery, MI, abnormal cardiac cath, and chest pain- rule out MI                 Telemetry Reviewed: Normal Sinus Rhythm  Indication for Continued Telemetry Use: Acute MI/Unstable Angina/Rule out ACS             Imaging: Reviewed radiology reports from this admission including: chest xray    Recent Cultures (last 7 days):   Results from last 7 days   Lab Units 05/07/23  1642   URINE CULTURE  >100,000 cfu/ml Klebsiella pneumoniae ESBL*       Last 24 Hours Medication List:   Current Facility-Administered Medications   Medication Dose Route Frequency Provider Last Rate   • acetaminophen  650 mg Oral Q6H PRN Keerthi Revankar, DO     • aspirin  81 mg Oral Daily With Breakfast Vanessa Rodas, DO     • atorvastatin  40 mg Oral Daily With Penny-Tucker Revankar, DO     • b complex-vitamin C-folic acid  1 capsule Oral Daily Keerthi Revankar, DO     • cholecalciferol  1,000 Units Oral Q24H Keerthi Revankar, DO     • clopidogrel  75 mg Oral Daily Keerthi Revankar, DO     • cycloSPORINE modified  50 mg Oral BID Keerthi Revankar, DO     • escitalopram  5 mg Oral Daily Keerthi Revankar, DO     • ezetimibe  10 mg Oral Daily Keerthi Revankar, DO     • heparin (porcine)  5,000 Units Subcutaneous Q8H [address] Lashae Knight MD     • iron sucrose  300 mg Intravenous Daily Robert Gould MD     • isosorbide mononitrate  30 mg Oral Daily Navtej Aidan, DO     • levothyroxine  112 mcg Oral Early Morning Keerthi Revankar, DO     • melatonin  3 mg Oral HS Keerthi Revankar, DO     • memantine  5 mg Oral Daily Keerthi Revankar, DO     • metoprolol tartrate  25 mg Oral Q12H [address] Raoul Sacks, PA-C     • mirtazapine  45 mg Oral HS Keerthi Revankar, DO     • morphine injection  2 mg Intravenous Once Richardean Pod, DO     • mycophenolate  750 mg Oral Q12H [address] Keerthi Revankar, DO     • nitroglycerin  0 4 mg Sublingual Q5 Min PRN Keerthi Revankar, DO     • OLANZapine  2 5 mg Oral Q6H PRN Keerthi Revankar, DO     • pantoprazole  40 mg Oral Early Morning Keerthi Revankar, DO     • polyethylene glycol  17 g Oral Daily PRN Keerthi Revankar, DO          Today, Patient Was Seen By: Lashae Knight MD    **Please Note: This note may have been constructed using a voice recognition system  **

## 2023-05-10 NOTE — ASSESSMENT & PLAN NOTE
Based on UA  Started on IV ertapenem given history of ESBL E  Coli  Current urine cultures growing ESBL Klebsiella  Completed 3-day course of IV ertapenem on 5/9/2023 which will be discontinued

## 2023-05-10 NOTE — ASSESSMENT & PLAN NOTE
Lab Results   Component Value Date    EGFR 38 05/10/2023    EGFR 43 05/09/2023    EGFR 52 05/08/2023    CREATININE 1 31 (H) 05/10/2023    CREATININE 1 20 05/09/2023    CREATININE 1 02 05/08/2023     Creatinine increased slightly  Check PVR  Repeat labs in a m

## 2023-05-10 NOTE — ASSESSMENT & PLAN NOTE
Patient reported chest pain overnight with EKG changes  · Peak troponin was 2268  Troponin has trended down this morning  · Patient has been on heparin drip for 48 hours which will be discontinued today  · Continue aspirin, Plavix, statin, Lopressor  · Echo showed EF of 55% with grade 2 diastolic dysfunction, no PFO no wall motion abnormality  · Initially medical management was decided but later after discussion with family patient was scheduled for cardiac catheterization today which was canceled due to anemia  Patient scheduled for cardiac catheterization tomorrow after receiving transfusion today    · Nitropaste was discontinued and patient started on isosorbide 30 mg p o  daily

## 2023-05-10 NOTE — PROGRESS NOTES
[facility]y 43  Progress Note  Name: Kelsey Saucedo I  MRN: [id number]  Unit/Bed#: 24226 [address] I Date of Admission: 5/7/2023   Date of Service: 5/10/2023 I Hospital Day: 2    Assessment/Plan   * Slurred speech  Assessment & Plan  Patient presented to the ER with slurring of speech that started when she woke up at around 9 AM   Last known normal was 9 PM before she went to sleep per significant other  Per ER note, some left-sided weakness was noted    CT of head neg for acute pathology  Continue ASA, Plavix and 40 mg statin per neurology  Lipid panel - LDL 37, HgA1C 5 3  MRI due to for acute CVA  Chronic infarcts were noted  Stable meningioma  CTA head/neck negative for significant cervical carotid stenosis but did show mild to moderate right intradural vertebral arteries atherosclerotic disease  Echo with bubble study as noted below  Neuro eval  speech therapy/PT/OT    NSTEMI (non-ST elevated myocardial infarction) [facility])  Assessment & Plan  Patient reported chest pain overnight with EKG changes  · Peak troponin was 2268  Troponin has trended down this morning  · Patient has been on heparin drip for 48 hours which will be discontinued today  · Continue aspirin, Plavix, statin, Lopressor  · Echo showed EF of 55% with grade 2 diastolic dysfunction, no PFO no wall motion abnormality  · Initially medical management was decided but later after discussion with family patient was scheduled for cardiac catheterization today which was canceled due to anemia  Patient scheduled for cardiac catheterization tomorrow after receiving transfusion today  · Nitropaste was discontinued and patient started on isosorbide 30 mg p o  daily      Recurrent UTI  Assessment & Plan  Based on UA  Started on IV ertapenem given history of ESBL E  Coli  Current urine cultures growing ESBL Klebsiella  Completed 3-day course of IV ertapenem on 5/9/2023 which will be discontinued      Left arm swelling  Assessment & Plan  Patient has a midline catheter in the left arm and patient also noted to have some left arm swelling  Check left upper extremity Dopplers rule out DVT    Dementia with behavioral disturbance [facility])  Assessment & Plan  Patient with history of dementia which is progressively worsening  Also with behavioral disturbances where patient tends to lash out at family members at times  Per ER note patient was screaming at everyone  · Patient still appears somewhat angry and annoyed when asked questions  · Continue Remeron, Namenda, Lexapro  · As was recommended by psychiatry on one of her prior admissions will place patient on Zyprexa as needed    Stage 3b chronic kidney disease [facility])  Assessment & Plan  Lab Results   Component Value Date    EGFR 38 05/10/2023    EGFR 43 05/09/2023    EGFR 52 05/08/2023    CREATININE 1 31 (H) 05/10/2023    CREATININE 1 20 05/09/2023    CREATININE 1 02 05/08/2023     Creatinine increased slightly  Check PVR  Repeat labs in a m  Benign essential hypertension  Assessment & Plan  Initially held Lopressor to allow for permissive hypertension  · Lopressor restarted at a lower dose of 25 mg every 12 hours  · Titrate up metoprolol as needed    Chronic anemia  Assessment & Plan   Hemoglobin dropped to 6 9  No evidence of active bleeding  Patient has been on iron supplementation  Iron panel showed percent saturation 17, TIBC 194, iron level of 33     Check stool for occult blood  Patient was given 1 unit of PBC transfusion and was also started on Venofer 100 mg IV daily  Follow-up posttransfusion H&H    Results from last 7 days   Lab Units 05/10/23  0801 05/10/23  0641 05/09/23  0550 05/08/23  1222 05/08/23  0425 05/07/23  1154 05/06/23  0526   HEMOGLOBIN g/dL 6 9* 6 9* 8 0* 7 5* 7 5* 9 7* 8 4*   HEMATOCRIT % 23 1* 22 8* 26 4* 25 3* 25 8* 32 3* 28 8*   MCV fL  --  93 92  --  94 95 94       Long-term use of immunosuppressant medication  Assessment & Plan  With history of liver transplant > 10 years ago  Continue CellCept, neorol    Hypothyroidism  Assessment & Plan  Continue Levothyroxine  TSH level elevated  · Per Significant other, patient takes levothyroxine along with her other medications  Dr Ramin Childress- Discussed with him and daughter to give it to her in the early morning without taking any of her other medications  · Repeat TSH as outpatient             VTE Pharmacologic Prophylaxis:   High Risk (Score >/= 5) - Pharmacological DVT Prophylaxis Ordered: heparin  Sequential Compression Devices Ordered  Patient Centered Rounds: I performed bedside rounds with nursing staff today  Discussions with Specialists or Other Care Team Provider: Cardiology    Education and Discussions with Family / Patient: Updated  (daughter) via phone  Total Time Spent on Date of Encounter in care of patient: 55 minutes This time was spent on one or more of the following: performing physical exam; counseling and coordination of care; obtaining or reviewing history; documenting in the medical record; reviewing/ordering tests, medications or procedures; communicating with other healthcare professionals and discussing with patient's family/caregivers  Current Length of Stay: 2 day(s)  Current Patient Status: Inpatient   Certification Statement: The patient will continue to require additional inpatient hospital stay due to NSTEMI, anemia  Discharge Plan: Anticipate discharge in 48-72 hrs to home with home services  Code Status: Level 1 - Full Code    Subjective:   Patient is having some left arm swelling as per RN  Patient had some more chest pain last night and needed nitroglycerin  Denies any shortness of breath, palpitations or abdominal pain      Objective:     Vitals:   Temp (24hrs), Av 5 °F (36 4 °C), Min:97 2 °F (36 2 °C), Max:97 9 °F (36 6 °C)    Temp:  [97 2 °F (36 2 °C)-97 9 °F (36 6 °C)] 97 2 °F (36 2 °C)  HR:  [80-99] 80  Resp:  [18-20] 20  BP: ()/(46-74) 113/57  SpO2:  [98 %-100 %] 98 %  Body mass index is 35 69 kg/m²  Input and Output Summary (last 24 hours): Intake/Output Summary (Last 24 hours) at 5/10/2023 1638  Last data filed at 5/10/2023 1220  Gross per 24 hour   Intake 320 83 ml   Output 400 ml   Net -79 17 ml       Physical Exam:   Physical Exam  Constitutional:       Appearance: Normal appearance  HENT:      Head: Normocephalic and atraumatic  Nose: Nose normal       Mouth/Throat:      Mouth: Mucous membranes are moist       Pharynx: Oropharynx is clear  Eyes:      Extraocular Movements: Extraocular movements intact  Pupils: Pupils are equal, round, and reactive to light  Cardiovascular:      Rate and Rhythm: Normal rate and regular rhythm  Pulmonary:      Effort: Pulmonary effort is normal       Breath sounds: Normal breath sounds  Abdominal:      General: Bowel sounds are normal  There is no distension  Palpations: Abdomen is soft  Tenderness: There is no abdominal tenderness  Musculoskeletal:         General: No swelling  Cervical back: Normal range of motion and neck supple  Comments: Left arm swelling   Skin:     General: Skin is warm and dry  Neurological:      General: No focal deficit present  Mental Status: She is alert  Additional Data:     Labs:  Results from last 7 days   Lab Units 05/10/23  0801 05/10/23  0641 05/08/23  0425 05/07/23  1154   WBC Thousand/uL  --  5 23   < > 5 29   HEMOGLOBIN g/dL 6 9* 6 9*   < > 9 7*   HEMATOCRIT % 23 1* 22 8*   < > 32 3*   PLATELETS Thousands/uL  --  221   < > 301   NEUTROS PCT %  --   --   --  68   LYMPHS PCT %  --   --   --  18   MONOS PCT %  --   --   --  8   EOS PCT %  --   --   --  4    < > = values in this interval not displayed       Results from last 7 days   Lab Units 05/10/23  0522 05/09/23  0550   SODIUM mmol/L 139 139   POTASSIUM mmol/L 4 7 4 7   CHLORIDE mmol/L 111* 111*   CO2 mmol/L 20* 21   BUN mg/dL 23 23   CREATININE mg/dL 1 31* 1 20   ANION GAP mmol/L 8 7   CALCIUM mg/dL 7 7* 8 2*   ALBUMIN g/dL  --  2 8*   TOTAL BILIRUBIN mg/dL  --  0 34   ALK PHOS U/L  --  221*   ALT U/L  --  11   AST U/L  --  24   GLUCOSE RANDOM mg/dL 125 103     Results from last 7 days   Lab Units 05/10/23  0522   INR  1 06     Results from last 7 days   Lab Units 05/07/23  1141   POC GLUCOSE mg/dl 93     Results from last 7 days   Lab Units 05/08/23  0425 05/04/23  1657   HEMOGLOBIN A1C % 5 3 5 1           Lines/Drains:  Invasive Devices     Peripheral Intravenous Line  Duration           Peripheral IV 05/07/23 Proximal;Right;Ventral (anterior) Antecubital 3 days    Long-Dwell Peripheral IV (Midline) 55/14/97 Left Basilic 2 days          Drain  Duration           External Urinary Catheter 1 day                  Telemetry:  Telemetry Orders (From admission, onward)             24 Hour Telemetry Monitoring  Continuous x 24 Hours (Telem)        Question:  Reason for 24 Hour Telemetry  Answer:  PCI/EP study (including pacer and ICD implementation), Cardiac surgery, MI, abnormal cardiac cath, and chest pain- rule out MI                 Telemetry Reviewed: {JMD Tele Review:12263}  Indication for Continued Telemetry Use: {Tele Indications:70024}             Imaging: {Radiology Review:30435}    Recent Cultures (last 7 days):   Results from last 7 days   Lab Units 05/07/23  1642   URINE CULTURE  >100,000 cfu/ml Klebsiella pneumoniae ESBL*       Last 24 Hours Medication List:   Current Facility-Administered Medications   Medication Dose Route Frequency Provider Last Rate   • acetaminophen  650 mg Oral Q6H PRN Keerthi Quach, DO     • aspirin  81 mg Oral Daily With Breakfast Viviana Shukla, DO     • atorvastatin  40 mg Oral Daily With Penny-Tucker Revsarahar, DO     • b complex-vitamin C-folic acid  1 capsule Oral Daily Keerthi Krishnaar, DO     • cholecalciferol  1,000 Units Oral Q24H Keerthi Quach, DO     • clopidogrel  75 mg Oral Daily Keerthi Quach, DO     • cycloSPORINE modified  50 mg Oral BID Keerthi Revankar, DO     • escitalopram  5 mg Oral Daily Keerthi Revankar, DO     • ezetimibe  10 mg Oral Daily Keerthi Revankar, DO     • heparin (porcine)  5,000 Units Subcutaneous Q8H [address] Monica Lopez MD     • iron sucrose  300 mg Intravenous Daily Robert Gould MD     • isosorbide mononitrate  30 mg Oral Daily Navtej Aidan, DO     • levothyroxine  112 mcg Oral Early Morning Keerthi Revankar, DO     • melatonin  3 mg Oral HS Keerthi Revankar, DO     • memantine  5 mg Oral Daily Keerthi Revankar, DO     • metoprolol tartrate  25 mg Oral Q12H [address] Norma mR PA-C     • mirtazapine  45 mg Oral HS Keerthi Revankar, DO     • morphine injection  2 mg Intravenous Once Marty Thompson, DO     • mycophenolate  750 mg Oral Q12H [address] Keerthi Revankar, DO     • nitroglycerin  0 4 mg Sublingual Q5 Min PRN Keerthi Revankar, DO     • OLANZapine  2 5 mg Oral Q6H PRN Keerthi Revankar, DO     • pantoprazole  40 mg Oral Early Morning Keerthi Revankar, DO     • polyethylene glycol  17 g Oral Daily PRN Keerthi Revankar, DO          Today, Patient Was Seen By: Monica Lopez MD    **Please Note: This note may have been constructed using a voice recognition system  **

## 2023-05-10 NOTE — ASSESSMENT & PLAN NOTE
Continue Levothyroxine  TSH level elevated  · Per Significant other, patient takes levothyroxine along with her other medications  Dr Danna Garcia- Discussed with him and daughter to give it to her in the early morning without taking any of her other medications      · Repeat TSH as outpatient

## 2023-05-10 NOTE — ASSESSMENT & PLAN NOTE
Patient has a midline catheter in the left arm and patient also noted to have some left arm swelling  Check left upper extremity Dopplers rule out DVT

## 2023-05-10 NOTE — ASSESSMENT & PLAN NOTE
Hemoglobin dropped to 6 9  No evidence of active bleeding  Patient has been on iron supplementation  Iron panel showed percent saturation 17, TIBC 194, iron level of 33     Check stool for occult blood  Patient was given 1 unit of PBC transfusion and was also started on Venofer 100 mg IV daily  Follow-up posttransfusion H&H    Results from last 7 days   Lab Units 05/10/23  0801 05/10/23  0641 05/09/23  0550 05/08/23  1222 05/08/23  0425 05/07/23  1154 05/06/23  0526   HEMOGLOBIN g/dL 6 9* 6 9* 8 0* 7 5* 7 5* 9 7* 8 4*   HEMATOCRIT % 23 1* 22 8* 26 4* 25 3* 25 8* 32 3* 28 8*   MCV fL  --  93 92  --  94 95 94

## 2023-05-10 NOTE — ASSESSMENT & PLAN NOTE
Patient presented to the ER with slurring of speech that started when she woke up at around 9 AM   Last known normal was 9 PM before she went to sleep per significant other  Per ER note, some left-sided weakness was noted    CT of head neg for acute pathology  Continue ASA, Plavix and 40 mg statin per neurology  Lipid panel - LDL 37, HgA1C 5 3  MRI due to for acute CVA  Chronic infarcts were noted    Stable meningioma  CTA head/neck negative for significant cervical carotid stenosis but did show mild to moderate right intradural vertebral arteries atherosclerotic disease  Echo with bubble study as noted below  Neuro eval  speech therapy/PT/OT DISPLAY PLAN FREE TEXT

## 2023-05-10 NOTE — SPEECH THERAPY NOTE
"SLP Progress Note    Patient Name: Amor Couch  FKLOS'L Date: 5/10/2023     Problem List  Principal Problem:    Slurred speech  Active Problems:    Hypothyroidism    Long-term use of immunosuppressant medication    Chronic anemia    Benign essential hypertension    Recurrent UTI    Stage 3b chronic kidney disease (HCC)    Dementia with behavioral disturbance (HCC)    NSTEMI (non-ST elevated myocardial infarction) ([facility] [address] )    Subjective:  \"Sleepy\" (reported to be sleep deprived  Objective:  Pt was seen for diagnostic dysphagia therapy  She was NPO 1st thing in am in preparation for cardiac cath  Hg found to be 6 9 and procedure held  Diet order resumed (regular textured food, nectar thick liquid)  Pt requested \"a drink\" was positioned upright  Pt was assessed 4 ozs apple juice, 2 of OJ, and tsps of pudding (she refused mashed potaotes and I would not have provided chicken given reduced sustained LETICIA/level of alertness)  Bolus formation, transfer and swallow initiation appeared prompt  No coughing, throat clearing, c/o stasis, multiple swallows, change in vocal quality noted c po intake today  Assessment:  Pt reportedly sleep deprived with reduced sustained level of alertness  She tolerated pudding and THIN liquid c no s/s aspiration  Plan/Recommendations:  Consider changing diet order to include THIN liquid  Continue aspiration precautions (including do not offer solid food until more alert)  SLP to f/u 2x weekly to ensure diet tolerance over time      Ermelinda Pitt [address] [id number]     "

## 2023-05-10 NOTE — PROGRESS NOTES
Progress Note - Cardiology   [facility]     Dinora Zhang 76 y o  female MRN: [id number]  : 1945  Unit/Bed#: [address] [place] [address] Encounter: [id number]    Assessment and Plan:   1  NSTEMI     - Cardiology consulted for concern for chest pain and elevated troponin  Internal Medicine note reports patient reported chest pain at 2300 hrs on 23 and 0200 hrs on 23    - 23 2321 hrs EKG showed normal sinus rhythm; left axis deviation; septal infarct , age undetermined; marked ST abnormality, possible lateral subendocardial injury; ST depressed in Anterolateral leads; T wave inversion evident in Anterolateral leads   - Repeat EKG on at 0034 hrs noted sinus tachycardia; eft axis deviation; septal infarct; marked ST abnormality, possible anterolateral subendocardial injury; ST depressed in Anterior leads  T wave inversion more evident in Lateral leads  - 23 0026hrs hs troponin 26, 0220 hrs 543, 0427hrs 1,307, 0836 hrs 2,268    - 23 hs troponin: 898  - 23: Patient denies experiencing chest pain overnight or this morning but note patient was given nitro at 1830 hrs and 1930 hr on 23 and 0014 hrs on 23  When asked again if she had experienced any chest pain last night she denies any symptoms of chest pain  - Continue heparin drip  - Continue nitro as needed  - Continue Imdur 30 mg daily  - Continue Lopressor 25 mg twice daily  - Currently on aspirin 81 mg daily, plavix 75 mg daily, and Lipitor 40 mg daily  - 23 TTE: LVEF 55%  Systolic function is normal  Diastolic function is moderately abnormal, consistent with grade II (pseudonormal) relaxation   Left atrial filling pressure is elevated  - Cardiac catheterization scheduled for 5/10/23 canceled in setting of low Hgb  2  Stroke-like symptoms       - Notes indicate family noted patient had difficulty speaking and left sided weakness prior to presentation   - 23 CT brain demonstrated no acute intracranial abnormality, stable chronic microangiopathic changes within the brain    - 5/07/23 CTA head/neck showed no acute pathology  - 5/08/23 MRI brain notes no acute intracranial abnormality, advanced chronic microangiopathic changes, chronic lacunar infarct right posterior frontal subcortical white matter  - Neurology recommendations noted  - Currently on aspirin 81 mg daily, plavix 75 mg daily, and Lipitor 40 mg daily       3  Chronic anemia       - H/H 5/10/23 6 9/22 8, on repeat H/H 6 9/23 1   - One unit PRBCs ordered  - Continue to trend H/H    - No signs of bleeding on examination  Nursing does not report any BRBPR or melena       4  UTI       - Currently on ertapenem  - Patient has history of ESBL UTI sensitive to ertapenem  - Care per primary team       5  Hypertension      -  BP controlled  - Continue Lopressor 25 mg twice daily  - Continue Imdur 30 mg daily       6  Hyperlipidemia       - 5/08/23 lipid panel: cholesterol 114, triglycerides 203, HDL 36, LDL 37    - Currently on Lipitor 40 mg daily and zetia 10 mg daily       7  Hypothyroidism       - Currently on levothyroxine 112 mcg daily  - 5/07/23 TSH: 9 037, elevated  - 5/07/23 Free T4: 0 79, normal       8  Liver transplant       - Care per primary team    - On immunosuppressants       9  CVA     - Prior lacunar infarct  - Currently on aspirin 81 mg daily, plavix 75 mg daily, and Lipitor 40 mg daily    - Neurology recommendations noted  10  CKDIII     - Baseline creatinine 1 1-1 4    - Care per primary team       11  Dementia       - Supportive care      Subjective / Objective:         H/H this morning 6 9/22 8, on repeat H/H 6 9/23  1  Cardiac catheterization canceled today in setting of low hemoglobin  Patient currently denies chest pain but states she had chest pain overnight  Note dose of nitro was given at 2337 hrs         Patient currently denies chest pain, shortness of breath, lightheadedness, "dizziness, nausea, or vomiting  Vitals: Blood pressure 109/56, pulse 85, temperature (!) 97 4 °F (36 3 °C), resp  rate 18, height 5' 2\" (1 575 m), weight 88 5 kg (195 lb 1 7 oz), SpO2 98 %, not currently breastfeeding  Vitals:    05/07/23 1553 05/08/23 1116   Weight: 88 5 kg (195 lb 3 2 oz) 88 5 kg (195 lb 1 7 oz)     Body mass index is 35 69 kg/m²  BP Readings from Last 3 Encounters:   05/10/23 109/56   05/06/23 139/65   05/04/23 110/58     Orthostatic Blood Pressures    Flowsheet Row Most Recent Value   Blood Pressure 109/56 filed at 05/10/2023 0714   Patient Position - Orthostatic VS Lying filed at 05/07/2023 1903        I/O       05/08 0701  05/09 0700 05/09 0701  05/10 0700 05/10 0701  05/11 0700    P  O  0      Total Intake(mL/kg) 0 (0)      Urine (mL/kg/hr) 1200 (0 6) 400 (0 2)     Stool 0      Total Output 1200 400     Net -1200 -400            Unmeasured Stool Occurrence 1 x          Invasive Devices     Peripheral Intravenous Line  Duration           Peripheral IV 05/07/23 Proximal;Right;Ventral (anterior) Antecubital 3 days    Long-Dwell Peripheral IV (Midline) 89/68/63 Left Basilic 2 days          Drain  Duration           External Urinary Catheter 1 day                  Intake/Output Summary (Last 24 hours) at 5/10/2023 0736  Last data filed at 5/10/2023 0537  Gross per 24 hour   Intake --   Output 400 ml   Net -400 ml         Physical Exam:   Physical Exam    Neurologic:  Alert & oriented x 3,  no focal deficits noted   Constitutional:  Well developed, well nourished,  With no acute distress  Eyes:  PERRL, conjunctiva normal   HENT:  Atraumatic, external ears normal, nose normal,   NECK: Normal range of motion, no tenderness, neck is supple , No JVP  Respiratory:  Bilateral air entry mostly clear to auscultation  Cardiovascular: S1-S2 regular with a 2/6 ejection systolic murmur   S4 is heard  GI:  Soft, nondistended, normal bowel sounds, nontender, no hepatosplenomegaly " appreciated  Musculoskeletal:  No tenderness, no deformities      Extremities:  Mild edema and distal pulses are present  Psychiatric:  Speech and behavior appropriate             Medications/ Allergies:     Current Facility-Administered Medications   Medication Dose Route Frequency Provider Last Rate   • acetaminophen  650 mg Oral Q6H PRN Keerthi Revankar, DO     • aspirin  81 mg Oral Daily With Breakfast Raejean Skains, DO     • atorvastatin  40 mg Oral Daily With Chicken-Tucker Revankar, DO     • b complex-vitamin C-folic acid  1 capsule Oral Daily Keerthi Revankar, DO     • cholecalciferol  1,000 Units Oral Q24H Keerthi Revankar, DO     • clopidogrel  75 mg Oral Daily Keerthi Revankar, DO     • cycloSPORINE modified  50 mg Oral BID Keerthi Revankar, DO     • ertapenem  1,000 mg Intravenous Q24H Raean Skains, DO 1,000 mg (05/09/23 2031)   • escitalopram  5 mg Oral Daily Keerthi Revankar, DO     • ezetimibe  10 mg Oral Daily Keerthi Revankar, DO     • ferrous sulfate  325 mg Oral BID With Meals Keerthi Revankar, DO     • heparin (porcine)  3-20 Units/kg/hr (Order-Specific) Intravenous Titrated MATTHEW Schreiber 21 8 Units/kg/hr (05/09/23 2213)   • heparin (porcine)  2,000 Units Intravenous Q6H PRN Dorene Skains, DO     • heparin (porcine)  4,000 Units Intravenous Q6H PRN Dorene Skains, DO     • isosorbide mononitrate  30 mg Oral Daily Navterolan Aidan, DO     • levothyroxine  112 mcg Oral Early Morning Keerthi Revankar, DO     • magnesium sulfate  1 g Intravenous Once MATTHEW Schreiber     • melatonin  3 mg Oral HS Keerthi Revankar, DO     • memantine  5 mg Oral Daily Keerthi Revankar, DO     • metoprolol tartrate  25 mg Oral Q12H [address] Alex Sanchez PA-C     • mirtazapine  45 mg Oral HS Keerthi Revankar, DO     • morphine injection  2 mg Intravenous Once Raean Skains, DO     • mycophenolate  750 mg Oral Q12H [address] Keerthi Revankar, DO     • nitroglycerin  0 4 mg Sublingual Q5 Min PRN Keerthi Revankar, DO     • OLANZapine  2 5 mg Oral Q6H PRN Keerthi Revankar, DO     • pantoprazole  40 mg Oral Early Morning Keerthi Revankar, DO     • polyethylene glycol  17 g Oral Daily PRN Keerthi Revankar, DO     • sodium chloride  50 mL/hr Intravenous Continuous Kristopher SYLVIE Blanca 50 mL/hr (05/10/23 0103)     acetaminophen, 650 mg, Q6H PRN  heparin (porcine), 2,000 Units, Q6H PRN  heparin (porcine), 4,000 Units, Q6H PRN  nitroglycerin, 0 4 mg, Q5 Min PRN  OLANZapine, 2 5 mg, Q6H PRN  polyethylene glycol, 17 g, Daily PRN      Allergies   Allergen Reactions   • Tetracyclines & Related Hives   • Vancomycin Other (See Comments) and Itching     Reaction Date: [id number]; Pt unsure of reaction   • Prograf [Tacrolimus] Anxiety     Reaction Date: [id number]; VTE Pharmacologic Prophylaxis:   Heparin    Labs:   Troponins:  Results from last 7 days   Lab Units 05/09/23  0550 05/08/23  0836 05/08/23  0427 05/08/23  0026 05/07/23  1749 05/07/23  1345   HS TNI RAND ng/L 898* 2,268* 1,307*   < >  --   --    HSTNI D2 ng/L  --   --   --   --   --  -1   HSTNI D4 ng/L  --   --   --   --  1  --     < > = values in this interval not displayed           CBC with diff:  Results from last 7 days   Lab Units 05/10/23  0801 05/10/23  0641 05/09/23  0550 05/08/23  1222 05/08/23  0425 05/07/23  1154 05/06/23  0526   WBC Thousand/uL  --  5 23 4 44  --  5 38 5 29 4 18*   HEMOGLOBIN g/dL 6 9* 6 9* 8 0* 7 5* 7 5* 9 7* 8 4*   HEMATOCRIT % 23 1* 22 8* 26 4* 25 3* 25 8* 32 3* 28 8*   MCV fL  --  93 92  --  94 95 94   PLATELETS Thousands/uL  --  221 209  --  231 301 218   MCH pg  --  28 0 27 8  --  27 4 28 6 27 4   MCHC g/dL  --  30 3* 30 3*  --  29 1* 30 0* 29 2*   RDW %  --  14 2 14 2  --  14 4 14 6 14 4   MPV fL  --  10 0 9 3  --  9 7 10 8 9 5   NRBC AUTO /100 WBCs  --   --   --   --   --  0 0       CMP:  Results from last 7 days   Lab Units 05/10/23  0522 05/09/23  0550 05/08/23  0425 05/07/23  1154 05/06/23  0526   SODIUM mmol/L 139 139 137 139 138   POTASSIUM mmol/L 4 7 4 7 5 0 4 9 4 5 CHLORIDE mmol/L 111* 111* 111* 108 111*   CO2 mmol/L 20* 21 21 22 20*   ANION GAP mmol/L 8 7 5 9 7   BUN mg/dL 23 23 23 29* 37*   CREATININE mg/dL 1 31* 1 20 1 02 1 11 1 54*   GLUCOSE FASTING mg/dL  --   --  109*  --   --    CALCIUM mg/dL 7 7* 8 2* 7 9* 8 7 8 0*   AST U/L  --  24  --  23 17   ALT U/L  --  11  --  11 9   ALK PHOS U/L  --  221*  --  254* 199*   TOTAL PROTEIN g/dL  --  5 8*  --  7 1 6 0*   ALBUMIN g/dL  --  2 8*  --  3 4* 2 9*   TOTAL BILIRUBIN mg/dL  --  0 34  --  0 35 0 36   EGFR ml/min/1 73sq m 38 43 52 47 31       Magnesium:  Results from last 7 days   Lab Units 05/10/23  0522 05/08/23  0425 05/07/23  1154   MAGNESIUM mg/dL 1 9 1 9 2 3     Coags:  Results from last 7 days   Lab Units 05/10/23  0522 05/09/23  1254 05/09/23  0550 05/08/23  2318 05/08/23  1523 05/08/23  0832 05/08/23  0220   PTT seconds 79* 64* 68* 55* 34 26 24   INR  1 06  --   --   --   --   --  1 01     TSH:  Results from last 7 days   Lab Units 05/07/23  1154   TSH 3RD GENERATON uIU/mL 9 037*     No components found for: TSH3  Lipid Profile:  Results from last 7 days   Lab Units 05/08/23  0425   CHOLESTEROL mg/dL 114   TRIGLYCERIDES mg/dL 203*   HDL mg/dL 36*   LDL CALC mg/dL 37     Hgb A1c:  Results from last 7 days   Lab Units 05/08/23  0425 05/04/23  1657   HEMOGLOBIN A1C % 5 3 5 1       Imaging & Testing   I have personally reviewed pertinent reports  XR ankle 3+ vw right    Result Date: 4/14/2023  Narrative: RIGHT ANKLE INDICATION:   S82 831D: Other fracture of upper and lower end of right fibula, subsequent encounter for closed fracture with routine healing  COMPARISON:  01/19/2023 VIEWS:  XR ANKLE 3+ VW RIGHT Images: 3 FINDINGS: There is no acute fracture or dislocation  Orthopedic plate with multiple screws in the distal fibula  Calcaneal spur(s) noted  No lytic or blastic osseous lesion  Soft tissue swelling about the ankle  Diffuse atherosclerotic calcifications  Impression: No acute osseous abnormality  Workstation performed: [id number]     MRI brain wo contrast    Result Date: 5/8/2023  Narrative: MRI BRAIN WITHOUT CONTRAST INDICATION: stroke  COMPARISON:   None  TECHNIQUE:  Multiplanar, multisequence imaging of the brain was performed  IMAGE QUALITY:  Diagnostic  FINDINGS: BRAIN PARENCHYMA:  There is no discrete mass, mass effect or midline shift  There is no intracranial hemorrhage  There is no evidence of acute infarction and diffusion imaging is unremarkable  Small scattered hyperintensities on T2/FLAIR imaging are noted in the periventricular and subcortical white matter demonstrating an appearance that is statistically most likely to represent advanced microangiopathic change  Chronic lacunar infarct right posterior frontal subcortical white matter, right centrum  semiovale  Chronic cortical infarct right occipital lobe  Extra-axial mass along the cribriform plate identified retrospectively stable for many years measures approximately 1 6 x 0 9 x 1 8 cm likely meningioma  No adjacent parenchymal edema  Foci of susceptibility artifact are seen in the supratentorial and infratentorial brain bilaterally compatible with chronic microhemorrhage with differential considerations to include chronic hypertension versus amyloid angiopathy  VENTRICLES:  Normal for the patient's age  SELLA AND PITUITARY GLAND:  Normal  ORBITS: Bilateral cataract surgery  PARANASAL SINUSES:  Normal  VASCULATURE:  Evaluation of the major intracranial vasculature demonstrates appropriate flow voids  CALVARIUM AND SKULL BASE:  Normal  EXTRACRANIAL SOFT TISSUES:  Normal      Impression: No acute intracranial abnormality  Advanced chronic microangiopathic changes  Chronic infarcts are noted as described above  1 6 x 0 9 x 1 8 cm extra-axial mass along the cribriform plate is retrospectively stable for several years compatible with a meningioma without adjacent parenchymal edema   Workstation performed: [id number]     CT stroke alert brain    Result Date: 5/7/2023  Narrative: CT BRAIN - STROKE ALERT PROTOCOL INDICATION:   Stroke Alert  COMPARISON: Head CT from November 17, 2020  MRI of the brain from February 1, 2019  TECHNIQUE:  CT examination of the brain was performed  In addition to axial images, coronal reformatted images were created and submitted for interpretation  Radiation dose length product (DLP) for this visit:  935 07 mGy-cm   This examination, like all CT scans performed in the [facility], was performed utilizing techniques to minimize radiation dose exposure, including the use of iterative  reconstruction and automated exposure control  IMAGE QUALITY:  Diagnostic  FINDINGS: PARENCHYMA: Decreased attenuation is noted in periventricular and subcortical white matter demonstrating an appearance that is statistically most likely to represent advanced microangiopathic change  There is stable asymmetric hypoattenuation in the right occipital white matter, likely representing additional site of remote prior ischemic insult  No CT signs of acute infarction  Chronic lacunar infarction in the right corona radiata (2:24) no intracranial mass, mass effect or midline shift  No acute parenchymal hemorrhage  Heavy intracranial atherosclerotic calcification of the carotid and vertebral arteries  VENTRICLES AND EXTRA-AXIAL SPACES: Stable lateral more than third and fourth ventriculomegaly  Generalized prominence of the cerebral sulci  Stable asymmetric left lateral frontal sulcal dilatation (2:25)  VISUALIZED ORBITS: Normal visualized orbits  PARANASAL SINUSES: Normal visualized paranasal sinuses  CALVARIUM AND EXTRACRANIAL SOFT TISSUES:   Normal      Impression: No acute intracranial abnormality  Stable chronic microangiopathic changes within the brain   Findings were directly discussed with Keron Hayden at 12:22 pm  Workstation performed: [id number]     CTA stroke alert (head/neck)    Result Date: 5/7/2023  Narrative: CTA NECK AND BRAIN WITH CONTRAST INDICATION: Stroke Alert COMPARISON:   None  TECHNIQUE:   Post contrast imaging was performed after administration of iodinated contrast through the neck and brain  Post contrast axial 0 625 mm images timed to opacify the arterial system  3D rendering was performed on an independent workstation  MIP reconstructions performed  Coronal reconstructions were performed of the noncontrast portion of the brain  Radiation dose length product (DLP) for this visit:  451 mGy-cm   This examination, like all CT scans performed in the [facility], was performed utilizing techniques to minimize radiation dose exposure, including the use of iterative reconstruction and automated exposure control  IV Contrast:  85 mL of iohexol (OMNIPAQUE) IMAGE QUALITY:   Diagnostic FINDINGS: CERVICAL VASCULATURE AORTIC ARCH AND GREAT VESSELS: Mild atherosclerotic disease of the arch, proximal great vessels and visualized subclavian vessels  No significant stenosis  RIGHT VERTEBRAL ARTERY CERVICAL SEGMENT:  Normal origin  The vessel is normal in caliber throughout the neck  LEFT VERTEBRAL ARTERY CERVICAL SEGMENT:  Normal origin  The vessel is normal in caliber throughout the neck  RIGHT EXTRACRANIAL CAROTID SEGMENT: Mild atherosclerotic disease of the distal common carotid artery and proximal cervical internal carotid artery without significant stenosis compared to the more distal ICA  There is mild tandem atherosclerotic stenosis  in the distal cervical ICA  LEFT EXTRACRANIAL CAROTID SEGMENT: Mild atherosclerotic disease of the distal common carotid artery and proximal cervical internal carotid artery without significant stenosis compared to the more distal ICA  There is additional mild tandem stenosis in the distal cervical ICA  NASCET criteria was used to determine the degree of internal carotid artery diameter stenosis   INTRACRANIAL VASCULATURE INTERNAL CAROTID ARTERIES: Atherosclerotic calcification of the carotid siphons without significant luminal narrowing  Ophthalmic artery origins and carotid termini are unremarkable  ANTERIOR CIRCULATION:  Symmetric A1 segments and anterior cerebral arteries with normal enhancement  Normal anterior communicating artery  MIDDLE CEREBRAL ARTERY CIRCULATION:  M1 segment and middle cerebral artery branches demonstrate normal enhancement bilaterally  DISTAL VERTEBRAL ARTERIES: Atherosclerotic calcifications and plaque within the intradural segments of both vertebral arteries, more pronounced on the right than the left with mild to moderate stenosis in the right pre-PICA segment  Posterior inferior cerebellar artery branches are patent bilaterally  Mild stenosis in the left intradural vertebral artery  Vertebrobasilar junction is unremarkable  BASILAR ARTERY: Mild atherosclerotic ectasia of the basilar artery  Atherosclerotic wall calcifications do not result in significant stenosis of the basilar artery  Basilar terminus is unremarkable  POSTERIOR CEREBRAL ARTERIES: Normal proximal PCAs normal left PCA  Patent left posterior communicating artery  Right posterior communicating artery is not well seen  VENOUS STRUCTURES:  Normal  NON VASCULAR ANATOMY BONY STRUCTURES:  No acute osseous abnormality  SOFT TISSUES OF THE NECK: 1 2 cm hyperdense nodule in the right thyroid lobe  Incidental identified on this CT  According to guidelines published in the February 2015 white paper on incidental thyroid nodules in the Journal of the [employer] of Radiology [facility]), because the nodule(s) are less than 1 5 cm in size and without suspicious feature, no further evaluation is recommended  THORACIC INLET: No consolidation  Left-sided loop recorder device noted in the anterior chest wall  Impression: No cervical intracranial large vessel occlusion  No intracranial aneurysm or dissection  No hemodynamically significant cervical carotid stenosis   Mild to moderate right intradural vertebral artery atherosclerotic disease  Findings were directly discussed with Cheryl Pace at 12:22 p m  Workstation performed: [id number]       EKG / Monitor: Personally reviewed  Telemetry reviewed: currently sinus rhythm, rate 86 bpm        Cardiac testing:     Echo complete w/ contrast if indicated     Result Date: 2023  Narrative: •  Left Ventricle: Left ventricular cavity size is normal  Wall thickness is severely increased  The left ventricular ejection fraction is 55% by visual estimation  Systolic function is normal  Although no diagnostic regional wall motion abnormality was identified, this possibility cannot be completely excluded on the basis of this study  Diastolic function is moderately abnormal, consistent with grade II (pseudonormal) relaxation  Left atrial filling pressure is elevated  •  IVS: There is sigmoid appearance of the septum  •  Left Atrium: The atrium is mildly dilated (35-41 mL/m2)  •  Atrial Septum: No patent foramen ovale detected using saline contrast injection at rest  •  Mitral Valve: There is severe diffuse calcification  There is moderately reduced mobility  There is severe annular calcification  There is mild to moderate regurgitation  •  Tricuspid Valve: There is mild regurgitation  The right ventricular systolic pressure is normal  The estimated right ventricular systolic pressure is 61 53 mmHg       Results for orders placed during the hospital encounter of 10/29/17    Echo complete with contrast if indicated    Narrative  [address]  [address] [facility] 6 [phone]    Transthoracic Echocardiogram  2D, M-mode, Doppler, and Color Doppler    Study date:  30-Oct-2017    Patient: Tito Collier  MR number: [id number]  Account number: [de-identified]  : 1945  Age: 66 years  Gender: Female  Status: Routine  Location: Bedside  Height: 62 in  Weight: 202 6 lb  BP: 136/ 72 mmHg    Indications: TIA    Diagnoses: I67 9 - Cerebrovascular disease, unspecified    Sonographer:  Phil France  Primary Physician:  Lyndon Ellis MD  Referring Physician:  William Lemus MD  Group:  Lisa Lyle  RN:  Saritha Chahal RN  Interpreting Physician:  Farhat Black MD    SUMMARY    LEFT VENTRICLE:  Systolic function was normal  Ejection fraction was estimated in the range of 55 % to 60 %  There were no regional wall motion abnormalities  Wall thickness was mildly increased  Doppler parameters were consistent with abnormal left ventricular relaxation (grade 1 diastolic dysfunction)  Doppler parameters were consistent with elevated mean left atrial filling pressure  LEFT ATRIUM:  The atrium was mildly dilated  ATRIAL SEPTUM:  No significant defect or patent foramen ovale was identified  There was a trace left-to-right shunt  This is within normal limits for the patient's age  TRICUSPID VALVE:  The tricuspid jet envelope definition was inadequate for estimation of RV systolic pressure  There are no indirect findings (abnormal RV volume or geometry, altered pulmonary flow velocity profile, or leftward septal displacement) which  would suggest moderate or severe pulmonary hypertension  HISTORY: PRIOR HISTORY: GERD, Hypothyroidism, Anxiety, Anemia    PROCEDURE: The procedure was performed at the bedside  This was a routine study  The transthoracic approach was used  The study included complete 2D imaging, M-mode, complete spectral Doppler, and color Doppler  The heart rate was 80 bpm,  at the start of the study  Intravenous contrast ( 10 ml) was administered to evaluate shunting  Image quality was adequate  LEFT VENTRICLE: Size was normal  Systolic function was normal  Ejection fraction was estimated in the range of 55 % to 60 %  There were no regional wall motion abnormalities  Wall thickness was mildly increased  No evidence of apical  thrombus   DOPPLER: Doppler parameters were consistent with abnormal left ventricular relaxation (grade 1 diastolic dysfunction)  Doppler parameters were consistent with elevated mean left atrial filling pressure  RIGHT VENTRICLE: The size was normal  Systolic function was normal  Wall thickness was normal     LEFT ATRIUM: The atrium was mildly dilated  ATRIAL SEPTUM: No significant defect or patent foramen ovale was identified  There was a trace left-to-right shunt  This is within normal limits for the patient's age  RIGHT ATRIUM: Size was normal     MITRAL VALVE: There was mild to moderate annular calcification  Valve structure was normal  There was normal leaflet separation  DOPPLER: The transmitral velocity was within the normal range  There was no evidence for stenosis  There was  no significant regurgitation  AORTIC VALVE: The valve was not visualized well enough to rule out a bicuspid morphology  Leaflets exhibited mildly increased thickness and normal cuspal separation  DOPPLER: Transaortic velocity was within the normal range  There was no  evidence for stenosis  There was no significant regurgitation  TRICUSPID VALVE: The valve structure was normal  There was normal leaflet separation  DOPPLER: The transtricuspid velocity was within the normal range  There was no evidence for stenosis  There was trace regurgitation  The tricuspid jet  envelope definition was inadequate for estimation of RV systolic pressure  There are no indirect findings (abnormal RV volume or geometry, altered pulmonary flow velocity profile, or leftward septal displacement) which would suggest  moderate or severe pulmonary hypertension  PULMONIC VALVE: Leaflets exhibited normal thickness, no calcification, and normal cuspal separation  DOPPLER: The transpulmonic velocity was within the normal range  There was no significant regurgitation  PERICARDIUM: There was no pericardial effusion  The pericardium was normal in appearance  AORTA: The root exhibited normal size      SYSTEMIC VEINS: IVC: The inferior vena cava was normal in size  SYSTEM MEASUREMENT TABLES    2D mode  AoR Diam 2D: 3 cm  LA Diam (2D): 4 1 cm  LA/Ao (2D): 1 37  FS (2D Teich): 25 1 %  IVSd (2D): 1 14 cm  LVDEV: 65 9 cm³  LVESV: 32 8 cm³  LVIDd(2D): 3 9 cm  LVISd (2D): 2 92 cm  LVPWd (2D): 1 09 cm  SV (Teich): 33 1 cm³    Apical four chamber  LVEF A4C: 56 %    Unspecified Scan Mode  MV Peak A Shiv: 1670 mm/s  MV Peak E Shiv  Mean: 1030 mm/s  MVA (PHT): 4 4 cm squared  PHT: 50 ms  Max P mm[Hg]  V Max: 2570 mm/s  Vmax: 2540 mm/s  RA Area: 10 6 cm squared  RA Volume: 20 9 cm³  TAPSE: 1 6 cm    [facility] Accredited Echocardiography Laboratory    Prepared and electronically signed by    Brianna Moore MD  Signed 30-Oct-2017 14:13:31      Results for orders placed during the hospital encounter of 17    NM myocardial perfusion spect (rx stress and/or rest)    Narrative  [address]  [address] [facility] 6 [phone]    Rest/Stress Gated SPECT Myocardial Perfusion Imaging After Regadenoson    Patient: Marita Harrison  MR number: [id number]  Account number: [de-identified]  : 1945  Age: 66 years  Gender: Female  Status: Inpatient  Location: Stress lab  Height: 62 in  Weight: 205 lb  BP: 161/ 79 mmHg    Allergies: TETRACYCLINES RELATED, VANCOMYCIN, TACROLIMUS    Diagnosis: I21 4 - Non-ST elevation (NSTEMI) myocardial infarction    Primary Physician:  Karen Christopher MD  RN:  TRUDI Sullivan  Group:  Bety Lara  Report Prepared By[de-identified]  TRUDI Sullivan  Interpreting Physician:  Nat Cockayne, MD    INDICATIONS: Detection of coronary artery disease  HISTORY: The patient is a 66year old  female  Chest pain status: no chest pain  Coronary artery disease risk factors: dyslipidemia  Cardiovascular history: arrhythmia, stroke, and transient ischemic attack  Co-morbidity: obesity,  Liver transplant   Medications: clopidogrel, a lipid lowering agent, thyroid medications, and transplant medications  PHYSICAL EXAM: Baseline physical exam screening: no wheezes audible  REST ECG: Normal sinus rhythm  PROCEDURE: The study was performed in the stress lab  A regadenoson infusion pharmacologic stress test was performed  Gated SPECT myocardial perfusion imaging was performed after stress and at rest  Systolic blood pressure was 161 mmHg, at  the start of the study  Diastolic blood pressure was 79 mmHg, at the start of the study  The heart rate was 76 bpm, at the start of the study  IV double checked  Regadenoson protocol:  Time HR bpm SBP mmHg DBP mmHg Symptoms Rhythm/conduct  Baseline 11:55 76 161 79 none NSR  Immediate 11:57 100 137 63 mild dyspnea --  1 min 11:58 102 168 72 same as above --  2 min 11:59 100 170 81 subsiding --  3 min 12:00 98 163 69 subsiding --  No medications or fluids given  STRESS SUMMARY: Duration of pharmacologic stress was 3 min  Maximal heart rate during stress was 103 bpm  The heart rate response to stress was normal  There was resting hypertension with an appropriate blood pressure response to stress  The rate-pressure product for the peak heart rate and blood pressure was 55712  There was no chest pain during stress  The stress test was terminated due to protocol completion  Pre oxygen saturation: 98 %  Peak oxygen saturation: 100 %  The stress ECG was negative for ischemia and normal  There were no stress arrhythmias or conduction abnormalities  ISOTOPE ADMINISTRATION:  Resting isotope administration Stress isotope administration  Agent Tetrofosmin Tetrofosmin  Dose 11 mCi 32 mCi  Date 11/06/2017 11/07/2017    The radiopharmaceutical was injected at the peak effect of pharmacologic stress  MYOCARDIAL PERFUSION IMAGING:  The image quality was fair  Left ventricular size was normal  The left ventricle dilated transiently during stress  The TID ratio was 1 26      PERFUSION DEFECTS:  -  There was a small, mildly severe, partially reversible myocardial perfusion defect of the apical lateral wall possibly due to artifact  GATED SPECT:  The calculated left ventricular ejection fraction was 70 %  Left ventricular ejection fraction was within normal limits by visual estimate  There was no left ventricular regional abnormality  SUMMARY:  -  Stress results: There was resting hypertension with an appropriate blood pressure response to stress  There was no chest pain during stress  -  ECG conclusions: The stress ECG was negative for ischemia and normal   -  Perfusion imaging: The left ventricle dilated transiently during stress  There was a small, mildly severe, partially reversible myocardial perfusion defect of the apical lateral wall possibly due to artifact   -  Gated SPECT: The calculated left ventricular ejection fraction was 70 %  Left ventricular ejection fraction was within normal limits by visual estimate  There was no left ventricular regional abnormality  IMPRESSIONS: Mildly abnormal study after pharmacologic vasodilation  There is evidence of cavity dilation with normal LV function and focal apico- lateral wall ischemia      Prepared and signed by    Gilberto Higgins MD  Signed 11/07/2017 17:18:41            Tae Blevins PA-C

## 2023-05-10 NOTE — PLAN OF CARE
Problem: MOBILITY - ADULT  Goal: Maintain or return to baseline ADL function  Description: INTERVENTIONS:  -  Assess patient's ability to carry out ADLs; assess patient's baseline for ADL function and identify physical deficits which impact ability to perform ADLs (bathing, care of mouth/teeth, toileting, grooming, dressing, etc )  - Assess/evaluate cause of self-care deficits   - Assess range of motion  - Assess patient's mobility; develop plan if impaired  - Assess patient's need for assistive devices and provide as appropriate  - Encourage maximum independence but intervene and supervise when necessary  - Involve family in performance of ADLs  - Assess for home care needs following discharge   - Consider OT consult to assist with ADL evaluation and planning for discharge  - Provide patient education as appropriate  Outcome: Progressing  Goal: Maintains/Returns to pre admission functional level  Description: INTERVENTIONS:  - Perform BMAT or MOVE assessment daily    - Set and communicate daily mobility goal to care team and patient/family/caregiver  - Collaborate with rehabilitation services on mobility goals if consulted  - Perform Range of Motion 3 times a day  - Reposition patient every 2 hours    - Dangle patient 3 times a day  - Stand patient 3 times a day  - Ambulate patient 3 times a day  - Out of bed to chair 3 times a day   - Out of bed for meals 3 times a day  - Out of bed for toileting  - Record patient progress and toleration of activity level   Outcome: Progressing     Problem: PAIN - ADULT  Goal: Verbalizes/displays adequate comfort level or baseline comfort level  Description: Interventions:  - Encourage patient to monitor pain and request assistance  - Assess pain using appropriate pain scale  - Administer analgesics based on type and severity of pain and evaluate response  - Implement non-pharmacological measures as appropriate and evaluate response  - Consider cultural and social influences on pain and pain management  - Notify physician/advanced practitioner if interventions unsuccessful or patient reports new pain  Outcome: Progressing     Problem: INFECTION - ADULT  Goal: Absence or prevention of progression during hospitalization  Description: INTERVENTIONS:  - Assess and monitor for signs and symptoms of infection  - Monitor lab/diagnostic results  - Monitor all insertion sites, i e  indwelling lines, tubes, and drains  - [place] appropriate cooling/warming therapies per order  - Administer medications as ordered  - Instruct and encourage patient and family to use good hand hygiene technique  - Identify and instruct in appropriate isolation precautions for identified infection/condition  Outcome: Progressing  Goal: Absence of fever/infection during neutropenic period  Description: INTERVENTIONS:  - Monitor WBC    Outcome: Progressing     Problem: SAFETY ADULT  Goal: Maintain or return to baseline ADL function  Description: INTERVENTIONS:  -  Assess patient's ability to carry out ADLs; assess patient's baseline for ADL function and identify physical deficits which impact ability to perform ADLs (bathing, care of mouth/teeth, toileting, grooming, dressing, etc )  - Assess/evaluate cause of self-care deficits   - Assess range of motion  - Assess patient's mobility; develop plan if impaired  - Assess patient's need for assistive devices and provide as appropriate  - Encourage maximum independence but intervene and supervise when necessary  - Involve family in performance of ADLs  - Assess for home care needs following discharge   - Consider OT consult to assist with ADL evaluation and planning for discharge  - Provide patient education as appropriate  Outcome: Progressing  Goal: Maintains/Returns to pre admission functional level  Description: INTERVENTIONS:  - Perform BMAT or MOVE assessment daily    - Set and communicate daily mobility goal to care team and patient/family/caregiver     - Collaborate with rehabilitation services on mobility goals if consulted  - Perform Range of Motion 3 times a day  - Reposition patient every 2 hours    - Dangle patient 3 times a day  - Stand patient 3 times a day  - Ambulate patient 3 times a day  - Out of bed to chair 3 times a day   - Out of bed for meals 3 times a day  - Out of bed for toileting  - Record patient progress and toleration of activity level   Outcome: Progressing  Goal: Patient will remain free of falls  Description: INTERVENTIONS:  - Educate patient/family on patient safety including physical limitations  - Instruct patient to call for assistance with activity   - Consult OT/PT to assist with strengthening/mobility   - Keep Call bell within reach  - Keep bed low and locked with side rails adjusted as appropriate  - Keep care items and personal belongings within reach  - Initiate and maintain comfort rounds  - Make Fall Risk Sign visible to staff  - Offer Toileting every 2 Hours, in advance of need  - Initiate/Maintain bed alarm  - Obtain necessary fall risk management equipment: walker  - Apply yellow socks and bracelet for high fall risk patients  - Consider moving patient to room near nurses station  Outcome: Progressing     Problem: CARDIOVASCULAR - ADULT  Goal: Maintains optimal cardiac output and hemodynamic stability  Description: INTERVENTIONS:  - Monitor I/O, vital signs and rhythm  - Monitor for S/S and trends of decreased cardiac output  - Administer and titrate ordered vasoactive medications to optimize hemodynamic stability  - Assess quality of pulses, skin color and temperature  - Assess for signs of decreased coronary artery perfusion  - Instruct patient to report change in severity of symptoms  Outcome: Progressing  Goal: Absence of cardiac dysrhythmias or at baseline rhythm  Description: INTERVENTIONS:  - Continuous cardiac monitoring, vital signs, obtain 12 lead EKG if ordered  - Administer antiarrhythmic and heart rate control medications as ordered  - Monitor electrolytes and administer replacement therapy as ordered  Outcome: Progressing     Problem: NEUROSENSORY - ADULT  Goal: Achieves stable or improved neurological status  Description: INTERVENTIONS  - Monitor and report changes in neurological status  - Monitor vital signs such as temperature, blood pressure, glucose, and any other labs ordered   - Initiate measures to prevent increased intracranial pressure    Outcome: Progressing  Goal: Achieves maximal functionality and self care  Description: INTERVENTIONS  - Monitor swallowing and airway patency with patient fatigue and changes in neurological status  - Encourage and assist patient to increase activity and self care  - Encourage visually impaired, hearing impaired and aphasic patients to use assistive/communication devices  Outcome: Progressing     Problem: Prexisting or High Potential for Compromised Skin Integrity  Goal: Skin integrity is maintained or improved  Description: INTERVENTIONS:  - Identify patients at risk for skin breakdown  - Assess and monitor skin integrity  - Assess and monitor nutrition and hydration status  - Monitor labs   - Assess for incontinence   - Turn and reposition patient  - Assist with mobility/ambulation  - Relieve pressure over bony prominences  - Avoid friction and shearing  - Provide appropriate hygiene as needed including keeping skin clean and dry  - Evaluate need for skin moisturizer/barrier cream  - Collaborate with interdisciplinary team   - Patient/family teaching  - Consider wound care consult   Outcome: Progressing     Problem: Nutrition/Hydration-ADULT  Goal: Nutrient/Hydration intake appropriate for improving, restoring or maintaining nutritional needs  Description: Monitor and assess patient's nutrition/hydration status for malnutrition  Collaborate with interdisciplinary team and initiate plan and interventions as ordered    Monitor patient's weight and dietary intake as ordered or per policy  Utilize nutrition screening tool and intervene as necessary  Determine patient's food preferences and provide high-protein, high-caloric foods as appropriate       INTERVENTIONS:  - Monitor oral intake, urinary output, labs, and treatment plans  - Assess nutrition and hydration status and recommend course of action  - Evaluate amount of meals eaten  - Assist patient with eating if necessary   - Allow adequate time for meals  - Recommend/ encourage appropriate diets, oral nutritional supplements, and vitamin/mineral supplements  - Order, calculate, and assess calorie counts as needed  - Assess need for intravenous fluids  - Provide specific nutrition/hydration education as appropriate  - Include patient/family/caregiver in decisions related to nutrition  Outcome: Progressing

## 2023-05-10 NOTE — PHYSICAL THERAPY NOTE
PT TREATMENT       05/10/23 1435   PT Last Visit   PT Visit Date 05/10/23   Note Type   Note Type Treatment   Pain Assessment   Pain Assessment Tool FLACC   Pain Score 3   Pain Location/Orientation Location: Chest  (with mobility, no pain at rest)   Effect of Pain on Daily Activities limits mobility   Patient's Stated Pain Goal No pain   Hospital Pain Intervention(s) Repositioned   Multiple Pain Sites No   Pain Rating: FLACC (Rest) - Face 0   Pain Rating: FLACC (Rest) - Legs 0   Pain Rating: FLACC (Rest) - Activity 0   Pain Rating: FLACC (Rest) - Cry 0   Pain Rating: FLACC (Rest) - Consolability 0   Score: FLACC (Rest) 0   Pain Rating: FLACC (Activity) - Face 0   Pain Rating: FLACC (Activity) - Legs 0   Pain Rating: FLACC (Activity) - Activity 1   Pain Rating: FLACC (Activity) - Cry 1   Pain Rating: FLACC (Activity) - Consolability 1   Score: FLACC (Activity) 3   Restrictions/Precautions   Weight Bearing Precautions Per Order No   Other Precautions Bed Alarm; Chair Alarm; Fall Risk;Pain   General   Chart Reviewed Yes   Family/Caregiver Present Yes  ( at bedside)   Cognition   Overall Cognitive Status Impaired   Arousal/Participation Cooperative   Orientation Level Oriented to person;Oriented to place   Following Commands Follows one step commands with increased time or repetition   Subjective   Subjective Pt initially refusing OOB to chair but agreeable with encouragement   Bed Mobility   Supine to Sit 3  Moderate assistance   Additional items Assist x 1;Verbal cues   Additional Comments transferred to bedside chair   Transfers   Sit to Stand 4  Minimal assistance   Additional items Assist x 2;Verbal cues   Stand to Sit 4  Minimal assistance   Additional items Assist x 2;Verbal cues   Stand pivot 4  Minimal assistance   Additional items Assist x 2;Verbal cues   Ambulation/Elevation   Gait pattern Wide LOUIS; Foward flexed; Short stride; Step to  (2-3 side steps to bedside chair)   Gait Assistance 4  Minimal assist   Additional items Assist x 2;Verbal cues   Assistive Device Rolling walker   Distance 2 feet to chair   Stair Management Assistance Not tested   Balance   Static Sitting Fair   Dynamic Sitting Fair -   Static Standing Fair -   Dynamic Standing Poor +   Ambulatory Poor   Activity Tolerance   Activity Tolerance Patient limited by fatigue;Patient limited by pain   Nurse Made Aware yes   Assessment   Prognosis Good   Problem List Decreased strength;Decreased range of motion;Decreased endurance;Decreased mobility; Impaired balance;Decreased cognition; Impaired judgement;Decreased safety awareness;Pain   Assessment Pt seen for PT session this afternoon - agreeable to participate with verbal encouragement from this writer + spouse  Requires Mod A to sit at EOB + Min-Mod A x 2 to perform bed > chair transfer using RW  Pt requires 3 attempts to maintain standing at EOB due to LOB, returned to sitting  On third attempt able to transfer to chair with Min-Mod A x 2 with increased WOB, anxious  Once seated pt dependent x 2 to reposition to back of chair - instructed in deep breathing due to anxiety - repositioned for comfort with all needs within reach + RN Mickey present to assist   The patient's AM-PAC Basic Mobility Inpatient Short Form Raw Score is 12  A Raw score of less than or equal to 16 suggests the patient may benefit from discharge to post-acute rehabilitation services  Please also refer to the recommendation of the Physical Therapist for safe discharge planning  Goals   Patient Goals to rest anf go home   Plan   Treatment/Interventions ADL retraining;Functional transfer training;LE strengthening/ROM; Therapeutic exercise; Endurance training;Bed mobility;Gait training;Spoke to nursing   PT Frequency Other (Comment)  (5x/week)   Recommendation   PT Discharge Recommendation Post acute rehabilitation services   AM-PAC Basic Mobility Inpatient   Turning in Flat Bed Without Bedrails 3   Lying on Back to Sitting on Edge of Flat Bed Without Bedrails 2   Moving Bed to Chair 2   Standing Up From Chair Using Arms 2   Walk in Room 2   Climb 3-5 Stairs With Railing 1   Basic Mobility Inpatient Raw Score 12   Basic Mobility Standardized Score 32 23   Highest Level Of Mobility   -HLM Goal 4: Move to chair/commode   -HLM Achieved 4: Move to chair/commode   Education   Education Provided Mobility training   Patient Reinforcement needed   End of Consult   Patient Position at End of Consult Bedside chair;Bed/Chair alarm activated; All needs within reach   [facility] Insurance Number  Anna Monroy [id number]

## 2023-05-11 NOTE — ASSESSMENT & PLAN NOTE
Lab Results   Component Value Date    EGFR 38 05/10/2023    EGFR 43 05/09/2023    EGFR 52 05/08/2023    CREATININE 1 31 (H) 05/10/2023    CREATININE 1 20 05/09/2023    CREATININE 1 02 05/08/2023     Creatinine increased slightly

## 2023-05-11 NOTE — CASE MANAGEMENT
Case Management Progress Note    Patient name Ijeoma Bonds  Location [address] Geoff Caba 53-* MRN [id number]  : 1945 Date 2023       LOS (days): 3  Geometric Mean LOS (GMLOS) (days): 5 70  Days to GMLOS:3        OBJECTIVE:     Current admission status: Inpatient  Preferred Pharmacy:   [address] [place] [address], [facility] [address] [place] [address]  Phone: [phone] Fax: [phone]    [id number] [address]  [address] [address]  Phone: [phone] Fax: [phone]    Primary Care Provider: Yaron Ramirez MD    Primary Insurance: MEDICARE  Secondary Insurance: COMMERCIAL MISCELLANEOUS    PROGRESS NOTE:    [facility] updated via [facility]  Referral canceled

## 2023-05-11 NOTE — ASSESSMENT & PLAN NOTE
Patient has a midline catheter in the left arm and patient also noted to have some left arm swelling  left upper extremity Dopplers rule out DVT was ordered

## 2023-05-11 NOTE — NURSING NOTE
Pt screaming, agitated, A/Ox1 all night with heart rate in the 100s on telemetry  Pt noted to be martin to the 50s on telemetry around 0215  Upon assessment, pt minimally responsive  Rapid response called at 0219  HR 56, BP 62/45  Respiratory at bedside mechanically ventilating patient  Hospitalist calling family members to confirm code status  NS bolus running wide open  EPI administered x 2 doses  Family changed pt from full code to comfort care  Mechanical ventilation stopped  Time of death called at 12  [facility] network called at [phone]

## 2023-05-11 NOTE — ASSESSMENT & PLAN NOTE
Patient presented to the ER with slurring of speech that started when she woke up at around 9 AM   Last known normal was 9 PM before she went to sleep per significant other  Per ER note, some left-sided weakness was noted  CT of head neg for acute pathology  Was on ASA, Plavix and 40 mg statin per neurology  Lipid panel - LDL 37, HgA1C 5 3  MRI neg for acute CVA  Chronic infarcts were noted    Stable meningioma  CTA head/neck negative for significant cervical carotid stenosis but did show mild to moderate right intradural vertebral arteries atherosclerotic disease  Echo with bubble study as noted below  Neuro eval/speech therapy/PT/OT while here

## 2023-05-11 NOTE — ASSESSMENT & PLAN NOTE
Patient with history of dementia which is progressively worsening  Also with behavioral disturbances where patient tends to lash out at family members at times  Per ER note patient was screaming at everyone  · was Remeron, Namenda, Lexapro    · As was recommended by psychiatry on one of her prior admissions was on Zyprexa as needed

## 2023-05-11 NOTE — PLAN OF CARE
Problem: MOBILITY - ADULT  Goal: Maintain or return to baseline ADL function  Description: INTERVENTIONS:  -  Assess patient's ability to carry out ADLs; assess patient's baseline for ADL function and identify physical deficits which impact ability to perform ADLs (bathing, care of mouth/teeth, toileting, grooming, dressing, etc )  - Assess/evaluate cause of self-care deficits   - Assess range of motion  - Assess patient's mobility; develop plan if impaired  - Assess patient's need for assistive devices and provide as appropriate  - Encourage maximum independence but intervene and supervise when necessary  - Involve family in performance of ADLs  - Assess for home care needs following discharge   - Consider OT consult to assist with ADL evaluation and planning for discharge  - Provide patient education as appropriate  Outcome: Progressing  Goal: Maintains/Returns to pre admission functional level  Description: INTERVENTIONS:  - Perform BMAT or MOVE assessment daily    - Set and communicate daily mobility goal to care team and patient/family/caregiver  - Collaborate with rehabilitation services on mobility goals if consulted  - Perform Range of Motion 3 times a day  - Reposition patient every 2 hours    - Dangle patient 3 times a day  - Stand patient 3 times a day  - Ambulate patient 3 times a day  - Out of bed to chair 3 times a day   - Out of bed for meals 3 times a day  - Out of bed for toileting  - Record patient progress and toleration of activity level   Outcome: Progressing     Problem: PAIN - ADULT  Goal: Verbalizes/displays adequate comfort level or baseline comfort level  Description: Interventions:  - Encourage patient to monitor pain and request assistance  - Assess pain using appropriate pain scale  - Administer analgesics based on type and severity of pain and evaluate response  - Implement non-pharmacological measures as appropriate and evaluate response  - Consider cultural and social influences on pain and pain management  - Notify physician/advanced practitioner if interventions unsuccessful or patient reports new pain  Outcome: Progressing     Problem: INFECTION - ADULT  Goal: Absence or prevention of progression during hospitalization  Description: INTERVENTIONS:  - Assess and monitor for signs and symptoms of infection  - Monitor lab/diagnostic results  - Monitor all insertion sites, i e  indwelling lines, tubes, and drains  - [place] appropriate cooling/warming therapies per order  - Administer medications as ordered  - Instruct and encourage patient and family to use good hand hygiene technique  - Identify and instruct in appropriate isolation precautions for identified infection/condition  Outcome: Progressing  Goal: Absence of fever/infection during neutropenic period  Description: INTERVENTIONS:  - Monitor WBC    Outcome: Progressing     Problem: SAFETY ADULT  Goal: Maintain or return to baseline ADL function  Description: INTERVENTIONS:  -  Assess patient's ability to carry out ADLs; assess patient's baseline for ADL function and identify physical deficits which impact ability to perform ADLs (bathing, care of mouth/teeth, toileting, grooming, dressing, etc )  - Assess/evaluate cause of self-care deficits   - Assess range of motion  - Assess patient's mobility; develop plan if impaired  - Assess patient's need for assistive devices and provide as appropriate  - Encourage maximum independence but intervene and supervise when necessary  - Involve family in performance of ADLs  - Assess for home care needs following discharge   - Consider OT consult to assist with ADL evaluation and planning for discharge  - Provide patient education as appropriate  Outcome: Progressing  Goal: Maintains/Returns to pre admission functional level  Description: INTERVENTIONS:  - Perform BMAT or MOVE assessment daily    - Set and communicate daily mobility goal to care team and patient/family/caregiver     - Collaborate with rehabilitation services on mobility goals if consulted  - Perform Range of Motion 3 times a day  - Reposition patient every 2 hours    - Dangle patient 3 times a day  - Stand patient 3 times a day  - Ambulate patient 3 times a day  - Out of bed to chair 3 times a day   - Out of bed for meals 3 times a day  - Out of bed for toileting  - Record patient progress and toleration of activity level   Outcome: Progressing  Goal: Patient will remain free of falls  Description: INTERVENTIONS:  - Educate patient/family on patient safety including physical limitations  - Instruct patient to call for assistance with activity   - Consult OT/PT to assist with strengthening/mobility   - Keep Call bell within reach  - Keep bed low and locked with side rails adjusted as appropriate  - Keep care items and personal belongings within reach  - Initiate and maintain comfort rounds  - Make Fall Risk Sign visible to staff  - Offer Toileting every 2 Hours, in advance of need  - Initiate/Maintain bed alarm  - Obtain necessary fall risk management equipment: yellow socks  - Apply yellow socks and bracelet for high fall risk patients  - Consider moving patient to room near nurses station  Outcome: Progressing     Problem: CARDIOVASCULAR - ADULT  Goal: Maintains optimal cardiac output and hemodynamic stability  Description: INTERVENTIONS:  - Monitor I/O, vital signs and rhythm  - Monitor for S/S and trends of decreased cardiac output  - Administer and titrate ordered vasoactive medications to optimize hemodynamic stability  - Assess quality of pulses, skin color and temperature  - Assess for signs of decreased coronary artery perfusion  - Instruct patient to report change in severity of symptoms  Outcome: Progressing  Goal: Absence of cardiac dysrhythmias or at baseline rhythm  Description: INTERVENTIONS:  - Continuous cardiac monitoring, vital signs, obtain 12 lead EKG if ordered  - Administer antiarrhythmic and heart rate control medications as ordered  - Monitor electrolytes and administer replacement therapy as ordered  Outcome: Progressing     Problem: NEUROSENSORY - ADULT  Goal: Achieves stable or improved neurological status  Description: INTERVENTIONS  - Monitor and report changes in neurological status  - Monitor vital signs such as temperature, blood pressure, glucose, and any other labs ordered   - Initiate measures to prevent increased intracranial pressure    Outcome: Progressing  Goal: Achieves maximal functionality and self care  Description: INTERVENTIONS  - Monitor swallowing and airway patency with patient fatigue and changes in neurological status  - Encourage and assist patient to increase activity and self care  - Encourage visually impaired, hearing impaired and aphasic patients to use assistive/communication devices  Outcome: Progressing     Problem: Prexisting or High Potential for Compromised Skin Integrity  Goal: Skin integrity is maintained or improved  Description: INTERVENTIONS:  - Identify patients at risk for skin breakdown  - Assess and monitor skin integrity  - Assess and monitor nutrition and hydration status  - Monitor labs   - Assess for incontinence   - Turn and reposition patient  - Assist with mobility/ambulation  - Relieve pressure over bony prominences  - Avoid friction and shearing  - Provide appropriate hygiene as needed including keeping skin clean and dry  - Evaluate need for skin moisturizer/barrier cream  - Collaborate with interdisciplinary team   - Patient/family teaching  - Consider wound care consult   Outcome: Progressing     Problem: Nutrition/Hydration-ADULT  Goal: Nutrient/Hydration intake appropriate for improving, restoring or maintaining nutritional needs  Description: Monitor and assess patient's nutrition/hydration status for malnutrition  Collaborate with interdisciplinary team and initiate plan and interventions as ordered    Monitor patient's weight and dietary intake as ordered or per policy  Utilize nutrition screening tool and intervene as necessary  Determine patient's food preferences and provide high-protein, high-caloric foods as appropriate       INTERVENTIONS:  - Monitor oral intake, urinary output, labs, and treatment plans  - Assess nutrition and hydration status and recommend course of action  - Evaluate amount of meals eaten  - Assist patient with eating if necessary   - Allow adequate time for meals  - Recommend/ encourage appropriate diets, oral nutritional supplements, and vitamin/mineral supplements  - Order, calculate, and assess calorie counts as needed  - Assess need for intravenous fluids  - Provide specific nutrition/hydration education as appropriate  - Include patient/family/caregiver in decisions related to nutrition  Outcome: Progressing

## 2023-05-11 NOTE — DISCHARGE SUMMARY
Wendy 43  Discharge- Steve Quinonez 1945, 76 y o  female MRN: [id number]  Unit/Bed#: [address] [address] Encounter: [id number]  Primary Care Provider: Joel Casper MD   Date and time admitted to hospital: 5/7/2023 11:37 AM    * Slurred speech  Assessment & Plan  Patient presented to the ER with slurring of speech that started when she woke up at around 9 AM   Last known normal was 9 PM before she went to sleep per significant other  Per ER note, some left-sided weakness was noted  CT of head neg for acute pathology  Was on ASA, Plavix and 40 mg statin per neurology  Lipid panel - LDL 37, HgA1C 5 3  MRI neg for acute CVA  Chronic infarcts were noted  Stable meningioma  CTA head/neck negative for significant cervical carotid stenosis but did show mild to moderate right intradural vertebral arteries atherosclerotic disease  Echo with bubble study as noted below  Neuro eval/speech therapy/PT/OT while here    NSTEMI (non-ST elevated myocardial infarction) [facility])  Assessment & Plan  Patient reported chest pain overnight with EKG changes  · Peak troponin was 2268  Troponin was trending down  · Was on heparin drip for 48 hours then discontinued today  · Was on aspirin, Plavix, statin, Lopressor  · Echo showed EF of 55% with grade 2 diastolic dysfunction, no PFO no wall motion abnormality  · Initially medical management was decided but later after discussion with family patient was scheduled for cardiac catheterization today  · Nitropaste was discontinued and patient started on isosorbide 30 mg p o  daily      Recurrent UTI  Assessment & Plan  Based on UA  Started on IV ertapenem given history of ESBL E  Coli  Current urine cultures growing ESBL Klebsiella  Completed 3-day course of IV ertapenem on 5/9/2023    Acute on chronic anemia  Assessment & Plan   Hemoglobin dropped to 6 9  No evidence of active bleeding    Patient has been on iron supplementation  Iron panel showed percent saturation 17, TIBC 194, iron level of 33  Patient was given 1 unit of PBC transfusion and was also started on Venofer 100 mg IV    Results from last 7 days   Lab Units 05/10/23  1830 05/10/23  0801 05/10/23  0641 05/09/23  0550 05/08/23  1222 05/08/23  0425 05/07/23  1154 05/06/23  0526   HEMOGLOBIN g/dL 7 3* 6 9* 6 9* 8 0* 7 5* 7 5* 9 7* 8 4*   HEMATOCRIT % 24 5* 23 1* 22 8* 26 4* 25 3* 25 8* 32 3* 28 8*   MCV fL  --   --  93 92  --  94 95 94       Dementia with behavioral disturbance (HCC)  Assessment & Plan  Patient with history of dementia which is progressively worsening  Also with behavioral disturbances where patient tends to lash out at family members at times  Per ER note patient was screaming at everyone  · was Remeron, Namenda, Lexapro  · As was recommended by psychiatry on one of her prior admissions was on Zyprexa as needed    Left arm swelling  Assessment & Plan  Patient has a midline catheter in the left arm and patient also noted to have some left arm swelling  left upper extremity Dopplers rule out DVT was ordered    Stage 3b chronic kidney disease [facility])  Assessment & Plan  Lab Results   Component Value Date    EGFR 38 05/10/2023    EGFR 43 05/09/2023    EGFR 52 05/08/2023    CREATININE 1 31 (H) 05/10/2023    CREATININE 1 20 05/09/2023    CREATININE 1 02 05/08/2023     Creatinine increased slightly  Benign essential hypertension  Assessment & Plan  Initially held Lopressor to allow for permissive hypertension  · Lopressor restarted at a lower dose of 25 mg every 12 hours    Long-term use of immunosuppressant medication  Assessment & Plan  With history of liver transplant > 10 years ago  Was on CellCept, neorol    Hypothyroidism  Assessment & Plan  Continue Levothyroxine  TSH level elevated  · Per Significant other, patient takes levothyroxine along with her other medications      Medical Problems     Resolved Problems  Date Reviewed: 5/11/2023   None       Discharging Physician / Practitioner: Dorothy Hayes DO Main  PCP: Ze Jones MD  Admission Date:   Admission Orders (From admission, onward)     Ordered        23 1608  Inpatient Admission  Once            23 1304  Place in Observation  Once                      Discharge Date: 23    Consultations During Hospital Stay:  · Cardio, neuro    Procedures Performed:   · echo    Significant Findings / Test Results:   · See above    Incidental Findings:   · N/A    Test Results Pending at Discharge (will require follow up):   · N/A     Outpatient Tests Requested:  · N/A    Reason for Admission: slurred speech    Hospital Course: Claudette Melissa is a 76 y o  female patient who originally presented to the hospital on 2023 due to Slurred speech when she woke up at 9 AM   Last known normal was 9 PM before patient went to sleep  Some left-sided weakness was noted by EMS  Patient was mated and underwent work-up to rule out stroke  MRI was negative for stroke  During admission patient complained of chest pain and troponins were noted to be elevated  She was seen by cardiology and plan was for cardiac catheterization tomorrow for further evaluation  Bed response was called today as patient noted to become acutely bradycardic and became completely unresponsive  ICU AP present at bedside and patient received epi  I personally attempted to call patient's daughter twice and on the third time she answered my phone call  Discussed with her patient's current clinical status and overall poor prognosis  Patient's daughter then got on the phone with her brother and they decided to make the patient DNR/DNI and focus on keeping her comfortable  Resuscitation measures were terminated and patient  at 2:40 AM   Patient's daughter was called again and notified of her passing away  Condolences offered    Please see above list of diagnoses and related plan for additional information       Discharge Day Visit / Exam:   Subjective: see Exam from death "note    Vitals: Blood Pressure: (!) 62/45 (23)  Pulse: 56 (23)  Temperature: (!) 96 5 °F (35 8 °C) (05/10/23 2227)  Temp Source: Axillary (05/10/23 1220)  Respirations: 22 (05/10/23 2227)  Height: 5' 2\" (157 5 cm) (23)  Weight - Scale: 88 5 kg (195 lb 1 7 oz) (23)  SpO2: (!) 77 % (23)  Exam:   Physical Exam     Discussion with Family: Updated  (daughter) via phone  Discharge instructions/Information to patient and family:   See after visit summary for information provided to patient and family  Provisions for Follow-Up Care:  See after visit summary for information related to follow-up care and any pertinent home health orders  Disposition:       Planned Readmission: no     Discharge Statement:  I spent < 30 minutes discharging the patient  This time was spent on the day of discharge  I had direct contact with the patient on the day of discharge  Greater than 50% of the total time was spent examining patient, answering all patient questions, arranging and discussing plan of care with patient as well as directly providing post-discharge instructions  Additional time then spent on discharge activities  Discharge Medications:  See after visit summary for reconciled discharge medications provided to patient and/or family        **Please Note: This note may have been constructed using a voice recognition system**    "

## 2023-05-11 NOTE — ASSESSMENT & PLAN NOTE
Initially held Lopressor to allow for permissive hypertension  · Lopressor restarted at a lower dose of 25 mg every 12 hours

## 2023-05-11 NOTE — RAPID RESPONSE
Rapid Response Note  Prabha Kay 76 y o  female MRN: [id number]  Unit/Bed#: [address] [place] [address] Encounter: [id number]    Rapid Response Notification(s):   Response called date/time:  5/11/2023 2:20 AM  Response team arrival date/time:  5/11/2023 2:20 AM  Response end date/time:  5/11/2023 2:30 AM  [facility]  Rapid response location:  [facility] unit  Primary reason for rapid response call: Other (comment), acute change in heart rate and acute change in neuro status (Patient bradycardic on arrival to 45s  Unresponsive  Weak pulse)    Rapid Response Intervention(s):   Airway:  None  Breathing:  Oxygen and assisted ventilation  Fluids administered:  None  Medications administered:  Epinephrine       Assessment:   · On arrival to bedside the patient was unresponsive with agonal respirations, bradycardia and unresponsiveness  There was a weak femoral pulse audible via doppler  Epinephrine x1 amp was given and she was assisted with BVM on 100% FiO2 while the primary team contacted family  [address] discussions were ongoing with consideration for transition to comfort guided status yesterday  While primary team was discussing [address] via phone the patient again began to become bradycardic with difficulty finding a pulse  While preparing for intubation the daughter and son decided to make the patient comfort care  Aggressive resuscitation measures were terminated and the patient was transitioned to comfort care  Plan:   · Transition to comfort status     Rapid Response Outcome:   Transfer:  Remain on floor  Primary service notified of transfer: No    Code Status: Level 4 (DNR/comfort care)    Comments:  Per discussion with family by Dr Dory Londono, the patient was transitioned to comfort status  Family notified of transfer: no  Family member contacted: Daughter and son by primary team     Background/Situation:   Prabha Kay is a 76 y o  female with PMHx of dementia, anemia, HTN, CKD   During her admission she experienced "episodes of chest pain with elevated troponin to peak of 2268  She was receiving a heparin drip, plavix, statin and tentatively scheduled for a cardiac catheterization tomorrow  This evening she became bradycardic and unresponsive prompting a rapid response  On arrival of the team she had a weak pulse which improved with 1 round of epinephrine  Primary team contacted family for [address] discussions, however, during their discussion she again began to have a weak pulse then lost a pulse  Family decided to pursue comfort care and aggressive resuscitation measures were held  Review of Systems   Unable to perform ROS: Mental status change       Objective:   Vitals:    05/10/23 1220 05/10/23 1951 05/10/23 2227 05/11/23 0221   BP: 113/57 129/73 129/73 (!) 62/45   BP Location: Right arm      Pulse: 80 90 92 56   Resp:  18 22    Temp: (!) 97 2 °F (36 2 °C) (!) 97 4 °F (36 3 °C) (!) 96 5 °F (35 8 °C)    TempSrc: Axillary      SpO2: 98% 100% 97% (!) 77%   Weight:       Height:         Physical Exam  Cardiovascular:      Rate and Rhythm: Bradycardia present  Comments: Weakly palpable pulse, audible femoral pulse via doppler  Neurological:      Mental Status: She is unresponsive  GCS: GCS eye subscore is 1  GCS verbal subscore is 1  GCS motor subscore is 1  Portions of the record may have been created with voice recognition software  Occasional wrong word or \"sound a like\" substitutions may have occurred due to the inherent limitations of voice recognition software  Read the chart carefully and recognize, using context, where substitutions have occurred      MATTHEW Guerra    "

## 2023-05-11 NOTE — ASSESSMENT & PLAN NOTE
Hemoglobin dropped to 6 9  No evidence of active bleeding  Patient has been on iron supplementation  Iron panel showed percent saturation 17, TIBC 194, iron level of 33     Patient was given 1 unit of PBC transfusion and was also started on Venofer 100 mg IV    Results from last 7 days   Lab Units 05/10/23  1830 05/10/23  0801 05/10/23  0641 05/09/23  0550 05/08/23  1222 05/08/23  0425 05/07/23  1154 05/06/23  0526   HEMOGLOBIN g/dL 7 3* 6 9* 6 9* 8 0* 7 5* 7 5* 9 7* 8 4*   HEMATOCRIT % 24 5* 23 1* 22 8* 26 4* 25 3* 25 8* 32 3* 28 8*   MCV fL  --   --  93 92  --  94 95 94

## 2023-05-11 NOTE — ASSESSMENT & PLAN NOTE
Continue Levothyroxine  TSH level elevated  · Per Significant other, patient takes levothyroxine along with her other medications

## 2023-05-11 NOTE — ASSESSMENT & PLAN NOTE
Patient reported chest pain overnight with EKG changes  · Peak troponin was 2268    Troponin was trending down  · Was on heparin drip for 48 hours then discontinued today  · Was on aspirin, Plavix, statin, Lopressor  · Echo showed EF of 55% with grade 2 diastolic dysfunction, no PFO no wall motion abnormality  · Initially medical management was decided but later after discussion with family patient was scheduled for cardiac catheterization today  · Nitropaste was discontinued and patient started on isosorbide 30 mg p o  daily

## 2023-05-11 NOTE — DEATH NOTE
INPATIENT DEATH NOTE  Joyce Figueroa 76 y o  female MRN: [id number]  Unit/Bed#: [address] [place] [address] Encounter: [id number]    Date, Time and Cause of Death    Date of Death: 23  Time of Death:  2:40 AM  Preliminary Cause of Death: Cardiopulmonary arrest ([facility] [address] )  Entered by: Dr Mohit Herring     Attribution     MR1 1 Kyler Jacobo, DO 23 02:50               PHYSICAL EXAM:  Spontaneous respirations absent, Breath sounds absent, Carotid pulse absent, Heart sounds absent and Pupillary light reflex absent    Medical Examiner notification criteria:  NONE APPLICABLE   Medical Examiner's office notified?:  No, does not meet ME notification criteria   Medical Examiner accepted case?:  N/A  Name of Medical Examiner: N/A    Family Notification  Was the family notified?: Yes  Date Notified: 23  Time Notified: 80  Notified by: Dr Js Castaneda  Name of Family Notified of Death: Daughter - Leonard Codding   Relationship to Patient: Daughter  Family Notification Route: Telephone  Was the family told to contact a  home?: Yes    Autopsy Options:  Post-mortem examination declined by next of kin    Primary Service Attending Physician notified?:  yes - Attending:  Dr Js Castaneda    Physician/Resident responsible for completing Discharge Summary:  Dr Js Castaneda

## 2023-05-11 NOTE — ASSESSMENT & PLAN NOTE
Based on UA    Started on IV ertapenem given history of ESBL E  Coli  Current urine cultures growing ESBL Klebsiella  Completed 3-day course of IV ertapenem on 5/9/2023

## 2023-05-12 LAB
ATRIAL RATE: 89 BPM
P AXIS: 23 DEGREES
PR INTERVAL: 168 MS
QRS AXIS: -32 DEGREES
QRSD INTERVAL: 94 MS
QT INTERVAL: 398 MS
QTC INTERVAL: 484 MS
T WAVE AXIS: 113 DEGREES
VENTRICULAR RATE: 89 BPM

## 2024-04-05 NOTE — TELEPHONE ENCOUNTER
CRYOSURGERY      Your doctor has used a method called cryosurgery to treat your skin condition. Cryosurgery refers to the use of very cold substances to treat a variety of skin conditions such as warts, pre-skin cancers, molluscum contagiosum, sun spots, and several benign growths. The substance we use in cryosurgery is liquid nitrogen and is so cold (-195 degrees Celsius) that is burns when administered.     Following treatment in the office, the skin may immediately burn and become red. You may find the area around the lesion is affected as well. It is sometimes necessary to treat not only the lesion, but a small area of the surrounding normal skin to achieve a good response.     A blister, and even a blood filled blister, may form after treatment.   This is a normal response. If the blister is painful, it is acceptable to sterilize a needle and with rubbing alcohol and gently pop the blister. It is important that you gently wash the area with soap and warm water as the blister fluid may contain wart virus if a wart was treated. Do no remove the roof of the blister.     The area treated can take anywhere from 1-3 weeks to heal. Healing time depends on the kind skin lesion treated, the location, and how aggressively the lesion was treated. It is recommended that the areas treated are covered with Vaseline or bacitracin ointment and a band-aid. If a band-aid is not practical, just ointment applied several times per day will do. Keeping these areas moist will speed the healing time.    Treatment with liquid nitrogen can leave a scar. In dark skin, it may be a light or dark scar, in light skin it may be a white or pink scar. These will generally fade with time, but may never go away completely.     If you have any concerns after your treatment, please feel free to call the office.         [facility] - DERMATOLOGY  [address], [address]  [facility] [facility] [id number]  Dept: [phone]  Dept  She states she does not have a new Rx for ezetimibe (ZETIA) 10 mg tablet  May be reached at [phone]      Made patient awear we are short staff and may be contacted tomorrow Fax: [phone]

## (undated) DEVICE — 2.5MM DRILL BIT/QC/GOLD/110MM

## (undated) DEVICE — INTENDED FOR TISSUE SEPARATION, AND OTHER PROCEDURES THAT REQUIRE A SHARP SURGICAL BLADE TO PUNCTURE OR CUT.: Brand: BARD-PARKER SAFETY BLADES SIZE 15, STERILE

## (undated) DEVICE — DISPOSABLE BIOPSY VALVE MAJ-1555: Brand: SINGLE USE BIOPSY VALVE (STERILE)

## (undated) DEVICE — BRUSH ENDO CLEANING DBL-HEADER

## (undated) DEVICE — CHLORAPREP HI-LITE 26ML ORANGE

## (undated) DEVICE — DRAPE SHEET THREE QUARTER

## (undated) DEVICE — 3.5MM DRILL BIT/QC/110MM

## (undated) DEVICE — "MAJ-901 WATER CONTAINER SET CV-160/140": Brand: WATER CONTAINER

## (undated) DEVICE — "MB-142 MOUTHPIECE": Brand: MOUTHPIECE

## (undated) DEVICE — "MH-438 A/W VLVE F/140 EVIS-140": Brand: AIR/WATER VALVE

## (undated) DEVICE — GAUZE SPONGES,USP TYPE VII GAUZE, 12 PLY: Brand: CURITY

## (undated) DEVICE — CURITY NON-ADHERENT STRIPS: Brand: CURITY

## (undated) DEVICE — PADDING CAST 6IN COTTON STRL

## (undated) DEVICE — DRAPE EQUIPMENT RF WAND

## (undated) DEVICE — SPONGE SCRUB 4 PCT CHLORHEXIDINE

## (undated) DEVICE — GAUZE SPONGES,16 PLY: Brand: CURITY

## (undated) DEVICE — BANDAGE, ESMARK LF STR 6"X9' (20/CS): Brand: CYPRESS

## (undated) DEVICE — PENCIL ELECTROSURG E-Z CLEAN -0035H

## (undated) DEVICE — NEEDLE 21 G X 1 1/2 SAFETY

## (undated) DEVICE — GLOVE SRG BIOGEL ORTHOPEDIC 8

## (undated) DEVICE — SINGLE-USE BIOPSY FORCEPS: Brand: RADIAL JAW 4

## (undated) DEVICE — GLOVE INDICATOR PI UNDERGLOVE SZ 8 BLUE

## (undated) DEVICE — SUT VICRYL 0 CT-1 18 IN J740D

## (undated) DEVICE — "MH-443 SUCTION VALVE F/EVIS140 EVIS160": Brand: SUCTION VALVE

## (undated) DEVICE — ELECTRODE BLADE MOD E-Z CLEAN 2.5IN 6.4CM -0012M

## (undated) DEVICE — DRAPE C-ARMOUR

## (undated) DEVICE — SOLIDIFIER FLUID WASTE CONTROL 1500ML

## (undated) DEVICE — ABDOMINAL PAD: Brand: DERMACEA

## (undated) DEVICE — 1200CC GUARDIAN II: Brand: GUARDIAN

## (undated) DEVICE — PADDING CAST 4 IN  COTTON STRL

## (undated) DEVICE — ACE WRAP 6 IN UNSTERILE

## (undated) DEVICE — ACE WRAP 6 IN STERILE

## (undated) DEVICE — DRAPE C-ARM X-RAY

## (undated) DEVICE — SUT VICRYL PLUS 2-0 CTB-1 27 IN VCPB259H

## (undated) DEVICE — AIRLIFE™  ADULT CUSHION NASAL CANNULA WITH 7 FOOT (2.1 M) CRUSH-RESISTANT OXYGEN TUBING, AND U/CONNECT-IT ADAPTER: Brand: AIRLIFE™

## (undated) DEVICE — GLOVE EXAM NON-STRL NTRL PLUS LRG PF

## (undated) DEVICE — LUBRICANT SURGILUBE TUBE 4 OZ  FLIP TOP

## (undated) DEVICE — ACE WRAP 4 IN UNSTERILE

## (undated) DEVICE — BITE BLOCK ENDO 60FR ADLT MAXI  DISP W/STRAP

## (undated) DEVICE — MEDI-VAC YANKAUER SUCTION HANDLE: Brand: CARDINAL HEALTH

## (undated) DEVICE — SUT ETHILON 3-0 FS-1 18 IN 663G

## (undated) DEVICE — TUBING AUX CHANNEL

## (undated) DEVICE — SYRINGE 10ML LL CONTROL TOP

## (undated) DEVICE — UNIVERSAL MAJOR EXTREMITY,KIT: Brand: CARDINAL HEALTH

## (undated) DEVICE — TUBING BUBBLE CLEAR 5MM X 100 FT NS